# Patient Record
Sex: MALE | Race: WHITE | Employment: OTHER | URBAN - METROPOLITAN AREA
[De-identification: names, ages, dates, MRNs, and addresses within clinical notes are randomized per-mention and may not be internally consistent; named-entity substitution may affect disease eponyms.]

---

## 2018-03-15 ENCOUNTER — OFFICE VISIT (OUTPATIENT)
Dept: NEUROSURGERY | Age: 53
End: 2018-03-15
Payer: MEDICARE

## 2018-03-15 VITALS
DIASTOLIC BLOOD PRESSURE: 98 MMHG | BODY MASS INDEX: 33.18 KG/M2 | SYSTOLIC BLOOD PRESSURE: 150 MMHG | WEIGHT: 245 LBS | HEIGHT: 72 IN | HEART RATE: 90 BPM

## 2018-03-15 DIAGNOSIS — M54.42 CHRONIC MIDLINE LOW BACK PAIN WITH BILATERAL SCIATICA: Primary | ICD-10-CM

## 2018-03-15 DIAGNOSIS — G89.29 CHRONIC MIDLINE LOW BACK PAIN WITH BILATERAL SCIATICA: Primary | ICD-10-CM

## 2018-03-15 DIAGNOSIS — M54.41 CHRONIC MIDLINE LOW BACK PAIN WITH BILATERAL SCIATICA: Primary | ICD-10-CM

## 2018-03-15 PROCEDURE — 99204 OFFICE O/P NEW MOD 45 MIN: CPT | Performed by: NEUROLOGICAL SURGERY

## 2018-03-15 RX ORDER — ROSUVASTATIN CALCIUM 10 MG/1
10 TABLET, COATED ORAL DAILY
COMMUNITY

## 2018-03-15 RX ORDER — LOSARTAN POTASSIUM 50 MG/1
50 TABLET ORAL DAILY
Status: ON HOLD | COMMUNITY
End: 2022-04-27 | Stop reason: HOSPADM

## 2018-03-15 RX ORDER — OXYCODONE HYDROCHLORIDE 15 MG/1
1 TABLET, FILM COATED, EXTENDED RELEASE ORAL EVERY 12 HOURS
Status: ON HOLD | COMMUNITY
End: 2022-04-27 | Stop reason: HOSPADM

## 2018-03-15 RX ORDER — FUROSEMIDE 20 MG/1
20 TABLET ORAL DAILY
Status: ON HOLD | COMMUNITY
End: 2022-04-27 | Stop reason: HOSPADM

## 2018-03-15 RX ORDER — POTASSIUM CHLORIDE 1.5 G/1.77G
10 POWDER, FOR SOLUTION ORAL DAILY
Status: ON HOLD | COMMUNITY
End: 2022-04-27 | Stop reason: HOSPADM

## 2018-03-15 ASSESSMENT — ENCOUNTER SYMPTOMS
EYES NEGATIVE: 1
BACK PAIN: 1
ABDOMINAL PAIN: 0
BOWEL INCONTINENCE: 0
RESPIRATORY NEGATIVE: 1
GASTROINTESTINAL NEGATIVE: 1

## 2018-03-15 NOTE — PROGRESS NOTES
Mouth/Throat: Oropharynx is clear and moist. No oropharyngeal exudate. Eyes: EOM are normal. Pupils are equal, round, and reactive to light. Right eye exhibits no discharge. Left eye exhibits no discharge. No scleral icterus. Neck: Normal range of motion. Neck supple. No JVD present. No tracheal deviation present. No thyromegaly present. Pulmonary/Chest: No stridor. No apnea, no tachypnea and no bradypnea. He is not intubated. Abdominal: Normal appearance. He exhibits no distension. Musculoskeletal: He exhibits no edema, tenderness or deformity. Lymphadenopathy:     He has no cervical adenopathy. Neurological: He is alert. He has normal strength and normal reflexes. He is not disoriented. He displays no atrophy, no tremor and normal reflexes. No cranial nerve deficit or sensory deficit. He exhibits normal muscle tone. He displays a negative Romberg sign. He displays no seizure activity. Coordination and gait normal. GCS eye subscore is 4. GCS verbal subscore is 5. GCS motor subscore is 6. He displays no Babinski's sign on the right side. He displays no Babinski's sign on the left side. Reflex Scores:       Tricep reflexes are 2+ on the right side and 2+ on the left side. Bicep reflexes are 2+ on the right side and 2+ on the left side. Brachioradialis reflexes are 2+ on the right side and 2+ on the left side. Patellar reflexes are 2+ on the right side and 2+ on the left side. Achilles reflexes are 2+ on the right side and 2+ on the left side. Skin: Skin is warm and dry. No rash noted. He is not diaphoretic. No erythema. No pallor. Psychiatric: He has a normal mood and affect. His behavior is normal. Judgment and thought content normal.   Vitals reviewed. Assessment:      46year old male who presents with back pain that radiates into both legs. 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 He has failed epidurals and PT.

## 2018-03-15 NOTE — PATIENT INSTRUCTIONS
Patient Education        Back Pain: Care Instructions  Your Care Instructions    Back pain has many possible causes. It is often related to problems with muscles and ligaments of the back. It may also be related to problems with the nerves, discs, or bones of the back. Moving, lifting, standing, sitting, or sleeping in an awkward way can strain the back. Sometimes you don't notice the injury until later. Arthritis is another common cause of back pain. Although it may hurt a lot, back pain usually improves on its own within several weeks. Most people recover in 12 weeks or less. Using good home treatment and being careful not to stress your back can help you feel better sooner. Follow-up care is a key part of your treatment and safety. Be sure to make and go to all appointments, and call your doctor if you are having problems. It's also a good idea to know your test results and keep a list of the medicines you take. How can you care for yourself at home? · Sit or lie in positions that are most comfortable and reduce your pain. Try one of these positions when you lie down:  ¨ Lie on your back with your knees bent and supported by large pillows. ¨ Lie on the floor with your legs on the seat of a sofa or chair. Bharat Cornea on your side with your knees and hips bent and a pillow between your legs. ¨ Lie on your stomach if it does not make pain worse. · Do not sit up in bed, and avoid soft couches and twisted positions. Bed rest can help relieve pain at first, but it delays healing. Avoid bed rest after the first day of back pain. · Change positions every 30 minutes. If you must sit for long periods of time, take breaks from sitting. Get up and walk around, or lie in a comfortable position. · Try using a heating pad on a low or medium setting for 15 to 20 minutes every 2 or 3 hours. Try a warm shower in place of one session with the heating pad.   · You can also try an ice pack for 10 to 15 minutes every 2 to 3 hours. Put a thin cloth between the ice pack and your skin. · Take pain medicines exactly as directed. ¨ If the doctor gave you a prescription medicine for pain, take it as prescribed. ¨ If you are not taking a prescription pain medicine, ask your doctor if you can take an over-the-counter medicine. · Take short walks several times a day. You can start with 5 to 10 minutes, 3 or 4 times a day, and work up to longer walks. Walk on level surfaces and avoid hills and stairs until your back is better. · Return to work and other activities as soon as you can. Continued rest without activity is usually not good for your back. · To prevent future back pain, do exercises to stretch and strengthen your back and stomach. Learn how to use good posture, safe lifting techniques, and proper body mechanics. When should you call for help? Call your doctor now or seek immediate medical care if:  ? · You have new or worsening numbness in your legs. ? · You have new or worsening weakness in your legs. (This could make it hard to stand up.)   ? · You lose control of your bladder or bowels. ? Watch closely for changes in your health, and be sure to contact your doctor if:  ? · Your pain gets worse. ? · You are not getting better after 2 weeks. Where can you learn more? Go to https://[email or web address]. org and sign in to your [employer] account. Enter H801 in the [facility] box to learn more about \"Back Pain: Care Instructions. \"     If you do not have an account, please click on the \"Sign Up Now\" link. Current as of: March 21, 2017  Content Version: 11.5  © 3376-5363 [employer]wise, Incorporated. Care instructions adapted under license by [facility] If you have questions about a medical condition or this instruction, always ask your healthcare professional. [address] any warranty or liability for your use of this information.

## 2018-03-15 NOTE — COMMUNICATION BODY
Assessment:     46year old male who presents with back pain that radiates into both legs. 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 He has failed epidurals and PT. Plan:     I have advised him that he would benefit from an L4-L5 and L5-S1 posterior lumbar interbody fusion. He needs to quit smoking and he wants to hold off for as long as possible and I am in agreement .

## 2018-03-26 ENCOUNTER — HOSPITAL ENCOUNTER (OUTPATIENT)
Age: 53
Discharge: HOME | End: 2018-03-28
Payer: MEDICARE

## 2018-03-26 ENCOUNTER — HOSPITAL ENCOUNTER (OUTPATIENT)
Dept: GENERAL RADIOLOGY | Age: 53
Discharge: HOME | End: 2018-03-28
Payer: MEDICARE

## 2018-03-26 DIAGNOSIS — M54.42 CHRONIC MIDLINE LOW BACK PAIN WITH BILATERAL SCIATICA: ICD-10-CM

## 2018-03-26 DIAGNOSIS — M54.41 CHRONIC MIDLINE LOW BACK PAIN WITH BILATERAL SCIATICA: ICD-10-CM

## 2018-03-26 DIAGNOSIS — G89.29 CHRONIC MIDLINE LOW BACK PAIN WITH BILATERAL SCIATICA: ICD-10-CM

## 2018-03-26 PROCEDURE — 73521 X-RAY EXAM HIPS BI 2 VIEWS: CPT

## 2022-03-15 ENCOUNTER — APPOINTMENT (OUTPATIENT)
Dept: GENERAL RADIOLOGY | Age: 57
End: 2022-03-15
Payer: MEDICARE

## 2022-03-15 ENCOUNTER — HOSPITAL ENCOUNTER (EMERGENCY)
Age: 57
Discharge: HOME | End: 2022-03-15
Attending: EMERGENCY MEDICINE
Payer: MEDICARE

## 2022-03-15 VITALS
DIASTOLIC BLOOD PRESSURE: 87 MMHG | HEART RATE: 80 BPM | OXYGEN SATURATION: 98 % | SYSTOLIC BLOOD PRESSURE: 157 MMHG | TEMPERATURE: 97.8 F | RESPIRATION RATE: 16 BRPM | BODY MASS INDEX: 38.65 KG/M2 | WEIGHT: 285 LBS

## 2022-03-15 DIAGNOSIS — R07.9 CHEST PAIN, UNSPECIFIED TYPE: Primary | ICD-10-CM

## 2022-03-15 DIAGNOSIS — E87.1 HYPONATREMIA: ICD-10-CM

## 2022-03-15 LAB
ALBUMIN SERPL-MCNC: 4.6 G/DL (ref 3.5–5.2)
ALP BLD-CCNC: 122 U/L (ref 40–129)
ALT SERPL-CCNC: 73 U/L (ref 0–40)
ANION GAP SERPL CALCULATED.3IONS-SCNC: 14 MMOL/L (ref 7–16)
AST SERPL-CCNC: 70 U/L (ref 0–39)
BASOPHILS ABSOLUTE: 0 E9/L (ref 0–0.2)
BASOPHILS RELATIVE PERCENT: 0.3 % (ref 0–2)
BILIRUB SERPL-MCNC: 0.8 MG/DL (ref 0–1.2)
BUN BLDV-MCNC: 8 MG/DL (ref 6–20)
CALCIUM SERPL-MCNC: 9.7 MG/DL (ref 8.6–10.2)
CHLORIDE BLD-SCNC: 81 MMOL/L (ref 98–107)
CO2: 28 MMOL/L (ref 22–29)
CREAT SERPL-MCNC: 0.8 MG/DL (ref 0.7–1.2)
D DIMER: <200 NG/ML DDU
EOSINOPHILS ABSOLUTE: 0 E9/L (ref 0.05–0.5)
EOSINOPHILS RELATIVE PERCENT: 0.2 % (ref 0–6)
GFR AFRICAN AMERICAN: >60
GFR NON-AFRICAN AMERICAN: >60 ML/MIN/1.73
GLUCOSE BLD-MCNC: 147 MG/DL (ref 74–99)
HCT VFR BLD CALC: 39.2 % (ref 37–54)
HEMOGLOBIN: 12.7 G/DL (ref 12.5–16.5)
HYPOCHROMIA: ABNORMAL
LYMPHOCYTES ABSOLUTE: 0.51 E9/L (ref 1.5–4)
LYMPHOCYTES RELATIVE PERCENT: 7.9 % (ref 20–42)
MCH RBC QN AUTO: 25.2 PG (ref 26–35)
MCHC RBC AUTO-ENTMCNC: 32.4 % (ref 32–34.5)
MCV RBC AUTO: 77.9 FL (ref 80–99.9)
MONOCYTES ABSOLUTE: 0.26 E9/L (ref 0.1–0.95)
MONOCYTES RELATIVE PERCENT: 3.5 % (ref 2–12)
NEUTROPHILS ABSOLUTE: 5.7 E9/L (ref 1.8–7.3)
NEUTROPHILS RELATIVE PERCENT: 88.6 % (ref 43–80)
OVALOCYTES: ABNORMAL
PDW BLD-RTO: 19.7 FL (ref 11.5–15)
PLATELET # BLD: 243 E9/L (ref 130–450)
PMV BLD AUTO: 8.8 FL (ref 7–12)
POIKILOCYTES: ABNORMAL
POLYCHROMASIA: ABNORMAL
POTASSIUM REFLEX MAGNESIUM: 3.9 MMOL/L (ref 3.5–5)
PRO-BNP: 127 PG/ML (ref 0–125)
RBC # BLD: 5.03 E12/L (ref 3.8–5.8)
SODIUM BLD-SCNC: 123 MMOL/L (ref 132–146)
TARGET CELLS: ABNORMAL
TEAR DROP CELLS: ABNORMAL
TOTAL PROTEIN: 8.5 G/DL (ref 6.4–8.3)
TROPONIN, HIGH SENSITIVITY: <6 NG/L (ref 0–11)
WBC # BLD: 6.4 E9/L (ref 4.5–11.5)

## 2022-03-15 PROCEDURE — 99284 EMERGENCY DEPT VISIT MOD MDM: CPT

## 2022-03-15 PROCEDURE — 71045 X-RAY EXAM CHEST 1 VIEW: CPT

## 2022-03-15 PROCEDURE — 83880 ASSAY OF NATRIURETIC PEPTIDE: CPT

## 2022-03-15 PROCEDURE — 96374 THER/PROPH/DIAG INJ IV PUSH: CPT

## 2022-03-15 PROCEDURE — 93005 ELECTROCARDIOGRAM TRACING: CPT | Performed by: STUDENT IN AN ORGANIZED HEALTH CARE EDUCATION/TRAINING PROGRAM

## 2022-03-15 PROCEDURE — 85025 COMPLETE CBC W/AUTO DIFF WBC: CPT

## 2022-03-15 PROCEDURE — 2580000003 HC RX 258: Performed by: STUDENT IN AN ORGANIZED HEALTH CARE EDUCATION/TRAINING PROGRAM

## 2022-03-15 PROCEDURE — 85378 FIBRIN DEGRADE SEMIQUANT: CPT

## 2022-03-15 PROCEDURE — 6360000002 HC RX W HCPCS: Performed by: STUDENT IN AN ORGANIZED HEALTH CARE EDUCATION/TRAINING PROGRAM

## 2022-03-15 PROCEDURE — 6370000000 HC RX 637 (ALT 250 FOR IP): Performed by: STUDENT IN AN ORGANIZED HEALTH CARE EDUCATION/TRAINING PROGRAM

## 2022-03-15 PROCEDURE — 80053 COMPREHEN METABOLIC PANEL: CPT

## 2022-03-15 PROCEDURE — 84484 ASSAY OF TROPONIN QUANT: CPT

## 2022-03-15 RX ORDER — 0.9 % SODIUM CHLORIDE 0.9 %
1000 INTRAVENOUS SOLUTION INTRAVENOUS ONCE
Status: COMPLETED | OUTPATIENT
Start: 2022-03-15 | End: 2022-03-15

## 2022-03-15 RX ORDER — ASPIRIN 325 MG
325 TABLET ORAL ONCE
Status: COMPLETED | OUTPATIENT
Start: 2022-03-15 | End: 2022-03-15

## 2022-03-15 RX ORDER — KETOROLAC TROMETHAMINE 30 MG/ML
30 INJECTION, SOLUTION INTRAMUSCULAR; INTRAVENOUS ONCE
Status: COMPLETED | OUTPATIENT
Start: 2022-03-15 | End: 2022-03-15

## 2022-03-15 RX ORDER — PROMETHAZINE HYDROCHLORIDE 25 MG/1
12.5 TABLET ORAL EVERY 6 HOURS PRN
Qty: 28 TABLET | Refills: 0 | Status: SHIPPED | OUTPATIENT
Start: 2022-03-15 | End: 2022-03-22

## 2022-03-15 RX ORDER — NAPROXEN 500 MG/1
500 TABLET ORAL 2 TIMES DAILY WITH MEALS
Qty: 30 TABLET | Refills: 1 | Status: ON HOLD | OUTPATIENT
Start: 2022-03-15 | End: 2022-04-27 | Stop reason: HOSPADM

## 2022-03-15 RX ADMIN — KETOROLAC TROMETHAMINE 30 MG: 30 INJECTION, SOLUTION INTRAMUSCULAR; INTRAVENOUS at 14:02

## 2022-03-15 RX ADMIN — ASPIRIN 325 MG: 325 TABLET ORAL at 12:35

## 2022-03-15 RX ADMIN — SODIUM CHLORIDE 1000 ML: 9 INJECTION, SOLUTION INTRAVENOUS at 14:00

## 2022-03-15 ASSESSMENT — ENCOUNTER SYMPTOMS
SORE THROAT: 0
NAUSEA: 1
DIARRHEA: 0
SHORTNESS OF BREATH: 1
ABDOMINAL PAIN: 0
COUGH: 0
VOICE CHANGE: 0
TROUBLE SWALLOWING: 0
PHOTOPHOBIA: 0
VOMITING: 1

## 2022-03-15 ASSESSMENT — PAIN SCALES - GENERAL
PAINLEVEL_OUTOF10: 7
PAINLEVEL_OUTOF10: 8
PAINLEVEL_OUTOF10: 8

## 2022-03-15 NOTE — ED PROVIDER NOTES
HPI   Patient is a 54-year-old current-smoking male present emergency department due to worsening acute chest pain. Patient symptoms started 3 days ago. He denies any inciting factors for his symptoms. He is endorsing associated diaphoresis, nausea, vomiting and shortness of breath. Patient localizes pain to his mid to left chest.  The pain is dull and intermittently sharp. Patient states the pain is intermittent and nonradiating. Nothing particular makes the pain better or worse. No particular association with exertion. Patient denies any personal cardiac history. No risk factors for PE including recent prolonged travel, recent surgeries, history of malignancy, hemoptysis, unilateral leg swelling or history of VTE. Patient also denies any recent sick contacts or exposure to known Covid positive individuals. Patient is otherwise denying fever, chills, headache, weakness, lightheadedness, urinary symptoms, constipation or diarrhea. His symptoms are moderate with no remitting or exacerbating factors. Review of Systems   Constitutional: Positive for diaphoresis and fatigue. Negative for chills and fever. Generalized fatigue. HENT: Negative for congestion, sore throat, trouble swallowing and voice change. Eyes: Negative for photophobia and visual disturbance. Respiratory: Positive for shortness of breath. Negative for cough. Cardiovascular: Positive for chest pain. Negative for palpitations. Gastrointestinal: Positive for nausea and vomiting. Negative for abdominal pain and diarrhea. Genitourinary: Negative for dysuria, flank pain and urgency. Musculoskeletal: Negative for arthralgias and myalgias. Skin: Negative for rash and wound. Neurological: Negative for dizziness, numbness and headaches. Psychiatric/Behavioral: Negative for behavioral problems and confusion. Physical Exam  Constitutional:       General: He is not in acute distress.      Appearance: Normal appearance. He is obese. He is not ill-appearing. HENT:      Head: Normocephalic and atraumatic. Mouth/Throat:      Mouth: Mucous membranes are moist.      Pharynx: Oropharynx is clear. Eyes:      Pupils: Pupils are equal, round, and reactive to light. Cardiovascular:      Rate and Rhythm: Normal rate and regular rhythm. Pulses: Normal pulses. Heart sounds: Normal heart sounds. Pulmonary:      Effort: Pulmonary effort is normal. No respiratory distress. Breath sounds: Decreased breath sounds present. No wheezing or rales. Comments: Mildly diminished breath sounds throughout likely exacerbated by body habitus. Abdominal:      Palpations: Abdomen is soft. Tenderness: There is no abdominal tenderness. There is no guarding or rebound. Musculoskeletal:      Cervical back: Normal range of motion and neck supple. Skin:     General: Skin is warm and dry. Capillary Refill: Capillary refill takes less than 2 seconds. Neurological:      General: No focal deficit present. Mental Status: He is alert and oriented to person, place, and time. Cranial Nerves: No cranial nerve deficit. Coordination: Coordination normal.   Psychiatric:         Mood and Affect: Mood normal.         Behavior: Behavior normal.          Procedures   EKG: This EKG is signed and interpreted by me. Normal sinus rhythm with a ventricular rate of 79 bpm.  Normal axis. AR interval normal.  QTc significantly prolonged at 749 ms. No evidence of acute STEMI. T wave flattening/inversions in V1 through V3. T wave changes compared to previous EKG on 11/12/2013 but otherwise stable. MDM   Patient is a 55-year-old current smoking male presenting the emergency department due to worsening acute chest pain. Patient's pain has been ongoing for 3 days. Nothing in particular makes the pain better or worse. Patient endorsing nausea, vomiting, diaphoresis and generalized fatigue.   Aspirin given in the ED. Patient denies personal cardiac history. On initial exam, patient non-toxic appearing, afebrile, hemodynamically stable, and in no acute distress. Work-up in the emergency department unremarkable. Troponin negative. EKG sinus and nonspecific. Chest x-ray negative. Patient has no risk factors for PE.  D-dimer negative. CBC with no leukocytosis or anemia. CMP showing mild hyponatremia with sodium of 123 but otherwise unremarkable. Patient given IV fluids in the ED. Also given IV Toradol with improvement in his chest pain. Discussed hyponatremic labs with the patient and he is agreeable to follow this up with his PCP as an outpatient. Patient main stable and agreeable to discharge after work-up and results were discussed with him. Given strict return precautions in case of new or worsening symptoms. ED Course as of 03/15/22 1529   Tue Mar 15, 2022   1334 EKG: This EKG is signed and interpreted by me. Normal sinus rhythm with a ventricular rate of 79 bpm.  Normal axis. FL interval normal.  QTc significantly prolonged at 749 ms. No evidence of acute STEMI. T wave flattening/inversions in V1 through V3. [PP]   1407 Patient reevaluated. He states that he is still having pain in the chest.  We will try Toradol and reevaluate. Work-up that has returned is unremarkable. D-dimer pending. [PP]   1788 Reevaluated and states that he is feeling better after Toradol. Work-up and results were discussed with the patient at length. He is agreeable to discharge home with outpatient follow-up as needed. [PP]      ED Course User Index  [PP] You Mann DO          --------------------------------------------- PAST HISTORY ---------------------------------------------  Past Medical History:  has a past medical history of Arthritis, Back pain, chronic, Hyperlipidemia, Hypertension, and Neck pain, chronic.     Past Surgical History:  has a past surgical history that includes External ear surgery; fracture surgery; eye surgery; Hemorrhoid surgery; and Shoulder arthroscopy (Right, 1/16/2015). Social History:  reports that he has been smoking cigarettes. He has a 9.50 pack-year smoking history. He has never used smokeless tobacco. He reports current alcohol use of about 2.0 standard drinks of alcohol per week. He reports that he does not use drugs. Family History: family history is not on file. The patients home medications have been reviewed.     Allergies: Pcn [penicillins]    -------------------------------------------------- RESULTS -------------------------------------------------  Labs:  Results for orders placed or performed during the hospital encounter of 03/15/22   CBC with Auto Differential   Result Value Ref Range    WBC 6.4 4.5 - 11.5 E9/L    RBC 5.03 3.80 - 5.80 E12/L    Hemoglobin 12.7 12.5 - 16.5 g/dL    Hematocrit 39.2 37.0 - 54.0 %    MCV 77.9 (L) 80.0 - 99.9 fL    MCH 25.2 (L) 26.0 - 35.0 pg    MCHC 32.4 32.0 - 34.5 %    RDW 19.7 (H) 11.5 - 15.0 fL    Platelets 236 364 - 778 E9/L    MPV 8.8 7.0 - 12.0 fL    Neutrophils % 88.6 (H) 43.0 - 80.0 %    Lymphocytes % 7.9 (L) 20.0 - 42.0 %    Monocytes % 3.5 2.0 - 12.0 %    Eosinophils % 0.2 0.0 - 6.0 %    Basophils % 0.3 0.0 - 2.0 %    Neutrophils Absolute 5.70 1.80 - 7.30 E9/L    Lymphocytes Absolute 0.51 (L) 1.50 - 4.00 E9/L    Monocytes Absolute 0.26 0.10 - 0.95 E9/L    Eosinophils Absolute 0.00 (L) 0.05 - 0.50 E9/L    Basophils Absolute 0.00 0.00 - 0.20 E9/L    Polychromasia 1+     Hypochromia 1+     Poikilocytes 1+     Ovalocytes 1+     Target Cells 1+     Tear Drop Cells 1+    Comprehensive Metabolic Panel w/ Reflex to MG   Result Value Ref Range    Sodium 123 (L) 132 - 146 mmol/L    Potassium reflex Magnesium 3.9 3.5 - 5.0 mmol/L    Chloride 81 (L) 98 - 107 mmol/L    CO2 28 22 - 29 mmol/L    Anion Gap 14 7 - 16 mmol/L    Glucose 147 (H) 74 - 99 mg/dL    BUN 8 6 - 20 mg/dL    CREATININE 0.8 0.7 - 1.2 mg/dL    GFR Non-African American >60 >=60 mL/min/1.73    GFR African American >60     Calcium 9.7 8.6 - 10.2 mg/dL    Total Protein 8.5 (H) 6.4 - 8.3 g/dL    Albumin 4.6 3.5 - 5.2 g/dL    Total Bilirubin 0.8 0.0 - 1.2 mg/dL    Alkaline Phosphatase 122 40 - 129 U/L    ALT 73 (H) 0 - 40 U/L    AST 70 (H) 0 - 39 U/L   Troponin   Result Value Ref Range    Troponin, High Sensitivity <6 0 - 11 ng/L   Brain Natriuretic Peptide   Result Value Ref Range    Pro- (H) 0 - 125 pg/mL   D-Dimer, Quantitative   Result Value Ref Range    D-Dimer, Quant <200 ng/mL DDU       Radiology:  XR CHEST 1 VIEW   Final Result   No acute process. ------------------------- NURSING NOTES AND VITALS REVIEWED ---------------------------  Date / Time Roomed:  3/15/2022 12:11 PM  ED Bed Assignment:  19/19    The nursing notes within the ED encounter and vital signs as below have been reviewed. BP (!) 154/88   Pulse 88   Temp 97.8 °F (36.6 °C)   Resp 24   Wt 285 lb (129.3 kg)   SpO2 98%   BMI 38.65 kg/m²   Oxygen Saturation Interpretation: Normal      ------------------------------------------ PROGRESS NOTES ------------------------------------------  I have spoken with the patient and discussed todays results, in addition to providing specific details for the plan of care and counseling regarding the diagnosis and prognosis. Their questions are answered at this time and they are agreeable with the plan. I discussed at length with them reasons for immediate return here for re evaluation. They will followup with primary care by calling their office tomorrow. --------------------------------- ADDITIONAL PROVIDER NOTES ---------------------------------  At this time the patient is without objective evidence of an acute process requiring hospitalization or inpatient management. They have remained hemodynamically stable throughout their entire ED visit and are stable for discharge with outpatient follow-up.      The plan has been discussed in detail and they are aware of the specific conditions for emergent return, as well as the importance of follow-up. New Prescriptions    NAPROXEN (NAPROSYN) 500 MG TABLET    Take 1 tablet by mouth 2 times daily (with meals) for 15 days    PROMETHAZINE (PHENERGAN) 25 MG TABLET    Take 0.5 tablets by mouth every 6 hours as needed for Nausea       Diagnosis:  1. Chest pain, unspecified type    2. Hyponatremia        Disposition:  Patient's disposition: Discharge to home  Patient's condition is stable.            Edwin Resendiz DO  Resident  03/15/22 [id number]

## 2022-03-18 LAB
EKG ATRIAL RATE: 79 BPM
EKG P AXIS: 64 DEGREES
EKG P-R INTERVAL: 140 MS
EKG Q-T INTERVAL: 654 MS
EKG QRS DURATION: 94 MS
EKG QTC CALCULATION (BAZETT): 749 MS
EKG R AXIS: 65 DEGREES
EKG T AXIS: 64 DEGREES
EKG VENTRICULAR RATE: 79 BPM

## 2022-03-18 PROCEDURE — 93010 ELECTROCARDIOGRAM REPORT: CPT | Performed by: INTERNAL MEDICINE

## 2022-04-09 ENCOUNTER — HOSPITAL ENCOUNTER (INPATIENT)
Age: 57
LOS: 16 days | Discharge: NURSING, REHAB OR LONG-TERM CARE | DRG: 896 | End: 2022-04-27
Attending: EMERGENCY MEDICINE | Admitting: STUDENT IN AN ORGANIZED HEALTH CARE EDUCATION/TRAINING PROGRAM
Payer: MEDICARE

## 2022-04-09 ENCOUNTER — APPOINTMENT (OUTPATIENT)
Dept: GENERAL RADIOLOGY | Age: 57
DRG: 896 | End: 2022-04-09
Payer: MEDICARE

## 2022-04-09 DIAGNOSIS — M54.9 CHRONIC BACK PAIN, UNSPECIFIED BACK LOCATION, UNSPECIFIED BACK PAIN LATERALITY: ICD-10-CM

## 2022-04-09 DIAGNOSIS — G89.29 CHRONIC BACK PAIN, UNSPECIFIED BACK LOCATION, UNSPECIFIED BACK PAIN LATERALITY: ICD-10-CM

## 2022-04-09 DIAGNOSIS — J44.1 COPD EXACERBATION (HCC): Primary | ICD-10-CM

## 2022-04-09 DIAGNOSIS — R07.9 CHEST PAIN, UNSPECIFIED TYPE: ICD-10-CM

## 2022-04-09 DIAGNOSIS — E87.1 HYPONATREMIA: ICD-10-CM

## 2022-04-09 DIAGNOSIS — F10.931 ALCOHOL WITHDRAWAL DELIRIUM (HCC): ICD-10-CM

## 2022-04-09 LAB
ANION GAP SERPL CALCULATED.3IONS-SCNC: 17 MMOL/L (ref 7–16)
ANISOCYTOSIS: ABNORMAL
BASOPHILS ABSOLUTE: 0 E9/L (ref 0–0.2)
BASOPHILS RELATIVE PERCENT: 0.4 % (ref 0–2)
BUN BLDV-MCNC: 12 MG/DL (ref 6–20)
CALCIUM SERPL-MCNC: 9.3 MG/DL (ref 8.6–10.2)
CHLORIDE BLD-SCNC: 86 MMOL/L (ref 98–107)
CHLORIDE URINE RANDOM: 35 MMOL/L
CO2: 20 MMOL/L (ref 22–29)
CREAT SERPL-MCNC: 0.9 MG/DL (ref 0.7–1.2)
D DIMER: <200 NG/ML DDU
EOSINOPHILS ABSOLUTE: 0.07 E9/L (ref 0.05–0.5)
EOSINOPHILS RELATIVE PERCENT: 0.9 % (ref 0–6)
GFR AFRICAN AMERICAN: >60
GFR NON-AFRICAN AMERICAN: >60 ML/MIN/1.73
GLUCOSE BLD-MCNC: 97 MG/DL (ref 74–99)
HCT VFR BLD CALC: 34.8 % (ref 37–54)
HEMOGLOBIN: 11.2 G/DL (ref 12.5–16.5)
LYMPHOCYTES ABSOLUTE: 0.47 E9/L (ref 1.5–4)
LYMPHOCYTES RELATIVE PERCENT: 6.1 % (ref 20–42)
MCH RBC QN AUTO: 25.9 PG (ref 26–35)
MCHC RBC AUTO-ENTMCNC: 32.2 % (ref 32–34.5)
MCV RBC AUTO: 80.4 FL (ref 80–99.9)
MONOCYTES ABSOLUTE: 0.08 E9/L (ref 0.1–0.95)
MONOCYTES RELATIVE PERCENT: 0.9 % (ref 2–12)
NEUTROPHILS ABSOLUTE: 7.27 E9/L (ref 1.8–7.3)
NEUTROPHILS RELATIVE PERCENT: 92.1 % (ref 43–80)
OVALOCYTES: ABNORMAL
PDW BLD-RTO: 18.5 FL (ref 11.5–15)
PLATELET # BLD: 200 E9/L (ref 130–450)
PMV BLD AUTO: 8.4 FL (ref 7–12)
POIKILOCYTES: ABNORMAL
POLYCHROMASIA: ABNORMAL
POTASSIUM REFLEX MAGNESIUM: 4.3 MMOL/L (ref 3.5–5)
POTASSIUM, UR: 16.9 MMOL/L
PRO-BNP: 48 PG/ML (ref 0–125)
RBC # BLD: 4.33 E12/L (ref 3.8–5.8)
SODIUM BLD-SCNC: 123 MMOL/L (ref 132–146)
SODIUM URINE: 32 MMOL/L
TARGET CELLS: ABNORMAL
TROPONIN, HIGH SENSITIVITY: <6 NG/L (ref 0–11)
TROPONIN, HIGH SENSITIVITY: <6 NG/L (ref 0–11)
WBC # BLD: 7.9 E9/L (ref 4.5–11.5)

## 2022-04-09 PROCEDURE — 96375 TX/PRO/DX INJ NEW DRUG ADDON: CPT

## 2022-04-09 PROCEDURE — G0378 HOSPITAL OBSERVATION PER HR: HCPCS

## 2022-04-09 PROCEDURE — 85025 COMPLETE CBC W/AUTO DIFF WBC: CPT

## 2022-04-09 PROCEDURE — 96361 HYDRATE IV INFUSION ADD-ON: CPT

## 2022-04-09 PROCEDURE — 83880 ASSAY OF NATRIURETIC PEPTIDE: CPT

## 2022-04-09 PROCEDURE — 99220 PR INITIAL OBSERVATION CARE/DAY 70 MINUTES: CPT | Performed by: STUDENT IN AN ORGANIZED HEALTH CARE EDUCATION/TRAINING PROGRAM

## 2022-04-09 PROCEDURE — 83935 ASSAY OF URINE OSMOLALITY: CPT

## 2022-04-09 PROCEDURE — 6360000002 HC RX W HCPCS: Performed by: STUDENT IN AN ORGANIZED HEALTH CARE EDUCATION/TRAINING PROGRAM

## 2022-04-09 PROCEDURE — 94640 AIRWAY INHALATION TREATMENT: CPT

## 2022-04-09 PROCEDURE — 93005 ELECTROCARDIOGRAM TRACING: CPT | Performed by: STUDENT IN AN ORGANIZED HEALTH CARE EDUCATION/TRAINING PROGRAM

## 2022-04-09 PROCEDURE — 96374 THER/PROPH/DIAG INJ IV PUSH: CPT

## 2022-04-09 PROCEDURE — 71045 X-RAY EXAM CHEST 1 VIEW: CPT

## 2022-04-09 PROCEDURE — 80048 BASIC METABOLIC PNL TOTAL CA: CPT

## 2022-04-09 PROCEDURE — 6370000000 HC RX 637 (ALT 250 FOR IP): Performed by: STUDENT IN AN ORGANIZED HEALTH CARE EDUCATION/TRAINING PROGRAM

## 2022-04-09 PROCEDURE — 87449 NOS EACH ORGANISM AG IA: CPT

## 2022-04-09 PROCEDURE — 82436 ASSAY OF URINE CHLORIDE: CPT

## 2022-04-09 PROCEDURE — 2580000003 HC RX 258: Performed by: STUDENT IN AN ORGANIZED HEALTH CARE EDUCATION/TRAINING PROGRAM

## 2022-04-09 PROCEDURE — 99285 EMERGENCY DEPT VISIT HI MDM: CPT

## 2022-04-09 PROCEDURE — 84145 PROCALCITONIN (PCT): CPT

## 2022-04-09 PROCEDURE — 80307 DRUG TEST PRSMV CHEM ANLYZR: CPT

## 2022-04-09 PROCEDURE — 84133 ASSAY OF URINE POTASSIUM: CPT

## 2022-04-09 PROCEDURE — 85378 FIBRIN DEGRADE SEMIQUANT: CPT

## 2022-04-09 PROCEDURE — 6360000002 HC RX W HCPCS: Performed by: INTERNAL MEDICINE

## 2022-04-09 PROCEDURE — 96372 THER/PROPH/DIAG INJ SC/IM: CPT

## 2022-04-09 PROCEDURE — 94664 DEMO&/EVAL PT USE INHALER: CPT

## 2022-04-09 PROCEDURE — 6370000000 HC RX 637 (ALT 250 FOR IP): Performed by: INTERNAL MEDICINE

## 2022-04-09 PROCEDURE — 84300 ASSAY OF URINE SODIUM: CPT

## 2022-04-09 PROCEDURE — 84484 ASSAY OF TROPONIN QUANT: CPT

## 2022-04-09 RX ORDER — BUDESONIDE 0.5 MG/2ML
0.5 INHALANT ORAL 2 TIMES DAILY
Status: DISCONTINUED | OUTPATIENT
Start: 2022-04-09 | End: 2022-04-13

## 2022-04-09 RX ORDER — ALBUTEROL SULFATE 90 UG/1
2 AEROSOL, METERED RESPIRATORY (INHALATION) EVERY 8 HOURS PRN
COMMUNITY
Start: 2022-01-31

## 2022-04-09 RX ORDER — METHYLPREDNISOLONE SODIUM SUCCINATE 125 MG/2ML
125 INJECTION, POWDER, LYOPHILIZED, FOR SOLUTION INTRAMUSCULAR; INTRAVENOUS ONCE
Status: COMPLETED | OUTPATIENT
Start: 2022-04-09 | End: 2022-04-09

## 2022-04-09 RX ORDER — ROSUVASTATIN CALCIUM 10 MG/1
10 TABLET, COATED ORAL DAILY
Status: DISCONTINUED | OUTPATIENT
Start: 2022-04-09 | End: 2022-04-20

## 2022-04-09 RX ORDER — SODIUM CHLORIDE 0.9 % (FLUSH) 0.9 %
5-40 SYRINGE (ML) INJECTION EVERY 12 HOURS SCHEDULED
Status: DISCONTINUED | OUTPATIENT
Start: 2022-04-09 | End: 2022-04-27 | Stop reason: HOSPADM

## 2022-04-09 RX ORDER — IPRATROPIUM BROMIDE AND ALBUTEROL SULFATE 2.5; .5 MG/3ML; MG/3ML
3 SOLUTION RESPIRATORY (INHALATION) ONCE
Status: COMPLETED | OUTPATIENT
Start: 2022-04-09 | End: 2022-04-09

## 2022-04-09 RX ORDER — 0.9 % SODIUM CHLORIDE 0.9 %
1000 INTRAVENOUS SOLUTION INTRAVENOUS ONCE
Status: COMPLETED | OUTPATIENT
Start: 2022-04-09 | End: 2022-04-09

## 2022-04-09 RX ORDER — PANTOPRAZOLE SODIUM 40 MG/1
40 TABLET, DELAYED RELEASE ORAL
Status: DISCONTINUED | OUTPATIENT
Start: 2022-04-10 | End: 2022-04-10

## 2022-04-09 RX ORDER — MORPHINE SULFATE 2 MG/ML
2 INJECTION, SOLUTION INTRAMUSCULAR; INTRAVENOUS ONCE
Status: COMPLETED | OUTPATIENT
Start: 2022-04-09 | End: 2022-04-09

## 2022-04-09 RX ORDER — ACETAMINOPHEN 650 MG/1
650 SUPPOSITORY RECTAL EVERY 6 HOURS PRN
Status: DISCONTINUED | OUTPATIENT
Start: 2022-04-09 | End: 2022-04-20

## 2022-04-09 RX ORDER — HYDROXYZINE 50 MG/1
100 TABLET, FILM COATED ORAL 2 TIMES DAILY
Status: ON HOLD | COMMUNITY
Start: 2022-01-03 | End: 2022-04-27 | Stop reason: HOSPADM

## 2022-04-09 RX ORDER — FUROSEMIDE 20 MG/1
20 TABLET ORAL DAILY
Status: DISCONTINUED | OUTPATIENT
Start: 2022-04-09 | End: 2022-04-13

## 2022-04-09 RX ORDER — LOSARTAN POTASSIUM 50 MG/1
50 TABLET ORAL DAILY
Status: DISCONTINUED | OUTPATIENT
Start: 2022-04-09 | End: 2022-04-27 | Stop reason: HOSPADM

## 2022-04-09 RX ORDER — METHYLPREDNISOLONE SODIUM SUCCINATE 40 MG/ML
40 INJECTION, POWDER, LYOPHILIZED, FOR SOLUTION INTRAMUSCULAR; INTRAVENOUS EVERY 12 HOURS
Status: DISCONTINUED | OUTPATIENT
Start: 2022-04-10 | End: 2022-04-12

## 2022-04-09 RX ORDER — ACETAMINOPHEN 325 MG/1
650 TABLET ORAL EVERY 6 HOURS PRN
Status: DISCONTINUED | OUTPATIENT
Start: 2022-04-09 | End: 2022-04-20

## 2022-04-09 RX ORDER — ONDANSETRON 4 MG/1
4 TABLET, ORALLY DISINTEGRATING ORAL EVERY 8 HOURS PRN
Status: DISCONTINUED | OUTPATIENT
Start: 2022-04-09 | End: 2022-04-27 | Stop reason: HOSPADM

## 2022-04-09 RX ORDER — ASPIRIN 81 MG/1
324 TABLET, CHEWABLE ORAL ONCE
Status: COMPLETED | OUTPATIENT
Start: 2022-04-09 | End: 2022-04-09

## 2022-04-09 RX ORDER — ESCITALOPRAM OXALATE 10 MG/1
10 TABLET ORAL DAILY
COMMUNITY
Start: 2022-04-03 | End: 2022-06-08 | Stop reason: HOSPADM

## 2022-04-09 RX ORDER — METHYLPREDNISOLONE SODIUM SUCCINATE 125 MG/2ML
125 INJECTION, POWDER, LYOPHILIZED, FOR SOLUTION INTRAMUSCULAR; INTRAVENOUS DAILY
Status: DISCONTINUED | OUTPATIENT
Start: 2022-04-09 | End: 2022-04-09

## 2022-04-09 RX ORDER — SODIUM CHLORIDE 0.9 % (FLUSH) 0.9 %
5-40 SYRINGE (ML) INJECTION PRN
Status: DISCONTINUED | OUTPATIENT
Start: 2022-04-09 | End: 2022-04-27 | Stop reason: HOSPADM

## 2022-04-09 RX ORDER — NICOTINE 21 MG/24HR
1 PATCH, TRANSDERMAL 24 HOURS TRANSDERMAL DAILY
Status: DISCONTINUED | OUTPATIENT
Start: 2022-04-09 | End: 2022-04-27 | Stop reason: HOSPADM

## 2022-04-09 RX ORDER — ONDANSETRON 2 MG/ML
4 INJECTION INTRAMUSCULAR; INTRAVENOUS EVERY 6 HOURS PRN
Status: DISCONTINUED | OUTPATIENT
Start: 2022-04-09 | End: 2022-04-27 | Stop reason: HOSPADM

## 2022-04-09 RX ORDER — HYDROCHLOROTHIAZIDE 25 MG/1
50 TABLET ORAL DAILY
Status: DISCONTINUED | OUTPATIENT
Start: 2022-04-09 | End: 2022-04-13

## 2022-04-09 RX ORDER — POLYETHYLENE GLYCOL 3350 17 G/17G
17 POWDER, FOR SOLUTION ORAL DAILY PRN
Status: DISCONTINUED | OUTPATIENT
Start: 2022-04-09 | End: 2022-04-27 | Stop reason: HOSPADM

## 2022-04-09 RX ORDER — LEVALBUTEROL INHALATION SOLUTION 0.31 MG/3ML
0.31 SOLUTION RESPIRATORY (INHALATION) 4 TIMES DAILY
Status: DISCONTINUED | OUTPATIENT
Start: 2022-04-09 | End: 2022-04-10

## 2022-04-09 RX ORDER — GABAPENTIN 300 MG/1
300 CAPSULE ORAL 3 TIMES DAILY
Status: DISCONTINUED | OUTPATIENT
Start: 2022-04-09 | End: 2022-04-12

## 2022-04-09 RX ORDER — ASPIRIN 81 MG/1
81 TABLET, CHEWABLE ORAL DAILY
Status: DISCONTINUED | OUTPATIENT
Start: 2022-04-10 | End: 2022-04-20

## 2022-04-09 RX ORDER — SODIUM CHLORIDE 9 MG/ML
INJECTION, SOLUTION INTRAVENOUS PRN
Status: DISCONTINUED | OUTPATIENT
Start: 2022-04-09 | End: 2022-04-27 | Stop reason: HOSPADM

## 2022-04-09 RX ADMIN — GABAPENTIN 300 MG: 300 CAPSULE ORAL at 22:18

## 2022-04-09 RX ADMIN — ENOXAPARIN SODIUM 30 MG: 100 INJECTION SUBCUTANEOUS at 22:17

## 2022-04-09 RX ADMIN — SODIUM CHLORIDE 1000 ML: 9 INJECTION, SOLUTION INTRAVENOUS at 17:32

## 2022-04-09 RX ADMIN — MORPHINE SULFATE 2 MG: 2 INJECTION, SOLUTION INTRAMUSCULAR; INTRAVENOUS at 20:27

## 2022-04-09 RX ADMIN — ASPIRIN 81 MG 324 MG: 81 TABLET ORAL at 17:28

## 2022-04-09 RX ADMIN — IPRATROPIUM BROMIDE 0.5 MG: 0.5 SOLUTION RESPIRATORY (INHALATION) at 19:02

## 2022-04-09 RX ADMIN — IPRATROPIUM BROMIDE AND ALBUTEROL SULFATE 3 AMPULE: .5; 3 SOLUTION RESPIRATORY (INHALATION) at 16:25

## 2022-04-09 RX ADMIN — LEVALBUTEROL 0.31 MG: 0.31 SOLUTION RESPIRATORY (INHALATION) at 19:03

## 2022-04-09 RX ADMIN — BUDESONIDE 500 MCG: 0.5 INHALANT RESPIRATORY (INHALATION) at 19:02

## 2022-04-09 RX ADMIN — METHYLPREDNISOLONE SODIUM SUCCINATE 125 MG: 125 INJECTION, POWDER, FOR SOLUTION INTRAMUSCULAR; INTRAVENOUS at 16:21

## 2022-04-09 RX ADMIN — Medication 10 ML: at 20:28

## 2022-04-09 ASSESSMENT — PAIN DESCRIPTION - PAIN TYPE
TYPE: ACUTE PAIN
TYPE: ACUTE PAIN

## 2022-04-09 ASSESSMENT — PAIN DESCRIPTION - LOCATION
LOCATION: CHEST
LOCATION: CHEST

## 2022-04-09 ASSESSMENT — ENCOUNTER SYMPTOMS
NAUSEA: 0
SORE THROAT: 0
COUGH: 0
VOMITING: 0
SHORTNESS OF BREATH: 1
ABDOMINAL PAIN: 0
DIARRHEA: 0
WHEEZING: 0
EYE REDNESS: 0
SINUS PRESSURE: 0
EYE PAIN: 0
BACK PAIN: 0
EYE DISCHARGE: 0

## 2022-04-09 ASSESSMENT — PAIN SCALES - GENERAL
PAINLEVEL_OUTOF10: 6
PAINLEVEL_OUTOF10: 8
PAINLEVEL_OUTOF10: 8

## 2022-04-09 ASSESSMENT — PAIN DESCRIPTION - ORIENTATION
ORIENTATION: LEFT
ORIENTATION: LEFT

## 2022-04-09 ASSESSMENT — PAIN DESCRIPTION - DESCRIPTORS
DESCRIPTORS: HEAVINESS;SHARP
DESCRIPTORS: HEAVINESS;SHARP

## 2022-04-09 ASSESSMENT — PAIN - FUNCTIONAL ASSESSMENT: PAIN_FUNCTIONAL_ASSESSMENT: PREVENTS OR INTERFERES WITH MANY ACTIVE NOT PASSIVE ACTIVITIES

## 2022-04-09 ASSESSMENT — PAIN DESCRIPTION - ONSET: ONSET: ON-GOING

## 2022-04-09 ASSESSMENT — PAIN DESCRIPTION - PROGRESSION: CLINICAL_PROGRESSION: GRADUALLY WORSENING

## 2022-04-09 ASSESSMENT — PAIN DESCRIPTION - FREQUENCY: FREQUENCY: CONTINUOUS

## 2022-04-09 ASSESSMENT — PAIN DESCRIPTION - DIRECTION: RADIATING_TOWARDS: DOWN L LEG

## 2022-04-09 NOTE — ED PROVIDER NOTES
The history is provided by the patient and medical records. 71-year-old male presents emergency department with complaint of shortness of breath and chest pain. Patient states symptoms going for approximately 2 days. States significantly worsening of shortness of breath and left-sided chest pain whenever asked to do any exertional activity. He denies any history of COPD however he does smoke 1/2 pack a day for the past 43 years. He states that the chest pain is like a pressure sensation left side of his chest does not radiate. Does have associated diaphoresis with any exertional activity with his chest pain and shortness of breath. He otherwise denies any abdominal pain change bowel bladder habits fevers chills cough congestion rhinitis. No other acute complaints this time. The patient presents with shortness of breath that has been going on for 2 days. These symptoms are moderate in severity. Symptoms are made better by nothing. Symptoms are made worse by exertion. Associated symptoms include chest pain. Review of Systems   Constitutional: Negative for chills and fever. HENT: Negative for ear pain, sinus pressure and sore throat. Eyes: Negative for pain, discharge and redness. Respiratory: Positive for shortness of breath. Negative for cough and wheezing. Cardiovascular: Positive for chest pain. Gastrointestinal: Negative for abdominal pain, diarrhea, nausea and vomiting. Genitourinary: Negative for dysuria and frequency. Musculoskeletal: Negative for arthralgias and back pain. Skin: Negative for rash and wound. Neurological: Negative for weakness and headaches. Hematological: Negative for adenopathy. All other systems reviewed and are negative. Physical Exam  Vitals and nursing note reviewed. Constitutional:       Appearance: He is well-developed. He is not toxic-appearing. HENT:      Head: Normocephalic and atraumatic.    Eyes:      Conjunctiva/sclera: Conjunctivae normal.   Cardiovascular:      Rate and Rhythm: Normal rate and regular rhythm. Heart sounds: Normal heart sounds. No murmur heard. Pulmonary:      Effort: Pulmonary effort is normal. Tachypnea present. No respiratory distress. Breath sounds: Wheezing present. No rales. Abdominal:      General: Bowel sounds are normal.      Palpations: Abdomen is soft. Tenderness: There is no abdominal tenderness. There is no guarding or rebound. Musculoskeletal:         General: No swelling, tenderness or deformity. Cervical back: Normal range of motion and neck supple. Skin:     General: Skin is warm and dry. Coloration: Skin is not jaundiced. Neurological:      General: No focal deficit present. Mental Status: He is alert and oriented to person, place, and time. Psychiatric:         Mood and Affect: Mood normal.         Thought Content: Thought content normal.          Procedures     MDM     63-year male presents emergency department complaint of shortness of breath and chest pain. Patient and appears to have wheezing and concern for COPD exacerbation due to his significant smoking history. Given DuoNeb's as well as Solu-Medrol. EKG was unremarkable for any ST elevation depression troponin less than 6. Patient made laboratory work-up and chest x-ray unremarkable. However patient does have significant medical history including hypertension high cholesterol and cigarette smoking. And with his chest pain with exertion this is very concerning. Patient will need admitted to the hospital for further work-up and evaluation. He is agreeable to this plan.                 --------------------------------------------- PAST HISTORY ---------------------------------------------  Past Medical History:  has a past medical history of Arthritis, Back pain, chronic, Hyperlipidemia, Hypertension, and Neck pain, chronic.     Past Surgical History:  has a past surgical history that includes External ear surgery; fracture surgery; eye surgery; Hemorrhoid surgery; and Shoulder arthroscopy (Right, 1/16/2015). Social History:  reports that he has been smoking cigarettes. He has a 9.50 pack-year smoking history. He has never used smokeless tobacco. He reports current alcohol use of about 2.0 standard drinks of alcohol per week. He reports that he does not use drugs. Family History: family history is not on file. The patients home medications have been reviewed.     Allergies: Pcn [penicillins]    -------------------------------------------------- RESULTS -------------------------------------------------    LABS:  Results for orders placed or performed during the hospital encounter of 04/09/22   CBC with Auto Differential   Result Value Ref Range    WBC 7.9 4.5 - 11.5 E9/L    RBC 4.33 3.80 - 5.80 E12/L    Hemoglobin 11.2 (L) 12.5 - 16.5 g/dL    Hematocrit 34.8 (L) 37.0 - 54.0 %    MCV 80.4 80.0 - 99.9 fL    MCH 25.9 (L) 26.0 - 35.0 pg    MCHC 32.2 32.0 - 34.5 %    RDW 18.5 (H) 11.5 - 15.0 fL    Platelets 031 999 - 865 E9/L    MPV 8.4 7.0 - 12.0 fL    Neutrophils % 92.1 (H) 43.0 - 80.0 %    Lymphocytes % 6.1 (L) 20.0 - 42.0 %    Monocytes % 0.9 (L) 2.0 - 12.0 %    Eosinophils % 0.9 0.0 - 6.0 %    Basophils % 0.4 0.0 - 2.0 %    Neutrophils Absolute 7.27 1.80 - 7.30 E9/L    Lymphocytes Absolute 0.47 (L) 1.50 - 4.00 E9/L    Monocytes Absolute 0.08 (L) 0.10 - 0.95 E9/L    Eosinophils Absolute 0.07 0.05 - 0.50 E9/L    Basophils Absolute 0.00 0.00 - 0.20 E9/L    Anisocytosis 2+     Polychromasia 1+     Poikilocytes 1+     Ovalocytes 1+     Target Cells 1+    Basic Metabolic Panel w/ Reflex to MG   Result Value Ref Range    Sodium 123 (L) 132 - 146 mmol/L    Potassium reflex Magnesium 4.3 3.5 - 5.0 mmol/L    Chloride 86 (L) 98 - 107 mmol/L    CO2 20 (L) 22 - 29 mmol/L    Anion Gap 17 (H) 7 - 16 mmol/L    Glucose 97 74 - 99 mg/dL    BUN 12 6 - 20 mg/dL    CREATININE 0.9 0.7 - 1.2 mg/dL    GFR Non-African American >60 >=60 mL/min/1.73    GFR African American >60     Calcium 9.3 8.6 - 10.2 mg/dL   Troponin   Result Value Ref Range    Troponin, High Sensitivity <6 0 - 11 ng/L   Brain Natriuretic Peptide   Result Value Ref Range    Pro-BNP 48 0 - 125 pg/mL   EKG 12 Lead   Result Value Ref Range    Ventricular Rate 91 BPM    Atrial Rate 91 BPM    P-R Interval 146 ms    QRS Duration 88 ms    Q-T Interval 394 ms    QTc Calculation (Bazett) 484 ms    P Axis 67 degrees    R Axis 71 degrees    T Axis 68 degrees       RADIOLOGY:  XR CHEST PORTABLE   Final Result   No pneumonia or pleural effusion. EKG:  This EKG is signed and interpreted by me. Rate: 91  Rhythm: Sinus  Interpretation: No acute ST elevation depression sinus rhythm normal axis  Comparison: stable as compared to patient's most recent EKG      ------------------------- NURSING NOTES AND VITALS REVIEWED ---------------------------  Date / Time Roomed:  4/9/2022  4:02 PM  ED Bed Assignment:  07/07    The nursing notes within the ED encounter and vital signs as below have been reviewed.      Patient Vitals for the past 24 hrs:   BP Temp Temp src Pulse Resp SpO2 Weight   04/09/22 1728 132/65 -- -- 99 -- 97 % --   04/09/22 1715 -- -- -- 113 -- 91 % --   04/09/22 1700 -- -- -- 98 22 100 % --   04/09/22 1654 -- -- -- -- -- -- 300 lb (136.1 kg)   04/09/22 1645 -- -- -- 95 13 100 % --   04/09/22 1630 -- -- -- 86 13 100 % --   04/09/22 1628 -- -- -- -- 15 99 % --   04/09/22 1627 -- -- -- -- 23 95 % --   04/09/22 1626 -- -- -- -- 14 95 % --   04/09/22 1610 -- 98 °F (36.7 °C) Oral -- -- -- --   04/09/22 1608 (!) 156/65 -- Oral 101 18 97 % --       Oxygen Saturation Interpretation: Normal    ------------------------------------------ PROGRESS NOTES ------------------------------------------  Re-evaluation(s):  Time: 1700  Patients symptoms show no change  Repeat physical examination is not changed    Counseling:  I have spoken with the patient and discussed todays results, in addition to providing specific details for the plan of care and counseling regarding the diagnosis and prognosis. Their questions are answered at this time and they are agreeable with the plan of admission.    --------------------------------- ADDITIONAL PROVIDER NOTES ---------------------------------  Consultations:  Time: 1728. Spoke with hospitalist.  Discussed case. They will admit the patient. This patient's ED course included: a personal history and physicial examination, re-evaluation prior to disposition, multiple bedside re-evaluations, IV medications, cardiac monitoring and continuous pulse oximetry    This patient has remained hemodynamically stable during their ED course. Diagnosis:  1. COPD exacerbation (HCC)    2. Chest pain, unspecified type    3. Hyponatremia        Disposition:  Patient's disposition: Admit to telemetry  Patient's condition is stable.          Norman Louis MD  Resident  04/09/22 0757

## 2022-04-09 NOTE — LETTER
[address] Medicaid  CERTIFICATION OF NECESSITY  FOR TRANSPORTATION   BY WHEELCHAIR VAN     Individual Information   1. Name: Freddie Alegre 2. [place] Medicaid Billing Number:    3. Address: [place] [address]      Transportation Provider Information   4. Provider Name: [facility]    5. [place] Medicaid Provider Number:  National Provider Identifier (NPI):      Certification  7. Criteria:  By signing this document, the practitioner certifies that two statements are true:  A. This individual must be accompanied by a mobility-related assistive device from the point of pick-up to the point of drop-off. B. Transport of this individual by standard passenger vehicle or common carrier is precluded or contraindicated. 8. Period Beginning Date: 4/27/2022   9. Length  [x] Not more than 1 day(s)  [] One Year     Additional Information Relevant to Certification   10. Comments or Explanations, If Necessary or Appropriate     Transportation to facility. No other transportation available for rehab      Certifying Practitioner Information   11. Name of Practitioner: Dr. Peter Barlow    12. [place] Medicaid Provider Number, If Applicable:  [address] Provider Identifier (NPI):      Signature Information   14. Date of Signature: 4/27/2022 15.  Name of Person Signing: Arelis Urias RN`   Electronically signed by Arelis Urias RN on 4/27/2022 at 3:20 PM       ODM [id number]  Rev. 7/2015

## 2022-04-09 NOTE — H&P
[address] [place], [place] [address]ist Group   History and Physical      CHIEF COMPLAINT:  Chest pressure, SOB    History of Present Illness:  64 y.o. male with a history of HTN, HLD, GERD, active smoker presents with 3 days of chest pressure. He states that for the past 3 days he has had pressure (feeling like someone is sitting on his chest) in the center of his chest, associated with SOB and sweating. These symptoms happen with exertion. He also has been more shaky than usual and gets chills at times. He also has a productive cough and feels that his \"sinuses are backed up\". He states that he came to the ER a month ago also for chest pressure and SOB and was sent home. He was nauseous at that time as well and was given antinausea medication which he is still taking now. He denies any vomiting, diarrhea, constipation, abdominal pain, palpitations, leg swelling or any other complaints. Informant(s) for H&P:Patient, ER, EMR review    REVIEW OF SYSTEMS:  no fevers, chills, cp, sob, n/v, ha, vision/hearing changes, wt changes, hot/cold flashes, other open skin lesions, diarrhea, constipation, dysuria/hematuria unless noted in HPI. Complete ROS performed with the patient and is otherwise negative. PMH:  Past Medical History:   Diagnosis Date    Arthritis     Back pain, chronic     Hyperlipidemia     Hypertension     Neck pain, chronic        Surgical History:  Past Surgical History:   Procedure Laterality Date    EXTERNAL EAR SURGERY      i & d left ear.  EYE SURGERY      Lt. Eye  Dart removed   [address] [employer]    HEMORRHOID SURGERY      SHOULDER ARTHROSCOPY Right 1/16/2015    right shoulder bicep tenodesis subacromail decompression and debridement       Medications Prior to Admission:    Prior to Admission medications    Medication Sig Start Date End Date Taking?  Authorizing Provider   naproxen (NAPROSYN) 500 MG tablet Take 1 tablet by mouth 2 times daily (with meals) for 15 days 3/15/22 3/30/22  Tony Myers DO   furosemide (LASIX) 20 MG tablet Take 20 mg by mouth daily    Historical Provider, MD   potassium chloride (KLOR-CON) 20 MEQ packet Take 10 mEq by mouth daily    Historical Provider, MD   losartan (COZAAR) 50 MG tablet Take 50 mg by mouth daily    Historical Provider, MD   rosuvastatin (CRESTOR) 10 MG tablet Take 10 mg by mouth daily    Historical Provider, MD   oxyCODONE (OXYCONTIN) 15 MG T12A extended release tablet Take 1 tablet by mouth every 12 hours. Historical Provider, MD   oxyCODONE HCl ER 10 MG CR tablet Take 10 mg by mouth every 12 hours    Historical Provider, MD   Gabapentin (NEURONTIN PO) Take by mouth    Historical Provider, MD   traMADol (ULTRAM) 50 MG tablet Take 50 mg by mouth every 6 hours as needed for Pain    Historical Provider, MD   Methocarbamol (ROBAXIN-750 PO) Take by mouth    Historical Provider, MD   ibuprofen (ADVIL;MOTRIN) 200 MG tablet Take 600 mg by mouth every 6 hours as needed for Pain    Historical Provider, MD   brompheniramine-pseudoephedrine-DM 30-2-10 MG/5ML syrup Take 5 mLs by mouth 4 times daily as needed 3/14/16   Lee Xiong MD   omeprazole (PRILOSEC) 20 MG capsule Take 20 mg by mouth Daily. 11/4/13   Historical Provider, MD   hydrochlorothiazide (HYDRODIURIL) 25 MG tablet Take 50 mg by mouth daily. Historical Provider, MD   verapamil (VERELAN PM) 360 MG CR capsule Take 360 mg by mouth daily. Historical Provider, MD       Allergies:    Pcn [penicillins]    Social History:    reports that he has been smoking cigarettes. He has a 9.50 pack-year smoking history. He has never used smokeless tobacco. He reports current alcohol use of about 2.0 standard drinks of alcohol per week. He reports that he does not use drugs. Family History:   family history is not on file.  Reports his brother had a heart attack    PHYSICAL EXAM:  Vitals:  /65   Pulse 96   Temp 98 °F (36.7 °C) (Oral)   Resp 25   Wt 300 lb (136.1 kg)   SpO2 96%   BMI 40.69 kg/m²     General Appearance: alert and oriented to person, place and time and in mild distress, obese  Skin: warm, diaphoretic  Head: normocephalic and atraumatic  Eyes: pupils equal, round, and reactive to light, extraocular eye movements intact, conjunctivae normal  Neck: neck supple and non tender without mass   Pulmonary/Chest:mild wheezing throughout, normal air movement, no respiratory distress  Cardiovascular: tachycardic, normal S1 and S2 and no carotid bruits  Abdomen: soft, non-tender, non-distended, normal bowel sounds, no masses or organomegaly  Extremities: no cyanosis, no clubbing and no edema  Neurologic: no cranial nerve deficit and speech normal    LABS:  Recent Labs     04/09/22  1619   *   K 4.3   CL 86*   CO2 20*   BUN 12   CREATININE 0.9   GLUCOSE 97   CALCIUM 9.3       Recent Labs     04/09/22  1619   WBC 7.9   RBC 4.33   HGB 11.2*   HCT 34.8*   MCV 80.4   MCH 25.9*   MCHC 32.2   RDW 18.5*      MPV 8.4       No results for input(s): POCGLU in the last 72 hours. Troponin [[id number]]    Collected: 04/09/22 1741    Updated: 04/09/22 1823    Specimen Source: Blood     Troponin, High Sensitivity <6 ng/L    Comment: High Sensitivity Troponin values cannot be compared with   other Troponin methodologies. Patients with high levels of Biotin oral intake (i.e. >5 mg/day)   may have falsely decreased Troponin levels. Samples collected   within 8 hours of biotin intake may require additional information   for diagnosis.        D-Dimer, Quantitative [[id number]]    Collected: 04/09/22 1741    Updated: 04/09/22 1810    Specimen Source: Blood     D-Dimer, Quant <200 ng/mL DDU         Radiology: XR CHEST PORTABLE    Result Date: 4/9/2022  EXAMINATION: ONE XRAY VIEW OF THE CHEST 4/9/2022 4:49 pm COMPARISON: March 15, 2022 HISTORY: ORDERING SYSTEM PROVIDED HISTORY: sob, wheeze TECHNOLOGIST PROVIDED HISTORY: Reason for exam:->sob, wheeze FINDINGS: No airspace opacity or pleural effusion. The heart is normal size. No pneumothorax. No free air beneath the hemidiaphragms. No pneumonia or pleural effusion. EKG: NSR    ASSESSMENT:      Principal Problem:    Acute chest pain  Resolved Problems:    * No resolved hospital problems. *      PLAN:    1. Chest pain, rule out ACS  -Chest pain/pressure with SOB and diaphoresis and nausea  -Troponin negative x 2, EKG no ST changes  -Patient has SYL score 3, HEART score 5   -Multiple risk factors include HTN, HLD, obesity, smoker, family history (brother)  -Will consult cardio for ACS rule out, likely needs stress test  -NPO after midnight for possible stress tomorrow  -Monitor on telemetry    2. Possible COPD exacerbation  -No formal diagnosis of COPD but was given albuterol PRN by PCP  -Wheezing on exam and patient is active smoker, currently saturating well on RA  -Given 125mg solumedrol in ER, will continue with 40mg BID tomorrow  -Nebulizers and incentive spirometer    3. Hyponatremia  -Sodium 123 on admission, was also 123 last month  -Was given 1L NS bolus in ER, will hold off on any additional IVF for now  -Possibly chronic from beer potomania (drinks 4-6 beers most days)  -Could also be from HCTZ, losartan and lasix, will hold lasix first and monitor    4. HTN  -Home meds HCTZ, lasix, losartan and verapamil  -BP on admission 156/65  -Continue home meds other than lasix and monitor BP    5. HLD  -Continue statin    6. GERD  -Continue PPI    7. Tobacco use disorder  -Smokes 1/2-1PPD for 45 years  -Counseled on smoking cessation    8. Morbid obesity  -BMI 40, counseled on lifestyle changes      Code Status: Full code  DVT prophylaxis: Lovenox    NOTE: This report was transcribed using voice recognition software.  Every effort was made to ensure accuracy; however, inadvertent computerized transcription errors may be present.     Electronically signed by Janay Hope MD on 4/9/2022 at 6:17 PM

## 2022-04-10 ENCOUNTER — APPOINTMENT (OUTPATIENT)
Dept: GENERAL RADIOLOGY | Age: 57
DRG: 896 | End: 2022-04-10
Payer: MEDICARE

## 2022-04-10 LAB
AADO2: 85.1 MMHG
ALBUMIN SERPL-MCNC: 3.8 G/DL (ref 3.5–5.2)
ALBUMIN SERPL-MCNC: 4.1 G/DL (ref 3.5–5.2)
ALP BLD-CCNC: 113 U/L (ref 40–129)
ALP BLD-CCNC: 98 U/L (ref 40–129)
ALT SERPL-CCNC: 46 U/L (ref 0–40)
ALT SERPL-CCNC: 53 U/L (ref 0–40)
AMMONIA: 26 UMOL/L (ref 16–60)
AMPHETAMINE SCREEN, URINE: NOT DETECTED
ANION GAP SERPL CALCULATED.3IONS-SCNC: 14 MMOL/L (ref 7–16)
ANION GAP SERPL CALCULATED.3IONS-SCNC: 15 MMOL/L (ref 7–16)
ANISOCYTOSIS: ABNORMAL
ANISOCYTOSIS: ABNORMAL
AST SERPL-CCNC: 29 U/L (ref 0–39)
AST SERPL-CCNC: 39 U/L (ref 0–39)
B.E.: 1 MMOL/L (ref -3–3)
BARBITURATE SCREEN URINE: NOT DETECTED
BASOPHILIC STIPPLING: ABNORMAL
BASOPHILS ABSOLUTE: 0 E9/L (ref 0–0.2)
BASOPHILS ABSOLUTE: 0.01 E9/L (ref 0–0.2)
BASOPHILS RELATIVE PERCENT: 0.1 % (ref 0–2)
BASOPHILS RELATIVE PERCENT: 0.1 % (ref 0–2)
BENZODIAZEPINE SCREEN, URINE: NOT DETECTED
BILIRUB SERPL-MCNC: 0.4 MG/DL (ref 0–1.2)
BILIRUB SERPL-MCNC: 0.5 MG/DL (ref 0–1.2)
BILIRUBIN DIRECT: <0.2 MG/DL (ref 0–0.3)
BILIRUBIN, INDIRECT: ABNORMAL MG/DL (ref 0–1)
BUN BLDV-MCNC: 14 MG/DL (ref 6–20)
BUN BLDV-MCNC: 26 MG/DL (ref 6–20)
CALCIUM SERPL-MCNC: 8.9 MG/DL (ref 8.6–10.2)
CALCIUM SERPL-MCNC: 9.1 MG/DL (ref 8.6–10.2)
CANNABINOID SCREEN URINE: POSITIVE
CHLORIDE BLD-SCNC: 92 MMOL/L (ref 98–107)
CHLORIDE BLD-SCNC: 93 MMOL/L (ref 98–107)
CO2: 22 MMOL/L (ref 22–29)
CO2: 23 MMOL/L (ref 22–29)
COCAINE METABOLITE SCREEN URINE: NOT DETECTED
COHB: 0.5 % (ref 0–1.5)
CREAT SERPL-MCNC: 0.7 MG/DL (ref 0.7–1.2)
CREAT SERPL-MCNC: 1.1 MG/DL (ref 0.7–1.2)
CRITICAL: ABNORMAL
DATE ANALYZED: ABNORMAL
DATE OF COLLECTION: ABNORMAL
EKG ATRIAL RATE: 87 BPM
EKG ATRIAL RATE: 91 BPM
EKG P AXIS: 64 DEGREES
EKG P AXIS: 67 DEGREES
EKG P-R INTERVAL: 146 MS
EKG P-R INTERVAL: 158 MS
EKG Q-T INTERVAL: 394 MS
EKG Q-T INTERVAL: 416 MS
EKG QRS DURATION: 88 MS
EKG QRS DURATION: 94 MS
EKG QTC CALCULATION (BAZETT): 484 MS
EKG QTC CALCULATION (BAZETT): 500 MS
EKG R AXIS: 67 DEGREES
EKG R AXIS: 71 DEGREES
EKG T AXIS: 55 DEGREES
EKG T AXIS: 68 DEGREES
EKG VENTRICULAR RATE: 87 BPM
EKG VENTRICULAR RATE: 91 BPM
EOSINOPHILS ABSOLUTE: 0 E9/L (ref 0.05–0.5)
EOSINOPHILS ABSOLUTE: 0 E9/L (ref 0.05–0.5)
EOSINOPHILS RELATIVE PERCENT: 0 % (ref 0–6)
EOSINOPHILS RELATIVE PERCENT: 0 % (ref 0–6)
FENTANYL SCREEN, URINE: NOT DETECTED
FIO2: 40 %
GFR AFRICAN AMERICAN: >60
GFR AFRICAN AMERICAN: >60
GFR NON-AFRICAN AMERICAN: >60 ML/MIN/1.73
GFR NON-AFRICAN AMERICAN: >60 ML/MIN/1.73
GLUCOSE BLD-MCNC: 175 MG/DL (ref 74–99)
GLUCOSE BLD-MCNC: 175 MG/DL (ref 74–99)
HBA1C MFR BLD: 6 % (ref 4–5.6)
HCO3: 25.8 MMOL/L (ref 22–26)
HCT VFR BLD CALC: 30.1 % (ref 37–54)
HCT VFR BLD CALC: 35 % (ref 37–54)
HEMOGLOBIN: 11.2 G/DL (ref 12.5–16.5)
HEMOGLOBIN: 9.8 G/DL (ref 12.5–16.5)
HHB: 1.4 % (ref 0–5)
HYPOCHROMIA: ABNORMAL
IMMATURE GRANULOCYTES #: 0.09 E9/L
IMMATURE GRANULOCYTES %: 0.8 % (ref 0–5)
INFLUENZA A: NOT DETECTED
INFLUENZA B: NOT DETECTED
LAB: ABNORMAL
LACTIC ACID: 1.8 MMOL/L (ref 0.5–2.2)
LIPASE: 34 U/L (ref 13–60)
LYMPHOCYTES ABSOLUTE: 0.47 E9/L (ref 1.5–4)
LYMPHOCYTES ABSOLUTE: 0.68 E9/L (ref 1.5–4)
LYMPHOCYTES RELATIVE PERCENT: 3.5 % (ref 20–42)
LYMPHOCYTES RELATIVE PERCENT: 4.3 % (ref 20–42)
Lab: ABNORMAL
Lab: ABNORMAL
MAGNESIUM: 2.2 MG/DL (ref 1.6–2.6)
MCH RBC QN AUTO: 25.8 PG (ref 26–35)
MCH RBC QN AUTO: 26.2 PG (ref 26–35)
MCHC RBC AUTO-ENTMCNC: 32 % (ref 32–34.5)
MCHC RBC AUTO-ENTMCNC: 32.6 % (ref 32–34.5)
MCV RBC AUTO: 80.5 FL (ref 80–99.9)
MCV RBC AUTO: 80.6 FL (ref 80–99.9)
METHADONE SCREEN, URINE: NOT DETECTED
METHB: 0.3 % (ref 0–1.5)
MODE: AC
MONOCYTES ABSOLUTE: 0.16 E9/L (ref 0.1–0.95)
MONOCYTES ABSOLUTE: 0.17 E9/L (ref 0.1–0.95)
MONOCYTES RELATIVE PERCENT: 0.9 % (ref 2–12)
MONOCYTES RELATIVE PERCENT: 1.5 % (ref 2–12)
NEUTROPHILS ABSOLUTE: 10.13 E9/L (ref 1.8–7.3)
NEUTROPHILS ABSOLUTE: 16.22 E9/L (ref 1.8–7.3)
NEUTROPHILS RELATIVE PERCENT: 93.3 % (ref 43–80)
NEUTROPHILS RELATIVE PERCENT: 95.7 % (ref 43–80)
O2 CONTENT: 15.8 ML/DL
O2 SATURATION: 98.6 % (ref 92–98.5)
O2HB: 97.8 % (ref 94–97)
OPERATOR ID: ABNORMAL
OPIATE SCREEN URINE: NOT DETECTED
OSMOLALITY URINE: 170 MOSM/KG (ref 300–900)
OVALOCYTES: ABNORMAL
OXYCODONE URINE: NOT DETECTED
PATIENT TEMP: 37 C
PCO2: 42.2 MMHG (ref 35–45)
PDW BLD-RTO: 18.6 FL (ref 11.5–15)
PDW BLD-RTO: 18.7 FL (ref 11.5–15)
PEEP/CPAP: 5 CMH2O
PFO2: 3.54 MMHG/%
PH BLOOD GAS: 7.41 (ref 7.35–7.45)
PHENCYCLIDINE SCREEN URINE: NOT DETECTED
PHOSPHORUS: 3.6 MG/DL (ref 2.5–4.5)
PLATELET # BLD: 194 E9/L (ref 130–450)
PLATELET # BLD: 202 E9/L (ref 130–450)
PMV BLD AUTO: 9.1 FL (ref 7–12)
PMV BLD AUTO: 9.4 FL (ref 7–12)
PO2: 141.6 MMHG (ref 75–100)
POIKILOCYTES: ABNORMAL
POLYCHROMASIA: ABNORMAL
POTASSIUM REFLEX MAGNESIUM: 4.5 MMOL/L (ref 3.5–5)
POTASSIUM SERPL-SCNC: 4.7 MMOL/L (ref 3.5–5)
PROCALCITONIN: 0.15 NG/ML (ref 0–0.08)
RBC # BLD: 3.74 E12/L (ref 3.8–5.8)
RBC # BLD: 4.34 E12/L (ref 3.8–5.8)
RI(T): 0.6
RR MECHANICAL: 16 B/MIN
SARS-COV-2 RNA, RT PCR: NOT DETECTED
SODIUM BLD-SCNC: 128 MMOL/L (ref 132–146)
SODIUM BLD-SCNC: 131 MMOL/L (ref 132–146)
SOURCE, BLOOD GAS: ABNORMAL
TARGET CELLS: ABNORMAL
TEAR DROP CELLS: ABNORMAL
THB: 11.3 G/DL (ref 11.5–16.5)
TIME ANALYZED: 2149
TOTAL PROTEIN: 7.2 G/DL (ref 6.4–8.3)
TOTAL PROTEIN: 7.7 G/DL (ref 6.4–8.3)
TSH SERPL DL<=0.05 MIU/L-ACNC: 0.75 UIU/ML (ref 0.27–4.2)
VT MECHANICAL: 460 ML
WBC # BLD: 10.9 E9/L (ref 4.5–11.5)
WBC # BLD: 16.9 E9/L (ref 4.5–11.5)

## 2022-04-10 PROCEDURE — 6370000000 HC RX 637 (ALT 250 FOR IP): Performed by: STUDENT IN AN ORGANIZED HEALTH CARE EDUCATION/TRAINING PROGRAM

## 2022-04-10 PROCEDURE — 6370000000 HC RX 637 (ALT 250 FOR IP): Performed by: INTERNAL MEDICINE

## 2022-04-10 PROCEDURE — 36415 COLL VENOUS BLD VENIPUNCTURE: CPT

## 2022-04-10 PROCEDURE — 96376 TX/PRO/DX INJ SAME DRUG ADON: CPT

## 2022-04-10 PROCEDURE — 96361 HYDRATE IV INFUSION ADD-ON: CPT

## 2022-04-10 PROCEDURE — 36556 INSERT NON-TUNNEL CV CATH: CPT

## 2022-04-10 PROCEDURE — 82140 ASSAY OF AMMONIA: CPT

## 2022-04-10 PROCEDURE — 5A1955Z RESPIRATORY VENTILATION, GREATER THAN 96 CONSECUTIVE HOURS: ICD-10-PCS | Performed by: INTERNAL MEDICINE

## 2022-04-10 PROCEDURE — 93010 ELECTROCARDIOGRAM REPORT: CPT | Performed by: INTERNAL MEDICINE

## 2022-04-10 PROCEDURE — 84443 ASSAY THYROID STIM HORMONE: CPT

## 2022-04-10 PROCEDURE — G0378 HOSPITAL OBSERVATION PER HR: HCPCS

## 2022-04-10 PROCEDURE — 6360000002 HC RX W HCPCS: Performed by: STUDENT IN AN ORGANIZED HEALTH CARE EDUCATION/TRAINING PROGRAM

## 2022-04-10 PROCEDURE — 2580000003 HC RX 258: Performed by: STUDENT IN AN ORGANIZED HEALTH CARE EDUCATION/TRAINING PROGRAM

## 2022-04-10 PROCEDURE — 83605 ASSAY OF LACTIC ACID: CPT

## 2022-04-10 PROCEDURE — 87206 SMEAR FLUORESCENT/ACID STAI: CPT

## 2022-04-10 PROCEDURE — 94002 VENT MGMT INPAT INIT DAY: CPT

## 2022-04-10 PROCEDURE — 6360000002 HC RX W HCPCS

## 2022-04-10 PROCEDURE — 02HV33Z INSERTION OF INFUSION DEVICE INTO SUPERIOR VENA CAVA, PERCUTANEOUS APPROACH: ICD-10-PCS | Performed by: INTERNAL MEDICINE

## 2022-04-10 PROCEDURE — 0BH17EZ INSERTION OF ENDOTRACHEAL AIRWAY INTO TRACHEA, VIA NATURAL OR ARTIFICIAL OPENING: ICD-10-PCS | Performed by: INTERNAL MEDICINE

## 2022-04-10 PROCEDURE — 80053 COMPREHEN METABOLIC PANEL: CPT

## 2022-04-10 PROCEDURE — 96375 TX/PRO/DX INJ NEW DRUG ADDON: CPT

## 2022-04-10 PROCEDURE — C9113 INJ PANTOPRAZOLE SODIUM, VIA: HCPCS

## 2022-04-10 PROCEDURE — 87070 CULTURE OTHR SPECIMN AEROBIC: CPT

## 2022-04-10 PROCEDURE — 2500000003 HC RX 250 WO HCPCS: Performed by: INTERNAL MEDICINE

## 2022-04-10 PROCEDURE — 87636 SARSCOV2 & INF A&B AMP PRB: CPT

## 2022-04-10 PROCEDURE — 87081 CULTURE SCREEN ONLY: CPT

## 2022-04-10 PROCEDURE — 99205 OFFICE O/P NEW HI 60 MIN: CPT | Performed by: INTERNAL MEDICINE

## 2022-04-10 PROCEDURE — 2580000003 HC RX 258

## 2022-04-10 PROCEDURE — 96365 THER/PROPH/DIAG IV INF INIT: CPT

## 2022-04-10 PROCEDURE — 83690 ASSAY OF LIPASE: CPT

## 2022-04-10 PROCEDURE — 94640 AIRWAY INHALATION TREATMENT: CPT

## 2022-04-10 PROCEDURE — 82805 BLOOD GASES W/O2 SATURATION: CPT

## 2022-04-10 PROCEDURE — 85025 COMPLETE CBC W/AUTO DIFF WBC: CPT

## 2022-04-10 PROCEDURE — 80076 HEPATIC FUNCTION PANEL: CPT

## 2022-04-10 PROCEDURE — A4216 STERILE WATER/SALINE, 10 ML: HCPCS

## 2022-04-10 PROCEDURE — 74018 RADEX ABDOMEN 1 VIEW: CPT

## 2022-04-10 PROCEDURE — 31500 INSERT EMERGENCY AIRWAY: CPT

## 2022-04-10 PROCEDURE — 71045 X-RAY EXAM CHEST 1 VIEW: CPT

## 2022-04-10 PROCEDURE — 2500000003 HC RX 250 WO HCPCS

## 2022-04-10 PROCEDURE — 83036 HEMOGLOBIN GLYCOSYLATED A1C: CPT

## 2022-04-10 PROCEDURE — 2700000000 HC OXYGEN THERAPY PER DAY

## 2022-04-10 PROCEDURE — 96367 TX/PROPH/DG ADDL SEQ IV INF: CPT

## 2022-04-10 PROCEDURE — 2500000003 HC RX 250 WO HCPCS: Performed by: STUDENT IN AN ORGANIZED HEALTH CARE EDUCATION/TRAINING PROGRAM

## 2022-04-10 PROCEDURE — 96372 THER/PROPH/DIAG INJ SC/IM: CPT

## 2022-04-10 PROCEDURE — 96366 THER/PROPH/DIAG IV INF ADDON: CPT

## 2022-04-10 PROCEDURE — 99226 PR SBSQ OBSERVATION CARE/DAY 35 MINUTES: CPT | Performed by: STUDENT IN AN ORGANIZED HEALTH CARE EDUCATION/TRAINING PROGRAM

## 2022-04-10 PROCEDURE — 84100 ASSAY OF PHOSPHORUS: CPT

## 2022-04-10 PROCEDURE — 51702 INSERT TEMP BLADDER CATH: CPT

## 2022-04-10 PROCEDURE — 93005 ELECTROCARDIOGRAM TRACING: CPT | Performed by: STUDENT IN AN ORGANIZED HEALTH CARE EDUCATION/TRAINING PROGRAM

## 2022-04-10 PROCEDURE — 80048 BASIC METABOLIC PNL TOTAL CA: CPT

## 2022-04-10 PROCEDURE — 6370000000 HC RX 637 (ALT 250 FOR IP)

## 2022-04-10 PROCEDURE — 83735 ASSAY OF MAGNESIUM: CPT

## 2022-04-10 RX ORDER — LORAZEPAM 1 MG/1
2 TABLET ORAL
Status: DISCONTINUED | OUTPATIENT
Start: 2022-04-10 | End: 2022-04-11

## 2022-04-10 RX ORDER — LORAZEPAM 2 MG/ML
4 INJECTION INTRAMUSCULAR
Status: DISCONTINUED | OUTPATIENT
Start: 2022-04-10 | End: 2022-04-11

## 2022-04-10 RX ORDER — DIAZEPAM 5 MG/ML
10 INJECTION, SOLUTION INTRAMUSCULAR; INTRAVENOUS ONCE
Status: DISCONTINUED | OUTPATIENT
Start: 2022-04-10 | End: 2022-04-10

## 2022-04-10 RX ORDER — DEXMEDETOMIDINE HYDROCHLORIDE 4 UG/ML
.1-1.5 INJECTION, SOLUTION INTRAVENOUS CONTINUOUS
Status: DISCONTINUED | OUTPATIENT
Start: 2022-04-10 | End: 2022-04-10

## 2022-04-10 RX ORDER — IPRATROPIUM BROMIDE AND ALBUTEROL SULFATE 2.5; .5 MG/3ML; MG/3ML
1 SOLUTION RESPIRATORY (INHALATION) EVERY 4 HOURS
Status: DISCONTINUED | OUTPATIENT
Start: 2022-04-10 | End: 2022-04-13

## 2022-04-10 RX ORDER — THIAMINE HYDROCHLORIDE 100 MG/ML
100 INJECTION, SOLUTION INTRAMUSCULAR; INTRAVENOUS DAILY
Status: DISCONTINUED | OUTPATIENT
Start: 2022-04-10 | End: 2022-04-10

## 2022-04-10 RX ORDER — METOPROLOL SUCCINATE 25 MG/1
25 TABLET, EXTENDED RELEASE ORAL 2 TIMES DAILY
Status: DISCONTINUED | OUTPATIENT
Start: 2022-04-10 | End: 2022-04-12

## 2022-04-10 RX ORDER — ROCURONIUM BROMIDE 10 MG/ML
50 INJECTION, SOLUTION INTRAVENOUS ONCE
Status: COMPLETED | OUTPATIENT
Start: 2022-04-10 | End: 2022-04-10

## 2022-04-10 RX ORDER — OXYCODONE HYDROCHLORIDE AND ACETAMINOPHEN 5; 325 MG/1; MG/1
1 TABLET ORAL ONCE
Status: COMPLETED | OUTPATIENT
Start: 2022-04-10 | End: 2022-04-10

## 2022-04-10 RX ORDER — LORAZEPAM 1 MG/1
1 TABLET ORAL
Status: DISCONTINUED | OUTPATIENT
Start: 2022-04-10 | End: 2022-04-11

## 2022-04-10 RX ORDER — M-VIT,TX,IRON,MINS/CALC/FOLIC 27MG-0.4MG
1 TABLET ORAL DAILY
Status: DISCONTINUED | OUTPATIENT
Start: 2022-04-10 | End: 2022-04-27 | Stop reason: HOSPADM

## 2022-04-10 RX ORDER — PROPOFOL 10 MG/ML
5-50 INJECTION, EMULSION INTRAVENOUS CONTINUOUS
Status: DISCONTINUED | OUTPATIENT
Start: 2022-04-10 | End: 2022-04-17

## 2022-04-10 RX ORDER — ETOMIDATE 2 MG/ML
30 INJECTION INTRAVENOUS ONCE
Status: COMPLETED | OUTPATIENT
Start: 2022-04-10 | End: 2022-04-10

## 2022-04-10 RX ORDER — GAUZE BANDAGE 2" X 2"
100 BANDAGE TOPICAL DAILY
Status: DISCONTINUED | OUTPATIENT
Start: 2022-04-10 | End: 2022-04-10

## 2022-04-10 RX ORDER — PHENOBARBITAL SODIUM 65 MG/ML
60 INJECTION INTRAMUSCULAR EVERY 8 HOURS SCHEDULED
Status: DISCONTINUED | OUTPATIENT
Start: 2022-04-10 | End: 2022-04-15

## 2022-04-10 RX ORDER — MORPHINE SULFATE 2 MG/ML
2 INJECTION, SOLUTION INTRAMUSCULAR; INTRAVENOUS EVERY 4 HOURS PRN
Status: DISCONTINUED | OUTPATIENT
Start: 2022-04-10 | End: 2022-04-11

## 2022-04-10 RX ORDER — SODIUM CHLORIDE 9 MG/ML
INJECTION, SOLUTION INTRAVENOUS CONTINUOUS
Status: DISCONTINUED | OUTPATIENT
Start: 2022-04-10 | End: 2022-04-11

## 2022-04-10 RX ORDER — PHENOBARBITAL SODIUM 65 MG/ML
60 INJECTION INTRAMUSCULAR EVERY 8 HOURS SCHEDULED
Status: DISCONTINUED | OUTPATIENT
Start: 2022-04-11 | End: 2022-04-10

## 2022-04-10 RX ORDER — LORAZEPAM 2 MG/ML
3 INJECTION INTRAMUSCULAR
Status: DISCONTINUED | OUTPATIENT
Start: 2022-04-10 | End: 2022-04-11

## 2022-04-10 RX ORDER — THIAMINE HYDROCHLORIDE 100 MG/ML
100 INJECTION, SOLUTION INTRAMUSCULAR; INTRAVENOUS DAILY
Status: DISCONTINUED | OUTPATIENT
Start: 2022-04-11 | End: 2022-04-11

## 2022-04-10 RX ORDER — LORAZEPAM 2 MG/ML
1 INJECTION INTRAMUSCULAR
Status: DISCONTINUED | OUTPATIENT
Start: 2022-04-10 | End: 2022-04-11

## 2022-04-10 RX ORDER — FENTANYL CITRATE 50 UG/ML
100 INJECTION, SOLUTION INTRAMUSCULAR; INTRAVENOUS ONCE
Status: COMPLETED | OUTPATIENT
Start: 2022-04-10 | End: 2022-04-10

## 2022-04-10 RX ORDER — SODIUM CHLORIDE 0.9 % (FLUSH) 0.9 %
5-40 SYRINGE (ML) INJECTION PRN
Status: DISCONTINUED | OUTPATIENT
Start: 2022-04-10 | End: 2022-04-27 | Stop reason: HOSPADM

## 2022-04-10 RX ORDER — LORAZEPAM 1 MG/1
3 TABLET ORAL
Status: DISCONTINUED | OUTPATIENT
Start: 2022-04-10 | End: 2022-04-11

## 2022-04-10 RX ORDER — SODIUM CHLORIDE 9 MG/ML
INJECTION, SOLUTION INTRAVENOUS PRN
Status: DISCONTINUED | OUTPATIENT
Start: 2022-04-10 | End: 2022-04-27 | Stop reason: HOSPADM

## 2022-04-10 RX ORDER — PHENOBARBITAL SODIUM 65 MG/ML
130 INJECTION INTRAMUSCULAR ONCE
Status: DISCONTINUED | OUTPATIENT
Start: 2022-04-10 | End: 2022-04-10 | Stop reason: CLARIF

## 2022-04-10 RX ORDER — ROCURONIUM BROMIDE 10 MG/ML
INJECTION, SOLUTION INTRAVENOUS
Status: COMPLETED
Start: 2022-04-10 | End: 2022-04-10

## 2022-04-10 RX ORDER — LORAZEPAM 1 MG/1
4 TABLET ORAL
Status: DISCONTINUED | OUTPATIENT
Start: 2022-04-10 | End: 2022-04-11

## 2022-04-10 RX ORDER — LORAZEPAM 2 MG/ML
2 INJECTION INTRAMUSCULAR
Status: DISCONTINUED | OUTPATIENT
Start: 2022-04-10 | End: 2022-04-11

## 2022-04-10 RX ORDER — SODIUM CHLORIDE 0.9 % (FLUSH) 0.9 %
5-40 SYRINGE (ML) INJECTION EVERY 12 HOURS SCHEDULED
Status: DISCONTINUED | OUTPATIENT
Start: 2022-04-10 | End: 2022-04-27 | Stop reason: HOSPADM

## 2022-04-10 RX ORDER — SODIUM CHLORIDE 0.9 % (FLUSH) 0.9 %
0.5 SYRINGE (ML) INJECTION ONCE
Status: COMPLETED | OUTPATIENT
Start: 2022-04-10 | End: 2022-04-10

## 2022-04-10 RX ORDER — PHENOBARBITAL SODIUM 65 MG/ML
INJECTION INTRAMUSCULAR
Status: DISPENSED
Start: 2022-04-10 | End: 2022-04-11

## 2022-04-10 RX ORDER — ISOSORBIDE MONONITRATE 30 MG/1
30 TABLET, EXTENDED RELEASE ORAL DAILY
Status: DISCONTINUED | OUTPATIENT
Start: 2022-04-10 | End: 2022-04-13

## 2022-04-10 RX ORDER — PHENOBARBITAL SODIUM 65 MG/ML
130 INJECTION INTRAMUSCULAR ONCE
Status: DISCONTINUED | OUTPATIENT
Start: 2022-04-10 | End: 2022-04-10

## 2022-04-10 RX ADMIN — IPRATROPIUM BROMIDE 0.5 MG: 0.5 SOLUTION RESPIRATORY (INHALATION) at 13:16

## 2022-04-10 RX ADMIN — METHYLPREDNISOLONE SODIUM SUCCINATE 40 MG: 40 INJECTION, POWDER, FOR SOLUTION INTRAMUSCULAR; INTRAVENOUS at 08:27

## 2022-04-10 RX ADMIN — PHENOBARBITAL SODIUM 130 MG: 65 INJECTION INTRAMUSCULAR; INTRAVENOUS at 18:05

## 2022-04-10 RX ADMIN — SODIUM CHLORIDE: 9 INJECTION, SOLUTION INTRAVENOUS at 18:05

## 2022-04-10 RX ADMIN — LOSARTAN POTASSIUM 50 MG: 50 TABLET, FILM COATED ORAL at 08:25

## 2022-04-10 RX ADMIN — LEVALBUTEROL 0.31 MG: 0.31 SOLUTION RESPIRATORY (INHALATION) at 13:17

## 2022-04-10 RX ADMIN — GABAPENTIN 300 MG: 300 CAPSULE ORAL at 13:47

## 2022-04-10 RX ADMIN — ROCURONIUM BROMIDE 50 MG: 10 INJECTION, SOLUTION INTRAVENOUS at 20:35

## 2022-04-10 RX ADMIN — IPRATROPIUM BROMIDE 0.5 MG: 0.5 SOLUTION RESPIRATORY (INHALATION) at 09:43

## 2022-04-10 RX ADMIN — VERAPAMIL HYDROCHLORIDE 360 MG: 180 TABLET, FILM COATED, EXTENDED RELEASE ORAL at 08:26

## 2022-04-10 RX ADMIN — LORAZEPAM 3 MG: 1 TABLET ORAL at 16:29

## 2022-04-10 RX ADMIN — METHYLPREDNISOLONE SODIUM SUCCINATE 40 MG: 40 INJECTION, POWDER, FOR SOLUTION INTRAMUSCULAR; INTRAVENOUS at 22:08

## 2022-04-10 RX ADMIN — ENOXAPARIN SODIUM 30 MG: 100 INJECTION SUBCUTANEOUS at 08:27

## 2022-04-10 RX ADMIN — OXYCODONE AND ACETAMINOPHEN 1 TABLET: 5; 325 TABLET ORAL at 01:50

## 2022-04-10 RX ADMIN — GABAPENTIN 300 MG: 300 CAPSULE ORAL at 22:08

## 2022-04-10 RX ADMIN — MORPHINE SULFATE 2 MG: 2 INJECTION, SOLUTION INTRAMUSCULAR; INTRAVENOUS at 13:48

## 2022-04-10 RX ADMIN — PROPOFOL 50 MCG/KG/MIN: 10 INJECTION, EMULSION INTRAVENOUS at 23:19

## 2022-04-10 RX ADMIN — PHENOBARBITAL SODIUM 60 MG: 65 INJECTION INTRAMUSCULAR at 20:55

## 2022-04-10 RX ADMIN — POTASSIUM BICARBONATE 10 MEQ: 782 TABLET, EFFERVESCENT ORAL at 08:25

## 2022-04-10 RX ADMIN — IPRATROPIUM BROMIDE AND ALBUTEROL SULFATE 1 AMPULE: 2.5; .5 SOLUTION RESPIRATORY (INHALATION) at 21:19

## 2022-04-10 RX ADMIN — LORAZEPAM 4 MG: 2 INJECTION INTRAMUSCULAR; INTRAVENOUS at 18:16

## 2022-04-10 RX ADMIN — THIAMINE HCL TAB 100 MG 100 MG: 100 TAB at 14:17

## 2022-04-10 RX ADMIN — Medication 10 ML: at 08:27

## 2022-04-10 RX ADMIN — PROPOFOL 10 MCG/KG/MIN: 10 INJECTION, EMULSION INTRAVENOUS at 20:30

## 2022-04-10 RX ADMIN — ASPIRIN 81 MG 81 MG: 81 TABLET ORAL at 08:25

## 2022-04-10 RX ADMIN — LEVALBUTEROL 0.31 MG: 0.31 SOLUTION RESPIRATORY (INHALATION) at 09:44

## 2022-04-10 RX ADMIN — THIAMINE HYDROCHLORIDE: 100 INJECTION, SOLUTION INTRAMUSCULAR; INTRAVENOUS at 18:09

## 2022-04-10 RX ADMIN — METOPROLOL SUCCINATE 25 MG: 25 TABLET, EXTENDED RELEASE ORAL at 11:49

## 2022-04-10 RX ADMIN — MULTIPLE VITAMINS W/ MINERALS TAB 1 TABLET: TAB at 14:17

## 2022-04-10 RX ADMIN — ETOMIDATE 30 MG: 20 INJECTION, SOLUTION INTRAVENOUS at 20:34

## 2022-04-10 RX ADMIN — ISOSORBIDE MONONITRATE 30 MG: 30 TABLET, EXTENDED RELEASE ORAL at 11:49

## 2022-04-10 RX ADMIN — ROCURONIUM BROMIDE 50 MG: 10 INJECTION INTRAVENOUS at 20:35

## 2022-04-10 RX ADMIN — BUDESONIDE 500 MCG: 0.5 INHALANT RESPIRATORY (INHALATION) at 21:19

## 2022-04-10 RX ADMIN — LORAZEPAM 4 MG: 2 INJECTION INTRAMUSCULAR; INTRAVENOUS at 14:17

## 2022-04-10 RX ADMIN — Medication 1.4 MCG/KG/HR: at 18:53

## 2022-04-10 RX ADMIN — MORPHINE SULFATE 2 MG: 2 INJECTION, SOLUTION INTRAMUSCULAR; INTRAVENOUS at 09:47

## 2022-04-10 RX ADMIN — Medication 10 ML: at 21:04

## 2022-04-10 RX ADMIN — ENOXAPARIN SODIUM 30 MG: 100 INJECTION SUBCUTANEOUS at 22:09

## 2022-04-10 RX ADMIN — LEVALBUTEROL 0.31 MG: 0.31 SOLUTION RESPIRATORY (INHALATION) at 21:20

## 2022-04-10 RX ADMIN — FENTANYL CITRATE 100 MCG: 50 INJECTION, SOLUTION INTRAMUSCULAR; INTRAVENOUS at 20:45

## 2022-04-10 RX ADMIN — BUDESONIDE 500 MCG: 0.5 INHALANT RESPIRATORY (INHALATION) at 09:44

## 2022-04-10 RX ADMIN — GABAPENTIN 300 MG: 300 CAPSULE ORAL at 08:25

## 2022-04-10 RX ADMIN — VALPROATE SODIUM 500 MG: 100 INJECTION, SOLUTION INTRAVENOUS at 22:20

## 2022-04-10 RX ADMIN — METOPROLOL SUCCINATE 25 MG: 25 TABLET, EXTENDED RELEASE ORAL at 22:08

## 2022-04-10 RX ADMIN — SODIUM CHLORIDE 40 MG: 9 INJECTION, SOLUTION INTRAMUSCULAR; INTRAVENOUS; SUBCUTANEOUS at 22:08

## 2022-04-10 RX ADMIN — SODIUM CHLORIDE, PRESERVATIVE FREE 0.5 ML: 5 INJECTION INTRAVENOUS at 21:05

## 2022-04-10 ASSESSMENT — PAIN DESCRIPTION - ORIENTATION
ORIENTATION: LEFT
ORIENTATION: LEFT

## 2022-04-10 ASSESSMENT — PAIN SCALES - GENERAL
PAINLEVEL_OUTOF10: 8
PAINLEVEL_OUTOF10: 0
PAINLEVEL_OUTOF10: 4
PAINLEVEL_OUTOF10: 8

## 2022-04-10 ASSESSMENT — PULMONARY FUNCTION TESTS
PIF_VALUE: 19
PIF_VALUE: 20
PIF_VALUE: 32

## 2022-04-10 ASSESSMENT — PAIN DESCRIPTION - ONSET
ONSET: ON-GOING
ONSET: ON-GOING

## 2022-04-10 ASSESSMENT — PAIN - FUNCTIONAL ASSESSMENT
PAIN_FUNCTIONAL_ASSESSMENT: PREVENTS OR INTERFERES WITH MANY ACTIVE NOT PASSIVE ACTIVITIES
PAIN_FUNCTIONAL_ASSESSMENT: PREVENTS OR INTERFERES WITH MANY ACTIVE NOT PASSIVE ACTIVITIES

## 2022-04-10 ASSESSMENT — PAIN DESCRIPTION - PROGRESSION
CLINICAL_PROGRESSION: GRADUALLY WORSENING
CLINICAL_PROGRESSION: NOT CHANGED
CLINICAL_PROGRESSION: NOT CHANGED

## 2022-04-10 ASSESSMENT — PAIN DESCRIPTION - FREQUENCY
FREQUENCY: CONTINUOUS
FREQUENCY: CONTINUOUS

## 2022-04-10 ASSESSMENT — PAIN DESCRIPTION - PAIN TYPE
TYPE: ACUTE PAIN
TYPE: ACUTE PAIN

## 2022-04-10 ASSESSMENT — PAIN DESCRIPTION - DESCRIPTORS
DESCRIPTORS: SHARP;HEAVINESS
DESCRIPTORS: SHARP;HEAVINESS

## 2022-04-10 ASSESSMENT — PAIN DESCRIPTION - LOCATION
LOCATION: CHEST
LOCATION: CHEST

## 2022-04-10 NOTE — PROGRESS NOTES
[address] [place], [place] [address]ist   Progress Note    Admitting Date and Time: 4/9/2022  4:02 PM  Admit Dx: Hyponatremia [E87.1]  COPD exacerbation ([address] [J44.1]  Acute chest pain [R07.9]  Chest pain, unspecified type [R07.9]    Subjective:    Pt feels the same as yesterday, is still having chest pressure, SOB and diaphoresis. He stated that the morphine helped his pain a lot. Per RN: Patient complaining of pain    ROS: denies fever, chills, cp, sob, n/v, HA unless stated above.      metoprolol succinate  25 mg Oral BID    isosorbide mononitrate  30 mg Oral Daily    losartan  50 mg Oral Daily    [Held by provider] hydroCHLOROthiazide  50 mg Oral Daily    [Held by provider] furosemide  20 mg Oral Daily    gabapentin  300 mg Oral TID    pantoprazole  40 mg Oral QAM AC    potassium bicarb-citric acid  10 mEq Oral Daily    [Held by provider] rosuvastatin  10 mg Oral Daily    sodium chloride flush  5-40 mL IntraVENous 2 times per day    enoxaparin  30 mg SubCUTAneous BID    methylPREDNISolone  40 mg IntraVENous Q12H    budesonide  0.5 mg Nebulization BID    ipratropium  0.5 mg Nebulization 4x daily    levalbuterol  0.31 mg Nebulization 4x Daily    aspirin  81 mg Oral Daily    nicotine  1 patch TransDERmal Daily     morphine, 2 mg, Q4H PRN  perflutren lipid microspheres, 1.5 mL, ONCE PRN  sodium chloride flush, 5-40 mL, PRN  sodium chloride, , PRN  ondansetron, 4 mg, Q8H PRN   Or  ondansetron, 4 mg, Q6H PRN  polyethylene glycol, 17 g, Daily PRN  acetaminophen, 650 mg, Q6H PRN   Or  acetaminophen, 650 mg, Q6H PRN         Objective:    /85   Pulse 89   Temp 98.2 °F (36.8 °C) (Oral)   Resp 20   Ht 6' (1.829 m)   Wt 300 lb (136.1 kg)   SpO2 100%   BMI 40.69 kg/m²        General Appearance: alert and oriented to person, place and time and in no acute distress, obese  Skin: warm, slightly diaphoretic  Head: normocephalic and atraumatic  Eyes: pupils equal, round, and reactive to light, extraocular eye movements intact, conjunctivae normal  Neck: neck supple and non tender without mass   Pulmonary/Chest:mild wheezing throughout, normal air movement, no respiratory distress  Cardiovascular: tachycardic, normal S1 and S2 and no carotid bruits  Abdomen: soft, non-tender, non-distended, normal bowel sounds, no masses or organomegaly  Extremities: no cyanosis, no clubbing and no edema  Neurologic: no cranial nerve deficit and speech normal      Recent Labs     04/09/22  1619 04/10/22  0732   * 128*   K 4.3 4.5   CL 86* 92*   CO2 20* 22   BUN 12 14   CREATININE 0.9 0.7   GLUCOSE 97 175*   CALCIUM 9.3 9.1       No results for input(s): ALKPHOS, PROT, LABALBU, BILITOT, AST, ALT in the last 72 hours. Recent Labs     04/09/22  1619 04/10/22  0732   WBC 7.9 10.9   RBC 4.33 4.34   HGB 11.2* 11.2*   HCT 34.8* 35.0*   MCV 80.4 80.6   MCH 25.9* 25.8*   MCHC 32.2 32.0   RDW 18.5* 18.6*    202   MPV 8.4 9.1         Radiology:   XR CHEST PORTABLE   Final Result   No pneumonia or pleural effusion. Assessment:  Principal Problem:    Acute chest pain  Resolved Problems:    * No resolved hospital problems. *      Plan:  1. Chest pain, rule out ACS  -Chest pain/pressure with SOB and diaphoresis and nausea  -Troponin negative x 2, EKG no ST changes  -Patient has SYL score 3, HEART score 5   -Multiple risk factors include HTN, HLD, obesity, smoker, family history (brother)  -Cardio consulted, planning for cath tomorrow vs Tuesday  -Monitor on telemetry     2. Possible COPD exacerbation  -No formal diagnosis of COPD but was given albuterol PRN by PCP  -Wheezing on exam and patient is active smoker, currently saturating well on RA  -Given 125mg solumedrol in ER, will continue with 40mg BID   -Nebulizers and incentive spirometer     3. Hyponatremia  -Sodium 123 on admission, was also 123 last month.  Today 128  -Was given 1L NS bolus in ER, will hold off on any additional IVF for now  -Possibly chronic from beer potomania (drinks 4-6 beers most days)  -Could also be from HCTZ, losartan and lasix, will hold lasix and HCTZ for now     4. HTN  -Home meds HCTZ, lasix, losartan and verapamil  -BP on admission 127/85  -Continue home meds other than lasix and monitor BP     5. HLD  -Continue statin     6. GERD  -Continue PPI     7. Tobacco use disorder  -Smokes 1/2-1PPD for 45 years  -Counseled on smoking cessation     8. Morbid obesity  -BMI 40, counseled on lifestyle changes    NOTE: This report was transcribed using voice recognition software. Every effort was made to ensure accuracy; however, inadvertent computerized transcription errors may be present.      Electronically signed by Mehnaz Cuello MD on 4/10/2022 at 11:18 AM

## 2022-04-10 NOTE — CONSULTS
Assessment and Plan    ETOH withdrawal with DT's  --In restraints with precedex  --Was a code violent this evening, started on phenobarbital q 8 hours  --on CIWA with ativan  --Start Valproate acid  --mechanically vented for airway protection  --Thiamine and folic acid, banana bag    Chest pain R/O ACS  --Followed by cardiology  --Plan for heart cath tomorrow or Tuesday at [facility] however his ETOH withdrawal may effect this plan  --Cardiology started beta blocker and imdur  --on ASA and ARB    Chronic diastolic heart failure  --Does not appear to be in acute exacerbation  --Diuretics on hold due to hyponatremia  --ECHO pending    COPD exacerbation  --on solu-medrol  --aerosolized bronchodilators including duoneb and pulmicort    Hyponatremia, chronic possible due to chronic beer intake  --urine osmolality 170 and urine sodium 32  --Lasix and HCTZ on hold  --currently receiving fluid  --awaiting new BMP, will check daily    HTN  --on ARB and BB    HLD  --Stating on hold due to increased LFT, ALT now 53 and AST now 36 down from 73 and 70 respectively     GERD  --on protonix    Tobacco use disorder  --on nicotine patch  --will need continued counseling on smoking cessation    Morbid obesity  --will need counseling on lifestyle modifications    History of Present Illness: This is a 64year old male with a past medical history listed below who was admitted to the hospital for the treatment of chest pain. This evening his attending physician was notified that he had begun to hallucinate and have symptoms of alcohol withdrawal.  CIWAS protocol was started. Prior to this the patient had admitted to 4 beers daily however based on his initial CIWA score this evening of 24, it appears that he drinks far more.   He was brought to ICU for continued agitation and non compliance and was started on a precedex drip as well as scheduled phenobarbital. He had receieved a total of 8mg of ativan prior to arrival of ICU which was given over a span of 4 hours. Upon his arrival he became very agitated and began removing IV's and being aggressive with staff. A code violet was called. Phenobarbitol 130mg IM was to be given followed by Valium 10mg IV once IV access was obtained. The patient was placed in restraints and began to become some what somnolent so the phenobarbital and valium were never given. He had some snoring and a pulse ox was difficult to obtained which forced nursing staff to notify myself over concerns of airway protection. Upon my initial assessment the patient was 92% on RA and without snoring. He did not appear to be in distress and did groan and somewhat awaken to painful stimuli. However, over the course of the next hour he continued to become more obtunded, developed snoring/indications of partial airway obstructing and was not longer awakening to painful stimuli so he was intubated for airway protection. Past Medical History:  Past Medical History:   Diagnosis Date    Arthritis     Back pain, chronic     Hyperlipidemia     Hypertension     Neck pain, chronic         Family History:   Family History   Problem Relation Age of Onset    Heart Attack Brother        Allergies:         Pcn [penicillins]    Social history:  Social History     Socioeconomic History    Marital status:      Spouse name: Not on file    Number of children: Not on file    Years of education: Not on file    Highest education level: Not on file   Occupational History    Not on file   Tobacco Use    Smoking status: Current Every Day Smoker     Packs/day: 0.75     Years: 43.00     Pack years: 32.25     Types: Cigarettes    Smokeless tobacco: Never Used   Substance and Sexual Activity    Alcohol use:  Yes     Alcohol/week: 28.0 standard drinks     Types: 28 Cans of beer per week     Comment: reports that he can go days without drinking but usually drinks 4 beers a day    Drug use: Yes     Types: Marijuana Neisha Beat)     Comment: almost daily for back pain    Sexual activity: Not on file   Other Topics Concern    Not on file   Social History Narrative    Not on file     Social Determinants of Health     Financial Resource Strain:     Difficulty of Paying Living Expenses: Not on file   Food Insecurity:     Worried About [address] in the Last Year: Not on file    Ran Out of Food in the Last Year: Not on file   Transportation Needs:     Lack of Transportation (Medical): Not on file    Lack of Transportation (Non-Medical):  Not on file   Physical Activity:     Days of Exercise per Week: Not on file    Minutes of Exercise per Session: Not on file   Stress:     Feeling of Stress : Not on file   Social Connections:     Frequency of Communication with Friends and Family: Not on file    Frequency of Social Gatherings with Friends and Family: Not on file    Attends Oriental orthodox Services: Not on file    Active Member of Clubs or Organizations: Not on file    Attends Club or Organization Meetings: Not on file    Marital Status: Not on file   Intimate Partner Violence:     Fear of Current or Ex-Partner: Not on file    Emotionally Abused: Not on file    Physically Abused: Not on file    Sexually Abused: Not on file   Housing Stability:     Unable to Pay for Housing in the Last Year: Not on file    Number of [place] in the Last Year: Not on file    Unstable Housing in the Last Year: Not on file       Current Medications:     Current Facility-Administered Medications:     morphine (PF) injection 2 mg, 2 mg, IntraVENous, Q4H PRN, Mehnaz Cuello MD, 2 mg at 04/10/22 1348    metoprolol succinate (TOPROL XL) extended release tablet 25 mg, 25 mg, Oral, BID, Mary Grace Woods MD, 25 mg at 04/10/22 1149    perflutren lipid microspheres (DEFINITY) injection 1.65 mg, 1.5 mL, IntraVENous, ONCE PRN, Mary Grace Woods MD    isosorbide mononitrate (IMDUR) extended release tablet 30 mg, 30 mg, Oral, Daily, Mary Grace Woods MD, 30 mg at 04/10/22 1149    sodium chloride flush 0.9 % injection 5-40 mL, 5-40 mL, IntraVENous, 2 times per day, Conrado Reynolds MD    sodium chloride flush 0.9 % injection 5-40 mL, 5-40 mL, IntraVENous, PRN, Conrado Reynolds MD    0.9 % sodium chloride infusion, , IntraVENous, PRN, Conrado Reynolds MD    therapeutic multivitamin-minerals 1 tablet, 1 tablet, Oral, Daily, Conrado Reynolds MD, 1 tablet at 04/10/22 1417    LORazepam (ATIVAN) tablet 1 mg, 1 mg, Oral, Q1H PRN **OR** LORazepam (ATIVAN) injection 1 mg, 1 mg, IntraVENous, Q1H PRN **OR** LORazepam (ATIVAN) tablet 2 mg, 2 mg, Oral, Q1H PRN **OR** LORazepam (ATIVAN) injection 2 mg, 2 mg, IntraVENous, Q1H PRN **OR** LORazepam (ATIVAN) tablet 3 mg, 3 mg, Oral, Q1H PRN, 3 mg at 04/10/22 1629 **OR** LORazepam (ATIVAN) injection 3 mg, 3 mg, IntraVENous, Q1H PRN **OR** LORazepam (ATIVAN) tablet 4 mg, 4 mg, Oral, Q1H PRN **OR** LORazepam (ATIVAN) injection 4 mg, 4 mg, IntraVENous, Q1H PRN, Conrado Reynolds MD, 4 mg at 04/10/22 1816    [COMPLETED] PHENobarbital (LUMINAL) injection 130 mg, 130 mg, IntraVENous, Once, 130 mg at 04/10/22 1805 **FOLLOWED BY** [START ON 4/11/2022] PHENobarbital (LUMINAL) injection 60 mg, 60 mg, IntraVENous, 3 times per day, Conrado Reynolds MD    0.9 % sodium chloride infusion, , IntraVENous, Continuous, Conrado Reynolds MD, Last Rate: 100 mL/hr at 04/10/22 1805, New Bag at 04/10/22 1805    [START ON 4/11/2022] thiamine (B-1) injection 100 mg, 100 mg, IntraVENous, Daily, Conrado Reynolds MD    dexmedetomidine (PRECEDEX) 400 mcg in sodium chloride 0.9 % 100 mL infusion, 0.1-1.5 mcg/kg/hr, IntraVENous, Continuous, Sharon Gerardo MD, Last Rate: 47.6 mL/hr at 04/10/22 1853, 1.4 mcg/kg/hr at 04/10/22 1853    PHENobarbital (LUMINAL) injection 130 mg, 130 mg, IntraMUSCular, Once, Sharon Gerardo MD    diazePAM (VALIUM) injection 10 mg, 10 mg, IntraVENous, Once, Sharon Gerardo MD    losartan (COZAAR) tablet 50 mg, 50 mg, Oral, Daily, Conrado Reynolds MD, 50 mg at 04/10/22 9738 [Held by provider] hydroCHLOROthiazide (HYDRODIURIL) tablet 50 mg, 50 mg, Oral, Daily, Jerica Ramos MD    [facility] AT [place] by provider] furosemide (LASIX) tablet 20 mg, 20 mg, Oral, Daily, Jerica Ramos MD    gabapentin (NEURONTIN) capsule 300 mg, 300 mg, Oral, TID, Jerica Ramos MD, 300 mg at 04/10/22 1347    pantoprazole (PROTONIX) tablet 40 mg, 40 mg, Oral, QAM AC, Jerica Ramos MD    potassium bicarb-citric acid (EFFER-K) effervescent tablet 10 mEq, 10 mEq, Oral, Daily, Jerica Ramos MD, 10 mEq at 04/10/22 0825    [Held by provider] rosuvastatin (CRESTOR) tablet 10 mg, 10 mg, Oral, Daily, Jerica Ramos MD    sodium chloride flush 0.9 % injection 5-40 mL, 5-40 mL, IntraVENous, 2 times per day, Jerica Ramos MD, 10 mL at 04/10/22 0827    sodium chloride flush 0.9 % injection 5-40 mL, 5-40 mL, IntraVENous, PRN, Jerica Ramos MD    0.9 % sodium chloride infusion, , IntraVENous, PRN, Jerica Ramos MD    enoxaparin (LOVENOX) injection 30 mg, 30 mg, SubCUTAneous, BID, Jerica Ramos MD, 30 mg at 04/10/22 0827    ondansetron (ZOFRAN-ODT) disintegrating tablet 4 mg, 4 mg, Oral, Q8H PRN **OR** ondansetron (ZOFRAN) injection 4 mg, 4 mg, IntraVENous, Q6H PRN, Jerica Ramos MD    polyethylene glycol (GLYCOLAX) packet 17 g, 17 g, Oral, Daily PRN, Jerica Ramos MD    acetaminophen (TYLENOL) tablet 650 mg, 650 mg, Oral, Q6H PRN **OR** acetaminophen (TYLENOL) suppository 650 mg, 650 mg, Rectal, Q6H PRN, Jerica Ramos MD    methylPREDNISolone sodium (SOLU-MEDROL) injection 40 mg, 40 mg, IntraVENous, Q12H, Jerica Ramos MD, 40 mg at 04/10/22 0827    budesonide (PULMICORT) nebulizer suspension 500 mcg, 0.5 mg, Nebulization, BID, Jerica Ramos MD, 500 mcg at 04/10/22 0944    ipratropium (ATROVENT) 0.02 % nebulizer solution 0.5 mg, 0.5 mg, Nebulization, 4x daily, Jerica Ramos MD, 0.5 mg at 04/10/22 1316    levalbuterol (XOPENEX) nebulization 0.31 mg, 0.31 mg, Nebulization, 4x Daily, [facility], MD, 0.31 mg at 04/10/22 1317    aspirin chewable tablet 81 mg, 81 mg, Oral, Daily, [facility], MD, 81 mg at 04/10/22 0825    nicotine (NICODERM CQ) 14 MG/24HR 1 patch, 1 patch, TransDERmal, Daily, Anthony Wheeler MD, 1 patch at 04/10/22 5028    Review of Systems:   Unable to perform review of systems s/t AMS    Physical Exam:   Vital Signs:  /71   Pulse 79   Temp 97.5 °F (36.4 °C) (Axillary)   Resp 25   Ht 6' (1.829 m)   Wt 280 lb 8 oz (127.2 kg)   SpO2 92%   BMI 38.04 kg/m²     Input/Output: In: 10 [I.V.:10]  Out: 2000     Oxygen requirements: VENT    Ventilator Information:  Vent Information  SpO2: 92 %    General appearance: Well developed, in mild respiratory distress    HEENT: Atraumatic/normocephalic, EOMI, CARMEN, pharynx clear, moist mucosa, redness of the uvula appreciated,   Neck: Supple, no jugular venous distension, lymphadenopathy, thyromegaly or carotid bruits  Chest: Equal diminished breath sounds, course rhonchi in the left upper lobe, no wheezing, no crackles and no tenderness over ribs, loud snoring   Cardiovascular: Normal S1 , S2, regular rate and rhythm, no murmur, rub or gallop  Abdomen: Normal sounds present, soft, lax with no tenderness, no hepatosplenomegaly, and no masses, obese  Extremities: No edema. Pulses are equally present. Skin: intact, no rashes   Neurologic: obtunded, does not awaken to painful stimuli, not protecting airway, No focal deficit     Investigations:  Labs, radiological imaging and cardiac work up were reviewed        ICU STAFF PHYSICIAN NOTE OF PERSONAL INVOLVEMENT IN CARE  As the attending physician, I certify that I personally reviewed the patient's history and personally examined the patient to confirm the physical findings described above, and that I reviewed the relevant imaging studies and available reports.   I also discussed the differential diagnosis and all of the proposed management plans with the patient and individuals accompanying the patient to this visit. They had the opportunity to ask questions about the proposed management plans and to have those questions answered. I saw and evaluated the patient. I agree with the findings and the plan of care as documented in [place] APRN-CNP's note      This patient has a high probability of sudden, clinically significant deterioration, which requires the highest level of physician preparedness to intervene urgently. I managed/supervised life or organ supporting interventions that required frequent physician assessment. I devoted my full attention to the direct care of this patient for the amount of time indicated below. Time I spent with family or surrogate(s) is included only if the patient was incapable of providing the necessary information or participating in medical decision-making. Time devoted to teaching and to any procedures I billed separately is not included.   Critical Care Time: 40 minutes    Rock Deon MORENO  Pulmonary and Critical Care Medicine      Electronically signed by JOSE Vidales CNP on 4/10/2022 at 7:14 PM
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
## 2022-04-10 NOTE — PLAN OF CARE
Problem: Pain:  Goal: Control of acute pain  Description: Control of acute pain  Outcome: Ongoing     Problem: Tissue Perfusion - Cardiopulmonary, Altered:  Goal: Absence of angina  Description: Absence of angina  Outcome: Ongoing

## 2022-04-10 NOTE — SIGNIFICANT EVENT
Was informed by nursing staff that patient had become very jittery, anxious and has been hallucinating that his son and daughter in the room. Initially, on admission, patient reported that he drank 4 beers most days but can go multiple days without alcohol. Likely that patient drinks more than he initially stated. Started CIWA protocol and first CIWA score was 24 followed by 20. Has been given 4mg ativan at 1417 and another 3mg ativan at 1629. Patient was evaluated by myself at 5pm and patient and CIWA was calculated at 25. Discussed case with intensivist, Dr. Roseanne Reyna, for possible transfer to ICU and patient was accepted to ICU. Was also advised to start 180mg loading dose of phenobarbital followed by 60mg Q8H as well as IVF, banana bag, daily thiamine, protonix and duonebs. Above orders placed and discussed with floor nursing staff and ICU nursing staff.

## 2022-04-11 PROBLEM — R07.9 CHEST PAIN: Status: ACTIVE | Noted: 2022-04-11

## 2022-04-11 LAB
ANION GAP SERPL CALCULATED.3IONS-SCNC: 10 MMOL/L (ref 7–16)
ANISOCYTOSIS: ABNORMAL
BASOPHILS ABSOLUTE: 0 E9/L (ref 0–0.2)
BASOPHILS RELATIVE PERCENT: 0.1 % (ref 0–2)
BUN BLDV-MCNC: 21 MG/DL (ref 6–20)
CALCIUM SERPL-MCNC: 8.8 MG/DL (ref 8.6–10.2)
CHLORIDE BLD-SCNC: 95 MMOL/L (ref 98–107)
CO2: 25 MMOL/L (ref 22–29)
CREAT SERPL-MCNC: 0.8 MG/DL (ref 0.7–1.2)
EOSINOPHILS ABSOLUTE: 0 E9/L (ref 0.05–0.5)
EOSINOPHILS RELATIVE PERCENT: 0 % (ref 0–6)
GFR AFRICAN AMERICAN: >60
GFR NON-AFRICAN AMERICAN: >60 ML/MIN/1.73
GLUCOSE BLD-MCNC: 199 MG/DL (ref 74–99)
HCT VFR BLD CALC: 30.6 % (ref 37–54)
HEMOGLOBIN: 9.8 G/DL (ref 12.5–16.5)
L. PNEUMOPHILA SEROGP 1 UR AG: NORMAL
LV EF: 58 %
LVEF MODALITY: NORMAL
LYMPHOCYTES ABSOLUTE: 0.35 E9/L (ref 1.5–4)
LYMPHOCYTES RELATIVE PERCENT: 2.6 % (ref 20–42)
MCH RBC QN AUTO: 26.6 PG (ref 26–35)
MCHC RBC AUTO-ENTMCNC: 32 % (ref 32–34.5)
MCV RBC AUTO: 82.9 FL (ref 80–99.9)
MONOCYTES ABSOLUTE: 0.35 E9/L (ref 0.1–0.95)
MONOCYTES RELATIVE PERCENT: 2.6 % (ref 2–12)
NEUTROPHILS ABSOLUTE: 11.02 E9/L (ref 1.8–7.3)
NEUTROPHILS RELATIVE PERCENT: 94.8 % (ref 43–80)
OVALOCYTES: ABNORMAL
PDW BLD-RTO: 19.3 FL (ref 11.5–15)
PLATELET # BLD: 186 E9/L (ref 130–450)
PMV BLD AUTO: 9 FL (ref 7–12)
POIKILOCYTES: ABNORMAL
POLYCHROMASIA: ABNORMAL
POTASSIUM REFLEX MAGNESIUM: 4.5 MMOL/L (ref 3.5–5)
RBC # BLD: 3.69 E12/L (ref 3.8–5.8)
SODIUM BLD-SCNC: 130 MMOL/L (ref 132–146)
STREP PNEUMONIAE ANTIGEN, URINE: NORMAL
TRIGL SERPL-MCNC: 114 MG/DL (ref 0–149)
WBC # BLD: 11.6 E9/L (ref 4.5–11.5)

## 2022-04-11 PROCEDURE — 2580000003 HC RX 258: Performed by: STUDENT IN AN ORGANIZED HEALTH CARE EDUCATION/TRAINING PROGRAM

## 2022-04-11 PROCEDURE — C9113 INJ PANTOPRAZOLE SODIUM, VIA: HCPCS

## 2022-04-11 PROCEDURE — 6370000000 HC RX 637 (ALT 250 FOR IP): Performed by: INTERNAL MEDICINE

## 2022-04-11 PROCEDURE — 2580000003 HC RX 258: Performed by: INTERNAL MEDICINE

## 2022-04-11 PROCEDURE — 2500000003 HC RX 250 WO HCPCS

## 2022-04-11 PROCEDURE — 2000000000 HC ICU R&B

## 2022-04-11 PROCEDURE — 96367 TX/PROPH/DG ADDL SEQ IV INF: CPT

## 2022-04-11 PROCEDURE — 84478 ASSAY OF TRIGLYCERIDES: CPT

## 2022-04-11 PROCEDURE — 6360000002 HC RX W HCPCS: Performed by: STUDENT IN AN ORGANIZED HEALTH CARE EDUCATION/TRAINING PROGRAM

## 2022-04-11 PROCEDURE — 6370000000 HC RX 637 (ALT 250 FOR IP): Performed by: STUDENT IN AN ORGANIZED HEALTH CARE EDUCATION/TRAINING PROGRAM

## 2022-04-11 PROCEDURE — 99233 SBSQ HOSP IP/OBS HIGH 50: CPT | Performed by: INTERNAL MEDICINE

## 2022-04-11 PROCEDURE — 2580000003 HC RX 258

## 2022-04-11 PROCEDURE — 80048 BASIC METABOLIC PNL TOTAL CA: CPT

## 2022-04-11 PROCEDURE — 6360000004 HC RX CONTRAST MEDICATION: Performed by: INTERNAL MEDICINE

## 2022-04-11 PROCEDURE — 6370000000 HC RX 637 (ALT 250 FOR IP)

## 2022-04-11 PROCEDURE — 6360000002 HC RX W HCPCS: Performed by: INTERNAL MEDICINE

## 2022-04-11 PROCEDURE — C8929 TTE W OR WO FOL WCON,DOPPLER: HCPCS

## 2022-04-11 PROCEDURE — 36415 COLL VENOUS BLD VENIPUNCTURE: CPT

## 2022-04-11 PROCEDURE — 36592 COLLECT BLOOD FROM PICC: CPT

## 2022-04-11 PROCEDURE — 96376 TX/PRO/DX INJ SAME DRUG ADON: CPT

## 2022-04-11 PROCEDURE — 85025 COMPLETE CBC W/AUTO DIFF WBC: CPT

## 2022-04-11 PROCEDURE — 96366 THER/PROPH/DIAG IV INF ADDON: CPT

## 2022-04-11 PROCEDURE — A4216 STERILE WATER/SALINE, 10 ML: HCPCS

## 2022-04-11 PROCEDURE — 99233 SBSQ HOSP IP/OBS HIGH 50: CPT | Performed by: STUDENT IN AN ORGANIZED HEALTH CARE EDUCATION/TRAINING PROGRAM

## 2022-04-11 PROCEDURE — 6360000002 HC RX W HCPCS

## 2022-04-11 PROCEDURE — 94003 VENT MGMT INPAT SUBQ DAY: CPT

## 2022-04-11 PROCEDURE — 94640 AIRWAY INHALATION TREATMENT: CPT

## 2022-04-11 RX ORDER — FENTANYL CITRATE 0.05 MG/ML
25 INJECTION, SOLUTION INTRAMUSCULAR; INTRAVENOUS EVERY 30 MIN PRN
Status: DISCONTINUED | OUTPATIENT
Start: 2022-04-11 | End: 2022-04-15

## 2022-04-11 RX ORDER — MIDAZOLAM HYDROCHLORIDE 1 MG/ML
INJECTION INTRAMUSCULAR; INTRAVENOUS
Status: COMPLETED
Start: 2022-04-11 | End: 2022-04-11

## 2022-04-11 RX ORDER — THIAMINE HYDROCHLORIDE 100 MG/ML
500 INJECTION, SOLUTION INTRAMUSCULAR; INTRAVENOUS EVERY 8 HOURS
Status: DISCONTINUED | OUTPATIENT
Start: 2022-04-11 | End: 2022-04-11 | Stop reason: CLARIF

## 2022-04-11 RX ORDER — THIAMINE HYDROCHLORIDE 100 MG/ML
100 INJECTION, SOLUTION INTRAMUSCULAR; INTRAVENOUS DAILY
Status: COMPLETED | OUTPATIENT
Start: 2022-04-14 | End: 2022-04-16

## 2022-04-11 RX ORDER — MIDAZOLAM HYDROCHLORIDE 1 MG/ML
2 INJECTION INTRAMUSCULAR; INTRAVENOUS ONCE
Status: DISCONTINUED | OUTPATIENT
Start: 2022-04-11 | End: 2022-04-11

## 2022-04-11 RX ORDER — MIDAZOLAM HYDROCHLORIDE 1 MG/ML
6 INJECTION INTRAMUSCULAR; INTRAVENOUS ONCE
Status: COMPLETED | OUTPATIENT
Start: 2022-04-11 | End: 2022-04-11

## 2022-04-11 RX ORDER — VALPROIC ACID 250 MG/5ML
500 SOLUTION ORAL EVERY 6 HOURS SCHEDULED
Status: DISCONTINUED | OUTPATIENT
Start: 2022-04-11 | End: 2022-04-12

## 2022-04-11 RX ADMIN — ENOXAPARIN SODIUM 30 MG: 100 INJECTION SUBCUTANEOUS at 08:37

## 2022-04-11 RX ADMIN — PROPOFOL 50 MCG/KG/MIN: 10 INJECTION, EMULSION INTRAVENOUS at 18:08

## 2022-04-11 RX ADMIN — IPRATROPIUM BROMIDE AND ALBUTEROL SULFATE 1 AMPULE: 2.5; .5 SOLUTION RESPIRATORY (INHALATION) at 10:02

## 2022-04-11 RX ADMIN — VALPROATE SODIUM 500 MG: 100 INJECTION, SOLUTION INTRAVENOUS at 09:06

## 2022-04-11 RX ADMIN — GABAPENTIN 300 MG: 300 CAPSULE ORAL at 20:00

## 2022-04-11 RX ADMIN — METHYLPREDNISOLONE SODIUM SUCCINATE 40 MG: 40 INJECTION, POWDER, FOR SOLUTION INTRAMUSCULAR; INTRAVENOUS at 08:37

## 2022-04-11 RX ADMIN — PROPOFOL 45 MCG/KG/MIN: 10 INJECTION, EMULSION INTRAVENOUS at 01:57

## 2022-04-11 RX ADMIN — GABAPENTIN 300 MG: 300 CAPSULE ORAL at 08:38

## 2022-04-11 RX ADMIN — PROPOFOL 45 MCG/KG/MIN: 10 INJECTION, EMULSION INTRAVENOUS at 05:02

## 2022-04-11 RX ADMIN — ASPIRIN 81 MG 81 MG: 81 TABLET ORAL at 08:38

## 2022-04-11 RX ADMIN — MIDAZOLAM HYDROCHLORIDE 2 MG/HR: 5 INJECTION, SOLUTION INTRAMUSCULAR; INTRAVENOUS at 04:43

## 2022-04-11 RX ADMIN — POTASSIUM BICARBONATE 10 MEQ: 782 TABLET, EFFERVESCENT ORAL at 08:38

## 2022-04-11 RX ADMIN — Medication 10 ML: at 20:00

## 2022-04-11 RX ADMIN — PHENOBARBITAL SODIUM 60 MG: 65 INJECTION INTRAMUSCULAR at 05:33

## 2022-04-11 RX ADMIN — PROPOFOL 50 MCG/KG/MIN: 10 INJECTION, EMULSION INTRAVENOUS at 14:53

## 2022-04-11 RX ADMIN — THIAMINE HYDROCHLORIDE 500 MG: 100 INJECTION, SOLUTION INTRAMUSCULAR; INTRAVENOUS at 16:51

## 2022-04-11 RX ADMIN — MIDAZOLAM HYDROCHLORIDE 4 MG/HR: 5 INJECTION, SOLUTION INTRAMUSCULAR; INTRAVENOUS at 12:43

## 2022-04-11 RX ADMIN — ENOXAPARIN SODIUM 30 MG: 100 INJECTION SUBCUTANEOUS at 20:00

## 2022-04-11 RX ADMIN — LOSARTAN POTASSIUM 50 MG: 50 TABLET, FILM COATED ORAL at 08:39

## 2022-04-11 RX ADMIN — METHYLPREDNISOLONE SODIUM SUCCINATE 40 MG: 40 INJECTION, POWDER, FOR SOLUTION INTRAMUSCULAR; INTRAVENOUS at 20:00

## 2022-04-11 RX ADMIN — PHENOBARBITAL SODIUM 60 MG: 65 INJECTION INTRAMUSCULAR at 14:10

## 2022-04-11 RX ADMIN — IPRATROPIUM BROMIDE AND ALBUTEROL SULFATE 1 AMPULE: 2.5; .5 SOLUTION RESPIRATORY (INHALATION) at 18:10

## 2022-04-11 RX ADMIN — IPRATROPIUM BROMIDE AND ALBUTEROL SULFATE 1 AMPULE: 2.5; .5 SOLUTION RESPIRATORY (INHALATION) at 06:34

## 2022-04-11 RX ADMIN — THIAMINE HYDROCHLORIDE 100 MG: 100 INJECTION, SOLUTION INTRAMUSCULAR; INTRAVENOUS at 08:41

## 2022-04-11 RX ADMIN — METOPROLOL SUCCINATE 25 MG: 25 TABLET, EXTENDED RELEASE ORAL at 20:00

## 2022-04-11 RX ADMIN — IPRATROPIUM BROMIDE AND ALBUTEROL SULFATE 1 AMPULE: 2.5; .5 SOLUTION RESPIRATORY (INHALATION) at 14:59

## 2022-04-11 RX ADMIN — MULTIPLE VITAMINS W/ MINERALS TAB 1 TABLET: TAB at 08:54

## 2022-04-11 RX ADMIN — IPRATROPIUM BROMIDE AND ALBUTEROL SULFATE 1 AMPULE: 2.5; .5 SOLUTION RESPIRATORY (INHALATION) at 00:49

## 2022-04-11 RX ADMIN — PROPOFOL 50 MCG/KG/MIN: 10 INJECTION, EMULSION INTRAVENOUS at 21:09

## 2022-04-11 RX ADMIN — BUDESONIDE 500 MCG: 0.5 INHALANT RESPIRATORY (INHALATION) at 18:11

## 2022-04-11 RX ADMIN — MIDAZOLAM HYDROCHLORIDE 2 MG: 1 INJECTION INTRAMUSCULAR; INTRAVENOUS at 12:24

## 2022-04-11 RX ADMIN — PROPOFOL 40 MCG/KG/MIN: 10 INJECTION, EMULSION INTRAVENOUS at 08:41

## 2022-04-11 RX ADMIN — Medication 10 ML: at 08:43

## 2022-04-11 RX ADMIN — MIDAZOLAM 2 MG: 1 INJECTION INTRAMUSCULAR; INTRAVENOUS at 12:24

## 2022-04-11 RX ADMIN — SODIUM CHLORIDE: 9 INJECTION, SOLUTION INTRAVENOUS at 07:38

## 2022-04-11 RX ADMIN — BUDESONIDE 500 MCG: 0.5 INHALANT RESPIRATORY (INHALATION) at 06:34

## 2022-04-11 RX ADMIN — VALPROIC ACID 500 MG: 250 SOLUTION ORAL at 15:25

## 2022-04-11 RX ADMIN — Medication 10 ML: at 20:01

## 2022-04-11 RX ADMIN — PERFLUTREN 1.65 MG: 6.52 INJECTION, SUSPENSION INTRAVENOUS at 11:46

## 2022-04-11 RX ADMIN — PHENOBARBITAL SODIUM 60 MG: 65 INJECTION INTRAMUSCULAR at 21:52

## 2022-04-11 RX ADMIN — GABAPENTIN 300 MG: 300 CAPSULE ORAL at 14:10

## 2022-04-11 RX ADMIN — IPRATROPIUM BROMIDE AND ALBUTEROL SULFATE 1 AMPULE: 2.5; .5 SOLUTION RESPIRATORY (INHALATION) at 22:02

## 2022-04-11 RX ADMIN — METOPROLOL SUCCINATE 25 MG: 25 TABLET, EXTENDED RELEASE ORAL at 08:39

## 2022-04-11 RX ADMIN — FENTANYL CITRATE 25 MCG/HR: 0.05 INJECTION, SOLUTION INTRAMUSCULAR; INTRAVENOUS at 12:07

## 2022-04-11 RX ADMIN — ISOSORBIDE MONONITRATE 30 MG: 30 TABLET, EXTENDED RELEASE ORAL at 08:38

## 2022-04-11 RX ADMIN — SODIUM CHLORIDE 40 MG: 9 INJECTION, SOLUTION INTRAMUSCULAR; INTRAVENOUS; SUBCUTANEOUS at 08:51

## 2022-04-11 RX ADMIN — Medication 10 ML: at 08:57

## 2022-04-11 ASSESSMENT — PULMONARY FUNCTION TESTS
PIF_VALUE: 24
PIF_VALUE: 26
PIF_VALUE: 25
PIF_VALUE: 18
PIF_VALUE: 19
PIF_VALUE: 25
PIF_VALUE: 30
PIF_VALUE: 50
PIF_VALUE: 22
PIF_VALUE: 26
PIF_VALUE: 23
PIF_VALUE: 25
PIF_VALUE: 22
PIF_VALUE: 24
PIF_VALUE: 24
PIF_VALUE: 19
PIF_VALUE: 20
PIF_VALUE: 26
PIF_VALUE: 18

## 2022-04-11 ASSESSMENT — PAIN SCALES - GENERAL
PAINLEVEL_OUTOF10: 0

## 2022-04-11 NOTE — ACP (ADVANCE CARE PLANNING)
Advance Care Planning   Healthcare Decision Maker:    Primary Decision Maker: Maria Ines Reddy Child - [phone]      Electronically signed by Katlyn Mcmillan RN-BC on 4/11/2022 at 8:38 AM

## 2022-04-11 NOTE — PROGRESS NOTES
[facility]) Physicians        CARDIOLOGY                 INPATIENT PROGRESS NOTE          PATIENT SEEN IN FOLLOW UP FOR: Chest pain, CHF    Hospital Day: 3     Allison Wilson is a 64year old patient known to Dr. Kash Dykes from initial consult      SUBJECTIVE: Event from yesterday noted; On Vent due to alcohol withdrawal to protect airway     ROS: Review of rest of 10 systems limited since Pt on vent and sedated. OBJECTIVE: Pt on Vent and sedated. Diagnostics:       Telemetry:  Reviewed         Intake/Output Summary (Last 24 hours) at 4/11/2022 0831  Last data filed at 4/11/2022 0740  Gross per 24 hour   Intake 1668.45 ml   Output 2150 ml   Net -481.55 ml       Labs:   CBC:   Recent Labs     04/10/22  1820 04/11/22  0513   WBC 16.9* 11.6*   HGB 9.8* 9.8*   HCT 30.1* 30.6*    186     BMP:   Recent Labs     04/10/22  1820 04/11/22  0513   * 130*   K 4.7 4.5   CO2 23 25   BUN 26* 21*   CREATININE 1.1 0.8   LABGLOM >60 >60   CALCIUM 8.9 8.8     Mag:   Recent Labs     04/10/22  1820   MG 2.2     Phos:   Recent Labs     04/10/22  1820   PHOS 3.6     TSH:   Recent Labs     04/10/22  0732   TSH 0.753     HgA1c:     BNP: No results for input(s): BNP in the last 72 hours. PT/INR: No results for input(s): PROTIME, INR in the last 72 hours. APTT:No results for input(s): APTT in the last 72 hours. CARDIAC ENZYMES:No results for input(s): CKTOTAL, CKMB, CKMBINDEX, TROPONINI in the last 72 hours.   FASTING LIPID PANEL:  Lab Results   Component Value Date    CHOL 213 12/09/2015    HDL 41 12/09/2015    LDLCALC - 12/09/2015    TRIG 681 12/09/2015     LIVER PROFILE:  Recent Labs     04/10/22  0732 04/10/22  1820   AST 39 29   ALT 53* 46*   LABALBU 4.1 3.8       Current Inpatient Medications:   metoprolol succinate  25 mg Oral BID    isosorbide mononitrate  30 mg Oral Daily    sodium chloride flush  5-40 mL IntraVENous 2 times per day    multivitamin  1 tablet Oral Daily    thiamine  100 mg IntraVENous Daily    valproate sodium (DEPACON) IVPB  500 mg IntraVENous Q12H    ipratropium-albuterol  1 ampule Inhalation Q4H    pantoprazole (PROTONIX) 40 mg injection  40 mg IntraVENous Daily    PHENobarbital  60 mg IntraVENous 3 times per day    losartan  50 mg Oral Daily    [Held by provider] hydroCHLOROthiazide  50 mg Oral Daily    [Held by provider] furosemide  20 mg Oral Daily    gabapentin  300 mg Oral TID    potassium bicarb-citric acid  10 mEq Oral Daily    [Held by provider] rosuvastatin  10 mg Oral Daily    sodium chloride flush  5-40 mL IntraVENous 2 times per day    enoxaparin  30 mg SubCUTAneous BID    methylPREDNISolone  40 mg IntraVENous Q12H    budesonide  0.5 mg Nebulization BID    aspirin  81 mg Oral Daily    nicotine  1 patch TransDERmal Daily       IV Infusions (if any):   sodium chloride      sodium chloride 100 mL/hr at 22    propofol 40 mcg/kg/min (22)    midazolam 2 mg/hr (22)    sodium chloride           PHYSICAL EXAM:     CONSTITUTIONAL:   /80   Pulse 75   Temp 97 °F (36.1 °C) (Bladder)   Resp 15   Ht 6' (1.829 m)   Wt 280 lb 8 oz (127.2 kg)   SpO2 96%   BMI 38.04 kg/m²   Pulse  Av.5  Min: 74  Max: 98  Systolic (37TCX), XYK:196 , Min:104 , GAJ:275    Diastolic (33VDE), HCV:63, Min:53, Max:96    In general, this is a well developed, well nourished who appears stated age. Pt on vent and sedated. HEENT: Pt on vent and sedated. Neck-  no stridor, no noted enlargement of the thyroid, no carotid bruit. no jugular venous distention   RESPIRATORY: Chest symmetrical.  No accessory muscle use. Lung auscultation - few rhonchi  CARDIOVASCULAR:     Heart Palpation - no palpable thrills   Heart Ausculation - Regular rate and rhythm, 2/6 systolic murmur, No s3 or rub. No lower extremity edema,  Distal pulses palpable, no clubbing  ABDOMEN: Soft, nondistended. Bowel sounds present. No Palpable masses.   MS: Pt on vent and sedated. .  : Deferred  Rectal Exam: Deferred  SKIN: warm and dry   NEURO / PSYCH: Pt on vent and sedated. ASSESSMENT / PLAN:    Exertional chest pain - Likely angina (CP with diaphoresis and dyspnea on exertion). MI ruled out; Continue ASA, Nitrates, BB. R/B/A discussed by Dr Bryson Dear, agreeable for [facility]. Left Heart Cath when stable and Off Vent. Indication: 3, Score: 8. Tentatively [facility] scheduled for Wednesday. 2D Echo pending. Essential HTN - Controlled     Dyslipidemia  - on Statin    Tobacco abuse (since age 15, previously 1 PPD, currently 0.75 PPD) - Will be counseled to quit smoking    Chronic HFpEF - Diuretics as needed    Probable KULDIP (patien reports multiple episodes of \"waking up with a choking feeling\") - Recommend Out-Pt sleep study    Non-morbid Obesity, BMI 38 - Diet, exercise and weight loss will be discussed.     Hx of Marijuana and ETOH use - Will be counseled to quit    Chronic back pain -- on Oxycodone as an outpatient    [address] of CAD    Anemia -- Chronic - Monitor H/H    History of elevated LFT's in 3/2022 - Monitor LFTs                   No family at bed side    Case d/w Dr Lisa Saunders        Electronically signed by Kareem Hdez MD on 4/11/2022 at 8:31 AM  [facility]) Cardiology

## 2022-04-11 NOTE — PROGRESS NOTES
Set-up vent and assisted N. P. with intubation. The 8.0 ETT is secured at 25 cm at the upper lip with an AnchorFast ETT tube patel. BR/S were bilateral and Easy Cap was positive for CO2. Expiratory condensation was present. Total time 40 minutes.

## 2022-04-11 NOTE — PROCEDURES
Hakeem Montgomery is a 64 y.o. male patient. 1. COPD exacerbation (HCC)    2. Chest pain, unspecified type    3. Hyponatremia      Past Medical History:   Diagnosis Date    Arthritis     Back pain, chronic     Hyperlipidemia     Hypertension     Neck pain, chronic      Blood pressure 115/71, pulse 79, temperature 97.3 °F (36.3 °C), temperature source Axillary, resp. rate 25, height 6' (1.829 m), weight 280 lb 8 oz (127.2 kg), SpO2 92 %. Intubation    Date/Time: 4/10/2022 9:12 PM  Performed by: JOSE Pat CNP  Authorized by: JOSE Pat CNP   Consent: The procedure was performed in an emergent situation. Verbal consent not obtained. Written consent not obtained. Relevant documents: relevant documents present and verified  Test results: test results available and properly labeled  Site marked: the operative site was marked  Imaging studies: imaging studies available  Required items: required blood products, implants, devices, and special equipment available  Patient identity confirmed: arm band  Time out: Immediately prior to procedure a \"time out\" was called to verify the correct patient, procedure, equipment, support staff and site/side marked as required. Indications: airway protection  Intubation method: video-assisted  Patient status: unconscious  Preoxygenation: BVM  Sedatives: etomidate  Laryngoscope size: Mac 4  Tube size: 8.0 mm  Tube type: cuffed  Number of attempts: 1  Ventilation between attempts: BVM  Cricoid pressure: yes  Cords visualized: yes  Post-procedure assessment: chest rise and CO2 detector  Breath sounds: equal  Cuff inflated: yes  ETT to lip: 25 cm  Tube secured with: ETT patel  Chest x-ray interpreted by me.   Chest x-ray findings: endotracheal tube in appropriate position  Patient tolerance: patient tolerated the procedure well with no immediate complications          JOSE Pat CNP  4/10/2022

## 2022-04-11 NOTE — PLAN OF CARE
Problem: Pain:  Goal: Pain level will decrease  Description: Pain level will decrease  Outcome: Met This Shift     Problem: Pain:  Goal: Control of acute pain  Description: Control of acute pain  Outcome: Met This Shift     Problem: Pain:  Goal: Control of chronic pain  Description: Control of chronic pain  Outcome: Met This Shift     Problem: Cardiac Output - Decreased:  Goal: Hemodynamic stability will improve  Description: Hemodynamic stability will improve  Outcome: Met This Shift     Problem: Pain:  Goal: Pain level will decrease  Description: Pain level will decrease  Outcome: Met This Shift     Problem: Tissue Perfusion - Cardiopulmonary, Altered:  Goal: Absence of angina  Description: Absence of angina  Outcome: Met This Shift     Problem: Tissue Perfusion - Cardiopulmonary, Altered:  Goal: Circulatory function within specified parameters  Description: Circulatory function within specified parameters  Outcome: Met This Shift     Problem: Tissue Perfusion - Cardiopulmonary, Altered:  Goal: Hemodynamic stability will improve  Description: Hemodynamic stability will improve  Outcome: Met This Shift     Problem: Fluid Volume - Deficit:  Goal: Absence of fluid volume deficit signs and symptoms  Description: Absence of fluid volume deficit signs and symptoms  Outcome: Met This Shift     Problem: Falls - Risk of:  Goal: Absence of physical injury  Description: Absence of physical injury  Outcome: Met This Shift     Problem: Non-Violent Restraints  Goal: No harm/injury to patient while restraints in use  4/11/2022 0537 by Kenji Amaro RN  Outcome: Met This Shift     Problem: Non-Violent Restraints  Goal: Patient's dignity will be maintained  4/11/2022 0537 by Kenji Amaro RN  Outcome: Met This Shift     Problem: Tobacco Use:  Goal: Will participate in inpatient tobacco-use cessation counseling  Description: Will participate in inpatient tobacco-use cessation counseling  Outcome: Not Met This Shift     Problem: Nutrition Deficit:  Goal: Ability to achieve adequate nutritional intake will improve  Description: Ability to achieve adequate nutritional intake will improve  Outcome: Not Met This Shift     Problem: Non-Violent Restraints  Goal: Removal from restraints as soon as assessed to be safe  4/11/2022 0537 by Jorge Rincon RN  Outcome: Not Met This Shift
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
## 2022-04-11 NOTE — PROGRESS NOTES
[address] [place], [place] [address]ist   Progress Note    Admitting Date and Time: 4/9/2022  4:02 PM  Admit Dx: Hyponatremia [E87.1]  COPD exacerbation ([address] [J44.1]  Acute chest pain [R07.9]  Chest pain, unspecified type [R07.9]  Chest pain [R07.9]    Subjective:    Pt intubated and sedated currently    Per RN: Patient doing ok this morning.  Was intubated overnight or airway protection after became lethargic after treatment for alcohol withdrawal with phenobarbital.     ROS: unable to assess due to being intubated and sedated     [START ON 4/14/2022] thiamine  100 mg IntraVENous Daily    thiamine (VITAMIN B1) IVPB  500 mg IntraVENous Q8H    metoprolol succinate  25 mg Oral BID    isosorbide mononitrate  30 mg Oral Daily    sodium chloride flush  5-40 mL IntraVENous 2 times per day    multivitamin  1 tablet Oral Daily    valproate sodium (DEPACON) IVPB  500 mg IntraVENous Q12H    ipratropium-albuterol  1 ampule Inhalation Q4H    pantoprazole (PROTONIX) 40 mg injection  40 mg IntraVENous Daily    PHENobarbital  60 mg IntraVENous 3 times per day    losartan  50 mg Oral Daily    [Held by provider] hydroCHLOROthiazide  50 mg Oral Daily    [Held by provider] furosemide  20 mg Oral Daily    gabapentin  300 mg Oral TID    potassium bicarb-citric acid  10 mEq Oral Daily    [Held by provider] rosuvastatin  10 mg Oral Daily    sodium chloride flush  5-40 mL IntraVENous 2 times per day    enoxaparin  30 mg SubCUTAneous BID    methylPREDNISolone  40 mg IntraVENous Q12H    budesonide  0.5 mg Nebulization BID    aspirin  81 mg Oral Daily    nicotine  1 patch TransDERmal Daily     fentanNYL, 25 mcg, Q30 Min PRN  sodium chloride flush, 5-40 mL, PRN  sodium chloride, , PRN  sodium chloride flush, 5-40 mL, PRN  sodium chloride, , PRN  ondansetron, 4 mg, Q8H PRN   Or  ondansetron, 4 mg, Q6H PRN  polyethylene glycol, 17 g, Daily PRN  acetaminophen, 650 mg, Q6H PRN   Or  acetaminophen, 650 mg, Q6H PRN Objective:    /71   Pulse 85   Temp 96.8 °F (36 °C) (Bladder)   Resp 14   Ht 6' (1.829 m)   Wt 280 lb 8 oz (127.2 kg)   SpO2 95%   BMI 38.04 kg/m²        General Appearance: intubated, sedated, in no acute distress, obese  Skin: warm, slightly diaphoretic  Head: normocephalic and atraumatic  Eyes: pupils equal, round, and reactive to light, extraocular eye movements intact, conjunctivae normal  Neck: neck supple and non tender without mass   Pulmonary/Chest:mild wheezing throughout, normal air movement, no respiratory distress  Cardiovascular: normal rate, normal S1 and S2 and no carotid bruits  Abdomen: soft, non-tender, non-distended, normal bowel sounds, no masses or organomegaly  Extremities: no cyanosis, no clubbing and no edema  Neurologic: sedated, no apparent focal deficit      Recent Labs     04/10/22  0732 04/10/22  1820 04/11/22  0513   * 131* 130*   K 4.5 4.7 4.5   CL 92* 93* 95*   CO2 22 23 25   BUN 14 26* 21*   CREATININE 0.7 1.1 0.8   GLUCOSE 175* 175* 199*   CALCIUM 9.1 8.9 8.8       Recent Labs     04/10/22  0732 04/10/22  1820   ALKPHOS 113 98   PROT 7.7 7.2   LABALBU 4.1 3.8   BILITOT 0.5 0.4   AST 39 29   ALT 53* 46*       Recent Labs     04/10/22  0732 04/10/22  1820 04/11/22  0513   WBC 10.9 16.9* 11.6*   RBC 4.34 3.74* 3.69*   HGB 11.2* 9.8* 9.8*   HCT 35.0* 30.1* 30.6*   MCV 80.6 80.5 82.9   MCH 25.8* 26.2 26.6   MCHC 32.0 32.6 32.0   RDW 18.6* 18.7* 19.3*    194 186   MPV 9.1 9.4 9.0         Radiology:   XR ABDOMEN FOR NG/OG/NE TUBE PLACEMENT   Final Result   Nasogastric tube with the tip in the proximal gastric body. XR CHEST PORTABLE   Final Result   Adequately positioned endotracheal tube, orogastric tube and right internal   jugular central venous line. XR CHEST PORTABLE   Final Result   No pneumonia or pleural effusion.              Assessment:  Principal Problem:    Acute chest pain  Active Problems:    Chest pain  Resolved Problems:    * No resolved hospital problems. *      Plan:  1. Chest pain, rule out ACS  -Chest pain/pressure with SOB and diaphoresis and nausea  -Troponin negative x 2, EKG no ST changes  -Patient has SYL score 3, HEART score 5   -Multiple risk factors include HTN, HLD, obesity, smoker, family history (brother)  -Cardio consulted, planning for cath tomorrow however now that is intubated and in acute alcohol withdrawal cath will be pushed back per cardio. -Monitor on telemetry     2. Possible COPD exacerbation  -No formal diagnosis of COPD but was given albuterol PRN by PCP  -Wheezing on exam and patient is active smoker, currently intubated for airway protection  -Given 125mg solumedrol in ER, will continue with 40mg BID   -Nebulizers      3. Severe alcohol withdrawal with DTs  -Patient initially was not truthful with how much he drank, advised patient that was assessing for risk of alcohol withdrawal and still did not admit true amount of alcohol use  -Began to go into mild alcohol withdrawal yesterday afternoon and CIWA protocol started  -As day went on alcohol withdrawal became severe and patient required transfer to ICU as hallucinating and becoming aggressive. Lali Knutson called overnight in ICU  -Given phenobarbital loading dose and then became lethargic and required intubation for airway protection. Now sedated with propofol and fentanyl.   -Phenobarbital 60mg Q8H for alcohol withdrawal, continue to monitor CIWA    4. Hyponatremia  -Sodium 123 on admission, was also 123 last month. Today 130  -Was given 1L NS bolus in ER, will hold off on any additional IVF for now  -Possibly chronic from beer potomania as is alcoholic  -Could also be from HCTZ, losartan and lasix, will hold lasix and HCTZ for now     4. HTN  -Home meds HCTZ, lasix, losartan and verapamil  -BP on admission 127/85, this morning 125/80  -Continue home meds other than lasix and monitor BP     5. HLD  -Continue statin     6. GERD  -Continue PPI     7.  Tobacco use disorder  -Smokes 1/2-1PPD for 45 years  -Counseled on smoking cessation     8. Morbid obesity  -BMI 40, counseled on lifestyle changes    NOTE: This report was transcribed using voice recognition software. Every effort was made to ensure accuracy; however, inadvertent computerized transcription errors may be present.      Electronically signed by Erin Taveras MD on 4/11/2022 at 12:09 PM

## 2022-04-11 NOTE — PROGRESS NOTES
CRITICAL CARE PROGRESS NOTE    The patient's case was discussed in multidisciplinary rounds including critical care specialist, nursing, RT and pharmacy. His evaluation is as follows:     ETOH withdrawal with delirium tremens  --Intubated for airway protection - Vent settings 460/ f 16/ PEEP 5/ 30% FiO2  --Sedation with propofol + midazolam infusions and phenobarbital + Valproic acid   --Thiamine and folic acid, banana bag    Chest pain R/O ACS  --Followed by cardiology  --Plan for heart cath tomorrow or Tuesday at [facility] however his ETOH withdrawal may effect this plan  --Continue  beta blocker and imdur, ASA and ARB     Chronic diastolic heart failure  --Does not appear to be in acute exacerbation  --Diuretics on hold due to hyponatremia  --ECHO pending     COPD exacerbation  --on solu-medrol  --aerosolized bronchodilators including duoneb and pulmicort     Hyponatremia, chronic possible due to chronic beer intake  --Lasix and HCTZ on hold  --Currently receiving fluids  --Daily BMP     HTN  --on ARB and BB     HLD  --Stating on hold due to increased LFT, ALT now 53 and AST now 36 down from 73 and 70 respectively      GERD  --On protonix     Tobacco use disorder  --Continue nicotine patch     Morbid obesity  --will need counseling on lifestyle modifications      DVT prophylaxis with enoxaparin   Nutrition: Tube feedings  Vascular catheters: TLC needed for vasopressor support  Urinary catheter needed for strict input/output    GI prophylaxis with PPI  Physical therapy once awake  Goals of care:  Full code    /73   Pulse 98   Temp 98.2 °F (36.8 °C) (Bladder)   Resp 16   Ht 6' (1.829 m)   Wt 280 lb 8 oz (127.2 kg)   SpO2 98%   BMI 38.04 kg/m²   General: Awake, agitated, with severe tremor, flushed and diaphoretic  HEENT: No head lesions, PERRL, EOMI, mouth with ETT, no lesions, no nasal lesions, no cervical adenopathy palpated  Respiratory: Lungs with equal breath sounds bilaterally, no adventitious sounds auscultated, no accessory muscle use  CV: Regular rate, no murmurs, no JVD, trace leg edema  Abdomen: Soft, non tender, + bowel sounds, no lesions  Skin: Hydrated, adequate turgor, no rash, capillary refill <2 seconds  Extremities: Muscular strength 5/5 in 4 limbs, moves 4 limbs spontaneously, distal pulses present  Neurology: Awake and agitated, confused and not redirectable, moves 4 limbs spontaneously, neck is supple, no meningitic signs present. Last 3 CMP:  Recent Labs     04/10/22  0732 04/10/22  1820 04/11/22  0513   * 131* 130*   K 4.5 4.7 4.5   CL 92* 93* 95*   CO2 22 23 25   BUN 14 26* 21*   CREATININE 0.7 1.1 0.8   GLUCOSE 175* 175* 199*   CALCIUM 9.1 8.9 8.8   PROT 7.7 7.2  --    LABALBU 4.1 3.8  --    BILITOT 0.5 0.4  --    ALKPHOS 113 98  --    AST 39 29  --    ALT 53* 46*  --      Recent Labs     04/11/22  0513   WBC 11.6*   RBC 3.69*   HGB 9.8*   HCT 30.6*   MCV 82.9   MCH 26.6   MCHC 32.0   RDW 19.3*      MPV 9.0       No results for input(s): BC in the last 72 hours. No results for input(s): Ernesto Deborah in the last 72 hours.     24 HR INTAKE/OUTPUT:      Intake/Output Summary (Last 24 hours) at 4/11/2022 0946  Last data filed at 4/11/2022 0740  Gross per 24 hour   Intake 1668.45 ml   Output 2150 ml   Net -481.55 ml     MEDICATIONS:   [START ON 4/14/2022] thiamine  100 mg IntraVENous Daily    thiamine (VITAMIN B1) IVPB  500 mg IntraVENous Q8H    metoprolol succinate  25 mg Oral BID    isosorbide mononitrate  30 mg Oral Daily    sodium chloride flush  5-40 mL IntraVENous 2 times per day    multivitamin  1 tablet Oral Daily    valproate sodium (DEPACON) IVPB  500 mg IntraVENous Q12H    ipratropium-albuterol  1 ampule Inhalation Q4H    pantoprazole (PROTONIX) 40 mg injection  40 mg IntraVENous Daily    PHENobarbital  60 mg IntraVENous 3 times per day    losartan  50 mg Oral Daily    [Held by provider] hydroCHLOROthiazide  50 mg Oral Daily    [Held by provider] furosemide  20 mg Oral Daily    gabapentin  300 mg Oral TID    potassium bicarb-citric acid  10 mEq Oral Daily    [Held by provider] rosuvastatin  10 mg Oral Daily    sodium chloride flush  5-40 mL IntraVENous 2 times per day    enoxaparin  30 mg SubCUTAneous BID    methylPREDNISolone  40 mg IntraVENous Q12H    budesonide  0.5 mg Nebulization BID    aspirin  81 mg Oral Daily    nicotine  1 patch TransDERmal Daily      fentaNYL 5 mcg/ml in 0.9%  ml infusion      sodium chloride      propofol 40 mcg/kg/min (04/11/22 0841)    sodium chloride       fentanNYL, perflutren lipid microspheres, sodium chloride flush, sodium chloride, sodium chloride flush, sodium chloride, ondansetron **OR** ondansetron, polyethylene glycol, acetaminophen **OR** acetaminophen    OBJECTIVE:  Vitals:    04/11/22 0800   BP: 128/76   Pulse: 84   Resp: 17   Temp: 96.8 °F (36 °C)   SpO2: 96%     FiO2 : 30 %  O2 Flow Rate (L/min): 3 L/min  O2 Device: Ventilator        LABS:  WBC   Date Value Ref Range Status   04/11/2022 11.6 (H) 4.5 - 11.5 E9/L Final   04/10/2022 16.9 (H) 4.5 - 11.5 E9/L Final   04/10/2022 10.9 4.5 - 11.5 E9/L Final     Hemoglobin   Date Value Ref Range Status   04/11/2022 9.8 (L) 12.5 - 16.5 g/dL Final   04/10/2022 9.8 (L) 12.5 - 16.5 g/dL Final   04/10/2022 11.2 (L) 12.5 - 16.5 g/dL Final     Hematocrit   Date Value Ref Range Status   04/11/2022 30.6 (L) 37.0 - 54.0 % Final   04/10/2022 30.1 (L) 37.0 - 54.0 % Final   04/10/2022 35.0 (L) 37.0 - 54.0 % Final     MCV   Date Value Ref Range Status   04/11/2022 82.9 80.0 - 99.9 fL Final   04/10/2022 80.5 80.0 - 99.9 fL Final   04/10/2022 80.6 80.0 - 99.9 fL Final     Platelets   Date Value Ref Range Status   04/11/2022 186 130 - 450 E9/L Final   04/10/2022 194 130 - 450 E9/L Final   04/10/2022 202 130 - 450 E9/L Final     Sodium   Date Value Ref Range Status   04/11/2022 130 (L) 132 - 146 mmol/L Final   04/10/2022 131 (L) 132 - 146 mmol/L Final 04/10/2022 128 (L) 132 - 146 mmol/L Final     Potassium   Date Value Ref Range Status   04/10/2022 4.7 3.5 - 5.0 mmol/L Final     Potassium reflex Magnesium   Date Value Ref Range Status   04/11/2022 4.5 3.5 - 5.0 mmol/L Final   04/10/2022 4.5 3.5 - 5.0 mmol/L Final   04/09/2022 4.3 3.5 - 5.0 mmol/L Final     Chloride   Date Value Ref Range Status   04/11/2022 95 (L) 98 - 107 mmol/L Final   04/10/2022 93 (L) 98 - 107 mmol/L Final   04/10/2022 92 (L) 98 - 107 mmol/L Final     CO2   Date Value Ref Range Status   04/11/2022 25 22 - 29 mmol/L Final   04/10/2022 23 22 - 29 mmol/L Final   04/10/2022 22 22 - 29 mmol/L Final     BUN   Date Value Ref Range Status   04/11/2022 21 (H) 6 - 20 mg/dL Final   04/10/2022 26 (H) 6 - 20 mg/dL Final   04/10/2022 14 6 - 20 mg/dL Final     CREATININE   Date Value Ref Range Status   04/11/2022 0.8 0.7 - 1.2 mg/dL Final   04/10/2022 1.1 0.7 - 1.2 mg/dL Final   04/10/2022 0.7 0.7 - 1.2 mg/dL Final     Glucose   Date Value Ref Range Status   04/11/2022 199 (H) 74 - 99 mg/dL Final   04/10/2022 175 (H) 74 - 99 mg/dL Final   04/10/2022 175 (H) 74 - 99 mg/dL Final     Calcium   Date Value Ref Range Status   04/11/2022 8.8 8.6 - 10.2 mg/dL Final   04/10/2022 8.9 8.6 - 10.2 mg/dL Final   04/10/2022 9.1 8.6 - 10.2 mg/dL Final     Total Protein   Date Value Ref Range Status   04/10/2022 7.2 6.4 - 8.3 g/dL Final   04/10/2022 7.7 6.4 - 8.3 g/dL Final   03/15/2022 8.5 (H) 6.4 - 8.3 g/dL Final     Albumin   Date Value Ref Range Status   04/10/2022 3.8 3.5 - 5.2 g/dL Final   04/10/2022 4.1 3.5 - 5.2 g/dL Final   03/15/2022 4.6 3.5 - 5.2 g/dL Final   10/13/2010 4.8 3.2 - 4.8 g/dL Final     Total Bilirubin   Date Value Ref Range Status   04/10/2022 0.4 0.0 - 1.2 mg/dL Final   04/10/2022 0.5 0.0 - 1.2 mg/dL Final   03/15/2022 0.8 0.0 - 1.2 mg/dL Final     Alkaline Phosphatase   Date Value Ref Range Status   04/10/2022 98 40 - 129 U/L Final   04/10/2022 113 40 - 129 U/L Final   03/15/2022 122 40 - 129 U/L Final     AST   Date Value Ref Range Status   04/10/2022 29 0 - 39 U/L Final   04/10/2022 39 0 - 39 U/L Final   03/15/2022 70 (H) 0 - 39 U/L Final     ALT   Date Value Ref Range Status   04/10/2022 46 (H) 0 - 40 U/L Final   04/10/2022 53 (H) 0 - 40 U/L Final   03/15/2022 73 (H) 0 - 40 U/L Final     GFR Non-   Date Value Ref Range Status   04/11/2022 >60 >=60 mL/min/1.73 Final     Comment:     Chronic Kidney Disease: less than 60 ml/min/1.73 sq.m. Kidney Failure: less than 15 ml/min/1.73 sq.m. Results valid for patients 18 years and older. 04/10/2022 >60 >=60 mL/min/1.73 Final     Comment:     Chronic Kidney Disease: less than 60 ml/min/1.73 sq.m. Kidney Failure: less than 15 ml/min/1.73 sq.m. Results valid for patients 18 years and older. 04/10/2022 >60 >=60 mL/min/1.73 Final     Comment:     Chronic Kidney Disease: less than 60 ml/min/1.73 sq.m. Kidney Failure: less than 15 ml/min/1.73 sq.m. Results valid for patients 18 years and older. GFR    Date Value Ref Range Status   04/11/2022 >60  Final   04/10/2022 >60  Final   04/10/2022 >60  Final     Magnesium   Date Value Ref Range Status   04/10/2022 2.2 1.6 - 2.6 mg/dL Final     Phosphorus   Date Value Ref Range Status   04/10/2022 3.6 2.5 - 4.5 mg/dL Final     Recent Labs     04/10/22  2149   PH 7.405   PO2 141.6*   PCO2 42.2   HCO3 25.8   BE 1.0   O2SAT 98.6*       RADIOLOGY:  XR ABDOMEN FOR NG/OG/NE TUBE PLACEMENT   Final Result   Nasogastric tube with the tip in the proximal gastric body. XR CHEST PORTABLE   Final Result   Adequately positioned endotracheal tube, orogastric tube and right internal   jugular central venous line. XR CHEST PORTABLE   Final Result   No pneumonia or pleural effusion. PROBLEM LIST:  Principal Problem:    Acute chest pain  Active Problems:    Chest pain  Resolved Problems:    * No resolved hospital problems. *          ATTESTATION:  ICU Staff Physician note of personal involvement in Care  As the attending physician, I certify that I personally reviewed the patients history and personnally examined the patient to confirm the physical findings described above,  And that I reviewed the relevant imaging studies and available reports. I also discussed the differential diagnosis and all of the proposed management plans with the patient and individuals accompanying the patient to this visit. They had the opportunity to ask questions about the proposed management plans and to have those questions answered. This patient has a high probability of sudden, clinically significant deterioration, which requires the highest level of physician preparedness to intervene urgently. I managed/supervised life or organ supporting interventions that required frequent physician assessment. I devoted my full attention to the direct care of this patient for the amount of time indicated below. Time I spent with the family or surrogate(s) is included only if the patient was incapable of providing the necessary information or participating in medical decisions - Time devoted to teaching and to any procedures I billed separately is not included.      CRITICAL CARE TIME:  30 minutes    Clemente Silver MD  Pulmonary and Critical Care Medicine

## 2022-04-11 NOTE — PROCEDURES
Jennifer Bacon is a 64 y.o. male patient. 1. COPD exacerbation (HCC)    2. Chest pain, unspecified type    3. Hyponatremia    4. Alcohol withdrawal delirium (HCC)      Past Medical History:   Diagnosis Date    Arthritis     Back pain, chronic     Hyperlipidemia     Hypertension     Neck pain, chronic      Blood pressure 115/71, pulse 79, temperature 97.3 °F (36.3 °C), temperature source Axillary, resp. rate 25, height 6' (1.829 m), weight 280 lb 8 oz (127.2 kg), SpO2 92 %. Central Line    Date/Time: 4/10/2022 9:14 PM  Performed by: JOSE Tinsley CNP  Authorized by: JOSE Tinsley CNP   Consent: The procedure was performed in an emergent situation. Verbal consent not obtained. Written consent not obtained. Relevant documents: relevant documents present and verified  Test results: test results available and properly labeled  Site marked: the operative site was marked  Imaging studies: imaging studies available  Required items: required blood products, implants, devices, and special equipment available  Patient identity confirmed: arm band  Time out: Immediately prior to procedure a \"time out\" was called to verify the correct patient, procedure, equipment, support staff and site/side marked as required.   Indications: vascular access    Sedation:  Patient sedated: yes  Analgesia: see MAR for details    Preparation: skin prepped with ChloraPrep  Skin prep agent dried: skin prep agent completely dried prior to procedure  Sterile barriers: all five maximum sterile barriers used - cap, mask, sterile gown, sterile gloves, and large sterile sheet  Hand hygiene: hand hygiene performed prior to central venous catheter insertion  Location details: right internal jugular  Patient position: flat  Catheter type: triple lumen  Catheter size: 7 Fr  Pre-procedure: landmarks identified  Ultrasound guidance: yes  Sterile ultrasound techniques: sterile gel and sterile probe covers were used  Number of attempts: 1  Successful placement: yes  Post-procedure: line sutured and dressing applied  Assessment: blood return through all ports,  placement verified by x-ray and no pneumothorax on x-ray  Patient tolerance: patient tolerated the procedure well with no immediate complications          JOSE Brwon - SHARONDA  4/10/2022

## 2022-04-12 LAB
ALBUMIN SERPL-MCNC: 3.6 G/DL (ref 3.5–5.2)
ALP BLD-CCNC: 87 U/L (ref 40–129)
ALT SERPL-CCNC: 40 U/L (ref 0–40)
ANION GAP SERPL CALCULATED.3IONS-SCNC: 10 MMOL/L (ref 7–16)
ANISOCYTOSIS: ABNORMAL
AST SERPL-CCNC: 21 U/L (ref 0–39)
BASOPHILIC STIPPLING: ABNORMAL
BASOPHILS ABSOLUTE: 0 E9/L (ref 0–0.2)
BASOPHILS RELATIVE PERCENT: 0.1 % (ref 0–2)
BILIRUB SERPL-MCNC: 0.3 MG/DL (ref 0–1.2)
BUN BLDV-MCNC: 15 MG/DL (ref 6–20)
CALCIUM SERPL-MCNC: 8.7 MG/DL (ref 8.6–10.2)
CHLORIDE BLD-SCNC: 95 MMOL/L (ref 98–107)
CO2: 26 MMOL/L (ref 22–29)
CREAT SERPL-MCNC: 0.8 MG/DL (ref 0.7–1.2)
EOSINOPHILS ABSOLUTE: 0 E9/L (ref 0.05–0.5)
EOSINOPHILS RELATIVE PERCENT: 0 % (ref 0–6)
GFR AFRICAN AMERICAN: >60
GFR NON-AFRICAN AMERICAN: >60 ML/MIN/1.73
GLUCOSE BLD-MCNC: 181 MG/DL (ref 74–99)
HCT VFR BLD CALC: 31.4 % (ref 37–54)
HEMOGLOBIN: 9.8 G/DL (ref 12.5–16.5)
HYPOCHROMIA: ABNORMAL
LYMPHOCYTES ABSOLUTE: 0.21 E9/L (ref 1.5–4)
LYMPHOCYTES RELATIVE PERCENT: 1.7 % (ref 20–42)
MAGNESIUM: 2.5 MG/DL (ref 1.6–2.6)
MCH RBC QN AUTO: 26.2 PG (ref 26–35)
MCHC RBC AUTO-ENTMCNC: 31.2 % (ref 32–34.5)
MCV RBC AUTO: 84 FL (ref 80–99.9)
MONOCYTES ABSOLUTE: 0.73 E9/L (ref 0.1–0.95)
MONOCYTES RELATIVE PERCENT: 7 % (ref 2–12)
MRSA CULTURE ONLY: NORMAL
NEUTROPHILS ABSOLUTE: 9.46 E9/L (ref 1.8–7.3)
NEUTROPHILS RELATIVE PERCENT: 91.3 % (ref 43–80)
OVALOCYTES: ABNORMAL
PDW BLD-RTO: 20.1 FL (ref 11.5–15)
PHOSPHORUS: 3.3 MG/DL (ref 2.5–4.5)
PLATELET # BLD: 187 E9/L (ref 130–450)
PMV BLD AUTO: 9.5 FL (ref 7–12)
POIKILOCYTES: ABNORMAL
POLYCHROMASIA: ABNORMAL
POTASSIUM SERPL-SCNC: 5 MMOL/L (ref 3.5–5)
RBC # BLD: 3.74 E12/L (ref 3.8–5.8)
SODIUM BLD-SCNC: 131 MMOL/L (ref 132–146)
TOTAL PROTEIN: 6.7 G/DL (ref 6.4–8.3)
WBC # BLD: 10.4 E9/L (ref 4.5–11.5)

## 2022-04-12 PROCEDURE — 85025 COMPLETE CBC W/AUTO DIFF WBC: CPT

## 2022-04-12 PROCEDURE — 6360000002 HC RX W HCPCS: Performed by: INTERNAL MEDICINE

## 2022-04-12 PROCEDURE — 94640 AIRWAY INHALATION TREATMENT: CPT

## 2022-04-12 PROCEDURE — 80053 COMPREHEN METABOLIC PANEL: CPT

## 2022-04-12 PROCEDURE — 87077 CULTURE AEROBIC IDENTIFY: CPT

## 2022-04-12 PROCEDURE — 6370000000 HC RX 637 (ALT 250 FOR IP)

## 2022-04-12 PROCEDURE — 2580000003 HC RX 258: Performed by: STUDENT IN AN ORGANIZED HEALTH CARE EDUCATION/TRAINING PROGRAM

## 2022-04-12 PROCEDURE — 36592 COLLECT BLOOD FROM PICC: CPT

## 2022-04-12 PROCEDURE — C9113 INJ PANTOPRAZOLE SODIUM, VIA: HCPCS

## 2022-04-12 PROCEDURE — 6370000000 HC RX 637 (ALT 250 FOR IP): Performed by: INTERNAL MEDICINE

## 2022-04-12 PROCEDURE — 6360000002 HC RX W HCPCS

## 2022-04-12 PROCEDURE — 2500000003 HC RX 250 WO HCPCS: Performed by: INTERNAL MEDICINE

## 2022-04-12 PROCEDURE — 6360000002 HC RX W HCPCS: Performed by: STUDENT IN AN ORGANIZED HEALTH CARE EDUCATION/TRAINING PROGRAM

## 2022-04-12 PROCEDURE — 2000000000 HC ICU R&B

## 2022-04-12 PROCEDURE — 87206 SMEAR FLUORESCENT/ACID STAI: CPT

## 2022-04-12 PROCEDURE — 94003 VENT MGMT INPAT SUBQ DAY: CPT

## 2022-04-12 PROCEDURE — 83735 ASSAY OF MAGNESIUM: CPT

## 2022-04-12 PROCEDURE — 6370000000 HC RX 637 (ALT 250 FOR IP): Performed by: STUDENT IN AN ORGANIZED HEALTH CARE EDUCATION/TRAINING PROGRAM

## 2022-04-12 PROCEDURE — A4216 STERILE WATER/SALINE, 10 ML: HCPCS

## 2022-04-12 PROCEDURE — 99233 SBSQ HOSP IP/OBS HIGH 50: CPT | Performed by: STUDENT IN AN ORGANIZED HEALTH CARE EDUCATION/TRAINING PROGRAM

## 2022-04-12 PROCEDURE — 87070 CULTURE OTHR SPECIMN AEROBIC: CPT

## 2022-04-12 PROCEDURE — 84100 ASSAY OF PHOSPHORUS: CPT

## 2022-04-12 PROCEDURE — 87186 SC STD MICRODIL/AGAR DIL: CPT

## 2022-04-12 PROCEDURE — 2580000003 HC RX 258

## 2022-04-12 PROCEDURE — 2580000003 HC RX 258: Performed by: INTERNAL MEDICINE

## 2022-04-12 PROCEDURE — 99233 SBSQ HOSP IP/OBS HIGH 50: CPT | Performed by: INTERNAL MEDICINE

## 2022-04-12 RX ORDER — MIDAZOLAM HYDROCHLORIDE 1 MG/ML
2 INJECTION INTRAMUSCULAR; INTRAVENOUS ONCE
Status: COMPLETED | OUTPATIENT
Start: 2022-04-12 | End: 2022-04-12

## 2022-04-12 RX ORDER — VALPROIC ACID 250 MG/5ML
750 SOLUTION ORAL EVERY 6 HOURS SCHEDULED
Status: COMPLETED | OUTPATIENT
Start: 2022-04-12 | End: 2022-04-16

## 2022-04-12 RX ORDER — GABAPENTIN 300 MG/1
600 CAPSULE ORAL 3 TIMES DAILY
Status: DISCONTINUED | OUTPATIENT
Start: 2022-04-12 | End: 2022-04-13

## 2022-04-12 RX ORDER — DEXMEDETOMIDINE HYDROCHLORIDE 4 UG/ML
.1-1.5 INJECTION, SOLUTION INTRAVENOUS CONTINUOUS
Status: DISCONTINUED | OUTPATIENT
Start: 2022-04-12 | End: 2022-04-13

## 2022-04-12 RX ORDER — FUROSEMIDE 10 MG/ML
40 INJECTION INTRAMUSCULAR; INTRAVENOUS ONCE
Status: COMPLETED | OUTPATIENT
Start: 2022-04-12 | End: 2022-04-12

## 2022-04-12 RX ADMIN — FENTANYL CITRATE 125 MCG/HR: 0.05 INJECTION, SOLUTION INTRAMUSCULAR; INTRAVENOUS at 13:52

## 2022-04-12 RX ADMIN — Medication 10 ML: at 20:18

## 2022-04-12 RX ADMIN — IPRATROPIUM BROMIDE AND ALBUTEROL SULFATE 1 AMPULE: 2.5; .5 SOLUTION RESPIRATORY (INHALATION) at 17:36

## 2022-04-12 RX ADMIN — BUDESONIDE 500 MCG: 0.5 INHALANT RESPIRATORY (INHALATION) at 17:36

## 2022-04-12 RX ADMIN — ENOXAPARIN SODIUM 30 MG: 100 INJECTION SUBCUTANEOUS at 20:18

## 2022-04-12 RX ADMIN — THIAMINE HYDROCHLORIDE 500 MG: 100 INJECTION, SOLUTION INTRAMUSCULAR; INTRAVENOUS at 16:34

## 2022-04-12 RX ADMIN — SODIUM CHLORIDE 40 MG: 9 INJECTION, SOLUTION INTRAMUSCULAR; INTRAVENOUS; SUBCUTANEOUS at 08:30

## 2022-04-12 RX ADMIN — GABAPENTIN 300 MG: 300 CAPSULE ORAL at 08:30

## 2022-04-12 RX ADMIN — BUDESONIDE 500 MCG: 0.5 INHALANT RESPIRATORY (INHALATION) at 06:14

## 2022-04-12 RX ADMIN — Medication 0.4 MCG/KG/HR: at 19:05

## 2022-04-12 RX ADMIN — PROPOFOL 40 MCG/KG/MIN: 10 INJECTION, EMULSION INTRAVENOUS at 15:44

## 2022-04-12 RX ADMIN — Medication 10 ML: at 09:08

## 2022-04-12 RX ADMIN — ASPIRIN 81 MG 81 MG: 81 TABLET ORAL at 08:30

## 2022-04-12 RX ADMIN — DOXYCYCLINE 100 MG: 100 INJECTION, POWDER, LYOPHILIZED, FOR SOLUTION INTRAVENOUS at 17:08

## 2022-04-12 RX ADMIN — METOPROLOL SUCCINATE 25 MG: 25 TABLET, EXTENDED RELEASE ORAL at 08:30

## 2022-04-12 RX ADMIN — MULTIPLE VITAMINS W/ MINERALS TAB 1 TABLET: TAB at 08:30

## 2022-04-12 RX ADMIN — MIDAZOLAM 2 MG: 1 INJECTION INTRAMUSCULAR; INTRAVENOUS at 10:09

## 2022-04-12 RX ADMIN — Medication 10 ML: at 09:07

## 2022-04-12 RX ADMIN — GABAPENTIN 600 MG: 300 CAPSULE ORAL at 20:17

## 2022-04-12 RX ADMIN — METOPROLOL TARTRATE 25 MG: 25 TABLET, FILM COATED ORAL at 20:21

## 2022-04-12 RX ADMIN — VALPROIC ACID 750 MG: 250 SOLUTION ORAL at 11:56

## 2022-04-12 RX ADMIN — PROPOFOL 40 MCG/KG/MIN: 10 INJECTION, EMULSION INTRAVENOUS at 00:49

## 2022-04-12 RX ADMIN — PROPOFOL 30 MCG/KG/MIN: 10 INJECTION, EMULSION INTRAVENOUS at 09:15

## 2022-04-12 RX ADMIN — PHENOBARBITAL SODIUM 60 MG: 65 INJECTION INTRAMUSCULAR at 22:29

## 2022-04-12 RX ADMIN — PROPOFOL 40 MCG/KG/MIN: 10 INJECTION, EMULSION INTRAVENOUS at 22:30

## 2022-04-12 RX ADMIN — IPRATROPIUM BROMIDE AND ALBUTEROL SULFATE 1 AMPULE: 2.5; .5 SOLUTION RESPIRATORY (INHALATION) at 13:30

## 2022-04-12 RX ADMIN — IPRATROPIUM BROMIDE AND ALBUTEROL SULFATE 1 AMPULE: 2.5; .5 SOLUTION RESPIRATORY (INHALATION) at 06:14

## 2022-04-12 RX ADMIN — FUROSEMIDE 40 MG: 10 INJECTION, SOLUTION INTRAMUSCULAR; INTRAVENOUS at 14:00

## 2022-04-12 RX ADMIN — VALPROIC ACID 500 MG: 250 SOLUTION ORAL at 00:00

## 2022-04-12 RX ADMIN — PROPOFOL 30 MCG/KG/MIN: 10 INJECTION, EMULSION INTRAVENOUS at 05:18

## 2022-04-12 RX ADMIN — Medication 0.2 MCG/KG/HR: at 12:31

## 2022-04-12 RX ADMIN — PHENOBARBITAL SODIUM 60 MG: 65 INJECTION INTRAMUSCULAR at 05:18

## 2022-04-12 RX ADMIN — IPRATROPIUM BROMIDE AND ALBUTEROL SULFATE 1 AMPULE: 2.5; .5 SOLUTION RESPIRATORY (INHALATION) at 21:44

## 2022-04-12 RX ADMIN — THIAMINE HYDROCHLORIDE 500 MG: 100 INJECTION, SOLUTION INTRAMUSCULAR; INTRAVENOUS at 08:35

## 2022-04-12 RX ADMIN — VALPROIC ACID 750 MG: 250 SOLUTION ORAL at 17:44

## 2022-04-12 RX ADMIN — ISOSORBIDE MONONITRATE 30 MG: 30 TABLET, EXTENDED RELEASE ORAL at 08:30

## 2022-04-12 RX ADMIN — THIAMINE HYDROCHLORIDE 500 MG: 100 INJECTION, SOLUTION INTRAMUSCULAR; INTRAVENOUS at 00:05

## 2022-04-12 RX ADMIN — IPRATROPIUM BROMIDE AND ALBUTEROL SULFATE 1 AMPULE: 2.5; .5 SOLUTION RESPIRATORY (INHALATION) at 01:40

## 2022-04-12 RX ADMIN — PHENOBARBITAL SODIUM 60 MG: 65 INJECTION INTRAMUSCULAR at 14:05

## 2022-04-12 RX ADMIN — VALPROIC ACID 500 MG: 250 SOLUTION ORAL at 05:18

## 2022-04-12 RX ADMIN — LOSARTAN POTASSIUM 50 MG: 50 TABLET, FILM COATED ORAL at 08:30

## 2022-04-12 RX ADMIN — IPRATROPIUM BROMIDE AND ALBUTEROL SULFATE 1 AMPULE: 2.5; .5 SOLUTION RESPIRATORY (INHALATION) at 09:47

## 2022-04-12 RX ADMIN — FENTANYL CITRATE 100 MCG/HR: 0.05 INJECTION, SOLUTION INTRAMUSCULAR; INTRAVENOUS at 01:08

## 2022-04-12 RX ADMIN — PROPOFOL 45 MCG/KG/MIN: 10 INJECTION, EMULSION INTRAVENOUS at 12:35

## 2022-04-12 RX ADMIN — MIDAZOLAM HYDROCHLORIDE 4 MG/HR: 5 INJECTION, SOLUTION INTRAMUSCULAR; INTRAVENOUS at 06:47

## 2022-04-12 RX ADMIN — PROPOFOL 40 MCG/KG/MIN: 10 INJECTION, EMULSION INTRAVENOUS at 19:04

## 2022-04-12 RX ADMIN — GABAPENTIN 600 MG: 300 CAPSULE ORAL at 14:09

## 2022-04-12 RX ADMIN — METHYLPREDNISOLONE SODIUM SUCCINATE 40 MG: 40 INJECTION, POWDER, FOR SOLUTION INTRAMUSCULAR; INTRAVENOUS at 08:30

## 2022-04-12 RX ADMIN — ENOXAPARIN SODIUM 30 MG: 100 INJECTION SUBCUTANEOUS at 08:30

## 2022-04-12 ASSESSMENT — PULMONARY FUNCTION TESTS
PIF_VALUE: 31
PIF_VALUE: 36
PIF_VALUE: 26
PIF_VALUE: 25
PIF_VALUE: 19
PIF_VALUE: 31
PIF_VALUE: 25
PIF_VALUE: 26
PIF_VALUE: 36
PIF_VALUE: 33
PIF_VALUE: 26
PIF_VALUE: 28
PIF_VALUE: 29
PIF_VALUE: 29
PIF_VALUE: 30
PIF_VALUE: 30
PIF_VALUE: 22
PIF_VALUE: 27
PIF_VALUE: 26
PIF_VALUE: 32
PIF_VALUE: 35
PIF_VALUE: 29
PIF_VALUE: 31
PIF_VALUE: 29
PIF_VALUE: 29
PIF_VALUE: 20
PIF_VALUE: 45
PIF_VALUE: 28

## 2022-04-12 ASSESSMENT — PAIN SCALES - GENERAL: PAINLEVEL_OUTOF10: 0

## 2022-04-12 NOTE — PROGRESS NOTES
[facility]) Physicians        CARDIOLOGY                 INPATIENT PROGRESS NOTE          PATIENT SEEN IN FOLLOW UP FOR: Chest pain, CHF    Hospital Day: 4     Shaina Pineda is a 64year old patient known to Dr. Ira Azul from initial consult      SUBJECTIVE:  On Vent due to alcohol withdrawal to protect airway     ROS: Review of rest of 10 systems limited since Pt on vent and sedated. OBJECTIVE: Pt on Vent and sedated. Diagnostics:       Telemetry:  Reviewed     Echo 4/11/22   Summary   Technically sub-optimal images - Definity Echo contrast used. Normal left ventricular chamber size. Normal left ventricular systolic function, LVEF 15-57%. Mild left ventricular concentric hypertrophy noted. Normal diastolic function. Interatrial septum not well visualized but appears intact. Normal right ventricle size and function, TAPSE 2.3cm. Valves not well visualized. No hemodynamically significant aortic stenosis. Normal aortic root size. No evidence of pericardial effusion. No intra cardiac mass or thrombus. No comparison study available. Intake/Output Summary (Last 24 hours) at 4/12/2022 1154  Last data filed at 4/12/2022 1000  Gross per 24 hour   Intake 2973.2 ml   Output 1550 ml   Net 1423.2 ml       Labs:   CBC:   Recent Labs     04/11/22  0513 04/12/22  0531   WBC 11.6* 10.4   HGB 9.8* 9.8*   HCT 30.6* 31.4*    187     BMP:   Recent Labs     04/11/22  0513 04/12/22  0531   * 131*   K 4.5 5.0   CO2 25 26   BUN 21* 15   CREATININE 0.8 0.8   LABGLOM >60 >60   CALCIUM 8.8 8.7     Mag:   Recent Labs     04/10/22  1820 04/12/22  0531   MG 2.2 2.5     Phos:   Recent Labs     04/10/22  1820 04/12/22  0531   PHOS 3.6 3.3     TSH:   Recent Labs     04/10/22  0732   TSH 0.753     HgA1c:     BNP: No results for input(s): BNP in the last 72 hours. PT/INR: No results for input(s): PROTIME, INR in the last 72 hours. APTT:No results for input(s):  APTT in the last 72 hours.  CARDIAC ENZYMES:No results for input(s): CKTOTAL, CKMB, CKMBINDEX, TROPONINI in the last 72 hours.   FASTING LIPID PANEL:  Lab Results   Component Value Date    CHOL 213 2015    HDL 41 2015    LDLCALC - 2015    TRIG 114 2022     LIVER PROFILE:  Recent Labs     04/10/22  1820 22  0531   AST 29 21   ALT 46* 40   LABALBU 3.8 3.6       Current Inpatient Medications:   gabapentin  600 mg Oral TID    valproic acid  750 mg Per NG tube 4 times per day    metoprolol tartrate  25 mg Oral BID    [START ON 2022] thiamine  100 mg IntraVENous Daily    thiamine (VITAMIN B1) IVPB  500 mg IntraVENous Q8H    isosorbide mononitrate  30 mg Oral Daily    sodium chloride flush  5-40 mL IntraVENous 2 times per day    multivitamin  1 tablet Oral Daily    ipratropium-albuterol  1 ampule Inhalation Q4H    pantoprazole (PROTONIX) 40 mg injection  40 mg IntraVENous Daily    PHENobarbital  60 mg IntraVENous 3 times per day    losartan  50 mg Oral Daily    [Held by provider] hydroCHLOROthiazide  50 mg Oral Daily    [Held by provider] furosemide  20 mg Oral Daily    [Held by provider] rosuvastatin  10 mg Oral Daily    sodium chloride flush  5-40 mL IntraVENous 2 times per day    enoxaparin  30 mg SubCUTAneous BID    budesonide  0.5 mg Nebulization BID    aspirin  81 mg Oral Daily    nicotine  1 patch TransDERmal Daily       IV Infusions (if any):   dexmedetomidine HCl in NaCl      fentaNYL 5 mcg/ml in 0.9%  ml infusion 100 mcg/hr (22 1299)    midazolam (VERSED) custom infusion 4 mg/hr (22 0647)    sodium chloride      propofol 30 mcg/kg/min (22 0915)    sodium chloride           PHYSICAL EXAM:     CONSTITUTIONAL:   /75   Pulse 99   Temp 99 °F (37.2 °C) (Core)   Resp 17   Ht 6' (1.829 m)   Wt 280 lb 8 oz (127.2 kg)   SpO2 93%   BMI 38.04 kg/m²   Pulse  Av.3  Min: 81  Max: 995  Systolic (30NZX), AHH:736 , Min:93 , CCC:596    Diastolic (24hrs), Av, Min:67, Max:90    In general, this is a well developed, well nourished who appears stated age. Pt on vent and sedated. HEENT: Pt on vent and sedated. Neck-  no stridor, no noted enlargement of the thyroid, no carotid bruit. no jugular venous distention   RESPIRATORY: Chest symmetrical.  No accessory muscle use. Lung auscultation - few rhonchi  CARDIOVASCULAR:     Heart Ausculation - Regular rate and rhythm, 2/6 systolic murmur, No s3 or rub.   + lower extremity edema,  Distal pulses palpable, no clubbing  ABDOMEN: Bowel sounds present. No Palpable masses. MS: Pt on vent and sedated. : Deferred  Rectal Exam: Deferred  SKIN: warm and dry   NEURO / PSYCH: Pt on vent and sedated. ASSESSMENT / PLAN:    Exertional chest pain - Likely angina (CP with diaphoresis and dyspnea on exertion). MI ruled out; Continue ASA, Nitrates, BB. R/B/A discussed by Dr Stephanie Alfaro, agreeable for C. Left Heart Cath when stable and Off Vent. Indication: 3, Score: 8. Tentatively LHC scheduled for Thursday. EF 55-60%. Essential HTN - Controlled     Dyslipidemia  - on Statin    Tobacco abuse (since age 15, previously 1 PPD, currently 0.75 PPD) - Will be counseled to quit smoking    Chronic HFpEF - Give one dose Lasix 40mg iv today and as needed    Probable KULDIP - Recommend Out-Pt sleep study    Non-morbid Obesity, BMI 38 - Diet, exercise and weight loss will be discussed.     Hx of Marijuana and ETOH use - Will be counseled to quit    Chronic back pain - on Oxycodone as an outpatient    Family Hx of CAD    Anemia, Chronic - Monitor H/H    History of elevated LFT's in 3/2022 - Monitor LFTs             No family at bed side    Case d/w Dr Jarod Castro      Electronically signed by Liset Cardoso MD on 2022 at 11:54 [facility]

## 2022-04-12 NOTE — CARE COORDINATION
4/12/2022 Cm transition of care: ICU, vent, sedation- fent, diprivan, versed, phenobarbital, solumedrol, protonix iv, duonebs, lovenox sq. Per daughter patient's addiction is not only alcohol but narcotics. Need for heart cath once more stable from withdrawals. Pt would benefit from rehab for his etoh and narcotic abuse. [facility] referral placed due to pt having custody of his 3 y/o grandson- only two living in the home. Cm following.  Electronically signed by Nitish Schaefer RN on 4/12/2022 at 10:00 AM

## 2022-04-12 NOTE — PLAN OF CARE
Problem: Pain:  Goal: Pain level will decrease  Description: Pain level will decrease  Outcome: Met This Shift  Goal: Control of acute pain  Description: Control of acute pain  Outcome: Met This Shift  Goal: Control of chronic pain  Description: Control of chronic pain  Outcome: Met This Shift     Problem: Cardiac Output - Decreased:  Goal: Hemodynamic stability will improve  Description: Hemodynamic stability will improve  Outcome: Met This Shift     Problem: Serum Glucose Level - Abnormal:  Goal: Ability to maintain appropriate glucose levels will improve  Description: Ability to maintain appropriate glucose levels will improve  Outcome: Met This Shift     Problem: Pain:  Goal: Pain level will decrease  Description: Pain level will decrease  Outcome: Met This Shift  Goal: Control of acute pain  Description: Control of acute pain  Outcome: Met This Shift  Goal: Control of chronic pain  Description: Control of chronic pain  Outcome: Met This Shift     Problem: Tissue Perfusion - Cardiopulmonary, Altered:  Goal: Absence of angina  Description: Absence of angina  Outcome: Met This Shift  Goal: Circulatory function within specified parameters  Description: Circulatory function within specified parameters  4/12/2022 1648 by Lord Richard RN  Outcome: Met This Shift  4/12/2022 0305 by Willie Calhoun RN  Outcome: Met This Shift  Goal: Hemodynamic stability will improve  Description: Hemodynamic stability will improve  4/12/2022 1648 by Lord Richard RN  Outcome: Met This Shift  4/12/2022 0305 by Willie Calhoun RN  Outcome: Met This Shift     Problem: Fluid Volume - Deficit:  Goal: Absence of fluid volume deficit signs and symptoms  Description: Absence of fluid volume deficit signs and symptoms  Outcome: Met This Shift     Problem: Sleep Pattern Disturbance:  Goal: Appears well-rested  Description: Appears well-rested  Outcome: Met This Shift     Problem: Falls - Risk of:  Goal: Will remain free from falls  Description: Will remain free from falls  Outcome: Met This Shift  Goal: Absence of physical injury  Description: Absence of physical injury  Outcome: Met This Shift     Problem: Non-Violent Restraints  Goal: No harm/injury to patient while restraints in use  4/12/2022 1648 by Mal Moritz, RN  Outcome: Met This Shift  4/12/2022 0305 by Mely Argueta RN  Outcome: Met This Shift  Goal: Patient's dignity will be maintained  4/12/2022 1648 by Mal Moritz, RN  Outcome: Met This Shift  4/12/2022 0305 by Mely Argueta RN  Outcome: Met This Shift     Problem: Skin Integrity:  Goal: Will show no infection signs and symptoms  Description: Will show no infection signs and symptoms  Outcome: Met This Shift  Goal: Absence of new skin breakdown  Description: Absence of new skin breakdown  Outcome: Met This Shift

## 2022-04-12 NOTE — PROGRESS NOTES
CRITICAL CARE PROGRESS NOTE    The patient's case was discussed in multidisciplinary rounds including critical care specialist, nursing, RT and pharmacy. His evaluation is as follows:     ETOH withdrawal with delirium tremens  --Today had episode of agitation requiring administration of midazolam 2 mg extra on top of infusion. --Intubated for airway protection - Vent settings 460/ f 16/ PEEP 5/ 30% FiO2  --Sedation with propofol + midazolam infusions and phenobarbital + Valproic acid (Increased to 750 mg q6h) + gabapentin (increased to 600 mg TID). --Check triglycerides MWF     --Goal to decrease/stop midazolam as it is expected to cause delirium from benzodiazepines, will start precedex infusion.    --Continue Thiamine and folic acid, banana bag    Acute hypoxemic respiratory failure due to pneumonia  --Mechanical ventilation with lung protective strategy  --Antibiotics: Doxycycline    Chest pain R/O ACS  --Followed by cardiology  --Plan for heart cath tomorrow or Tuesday at [facility] [place] however his ETOH withdrawal may effect this plan  --Continue  beta blocker and imdur, ASA and ARB     Chronic diastolic heart failure  --Does not appear to be in acute exacerbation  --Diuretics on hold due to hyponatremia  --ECHO pending     COPD exacerbation  --Stop solu-medrol  --Continue aerosolized bronchodilators including duoneb and pulmicort     Hyponatremia, chronic possible due to chronic beer intake  --Lasix and HCTZ on hold  --Currently receiving fluids  --Daily BMP     HTN  --on ARB and BB     HLD  --Stating on hold due to increased LFT, ALT now 53 and AST now 36 down from 73 and 70 respectively      GERD  --On protonix     Tobacco use disorder  --Continue nicotine patch     Morbid obesity  --will need counseling on lifestyle modifications    Prolonged QTc  --Avoid medications that prolong the QT      DVT prophylaxis with enoxaparin   Nutrition: Tube feedings  Vascular catheters: TLC needed for vasopressor support  Urinary catheter needed for strict input/output    GI prophylaxis with PPI  Physical therapy once awake  Goals of care: Full code    BP (!) 140/84   Pulse 116   Temp 99 °F (37.2 °C) (Core)   Resp 30   Ht 6' (1.829 m)   Wt 280 lb 8 oz (127.2 kg)   SpO2 90%   BMI 38.04 kg/m²   General:Lethargic, sedated and ventilated, at times agitated and moving 4 limbs and trying to sit up. HEENT: No head lesions, PERRL, EOMI, mouth with ETT, no nasal lesions, no cervical adenopathy palpated  Respiratory: Lungs with equal breath sounds bilaterally, no adventitious sounds auscultated, no accessory muscle use  CV: Regular rate, no murmurs, no JVD, no leg edema  Abdomen: Soft, non tender, + bowel sounds, no lesions  Skin: Hydrated, adequate turgor, no rash, capillary refill <2 seconds  Extremities: Muscular strength 5/5 in 4 limbs when agitated, moves 4 limbs spontaneously, distal pulses present  Neurology: Lethargic, sedated, neck is supple, no meningitic signs present. Last 3 CMP:    Recent Labs     04/10/22  0732 04/10/22  0732 04/10/22  1820 04/11/22  0513 04/12/22  0531   *   < > 131* 130* 131*   K 4.5  --  4.7 4.5 5.0   CL 92*   < > 93* 95* 95*   CO2 22   < > 23 25 26   BUN 14   < > 26* 21* 15   CREATININE 0.7   < > 1.1 0.8 0.8   GLUCOSE 175*   < > 175* 199* 181*   CALCIUM 9.1   < > 8.9 8.8 8.7   PROT 7.7  --  7.2  --  6.7   LABALBU 4.1  --  3.8  --  3.6   BILITOT 0.5  --  0.4  --  0.3   ALKPHOS 113  --  98  --  87   AST 39  --  29  --  21   ALT 53*  --  46*  --  40    < > = values in this interval not displayed. Recent Labs     04/12/22  0531   WBC 10.4   RBC 3.74*   HGB 9.8*   HCT 31.4*   MCV 84.0   MCH 26.2   MCHC 31.2*   RDW 20.1*      MPV 9.5       No results for input(s): BC in the last 72 hours. No results for input(s): Bart Ruts in the last 72 hours.     24 HR INTAKE/OUTPUT:      Intake/Output Summary (Last 24 hours) at 4/12/2022 1038  Last data filed at 4/12/2022 0800  Gross per 24 hour   Intake 2973.2 ml   Output 1150 ml   Net 1823.2 ml     MEDICATIONS:   [START ON 4/14/2022] thiamine  100 mg IntraVENous Daily    thiamine (VITAMIN B1) IVPB  500 mg IntraVENous Q8H    valproic acid  500 mg Per NG tube 4 times per day    metoprolol succinate  25 mg Oral BID    isosorbide mononitrate  30 mg Oral Daily    sodium chloride flush  5-40 mL IntraVENous 2 times per day    multivitamin  1 tablet Oral Daily    ipratropium-albuterol  1 ampule Inhalation Q4H    pantoprazole (PROTONIX) 40 mg injection  40 mg IntraVENous Daily    PHENobarbital  60 mg IntraVENous 3 times per day    losartan  50 mg Oral Daily    [Held by provider] hydroCHLOROthiazide  50 mg Oral Daily    [Held by provider] furosemide  20 mg Oral Daily    gabapentin  300 mg Oral TID    potassium bicarb-citric acid  10 mEq Oral Daily    [Held by provider] rosuvastatin  10 mg Oral Daily    sodium chloride flush  5-40 mL IntraVENous 2 times per day    enoxaparin  30 mg SubCUTAneous BID    methylPREDNISolone  40 mg IntraVENous Q12H    budesonide  0.5 mg Nebulization BID    aspirin  81 mg Oral Daily    nicotine  1 patch TransDERmal Daily      fentaNYL 5 mcg/ml in 0.9%  ml infusion 100 mcg/hr (04/12/22 2827)    midazolam (VERSED) custom infusion 4 mg/hr (04/12/22 0673)    sodium chloride      propofol 30 mcg/kg/min (04/12/22 2397)    sodium chloride       fentanNYL, sodium chloride flush, sodium chloride, sodium chloride flush, sodium chloride, ondansetron **OR** ondansetron, polyethylene glycol, acetaminophen **OR** acetaminophen    OBJECTIVE:  Vitals:    04/12/22 1029   BP:    Pulse: 116   Resp: 30   Temp:    SpO2: 90%     FiO2 : 40 %  O2 Flow Rate (L/min): 3 L/min  O2 Device: Ventilator        LABS:  WBC   Date Value Ref Range Status   04/12/2022 10.4 4.5 - 11.5 E9/L Final   04/11/2022 11.6 (H) 4.5 - 11.5 E9/L Final   04/10/2022 16.9 (H) 4.5 - 11.5 E9/L Final     Hemoglobin   Date Value Ref Range Status 04/12/2022 9.8 (L) 12.5 - 16.5 g/dL Final   04/11/2022 9.8 (L) 12.5 - 16.5 g/dL Final   04/10/2022 9.8 (L) 12.5 - 16.5 g/dL Final     Hematocrit   Date Value Ref Range Status   04/12/2022 31.4 (L) 37.0 - 54.0 % Final   04/11/2022 30.6 (L) 37.0 - 54.0 % Final   04/10/2022 30.1 (L) 37.0 - 54.0 % Final     MCV   Date Value Ref Range Status   04/12/2022 84.0 80.0 - 99.9 fL Final   04/11/2022 82.9 80.0 - 99.9 fL Final   04/10/2022 80.5 80.0 - 99.9 fL Final     Platelets   Date Value Ref Range Status   04/12/2022 187 130 - 450 E9/L Final   04/11/2022 186 130 - 450 E9/L Final   04/10/2022 194 130 - 450 E9/L Final     Sodium   Date Value Ref Range Status   04/12/2022 131 (L) 132 - 146 mmol/L Final   04/11/2022 130 (L) 132 - 146 mmol/L Final   04/10/2022 131 (L) 132 - 146 mmol/L Final     Potassium   Date Value Ref Range Status   04/12/2022 5.0 3.5 - 5.0 mmol/L Final   04/10/2022 4.7 3.5 - 5.0 mmol/L Final     Potassium reflex Magnesium   Date Value Ref Range Status   04/11/2022 4.5 3.5 - 5.0 mmol/L Final   04/10/2022 4.5 3.5 - 5.0 mmol/L Final   04/09/2022 4.3 3.5 - 5.0 mmol/L Final     Chloride   Date Value Ref Range Status   04/12/2022 95 (L) 98 - 107 mmol/L Final   04/11/2022 95 (L) 98 - 107 mmol/L Final   04/10/2022 93 (L) 98 - 107 mmol/L Final     CO2   Date Value Ref Range Status   04/12/2022 26 22 - 29 mmol/L Final   04/11/2022 25 22 - 29 mmol/L Final   04/10/2022 23 22 - 29 mmol/L Final     BUN   Date Value Ref Range Status   04/12/2022 15 6 - 20 mg/dL Final   04/11/2022 21 (H) 6 - 20 mg/dL Final   04/10/2022 26 (H) 6 - 20 mg/dL Final     CREATININE   Date Value Ref Range Status   04/12/2022 0.8 0.7 - 1.2 mg/dL Final   04/11/2022 0.8 0.7 - 1.2 mg/dL Final   04/10/2022 1.1 0.7 - 1.2 mg/dL Final     Glucose   Date Value Ref Range Status   04/12/2022 181 (H) 74 - 99 mg/dL Final   04/11/2022 199 (H) 74 - 99 mg/dL Final   04/10/2022 175 (H) 74 - 99 mg/dL Final     Calcium   Date Value Ref Range Status   04/12/2022 8.7 8.6 - 10.2 mg/dL Final   04/11/2022 8.8 8.6 - 10.2 mg/dL Final   04/10/2022 8.9 8.6 - 10.2 mg/dL Final     Total Protein   Date Value Ref Range Status   04/12/2022 6.7 6.4 - 8.3 g/dL Final   04/10/2022 7.2 6.4 - 8.3 g/dL Final   04/10/2022 7.7 6.4 - 8.3 g/dL Final     Albumin   Date Value Ref Range Status   04/12/2022 3.6 3.5 - 5.2 g/dL Final   04/10/2022 3.8 3.5 - 5.2 g/dL Final   04/10/2022 4.1 3.5 - 5.2 g/dL Final   10/13/2010 4.8 3.2 - 4.8 g/dL Final     Total Bilirubin   Date Value Ref Range Status   04/12/2022 0.3 0.0 - 1.2 mg/dL Final   04/10/2022 0.4 0.0 - 1.2 mg/dL Final   04/10/2022 0.5 0.0 - 1.2 mg/dL Final     Alkaline Phosphatase   Date Value Ref Range Status   04/12/2022 87 40 - 129 U/L Final   04/10/2022 98 40 - 129 U/L Final   04/10/2022 113 40 - 129 U/L Final     AST   Date Value Ref Range Status   04/12/2022 21 0 - 39 U/L Final   04/10/2022 29 0 - 39 U/L Final   04/10/2022 39 0 - 39 U/L Final     ALT   Date Value Ref Range Status   04/12/2022 40 0 - 40 U/L Final   04/10/2022 46 (H) 0 - 40 U/L Final   04/10/2022 53 (H) 0 - 40 U/L Final     GFR Non-   Date Value Ref Range Status   04/12/2022 >60 >=60 mL/min/1.73 Final     Comment:     Chronic Kidney Disease: less than 60 ml/min/1.73 sq.m. Kidney Failure: less than 15 ml/min/1.73 sq.m. Results valid for patients 18 years and older. 04/11/2022 >60 >=60 mL/min/1.73 Final     Comment:     Chronic Kidney Disease: less than 60 ml/min/1.73 sq.m. Kidney Failure: less than 15 ml/min/1.73 sq.m. Results valid for patients 18 years and older. 04/10/2022 >60 >=60 mL/min/1.73 Final     Comment:     Chronic Kidney Disease: less than 60 ml/min/1.73 sq.m. Kidney Failure: less than 15 ml/min/1.73 sq.m. Results valid for patients 18 years and older.        GFR    Date Value Ref Range Status   04/12/2022 >60  Final   04/11/2022 >60  Final   04/10/2022 >60  Final     Magnesium   Date Value Ref Range Status   04/12/2022 2.5 1.6 - 2.6 mg/dL Final   04/10/2022 2.2 1.6 - 2.6 mg/dL Final     Phosphorus   Date Value Ref Range Status   04/12/2022 3.3 2.5 - 4.5 mg/dL Final   04/10/2022 3.6 2.5 - 4.5 mg/dL Final     Recent Labs     04/10/22  2149   PH 7.405   PO2 141.6*   PCO2 42.2   HCO3 25.8   BE 1.0   O2SAT 98.6*       RADIOLOGY:  XR ABDOMEN FOR NG/OG/NE TUBE PLACEMENT   Final Result   Nasogastric tube with the tip in the proximal gastric body. XR CHEST PORTABLE   Final Result   Adequately positioned endotracheal tube, orogastric tube and right internal   jugular central venous line. XR CHEST PORTABLE   Final Result   No pneumonia or pleural effusion. PROBLEM LIST:  Principal Problem:    Acute chest pain  Active Problems:    Chest pain  Resolved Problems:    * No resolved hospital problems. *          ATTESTATION:  ICU Staff Physician note of personal involvement in Care  As the attending physician, I certify that I personally reviewed the patients history and personnally examined the patient to confirm the physical findings described above,  And that I reviewed the relevant imaging studies and available reports. I also discussed the differential diagnosis and all of the proposed management plans with the patient and individuals accompanying the patient to this visit. They had the opportunity to ask questions about the proposed management plans and to have those questions answered. This patient has a high probability of sudden, clinically significant deterioration, which requires the highest level of physician preparedness to intervene urgently. I managed/supervised life or organ supporting interventions that required frequent physician assessment. I devoted my full attention to the direct care of this patient for the amount of time indicated below.   Time I spent with the family or surrogate(s) is included only if the patient was incapable of providing the necessary information or

## 2022-04-12 NOTE — PLAN OF CARE
Problem: Pain:  Goal: Pain level will decrease  Description: Pain level will decrease  4/11/2022 1443 by Ayaka Smart RN  Outcome: Met This Shift  Goal: Control of acute pain  Description: Control of acute pain  4/11/2022 1443 by Ayaka Smart RN  Outcome: Met This Shift  Goal: Control of chronic pain  Description: Control of chronic pain  4/11/2022 1443 by Ayaka Smart RN  Outcome: Met This Shift     Problem: Cardiac Output - Decreased:  Goal: Hemodynamic stability will improve  Description: Hemodynamic stability will improve  4/11/2022 1443 by Ayaka Smart RN  Outcome: Met This Shift     Problem: Serum Glucose Level - Abnormal:  Goal: Ability to maintain appropriate glucose levels will improve  Description: Ability to maintain appropriate glucose levels will improve  4/11/2022 1443 by Ayaka Smart RN  Outcome: Met This Shift     Problem: Pain:  Goal: Pain level will decrease  Description: Pain level will decrease  4/11/2022 1443 by Ayaka Smart RN  Outcome: Met This Shift  Goal: Control of acute pain  Description: Control of acute pain  4/11/2022 1443 by Ayaka Smart RN  Outcome: Met This Shift  Goal: Control of chronic pain  Description: Control of chronic pain  4/11/2022 1443 by Ayaka Smart RN  Outcome: Met This Shift     Problem: Tissue Perfusion - Cardiopulmonary, Altered:  Goal: Absence of angina  Description: Absence of angina  4/11/2022 1443 by Ayaka Smart RN  Outcome: Met This Shift  Goal: Circulatory function within specified parameters  Description: Circulatory function within specified parameters  4/12/2022 0305 by Janel Patterson RN  Outcome: Met This Shift  4/11/2022 1443 by Ayaka Smart RN  Outcome: Met This Shift  Goal: Hemodynamic stability will improve  Description: Hemodynamic stability will improve  4/12/2022 0305 by Janel Patterson RN  Outcome: Met This Shift  4/11/2022 1443 by Ayaka Smart RN  Outcome: Met This Shift     Problem: Fluid Volume - Deficit:  Goal: Absence of fluid volume deficit signs and symptoms  Description: Absence of fluid volume deficit signs and symptoms  4/11/2022 1443 by Lacey Pedersen RN  Outcome: Met This Shift     Problem: Sleep Pattern Disturbance:  Goal: Appears well-rested  Description: Appears well-rested  4/11/2022 1443 by Lacey Pedersen RN  Outcome: Met This Shift     Problem: Falls - Risk of:  Goal: Will remain free from falls  Description: Will remain free from falls  4/11/2022 1443 by Lacey Pedersen RN  Outcome: Met This Shift  Goal: Absence of physical injury  Description: Absence of physical injury  4/11/2022 1443 by Lacey Pedersen RN  Outcome: Met This Shift     Problem: Non-Violent Restraints  Goal: No harm/injury to patient while restraints in use  4/12/2022 0305 by Sharon Jarrett RN  Outcome: Met This Shift  4/11/2022 1443 by Lacey Pedersen RN  Outcome: Met This Shift  Goal: Patient's dignity will be maintained  4/12/2022 0305 by Sharon Jarrett RN  Outcome: Met This Shift  4/11/2022 1443 by Lacey Pedersen RN  Outcome: Met This Shift     Problem: Skin Integrity:  Goal: Will show no infection signs and symptoms  Description: Will show no infection signs and symptoms  4/11/2022 1443 by Lacey Pedersen RN  Outcome: Met This Shift  Goal: Absence of new skin breakdown  Description: Absence of new skin breakdown  4/11/2022 1443 by Lacey Pedersen RN  Outcome: Met This Shift     Problem: Non-Violent Restraints  Goal: Removal from restraints as soon as assessed to be safe  4/12/2022 0305 by Sharon Jarrett RN  Outcome: Not Met This Shift  Patient educated on restraints and lines/tubes connected to him. Patient is not redirectable at this time. Restraints continued for patient's safety. Will continue to assess.

## 2022-04-12 NOTE — PROGRESS NOTES
This nurse found an extra bag of versed in the patient's room. The newest bag was sent back to pharmacy. This nurse will hang the other bag. See MAR. Charge nurse Tre El RN aware.

## 2022-04-12 NOTE — PROGRESS NOTES
[address] [place], [place] [address]   Progress Note    Admitting Date and Time: 4/9/2022  4:02 PM  Admit Dx: Hyponatremia [E87.1]  COPD exacerbation (HCC) [J44.1]  Acute chest pain [R07.9]  Chest pain, unspecified type [R07.9]  Chest pain [R07.9]    Subjective:    Pt intubated and sedated currently    Per RN: Patient became more agitated this morning and required versed in addition to continuous infusion    ROS: unable to assess due to being intubated and sedated     gabapentin  600 mg Oral TID    valproic acid  750 mg Per NG tube 4 times per day    metoprolol tartrate  25 mg Oral BID    [START ON 4/14/2022] thiamine  100 mg IntraVENous Daily    thiamine (VITAMIN B1) IVPB  500 mg IntraVENous Q8H    isosorbide mononitrate  30 mg Oral Daily    sodium chloride flush  5-40 mL IntraVENous 2 times per day    multivitamin  1 tablet Oral Daily    ipratropium-albuterol  1 ampule Inhalation Q4H    pantoprazole (PROTONIX) 40 mg injection  40 mg IntraVENous Daily    PHENobarbital  60 mg IntraVENous 3 times per day    losartan  50 mg Oral Daily    [Held by provider] hydroCHLOROthiazide  50 mg Oral Daily    [Held by provider] furosemide  20 mg Oral Daily    [Held by provider] rosuvastatin  10 mg Oral Daily    sodium chloride flush  5-40 mL IntraVENous 2 times per day    enoxaparin  30 mg SubCUTAneous BID    budesonide  0.5 mg Nebulization BID    aspirin  81 mg Oral Daily    nicotine  1 patch TransDERmal Daily     fentanNYL, 25 mcg, Q30 Min PRN  sodium chloride flush, 5-40 mL, PRN  sodium chloride, , PRN  sodium chloride flush, 5-40 mL, PRN  sodium chloride, , PRN  ondansetron, 4 mg, Q8H PRN   Or  ondansetron, 4 mg, Q6H PRN  polyethylene glycol, 17 g, Daily PRN  acetaminophen, 650 mg, Q6H PRN   Or  acetaminophen, 650 mg, Q6H PRN         Objective:    /74   Pulse 93   Temp 99.3 °F (37.4 °C) (Core)   Resp 15   Ht 6' (1.829 m)   Wt 280 lb 8 oz (127.2 kg)   SpO2 95%   BMI 38.04 kg/m² General Appearance: intubated, sedated, in no acute distress, obese, restless, agitated  Skin: warm, slightly diaphoretic  Head: normocephalic and atraumatic  Eyes: pupils equal, round, and reactive to light, extraocular eye movements intact, conjunctivae normal  Neck: neck supple and non tender without mass   Pulmonary/Chest:mild wheezing throughout, normal air movement, no respiratory distress  Cardiovascular: normal rate, normal S1 and S2 and no carotid bruits  Abdomen: soft, non-tender, non-distended, normal bowel sounds, no masses or organomegaly  Extremities: no cyanosis, no clubbing and no edema  Neurologic: sedated, no apparent focal deficit      Recent Labs     04/10/22  1820 04/11/22  0513 04/12/22  0531   * 130* 131*   K 4.7 4.5 5.0   CL 93* 95* 95*   CO2 23 25 26   BUN 26* 21* 15   CREATININE 1.1 0.8 0.8   GLUCOSE 175* 199* 181*   CALCIUM 8.9 8.8 8.7       Recent Labs     04/10/22  0732 04/10/22  1820 04/12/22  0531   ALKPHOS 113 98 87   PROT 7.7 7.2 6.7   LABALBU 4.1 3.8 3.6   BILITOT 0.5 0.4 0.3   AST 39 29 21   ALT 53* 46* 40       Recent Labs     04/10/22  1820 04/11/22  0513 04/12/22  0531   WBC 16.9* 11.6* 10.4   RBC 3.74* 3.69* 3.74*   HGB 9.8* 9.8* 9.8*   HCT 30.1* 30.6* 31.4*   MCV 80.5 82.9 84.0   MCH 26.2 26.6 26.2   MCHC 32.6 32.0 31.2*   RDW 18.7* 19.3* 20.1*    186 187   MPV 9.4 9.0 9.5         Radiology:   XR ABDOMEN FOR NG/OG/NE TUBE PLACEMENT   Final Result   Nasogastric tube with the tip in the proximal gastric body. XR CHEST PORTABLE   Final Result   Adequately positioned endotracheal tube, orogastric tube and right internal   jugular central venous line. XR CHEST PORTABLE   Final Result   No pneumonia or pleural effusion. Assessment:  Principal Problem:    Acute chest pain  Active Problems:    Chest pain  Resolved Problems:    * No resolved hospital problems. *      Plan:  1.  Chest pain, rule out ACS  -Chest pain/pressure with SOB and diaphoresis and nausea  -Troponin negative x 2, EKG no ST changes  -Patient has SYL score 3, HEART score 5   -Multiple risk factors include HTN, HLD, obesity, smoker, family history (brother)  -Cardio consulted, planning for cath tomorrow however now that is intubated and in acute alcohol withdrawal cath will be pushed back per cardio. -Monitor on telemetry     2. Possible COPD exacerbation  -No formal diagnosis of COPD but was given albuterol PRN by PCP  -Wheezing on exam and patient is active smoker, currently intubated for airway protection  -Given 125mg solumedrol in ER and had two days of 40mg BID, now stopped per ICU  -Nebulizers      3. Severe alcohol withdrawal with DTs  -Patient initially was not truthful with how much he drank, advised patient that was assessing for risk of alcohol withdrawal and still did not admit true amount of alcohol use  -Began to go into mild alcohol withdrawal on 4/10 afternoon and CIWA protocol started  -As day went on alcohol withdrawal became severe and patient required transfer to ICU as hallucinating and becoming aggressive. Lali Knutson called overnight on 4/10 in ICU  -Given phenobarbital loading dose and then became lethargic and required intubation for airway protection. Now sedated with propofol and fentanyl.   -Phenobarbital 60mg Q8H, valproic acid 750mg QID, precedex drip, versed drip and gabapentin 600mg TID for alcohol withdrawal. Still required additional dose of versed today for agitation. Also on propofol and fentanyl drips. Goal is to wean off versed with addition of precedex to avoid versed delirium     4. Hyponatremia  -Sodium 123 on admission, was also 123 last month. Today 131  -Was given 1L NS bolus in ER and had about 24hrs of NS 100ml/hr. Now off IVF  -Possibly chronic from beer potomania as is alcoholic  -Could also be from HCTZ, losartan and lasix, will hold lasix and HCTZ for now     4.  HTN  -Home meds HCTZ, lasix, losartan and verapamil  -BP on admission 127/85, this morning 126/74  -Continue home meds other than lasix and monitor BP     5. HLD  -Continue statin     6. GERD  -Continue PPI     7. Tobacco use disorder  -Smokes 1/2-1PPD for 45 years  -Counseled on smoking cessation     8. Morbid obesity  -BMI 40, counseled on lifestyle changes for once discharged from hospital     NOTE: This report was transcribed using voice recognition software. Every effort was made to ensure accuracy; however, inadvertent computerized transcription errors may be present.      Electronically signed by Geary Homans, MD on 4/12/2022 at 2:31 PM

## 2022-04-13 LAB
ALBUMIN SERPL-MCNC: 3.5 G/DL (ref 3.5–5.2)
ALP BLD-CCNC: 87 U/L (ref 40–129)
ALT SERPL-CCNC: 41 U/L (ref 0–40)
ANION GAP SERPL CALCULATED.3IONS-SCNC: 9 MMOL/L (ref 7–16)
ANISOCYTOSIS: ABNORMAL
AST SERPL-CCNC: 20 U/L (ref 0–39)
BASOPHILS ABSOLUTE: 0.01 E9/L (ref 0–0.2)
BASOPHILS RELATIVE PERCENT: 0.1 % (ref 0–2)
BILIRUB SERPL-MCNC: 0.3 MG/DL (ref 0–1.2)
BUN BLDV-MCNC: 15 MG/DL (ref 6–20)
CALCIUM SERPL-MCNC: 8.9 MG/DL (ref 8.6–10.2)
CHLORIDE BLD-SCNC: 99 MMOL/L (ref 98–107)
CO2: 29 MMOL/L (ref 22–29)
CREAT SERPL-MCNC: 0.8 MG/DL (ref 0.7–1.2)
CULTURE, RESPIRATORY: NORMAL
EOSINOPHILS ABSOLUTE: 0 E9/L (ref 0.05–0.5)
EOSINOPHILS RELATIVE PERCENT: 0 % (ref 0–6)
GFR AFRICAN AMERICAN: >60
GFR NON-AFRICAN AMERICAN: >60 ML/MIN/1.73
GLUCOSE BLD-MCNC: 134 MG/DL (ref 74–99)
HCT VFR BLD CALC: 33.7 % (ref 37–54)
HEMOGLOBIN: 10.5 G/DL (ref 12.5–16.5)
IMMATURE GRANULOCYTES #: 0.18 E9/L
IMMATURE GRANULOCYTES %: 2.1 % (ref 0–5)
LYMPHOCYTES ABSOLUTE: 1.02 E9/L (ref 1.5–4)
LYMPHOCYTES RELATIVE PERCENT: 12 % (ref 20–42)
MAGNESIUM: 2.5 MG/DL (ref 1.6–2.6)
MCH RBC QN AUTO: 26 PG (ref 26–35)
MCHC RBC AUTO-ENTMCNC: 31.2 % (ref 32–34.5)
MCV RBC AUTO: 83.4 FL (ref 80–99.9)
MONOCYTES ABSOLUTE: 1.03 E9/L (ref 0.1–0.95)
MONOCYTES RELATIVE PERCENT: 12.1 % (ref 2–12)
NEUTROPHILS ABSOLUTE: 6.25 E9/L (ref 1.8–7.3)
NEUTROPHILS RELATIVE PERCENT: 73.7 % (ref 43–80)
PDW BLD-RTO: 20.1 FL (ref 11.5–15)
PHOSPHORUS: 3.5 MG/DL (ref 2.5–4.5)
PLATELET # BLD: 189 E9/L (ref 130–450)
PMV BLD AUTO: 9.1 FL (ref 7–12)
POLYCHROMASIA: ABNORMAL
POTASSIUM SERPL-SCNC: 4.5 MMOL/L (ref 3.5–5)
RBC # BLD: 4.04 E12/L (ref 3.8–5.8)
SMEAR, RESPIRATORY: NORMAL
SODIUM BLD-SCNC: 137 MMOL/L (ref 132–146)
TOTAL PROTEIN: 7 G/DL (ref 6.4–8.3)
TRIGL SERPL-MCNC: 88 MG/DL (ref 0–149)
WBC # BLD: 8.5 E9/L (ref 4.5–11.5)

## 2022-04-13 PROCEDURE — 6370000000 HC RX 637 (ALT 250 FOR IP): Performed by: INTERNAL MEDICINE

## 2022-04-13 PROCEDURE — 84100 ASSAY OF PHOSPHORUS: CPT

## 2022-04-13 PROCEDURE — A4216 STERILE WATER/SALINE, 10 ML: HCPCS

## 2022-04-13 PROCEDURE — 2580000003 HC RX 258

## 2022-04-13 PROCEDURE — 80053 COMPREHEN METABOLIC PANEL: CPT

## 2022-04-13 PROCEDURE — C9113 INJ PANTOPRAZOLE SODIUM, VIA: HCPCS

## 2022-04-13 PROCEDURE — 36592 COLLECT BLOOD FROM PICC: CPT

## 2022-04-13 PROCEDURE — 2580000003 HC RX 258: Performed by: INTERNAL MEDICINE

## 2022-04-13 PROCEDURE — 99233 SBSQ HOSP IP/OBS HIGH 50: CPT | Performed by: STUDENT IN AN ORGANIZED HEALTH CARE EDUCATION/TRAINING PROGRAM

## 2022-04-13 PROCEDURE — 99233 SBSQ HOSP IP/OBS HIGH 50: CPT | Performed by: INTERNAL MEDICINE

## 2022-04-13 PROCEDURE — 6360000002 HC RX W HCPCS

## 2022-04-13 PROCEDURE — 2500000003 HC RX 250 WO HCPCS: Performed by: INTERNAL MEDICINE

## 2022-04-13 PROCEDURE — 6360000002 HC RX W HCPCS: Performed by: INTERNAL MEDICINE

## 2022-04-13 PROCEDURE — 85025 COMPLETE CBC W/AUTO DIFF WBC: CPT

## 2022-04-13 PROCEDURE — 94640 AIRWAY INHALATION TREATMENT: CPT

## 2022-04-13 PROCEDURE — 6360000002 HC RX W HCPCS: Performed by: STUDENT IN AN ORGANIZED HEALTH CARE EDUCATION/TRAINING PROGRAM

## 2022-04-13 PROCEDURE — 94003 VENT MGMT INPAT SUBQ DAY: CPT

## 2022-04-13 PROCEDURE — 6370000000 HC RX 637 (ALT 250 FOR IP): Performed by: STUDENT IN AN ORGANIZED HEALTH CARE EDUCATION/TRAINING PROGRAM

## 2022-04-13 PROCEDURE — 83735 ASSAY OF MAGNESIUM: CPT

## 2022-04-13 PROCEDURE — 2700000000 HC OXYGEN THERAPY PER DAY

## 2022-04-13 PROCEDURE — 2580000003 HC RX 258: Performed by: STUDENT IN AN ORGANIZED HEALTH CARE EDUCATION/TRAINING PROGRAM

## 2022-04-13 PROCEDURE — 6370000000 HC RX 637 (ALT 250 FOR IP)

## 2022-04-13 PROCEDURE — 2000000000 HC ICU R&B

## 2022-04-13 PROCEDURE — 84478 ASSAY OF TRIGLYCERIDES: CPT

## 2022-04-13 RX ORDER — IPRATROPIUM BROMIDE AND ALBUTEROL SULFATE 2.5; .5 MG/3ML; MG/3ML
1 SOLUTION RESPIRATORY (INHALATION) EVERY 4 HOURS PRN
Status: DISCONTINUED | OUTPATIENT
Start: 2022-04-13 | End: 2022-04-19

## 2022-04-13 RX ORDER — DIAZEPAM 5 MG/1
5 TABLET ORAL EVERY 6 HOURS
Status: DISCONTINUED | OUTPATIENT
Start: 2022-04-13 | End: 2022-04-15

## 2022-04-13 RX ORDER — FOLIC ACID 1 MG/1
1 TABLET ORAL DAILY
Status: DISCONTINUED | OUTPATIENT
Start: 2022-04-13 | End: 2022-04-19

## 2022-04-13 RX ORDER — GABAPENTIN 400 MG/1
800 CAPSULE ORAL 3 TIMES DAILY
Status: DISCONTINUED | OUTPATIENT
Start: 2022-04-13 | End: 2022-04-16

## 2022-04-13 RX ADMIN — VALPROIC ACID 750 MG: 250 SOLUTION ORAL at 00:08

## 2022-04-13 RX ADMIN — PROPOFOL 35 MCG/KG/MIN: 10 INJECTION, EMULSION INTRAVENOUS at 12:25

## 2022-04-13 RX ADMIN — GABAPENTIN 600 MG: 300 CAPSULE ORAL at 08:26

## 2022-04-13 RX ADMIN — LOSARTAN POTASSIUM 50 MG: 50 TABLET, FILM COATED ORAL at 08:35

## 2022-04-13 RX ADMIN — IPRATROPIUM BROMIDE AND ALBUTEROL SULFATE 1 AMPULE: 2.5; .5 SOLUTION RESPIRATORY (INHALATION) at 09:14

## 2022-04-13 RX ADMIN — BUDESONIDE 500 MCG: 0.5 INHALANT RESPIRATORY (INHALATION) at 06:24

## 2022-04-13 RX ADMIN — VALPROIC ACID 750 MG: 250 SOLUTION ORAL at 11:32

## 2022-04-13 RX ADMIN — VALPROIC ACID 750 MG: 250 SOLUTION ORAL at 23:34

## 2022-04-13 RX ADMIN — GABAPENTIN 800 MG: 400 CAPSULE ORAL at 14:17

## 2022-04-13 RX ADMIN — FOLIC ACID 1 MG: 1 TABLET ORAL at 11:31

## 2022-04-13 RX ADMIN — ENOXAPARIN SODIUM 30 MG: 100 INJECTION SUBCUTANEOUS at 20:31

## 2022-04-13 RX ADMIN — DIAZEPAM 5 MG: 5 TABLET ORAL at 11:32

## 2022-04-13 RX ADMIN — FENTANYL CITRATE 50 MCG/HR: 0.05 INJECTION, SOLUTION INTRAMUSCULAR; INTRAVENOUS at 14:19

## 2022-04-13 RX ADMIN — PHENOBARBITAL SODIUM 60 MG: 65 INJECTION INTRAMUSCULAR at 05:55

## 2022-04-13 RX ADMIN — ACETAMINOPHEN 650 MG: 325 TABLET ORAL at 17:44

## 2022-04-13 RX ADMIN — Medication 10 ML: at 08:27

## 2022-04-13 RX ADMIN — PROPOFOL 50 MCG/KG/MIN: 10 INJECTION, EMULSION INTRAVENOUS at 23:35

## 2022-04-13 RX ADMIN — DIAZEPAM 5 MG: 5 TABLET ORAL at 17:44

## 2022-04-13 RX ADMIN — PROPOFOL 35 MCG/KG/MIN: 10 INJECTION, EMULSION INTRAVENOUS at 05:26

## 2022-04-13 RX ADMIN — VALPROIC ACID 750 MG: 250 SOLUTION ORAL at 05:55

## 2022-04-13 RX ADMIN — PROPOFOL 30 MCG/KG/MIN: 10 INJECTION, EMULSION INTRAVENOUS at 01:30

## 2022-04-13 RX ADMIN — METOPROLOL TARTRATE 25 MG: 25 TABLET, FILM COATED ORAL at 08:25

## 2022-04-13 RX ADMIN — PROPOFOL 50 MCG/KG/MIN: 10 INJECTION, EMULSION INTRAVENOUS at 19:08

## 2022-04-13 RX ADMIN — SODIUM CHLORIDE 40 MG: 9 INJECTION, SOLUTION INTRAMUSCULAR; INTRAVENOUS; SUBCUTANEOUS at 08:26

## 2022-04-13 RX ADMIN — METOPROLOL TARTRATE 25 MG: 25 TABLET, FILM COATED ORAL at 20:31

## 2022-04-13 RX ADMIN — DOXYCYCLINE 100 MG: 100 INJECTION, POWDER, LYOPHILIZED, FOR SOLUTION INTRAVENOUS at 05:03

## 2022-04-13 RX ADMIN — DIAZEPAM 5 MG: 5 TABLET ORAL at 23:34

## 2022-04-13 RX ADMIN — IPRATROPIUM BROMIDE AND ALBUTEROL SULFATE 1 AMPULE: 2.5; .5 SOLUTION RESPIRATORY (INHALATION) at 01:47

## 2022-04-13 RX ADMIN — PROPOFOL 35 MCG/KG/MIN: 10 INJECTION, EMULSION INTRAVENOUS at 08:24

## 2022-04-13 RX ADMIN — IPRATROPIUM BROMIDE AND ALBUTEROL SULFATE 1 AMPULE: 2.5; .5 SOLUTION RESPIRATORY (INHALATION) at 06:24

## 2022-04-13 RX ADMIN — MULTIPLE VITAMINS W/ MINERALS TAB 1 TABLET: TAB at 08:26

## 2022-04-13 RX ADMIN — PHENOBARBITAL SODIUM 60 MG: 65 INJECTION INTRAMUSCULAR at 14:17

## 2022-04-13 RX ADMIN — PROPOFOL 50 MCG/KG/MIN: 10 INJECTION, EMULSION INTRAVENOUS at 21:08

## 2022-04-13 RX ADMIN — THIAMINE HYDROCHLORIDE 500 MG: 100 INJECTION, SOLUTION INTRAMUSCULAR; INTRAVENOUS at 16:26

## 2022-04-13 RX ADMIN — THIAMINE HYDROCHLORIDE 500 MG: 100 INJECTION, SOLUTION INTRAMUSCULAR; INTRAVENOUS at 08:50

## 2022-04-13 RX ADMIN — ACETAMINOPHEN 650 MG: 325 TABLET ORAL at 23:34

## 2022-04-13 RX ADMIN — PROPOFOL 50 MCG/KG/MIN: 10 INJECTION, EMULSION INTRAVENOUS at 16:29

## 2022-04-13 RX ADMIN — Medication 0.4 MCG/KG/HR: at 03:01

## 2022-04-13 RX ADMIN — ASPIRIN 81 MG 81 MG: 81 TABLET ORAL at 08:25

## 2022-04-13 RX ADMIN — FENTANYL CITRATE 125 MCG/HR: 0.05 INJECTION, SOLUTION INTRAMUSCULAR; INTRAVENOUS at 00:17

## 2022-04-13 RX ADMIN — VALPROIC ACID 750 MG: 250 SOLUTION ORAL at 17:44

## 2022-04-13 RX ADMIN — GABAPENTIN 800 MG: 400 CAPSULE ORAL at 20:31

## 2022-04-13 RX ADMIN — PHENOBARBITAL SODIUM 60 MG: 65 INJECTION INTRAMUSCULAR at 22:07

## 2022-04-13 RX ADMIN — THIAMINE HYDROCHLORIDE 500 MG: 100 INJECTION, SOLUTION INTRAMUSCULAR; INTRAVENOUS at 00:17

## 2022-04-13 RX ADMIN — ENOXAPARIN SODIUM 30 MG: 100 INJECTION SUBCUTANEOUS at 08:26

## 2022-04-13 RX ADMIN — Medication 10 ML: at 20:31

## 2022-04-13 ASSESSMENT — PULMONARY FUNCTION TESTS
PIF_VALUE: 21
PIF_VALUE: 26
PIF_VALUE: 26
PIF_VALUE: 47
PIF_VALUE: 42
PIF_VALUE: 20
PIF_VALUE: 26
PIF_VALUE: 27
PIF_VALUE: 19
PIF_VALUE: 13
PIF_VALUE: 21
PIF_VALUE: 26
PIF_VALUE: 20
PIF_VALUE: 21
PIF_VALUE: 20
PIF_VALUE: 31
PIF_VALUE: 25
PIF_VALUE: 26
PIF_VALUE: 36
PIF_VALUE: 18
PIF_VALUE: 22
PIF_VALUE: 16
PIF_VALUE: 20
PIF_VALUE: 16

## 2022-04-13 ASSESSMENT — PAIN SCALES - GENERAL
PAINLEVEL_OUTOF10: 0

## 2022-04-13 ASSESSMENT — PAIN DESCRIPTION - PAIN TYPE: TYPE: ACUTE PAIN

## 2022-04-13 NOTE — PROGRESS NOTES
[address] [place], [place] [address]   Progress Note    Admitting Date and Time: 4/9/2022  4:02 PM  Admit Dx: Hyponatremia [E87.1]  COPD exacerbation (HCC) [J44.1]  Acute chest pain [R07.9]  Chest pain, unspecified type [R07.9]  Chest pain [R07.9]    Subjective:    Pt intubated and sedated currently    Per RN: Patient continues to become agitated when being given care    ROS: unable to assess due to being intubated and sedated     diazePAM  5 mg Oral Q6H    gabapentin  800 mg Oral TID    folic acid  1 mg Oral Daily    valproic acid  750 mg Per NG tube 4 times per day    metoprolol tartrate  25 mg Oral BID    [START ON 4/14/2022] thiamine  100 mg IntraVENous Daily    thiamine (VITAMIN B1) IVPB  500 mg IntraVENous Q8H    sodium chloride flush  5-40 mL IntraVENous 2 times per day    multivitamin  1 tablet Oral Daily    pantoprazole (PROTONIX) 40 mg injection  40 mg IntraVENous Daily    PHENobarbital  60 mg IntraVENous 3 times per day    losartan  50 mg Oral Daily    rosuvastatin  10 mg Oral Daily    sodium chloride flush  5-40 mL IntraVENous 2 times per day    enoxaparin  30 mg SubCUTAneous BID    aspirin  81 mg Oral Daily    nicotine  1 patch TransDERmal Daily     ipratropium-albuterol, 1 ampule, Q4H PRN  fentanNYL, 25 mcg, Q30 Min PRN  sodium chloride flush, 5-40 mL, PRN  sodium chloride, , PRN  sodium chloride flush, 5-40 mL, PRN  sodium chloride, , PRN  ondansetron, 4 mg, Q8H PRN   Or  ondansetron, 4 mg, Q6H PRN  polyethylene glycol, 17 g, Daily PRN  acetaminophen, 650 mg, Q6H PRN   Or  acetaminophen, 650 mg, Q6H PRN         Objective:    /74   Pulse 76   Temp 99.3 °F (37.4 °C) (Core)   Resp 17   Ht 6' (1.829 m)   Wt 280 lb 8 oz (127.2 kg)   SpO2 93%   BMI 38.04 kg/m²        General Appearance: intubated, sedated, in no acute distress, obese, restless, agitated  Skin: warm, slightly diaphoretic  Head: normocephalic and atraumatic  Eyes: pupils equal, round, and reactive to light, extraocular eye movements intact, conjunctivae normal  Neck: neck supple and non tender without mass   Pulmonary/Chest:mild wheezing throughout, normal air movement, no respiratory distress  Cardiovascular: normal rate, normal S1 and S2 and no carotid bruits  Abdomen: soft, non-tender, non-distended, normal bowel sounds, no masses or organomegaly  Extremities: no cyanosis, no clubbing and no edema  Neurologic: sedated, no apparent focal deficit      Recent Labs     04/11/22  0513 04/12/22  0531 04/13/22  0504   * 131* 137   K 4.5 5.0 4.5   CL 95* 95* 99   CO2 25 26 29   BUN 21* 15 15   CREATININE 0.8 0.8 0.8   GLUCOSE 199* 181* 134*   CALCIUM 8.8 8.7 8.9       Recent Labs     04/10/22  1820 04/12/22  0531 04/13/22  0504   ALKPHOS 98 87 87   PROT 7.2 6.7 7.0   LABALBU 3.8 3.6 3.5   BILITOT 0.4 0.3 0.3   AST 29 21 20   ALT 46* 40 41*       Recent Labs     04/11/22  0513 04/12/22  0531 04/13/22  0504   WBC 11.6* 10.4 8.5   RBC 3.69* 3.74* 4.04   HGB 9.8* 9.8* 10.5*   HCT 30.6* 31.4* 33.7*   MCV 82.9 84.0 83.4   MCH 26.6 26.2 26.0   MCHC 32.0 31.2* 31.2*   RDW 19.3* 20.1* 20.1*    187 189   MPV 9.0 9.5 9.1         Radiology:   XR ABDOMEN FOR NG/OG/NE TUBE PLACEMENT   Final Result   Nasogastric tube with the tip in the proximal gastric body. XR CHEST PORTABLE   Final Result   Adequately positioned endotracheal tube, orogastric tube and right internal   jugular central venous line. XR CHEST PORTABLE   Final Result   No pneumonia or pleural effusion. Assessment:  Principal Problem:    Acute chest pain  Active Problems:    Chest pain  Resolved Problems:    * No resolved hospital problems. *      Plan:  1.  Chest pain, rule out ACS  -Chest pain/pressure with SOB and diaphoresis and nausea  -Troponin negative x 2, EKG no ST changes  -Patient has SYL score 3, HEART score 5   -Multiple risk factors include HTN, HLD, obesity, smoker, family history (brother)  -Cardio consulted, planning for cath tomorrow however now that is intubated and in acute alcohol withdrawal cath will be pushed back per cardio. -Monitor on telemetry     2. Possible COPD exacerbation  -No formal diagnosis of COPD but was given albuterol PRN by PCP  -Wheezing on exam and patient is active smoker, currently intubated for airway protection  -Given 125mg solumedrol in ER and had two days of 40mg BID, now stopped per ICU  -Nebulizers      3. Severe alcohol withdrawal with DTs  -Patient initially was not truthful with how much he drank, advised patient that was assessing for risk of alcohol withdrawal and still did not admit true amount of alcohol use  -Began to go into mild alcohol withdrawal on 4/10 afternoon and CIWA protocol started  -As day went on alcohol withdrawal became severe and patient required transfer to ICU as hallucinating and becoming aggressive. Lali Knutson called overnight on 4/10 in ICU  -Given phenobarbital loading dose and then became lethargic and required intubation for airway protection. Now sedated with propofol and fentanyl.   -Phenobarbital 60mg Q8H, valproic acid 750mg Q6H, valium 5mg Q6H and gabapentin 800mg TID for alcohol withdrawal. Also on propofol and fentanyl drips. 4. Hyponatremia  -Sodium 123 on admission, was also 123 last month. Today 137  -Was given 1L NS bolus in ER and had about 24hrs of NS 100ml/hr. Now off IVF but on tube feedings  -Possibly chronic from beer potomania as is alcoholic  -Could also be from HCTZ, losartan and lasix, will hold lasix and HCTZ for now     4. HTN  -Home meds HCTZ, lasix, losartan and verapamil  -BP on admission 127/85, this morning 127/74  -Continue home meds other than lasix and monitor BP     5. HLD  -Continue statin     6. GERD  -Continue PPI     7. Tobacco use disorder  -Smokes 1/2-1PPD for 45 years  -Counseled on smoking cessation     8.  Morbid obesity  -BMI 40, counseled on lifestyle changes for once discharged from hospital     NOTE: This report was transcribed using voice recognition software. Every effort was made to ensure accuracy; however, inadvertent computerized transcription errors may be present.      Electronically signed by Peter Barlow MD on 4/13/2022 at 2:18 PM

## 2022-04-13 NOTE — PLAN OF CARE
Problem: Non-Violent Restraints  Goal: Removal from restraints as soon as assessed to be safe  4/13/2022 1447 by Fran Vidales RN  Outcome: Not Met This Shift  4/13/2022 0608 by Bola Rand RN  Outcome: Not Met This Shift  Goal: No harm/injury to patient while restraints in use  4/13/2022 1447 by Fran Vidales RN  Outcome: Met This Shift  4/13/2022 0608 by Bola Rand RN  Outcome: Met This Shift  Goal: Patient's dignity will be maintained  4/13/2022 1447 by Fran Vidales RN  Outcome: Met This Shift  4/13/2022 0608 by Bola Rand RN  Outcome: Met This Shift

## 2022-04-13 NOTE — PROGRESS NOTES
[facility]) Physicians        CARDIOLOGY                 INPATIENT PROGRESS NOTE          PATIENT SEEN IN FOLLOW UP FOR: Chest pain, CHF    Hospital Day: 5     Hakeem Montgomery is a 64year old patient known to Dr. Catalina Quezada from initial consult      SUBJECTIVE:  On Vent due to alcohol withdrawal to protect airway     ROS: Review of rest of 10 systems limited since Pt on Vent and sedated. OBJECTIVE: Pt on Vent and sedated. Diagnostics:       Telemetry:  Reviewed     Echo 4/11/22   Summary   Technically sub-optimal images - Definity Echo contrast used. Normal left ventricular chamber size. Normal left ventricular systolic function, LVEF 89-15%. Mild left ventricular concentric hypertrophy noted. Normal diastolic function. Interatrial septum not well visualized but appears intact. Normal right ventricle size and function, TAPSE 2.3cm. Valves not well visualized. No hemodynamically significant aortic stenosis. Normal aortic root size. No evidence of pericardial effusion. No intra cardiac mass or thrombus. No comparison study available. Intake/Output Summary (Last 24 hours) at 4/13/2022 1219  Last data filed at 4/13/2022 0526  Gross per 24 hour   Intake 3040.84 ml   Output 2375 ml   Net 665.84 ml       Labs:   CBC:   Recent Labs     04/12/22  0531 04/13/22  0504   WBC 10.4 8.5   HGB 9.8* 10.5*   HCT 31.4* 33.7*    189     BMP:   Recent Labs     04/12/22  0531 04/13/22  0504   * 137   K 5.0 4.5   CO2 26 29   BUN 15 15   CREATININE 0.8 0.8   LABGLOM >60 >60   CALCIUM 8.7 8.9     Mag:   Recent Labs     04/12/22  0531 04/13/22  0504   MG 2.5 2.5     Phos:   Recent Labs     04/12/22  0531 04/13/22  0504   PHOS 3.3 3.5     TSH:   No results for input(s): TSH in the last 72 hours. HgA1c:     BNP: No results for input(s): BNP in the last 72 hours. PT/INR: No results for input(s): PROTIME, INR in the last 72 hours. APTT:No results for input(s):  APTT in the last 72 hours.  CARDIAC ENZYMES:No results for input(s): CKTOTAL, CKMB, CKMBINDEX, TROPONINI in the last 72 hours. FASTING LIPID PANEL:  Lab Results   Component Value Date    CHOL 213 2015    HDL 41 2015    [address] - 2015    TRIG 88 2022     LIVER PROFILE:  Recent Labs     22  0531 22  0504   AST 21 20   ALT 40 41*   LABALBU 3.6 3.5       Current Inpatient Medications:   diazePAM  5 mg Oral Q6H    gabapentin  800 mg Oral TID    folic acid  1 mg Oral Daily    valproic acid  750 mg Per NG tube 4 times per day    metoprolol tartrate  25 mg Oral BID    [START ON 2022] thiamine  100 mg IntraVENous Daily    thiamine (VITAMIN B1) IVPB  500 mg IntraVENous Q8H    sodium chloride flush  5-40 mL IntraVENous 2 times per day    multivitamin  1 tablet Oral Daily    ipratropium-albuterol  1 ampule Inhalation Q4H    pantoprazole (PROTONIX) 40 mg injection  40 mg IntraVENous Daily    PHENobarbital  60 mg IntraVENous 3 times per day    losartan  50 mg Oral Daily    rosuvastatin  10 mg Oral Daily    sodium chloride flush  5-40 mL IntraVENous 2 times per day    enoxaparin  30 mg SubCUTAneous BID    aspirin  81 mg Oral Daily    nicotine  1 patch TransDERmal Daily       IV Infusions (if any):   fentaNYL 5 mcg/ml in 0.9%  ml infusion 75 mcg/hr (22 1132)    sodium chloride      propofol 35 mcg/kg/min (22 0824)    sodium chloride           PHYSICAL EXAM:     CONSTITUTIONAL:   /82   Pulse 79   Temp 99.3 °F (37.4 °C) (Core)   Resp 14   Ht 6' (1.829 m)   Wt 280 lb 8 oz (127.2 kg)   SpO2 95%   BMI 38.04 kg/m²   Pulse  Av.8  Min: 72  Max: 92  Systolic (76TYE), NMT:030 , Min:121 , FJI:877    Diastolic (41TIZ), SHANNAN:05, Min:73, Max:111    In general, this is a well developed, well nourished who appears stated age. Pt on vent and sedated. HEENT: Pt on vent and sedated. Neck-  no stridor, no noted enlargement of the thyroid, no carotid bruit.  no jugular

## 2022-04-13 NOTE — PROGRESS NOTES
Comprehensive Nutrition Assessment    Type and Reason for Visit:  Initial,RD Nutrition Re-Screen/LOS (Vent/ICU)    Nutrition Recommendations/Plan: Modify Tube Feeding (Immune Enhancing([email or web address] [email or web address] x24hrs add 1 protein modular via OGT 1080mlTV/2429kcal includes propofol&prot. mod/128gm pro/820ml FW: additional water flushes per critical care. Nutrition Assessment:  Pt admits w/ Dx: SOB, Intubated 2/2 Alcohol Withdrawl Delirium w/ PMH of Polysubstance abuse, COPD, GERD. Pt receives TF for nutritional support. Will monitor and recommend TF modifications to meet optimal nutr'l needs    Malnutrition Assessment:  Malnutrition Status: At risk for malnutrition (Comment)    Context:  Acute Illness     Findings of the 6 clinical characteristics of malnutrition:  Energy Intake:  7 - 50% or less of estimated energy requirements for 5 or more days  Weight Loss:  Unable to assess (no accurate wt hx)     Body Fat Loss:  No significant body fat loss     Muscle Mass Loss:  No significant muscle mass loss    Fluid Accumulation:  No significant fluid accumulation     Strength:  Not Performed    Estimated Daily Nutrient Needs:  Energy (kcal):  8748-4900; Weight Used for Energy Requirements:  Current     Protein (g):  120-145 (x1.5-1.8gm/kg);  Weight Used for Protein Requirements:  Ideal        Fluid (ml/day):  per critical care; Method Used for Fluid Requirements:         Nutrition Related Findings:  Intubated, Propofol @26.7ml/hr provides 705 lipid kcals, MAP 92, hypoactvie BS, abd distended/round, BLE +2 pitting edema, BUE +1 non-pitting edema, I/O WNL      Wounds:  None       Current Nutrition Therapies:    Current Tube Feeding (TF) Orders:  · Feeding Route: Orogastric  · Formula: Standard with Fiber  · Schedule: Continuous    · Water Flushes: per critical order  · Current TF & Flush Orders Provides: 960ml TV/2145kcal includes propofol/61gm pro/730ml FW      Anthropometric Measures:  · Height: 6' (182.9 cm)  · Current Body Weight: 280 lb (127 kg) (4/10 bed scale)   · Admission Body Weight: 300 lb (136.1 kg) (4/10 no method)    · Usual Body Weight: 285 lb (129.3 kg) (3/15/2022 per emr review w/ no method)     · Ideal Body Weight: 178 lbs; % Ideal Body Weight 157.3 %   · BMI: 38  · Adjusted Body Weight:  ; No Adjustment   · BMI Categories: Obese Class 2 (BMI 35.0 -39.9)       Nutrition Diagnosis:   · Inadequate oral intake related to impaired respiratory function as evidenced by NPO or clear liquid status due to medical condition,nutrition support - enteral nutrition      Nutrition Interventions:   Food and/or Nutrient Delivery:  Modify Tube Feeding (Immune Enhancing(LORI [email or web address] x24hrs add 1 protein modular via OGT 1080mlTV/2429kcal includes propofol&prot. mod/128gm pro/820ml FW: additional water flushes per critical care)  Nutrition Education/Counseling:  Education not indicated   Coordination of Nutrition Care:  Continue to monitor while inpatient    Goals:  EN Tolerance       Nutrition Monitoring and Evaluation:   Behavioral-Environmental Outcomes:  None Identified   Food/Nutrient Intake Outcomes:  Enteral Nutrition Intake/Tolerance  Physical Signs/Symptoms Outcomes:  Biochemical Data,Chewing or Swallowing,Fluid Status or Edema,Hemodynamic Status,Nutrition Focused Physical Findings,Skin,Weight     Discharge Planning:     Too soon to determine     Electronically signed by Jesús Cortes RD, LD on 4/13/22 at 9:37 AM EDT    Contact: [phone]

## 2022-04-13 NOTE — PROGRESS NOTES
CRITICAL CARE PROGRESS NOTE    The patient's case was discussed in multidisciplinary rounds including critical care specialist, nursing, RT and pharmacy. His evaluation is as follows:     Daily progress:  April 13, 2022: Patient was agitated and combative off sedation this morning. He has no fever, chills, rigors. He remains sedated and intubated.   He continues to be off vasopressors    ETOH withdrawal with delirium tremens    --Intubated for airway protection - Vent settings 460/ f 16/ PEEP 5/ 30% FiO2  --Sedation with propofol + fentanyl and phenobarbital + Valproic acid (Increased to 750 mg q6h) + gabapentin (increased to 800 mg TID)+ scheduled diazepam was added  --Check triglycerides MWF     --Continue Thiamine and folic acid    Acute hypoxemic respiratory failure due to pneumonia  --Mechanical ventilation with lung protective strategy  --Antibiotics: Off antibiotics    Chest pain R/O ACS  --Followed by cardiology  --Plan for heart cath tomorrow or Tuesday at [facility] however his ETOH withdrawal may effect this plan  --Continue  beta blocker and imdur, ASA and ARB     Chronic diastolic heart failure  --Does not appear to be in acute exacerbation  --Diuretics on hold due to hyponatremia  --ECHO pending     COPD exacerbation  --Off solu-medrol  --Continue as needed aerosolized bronchodilators     Hyponatremia, chronic possible due to chronic beer intake  --Lasix and HCTZ on hold  --Currently on tube feed  --Daily BMP     HTN  --on ARB and BB     HLD  --Stating on hold due to increased LFT, ALT now 53 and AST now 36 down from 73 and 70 respectively      GERD  --On protonix     Tobacco use disorder  --Continue nicotine patch     Morbid obesity  --will need counseling on lifestyle modifications    Prolonged QTc  --Avoid medications that prolong the QT      DVT prophylaxis with enoxaparin   Nutrition: Tube feedings  Vascular catheters: TLC needed for vasopressor support  Urinary catheter needed for strict input/output    GI prophylaxis with PPI  Physical therapy once awake  Goals of care: Full code    /82   Pulse 79   Temp 99.3 °F (37.4 °C) (Core)   Resp 14   Ht 6' (1.829 m)   Wt 280 lb 8 oz (127.2 kg)   SpO2 95%   BMI 38.04 kg/m²   General:Lethargic, sedated and ventilated, at times agitated and moving 4 limbs and trying to sit up. HEENT: No head lesions, PERRL, EOMI, mouth with ETT, no nasal lesions, no cervical adenopathy palpated  Respiratory: Lungs with equal breath sounds bilaterally, no adventitious sounds auscultated, no accessory muscle use  CV: Regular rate, no murmurs, no JVD, no leg edema  Abdomen: Soft, non tender, + bowel sounds, no lesions  Skin: Hydrated, adequate turgor, no rash, capillary refill <2 seconds  Extremities: Muscular strength 5/5 in 4 limbs when agitated, moves 4 limbs spontaneously, distal pulses present  Neurology: Lethargic, sedated, neck is supple, no meningitic signs present. Last 3 CMP:    Recent Labs     04/10/22  1820 04/10/22  1820 04/11/22  0513 04/12/22  0531 04/13/22  0504   *   < > 130* 131* 137   K 4.7  --  4.5 5.0 4.5   CL 93*   < > 95* 95* 99   CO2 23   < > 25 26 29   BUN 26*   < > 21* 15 15   CREATININE 1.1   < > 0.8 0.8 0.8   GLUCOSE 175*   < > 199* 181* 134*   CALCIUM 8.9   < > 8.8 8.7 8.9   PROT 7.2  --   --  6.7 7.0   LABALBU 3.8  --   --  3.6 3.5   BILITOT 0.4  --   --  0.3 0.3   ALKPHOS 98  --   --  87 87   AST 29  --   --  21 20   ALT 46*  --   --  40 41*    < > = values in this interval not displayed. Recent Labs     04/13/22  0504   WBC 8.5   RBC 4.04   HGB 10.5*   HCT 33.7*   MCV 83.4   MCH 26.0   MCHC 31.2*   RDW 20.1*      MPV 9.1       No results for input(s): BC in the last 72 hours. No results for input(s): Emmalene [place] in the last 72 hours.     24 HR INTAKE/OUTPUT:      Intake/Output Summary (Last 24 hours) at 4/13/2022 1245  Last data filed at 4/13/2022 0526  Gross per 24 hour   Intake 3040.84 ml   Output 2375 ml Net 665.84 ml     MEDICATIONS:   diazePAM  5 mg Oral Q6H    gabapentin  800 mg Oral TID    folic acid  1 mg Oral Daily    valproic acid  750 mg Per NG tube 4 times per day    metoprolol tartrate  25 mg Oral BID    [START ON 4/14/2022] thiamine  100 mg IntraVENous Daily    thiamine (VITAMIN B1) IVPB  500 mg IntraVENous Q8H    sodium chloride flush  5-40 mL IntraVENous 2 times per day    multivitamin  1 tablet Oral Daily    ipratropium-albuterol  1 ampule Inhalation Q4H    pantoprazole (PROTONIX) 40 mg injection  40 mg IntraVENous Daily    PHENobarbital  60 mg IntraVENous 3 times per day    losartan  50 mg Oral Daily    rosuvastatin  10 mg Oral Daily    sodium chloride flush  5-40 mL IntraVENous 2 times per day    enoxaparin  30 mg SubCUTAneous BID    aspirin  81 mg Oral Daily    nicotine  1 patch TransDERmal Daily      fentaNYL 5 mcg/ml in 0.9%  ml infusion 75 mcg/hr (04/13/22 1132)    sodium chloride      propofol 35 mcg/kg/min (04/13/22 1225)    sodium chloride       fentanNYL, sodium chloride flush, sodium chloride, sodium chloride flush, sodium chloride, ondansetron **OR** ondansetron, polyethylene glycol, acetaminophen **OR** acetaminophen    OBJECTIVE:  Vitals:    04/13/22 1100   BP: 131/82   Pulse: 79   Resp: 14   Temp:    SpO2: 95%     FiO2 : 40 %  O2 Flow Rate (L/min): 3 L/min  O2 Device: Ventilator        LABS:  WBC   Date Value Ref Range Status   04/13/2022 8.5 4.5 - 11.5 E9/L Final   04/12/2022 10.4 4.5 - 11.5 E9/L Final   04/11/2022 11.6 (H) 4.5 - 11.5 E9/L Final     Hemoglobin   Date Value Ref Range Status   04/13/2022 10.5 (L) 12.5 - 16.5 g/dL Final   04/12/2022 9.8 (L) 12.5 - 16.5 g/dL Final   04/11/2022 9.8 (L) 12.5 - 16.5 g/dL Final     Hematocrit   Date Value Ref Range Status   04/13/2022 33.7 (L) 37.0 - 54.0 % Final   04/12/2022 31.4 (L) 37.0 - 54.0 % Final   04/11/2022 30.6 (L) 37.0 - 54.0 % Final     MCV   Date Value Ref Range Status   04/13/2022 83.4 80.0 - 99.9 fL Final   04/12/2022 84.0 80.0 - 99.9 fL Final   04/11/2022 82.9 80.0 - 99.9 fL Final     Platelets   Date Value Ref Range Status   04/13/2022 189 130 - 450 E9/L Final   04/12/2022 187 130 - 450 E9/L Final   04/11/2022 186 130 - 450 E9/L Final     Sodium   Date Value Ref Range Status   04/13/2022 137 132 - 146 mmol/L Final   04/12/2022 131 (L) 132 - 146 mmol/L Final   04/11/2022 130 (L) 132 - 146 mmol/L Final     Potassium   Date Value Ref Range Status   04/13/2022 4.5 3.5 - 5.0 mmol/L Final   04/12/2022 5.0 3.5 - 5.0 mmol/L Final   04/10/2022 4.7 3.5 - 5.0 mmol/L Final     Potassium reflex Magnesium   Date Value Ref Range Status   04/11/2022 4.5 3.5 - 5.0 mmol/L Final   04/10/2022 4.5 3.5 - 5.0 mmol/L Final   04/09/2022 4.3 3.5 - 5.0 mmol/L Final     Chloride   Date Value Ref Range Status   04/13/2022 99 98 - 107 mmol/L Final   04/12/2022 95 (L) 98 - 107 mmol/L Final   04/11/2022 95 (L) 98 - 107 mmol/L Final     CO2   Date Value Ref Range Status   04/13/2022 29 22 - 29 mmol/L Final   04/12/2022 26 22 - 29 mmol/L Final   04/11/2022 25 22 - 29 mmol/L Final     BUN   Date Value Ref Range Status   04/13/2022 15 6 - 20 mg/dL Final   04/12/2022 15 6 - 20 mg/dL Final   04/11/2022 21 (H) 6 - 20 mg/dL Final     CREATININE   Date Value Ref Range Status   04/13/2022 0.8 0.7 - 1.2 mg/dL Final   04/12/2022 0.8 0.7 - 1.2 mg/dL Final   04/11/2022 0.8 0.7 - 1.2 mg/dL Final     Glucose   Date Value Ref Range Status   04/13/2022 134 (H) 74 - 99 mg/dL Final   04/12/2022 181 (H) 74 - 99 mg/dL Final   04/11/2022 199 (H) 74 - 99 mg/dL Final     Calcium   Date Value Ref Range Status   04/13/2022 8.9 8.6 - 10.2 mg/dL Final   04/12/2022 8.7 8.6 - 10.2 mg/dL Final   04/11/2022 8.8 8.6 - 10.2 mg/dL Final     Total Protein   Date Value Ref Range Status   04/13/2022 7.0 6.4 - 8.3 g/dL Final   04/12/2022 6.7 6.4 - 8.3 g/dL Final   04/10/2022 7.2 6.4 - 8.3 g/dL Final     Albumin   Date Value Ref Range Status   04/13/2022 3.5 3.5 - 5.2 g/dL Final 04/12/2022 3.6 3.5 - 5.2 g/dL Final   04/10/2022 3.8 3.5 - 5.2 g/dL Final   10/13/2010 4.8 3.2 - 4.8 g/dL Final     Total Bilirubin   Date Value Ref Range Status   04/13/2022 0.3 0.0 - 1.2 mg/dL Final   04/12/2022 0.3 0.0 - 1.2 mg/dL Final   04/10/2022 0.4 0.0 - 1.2 mg/dL Final     Alkaline Phosphatase   Date Value Ref Range Status   04/13/2022 87 40 - 129 U/L Final   04/12/2022 87 40 - 129 U/L Final   04/10/2022 98 40 - 129 U/L Final     AST   Date Value Ref Range Status   04/13/2022 20 0 - 39 U/L Final   04/12/2022 21 0 - 39 U/L Final   04/10/2022 29 0 - 39 U/L Final     ALT   Date Value Ref Range Status   04/13/2022 41 (H) 0 - 40 U/L Final   04/12/2022 40 0 - 40 U/L Final   04/10/2022 46 (H) 0 - 40 U/L Final     GFR Non-   Date Value Ref Range Status   04/13/2022 >60 >=60 mL/min/1.73 Final     Comment:     Chronic Kidney Disease: less than 60 ml/min/1.73 sq.m. Kidney Failure: less than 15 ml/min/1.73 sq.m. Results valid for patients 18 years and older. 04/12/2022 >60 >=60 mL/min/1.73 Final     Comment:     Chronic Kidney Disease: less than 60 ml/min/1.73 sq.m. Kidney Failure: less than 15 ml/min/1.73 sq.m. Results valid for patients 18 years and older. 04/11/2022 >60 >=60 mL/min/1.73 Final     Comment:     Chronic Kidney Disease: less than 60 ml/min/1.73 sq.m. Kidney Failure: less than 15 ml/min/1.73 sq.m. Results valid for patients 18 years and older.        GFR    Date Value Ref Range Status   04/13/2022 >60  Final   04/12/2022 >60  Final   04/11/2022 >60  Final     Magnesium   Date Value Ref Range Status   04/13/2022 2.5 1.6 - 2.6 mg/dL Final   04/12/2022 2.5 1.6 - 2.6 mg/dL Final   04/10/2022 2.2 1.6 - 2.6 mg/dL Final     Phosphorus   Date Value Ref Range Status   04/13/2022 3.5 2.5 - 4.5 mg/dL Final   04/12/2022 3.3 2.5 - 4.5 mg/dL Final   04/10/2022 3.6 2.5 - 4.5 mg/dL Final     Recent Labs     04/10/22  2149   PH 7.405   PO2 141.6*

## 2022-04-14 ENCOUNTER — APPOINTMENT (OUTPATIENT)
Dept: GENERAL RADIOLOGY | Age: 57
DRG: 896 | End: 2022-04-14
Payer: MEDICARE

## 2022-04-14 LAB
ALBUMIN SERPL-MCNC: 3.1 G/DL (ref 3.5–5.2)
ALP BLD-CCNC: 110 U/L (ref 40–129)
ALT SERPL-CCNC: 45 U/L (ref 0–40)
ANION GAP SERPL CALCULATED.3IONS-SCNC: 12 MMOL/L (ref 7–16)
ANISOCYTOSIS: ABNORMAL
AST SERPL-CCNC: 27 U/L (ref 0–39)
BACTERIA: ABNORMAL /HPF
BASOPHILS ABSOLUTE: 0 E9/L (ref 0–0.2)
BASOPHILS RELATIVE PERCENT: 0.3 % (ref 0–2)
BILIRUB SERPL-MCNC: 0.4 MG/DL (ref 0–1.2)
BILIRUBIN URINE: ABNORMAL
BLOOD, URINE: NEGATIVE
BUN BLDV-MCNC: 20 MG/DL (ref 6–20)
CALCIUM SERPL-MCNC: 8.7 MG/DL (ref 8.6–10.2)
CHLORIDE BLD-SCNC: 96 MMOL/L (ref 98–107)
CLARITY: CLEAR
CO2: 26 MMOL/L (ref 22–29)
COLOR: ABNORMAL
CREAT SERPL-MCNC: 1 MG/DL (ref 0.7–1.2)
EOSINOPHILS ABSOLUTE: 0 E9/L (ref 0.05–0.5)
EOSINOPHILS RELATIVE PERCENT: 0.3 % (ref 0–6)
EPITHELIAL CELLS, UA: ABNORMAL /HPF
GFR AFRICAN AMERICAN: >60
GFR NON-AFRICAN AMERICAN: >60 ML/MIN/1.73
GLUCOSE BLD-MCNC: 158 MG/DL (ref 74–99)
GLUCOSE URINE: NEGATIVE MG/DL
HCT VFR BLD CALC: 35.5 % (ref 37–54)
HEMOGLOBIN: 11 G/DL (ref 12.5–16.5)
HYPOCHROMIA: ABNORMAL
KETONES, URINE: ABNORMAL MG/DL
LEUKOCYTE ESTERASE, URINE: NEGATIVE
LYMPHOCYTES ABSOLUTE: 0.54 E9/L (ref 1.5–4)
LYMPHOCYTES RELATIVE PERCENT: 4.3 % (ref 20–42)
MAGNESIUM: 2.4 MG/DL (ref 1.6–2.6)
MCH RBC QN AUTO: 26.3 PG (ref 26–35)
MCHC RBC AUTO-ENTMCNC: 31 % (ref 32–34.5)
MCV RBC AUTO: 84.9 FL (ref 80–99.9)
METAMYELOCYTES RELATIVE PERCENT: 1.7 % (ref 0–1)
MONOCYTES ABSOLUTE: 1.35 E9/L (ref 0.1–0.95)
MONOCYTES RELATIVE PERCENT: 10.4 % (ref 2–12)
MYELOCYTE PERCENT: 0.9 % (ref 0–0)
NEUTROPHILS ABSOLUTE: 11.48 E9/L (ref 1.8–7.3)
NEUTROPHILS RELATIVE PERCENT: 82.6 % (ref 43–80)
NITRITE, URINE: NEGATIVE
OVALOCYTES: ABNORMAL
PDW BLD-RTO: 20.5 FL (ref 11.5–15)
PH UA: 6 (ref 5–9)
PHOSPHORUS: 4.7 MG/DL (ref 2.5–4.5)
PLATELET # BLD: 204 E9/L (ref 130–450)
PMV BLD AUTO: 9.3 FL (ref 7–12)
POIKILOCYTES: ABNORMAL
POLYCHROMASIA: ABNORMAL
POTASSIUM SERPL-SCNC: 4.3 MMOL/L (ref 3.5–5)
PROTEIN UA: 30 MG/DL
RBC # BLD: 4.18 E12/L (ref 3.8–5.8)
RBC UA: ABNORMAL /HPF (ref 0–2)
SODIUM BLD-SCNC: 134 MMOL/L (ref 132–146)
SPECIFIC GRAVITY UA: 1.01 (ref 1–1.03)
TOTAL PROTEIN: 6.6 G/DL (ref 6.4–8.3)
UROBILINOGEN, URINE: 1 E.U./DL
WBC # BLD: 13.5 E9/L (ref 4.5–11.5)
WBC UA: ABNORMAL /HPF (ref 0–5)

## 2022-04-14 PROCEDURE — 6370000000 HC RX 637 (ALT 250 FOR IP): Performed by: STUDENT IN AN ORGANIZED HEALTH CARE EDUCATION/TRAINING PROGRAM

## 2022-04-14 PROCEDURE — A4216 STERILE WATER/SALINE, 10 ML: HCPCS

## 2022-04-14 PROCEDURE — 6370000000 HC RX 637 (ALT 250 FOR IP): Performed by: INTERNAL MEDICINE

## 2022-04-14 PROCEDURE — 36415 COLL VENOUS BLD VENIPUNCTURE: CPT

## 2022-04-14 PROCEDURE — 2580000003 HC RX 258

## 2022-04-14 PROCEDURE — 6360000002 HC RX W HCPCS: Performed by: INTERNAL MEDICINE

## 2022-04-14 PROCEDURE — 80053 COMPREHEN METABOLIC PANEL: CPT

## 2022-04-14 PROCEDURE — 2580000003 HC RX 258: Performed by: INTERNAL MEDICINE

## 2022-04-14 PROCEDURE — 6360000002 HC RX W HCPCS: Performed by: NURSE PRACTITIONER

## 2022-04-14 PROCEDURE — 99233 SBSQ HOSP IP/OBS HIGH 50: CPT | Performed by: INTERNAL MEDICINE

## 2022-04-14 PROCEDURE — 36592 COLLECT BLOOD FROM PICC: CPT

## 2022-04-14 PROCEDURE — 6370000000 HC RX 637 (ALT 250 FOR IP): Performed by: NURSE PRACTITIONER

## 2022-04-14 PROCEDURE — 6360000002 HC RX W HCPCS

## 2022-04-14 PROCEDURE — 87150 DNA/RNA AMPLIFIED PROBE: CPT

## 2022-04-14 PROCEDURE — C9113 INJ PANTOPRAZOLE SODIUM, VIA: HCPCS

## 2022-04-14 PROCEDURE — 94003 VENT MGMT INPAT SUBQ DAY: CPT

## 2022-04-14 PROCEDURE — 85025 COMPLETE CBC W/AUTO DIFF WBC: CPT

## 2022-04-14 PROCEDURE — 87040 BLOOD CULTURE FOR BACTERIA: CPT

## 2022-04-14 PROCEDURE — 2000000000 HC ICU R&B

## 2022-04-14 PROCEDURE — 6360000002 HC RX W HCPCS: Performed by: STUDENT IN AN ORGANIZED HEALTH CARE EDUCATION/TRAINING PROGRAM

## 2022-04-14 PROCEDURE — 2500000003 HC RX 250 WO HCPCS: Performed by: INTERNAL MEDICINE

## 2022-04-14 PROCEDURE — 83735 ASSAY OF MAGNESIUM: CPT

## 2022-04-14 PROCEDURE — 84100 ASSAY OF PHOSPHORUS: CPT

## 2022-04-14 PROCEDURE — 71045 X-RAY EXAM CHEST 1 VIEW: CPT

## 2022-04-14 PROCEDURE — 81001 URINALYSIS AUTO W/SCOPE: CPT

## 2022-04-14 PROCEDURE — 2580000003 HC RX 258: Performed by: STUDENT IN AN ORGANIZED HEALTH CARE EDUCATION/TRAINING PROGRAM

## 2022-04-14 RX ORDER — CLONIDINE 0.3 MG/24H
1 PATCH, EXTENDED RELEASE TRANSDERMAL WEEKLY
Status: DISCONTINUED | OUTPATIENT
Start: 2022-04-14 | End: 2022-04-21

## 2022-04-14 RX ORDER — HYDRALAZINE HYDROCHLORIDE 20 MG/ML
10 INJECTION INTRAMUSCULAR; INTRAVENOUS EVERY 6 HOURS PRN
Status: DISCONTINUED | OUTPATIENT
Start: 2022-04-14 | End: 2022-04-27 | Stop reason: HOSPADM

## 2022-04-14 RX ADMIN — ACETAMINOPHEN 650 MG: 325 TABLET ORAL at 15:24

## 2022-04-14 RX ADMIN — MULTIPLE VITAMINS W/ MINERALS TAB 1 TABLET: TAB at 07:56

## 2022-04-14 RX ADMIN — DIAZEPAM 5 MG: 5 TABLET ORAL at 16:37

## 2022-04-14 RX ADMIN — PROPOFOL 50 MCG/KG/MIN: 10 INJECTION, EMULSION INTRAVENOUS at 10:06

## 2022-04-14 RX ADMIN — FOLIC ACID 1 MG: 1 TABLET ORAL at 07:55

## 2022-04-14 RX ADMIN — Medication 10 ML: at 08:13

## 2022-04-14 RX ADMIN — CEFEPIME HYDROCHLORIDE 2000 MG: 2 INJECTION, POWDER, FOR SOLUTION INTRAVENOUS at 18:31

## 2022-04-14 RX ADMIN — PHENOBARBITAL SODIUM 60 MG: 65 INJECTION INTRAMUSCULAR at 22:03

## 2022-04-14 RX ADMIN — FENTANYL CITRATE 150 MCG/HR: 0.05 INJECTION, SOLUTION INTRAMUSCULAR; INTRAVENOUS at 05:27

## 2022-04-14 RX ADMIN — PROPOFOL 50 MCG/KG/MIN: 10 INJECTION, EMULSION INTRAVENOUS at 23:41

## 2022-04-14 RX ADMIN — PROPOFOL 50 MCG/KG/MIN: 10 INJECTION, EMULSION INTRAVENOUS at 20:50

## 2022-04-14 RX ADMIN — PROPOFOL 50 MCG/KG/MIN: 10 INJECTION, EMULSION INTRAVENOUS at 18:10

## 2022-04-14 RX ADMIN — Medication 10 ML: at 20:03

## 2022-04-14 RX ADMIN — VALPROIC ACID 750 MG: 250 SOLUTION ORAL at 05:13

## 2022-04-14 RX ADMIN — THIAMINE HYDROCHLORIDE 100 MG: 100 INJECTION, SOLUTION INTRAMUSCULAR; INTRAVENOUS at 16:38

## 2022-04-14 RX ADMIN — CEFEPIME HYDROCHLORIDE 2000 MG: 2 INJECTION, POWDER, FOR SOLUTION INTRAVENOUS at 11:31

## 2022-04-14 RX ADMIN — DIAZEPAM 5 MG: 5 TABLET ORAL at 05:13

## 2022-04-14 RX ADMIN — VALPROIC ACID 750 MG: 250 SOLUTION ORAL at 18:05

## 2022-04-14 RX ADMIN — GABAPENTIN 800 MG: 400 CAPSULE ORAL at 20:01

## 2022-04-14 RX ADMIN — Medication 10 ML: at 20:02

## 2022-04-14 RX ADMIN — THIAMINE HYDROCHLORIDE 500 MG: 100 INJECTION, SOLUTION INTRAMUSCULAR; INTRAVENOUS at 00:54

## 2022-04-14 RX ADMIN — ASPIRIN 81 MG 81 MG: 81 TABLET ORAL at 07:56

## 2022-04-14 RX ADMIN — ROSUVASTATIN CALCIUM 10 MG: 10 TABLET, FILM COATED ORAL at 07:55

## 2022-04-14 RX ADMIN — PROPOFOL 50 MCG/KG/MIN: 10 INJECTION, EMULSION INTRAVENOUS at 07:15

## 2022-04-14 RX ADMIN — ACETAMINOPHEN 650 MG: 325 TABLET ORAL at 05:13

## 2022-04-14 RX ADMIN — LOSARTAN POTASSIUM 50 MG: 50 TABLET, FILM COATED ORAL at 07:56

## 2022-04-14 RX ADMIN — SODIUM CHLORIDE 40 MG: 9 INJECTION, SOLUTION INTRAMUSCULAR; INTRAVENOUS; SUBCUTANEOUS at 08:12

## 2022-04-14 RX ADMIN — GABAPENTIN 800 MG: 400 CAPSULE ORAL at 07:55

## 2022-04-14 RX ADMIN — THIAMINE HYDROCHLORIDE 500 MG: 100 INJECTION, SOLUTION INTRAMUSCULAR; INTRAVENOUS at 09:03

## 2022-04-14 RX ADMIN — PHENOBARBITAL SODIUM 60 MG: 65 INJECTION INTRAMUSCULAR at 13:00

## 2022-04-14 RX ADMIN — DIAZEPAM 5 MG: 5 TABLET ORAL at 10:03

## 2022-04-14 RX ADMIN — DIAZEPAM 5 MG: 5 TABLET ORAL at 22:59

## 2022-04-14 RX ADMIN — METOPROLOL TARTRATE 25 MG: 25 TABLET, FILM COATED ORAL at 07:55

## 2022-04-14 RX ADMIN — KETAMINE HYDROCHLORIDE 0.2 MG/KG/HR: 50 INJECTION, SOLUTION INTRAMUSCULAR; INTRAVENOUS at 11:04

## 2022-04-14 RX ADMIN — PHENOBARBITAL SODIUM 60 MG: 65 INJECTION INTRAMUSCULAR at 05:13

## 2022-04-14 RX ADMIN — SODIUM CHLORIDE: 9 INJECTION, SOLUTION INTRAVENOUS at 11:29

## 2022-04-14 RX ADMIN — ENOXAPARIN SODIUM 30 MG: 100 INJECTION SUBCUTANEOUS at 20:02

## 2022-04-14 RX ADMIN — METOPROLOL TARTRATE 25 MG: 25 TABLET, FILM COATED ORAL at 20:02

## 2022-04-14 RX ADMIN — PROPOFOL 50 MCG/KG/MIN: 10 INJECTION, EMULSION INTRAVENOUS at 13:27

## 2022-04-14 RX ADMIN — ENOXAPARIN SODIUM 30 MG: 100 INJECTION SUBCUTANEOUS at 08:12

## 2022-04-14 RX ADMIN — PROPOFOL 50 MCG/KG/MIN: 10 INJECTION, EMULSION INTRAVENOUS at 04:35

## 2022-04-14 RX ADMIN — HYDRALAZINE HYDROCHLORIDE 10 MG: 20 INJECTION INTRAMUSCULAR; INTRAVENOUS at 16:37

## 2022-04-14 RX ADMIN — PROPOFOL 50 MCG/KG/MIN: 10 INJECTION, EMULSION INTRAVENOUS at 15:23

## 2022-04-14 RX ADMIN — ACETAMINOPHEN 650 MG: 325 TABLET ORAL at 20:50

## 2022-04-14 RX ADMIN — VALPROIC ACID 750 MG: 250 SOLUTION ORAL at 11:17

## 2022-04-14 RX ADMIN — PROPOFOL 50 MCG/KG/MIN: 10 INJECTION, EMULSION INTRAVENOUS at 02:15

## 2022-04-14 RX ADMIN — GABAPENTIN 800 MG: 400 CAPSULE ORAL at 13:30

## 2022-04-14 ASSESSMENT — PULMONARY FUNCTION TESTS
PIF_VALUE: 37
PIF_VALUE: 39
PIF_VALUE: 32
PIF_VALUE: 38
PIF_VALUE: 39
PIF_VALUE: 23
PIF_VALUE: 38
PIF_VALUE: 37
PIF_VALUE: 37
PIF_VALUE: 36
PIF_VALUE: 31
PIF_VALUE: 31
PIF_VALUE: 42
PIF_VALUE: 40
PIF_VALUE: 26
PIF_VALUE: 22
PIF_VALUE: 29
PIF_VALUE: 30
PIF_VALUE: 24
PIF_VALUE: 30
PIF_VALUE: 29

## 2022-04-14 ASSESSMENT — PAIN SCALES - GENERAL
PAINLEVEL_OUTOF10: 0
PAINLEVEL_OUTOF10: 3
PAINLEVEL_OUTOF10: 0
PAINLEVEL_OUTOF10: 3
PAINLEVEL_OUTOF10: 0

## 2022-04-14 NOTE — PROGRESS NOTES
Per Pharmacy Ketamine Order- Must contact provider if SBP is over 160. Dr Nabila Phoenix notified and RN was given orders for Hydralazine 10 mg IV q4hr  and a Clonidine patch. Told to continue with titration as appropriate.     Electronically signed by Suzan Canas RN on 4/14/2022 at 5:17 PM

## 2022-04-14 NOTE — CARE COORDINATION
4/14/2022 CM transition of care: ICU, vent, sedation- fent, diprivan, versed, phenobarbital, solumedrol, protonix iv, thiamine iv, duonebs, lovenox sq. Tube feedings. Family states pt has dual addictions etoh and narcotics. Need for heart cath once more stable from withdrawals, possibly Friday.  Met with son Gabriel Plant Herminio Quinteros), he is working with courts for return of his child Sae Campbell- see [facility] referral below. Cm for discharge planning.  Electronically signed by Nitish Schaefer RN-BC on 4/14/2022 at 9:15 AM

## 2022-04-14 NOTE — PROGRESS NOTES
Pt bladder temp probe temp 101.8- NP notified- [employer] cooling blanket at 0600 and given tylenol via OG.        Electronically signed by Carlos Talavera RN on 4/14/2022 at 6:06 AM

## 2022-04-14 NOTE — PROGRESS NOTES
[facility]) Physicians        CARDIOLOGY                 INPATIENT PROGRESS NOTE          PATIENT SEEN IN FOLLOW UP FOR: Chest pain, CHF    Hospital Day: 6     Giovana Pulido is a 64year old patient known to Dr. Shawanda Correa from initial consult      SUBJECTIVE:  On Vent due to alcohol withdrawal to protect airway     ROS: Review of rest of 10 systems limited since Pt on Vent and sedated. OBJECTIVE: Pt on Vent and sedated. Diagnostics:       Telemetry:  Reviewed     Echo 4/11/22   Summary   Technically sub-optimal images - Definity Echo contrast used. Normal left ventricular chamber size. Normal left ventricular systolic function, LVEF 36-62%. Mild left ventricular concentric hypertrophy noted. Normal diastolic function. Interatrial septum not well visualized but appears intact. Normal right ventricle size and function, TAPSE 2.3cm. Valves not well visualized. No hemodynamically significant aortic stenosis. Normal aortic root size. No evidence of pericardial effusion. No intra cardiac mass or thrombus. No comparison study available. Intake/Output Summary (Last 24 hours) at 4/14/2022 1809  Last data filed at 4/14/2022 1659  Gross per 24 hour   Intake 3028.26 ml   Output 1400 ml   Net 1628.26 ml       Labs:   CBC:   Recent Labs     04/13/22  0504 04/14/22  0548   WBC 8.5 13.5*   HGB 10.5* 11.0*   HCT 33.7* 35.5*    204     BMP:   Recent Labs     04/13/22  0504 04/14/22  0548    134   K 4.5 4.3   CO2 29 26   BUN 15 20   CREATININE 0.8 1.0   LABGLOM >60 >60   CALCIUM 8.9 8.7     Mag:   Recent Labs     04/13/22  0504 04/14/22  0548   MG 2.5 2.4     Phos:   Recent Labs     04/13/22  0504 04/14/22  0548   PHOS 3.5 4.7*     TSH:   No results for input(s): TSH in the last 72 hours. HgA1c:     BNP: No results for input(s): BNP in the last 72 hours. PT/INR: No results for input(s): PROTIME, INR in the last 72 hours. APTT:No results for input(s):  APTT in the last 72 hours. CARDIAC ENZYMES:No results for input(s): CKTOTAL, CKMB, CKMBINDEX, TROPONINI in the last 72 hours. FASTING LIPID PANEL:  Lab Results   Component Value Date    CHOL 213 2015    HDL 41 2015    [address] - 2015    TRIG 88 2022     LIVER PROFILE:  Recent Labs     22  0504 22  0548   AST 20 27   ALT 41* 45*   LABALBU 3.5 3.1*       Current Inpatient Medications:   cefepime  2,000 mg IntraVENous Q8H    cloNIDine  1 patch TransDERmal Weekly    diazePAM  5 mg Oral Q6H    gabapentin  800 mg Oral TID    folic acid  1 mg Oral Daily    valproic acid  750 mg Per NG tube 4 times per day    metoprolol tartrate  25 mg Oral BID    thiamine  100 mg IntraVENous Daily    sodium chloride flush  5-40 mL IntraVENous 2 times per day    multivitamin  1 tablet Oral Daily    pantoprazole (PROTONIX) 40 mg injection  40 mg IntraVENous Daily    PHENobarbital  60 mg IntraVENous 3 times per day    losartan  50 mg Oral Daily    rosuvastatin  10 mg Oral Daily    sodium chloride flush  5-40 mL IntraVENous 2 times per day    enoxaparin  30 mg SubCUTAneous BID    aspirin  81 mg Oral Daily    nicotine  1 patch TransDERmal Daily       IV Infusions (if any):   ketamine (KETALAR) 5 mg/mL infusion for analgosedation 0.702 mg/kg/hr (22)    sodium chloride Stopped (22 1131)    propofol 50 mcg/kg/min (22)    sodium chloride           PHYSICAL EXAM:     CONSTITUTIONAL:   BP (!) 141/78   Pulse 88   Temp 100.4 °F (38 °C) (Core)   Resp 15   Ht 6' (1.829 m)   Wt 280 lb 8 oz (127.2 kg)   SpO2 95%   BMI 38.04 kg/m²   Pulse  Av.8  Min: 72  Max: 258  Systolic (70AML), ZPK:241 , Min:84 , WXH:601    Diastolic (14NMO), FSU:76, Min:52, Max:125    In general, this is a well developed, well nourished who appears stated age. Pt on vent and sedated. HEENT: Pt on vent and sedated. Neck-  no stridor, no noted enlargement of the thyroid, no carotid bruit.  no jugular venous distention   RESPIRATORY: Chest symmetrical.  No accessory muscle use. Lung auscultation - few rhonchi  CARDIOVASCULAR:     Heart Ausculation - Regular rate and rhythm, 2/6 systolic murmur, No s3 or rub.   + lower extremity edema,  Distal pulses palpable, no clubbing  ABDOMEN: Bowel sounds present. No Palpable masses. MS: Pt on vent and sedated. : Deferred  Rectal Exam: Deferred  SKIN: warm and dry   NEURO / PSYCH: Pt on vent and sedated. ASSESSMENT / PLAN:    Exertional chest pain - Likely angina (CP with diaphoresis and dyspnea on exertion). MI ruled out; Continue ASA, Nitrates, BB. R/B/A discussed by Dr Terranec Michaels, agreeable for [facility]. Left Heart Cath when stable and Off Vent. Indication: 3, Score: 8. Cancel his Heart cath for now. Essential HTN - Adjust meds for BP     Dyslipidemia  - on Statin    Tobacco abuse (since age 15, previously 1 PPD, currently 0.75 PPD) - Will be counseled to quit smoking    Chronic HFpEF - Given one dose Lasix 40mg yesterday    Probable KULDIP - Recommend Out-Pt sleep study    Non-morbid Obesity, BMI 38 - Diet, exercise and weight loss will be discussed.     Hx of Marijuana and ETOH use - Will be counseled to quit    Chronic back pain - on Oxycodone as an outpatient    Family Hx of CAD    Anemia, Chronic - Monitor H/H    History of elevated LFT's in 3/2022 - Monitor LFTs           No family at bed side      Electronically signed by Elisabeth Alonso MD on 4/14/2022 at 6:09 PM  [facility]) Cardiology

## 2022-04-14 NOTE — PLAN OF CARE
Problem: Falls - Risk of:  Goal: Will remain free from falls  Description: Will remain free from falls  Outcome: Met This Shift  Goal: Absence of physical injury  Description: Absence of physical injury  Outcome: Met This Shift     Problem: Non-Violent Restraints  Goal: No harm/injury to patient while restraints in use  Outcome: Met This Shift  Goal: Patient's dignity will be maintained  Outcome: Met This Shift     Problem: Non-Violent Restraints  Goal: Removal from restraints as soon as assessed to be safe  Outcome: Not Met This Shift

## 2022-04-14 NOTE — PROGRESS NOTES
Patient's son Justyna Rich asking to be present for SAT/SBT to \"help keep dad calm. \" Informed Justyna Rich that sometimes family being present for these trials actually makes the patient more restless so he may be asked to step out, however he will be allowed to try and stay in room for this. Unsure when SAT/SBT will be attempted due to instability of patient but informed Justyna Rich that he will be notified when this step is ready to be taken.

## 2022-04-14 NOTE — PROGRESS NOTES
CRITICAL CARE PROGRESS NOTE    The patient's case was discussed in multidisciplinary rounds including critical care specialist, nursing, RT and pharmacy. His evaluation is as follows:     Daily progress:  April 13, 2022: Patient was agitated and combative off sedation this morning. He has no fever, chills, rigors. He remains sedated and intubated. He continues to be off vasopressors    April 14, 2022: Patient remains sedated intubated and mechanically ventilated. Patient's ventilator was weaned down slowly but the patient was fighting the endotracheal tube. He is running low-grade fever but remains hemodynamically stable. White blood cells count has slightly increased.     ETOH withdrawal with delirium tremens    --Intubated for airway protection - Vent settings 460/ f 16/ PEEP 5/ 40% FiO2  --Sedation with propofol + fentanyl and phenobarbital + Valproic acid (Increased to 750 mg q6h) + gabapentin (increased to 800 mg TID)+ scheduled diazepam was added  --We will add ketamine and discontinue continue  --Check triglycerides twice a week     --Continue Thiamine and folic acid    Acute hypoxemic respiratory failure due to pneumonia  --Mechanical ventilation with lung protective strategy  --Antibiotics: Cefepime for low-grade fever of suspected healthcare associated pneumonia    Chest pain R/O ACS  --Followed by cardiology  --Plan for heart cath tomorrow or Tuesday at [facility] however his ETOH withdrawal may effect this plan  --Continue  beta blocker and imdur, ASA and ARB     Chronic diastolic heart failure  --Does not appear to be in acute exacerbation  --Diuretics on hold due to hyponatremia  --ECHO with normal EF    COPD exacerbation (resolved)  --Off solu-medrol  --Continue as needed aerosolized bronchodilators     Hyponatremia, chronic possible due to chronic beer intake  --Resume losartan  --Currently on tube feed  --Daily BMP     HTN  --on ARB and BB     HLD  --Stating on hold due to increased LFT, ALT now 53 and AST now 36 down from 73 and 70 respectively      GERD  --On protonix     Tobacco use disorder  --Continue nicotine patch     Morbid obesity  --will need counseling on lifestyle modifications    Prolonged QTc  --Avoid medications that prolong the QT      DVT prophylaxis with enoxaparin   Nutrition: Tube feedings  Vascular catheters: TLC needed for vasopressor support  Urinary catheter needed for strict input/output    GI prophylaxis with PPI  Physical therapy once awake  Goals of care: Full code    /86   Pulse 74   Temp 99.7 °F (37.6 °C) (Core)   Resp 18   Ht 6' (1.829 m)   Wt 280 lb 8 oz (127.2 kg)   SpO2 100%   BMI 38.04 kg/m²   General:Lethargic, sedated and ventilated, at times agitated and moving 4 limbs and trying to sit up. HEENT: No head lesions, PERRL, EOMI, mouth with ETT, no nasal lesions, no cervical adenopathy palpated  Respiratory: Lungs with equal breath sounds bilaterally, no adventitious sounds auscultated, no accessory muscle use  CV: Regular rate, no murmurs, no JVD, no leg edema  Abdomen: Soft, non tender, + bowel sounds, no lesions  Skin: Hydrated, adequate turgor, no rash, capillary refill <2 seconds  Extremities: Muscular strength 5/5 in 4 limbs when agitated, moves 4 limbs spontaneously, distal pulses present  Neurology: Lethargic, sedated, neck is supple, no meningitic signs present. Last 3 CMP:    Recent Labs     04/12/22  0531 04/13/22  0504 04/14/22  0548   * 137 134   K 5.0 4.5 4.3   CL 95* 99 96*   CO2 26 29 26   BUN 15 15 20   CREATININE 0.8 0.8 1.0   GLUCOSE 181* 134* 158*   CALCIUM 8.7 8.9 8.7   PROT 6.7 7.0 6.6   LABALBU 3.6 3.5 3.1*   BILITOT 0.3 0.3 0.4   ALKPHOS 87 87 110   AST 21 20 27   ALT 40 41* 45*     Recent Labs     04/14/22  0548   WBC 13.5*   RBC 4.18   HGB 11.0*   HCT 35.5*   MCV 84.9   MCH 26.3   MCHC 31.0*   RDW 20.5*      MPV 9.3       No results for input(s): BC in the last 72 hours.     No results for input(s): Joellen Scott in the last 72 hours.     24 HR INTAKE/OUTPUT:      Intake/Output Summary (Last 24 hours) at 4/14/2022 1042  Last data filed at 4/14/2022 1006  Gross per 24 hour   Intake 2996.25 ml   Output 1450 ml   Net 1546.25 ml     MEDICATIONS:   cefepime  2,000 mg IntraVENous Q8H    diazePAM  5 mg Oral Q6H    gabapentin  800 mg Oral TID    folic acid  1 mg Oral Daily    valproic acid  750 mg Per NG tube 4 times per day    metoprolol tartrate  25 mg Oral BID    thiamine  100 mg IntraVENous Daily    sodium chloride flush  5-40 mL IntraVENous 2 times per day    multivitamin  1 tablet Oral Daily    pantoprazole (PROTONIX) 40 mg injection  40 mg IntraVENous Daily    PHENobarbital  60 mg IntraVENous 3 times per day    losartan  50 mg Oral Daily    rosuvastatin  10 mg Oral Daily    sodium chloride flush  5-40 mL IntraVENous 2 times per day    enoxaparin  30 mg SubCUTAneous BID    aspirin  81 mg Oral Daily    nicotine  1 patch TransDERmal Daily      fentaNYL 5 mcg/ml in 0.9%  ml infusion 150 mcg/hr (04/14/22 1021)    sodium chloride      propofol 50 mcg/kg/min (04/14/22 1006)    sodium chloride       ipratropium-albuterol, fentanNYL, sodium chloride flush, sodium chloride, sodium chloride flush, sodium chloride, ondansetron **OR** ondansetron, polyethylene glycol, acetaminophen **OR** acetaminophen    OBJECTIVE:  Vitals:    04/14/22 1000   BP: 124/86   Pulse: 74   Resp: 18   Temp: 99.7 °F (37.6 °C)   SpO2: 100%     FiO2 : 40 %  O2 Flow Rate (L/min): 3 L/min  O2 Device: Ventilator        LABS:  WBC   Date Value Ref Range Status   04/14/2022 13.5 (H) 4.5 - 11.5 E9/L Final   04/13/2022 8.5 4.5 - 11.5 E9/L Final   04/12/2022 10.4 4.5 - 11.5 E9/L Final     Hemoglobin   Date Value Ref Range Status   04/14/2022 11.0 (L) 12.5 - 16.5 g/dL Final   04/13/2022 10.5 (L) 12.5 - 16.5 g/dL Final   04/12/2022 9.8 (L) 12.5 - 16.5 g/dL Final     Hematocrit   Date Value Ref Range Status   04/14/2022 35.5 (L) 37.0 - 54.0 % Final   04/13/2022 33.7 (L) 37.0 - 54.0 % Final   04/12/2022 31.4 (L) 37.0 - 54.0 % Final     MCV   Date Value Ref Range Status   04/14/2022 84.9 80.0 - 99.9 fL Final   04/13/2022 83.4 80.0 - 99.9 fL Final   04/12/2022 84.0 80.0 - 99.9 fL Final     Platelets   Date Value Ref Range Status   04/14/2022 204 130 - 450 E9/L Final   04/13/2022 189 130 - 450 E9/L Final   04/12/2022 187 130 - 450 E9/L Final     Sodium   Date Value Ref Range Status   04/14/2022 134 132 - 146 mmol/L Final   04/13/2022 137 132 - 146 mmol/L Final   04/12/2022 131 (L) 132 - 146 mmol/L Final     Potassium   Date Value Ref Range Status   04/14/2022 4.3 3.5 - 5.0 mmol/L Final   04/13/2022 4.5 3.5 - 5.0 mmol/L Final   04/12/2022 5.0 3.5 - 5.0 mmol/L Final     Potassium reflex Magnesium   Date Value Ref Range Status   04/11/2022 4.5 3.5 - 5.0 mmol/L Final   04/10/2022 4.5 3.5 - 5.0 mmol/L Final   04/09/2022 4.3 3.5 - 5.0 mmol/L Final     Chloride   Date Value Ref Range Status   04/14/2022 96 (L) 98 - 107 mmol/L Final   04/13/2022 99 98 - 107 mmol/L Final   04/12/2022 95 (L) 98 - 107 mmol/L Final     CO2   Date Value Ref Range Status   04/14/2022 26 22 - 29 mmol/L Final   04/13/2022 29 22 - 29 mmol/L Final   04/12/2022 26 22 - 29 mmol/L Final     BUN   Date Value Ref Range Status   04/14/2022 20 6 - 20 mg/dL Final   04/13/2022 15 6 - 20 mg/dL Final   04/12/2022 15 6 - 20 mg/dL Final     CREATININE   Date Value Ref Range Status   04/14/2022 1.0 0.7 - 1.2 mg/dL Final   04/13/2022 0.8 0.7 - 1.2 mg/dL Final   04/12/2022 0.8 0.7 - 1.2 mg/dL Final     Glucose   Date Value Ref Range Status   04/14/2022 158 (H) 74 - 99 mg/dL Final   04/13/2022 134 (H) 74 - 99 mg/dL Final   04/12/2022 181 (H) 74 - 99 mg/dL Final     Calcium   Date Value Ref Range Status   04/14/2022 8.7 8.6 - 10.2 mg/dL Final   04/13/2022 8.9 8.6 - 10.2 mg/dL Final   04/12/2022 8.7 8.6 - 10.2 mg/dL Final     Total Protein   Date Value Ref Range Status   04/14/2022 6.6 6.4 - 8.3 g/dL Final   04/13/2022 7.0 6.4 - 8.3 g/dL Final   04/12/2022 6.7 6.4 - 8.3 g/dL Final     Albumin   Date Value Ref Range Status   04/14/2022 3.1 (L) 3.5 - 5.2 g/dL Final   04/13/2022 3.5 3.5 - 5.2 g/dL Final   04/12/2022 3.6 3.5 - 5.2 g/dL Final   10/13/2010 4.8 3.2 - 4.8 g/dL Final     Total Bilirubin   Date Value Ref Range Status   04/14/2022 0.4 0.0 - 1.2 mg/dL Final   04/13/2022 0.3 0.0 - 1.2 mg/dL Final   04/12/2022 0.3 0.0 - 1.2 mg/dL Final     Alkaline Phosphatase   Date Value Ref Range Status   04/14/2022 110 40 - 129 U/L Final   04/13/2022 87 40 - 129 U/L Final   04/12/2022 87 40 - 129 U/L Final     AST   Date Value Ref Range Status   04/14/2022 27 0 - 39 U/L Final   04/13/2022 20 0 - 39 U/L Final   04/12/2022 21 0 - 39 U/L Final     ALT   Date Value Ref Range Status   04/14/2022 45 (H) 0 - 40 U/L Final   04/13/2022 41 (H) 0 - 40 U/L Final   04/12/2022 40 0 - 40 U/L Final     GFR Non-   Date Value Ref Range Status   04/14/2022 >60 >=60 mL/min/1.73 Final     Comment:     Chronic Kidney Disease: less than 60 ml/min/1.73 sq.m. Kidney Failure: less than 15 ml/min/1.73 sq.m. Results valid for patients 18 years and older. 04/13/2022 >60 >=60 mL/min/1.73 Final     Comment:     Chronic Kidney Disease: less than 60 ml/min/1.73 sq.m. Kidney Failure: less than 15 ml/min/1.73 sq.m. Results valid for patients 18 years and older. 04/12/2022 >60 >=60 mL/min/1.73 Final     Comment:     Chronic Kidney Disease: less than 60 ml/min/1.73 sq.m. Kidney Failure: less than 15 ml/min/1.73 sq.m. Results valid for patients 18 years and older.        GFR    Date Value Ref Range Status   04/14/2022 >60  Final   04/13/2022 >60  Final   04/12/2022 >60  Final     Magnesium   Date Value Ref Range Status   04/14/2022 2.4 1.6 - 2.6 mg/dL Final   04/13/2022 2.5 1.6 - 2.6 mg/dL Final   04/12/2022 2.5 1.6 - 2.6 mg/dL Final     Phosphorus   Date Value Ref Range Status   04/14/2022 4.7 (H) 2.5 - 4.5 mg/dL Final   04/13/2022 3.5 2.5 - 4.5 mg/dL Final   04/12/2022 3.3 2.5 - 4.5 mg/dL Final     No results for input(s): PH, PO2, PCO2, HCO3, BE, O2SAT in the last 72 hours. RADIOLOGY:  XR CHEST PORTABLE   Final Result   New atelectasis and or infiltrate at the right base. XR ABDOMEN FOR NG/OG/NE TUBE PLACEMENT   Final Result   Nasogastric tube with the tip in the proximal gastric body. XR CHEST PORTABLE   Final Result   Adequately positioned endotracheal tube, orogastric tube and right internal   jugular central venous line. XR CHEST PORTABLE   Final Result   No pneumonia or pleural effusion. PROBLEM LIST:  Principal Problem:    Acute chest pain  Active Problems:    Chest pain  Resolved Problems:    * No resolved hospital problems. *          ATTESTATION:  ICU Staff Physician note of personal involvement in Care  As the attending physician, I certify that I personally reviewed the patients history and personnally examined the patient to confirm the physical findings described above,  And that I reviewed the relevant imaging studies and available reports. I also discussed the differential diagnosis and all of the proposed management plans with the patient and individuals accompanying the patient to this visit. They had the opportunity to ask questions about the proposed management plans and to have those questions answered. This patient has a high probability of sudden, clinically significant deterioration, which requires the highest level of physician preparedness to intervene urgently. I managed/supervised life or organ supporting interventions that required frequent physician assessment. I devoted my full attention to the direct care of this patient for the amount of time indicated below.   Time I spent with the family or surrogate(s) is included only if the patient was incapable of providing the necessary information or participating in medical decisions - Time devoted to teaching and to any procedures I billed separately is not included.      CRITICAL CARE TIME:  40 minutes

## 2022-04-14 NOTE — PLAN OF CARE
Problem: Cardiac Output - Decreased:  Goal: Hemodynamic stability will improve  Description: Hemodynamic stability will improve  Outcome: Met This Shift     Problem: Non-Violent Restraints  Goal: Removal from restraints as soon as assessed to be safe  4/14/2022 1338 by Vika Patel RN  Outcome: Not Met This Shift     Problem: Non-Violent Restraints  Goal: No harm/injury to patient while restraints in use  4/14/2022 1338 by Vika Patel RN  Outcome: Met This Shift     Problem: Non-Violent Restraints  Goal: Patient's dignity will be maintained  4/14/2022 1338 by Vika Patel RN  Outcome: Met This Shift     Problem: Skin Integrity:  Goal: Will show no infection signs and symptoms  Description: Will show no infection signs and symptoms  Outcome: Not Met This Shift     Problem: Skin Integrity:  Goal: Absence of new skin breakdown  Description: Absence of new skin breakdown  Outcome: Met This Shift

## 2022-04-14 NOTE — PROGRESS NOTES
[address] [place], [place] [address]ist   ICU Progress Note    Admitting Date and Time: 4/9/2022  4:02 PM  Admit Dx: Hyponatremia [E87.1]  COPD exacerbation ([address] [J44.1]  Acute chest pain [R07.9]  Chest pain, unspecified type [R07.9]  Chest pain [R07.9]    Subjective:    4/13: Pt intubated and sedated currently    4/14: Pt intubated and sedated. Per RN: Propofol is at upper limits.  Ketamine added today per intensivist    ROS: unable to assess due to being intubated and sedated     cefepime  2,000 mg IntraVENous Q8H    cloNIDine  1 patch TransDERmal Weekly    diazePAM  5 mg Oral Q6H    gabapentin  800 mg Oral TID    folic acid  1 mg Oral Daily    valproic acid  750 mg Per NG tube 4 times per day    metoprolol tartrate  25 mg Oral BID    thiamine  100 mg IntraVENous Daily    sodium chloride flush  5-40 mL IntraVENous 2 times per day    multivitamin  1 tablet Oral Daily    pantoprazole (PROTONIX) 40 mg injection  40 mg IntraVENous Daily    PHENobarbital  60 mg IntraVENous 3 times per day    losartan  50 mg Oral Daily    rosuvastatin  10 mg Oral Daily    sodium chloride flush  5-40 mL IntraVENous 2 times per day    enoxaparin  30 mg SubCUTAneous BID    aspirin  81 mg Oral Daily    nicotine  1 patch TransDERmal Daily     hydrALAZINE, 10 mg, Q6H PRN  ipratropium-albuterol, 1 ampule, Q4H PRN  fentanNYL, 25 mcg, Q30 Min PRN  sodium chloride flush, 5-40 mL, PRN  sodium chloride, , PRN  sodium chloride flush, 5-40 mL, PRN  sodium chloride, , PRN  ondansetron, 4 mg, Q8H PRN   Or  ondansetron, 4 mg, Q6H PRN  polyethylene glycol, 17 g, Daily PRN  acetaminophen, 650 mg, Q6H PRN   Or  acetaminophen, 650 mg, Q6H PRN         Objective:    BP (!) 172/76   Pulse 93   Temp 100.4 °F (38 °C) (Core)   Resp 14   Ht 6' (1.829 m)   Wt 280 lb 8 oz (127.2 kg)   SpO2 94%   BMI 38.04 kg/m²    General Appearance: intubated, sedated, obese  Skin: warm,  Head: normocephalic and atraumatic  Eyes: pupils equal, round, and reactive to light, extraocular eye movements intact, conjunctivae normal  Neck: R IJ TLC. Pulmonary/Chest:mild wheezing throughout, normal air movement, no respiratory distress  Cardiovascular: normal rate, normal S1 and S2 and no carotid bruits  Abdomen: soft, non-tender, non-distended, normal bowel sounds, no masses or organomegaly  Extremities: no cyanosis, no clubbing and no edema  Neurologic: sedated, no apparent focal deficit      Recent Labs     04/12/22 0531 04/13/22  0504 04/14/22  0548   * 137 134   K 5.0 4.5 4.3   CL 95* 99 96*   CO2 26 29 26   BUN 15 15 20   CREATININE 0.8 0.8 1.0   GLUCOSE 181* 134* 158*   CALCIUM 8.7 8.9 8.7       Recent Labs     04/12/22 0531 04/13/22  0504 04/14/22  0548   ALKPHOS 87 87 110   PROT 6.7 7.0 6.6   LABALBU 3.6 3.5 3.1*   BILITOT 0.3 0.3 0.4   AST 21 20 27   ALT 40 41* 45*       Recent Labs     04/12/22 0531 04/13/22  0504 04/14/22  0548   WBC 10.4 8.5 13.5*   RBC 3.74* 4.04 4.18   HGB 9.8* 10.5* 11.0*   HCT 31.4* 33.7* 35.5*   MCV 84.0 83.4 84.9   MCH 26.2 26.0 26.3   MCHC 31.2* 31.2* 31.0*   RDW 20.1* 20.1* 20.5*    189 204   MPV 9.5 9.1 9.3         Radiology:   XR CHEST PORTABLE   Final Result   New atelectasis and or infiltrate at the right base. XR ABDOMEN FOR NG/OG/NE TUBE PLACEMENT   Final Result   Nasogastric tube with the tip in the proximal gastric body. XR CHEST PORTABLE   Final Result   Adequately positioned endotracheal tube, orogastric tube and right internal   jugular central venous line. XR CHEST PORTABLE   Final Result   No pneumonia or pleural effusion. Assessment:  Principal Problem:    Acute chest pain  Active Problems:    Chest pain  Resolved Problems:    * No resolved hospital problems. *      Plan:    1.  Chest pain rule out ACS  -Chest pain/pressure with SOB and diaphoresis and nausea  -Troponin negative x 2, EKG no ST changes  -Patient has SYL score 3, HEART score 5   -Multiple risk factors include HTN, HLD, obesity, smoker, family history (brother)  -Cardio consulted, they were planning for cath but not now that is intubated and in acute alcohol withdrawal cath will be pushed back per cardio. -Monitor on telemetry     2. Probable COPD exacerbation  -No formal diagnosis of COPD but was given albuterol PRN by PCP  -Wheezing on exam and patient is active smoker, currently intubated for airway protection  -Given 125mg solumedrol in ER and had two days of 40mg BID, now stopped per ICU  -Nebulizers; budesonide stopped after Sunday AM dose     3. Severe alcohol withdrawal with DTs  -Per prior hospitalist, patient initially was not truthful with how much he drank, advised patient that was assessing for risk of alcohol withdrawal and still did not admit true amount of alcohol use  -Began to go into mild alcohol withdrawal on 4/10 afternoon and CIWA protocol started  -As day went on alcohol withdrawal became severe and patient required transfer to ICU as hallucinating and becoming aggressive. Lali Knutson called overnight on 4/10 in ICU  -Given phenobarbital loading dose and then became lethargic and required intubation for airway protection. Now sedated with propofol and fentanyl.   -Phenobarbital 60mg Q8H, valproic acid 750mg Q6H, valium 5mg Q6H and gabapentin 800mg TID for alcohol withdrawal.     4. Hyponatremia  -Sodium 123 on admission, was also 123 last month. Today 134  -Was given 1L NS bolus in ER and had about 24hrs of NS 100ml/hr. Now off IVF but on tube feedings  -Possibly chronic from beer potomania as is alcoholic  -Could also be from HCTZ, losartan and lasix, will hold lasix and HCTZ for now      5. HTN  -Home meds HCTZ, lasix, losartan and verapamil  -BP on admission 127/85, this morning 127/74  -Continue home meds other than lasix and monitor BP     6. HLD  -Continue statin     7. GERD  -Continue PPI     8.   Tobacco use disorder   -Smokes 1/2-1PPD for 45 years  -Counseled on smoking cessation     9. Morbid obesity with BMI 38.04  -will need to be counseled on lifestyle changes prior to discharge from hospital     NOTE: This report was transcribed using voice recognition software. Every effort was made to ensure accuracy; however, inadvertent computerized transcription errors may be present.      Electronically signed by Dimitri Suárez MD on 4/14/2022 at 4:57 PM

## 2022-04-15 ENCOUNTER — HOSPITAL ENCOUNTER (OUTPATIENT)
Dept: CARDIAC CATH/INVASIVE PROCEDURES | Age: 57
Discharge: HOME | End: 2022-04-15

## 2022-04-15 LAB
ACINETOBACTER CALCOAC BAUMANNII COMPLEX BY PCR: NOT DETECTED
ALBUMIN SERPL-MCNC: 3 G/DL (ref 3.5–5.2)
ALP BLD-CCNC: 159 U/L (ref 40–129)
ALT SERPL-CCNC: 42 U/L (ref 0–40)
ANION GAP SERPL CALCULATED.3IONS-SCNC: 10 MMOL/L (ref 7–16)
ANISOCYTOSIS: ABNORMAL
AST SERPL-CCNC: 35 U/L (ref 0–39)
BACTEROIDES FRAGILIS BY PCR: NOT DETECTED
BASOPHILS ABSOLUTE: 0 E9/L (ref 0–0.2)
BASOPHILS RELATIVE PERCENT: 0.4 % (ref 0–2)
BILIRUB SERPL-MCNC: 0.5 MG/DL (ref 0–1.2)
BOTTLE TYPE: ABNORMAL
BUN BLDV-MCNC: 11 MG/DL (ref 6–20)
CALCIUM SERPL-MCNC: 8.5 MG/DL (ref 8.6–10.2)
CANDIDA ALBICANS BY PCR: NOT DETECTED
CANDIDA AURIS BY PCR: NOT DETECTED
CANDIDA GLABRATA BY PCR: NOT DETECTED
CANDIDA KRUSEI BY PCR: NOT DETECTED
CANDIDA PARAPSILOSIS BY PCR: NOT DETECTED
CANDIDA TROPICALIS BY PCR: NOT DETECTED
CHLORIDE BLD-SCNC: 95 MMOL/L (ref 98–107)
CO2: 26 MMOL/L (ref 22–29)
CREAT SERPL-MCNC: 0.7 MG/DL (ref 0.7–1.2)
CRYPTOCOCCUS NEOFORMANS/GATTII BY PCR: NOT DETECTED
DOHLE BODIES: ABNORMAL
ENTEROBACTER CLOACAE COMPLEX BY PCR: NOT DETECTED
ENTEROBACTERALES BY PCR: NOT DETECTED
ENTEROCOCCUS FAECALIS BY PCR: NOT DETECTED
ENTEROCOCCUS FAECIUM BY PCR: NOT DETECTED
EOSINOPHILS ABSOLUTE: 0.25 E9/L (ref 0.05–0.5)
EOSINOPHILS RELATIVE PERCENT: 1.7 % (ref 0–6)
ESCHERICHIA COLI BY PCR: NOT DETECTED
GFR AFRICAN AMERICAN: >60
GFR NON-AFRICAN AMERICAN: >60 ML/MIN/1.73
GLUCOSE BLD-MCNC: 179 MG/DL (ref 74–99)
HAEMOPHILUS INFLUENZAE BY PCR: NOT DETECTED
HCT VFR BLD CALC: 36.6 % (ref 37–54)
HEMOGLOBIN: 11.9 G/DL (ref 12.5–16.5)
KLEBSIELLA AEROGENES BY PCR: NOT DETECTED
KLEBSIELLA OXYTOCA BY PCR: NOT DETECTED
KLEBSIELLA PNEUMONIAE GROUP BY PCR: NOT DETECTED
LISTERIA MONOCYTOGENES BY PCR: NOT DETECTED
LYMPHOCYTES ABSOLUTE: 0.44 E9/L (ref 1.5–4)
LYMPHOCYTES RELATIVE PERCENT: 2.6 % (ref 20–42)
MAGNESIUM: 2.4 MG/DL (ref 1.6–2.6)
MCH RBC QN AUTO: 27.3 PG (ref 26–35)
MCHC RBC AUTO-ENTMCNC: 32.5 % (ref 32–34.5)
MCV RBC AUTO: 83.9 FL (ref 80–99.9)
METAMYELOCYTES RELATIVE PERCENT: 3.4 % (ref 0–1)
METHICILLIN RESISTANCE MECA/C  BY PCR: NOT DETECTED
MONOCYTES ABSOLUTE: 1.16 E9/L (ref 0.1–0.95)
MONOCYTES RELATIVE PERCENT: 7.8 % (ref 2–12)
MYELOCYTE PERCENT: 1.7 % (ref 0–0)
NEISSERIA MENINGITIDIS BY PCR: NOT DETECTED
NEUTROPHILS ABSOLUTE: 12.76 E9/L (ref 1.8–7.3)
NEUTROPHILS RELATIVE PERCENT: 82.8 % (ref 43–80)
ORDER NUMBER: ABNORMAL
OVALOCYTES: ABNORMAL
PDW BLD-RTO: 20.1 FL (ref 11.5–15)
PHOSPHORUS: 3.2 MG/DL (ref 2.5–4.5)
PLATELET # BLD: 195 E9/L (ref 130–450)
PMV BLD AUTO: 9.3 FL (ref 7–12)
POIKILOCYTES: ABNORMAL
POLYCHROMASIA: ABNORMAL
POTASSIUM SERPL-SCNC: 4.6 MMOL/L (ref 3.5–5)
PROTEUS SPECIES BY PCR: NOT DETECTED
PSEUDOMONAS AERUGINOSA BY PCR: NOT DETECTED
RBC # BLD: 4.36 E12/L (ref 3.8–5.8)
SALMONELLA SPECIES BY PCR: NOT DETECTED
SERRATIA MARCESCENS BY PCR: NOT DETECTED
SODIUM BLD-SCNC: 131 MMOL/L (ref 132–146)
SOURCE OF BLOOD CULTURE: ABNORMAL
STAPHYLOCOCCUS AUREUS BY PCR: NOT DETECTED
STAPHYLOCOCCUS EPIDERMIDIS BY PCR: DETECTED
STAPHYLOCOCCUS LUGDUNENSIS BY PCR: NOT DETECTED
STAPHYLOCOCCUS SPECIES BY PCR: DETECTED
STENOTROPHOMONAS MALTOPHILIA BY PCR: NOT DETECTED
STREPTOCOCCUS AGALACTIAE BY PCR: NOT DETECTED
STREPTOCOCCUS PNEUMONIAE BY PCR: NOT DETECTED
STREPTOCOCCUS PYOGENES  BY PCR: NOT DETECTED
STREPTOCOCCUS SPECIES BY PCR: NOT DETECTED
TARGET CELLS: ABNORMAL
TEAR DROP CELLS: ABNORMAL
TOTAL PROTEIN: 6.8 G/DL (ref 6.4–8.3)
TOXIC GRANULATION: ABNORMAL
VACUOLATED NEUTROPHILS: ABNORMAL
WBC # BLD: 14.5 E9/L (ref 4.5–11.5)

## 2022-04-15 PROCEDURE — 6370000000 HC RX 637 (ALT 250 FOR IP): Performed by: INTERNAL MEDICINE

## 2022-04-15 PROCEDURE — 83735 ASSAY OF MAGNESIUM: CPT

## 2022-04-15 PROCEDURE — 6360000002 HC RX W HCPCS: Performed by: NURSE PRACTITIONER

## 2022-04-15 PROCEDURE — 6360000002 HC RX W HCPCS

## 2022-04-15 PROCEDURE — 36415 COLL VENOUS BLD VENIPUNCTURE: CPT

## 2022-04-15 PROCEDURE — 84100 ASSAY OF PHOSPHORUS: CPT

## 2022-04-15 PROCEDURE — 2580000003 HC RX 258: Performed by: INTERNAL MEDICINE

## 2022-04-15 PROCEDURE — 6360000002 HC RX W HCPCS: Performed by: INTERNAL MEDICINE

## 2022-04-15 PROCEDURE — 80053 COMPREHEN METABOLIC PANEL: CPT

## 2022-04-15 PROCEDURE — 2580000003 HC RX 258

## 2022-04-15 PROCEDURE — 2500000003 HC RX 250 WO HCPCS: Performed by: NURSE PRACTITIONER

## 2022-04-15 PROCEDURE — 85025 COMPLETE CBC W/AUTO DIFF WBC: CPT

## 2022-04-15 PROCEDURE — A4216 STERILE WATER/SALINE, 10 ML: HCPCS

## 2022-04-15 PROCEDURE — 6360000002 HC RX W HCPCS: Performed by: STUDENT IN AN ORGANIZED HEALTH CARE EDUCATION/TRAINING PROGRAM

## 2022-04-15 PROCEDURE — 94003 VENT MGMT INPAT SUBQ DAY: CPT

## 2022-04-15 PROCEDURE — 2580000003 HC RX 258: Performed by: STUDENT IN AN ORGANIZED HEALTH CARE EDUCATION/TRAINING PROGRAM

## 2022-04-15 PROCEDURE — 99232 SBSQ HOSP IP/OBS MODERATE 35: CPT | Performed by: INTERNAL MEDICINE

## 2022-04-15 PROCEDURE — 94640 AIRWAY INHALATION TREATMENT: CPT

## 2022-04-15 PROCEDURE — 6370000000 HC RX 637 (ALT 250 FOR IP): Performed by: STUDENT IN AN ORGANIZED HEALTH CARE EDUCATION/TRAINING PROGRAM

## 2022-04-15 PROCEDURE — 99233 SBSQ HOSP IP/OBS HIGH 50: CPT | Performed by: INTERNAL MEDICINE

## 2022-04-15 PROCEDURE — 36592 COLLECT BLOOD FROM PICC: CPT

## 2022-04-15 PROCEDURE — 2500000003 HC RX 250 WO HCPCS: Performed by: INTERNAL MEDICINE

## 2022-04-15 PROCEDURE — C9113 INJ PANTOPRAZOLE SODIUM, VIA: HCPCS

## 2022-04-15 PROCEDURE — 2000000000 HC ICU R&B

## 2022-04-15 RX ORDER — MEPERIDINE HYDROCHLORIDE 25 MG/ML
50 INJECTION INTRAMUSCULAR; INTRAVENOUS; SUBCUTANEOUS ONCE
Status: COMPLETED | OUTPATIENT
Start: 2022-04-15 | End: 2022-04-15

## 2022-04-15 RX ORDER — IPRATROPIUM BROMIDE AND ALBUTEROL SULFATE 2.5; .5 MG/3ML; MG/3ML
1 SOLUTION RESPIRATORY (INHALATION)
Status: DISCONTINUED | OUTPATIENT
Start: 2022-04-15 | End: 2022-04-18

## 2022-04-15 RX ORDER — DIAZEPAM 5 MG/1
10 TABLET ORAL EVERY 6 HOURS
Status: DISCONTINUED | OUTPATIENT
Start: 2022-04-15 | End: 2022-04-16

## 2022-04-15 RX ORDER — PHENOBARBITAL SODIUM 65 MG/ML
130 INJECTION INTRAMUSCULAR EVERY 8 HOURS SCHEDULED
Status: DISCONTINUED | OUTPATIENT
Start: 2022-04-15 | End: 2022-04-16

## 2022-04-15 RX ORDER — METHYLPREDNISOLONE SODIUM SUCCINATE 40 MG/ML
40 INJECTION, POWDER, LYOPHILIZED, FOR SOLUTION INTRAMUSCULAR; INTRAVENOUS EVERY 12 HOURS
Status: DISCONTINUED | OUTPATIENT
Start: 2022-04-15 | End: 2022-04-18

## 2022-04-15 RX ORDER — ACETYLCYSTEINE 100 MG/ML
4 SOLUTION ORAL; RESPIRATORY (INHALATION) EVERY 4 HOURS
Status: DISCONTINUED | OUTPATIENT
Start: 2022-04-15 | End: 2022-04-20

## 2022-04-15 RX ORDER — METOPROLOL TARTRATE 5 MG/5ML
5 INJECTION INTRAVENOUS EVERY 6 HOURS PRN
Status: DISCONTINUED | OUTPATIENT
Start: 2022-04-15 | End: 2022-04-18

## 2022-04-15 RX ORDER — METOPROLOL TARTRATE 50 MG/1
50 TABLET, FILM COATED ORAL 2 TIMES DAILY
Status: DISCONTINUED | OUTPATIENT
Start: 2022-04-15 | End: 2022-04-18

## 2022-04-15 RX ORDER — LABETALOL HYDROCHLORIDE 5 MG/ML
10 INJECTION, SOLUTION INTRAVENOUS ONCE
Status: COMPLETED | OUTPATIENT
Start: 2022-04-15 | End: 2022-04-15

## 2022-04-15 RX ADMIN — Medication 10 ML: at 20:36

## 2022-04-15 RX ADMIN — ACETYLCYSTEINE 400 MG: 100 INHALANT RESPIRATORY (INHALATION) at 20:04

## 2022-04-15 RX ADMIN — VALPROIC ACID 750 MG: 250 SOLUTION ORAL at 05:24

## 2022-04-15 RX ADMIN — THIAMINE HYDROCHLORIDE 100 MG: 100 INJECTION, SOLUTION INTRAMUSCULAR; INTRAVENOUS at 08:58

## 2022-04-15 RX ADMIN — VALPROIC ACID 750 MG: 250 SOLUTION ORAL at 00:03

## 2022-04-15 RX ADMIN — ACETYLCYSTEINE 400 MG: 100 INHALANT RESPIRATORY (INHALATION) at 16:22

## 2022-04-15 RX ADMIN — PROPOFOL 50 MCG/KG/MIN: 10 INJECTION, EMULSION INTRAVENOUS at 22:41

## 2022-04-15 RX ADMIN — PROPOFOL 50 MCG/KG/MIN: 10 INJECTION, EMULSION INTRAVENOUS at 20:05

## 2022-04-15 RX ADMIN — ASPIRIN 81 MG 81 MG: 81 TABLET ORAL at 08:58

## 2022-04-15 RX ADMIN — IPRATROPIUM BROMIDE AND ALBUTEROL SULFATE 1 AMPULE: .5; 2.5 SOLUTION RESPIRATORY (INHALATION) at 20:04

## 2022-04-15 RX ADMIN — DIAZEPAM 10 MG: 5 TABLET ORAL at 23:21

## 2022-04-15 RX ADMIN — PROPOFOL 50 MCG/KG/MIN: 10 INJECTION, EMULSION INTRAVENOUS at 17:27

## 2022-04-15 RX ADMIN — DIAZEPAM 10 MG: 5 TABLET ORAL at 17:47

## 2022-04-15 RX ADMIN — HYDRALAZINE HYDROCHLORIDE 10 MG: 20 INJECTION INTRAMUSCULAR; INTRAVENOUS at 03:21

## 2022-04-15 RX ADMIN — VALPROIC ACID 750 MG: 250 SOLUTION ORAL at 12:06

## 2022-04-15 RX ADMIN — VALPROIC ACID 750 MG: 250 SOLUTION ORAL at 18:53

## 2022-04-15 RX ADMIN — PROPOFOL 50 MCG/KG/MIN: 10 INJECTION, EMULSION INTRAVENOUS at 13:57

## 2022-04-15 RX ADMIN — METOPROLOL TARTRATE 25 MG: 25 TABLET, FILM COATED ORAL at 08:57

## 2022-04-15 RX ADMIN — Medication 10 ML: at 09:00

## 2022-04-15 RX ADMIN — VANCOMYCIN HYDROCHLORIDE 1500 MG: 5 INJECTION, POWDER, LYOPHILIZED, FOR SOLUTION INTRAVENOUS at 12:08

## 2022-04-15 RX ADMIN — IPRATROPIUM BROMIDE AND ALBUTEROL SULFATE 1 AMPULE: .5; 2.5 SOLUTION RESPIRATORY (INHALATION) at 12:29

## 2022-04-15 RX ADMIN — CEFEPIME HYDROCHLORIDE 2000 MG: 2 INJECTION, POWDER, FOR SOLUTION INTRAVENOUS at 18:57

## 2022-04-15 RX ADMIN — ACETAMINOPHEN 650 MG: 325 TABLET ORAL at 04:32

## 2022-04-15 RX ADMIN — PHENOBARBITAL SODIUM 130 MG: 65 INJECTION INTRAMUSCULAR; INTRAVENOUS at 14:48

## 2022-04-15 RX ADMIN — GABAPENTIN 800 MG: 400 CAPSULE ORAL at 14:48

## 2022-04-15 RX ADMIN — ROSUVASTATIN CALCIUM 10 MG: 10 TABLET, FILM COATED ORAL at 08:59

## 2022-04-15 RX ADMIN — PROPOFOL 50 MCG/KG/MIN: 10 INJECTION, EMULSION INTRAVENOUS at 10:51

## 2022-04-15 RX ADMIN — MULTIPLE VITAMINS W/ MINERALS TAB 1 TABLET: TAB at 08:58

## 2022-04-15 RX ADMIN — MEPERIDINE HYDROCHLORIDE 50 MG: 25 INJECTION INTRAMUSCULAR; INTRAVENOUS; SUBCUTANEOUS at 09:49

## 2022-04-15 RX ADMIN — ACETYLCYSTEINE 400 MG: 100 INHALANT RESPIRATORY (INHALATION) at 12:30

## 2022-04-15 RX ADMIN — GABAPENTIN 800 MG: 400 CAPSULE ORAL at 08:58

## 2022-04-15 RX ADMIN — CEFEPIME HYDROCHLORIDE 2000 MG: 2 INJECTION, POWDER, FOR SOLUTION INTRAVENOUS at 03:23

## 2022-04-15 RX ADMIN — PHENOBARBITAL SODIUM 130 MG: 65 INJECTION INTRAMUSCULAR; INTRAVENOUS at 21:59

## 2022-04-15 RX ADMIN — METHYLPREDNISOLONE SODIUM SUCCINATE 40 MG: 40 INJECTION, POWDER, FOR SOLUTION INTRAMUSCULAR; INTRAVENOUS at 21:58

## 2022-04-15 RX ADMIN — ENOXAPARIN SODIUM 30 MG: 100 INJECTION SUBCUTANEOUS at 20:27

## 2022-04-15 RX ADMIN — METOPROLOL TARTRATE 50 MG: 50 TABLET, FILM COATED ORAL at 20:28

## 2022-04-15 RX ADMIN — IPRATROPIUM BROMIDE AND ALBUTEROL SULFATE 1 AMPULE: .5; 2.5 SOLUTION RESPIRATORY (INHALATION) at 16:22

## 2022-04-15 RX ADMIN — VANCOMYCIN HYDROCHLORIDE 1500 MG: 5 INJECTION, POWDER, LYOPHILIZED, FOR SOLUTION INTRAVENOUS at 23:36

## 2022-04-15 RX ADMIN — GABAPENTIN 800 MG: 400 CAPSULE ORAL at 20:27

## 2022-04-15 RX ADMIN — VALPROIC ACID 750 MG: 250 SOLUTION ORAL at 23:21

## 2022-04-15 RX ADMIN — CEFEPIME HYDROCHLORIDE 2000 MG: 2 INJECTION, POWDER, FOR SOLUTION INTRAVENOUS at 10:33

## 2022-04-15 RX ADMIN — SODIUM CHLORIDE: 9 INJECTION, SOLUTION INTRAVENOUS at 23:36

## 2022-04-15 RX ADMIN — PHENOBARBITAL SODIUM 60 MG: 65 INJECTION INTRAMUSCULAR at 05:24

## 2022-04-15 RX ADMIN — Medication 10 ML: at 20:37

## 2022-04-15 RX ADMIN — METHYLPREDNISOLONE SODIUM SUCCINATE 40 MG: 40 INJECTION, POWDER, FOR SOLUTION INTRAMUSCULAR; INTRAVENOUS at 10:17

## 2022-04-15 RX ADMIN — SODIUM CHLORIDE 40 MG: 9 INJECTION, SOLUTION INTRAMUSCULAR; INTRAVENOUS; SUBCUTANEOUS at 08:57

## 2022-04-15 RX ADMIN — KETAMINE HYDROCHLORIDE 0.95 MG/KG/HR: 100 INJECTION INTRAMUSCULAR; INTRAVENOUS at 10:23

## 2022-04-15 RX ADMIN — DIAZEPAM 10 MG: 5 TABLET ORAL at 10:35

## 2022-04-15 RX ADMIN — PROPOFOL 50 MCG/KG/MIN: 10 INJECTION, EMULSION INTRAVENOUS at 07:32

## 2022-04-15 RX ADMIN — DIAZEPAM 5 MG: 5 TABLET ORAL at 05:25

## 2022-04-15 RX ADMIN — PROPOFOL 50 MCG/KG/MIN: 10 INJECTION, EMULSION INTRAVENOUS at 04:34

## 2022-04-15 RX ADMIN — PROPOFOL 50 MCG/KG/MIN: 10 INJECTION, EMULSION INTRAVENOUS at 02:34

## 2022-04-15 RX ADMIN — LABETALOL HYDROCHLORIDE 10 MG: 5 INJECTION INTRAVENOUS at 06:14

## 2022-04-15 RX ADMIN — ENOXAPARIN SODIUM 30 MG: 100 INJECTION SUBCUTANEOUS at 08:59

## 2022-04-15 RX ADMIN — LOSARTAN POTASSIUM 50 MG: 50 TABLET, FILM COATED ORAL at 10:17

## 2022-04-15 RX ADMIN — FOLIC ACID 1 MG: 1 TABLET ORAL at 08:58

## 2022-04-15 ASSESSMENT — PULMONARY FUNCTION TESTS
PIF_VALUE: 31
PIF_VALUE: 21
PIF_VALUE: 21
PIF_VALUE: 22
PIF_VALUE: 17
PIF_VALUE: 21
PIF_VALUE: 35
PIF_VALUE: 29
PIF_VALUE: 25
PIF_VALUE: 21
PIF_VALUE: 20
PIF_VALUE: 27
PIF_VALUE: 30
PIF_VALUE: 25
PIF_VALUE: 32
PIF_VALUE: 34
PIF_VALUE: 26
PIF_VALUE: 31
PIF_VALUE: 28

## 2022-04-15 NOTE — PROGRESS NOTES
[facility]) Physicians        CARDIOLOGY                 INPATIENT PROGRESS NOTE          PATIENT SEEN IN FOLLOW UP FOR: Chest pain, CHF    Hospital Day: 7     Hakeem Montgomery is a 64year old patient known to Dr. Catalina Quezada from initial consult      SUBJECTIVE:  On Vent, due to alcohol withdrawal and to protect airway     ROS: Review of rest of 10 systems limited since Pt on Vent and sedated. OBJECTIVE: Pt on Vent and sedated. Diagnostics:       Telemetry:  Reviewed     Echo 4/11/22   Summary   Technically sub-optimal images - Definity Echo contrast used. Normal left ventricular chamber size. Normal left ventricular systolic function, LVEF 99-75%. Mild left ventricular concentric hypertrophy noted. Normal diastolic function. Interatrial septum not well visualized but appears intact. Normal right ventricle size and function, TAPSE 2.3cm. Valves not well visualized. No hemodynamically significant aortic stenosis. Normal aortic root size. No evidence of pericardial effusion. No intra cardiac mass or thrombus. No comparison study available. Intake/Output Summary (Last 24 hours) at 4/15/2022 1003  Last data filed at 4/15/2022 1828  Gross per 24 hour   Intake 2463.03 ml   Output 2275 ml   Net 188.03 ml       Labs:   CBC:   Recent Labs     04/14/22  0548 04/15/22  0445   WBC 13.5* 14.5*   HGB 11.0* 11.9*   HCT 35.5* 36.6*    195     BMP:   Recent Labs     04/14/22  0548 04/15/22  0445    131*   K 4.3 4.6   CO2 26 26   BUN 20 11   CREATININE 1.0 0.7   LABGLOM >60 >60   CALCIUM 8.7 8.5*     Mag:   Recent Labs     04/14/22  0548 04/15/22  0445   MG 2.4 2.4     Phos:   Recent Labs     04/14/22  0548 04/15/22  0445   PHOS 4.7* 3.2     TSH:   No results for input(s): TSH in the last 72 hours. HgA1c:     BNP: No results for input(s): BNP in the last 72 hours. PT/INR: No results for input(s): PROTIME, INR in the last 72 hours. APTT:No results for input(s):  APTT in the last 72 hours. CARDIAC ENZYMES:No results for input(s): CKTOTAL, CKMB, CKMBINDEX, TROPONINI in the last 72 hours. FASTING LIPID PANEL:  Lab Results   Component Value Date    CHOL 213 2015    HDL 41 2015    [address] - 2015    TRIG 88 2022     LIVER PROFILE:  Recent Labs     22  0548 04/15/22  0445   AST 27 35   ALT 45* 42*   LABALBU 3.1* 3.0*       Current Inpatient Medications:   cefepime  2,000 mg IntraVENous Q8H    cloNIDine  1 patch TransDERmal Weekly    diazePAM  5 mg Oral Q6H    gabapentin  800 mg Oral TID    folic acid  1 mg Oral Daily    valproic acid  750 mg Per NG tube 4 times per day    metoprolol tartrate  25 mg Oral BID    thiamine  100 mg IntraVENous Daily    sodium chloride flush  5-40 mL IntraVENous 2 times per day    multivitamin  1 tablet Oral Daily    pantoprazole (PROTONIX) 40 mg injection  40 mg IntraVENous Daily    PHENobarbital  60 mg IntraVENous 3 times per day    losartan  50 mg Oral Daily    rosuvastatin  10 mg Oral Daily    sodium chloride flush  5-40 mL IntraVENous 2 times per day    enoxaparin  30 mg SubCUTAneous BID    aspirin  81 mg Oral Daily    nicotine  1 patch TransDERmal Daily       IV Infusions (if any):   ketamine (KETALAR) 5 mg/mL infusion for analgosedation 0.9 mg/kg/hr (04/15/22 0910)    sodium chloride Stopped (22 1131)    propofol 50 mcg/kg/min (04/15/22 0732)    sodium chloride           PHYSICAL EXAM:     CONSTITUTIONAL:   BP (!) 178/80   Pulse 86   Temp 99.8 °F (37.7 °C) (Bladder)   Resp 18   Ht 6' (1.829 m)   Wt 280 lb 8 oz (127.2 kg)   SpO2 98%   BMI 38.04 kg/m²   Pulse  Av.4  Min: 76  Max: 925  Systolic (63HZM), DLM:434 , Min:119 , BSL:004    Diastolic (66SZR), WWF:19, Min:51, Max:125    In general, this is a well developed, well nourished who appears stated age. Pt on vent and sedated. HEENT: Pt on vent and sedated. Neck-  no stridor, no noted enlargement of the thyroid, no carotid bruit. no jugular venous distention   RESPIRATORY: Chest symmetrical.  No accessory muscle use. Lung auscultation - few rhonchi  CARDIOVASCULAR:     Heart Ausculation - Regular rate and rhythm, 2/6 systolic murmur, No s3 or rub.   + lower extremity edema,  Distal pulses palpable, no clubbing  ABDOMEN: Bowel sounds present. No Palpable masses. MS: Pt on vent and sedated. : Deferred  Rectal Exam: Deferred  SKIN: warm and dry   NEURO / PSYCH: Pt on vent and sedated. ASSESSMENT / PLAN:    Exertional chest pain - Likely angina (CP with diaphoresis and dyspnea on exertion). MI ruled out; Continue ASA, Nitrates, BB. R/B/A discussed by Dr Lee Gama, he was agreeable for [facility]. Indication: 3, Score: 8. Cancel Heart cath for now, Left Heart Cath when stable. Essential HTN - Increase Metorpolol 50 BID, monitor BP, HR    Dyslipidemia  - on Statin    Tobacco abuse - Will be counseled to quit smoking    Chronic HFpEF - Diuretics as needed    Probable KULDIP - Recommend Out-Pt sleep study    Non-morbid Obesity, BMI 38 - Diet, exercise and weight loss will be discussed.     Hx of Marijuana and ETOH use - Will be counseled to quit    Chronic back pain - on Oxycodone as an outpatient    Family Hx of CAD    Anemia, Chronic - Monitor H/H    History of elevated LFT's in 3/2022 - Monitor LFTs           No family at bed side      Electronically signed by Venancio Olivares MD on 4/15/2022 at 10:03 AM  [facility]) Cardiology

## 2022-04-15 NOTE — PROGRESS NOTES
Pharmacy Consultation Note  (Antibiotic Dosing and Monitoring)    Initial consult date: 4/15/2022  Consulting physician/provider: Dr. Nabila hPoenix  Drug: Vancomycin  Indication: Sepsis    Age/  Gender Height Weight IBW  Allergy Information   56 y.o./male 6' (182.9 cm) 300 lb (136.1 kg)     Ideal body weight: 77.6 kg (171 lb 1.2 oz)  Adjusted ideal body weight: 97.5 kg (214 lb 13.5 oz)   Pcn [penicillins]      Renal Function:  Recent Labs     04/13/22  0504 04/14/22  0548 04/15/22  0445   BUN 15 20 11   CREATININE 0.8 1.0 0.7       Intake/Output Summary (Last 24 hours) at 4/15/2022 1058  Last data filed at 4/15/2022 6734  Gross per 24 hour   Intake 2437.66 ml   Output 2275 ml   Net 162.66 ml       Vancomycin Monitoring:  Trough:  No results for input(s): VANCOTROUGH in the last 72 hours. Random:  No results for input(s): VANCORANDOM in the last 72 hours. Vancomycin Administration Times:  Recent vancomycin administrations      No vancomycin IV orders with administrations found. Orders not given:          vancomycin (VANCOCIN) 1,500 mg in dextrose 5 % 300 mL IVPB              Assessment:  · Patient is a 64 y.o. male who has been initiated on vancomycin for sepsis. Pt with 1/2 positive blood cultures for GPC in clusters. Additionally, patient currently intubated with increasing sputum production, temperature, and leukocytosis. · Estimated Creatinine Clearance: 162 mL/min (based on SCr of 0.7 mg/dL).   · To dose vancomycin, pharmacy will be utilizing [employer] calculation software for goal AUC/ANAHI 400-600 mg/L-hr    Plan:  · Start vancomycin 1500 mg IV every 12 hours (est AUC/ANAHI 539 mg/L.hr)  · Will check vancomycin levels when appropriate  · Will continue to monitor renal function   · Clinical pharmacy to follow    Gavin Mary PharmD, BCPS 4/15/2022 10:58 AM   Ext: [phone]

## 2022-04-15 NOTE — PLAN OF CARE
Problem: Non-Violent Restraints  Goal: Removal from restraints as soon as assessed to be safe  4/15/2022 1900 by Felipe Ellis RN  Outcome: Not Met This Shift     Problem: Non-Violent Restraints  Goal: No harm/injury to patient while restraints in use  4/15/2022 1900 by Felipe Ellis RN  Outcome: Met This Shift     Problem: Non-Violent Restraints  Goal: Patient's dignity will be maintained  4/15/2022 1900 by Felipe Ellis RN  Outcome: Met This Shift

## 2022-04-15 NOTE — CARE COORDINATION
4-15-Cm note: ( covid neg 4-10) pt remains in ICU on vent with sedation, pt has not been able to tolerate weaning from sedation,  may need to consider LTAC if unable to be weaned off vent. No family at bedside during my visit. CM/SS will follow for dc planning. If vent weaning is successful PT/OT would be needed to assess needs.  Electronically signed by Axel Noel RN on 4/15/2022 at 3:48 PM

## 2022-04-15 NOTE — PROGRESS NOTES
[address] [place], [place] [address]ist   ICU Progress Note    Admitting Date and Time: 4/9/2022  4:02 PM  Admit Dx: Hyponatremia [E87.1]  COPD exacerbation ([address] [J44.1]  Acute chest pain [R07.9]  Chest pain, unspecified type [R07.9]  Chest pain [R07.9]    Subjective:    4/13: Pt intubated and sedated currently    4/14: Pt intubated and sedated. 4/15: Pt remains in intubated and sedated. Per RN: Patient was squirming all over the bed. Phenobarb and valium were increased. Blood cultures gram positive cocci in clusters and Vanco was started.     ROS: unable to assess due to being intubated and sedated     diazePAM  10 mg Oral Q6H    PHENobarbital  130 mg IntraVENous 3 times per day    methylPREDNISolone  40 mg IntraVENous Q12H    acetylcysteine  4 mL Inhalation Q4H    ipratropium-albuterol  1 ampule Inhalation Q4H WA    metoprolol tartrate  50 mg Oral BID    vancomycin  1,500 mg IntraVENous Q12H    cefepime  2,000 mg IntraVENous Q8H    cloNIDine  1 patch TransDERmal Weekly    gabapentin  800 mg Oral TID    folic acid  1 mg Oral Daily    valproic acid  750 mg Per NG tube 4 times per day    thiamine  100 mg IntraVENous Daily    sodium chloride flush  5-40 mL IntraVENous 2 times per day    multivitamin  1 tablet Oral Daily    pantoprazole (PROTONIX) 40 mg injection  40 mg IntraVENous Daily    losartan  50 mg Oral Daily    rosuvastatin  10 mg Oral Daily    sodium chloride flush  5-40 mL IntraVENous 2 times per day    enoxaparin  30 mg SubCUTAneous BID    aspirin  81 mg Oral Daily    nicotine  1 patch TransDERmal Daily     metoprolol, 5 mg, Q6H PRN  hydrALAZINE, 10 mg, Q6H PRN  ipratropium-albuterol, 1 ampule, Q4H PRN  sodium chloride flush, 5-40 mL, PRN  sodium chloride, , PRN  sodium chloride flush, 5-40 mL, PRN  sodium chloride, , PRN  ondansetron, 4 mg, Q8H PRN   Or  ondansetron, 4 mg, Q6H PRN  polyethylene glycol, 17 g, Daily PRN  acetaminophen, 650 mg, Q6H PRN   Or  acetaminophen, 650 mg, Q6H PRN         Objective:    BP (!) 178/80   Pulse 79   Temp 99.9 °F (37.7 °C) (Bladder)   Resp 16   Ht 6' (1.829 m)   Wt 280 lb 8 oz (127.2 kg)   SpO2 99%   BMI 38.04 kg/m²    General Appearance: intubated, sedated, obese  Skin: warm,  Head: normocephalic and atraumatic  Eyes: pupils equal, round, and reactive to light, extraocular eye movements intact, conjunctivae normal  Neck: R IJ TLC. Pulmonary/Chest:mild wheezing throughout, normal air movement, no respiratory distress  Cardiovascular: normal rate, normal S1 and S2 and no carotid bruits  Abdomen: soft, non-tender, non-distended, normal bowel sounds, no masses or organomegaly  Extremities: no cyanosis, no clubbing and no edema  Neurologic: sedated, no apparent focal deficit      Recent Labs     04/13/22  0504 04/14/22  0548 04/15/22  0445    134 131*   K 4.5 4.3 4.6   CL 99 96* 95*   CO2 29 26 26   BUN 15 20 11   CREATININE 0.8 1.0 0.7   GLUCOSE 134* 158* 179*   CALCIUM 8.9 8.7 8.5*       Recent Labs     04/13/22  0504 04/14/22  0548 04/15/22  0445   ALKPHOS 87 110 159*   PROT 7.0 6.6 6.8   LABALBU 3.5 3.1* 3.0*   BILITOT 0.3 0.4 0.5   AST 20 27 35   ALT 41* 45* 42*       Recent Labs     04/13/22  0504 04/14/22  0548 04/15/22  0445   WBC 8.5 13.5* 14.5*   RBC 4.04 4.18 4.36   HGB 10.5* 11.0* 11.9*   HCT 33.7* 35.5* 36.6*   MCV 83.4 84.9 83.9   MCH 26.0 26.3 27.3   MCHC 31.2* 31.0* 32.5   RDW 20.1* 20.5* 20.1*    204 195   MPV 9.1 9.3 9.3     STREP PNEUMONIAE ANTIGEN [[id number]] Collected: 04/09/22 2033   Order Status: Completed Specimen: Urine, clean catch Updated: 04/11/22 1129    STREP PNEUMONIAE ANTIGEN, URINE --    Presumptive negative- suggests no current or recent   pneumococcal infection.    Infection due to Strep pneumoniae cannot be   ruled out since the antigen present in the sample   may be below the detection limit of the test.   Normal Range:Presumptive Negative    Narrative:     Source: URINE       Site:           LEGIONELLA ANTIGEN, URINE [[id number]] Collected: 04/09/22 2034   Order Status: Completed Specimen: Urine voided Updated: 04/11/22 1127    L. pneumophila Serogp 1 Ur Ag --    Presumptive Negative -suggesting no recent or current infections   with Legionella pneumophila serogroup 1. Infection to Legionella cannot be ruled out since other serogroups   and species may cause infection, antigen may not be present in   early infection, or level of antigen may be below the   detection limit. Normal Range: Presumptive Negative    Narrative:     Source: URV       Site: Urine Clean Catch             COVID-19 & Influenza Combo [[id number]] Collected: 04/10/22 0843   Order Status: Completed Specimen: Nasopharyngeal Swab Updated: 04/10/22 0944    SARS-CoV-2 RNA, RT PCR NOT DETECTED    Comment: Not Detected results do not preclude SARS-CoV-2 infection and   should not be used as the sole basis for patient management   decisions.  Results must be combined with clinical observations,   patient history, and epidemiological information. Testing was performed using [employer] SARS-CoV-2 and Influenza A/B   nucleic acid assay. This test is a multiplex Real-Time Reverse   Transcriptase Polymerase Chain Reaction (RT-PCR)-based in vitro   diagnostic test intended for the qualitative detection of nucleic   acids from SARS-CoV-2, influenza A, and influenza B in nasopharyngeal   and nasal swab specimens for use under the FDAs Emergency Use   Authorization (EUA) only.      Patient Fact Sheet: [employer].   Provider Fact Sheet: Gila.   EUA: Gila.   IFU: Gila.     Methodology:  RT-PCR        INFLUENZA A NOT DETECTED    INFLUENZA B NOT DETECTED     Culture, Blood 2 [[id number]] (Abnormal) Collected: 04/14/22 0322   Order Status: Completed Specimen: Blood Updated: 04/15/22 1046    Culture, Blood 2 -- Abnormal     Gram stain performed from blood culture bottle media   Gram positive cocci in clusters    Abnormal    Narrative:     Zully Caul [id number],   Microbiology results called to and read back by Brian Ha RN, 04/15/2022   10:46, by [facility]   Culture, Blood 1 [[id number]] Collected: 04/14/22 0322   Order Status: Completed Specimen: Blood Updated: 04/15/22 0935    Blood Culture, Routine 24 Hours no growth   Narrative:     Source: BLOOD       Site: Hand, Left          Radiology:   XR CHEST PORTABLE   Final Result   New atelectasis and or infiltrate at the right base. XR ABDOMEN FOR NG/OG/NE TUBE PLACEMENT   Final Result   Nasogastric tube with the tip in the proximal gastric body. XR CHEST PORTABLE   Final Result   Adequately positioned endotracheal tube, orogastric tube and right internal   jugular central venous line. XR CHEST PORTABLE   Final Result   No pneumonia or pleural effusion. Assessment:  Principal Problem:    Acute chest pain  Active Problems:    Chest pain  Resolved Problems:    * No resolved hospital problems. *      Plan:    1. Chest pain rule out ACS  -Chest pain/pressure with SOB and diaphoresis and nausea  -Troponin negative x 2, EKG no ST changes  -Patient has SYL score 3, HEART score 5   -Multiple risk factors include HTN, HLD, obesity, smoker, family history (brother)  -Cardio consulted, they were planning for cath but not now that is intubated and in acute alcohol withdrawal cath will be pushed back per cardio. -Monitor on telemetry     2. Probable COPD exacerbation  -No formal diagnosis of COPD but was given albuterol PRN by PCP  -Wheezing on exam and patient is active smoker, currently intubated for airway protection  -Given 125mg solumedrol in ER and had two days of 40mg BID, now stopped per ICU  -Nebulizers; budesonide stopped after Sunday AM dose     3.  Severe alcohol withdrawal with DTs  -Per prior hospitalist, patient initially was not truthful with how much he drank, advised patient that was assessing for risk of alcohol withdrawal and still did not admit true amount of alcohol use  -Began to go into mild alcohol withdrawal on 4/10 afternoon and CIWA protocol started  -As day went on alcohol withdrawal became severe and patient required transfer to ICU as hallucinating and becoming aggressive. Lali Knutson called overnight on 4/10 in ICU  -Given phenobarbital loading dose and then became lethargic and required intubation for airway protection. Now sedated with propofol and fentanyl.   -Phenobarbital 60mg Q8H (today increased to to 130 mg q8), valproic acid 750mg Q6H, valium 5mg Q6H (today increased to 10 mg q6) and gabapentin 800mg TID for alcohol withdrawal.     4. Hyponatremia  -Sodium 123 on admission, was also 123 last month. Today 131  -Was given 1L NS bolus in ER and had about 24hrs of NS 100ml/hr. Now off IVF but on tube feedings  -Possibly chronic from beer potomania as is alcoholic  -Could also be from HCTZ, losartan and lasix, will hold lasix and HCTZ for now      5. Positive blood culture gram-positive cocci in clusters  -Patient with 1 set of blood cultures positive from 4/14.  -started on Vanco 1.5 g IV q12.    6. HTN  -Home meds HCTZ, lasix, losartan and verapamil  -BP on admission 127/85, this morning 127/74  -Continue home meds other than lasix and monitor BP     7. HLD  -Continue statin     8. GERD  -Continue PPI     9. Tobacco use disorder   -Smokes 1/2-1PPD for 45 years  -Counseled on smoking cessation     10. Morbid obesity with BMI 38.04  -will need to be counseled on lifestyle changes prior to discharge from hospital     NOTE: This report was transcribed using voice recognition software. Every effort was made to ensure accuracy; however, inadvertent computerized transcription errors may be present.      Electronically signed by Helen Zuleta MD on 4/15/2022 at 4:09 PM

## 2022-04-15 NOTE — PROGRESS NOTES
CRITICAL CARE PROGRESS NOTE    The patient's case was discussed in multidisciplinary rounds including critical care specialist, nursing, RT and pharmacy. His evaluation is as follows:     Daily progress:  April 13, 2022: Patient was agitated and combative off sedation this morning. He has no fever, chills, rigors. He remains sedated and intubated. He continues to be off vasopressors    April 14, 2022: Patient remains sedated intubated and mechanically ventilated. Patient's ventilator was weaned down slowly but the patient was fighting the endotracheal tube. He is running low-grade fever but remains hemodynamically stable. White blood cells count has slightly increased. April 15, 2022: Patient remains sedated intubated and mechanically ventilated he continues to be agitated or combative off sedation. He has no fever this morning but he was shivering and received 1 dose of Demerol. ETOH withdrawal with delirium tremens  --Intubated for airway protection - Vent settings 460/ f 16/ PEEP 5/ 40% FiO2  --Sedation with propofol + fentanyl and phenobarbital + Valproic acid (Increased to 750 mg q6h) + gabapentin (increased to 800 mg TID)+ scheduled diazepam was increased and phenobarbital was increased as well.   --Continue with Ketamine and propofol  --Check triglycerides twice a week     --Continue Thiamine and folic acid    Acute hypoxemic respiratory failure due to pneumonia  --Mechanical ventilation with lung protective strategy  --Antibiotics: Cefepime/vancomycin for low-grade fever of suspected healthcare associated pneumonia and positive blood culture    Chest pain R/O ACS  --Followed by cardiology  --Plan for heart cath tomorrow or Tuesday at [facility] however his ETOH withdrawal may effect this plan  --Continue  beta blocker and imdur, ASA and ARB     Chronic diastolic heart failure  --Does not appear to be in acute exacerbation  --Diuretics on hold due to hyponatremia  --ECHO with normal EF    COPD exacerbation (resolved)  --Received Solu-Medrol 40 Randag  --Scheduled bronchodilators and scheduled Mucomyst    Hyponatremia, chronic possible due to chronic beer intake  --Resume losartan  --Currently on tube feed  --Daily BMP     HTN  --on ARB and BB     HLD  --Stating on hold due to increased LFT, ALT now 53 and AST now 36 down from 73 and 70 respectively      GERD  --On protonix     Tobacco use disorder  --Continue nicotine patch     Morbid obesity  --will need counseling on lifestyle modifications    Prolonged QTc  --Avoid medications that prolong the QT      DVT prophylaxis with enoxaparin   Nutrition: Tube feedings  Vascular catheters: TLC needed for vasopressor support  Urinary catheter needed for strict input/output    GI prophylaxis with PPI  Physical therapy once awake  Goals of care: Full code    BP (!) 178/80   Pulse 86   Temp 99.8 °F (37.7 °C) (Bladder)   Resp 18   Ht 6' (1.829 m)   Wt 280 lb 8 oz (127.2 kg)   SpO2 98%   BMI 38.04 kg/m²   General:Lethargic, sedated and ventilated, at times agitated and moving 4 limbs and trying to sit up. HEENT: No head lesions, PERRL, EOMI, mouth with ETT, no nasal lesions, no cervical adenopathy palpated  Respiratory: Lungs with decreased breath sounds bilaterally, +ve wheezing auscultated, no accessory muscle use  CV: Regular rate, no murmurs, no JVD, no leg edema  Abdomen: Soft, non tender, + bowel sounds, no lesions  Skin: Hydrated, adequate turgor, no rash, capillary refill <2 seconds  Extremities: Muscular strength 5/5 in 4 limbs when agitated, moves 4 limbs spontaneously, distal pulses present  Neurology: Lethargic, sedated, neck is supple, no meningitic signs present.       Last 3 CMP:    Recent Labs     04/13/22  0504 04/14/22  0548 04/15/22  0445    134 131*   K 4.5 4.3 4.6   CL 99 96* 95*   CO2 29 26 26   BUN 15 20 11   CREATININE 0.8 1.0 0.7   GLUCOSE 134* 158* 179*   CALCIUM 8.9 8.7 8.5*   PROT 7.0 6.6 6.8   LABALBU 3.5 3.1* 3.0* BILITOT 0.3 0.4 0.5   ALKPHOS 87 110 159*   AST 20 27 35   ALT 41* 45* 42*     Recent Labs     04/15/22  0445   WBC 14.5*   RBC 4.36   HGB 11.9*   HCT 36.6*   MCV 83.9   MCH 27.3   MCHC 32.5   RDW 20.1*      MPV 9.3       Recent Labs     04/14/22  0322   BC 24 Hours no growth       Recent Labs     04/14/22  0322   BLOODCULT2 Gram stain performed from blood culture bottle media  Gram positive cocci in clusters  *       24 HR INTAKE/OUTPUT:      Intake/Output Summary (Last 24 hours) at 4/15/2022 0959  Last data filed at 4/15/2022 2693  Gross per 24 hour   Intake 2463.03 ml   Output 2275 ml   Net 188.03 ml     MEDICATIONS:   cefepime  2,000 mg IntraVENous Q8H    cloNIDine  1 patch TransDERmal Weekly    diazePAM  5 mg Oral Q6H    gabapentin  800 mg Oral TID    folic acid  1 mg Oral Daily    valproic acid  750 mg Per NG tube 4 times per day    metoprolol tartrate  25 mg Oral BID    thiamine  100 mg IntraVENous Daily    sodium chloride flush  5-40 mL IntraVENous 2 times per day    multivitamin  1 tablet Oral Daily    pantoprazole (PROTONIX) 40 mg injection  40 mg IntraVENous Daily    PHENobarbital  60 mg IntraVENous 3 times per day    losartan  50 mg Oral Daily    rosuvastatin  10 mg Oral Daily    sodium chloride flush  5-40 mL IntraVENous 2 times per day    enoxaparin  30 mg SubCUTAneous BID    aspirin  81 mg Oral Daily    nicotine  1 patch TransDERmal Daily      ketamine (KETALAR) 5 mg/mL infusion for analgosedation 0.9 mg/kg/hr (04/15/22 0910)    sodium chloride Stopped (04/14/22 1131)    propofol 50 mcg/kg/min (04/15/22 0732)    sodium chloride       hydrALAZINE, ipratropium-albuterol, sodium chloride flush, sodium chloride, sodium chloride flush, sodium chloride, ondansetron **OR** ondansetron, polyethylene glycol, acetaminophen **OR** acetaminophen    OBJECTIVE:  Vitals:    04/15/22 0823   BP:    Pulse: 86   Resp: 18   Temp:    SpO2: 98%     FiO2 : 40 %  O2 Flow Rate (L/min): 3 L/min  O2 Device: Ventilator        LABS:  WBC   Date Value Ref Range Status   04/15/2022 14.5 (H) 4.5 - 11.5 E9/L Final   04/14/2022 13.5 (H) 4.5 - 11.5 E9/L Final   04/13/2022 8.5 4.5 - 11.5 E9/L Final     Hemoglobin   Date Value Ref Range Status   04/15/2022 11.9 (L) 12.5 - 16.5 g/dL Final   04/14/2022 11.0 (L) 12.5 - 16.5 g/dL Final   04/13/2022 10.5 (L) 12.5 - 16.5 g/dL Final     Hematocrit   Date Value Ref Range Status   04/15/2022 36.6 (L) 37.0 - 54.0 % Final   04/14/2022 35.5 (L) 37.0 - 54.0 % Final   04/13/2022 33.7 (L) 37.0 - 54.0 % Final     MCV   Date Value Ref Range Status   04/15/2022 83.9 80.0 - 99.9 fL Final   04/14/2022 84.9 80.0 - 99.9 fL Final   04/13/2022 83.4 80.0 - 99.9 fL Final     Platelets   Date Value Ref Range Status   04/15/2022 195 130 - 450 E9/L Final   04/14/2022 204 130 - 450 E9/L Final   04/13/2022 189 130 - 450 E9/L Final     Sodium   Date Value Ref Range Status   04/15/2022 131 (L) 132 - 146 mmol/L Final   04/14/2022 134 132 - 146 mmol/L Final   04/13/2022 137 132 - 146 mmol/L Final     Potassium   Date Value Ref Range Status   04/15/2022 4.6 3.5 - 5.0 mmol/L Final   04/14/2022 4.3 3.5 - 5.0 mmol/L Final   04/13/2022 4.5 3.5 - 5.0 mmol/L Final     Potassium reflex Magnesium   Date Value Ref Range Status   04/11/2022 4.5 3.5 - 5.0 mmol/L Final   04/10/2022 4.5 3.5 - 5.0 mmol/L Final   04/09/2022 4.3 3.5 - 5.0 mmol/L Final     Chloride   Date Value Ref Range Status   04/15/2022 95 (L) 98 - 107 mmol/L Final   04/14/2022 96 (L) 98 - 107 mmol/L Final   04/13/2022 99 98 - 107 mmol/L Final     CO2   Date Value Ref Range Status   04/15/2022 26 22 - 29 mmol/L Final   04/14/2022 26 22 - 29 mmol/L Final   04/13/2022 29 22 - 29 mmol/L Final     BUN   Date Value Ref Range Status   04/15/2022 11 6 - 20 mg/dL Final   04/14/2022 20 6 - 20 mg/dL Final   04/13/2022 15 6 - 20 mg/dL Final     CREATININE   Date Value Ref Range Status   04/15/2022 0.7 0.7 - 1.2 mg/dL Final   04/14/2022 1.0 0.7 - 1.2 mg/dL Final   04/13/2022 0.8 0.7 - 1.2 mg/dL Final     Glucose   Date Value Ref Range Status   04/15/2022 179 (H) 74 - 99 mg/dL Final   04/14/2022 158 (H) 74 - 99 mg/dL Final   04/13/2022 134 (H) 74 - 99 mg/dL Final     Calcium   Date Value Ref Range Status   04/15/2022 8.5 (L) 8.6 - 10.2 mg/dL Final   04/14/2022 8.7 8.6 - 10.2 mg/dL Final   04/13/2022 8.9 8.6 - 10.2 mg/dL Final     Total Protein   Date Value Ref Range Status   04/15/2022 6.8 6.4 - 8.3 g/dL Final   04/14/2022 6.6 6.4 - 8.3 g/dL Final   04/13/2022 7.0 6.4 - 8.3 g/dL Final     Albumin   Date Value Ref Range Status   04/15/2022 3.0 (L) 3.5 - 5.2 g/dL Final   04/14/2022 3.1 (L) 3.5 - 5.2 g/dL Final   04/13/2022 3.5 3.5 - 5.2 g/dL Final   10/13/2010 4.8 3.2 - 4.8 g/dL Final     Total Bilirubin   Date Value Ref Range Status   04/15/2022 0.5 0.0 - 1.2 mg/dL Final   04/14/2022 0.4 0.0 - 1.2 mg/dL Final   04/13/2022 0.3 0.0 - 1.2 mg/dL Final     Alkaline Phosphatase   Date Value Ref Range Status   04/15/2022 159 (H) 40 - 129 U/L Final   04/14/2022 110 40 - 129 U/L Final   04/13/2022 87 40 - 129 U/L Final     AST   Date Value Ref Range Status   04/15/2022 35 0 - 39 U/L Final   04/14/2022 27 0 - 39 U/L Final   04/13/2022 20 0 - 39 U/L Final     ALT   Date Value Ref Range Status   04/15/2022 42 (H) 0 - 40 U/L Final   04/14/2022 45 (H) 0 - 40 U/L Final   04/13/2022 41 (H) 0 - 40 U/L Final     GFR Non-   Date Value Ref Range Status   04/15/2022 >60 >=60 mL/min/1.73 Final     Comment:     Chronic Kidney Disease: less than 60 ml/min/1.73 sq.m. Kidney Failure: less than 15 ml/min/1.73 sq.m. Results valid for patients 18 years and older. 04/14/2022 >60 >=60 mL/min/1.73 Final     Comment:     Chronic Kidney Disease: less than 60 ml/min/1.73 sq.m. Kidney Failure: less than 15 ml/min/1.73 sq.m. Results valid for patients 18 years and older.      04/13/2022 >60 >=60 mL/min/1.73 Final     Comment:     Chronic Kidney Disease: less than 60 ml/min/1.73 sq.m. Kidney Failure: less than 15 ml/min/1.73 sq.m. Results valid for patients 18 years and older. GFR    Date Value Ref Range Status   04/15/2022 >60  Final   04/14/2022 >60  Final   04/13/2022 >60  Final     Magnesium   Date Value Ref Range Status   04/15/2022 2.4 1.6 - 2.6 mg/dL Final   04/14/2022 2.4 1.6 - 2.6 mg/dL Final   04/13/2022 2.5 1.6 - 2.6 mg/dL Final     Phosphorus   Date Value Ref Range Status   04/15/2022 3.2 2.5 - 4.5 mg/dL Final   04/14/2022 4.7 (H) 2.5 - 4.5 mg/dL Final   04/13/2022 3.5 2.5 - 4.5 mg/dL Final     No results for input(s): PH, PO2, PCO2, HCO3, BE, O2SAT in the last 72 hours. RADIOLOGY:  XR CHEST PORTABLE   Final Result   New atelectasis and or infiltrate at the right base. XR ABDOMEN FOR NG/OG/NE TUBE PLACEMENT   Final Result   Nasogastric tube with the tip in the proximal gastric body. XR CHEST PORTABLE   Final Result   Adequately positioned endotracheal tube, orogastric tube and right internal   jugular central venous line. XR CHEST PORTABLE   Final Result   No pneumonia or pleural effusion. PROBLEM LIST:  Principal Problem:    Acute chest pain  Active Problems:    Chest pain  Resolved Problems:    * No resolved hospital problems. *          ATTESTATION:  ICU Staff Physician note of personal involvement in Care  As the attending physician, I certify that I personally reviewed the patients history and personnally examined the patient to confirm the physical findings described above,  And that I reviewed the relevant imaging studies and available reports. I also discussed the differential diagnosis and all of the proposed management plans with the patient and individuals accompanying the patient to this visit. They had the opportunity to ask questions about the proposed management plans and to have those questions answered.      This patient has a high probability of sudden, clinically significant deterioration, which requires the highest level of physician preparedness to intervene urgently. I managed/supervised life or organ supporting interventions that required frequent physician assessment. I devoted my full attention to the direct care of this patient for the amount of time indicated below. Time I spent with the family or surrogate(s) is included only if the patient was incapable of providing the necessary information or participating in medical decisions - Time devoted to teaching and to any procedures I billed separately is not included.      CRITICAL CARE TIME:  40 minutes

## 2022-04-15 NOTE — PLAN OF CARE
Problem: Pain:  Goal: Pain level will decrease  Description: Pain level will decrease  Outcome: Met This Shift     Problem: Pain:  Goal: Control of acute pain  Description: Control of acute pain  Outcome: Met This Shift     Problem: Pain:  Goal: Control of chronic pain  Description: Control of chronic pain  Outcome: Met This Shift     Problem: Cardiac Output - Decreased:  Goal: Hemodynamic stability will improve  Description: Hemodynamic stability will improve  Outcome: Met This Shift     Problem: Pain:  Goal: Pain level will decrease  Description: Pain level will decrease  Outcome: Met This Shift     Problem: Falls - Risk of:  Goal: Will remain free from falls  Description: Will remain free from falls  Outcome: Met This Shift     Problem: Falls - Risk of:  Goal: Absence of physical injury  Description: Absence of physical injury  Outcome: Met This Shift     Problem: Non-Violent Restraints  Goal: No harm/injury to patient while restraints in use  Outcome: Met This Shift     Problem: Non-Violent Restraints  Goal: Patient's dignity will be maintained  Outcome: Met This Shift     Problem: Non-Violent Restraints  Goal: Removal from restraints as soon as assessed to be safe  Outcome: Not Met This Shift

## 2022-04-16 LAB
ALBUMIN SERPL-MCNC: 3 G/DL (ref 3.5–5.2)
ALP BLD-CCNC: 166 U/L (ref 40–129)
ALT SERPL-CCNC: 39 U/L (ref 0–40)
ANION GAP SERPL CALCULATED.3IONS-SCNC: 8 MMOL/L (ref 7–16)
AST SERPL-CCNC: 30 U/L (ref 0–39)
BASOPHILS ABSOLUTE: 0 E9/L (ref 0–0.2)
BASOPHILS RELATIVE PERCENT: 0.6 % (ref 0–2)
BILIRUB SERPL-MCNC: 0.3 MG/DL (ref 0–1.2)
BUN BLDV-MCNC: 18 MG/DL (ref 6–20)
BURR CELLS: ABNORMAL
CALCIUM SERPL-MCNC: 8.8 MG/DL (ref 8.6–10.2)
CHLORIDE BLD-SCNC: 93 MMOL/L (ref 98–107)
CO2: 27 MMOL/L (ref 22–29)
CREAT SERPL-MCNC: 0.7 MG/DL (ref 0.7–1.2)
DOHLE BODIES: ABNORMAL
EOSINOPHILS ABSOLUTE: 0 E9/L (ref 0.05–0.5)
EOSINOPHILS RELATIVE PERCENT: 0.1 % (ref 0–6)
GFR AFRICAN AMERICAN: >60
GFR NON-AFRICAN AMERICAN: >60 ML/MIN/1.73
GLUCOSE BLD-MCNC: 266 MG/DL (ref 74–99)
HBA1C MFR BLD: 6.2 % (ref 4–5.6)
HCT VFR BLD CALC: 35.9 % (ref 37–54)
HEMOGLOBIN: 11.4 G/DL (ref 12.5–16.5)
HYPOCHROMIA: ABNORMAL
LYMPHOCYTES ABSOLUTE: 0.14 E9/L (ref 1.5–4)
LYMPHOCYTES RELATIVE PERCENT: 0.9 % (ref 20–42)
MAGNESIUM: 2.6 MG/DL (ref 1.6–2.6)
MCH RBC QN AUTO: 26 PG (ref 26–35)
MCHC RBC AUTO-ENTMCNC: 31.8 % (ref 32–34.5)
MCV RBC AUTO: 81.8 FL (ref 80–99.9)
METER GLUCOSE: 175 MG/DL (ref 74–99)
METER GLUCOSE: 189 MG/DL (ref 74–99)
METER GLUCOSE: 262 MG/DL (ref 74–99)
MONOCYTES ABSOLUTE: 1.29 E9/L (ref 0.1–0.95)
MONOCYTES RELATIVE PERCENT: 8.8 % (ref 2–12)
MYELOCYTE PERCENT: 0.9 % (ref 0–0)
NEUTROPHILS ABSOLUTE: 12.87 E9/L (ref 1.8–7.3)
NEUTROPHILS RELATIVE PERCENT: 88.6 % (ref 43–80)
OVALOCYTES: ABNORMAL
PDW BLD-RTO: 19.2 FL (ref 11.5–15)
PHOSPHORUS: 3.2 MG/DL (ref 2.5–4.5)
PLATELET # BLD: 197 E9/L (ref 130–450)
PMV BLD AUTO: 9.2 FL (ref 7–12)
POIKILOCYTES: ABNORMAL
POLYCHROMASIA: ABNORMAL
POTASSIUM SERPL-SCNC: 5.4 MMOL/L (ref 3.5–5)
PROMYELOCYTES PERCENT: 0.9 % (ref 0–0)
RBC # BLD: 4.39 E12/L (ref 3.8–5.8)
SCHISTOCYTES: ABNORMAL
SODIUM BLD-SCNC: 128 MMOL/L (ref 132–146)
SPHEROCYTES: ABNORMAL
TARGET CELLS: ABNORMAL
TOTAL PROTEIN: 7 G/DL (ref 6.4–8.3)
TRIGL SERPL-MCNC: 115 MG/DL (ref 0–149)
VANCOMYCIN TROUGH: 5.7 MCG/ML (ref 5–16)
WBC # BLD: 14.3 E9/L (ref 4.5–11.5)

## 2022-04-16 PROCEDURE — 6360000002 HC RX W HCPCS

## 2022-04-16 PROCEDURE — 6360000002 HC RX W HCPCS: Performed by: NURSE PRACTITIONER

## 2022-04-16 PROCEDURE — 84478 ASSAY OF TRIGLYCERIDES: CPT

## 2022-04-16 PROCEDURE — C9113 INJ PANTOPRAZOLE SODIUM, VIA: HCPCS

## 2022-04-16 PROCEDURE — 94640 AIRWAY INHALATION TREATMENT: CPT

## 2022-04-16 PROCEDURE — 2580000003 HC RX 258: Performed by: STUDENT IN AN ORGANIZED HEALTH CARE EDUCATION/TRAINING PROGRAM

## 2022-04-16 PROCEDURE — 80053 COMPREHEN METABOLIC PANEL: CPT

## 2022-04-16 PROCEDURE — 85025 COMPLETE CBC W/AUTO DIFF WBC: CPT

## 2022-04-16 PROCEDURE — 2000000000 HC ICU R&B

## 2022-04-16 PROCEDURE — 83735 ASSAY OF MAGNESIUM: CPT

## 2022-04-16 PROCEDURE — 36415 COLL VENOUS BLD VENIPUNCTURE: CPT

## 2022-04-16 PROCEDURE — 2580000003 HC RX 258

## 2022-04-16 PROCEDURE — 80202 ASSAY OF VANCOMYCIN: CPT

## 2022-04-16 PROCEDURE — 6360000002 HC RX W HCPCS: Performed by: INTERNAL MEDICINE

## 2022-04-16 PROCEDURE — 36592 COLLECT BLOOD FROM PICC: CPT

## 2022-04-16 PROCEDURE — 6370000000 HC RX 637 (ALT 250 FOR IP): Performed by: INTERNAL MEDICINE

## 2022-04-16 PROCEDURE — 99232 SBSQ HOSP IP/OBS MODERATE 35: CPT | Performed by: INTERNAL MEDICINE

## 2022-04-16 PROCEDURE — 83036 HEMOGLOBIN GLYCOSYLATED A1C: CPT

## 2022-04-16 PROCEDURE — 2500000003 HC RX 250 WO HCPCS: Performed by: INTERNAL MEDICINE

## 2022-04-16 PROCEDURE — 6360000002 HC RX W HCPCS: Performed by: STUDENT IN AN ORGANIZED HEALTH CARE EDUCATION/TRAINING PROGRAM

## 2022-04-16 PROCEDURE — 94003 VENT MGMT INPAT SUBQ DAY: CPT

## 2022-04-16 PROCEDURE — 84100 ASSAY OF PHOSPHORUS: CPT

## 2022-04-16 PROCEDURE — 6370000000 HC RX 637 (ALT 250 FOR IP): Performed by: STUDENT IN AN ORGANIZED HEALTH CARE EDUCATION/TRAINING PROGRAM

## 2022-04-16 PROCEDURE — 82962 GLUCOSE BLOOD TEST: CPT

## 2022-04-16 PROCEDURE — A4216 STERILE WATER/SALINE, 10 ML: HCPCS

## 2022-04-16 PROCEDURE — 2580000003 HC RX 258: Performed by: INTERNAL MEDICINE

## 2022-04-16 RX ORDER — DEXTROSE MONOHYDRATE 25 G/50ML
12.5 INJECTION, SOLUTION INTRAVENOUS PRN
Status: DISCONTINUED | OUTPATIENT
Start: 2022-04-16 | End: 2022-04-16 | Stop reason: RX

## 2022-04-16 RX ORDER — NICOTINE POLACRILEX 4 MG
15 LOZENGE BUCCAL PRN
Status: DISCONTINUED | OUTPATIENT
Start: 2022-04-16 | End: 2022-04-16 | Stop reason: SDUPTHER

## 2022-04-16 RX ORDER — FENTANYL CITRATE 0.05 MG/ML
50 INJECTION, SOLUTION INTRAMUSCULAR; INTRAVENOUS
Status: DISCONTINUED | OUTPATIENT
Start: 2022-04-16 | End: 2022-04-16

## 2022-04-16 RX ORDER — PHENOBARBITAL SODIUM 65 MG/ML
65 INJECTION INTRAMUSCULAR EVERY 8 HOURS SCHEDULED
Status: DISCONTINUED | OUTPATIENT
Start: 2022-04-16 | End: 2022-04-17 | Stop reason: CLARIF

## 2022-04-16 RX ORDER — DEXTROSE MONOHYDRATE 50 MG/ML
100 INJECTION, SOLUTION INTRAVENOUS PRN
Status: DISCONTINUED | OUTPATIENT
Start: 2022-04-16 | End: 2022-04-27 | Stop reason: HOSPADM

## 2022-04-16 RX ORDER — GABAPENTIN 300 MG/1
600 CAPSULE ORAL 3 TIMES DAILY
Status: DISCONTINUED | OUTPATIENT
Start: 2022-04-16 | End: 2022-04-17

## 2022-04-16 RX ORDER — METOPROLOL TARTRATE 5 MG/5ML
5 INJECTION INTRAVENOUS ONCE
Status: COMPLETED | OUTPATIENT
Start: 2022-04-16 | End: 2022-04-16

## 2022-04-16 RX ORDER — FENTANYL CITRATE 0.05 MG/ML
50 INJECTION, SOLUTION INTRAMUSCULAR; INTRAVENOUS
Status: DISCONTINUED | OUTPATIENT
Start: 2022-04-16 | End: 2022-04-18

## 2022-04-16 RX ORDER — DIAZEPAM 5 MG/1
5 TABLET ORAL EVERY 6 HOURS
Status: DISCONTINUED | OUTPATIENT
Start: 2022-04-16 | End: 2022-04-17

## 2022-04-16 RX ORDER — DEXTROSE MONOHYDRATE 50 MG/ML
100 INJECTION, SOLUTION INTRAVENOUS PRN
Status: DISCONTINUED | OUTPATIENT
Start: 2022-04-16 | End: 2022-04-16 | Stop reason: SDUPTHER

## 2022-04-16 RX ORDER — DEXTROSE MONOHYDRATE 25 G/50ML
12.5 INJECTION, SOLUTION INTRAVENOUS PRN
Status: DISCONTINUED | OUTPATIENT
Start: 2022-04-16 | End: 2022-04-16 | Stop reason: CLARIF

## 2022-04-16 RX ORDER — NICOTINE POLACRILEX 4 MG
15 LOZENGE BUCCAL PRN
Status: DISCONTINUED | OUTPATIENT
Start: 2022-04-16 | End: 2022-04-27 | Stop reason: HOSPADM

## 2022-04-16 RX ADMIN — IPRATROPIUM BROMIDE AND ALBUTEROL SULFATE 1 AMPULE: .5; 2.5 SOLUTION RESPIRATORY (INHALATION) at 09:24

## 2022-04-16 RX ADMIN — IPRATROPIUM BROMIDE AND ALBUTEROL SULFATE 1 AMPULE: .5; 2.5 SOLUTION RESPIRATORY (INHALATION) at 02:38

## 2022-04-16 RX ADMIN — METHYLPREDNISOLONE SODIUM SUCCINATE 40 MG: 40 INJECTION, POWDER, FOR SOLUTION INTRAMUSCULAR; INTRAVENOUS at 10:00

## 2022-04-16 RX ADMIN — DIAZEPAM 10 MG: 5 TABLET ORAL at 06:12

## 2022-04-16 RX ADMIN — PROPOFOL 50 MCG/KG/MIN: 10 INJECTION, EMULSION INTRAVENOUS at 06:59

## 2022-04-16 RX ADMIN — ACETYLCYSTEINE 400 MG: 100 INHALANT RESPIRATORY (INHALATION) at 13:38

## 2022-04-16 RX ADMIN — CEFEPIME HYDROCHLORIDE 2000 MG: 2 INJECTION, POWDER, FOR SOLUTION INTRAVENOUS at 18:56

## 2022-04-16 RX ADMIN — CEFEPIME HYDROCHLORIDE 2000 MG: 2 INJECTION, POWDER, FOR SOLUTION INTRAVENOUS at 10:32

## 2022-04-16 RX ADMIN — THIAMINE HYDROCHLORIDE 100 MG: 100 INJECTION, SOLUTION INTRAMUSCULAR; INTRAVENOUS at 08:37

## 2022-04-16 RX ADMIN — MULTIPLE VITAMINS W/ MINERALS TAB 1 TABLET: TAB at 08:37

## 2022-04-16 RX ADMIN — PHENOBARBITAL SODIUM 130 MG: 65 INJECTION INTRAMUSCULAR; INTRAVENOUS at 05:15

## 2022-04-16 RX ADMIN — Medication 10 ML: at 08:39

## 2022-04-16 RX ADMIN — METHYLPREDNISOLONE SODIUM SUCCINATE 40 MG: 40 INJECTION, POWDER, FOR SOLUTION INTRAMUSCULAR; INTRAVENOUS at 23:09

## 2022-04-16 RX ADMIN — PROPOFOL 50 MCG/KG/MIN: 10 INJECTION, EMULSION INTRAVENOUS at 12:49

## 2022-04-16 RX ADMIN — ACETYLCYSTEINE 400 MG: 100 INHALANT RESPIRATORY (INHALATION) at 18:19

## 2022-04-16 RX ADMIN — KETAMINE HYDROCHLORIDE 0.95 MG/KG/HR: 100 INJECTION INTRAMUSCULAR; INTRAVENOUS at 05:16

## 2022-04-16 RX ADMIN — SODIUM CHLORIDE: 9 INJECTION, SOLUTION INTRAVENOUS at 03:06

## 2022-04-16 RX ADMIN — IPRATROPIUM BROMIDE AND ALBUTEROL SULFATE 1 AMPULE: .5; 2.5 SOLUTION RESPIRATORY (INHALATION) at 13:38

## 2022-04-16 RX ADMIN — PROPOFOL 50 MCG/KG/MIN: 10 INJECTION, EMULSION INTRAVENOUS at 23:29

## 2022-04-16 RX ADMIN — METOPROLOL TARTRATE 5 MG: 5 INJECTION INTRAVENOUS at 18:15

## 2022-04-16 RX ADMIN — PROPOFOL 50 MCG/KG/MIN: 10 INJECTION, EMULSION INTRAVENOUS at 21:06

## 2022-04-16 RX ADMIN — FENTANYL CITRATE 50 MCG: 50 INJECTION INTRAMUSCULAR; INTRAVENOUS at 23:52

## 2022-04-16 RX ADMIN — PROPOFOL 50 MCG/KG/MIN: 10 INJECTION, EMULSION INTRAVENOUS at 01:01

## 2022-04-16 RX ADMIN — IPRATROPIUM BROMIDE AND ALBUTEROL SULFATE 1 AMPULE: .5; 2.5 SOLUTION RESPIRATORY (INHALATION) at 22:00

## 2022-04-16 RX ADMIN — ACETAMINOPHEN 650 MG: 325 TABLET ORAL at 20:37

## 2022-04-16 RX ADMIN — ASPIRIN 81 MG 81 MG: 81 TABLET ORAL at 08:37

## 2022-04-16 RX ADMIN — METOPROLOL TARTRATE 50 MG: 50 TABLET, FILM COATED ORAL at 08:37

## 2022-04-16 RX ADMIN — PROPOFOL 50 MCG/KG/MIN: 10 INJECTION, EMULSION INTRAVENOUS at 10:33

## 2022-04-16 RX ADMIN — CEFEPIME HYDROCHLORIDE 2000 MG: 2 INJECTION, POWDER, FOR SOLUTION INTRAVENOUS at 03:07

## 2022-04-16 RX ADMIN — Medication 10 ML: at 20:42

## 2022-04-16 RX ADMIN — ACETYLCYSTEINE 400 MG: 100 INHALANT RESPIRATORY (INHALATION) at 22:01

## 2022-04-16 RX ADMIN — IPRATROPIUM BROMIDE AND ALBUTEROL SULFATE 1 AMPULE: .5; 2.5 SOLUTION RESPIRATORY (INHALATION) at 06:34

## 2022-04-16 RX ADMIN — ACETYLCYSTEINE 400 MG: 100 INHALANT RESPIRATORY (INHALATION) at 02:38

## 2022-04-16 RX ADMIN — FENTANYL CITRATE 50 MCG: 50 INJECTION INTRAMUSCULAR; INTRAVENOUS at 20:34

## 2022-04-16 RX ADMIN — INSULIN LISPRO 1 UNITS: 100 INJECTION, SOLUTION INTRAVENOUS; SUBCUTANEOUS at 17:20

## 2022-04-16 RX ADMIN — ENOXAPARIN SODIUM 30 MG: 100 INJECTION SUBCUTANEOUS at 08:36

## 2022-04-16 RX ADMIN — VANCOMYCIN HYDROCHLORIDE 1500 MG: 5 INJECTION, POWDER, LYOPHILIZED, FOR SOLUTION INTRAVENOUS at 23:45

## 2022-04-16 RX ADMIN — PROPOFOL 50 MCG/KG/MIN: 10 INJECTION, EMULSION INTRAVENOUS at 18:55

## 2022-04-16 RX ADMIN — FOLIC ACID 1 MG: 1 TABLET ORAL at 08:37

## 2022-04-16 RX ADMIN — INSULIN LISPRO 3 UNITS: 100 INJECTION, SOLUTION INTRAVENOUS; SUBCUTANEOUS at 12:23

## 2022-04-16 RX ADMIN — HYDRALAZINE HYDROCHLORIDE 10 MG: 20 INJECTION INTRAMUSCULAR; INTRAVENOUS at 16:24

## 2022-04-16 RX ADMIN — GABAPENTIN 600 MG: 300 CAPSULE ORAL at 20:37

## 2022-04-16 RX ADMIN — ROSUVASTATIN CALCIUM 10 MG: 10 TABLET, FILM COATED ORAL at 08:37

## 2022-04-16 RX ADMIN — VALPROIC ACID 750 MG: 250 SOLUTION ORAL at 05:15

## 2022-04-16 RX ADMIN — METOPROLOL TARTRATE 5 MG: 5 INJECTION, SOLUTION INTRAVENOUS at 15:09

## 2022-04-16 RX ADMIN — DIAZEPAM 5 MG: 5 TABLET ORAL at 23:09

## 2022-04-16 RX ADMIN — GABAPENTIN 800 MG: 400 CAPSULE ORAL at 08:37

## 2022-04-16 RX ADMIN — VANCOMYCIN HYDROCHLORIDE 1500 MG: 5 INJECTION, POWDER, LYOPHILIZED, FOR SOLUTION INTRAVENOUS at 11:12

## 2022-04-16 RX ADMIN — DIAZEPAM 10 MG: 5 TABLET ORAL at 10:00

## 2022-04-16 RX ADMIN — ACETYLCYSTEINE 400 MG: 100 INHALANT RESPIRATORY (INHALATION) at 06:34

## 2022-04-16 RX ADMIN — IPRATROPIUM BROMIDE AND ALBUTEROL SULFATE 1 AMPULE: .5; 2.5 SOLUTION RESPIRATORY (INHALATION) at 18:19

## 2022-04-16 RX ADMIN — PHENOBARBITAL SODIUM 65 MG: 65 INJECTION INTRAMUSCULAR; INTRAVENOUS at 23:09

## 2022-04-16 RX ADMIN — DIAZEPAM 5 MG: 5 TABLET ORAL at 17:24

## 2022-04-16 RX ADMIN — ACETYLCYSTEINE 400 MG: 100 INHALANT RESPIRATORY (INHALATION) at 09:24

## 2022-04-16 RX ADMIN — PROPOFOL 50 MCG/KG/MIN: 10 INJECTION, EMULSION INTRAVENOUS at 03:56

## 2022-04-16 RX ADMIN — INSULIN LISPRO 1 UNITS: 100 INJECTION, SOLUTION INTRAVENOUS; SUBCUTANEOUS at 20:42

## 2022-04-16 RX ADMIN — PROPOFOL 50 MCG/KG/MIN: 10 INJECTION, EMULSION INTRAVENOUS at 16:22

## 2022-04-16 RX ADMIN — ENOXAPARIN SODIUM 30 MG: 100 INJECTION SUBCUTANEOUS at 20:37

## 2022-04-16 RX ADMIN — SODIUM CHLORIDE 40 MG: 9 INJECTION, SOLUTION INTRAMUSCULAR; INTRAVENOUS; SUBCUTANEOUS at 08:37

## 2022-04-16 RX ADMIN — LOSARTAN POTASSIUM 50 MG: 50 TABLET, FILM COATED ORAL at 08:37

## 2022-04-16 RX ADMIN — METOPROLOL TARTRATE 50 MG: 50 TABLET, FILM COATED ORAL at 20:37

## 2022-04-16 ASSESSMENT — PULMONARY FUNCTION TESTS
PIF_VALUE: 19
PIF_VALUE: 19
PIF_VALUE: 20
PIF_VALUE: 18
PIF_VALUE: 23
PIF_VALUE: 18
PIF_VALUE: 32
PIF_VALUE: 20
PIF_VALUE: 17
PIF_VALUE: 23
PIF_VALUE: 20
PIF_VALUE: 31
PIF_VALUE: 33
PIF_VALUE: 20
PIF_VALUE: 19
PIF_VALUE: 21
PIF_VALUE: 20
PIF_VALUE: 20
PIF_VALUE: 17
PIF_VALUE: 18
PIF_VALUE: 20
PIF_VALUE: 15
PIF_VALUE: 22
PIF_VALUE: 20
PIF_VALUE: 18

## 2022-04-16 ASSESSMENT — PAIN SCALES - GENERAL: PAINLEVEL_OUTOF10: 0

## 2022-04-16 NOTE — PROGRESS NOTES
[address] [place] [address]   ICU Progress Note    Admitting Date and Time: 4/9/2022  4:02 PM  Admit Dx: Hyponatremia [E87.1]  COPD exacerbation ([address] [J44.1]  Acute chest pain [R07.9]  Chest pain, unspecified type [R07.9]  Chest pain [R07.9]    Subjective:    4/16: Pt intubated and sedated currently      Per RN: Not very successful in weaning sedation  ROS: unable to assess due to being intubated and sedated     insulin lispro  0-6 Units SubCUTAneous TID WC    insulin lispro  0-3 Units SubCUTAneous Nightly    diazePAM  5 mg Oral Q6H    gabapentin  600 mg Oral TID    PHENobarbital  65 mg IntraVENous 3 times per day    methylPREDNISolone  40 mg IntraVENous Q12H    acetylcysteine  4 mL Inhalation Q4H    ipratropium-albuterol  1 ampule Inhalation Q4H WA    metoprolol tartrate  50 mg Oral BID    vancomycin  1,500 mg IntraVENous Q12H    cefepime  2,000 mg IntraVENous Q8H    cloNIDine  1 patch TransDERmal Weekly    folic acid  1 mg Oral Daily    sodium chloride flush  5-40 mL IntraVENous 2 times per day    multivitamin  1 tablet Oral Daily    pantoprazole (PROTONIX) 40 mg injection  40 mg IntraVENous Daily    losartan  50 mg Oral Daily    rosuvastatin  10 mg Oral Daily    sodium chloride flush  5-40 mL IntraVENous 2 times per day    enoxaparin  30 mg SubCUTAneous BID    aspirin  81 mg Oral Daily    nicotine  1 patch TransDERmal Daily     dextrose bolus (hypoglycemia), 125 mL, PRN   Or  dextrose bolus (hypoglycemia), 250 mL, PRN  glucose, 15 g, PRN  glucagon (rDNA), 1 mg, PRN  dextrose, 100 mL/hr, PRN  metoprolol, 5 mg, Q6H PRN  hydrALAZINE, 10 mg, Q6H PRN  ipratropium-albuterol, 1 ampule, Q4H PRN  sodium chloride flush, 5-40 mL, PRN  sodium chloride, , PRN  sodium chloride flush, 5-40 mL, PRN  sodium chloride, , PRN  ondansetron, 4 mg, Q8H PRN   Or  ondansetron, 4 mg, Q6H PRN  polyethylene glycol, 17 g, Daily PRN  acetaminophen, 650 mg, Q6H PRN   Or  acetaminophen, 650 mg, Q6H PRN Objective:    BP (!) 210/109   Pulse 103   Temp 100.2 °F (37.9 °C) (Bladder)   Resp 17   Ht 6' (1.829 m)   Wt 280 lb 8 oz (127.2 kg)   SpO2 96%   BMI 38.04 kg/m²    General Appearance: intubated, sedated, obese  Skin: warm,  Head: normocephalic and atraumatic  Eyes: pupils equal, round, and reactive to light, extraocular eye movements intact, conjunctivae normal  Neck: R IJ TLC. Pulmonary/Chest:mild wheezing throughout, normal air movement, no respiratory distress  Cardiovascular: normal rate, normal S1 and S2 and no carotid bruits  Abdomen: soft, non-tender, non-distended, normal bowel sounds, no masses or organomegaly  Extremities: no cyanosis, no clubbing and no edema  Neurologic: sedated, no apparent focal deficit      Recent Labs     04/14/22  0548 04/15/22  0445 04/16/22  0423    131* 128*   K 4.3 4.6 5.4*   CL 96* 95* 93*   CO2 26 26 27   BUN 20 11 18   CREATININE 1.0 0.7 0.7   GLUCOSE 158* 179* 266*   CALCIUM 8.7 8.5* 8.8       Recent Labs     04/14/22  0548 04/15/22  0445 04/16/22  0423   ALKPHOS 110 159* 166*   PROT 6.6 6.8 7.0   LABALBU 3.1* 3.0* 3.0*   BILITOT 0.4 0.5 0.3   AST 27 35 30   ALT 45* 42* 39       Recent Labs     04/14/22  0548 04/15/22  0445 04/16/22  0423   WBC 13.5* 14.5* 14.3*   RBC 4.18 4.36 4.39   HGB 11.0* 11.9* 11.4*   HCT 35.5* 36.6* 35.9*   MCV 84.9 83.9 81.8   MCH 26.3 27.3 26.0   MCHC 31.0* 32.5 31.8*   RDW 20.5* 20.1* 19.2*    195 197   MPV 9.3 9.3 9.2     STREP PNEUMONIAE ANTIGEN [[id number]] Collected: 04/09/22 2033   Order Status: Completed Specimen: Urine, clean catch Updated: 04/11/22 1129    STREP PNEUMONIAE ANTIGEN, URINE --    Presumptive negative- suggests no current or recent   pneumococcal infection.    Infection due to Strep pneumoniae cannot be   ruled out since the antigen present in the sample   may be below the detection limit of the test.   Normal Range:Presumptive Negative    Narrative:     Source: URINE       Site:           LEGIONELLA ANTIGEN, URINE [[id number]] Collected: 04/09/22 2034   Order Status: Completed Specimen: Urine voided Updated: 04/11/22 1127    L. pneumophila Serogp 1 Ur Ag --    Presumptive Negative -suggesting no recent or current infections   with Legionella pneumophila serogroup 1. Infection to Legionella cannot be ruled out since other serogroups   and species may cause infection, antigen may not be present in   early infection, or level of antigen may be below the   detection limit. Normal Range: Presumptive Negative    Narrative:     Source: URV       Site: Urine Clean Catch             COVID-19 & Influenza Combo [[id number]] Collected: 04/10/22 0843   Order Status: Completed Specimen: Nasopharyngeal Swab Updated: 04/10/22 0944    SARS-CoV-2 RNA, RT PCR NOT DETECTED    Comment: Not Detected results do not preclude SARS-CoV-2 infection and   should not be used as the sole basis for patient management   decisions.  Results must be combined with clinical observations,   patient history, and epidemiological information. Testing was performed using [employer] SARS-CoV-2 and Influenza A/B   nucleic acid assay. This test is a multiplex Real-Time Reverse   Transcriptase Polymerase Chain Reaction (RT-PCR)-based in vitro   diagnostic test intended for the qualitative detection of nucleic   acids from SARS-CoV-2, influenza A, and influenza B in nasopharyngeal   and nasal swab specimens for use under the FDAs Emergency Use   Authorization (EUA) only.      Patient Fact Sheet: FindDrives.pl   Provider Fact Sheet: FindDrives.pl   EUA: FindDrives.pl   IFU: FindDrives.pl     Methodology:  RT-PCR        INFLUENZA A NOT DETECTED    INFLUENZA B NOT DETECTED     Culture, Blood 2 [[id number]] (Abnormal) Collected: 04/14/22 0322   Order Status: Completed Specimen: Blood Updated: 04/15/22 1046    Culture, Blood 2 -- Abnormal     Gram stain performed from blood culture bottle media   Gram positive cocci in clusters    Abnormal    Narrative:     Talisha Wang [id number],   Microbiology results called to and read back by Mario Acosta RN, 04/15/2022   10:46, by [facility]   Culture, Blood 1 [[id number]] Collected: 04/14/22 0322   Order Status: Completed Specimen: Blood Updated: 04/15/22 0935    Blood Culture, Routine 24 Hours no growth   Narrative:     Source: BLOOD       Site: Hand, Left          Radiology:   XR CHEST PORTABLE   Final Result   New atelectasis and or infiltrate at the right base. XR ABDOMEN FOR NG/OG/NE TUBE PLACEMENT   Final Result   Nasogastric tube with the tip in the proximal gastric body. XR CHEST PORTABLE   Final Result   Adequately positioned endotracheal tube, orogastric tube and right internal   jugular central venous line. XR CHEST PORTABLE   Final Result   No pneumonia or pleural effusion. Assessment:  Principal Problem:    Acute chest pain  Active Problems:    Chest pain  Resolved Problems:    * No resolved hospital problems. *      Plan:    1. Chest pain rule out ACS  -Chest pain/pressure with SOB and diaphoresis and nausea  -Troponin negative x 2, EKG no ST changes  -Patient has SYL score 3, HEART score 5   -Multiple risk factors include HTN, HLD, obesity, smoker, family history (brother)  -Cardio planning for cath is on hold since he is intubated and in acute alcohol withdrawal    2. Probable COPD exacerbation  -No formal diagnosis by PCP  Status post 125mg solumedrol in ER and 2 days of 40mg BID  Nebulizers; budesonide stopped after Sunday AM dose     3.  Severe alcohol withdrawal with DTs  -Per prior hospitalist, patient initially was not truthful with how much he drank, advised patient that was assessing for risk of alcohol withdrawal and still did not admit true amount of alcohol use  -Began to go into mild alcohol withdrawal on 4/10 afternoon and [facility] protocol started  -As day went on alcohol withdrawal became severe and patient required transfer to ICU as hallucinating and becoming aggressive. Lali Knutson called overnight on 4/10 in ICU  -Given phenobarbital loading dose and then became lethargic and required intubation for airway protection. Now sedated with propofol and fentanyl.   -Phenobarbital titrated, as well as valproic acid and valium 5mg Q6Hand gabapentin 800mg TID for alcohol withdrawal.     4. Hyponatremia Possibly chronic from beer potomania as is alcoholic  -Sodium 920 on admission, was also 123 last month. Improved  -Was given 1L NS bolus in ER and had about 24hrs of NS 100ml/hr. Monitor on tube feedings  --Could also be from HCTZ, losartan and lasix, will hold lasix and HCTZ for now      5. Positive blood culture gram-positive cocci in clusters  -Patient with 1 set of blood cultures positive from 4/14.  -started on Vanco 1.5 g IV q12.    6. HTN  -Home meds HCTZ, lasix, losartan and verapamil   monitor BP and adjust.  Mainly agitation is driving up blood pressure     7. HLD Continue statin     8. GERD; Continue PPI     9. Tobacco use disorder   -Smokes 1/2-1PPD for 45 years  -Counseled on smoking cessation     10. Morbid obesity with BMI 38.04  -will need to be counseled on lifestyle changes prior to discharge from hospital   11. Full code. 12.  VT prophylaxis 30 twice daily with Lovenox. 13.  Disposition probable rehab placement when medically clear in the coming week  NOTE: This report was transcribed using voice recognition software. Every effort was made to ensure accuracy; however, inadvertent computerized transcription errors may be present.      Electronically signed by Aubrey Hudson MD on 4/16/2022 at 5:20 PM

## 2022-04-16 NOTE — PROGRESS NOTES
CRITICAL CARE PROGRESS NOTE    The patient's case was discussed in multidisciplinary rounds including critical care specialist, nursing, RT and pharmacy. His evaluation is as follows:     Daily progress:  April 13, 2022: Patient was agitated and combative off sedation this morning. He has no fever, chills, rigors. He remains sedated and intubated. He continues to be off vasopressors    April 14, 2022: Patient remains sedated intubated and mechanically ventilated. Patient's ventilator was weaned down slowly but the patient was fighting the endotracheal tube. He is running low-grade fever but remains hemodynamically stable. White blood cells count has slightly increased. 4/16/2022: Patient remains sedated intubated and mechanically ventilated. He is much less wheezy today. We will attempt sedation vacation followed by spontaneous breathing trial if possible. His secretions have improved however he continues to have respiratory secretions. He has low-grade fever    ETOH withdrawal with delirium tremens  --Sedation vacation today followed by spontaneous breathing trial if possible.   --Intubated for airway protection - Vent settings 460/ f 16/ PEEP 5/ 40% FiO2  --Sedation with propofol + fentanyl and phenobarbital + Valproic acid (Increased to 750 mg q6h) + gabapentin (increased to 800 mg TID)+ scheduled diazepam was added  --Continue with ketamine  --Check triglycerides twice a week     --Continue Thiamine and folic acid    Acute hypoxemic respiratory failure due to pneumonia  --Mechanical ventilation with lung protective strategy  --Antibiotics: Cefepime/vancomycin for low-grade fever due to suspected healthcare associated pneumonia    Chest pain R/O ACS  --Followed by cardiology  --Plan for heart cath tomorrow or Tuesday at [facility] however his ETOH withdrawal may effect this plan  --Continue  beta blocker and imdur, ASA and ARB     Chronic diastolic heart failure  --Does not appear to be in acute exacerbation  --Diuretics on hold due to hyponatremia  --ECHO with normal EF    COPD exacerbation (resolved)  --Off solu-medrol  --Continue as needed aerosolized bronchodilators     Hyponatremia, chronic possible due to chronic beer intake  --Resume losartan  --Currently on tube feed  --Daily BMP     HTN  --on ARB and BB     HLD  --Stating on hold due to increased LFT, ALT now 53 and AST now 36 down from 73 and 70 respectively      GERD  --On protonix     Tobacco use disorder  --Continue nicotine patch     Morbid obesity  --will need counseling on lifestyle modifications    Prolonged QTc  --Avoid medications that prolong the QT      DVT prophylaxis with enoxaparin   Nutrition: Tube feedings  Vascular catheters: TLC needed for vasopressor support  Urinary catheter needed for strict input/output    GI prophylaxis with PPI  Physical therapy once awake  Goals of care: Full code    /67   Pulse 73   Temp 99.4 °F (37.4 °C) (Bladder)   Resp 20   Ht 6' (1.829 m)   Wt 280 lb 8 oz (127.2 kg)   SpO2 96%   BMI 38.04 kg/m²   General:Lethargic, sedated and ventilated, at times agitated and moving 4 limbs and trying to sit up. HEENT: No head lesions, PERRL, EOMI, mouth with ETT, no nasal lesions, no cervical adenopathy palpated  Respiratory: Lungs with equal breath sounds bilaterally, no adventitious sounds auscultated, no accessory muscle use  CV: Regular rate, no murmurs, no JVD, no leg edema  Abdomen: Soft, non tender, + bowel sounds, no lesions  Skin: Hydrated, adequate turgor, no rash, capillary refill <2 seconds  Extremities: Muscular strength 5/5 in 4 limbs when agitated, moves 4 limbs spontaneously, distal pulses present  Neurology: Lethargic, sedated, neck is supple, no meningitic signs present.       Last 3 CMP:    Recent Labs     04/14/22  0548 04/15/22  0445 04/16/22  0423    131* 128*   K 4.3 4.6 5.4*   CL 96* 95* 93*   CO2 26 26 27   BUN 20 11 18   CREATININE 1.0 0.7 0.7   GLUCOSE 158* 179* 266*   CALCIUM 8.7 8.5* 8.8   PROT 6.6 6.8 7.0   LABALBU 3.1* 3.0* 3.0*   BILITOT 0.4 0.5 0.3   ALKPHOS 110 159* 166*   AST 27 35 30   ALT 45* 42* 39     Recent Labs     04/16/22  0423   WBC 14.3*   RBC 4.39   HGB 11.4*   HCT 35.9*   MCV 81.8   MCH 26.0   MCHC 31.8*   RDW 19.2*      MPV 9.2       Recent Labs     04/14/22  0322   BC 24 Hours no growth       Recent Labs     04/14/22  0322   BLOODCULT2 Gram stain performed from blood culture bottle media  Gram positive cocci in clusters  Gram stain performed from blood culture bottle media  Gram positive cocci in clusters  *  This organism was isolated in one set. Susceptibility testing is not routinely done as this  organism frequently represents skin contamination. Additional testing can be ordered by calling the  Microbiology Department.          24 HR INTAKE/OUTPUT:      Intake/Output Summary (Last 24 hours) at 4/16/2022 1158  Last data filed at 4/16/2022 0715  Gross per 24 hour   Intake 3727.47 ml   Output 2325 ml   Net 1402.47 ml     MEDICATIONS:   diazePAM  10 mg Oral Q6H    PHENobarbital  130 mg IntraVENous 3 times per day    methylPREDNISolone  40 mg IntraVENous Q12H    acetylcysteine  4 mL Inhalation Q4H    ipratropium-albuterol  1 ampule Inhalation Q4H WA    metoprolol tartrate  50 mg Oral BID    vancomycin  1,500 mg IntraVENous Q12H    cefepime  2,000 mg IntraVENous Q8H    cloNIDine  1 patch TransDERmal Weekly    gabapentin  800 mg Oral TID    folic acid  1 mg Oral Daily    sodium chloride flush  5-40 mL IntraVENous 2 times per day    multivitamin  1 tablet Oral Daily    pantoprazole (PROTONIX) 40 mg injection  40 mg IntraVENous Daily    losartan  50 mg Oral Daily    rosuvastatin  10 mg Oral Daily    sodium chloride flush  5-40 mL IntraVENous 2 times per day    enoxaparin  30 mg SubCUTAneous BID    aspirin  81 mg Oral Daily    nicotine  1 patch TransDERmal Daily      ketamine (KETALAR) 5 mg/mL infusion for analgosedation 0.947 mg/kg/hr (04/16/22 0715)    sodium chloride Stopped (04/16/22 0307)    propofol 50 mcg/kg/min (04/16/22 1033)    sodium chloride Stopped (04/16/22 0306)     metoprolol, hydrALAZINE, ipratropium-albuterol, sodium chloride flush, sodium chloride, sodium chloride flush, sodium chloride, ondansetron **OR** ondansetron, polyethylene glycol, acetaminophen **OR** acetaminophen    OBJECTIVE:  Vitals:    04/16/22 0928   BP:    Pulse: 73   Resp: 20   Temp:    SpO2: 96%     FiO2 : 40 %  O2 Flow Rate (L/min): 3 L/min  O2 Device: Ventilator        LABS:  WBC   Date Value Ref Range Status   04/16/2022 14.3 (H) 4.5 - 11.5 E9/L Final   04/15/2022 14.5 (H) 4.5 - 11.5 E9/L Final   04/14/2022 13.5 (H) 4.5 - 11.5 E9/L Final     Hemoglobin   Date Value Ref Range Status   04/16/2022 11.4 (L) 12.5 - 16.5 g/dL Final   04/15/2022 11.9 (L) 12.5 - 16.5 g/dL Final   04/14/2022 11.0 (L) 12.5 - 16.5 g/dL Final     Hematocrit   Date Value Ref Range Status   04/16/2022 35.9 (L) 37.0 - 54.0 % Final   04/15/2022 36.6 (L) 37.0 - 54.0 % Final   04/14/2022 35.5 (L) 37.0 - 54.0 % Final     MCV   Date Value Ref Range Status   04/16/2022 81.8 80.0 - 99.9 fL Final   04/15/2022 83.9 80.0 - 99.9 fL Final   04/14/2022 84.9 80.0 - 99.9 fL Final     Platelets   Date Value Ref Range Status   04/16/2022 197 130 - 450 E9/L Final   04/15/2022 195 130 - 450 E9/L Final   04/14/2022 204 130 - 450 E9/L Final     Sodium   Date Value Ref Range Status   04/16/2022 128 (L) 132 - 146 mmol/L Final   04/15/2022 131 (L) 132 - 146 mmol/L Final   04/14/2022 134 132 - 146 mmol/L Final     Potassium   Date Value Ref Range Status   04/16/2022 5.4 (H) 3.5 - 5.0 mmol/L Final   04/15/2022 4.6 3.5 - 5.0 mmol/L Final   04/14/2022 4.3 3.5 - 5.0 mmol/L Final     Potassium reflex Magnesium   Date Value Ref Range Status   04/11/2022 4.5 3.5 - 5.0 mmol/L Final   04/10/2022 4.5 3.5 - 5.0 mmol/L Final   04/09/2022 4.3 3.5 - 5.0 mmol/L Final     Chloride   Date Value Ref Range Status 04/16/2022 93 (L) 98 - 107 mmol/L Final   04/15/2022 95 (L) 98 - 107 mmol/L Final   04/14/2022 96 (L) 98 - 107 mmol/L Final     CO2   Date Value Ref Range Status   04/16/2022 27 22 - 29 mmol/L Final   04/15/2022 26 22 - 29 mmol/L Final   04/14/2022 26 22 - 29 mmol/L Final     BUN   Date Value Ref Range Status   04/16/2022 18 6 - 20 mg/dL Final   04/15/2022 11 6 - 20 mg/dL Final   04/14/2022 20 6 - 20 mg/dL Final     CREATININE   Date Value Ref Range Status   04/16/2022 0.7 0.7 - 1.2 mg/dL Final   04/15/2022 0.7 0.7 - 1.2 mg/dL Final   04/14/2022 1.0 0.7 - 1.2 mg/dL Final     Glucose   Date Value Ref Range Status   04/16/2022 266 (H) 74 - 99 mg/dL Final   04/15/2022 179 (H) 74 - 99 mg/dL Final   04/14/2022 158 (H) 74 - 99 mg/dL Final     Calcium   Date Value Ref Range Status   04/16/2022 8.8 8.6 - 10.2 mg/dL Final   04/15/2022 8.5 (L) 8.6 - 10.2 mg/dL Final   04/14/2022 8.7 8.6 - 10.2 mg/dL Final     Total Protein   Date Value Ref Range Status   04/16/2022 7.0 6.4 - 8.3 g/dL Final   04/15/2022 6.8 6.4 - 8.3 g/dL Final   04/14/2022 6.6 6.4 - 8.3 g/dL Final     Albumin   Date Value Ref Range Status   04/16/2022 3.0 (L) 3.5 - 5.2 g/dL Final   04/15/2022 3.0 (L) 3.5 - 5.2 g/dL Final   04/14/2022 3.1 (L) 3.5 - 5.2 g/dL Final   10/13/2010 4.8 3.2 - 4.8 g/dL Final     Total Bilirubin   Date Value Ref Range Status   04/16/2022 0.3 0.0 - 1.2 mg/dL Final   04/15/2022 0.5 0.0 - 1.2 mg/dL Final   04/14/2022 0.4 0.0 - 1.2 mg/dL Final     Alkaline Phosphatase   Date Value Ref Range Status   04/16/2022 166 (H) 40 - 129 U/L Final   04/15/2022 159 (H) 40 - 129 U/L Final   04/14/2022 110 40 - 129 U/L Final     AST   Date Value Ref Range Status   04/16/2022 30 0 - 39 U/L Final   04/15/2022 35 0 - 39 U/L Final   04/14/2022 27 0 - 39 U/L Final     ALT   Date Value Ref Range Status   04/16/2022 39 0 - 40 U/L Final   04/15/2022 42 (H) 0 - 40 U/L Final   04/14/2022 45 (H) 0 - 40 U/L Final     GFR Non-   Date Value Ref reviewed the patients history and personnally examined the patient to confirm the physical findings described above,  And that I reviewed the relevant imaging studies and available reports. I also discussed the differential diagnosis and all of the proposed management plans with the patient and individuals accompanying the patient to this visit. They had the opportunity to ask questions about the proposed management plans and to have those questions answered. This patient has a high probability of sudden, clinically significant deterioration, which requires the highest level of physician preparedness to intervene urgently. I managed/supervised life or organ supporting interventions that required frequent physician assessment. I devoted my full attention to the direct care of this patient for the amount of time indicated below. Time I spent with the family or surrogate(s) is included only if the patient was incapable of providing the necessary information or participating in medical decisions - Time devoted to teaching and to any procedures I billed separately is not included.      CRITICAL CARE TIME:  40 minutes

## 2022-04-16 NOTE — PROGRESS NOTES
[facility]) Physicians        CARDIOLOGY                 INPATIENT PROGRESS NOTE          PATIENT SEEN IN FOLLOW UP FOR: Chest pain, CHF    Hospital Day: 8     Gertrudis Taveras is a 64year old patient known to Dr. Shanita Denney from initial consult      SUBJECTIVE:  On Vent, due to alcohol withdrawal and to protect airway     ROS: Review of rest of 10 systems limited since Pt on Vent and sedated. OBJECTIVE: Pt on Vent and sedated. Diagnostics:       Telemetry:  Reviewed     Echo 4/11/22   Summary   Technically sub-optimal images - Definity Echo contrast used. Normal left ventricular chamber size. Normal left ventricular systolic function, LVEF 24-96%. Mild left ventricular concentric hypertrophy noted. Normal diastolic function. Interatrial septum not well visualized but appears intact. Normal right ventricle size and function, TAPSE 2.3cm. Valves not well visualized. No hemodynamically significant aortic stenosis. Normal aortic root size. No evidence of pericardial effusion. No intra cardiac mass or thrombus. No comparison study available. Intake/Output Summary (Last 24 hours) at 4/16/2022 1135  Last data filed at 4/16/2022 0715  Gross per 24 hour   Intake 3727.47 ml   Output 2325 ml   Net 1402.47 ml       Labs:   CBC:   Recent Labs     04/15/22  0445 04/16/22  0423   WBC 14.5* 14.3*   HGB 11.9* 11.4*   HCT 36.6* 35.9*    197     BMP:   Recent Labs     04/15/22  0445 04/16/22  0423   * 128*   K 4.6 5.4*   CO2 26 27   BUN 11 18   CREATININE 0.7 0.7   LABGLOM >60 >60   CALCIUM 8.5* 8.8     Mag:   Recent Labs     04/15/22  0445 04/16/22  0423   MG 2.4 2.6     Phos:   Recent Labs     04/15/22  0445 04/16/22  0423   PHOS 3.2 3.2     TSH:   No results for input(s): TSH in the last 72 hours. HgA1c:     BNP: No results for input(s): BNP in the last 72 hours. PT/INR: No results for input(s): PROTIME, INR in the last 72 hours. APTT:No results for input(s):  APTT in the last 72 hours. CARDIAC ENZYMES:No results for input(s): CKTOTAL, CKMB, CKMBINDEX, TROPONINI in the last 72 hours. FASTING LIPID PANEL:  Lab Results   Component Value Date    CHOL 213 2015    HDL 41 2015    LDLCALC - 2015    TRIG 115 2022     LIVER PROFILE:  Recent Labs     04/15/22  0445 22  0423   AST 35 30   ALT 42* 39   LABALBU 3.0* 3.0*       Current Inpatient Medications:   diazePAM  10 mg Oral Q6H    PHENobarbital  130 mg IntraVENous 3 times per day    methylPREDNISolone  40 mg IntraVENous Q12H    acetylcysteine  4 mL Inhalation Q4H    ipratropium-albuterol  1 ampule Inhalation Q4H WA    metoprolol tartrate  50 mg Oral BID    vancomycin  1,500 mg IntraVENous Q12H    cefepime  2,000 mg IntraVENous Q8H    cloNIDine  1 patch TransDERmal Weekly    gabapentin  800 mg Oral TID    folic acid  1 mg Oral Daily    sodium chloride flush  5-40 mL IntraVENous 2 times per day    multivitamin  1 tablet Oral Daily    pantoprazole (PROTONIX) 40 mg injection  40 mg IntraVENous Daily    losartan  50 mg Oral Daily    rosuvastatin  10 mg Oral Daily    sodium chloride flush  5-40 mL IntraVENous 2 times per day    enoxaparin  30 mg SubCUTAneous BID    aspirin  81 mg Oral Daily    nicotine  1 patch TransDERmal Daily       IV Infusions (if any):   ketamine (KETALAR) 5 mg/mL infusion for analgosedation 0.947 mg/kg/hr (22 0715)    sodium chloride Stopped (22 0307)    propofol 50 mcg/kg/min (22 1033)    sodium chloride Stopped (22 0306)         PHYSICAL EXAM:     CONSTITUTIONAL:   /67   Pulse 73   Temp 99.4 °F (37.4 °C) (Bladder)   Resp 20   Ht 6' (1.829 m)   Wt 280 lb 8 oz (127.2 kg)   SpO2 96%   BMI 38.04 kg/m²   Pulse  Av.2  Min: 73  Max: 333  Systolic (49HZV), NABILA:956 , Min:103 , KRN:257    Diastolic (11GNO), GLQ:24, Min:48, Max:95    In general, this is a well developed, well nourished who appears stated age. Pt on vent and sedated. HEENT: Pt on vent and sedated. Neck-  no stridor, no noted enlargement of the thyroid, no carotid bruit. no jugular venous distention   RESPIRATORY: Chest symmetrical.  No accessory muscle use. Lung auscultation - few rhonchi  CARDIOVASCULAR:     Heart Ausculation - Regular rate and rhythm, 2/6 systolic murmur, No s3 or rub.   + lower extremity edema,  Distal pulses palpable, no clubbing  ABDOMEN: Bowel sounds present. No Palpable masses. MS: Pt on vent and sedated. : Deferred  Rectal Exam: Deferred  SKIN: warm and dry   NEURO / PSYCH: Pt on vent and sedated. ASSESSMENT / PLAN:    Exertional chest pain - Likely angina (CP with diaphoresis and dyspnea on exertion). MI ruled out; Continue ASA, Nitrates, BB. R/B/A discussed by Dr Cory Castillo, he was agreeable for [facility]. Indication: 3, Score: 8. Cancel Heart cath for now, Left Heart Cath when stable. Essential HTN - Increase Metorpolol 50 BID, monitor BP, HR    Dyslipidemia  - on Statin    Tobacco abuse - Will be counseled to quit smoking    Chronic HFpEF - Diuretics as needed    Probable KULDIP - Recommend Out-Pt sleep study    Non-morbid Obesity, BMI 38 - Diet, exercise and weight loss will be discussed.     Hx of Marijuana and ETOH use - Will be counseled to quit    Chronic back pain - on Oxycodone as an outpatient    Family Hx of CAD    Anemia, Chronic - Monitor H/H    History of elevated LFT's in 3/2022 - Monitor LFTs               Cardiology will see as needed during weekend  No family at bed side      Electronically signed by Charan Dorsey MD on 4/16/2022 at 11:35 AM  [facility]) Cardiology

## 2022-04-16 NOTE — PLAN OF CARE
Problem: Non-Violent Restraints  Goal: Removal from restraints as soon as assessed to be safe  4/16/2022 1824 by Michael Link RN  Outcome: Not Met This Shift     Problem: Non-Violent Restraints  Goal: No harm/injury to patient while restraints in use  4/16/2022 1824 by Michael Link RN  Outcome: Met This Shift     Problem: Non-Violent Restraints  Goal: Patient's dignity will be maintained  4/16/2022 1824 by Michael Link RN  Outcome: Met This Shift

## 2022-04-16 NOTE — PLAN OF CARE
Problem: Pain:  Goal: Pain level will decrease  Description: Pain level will decrease  Outcome: Met This Shift     Problem: Pain:  Goal: Control of acute pain  Description: Control of acute pain  Outcome: Met This Shift     Problem: Cardiac Output - Decreased:  Goal: Hemodynamic stability will improve  Description: Hemodynamic stability will improve  Outcome: Met This Shift     Problem: Pain:  Goal: Pain level will decrease  Description: Pain level will decrease  Outcome: Met This Shift     Problem: Falls - Risk of:  Goal: Will remain free from falls  Description: Will remain free from falls  Outcome: Met This Shift     Problem: Falls - Risk of:  Goal: Absence of physical injury  Description: Absence of physical injury  Outcome: Met This Shift     Problem: Non-Violent Restraints  Goal: No harm/injury to patient while restraints in use  4/16/2022 0545 by Leila Melissa RN  Outcome: Met This Shift     Problem: Non-Violent Restraints  Goal: Patient's dignity will be maintained  4/16/2022 0545 by Leila Melissa RN  Outcome: Met This Shift     Problem: Pain:  Goal: Control of chronic pain  Description: Control of chronic pain  Outcome: Not Met This Shift     Problem: Non-Violent Restraints  Goal: Removal from restraints as soon as assessed to be safe  4/16/2022 0545 by Leila Melissa RN  Outcome: Not Met This Shift

## 2022-04-16 NOTE — PROGRESS NOTES
Pharmacy Consultation Note  (Antibiotic Dosing and Monitoring)    Initial consult date: 4/15/2022  Consulting physician/provider: Dr. Christa Pérez  Drug: Vancomycin  Indication: Sepsis    Age/  Gender Height Weight IBW  Allergy Information   56 y.o./male 6' (182.9 cm) 300 lb (136.1 kg)     Ideal body weight: 77.6 kg (171 lb 1.2 oz)  Adjusted ideal body weight: 97.5 kg (214 lb 13.5 oz)   Pcn [penicillins]      Renal Function:  Recent Labs     04/14/22  0548 04/15/22  0445 04/16/22  0423   BUN 20 11 18   CREATININE 1.0 0.7 0.7       Intake/Output Summary (Last 24 hours) at 4/16/2022 1543  Last data filed at 4/16/2022 1408  Gross per 24 hour   Intake 3333 ml   Output 3025 ml   Net 308 ml       Vancomycin Monitoring:  Trough:  No results for input(s): VANCOTROUGH in the last 72 hours. Random:  No results for input(s): VANCORANDOM in the last 72 hours. Vancomycin Administration Times:  Recent vancomycin administrations      No vancomycin IV orders with administrations found. Orders not given:          vancomycin (VANCOCIN) 1,500 mg in dextrose 5 % 300 mL IVPB              Assessment:  · Patient is a 64 y.o. male who has been initiated on vancomycin for sepsis. Pt with 1/2 positive blood cultures for GPC in clusters. Additionally, patient currently intubated with increasing sputum production, temperature, and leukocytosis. Estimated Creatinine Clearance: 162 mL/min (based on SCr of 0.7 mg/dL). · To dose vancomycin, pharmacy will be utilizing [employer] calculation software for goal AUC/ANAHI 400-600 mg/L-hr    Plan:  · Vancomycin 1500 mg IV every 12 hours  · Trough tonight @ 2300;  Hold dose if level > 20 mcg/mL  · Will continue to monitor renal function   · Clinical pharmacy to follow    Alton Durand PharmD 4/16/2022 3:49 PM   [phone]

## 2022-04-17 LAB
ALBUMIN SERPL-MCNC: 2.7 G/DL (ref 3.5–5.2)
ALP BLD-CCNC: 129 U/L (ref 40–129)
ALT SERPL-CCNC: 46 U/L (ref 0–40)
AMMONIA: 33 UMOL/L (ref 16–60)
ANION GAP SERPL CALCULATED.3IONS-SCNC: 10 MMOL/L (ref 7–16)
AST SERPL-CCNC: 33 U/L (ref 0–39)
BASOPHILS ABSOLUTE: 0 E9/L (ref 0–0.2)
BASOPHILS RELATIVE PERCENT: 0 % (ref 0–2)
BILIRUB SERPL-MCNC: 0.3 MG/DL (ref 0–1.2)
BUN BLDV-MCNC: 18 MG/DL (ref 6–20)
CALCIUM SERPL-MCNC: 8.9 MG/DL (ref 8.6–10.2)
CHLORIDE BLD-SCNC: 87 MMOL/L (ref 98–107)
CO2: 24 MMOL/L (ref 22–29)
CREAT SERPL-MCNC: 0.6 MG/DL (ref 0.7–1.2)
CULTURE, BLOOD 2: ABNORMAL
EOSINOPHILS ABSOLUTE: 0 E9/L (ref 0.05–0.5)
EOSINOPHILS RELATIVE PERCENT: 0 % (ref 0–6)
GFR AFRICAN AMERICAN: >60
GFR NON-AFRICAN AMERICAN: >60 ML/MIN/1.73
GLUCOSE BLD-MCNC: 237 MG/DL (ref 74–99)
HCT VFR BLD CALC: 34.9 % (ref 37–54)
HEMOGLOBIN: 11 G/DL (ref 12.5–16.5)
LYMPHOCYTES ABSOLUTE: 0.38 E9/L (ref 1.5–4)
LYMPHOCYTES RELATIVE PERCENT: 3 % (ref 20–42)
MAGNESIUM: 2.3 MG/DL (ref 1.6–2.6)
MCH RBC QN AUTO: 26.1 PG (ref 26–35)
MCHC RBC AUTO-ENTMCNC: 31.5 % (ref 32–34.5)
MCV RBC AUTO: 82.7 FL (ref 80–99.9)
METAMYELOCYTES RELATIVE PERCENT: 3 % (ref 0–1)
METER GLUCOSE: 195 MG/DL (ref 74–99)
METER GLUCOSE: 201 MG/DL (ref 74–99)
METER GLUCOSE: 201 MG/DL (ref 74–99)
MONOCYTES ABSOLUTE: 2 E9/L (ref 0.1–0.95)
MONOCYTES RELATIVE PERCENT: 16 % (ref 2–12)
MYELOCYTE PERCENT: 2 % (ref 0–0)
NEUTROPHILS ABSOLUTE: 10.13 E9/L (ref 1.8–7.3)
NEUTROPHILS RELATIVE PERCENT: 76 % (ref 43–80)
ORGANISM: ABNORMAL
PDW BLD-RTO: 19.4 FL (ref 11.5–15)
PHOSPHORUS: 3.5 MG/DL (ref 2.5–4.5)
PLATELET # BLD: 222 E9/L (ref 130–450)
PMV BLD AUTO: 9.2 FL (ref 7–12)
POTASSIUM SERPL-SCNC: 5.2 MMOL/L (ref 3.5–5)
RBC # BLD: 4.22 E12/L (ref 3.8–5.8)
SODIUM BLD-SCNC: 121 MMOL/L (ref 132–146)
TOTAL PROTEIN: 6.8 G/DL (ref 6.4–8.3)
WBC # BLD: 12.5 E9/L (ref 4.5–11.5)

## 2022-04-17 PROCEDURE — 2580000003 HC RX 258

## 2022-04-17 PROCEDURE — 2000000000 HC ICU R&B

## 2022-04-17 PROCEDURE — 6360000002 HC RX W HCPCS: Performed by: NURSE PRACTITIONER

## 2022-04-17 PROCEDURE — 6360000002 HC RX W HCPCS: Performed by: STUDENT IN AN ORGANIZED HEALTH CARE EDUCATION/TRAINING PROGRAM

## 2022-04-17 PROCEDURE — 99232 SBSQ HOSP IP/OBS MODERATE 35: CPT | Performed by: INTERNAL MEDICINE

## 2022-04-17 PROCEDURE — 6370000000 HC RX 637 (ALT 250 FOR IP): Performed by: INTERNAL MEDICINE

## 2022-04-17 PROCEDURE — 2580000003 HC RX 258: Performed by: STUDENT IN AN ORGANIZED HEALTH CARE EDUCATION/TRAINING PROGRAM

## 2022-04-17 PROCEDURE — 80053 COMPREHEN METABOLIC PANEL: CPT

## 2022-04-17 PROCEDURE — 82962 GLUCOSE BLOOD TEST: CPT

## 2022-04-17 PROCEDURE — 85025 COMPLETE CBC W/AUTO DIFF WBC: CPT

## 2022-04-17 PROCEDURE — 94640 AIRWAY INHALATION TREATMENT: CPT

## 2022-04-17 PROCEDURE — 2580000003 HC RX 258: Performed by: INTERNAL MEDICINE

## 2022-04-17 PROCEDURE — 84100 ASSAY OF PHOSPHORUS: CPT

## 2022-04-17 PROCEDURE — 6360000002 HC RX W HCPCS: Performed by: INTERNAL MEDICINE

## 2022-04-17 PROCEDURE — 6360000002 HC RX W HCPCS

## 2022-04-17 PROCEDURE — A4216 STERILE WATER/SALINE, 10 ML: HCPCS

## 2022-04-17 PROCEDURE — 82140 ASSAY OF AMMONIA: CPT

## 2022-04-17 PROCEDURE — C9113 INJ PANTOPRAZOLE SODIUM, VIA: HCPCS

## 2022-04-17 PROCEDURE — 2580000003 HC RX 258: Performed by: NURSE PRACTITIONER

## 2022-04-17 PROCEDURE — 6370000000 HC RX 637 (ALT 250 FOR IP): Performed by: STUDENT IN AN ORGANIZED HEALTH CARE EDUCATION/TRAINING PROGRAM

## 2022-04-17 PROCEDURE — 36592 COLLECT BLOOD FROM PICC: CPT

## 2022-04-17 PROCEDURE — 2500000003 HC RX 250 WO HCPCS: Performed by: INTERNAL MEDICINE

## 2022-04-17 PROCEDURE — 94003 VENT MGMT INPAT SUBQ DAY: CPT

## 2022-04-17 PROCEDURE — 83735 ASSAY OF MAGNESIUM: CPT

## 2022-04-17 RX ORDER — DEXTROSE MONOHYDRATE 25 G/50ML
12.5 INJECTION, SOLUTION INTRAVENOUS PRN
Status: DISCONTINUED | OUTPATIENT
Start: 2022-04-17 | End: 2022-04-17 | Stop reason: SDUPTHER

## 2022-04-17 RX ORDER — MIDAZOLAM HYDROCHLORIDE 1 MG/ML
3 INJECTION INTRAMUSCULAR; INTRAVENOUS ONCE
Status: DISCONTINUED | OUTPATIENT
Start: 2022-04-17 | End: 2022-04-17 | Stop reason: ALTCHOICE

## 2022-04-17 RX ORDER — NICOTINE POLACRILEX 4 MG
15 LOZENGE BUCCAL PRN
Status: DISCONTINUED | OUTPATIENT
Start: 2022-04-17 | End: 2022-04-17 | Stop reason: SDUPTHER

## 2022-04-17 RX ORDER — FENTANYL CITRATE 0.05 MG/ML
50 INJECTION, SOLUTION INTRAMUSCULAR; INTRAVENOUS
Status: COMPLETED | OUTPATIENT
Start: 2022-04-17 | End: 2022-04-17

## 2022-04-17 RX ORDER — GLYCOPYRROLATE 1 MG/1
1 TABLET ORAL 3 TIMES DAILY
Status: DISCONTINUED | OUTPATIENT
Start: 2022-04-17 | End: 2022-04-18

## 2022-04-17 RX ORDER — DEXTROSE MONOHYDRATE 50 MG/ML
100 INJECTION, SOLUTION INTRAVENOUS PRN
Status: DISCONTINUED | OUTPATIENT
Start: 2022-04-17 | End: 2022-04-17 | Stop reason: SDUPTHER

## 2022-04-17 RX ORDER — PHENOBARBITAL SODIUM 65 MG/ML
65 INJECTION INTRAMUSCULAR EVERY 8 HOURS SCHEDULED
Status: DISCONTINUED | OUTPATIENT
Start: 2022-04-17 | End: 2022-04-18

## 2022-04-17 RX ORDER — GABAPENTIN 400 MG/1
400 CAPSULE ORAL 3 TIMES DAILY
Status: DISCONTINUED | OUTPATIENT
Start: 2022-04-17 | End: 2022-04-22

## 2022-04-17 RX ORDER — SODIUM CHLORIDE 9 MG/ML
INJECTION, SOLUTION INTRAVENOUS CONTINUOUS
Status: DISCONTINUED | OUTPATIENT
Start: 2022-04-17 | End: 2022-04-21

## 2022-04-17 RX ORDER — MIDAZOLAM HYDROCHLORIDE 5 MG/ML
INJECTION INTRAMUSCULAR; INTRAVENOUS
Status: COMPLETED
Start: 2022-04-17 | End: 2022-04-17

## 2022-04-17 RX ORDER — DEXMEDETOMIDINE HYDROCHLORIDE 4 UG/ML
.1-1.5 INJECTION, SOLUTION INTRAVENOUS CONTINUOUS
Status: DISCONTINUED | OUTPATIENT
Start: 2022-04-17 | End: 2022-04-18

## 2022-04-17 RX ADMIN — PHENOBARBITAL SODIUM 65 MG: 65 INJECTION INTRAMUSCULAR; INTRAVENOUS at 05:19

## 2022-04-17 RX ADMIN — PHENOBARBITAL SODIUM 65 MG: 65 INJECTION INTRAMUSCULAR; INTRAVENOUS at 14:01

## 2022-04-17 RX ADMIN — METHYLPREDNISOLONE SODIUM SUCCINATE 40 MG: 40 INJECTION, POWDER, FOR SOLUTION INTRAMUSCULAR; INTRAVENOUS at 10:19

## 2022-04-17 RX ADMIN — FENTANYL CITRATE 50 MCG: 50 INJECTION INTRAMUSCULAR; INTRAVENOUS at 03:10

## 2022-04-17 RX ADMIN — ACETYLCYSTEINE 400 MG: 100 INHALANT RESPIRATORY (INHALATION) at 22:21

## 2022-04-17 RX ADMIN — PROPOFOL 50 MCG/KG/MIN: 10 INJECTION, EMULSION INTRAVENOUS at 02:15

## 2022-04-17 RX ADMIN — PROPOFOL 50 MCG/KG/MIN: 10 INJECTION, EMULSION INTRAVENOUS at 05:18

## 2022-04-17 RX ADMIN — GABAPENTIN 400 MG: 400 CAPSULE ORAL at 14:00

## 2022-04-17 RX ADMIN — PROPOFOL 50 MCG/KG/MIN: 10 INJECTION, EMULSION INTRAVENOUS at 08:21

## 2022-04-17 RX ADMIN — FOLIC ACID 1 MG: 1 TABLET ORAL at 08:50

## 2022-04-17 RX ADMIN — CEFEPIME HYDROCHLORIDE 2000 MG: 2 INJECTION, POWDER, FOR SOLUTION INTRAVENOUS at 10:19

## 2022-04-17 RX ADMIN — METOPROLOL TARTRATE 50 MG: 50 TABLET, FILM COATED ORAL at 20:36

## 2022-04-17 RX ADMIN — INSULIN LISPRO 1 UNITS: 100 INJECTION, SOLUTION INTRAVENOUS; SUBCUTANEOUS at 20:38

## 2022-04-17 RX ADMIN — IPRATROPIUM BROMIDE AND ALBUTEROL SULFATE 1 AMPULE: .5; 2.5 SOLUTION RESPIRATORY (INHALATION) at 01:49

## 2022-04-17 RX ADMIN — GABAPENTIN 400 MG: 400 CAPSULE ORAL at 20:36

## 2022-04-17 RX ADMIN — SODIUM CHLORIDE 40 MG: 9 INJECTION, SOLUTION INTRAMUSCULAR; INTRAVENOUS; SUBCUTANEOUS at 08:50

## 2022-04-17 RX ADMIN — FENTANYL CITRATE 50 MCG: 50 INJECTION INTRAMUSCULAR; INTRAVENOUS at 02:07

## 2022-04-17 RX ADMIN — IPRATROPIUM BROMIDE AND ALBUTEROL SULFATE 1 AMPULE: .5; 2.5 SOLUTION RESPIRATORY (INHALATION) at 22:21

## 2022-04-17 RX ADMIN — ENOXAPARIN SODIUM 30 MG: 100 INJECTION SUBCUTANEOUS at 08:50

## 2022-04-17 RX ADMIN — ACETAMINOPHEN 650 MG: 325 TABLET ORAL at 14:01

## 2022-04-17 RX ADMIN — Medication 10 ML: at 09:03

## 2022-04-17 RX ADMIN — GABAPENTIN 600 MG: 300 CAPSULE ORAL at 08:50

## 2022-04-17 RX ADMIN — MIDAZOLAM HYDROCHLORIDE 3 MG: 5 INJECTION, SOLUTION INTRAMUSCULAR; INTRAVENOUS at 06:10

## 2022-04-17 RX ADMIN — ACETYLCYSTEINE 400 MG: 100 INHALANT RESPIRATORY (INHALATION) at 09:20

## 2022-04-17 RX ADMIN — METHYLPREDNISOLONE SODIUM SUCCINATE 40 MG: 40 INJECTION, POWDER, FOR SOLUTION INTRAMUSCULAR; INTRAVENOUS at 22:54

## 2022-04-17 RX ADMIN — GLYCOPYRROLATE 1 MG: 1 TABLET ORAL at 11:13

## 2022-04-17 RX ADMIN — DIAZEPAM 5 MG: 5 TABLET ORAL at 11:13

## 2022-04-17 RX ADMIN — VANCOMYCIN HYDROCHLORIDE 1750 MG: 500 INJECTION, POWDER, LYOPHILIZED, FOR SOLUTION INTRAVENOUS at 11:19

## 2022-04-17 RX ADMIN — GLYCOPYRROLATE 1 MG: 1 TABLET ORAL at 14:01

## 2022-04-17 RX ADMIN — INSULIN LISPRO 2 UNITS: 100 INJECTION, SOLUTION INTRAVENOUS; SUBCUTANEOUS at 17:52

## 2022-04-17 RX ADMIN — FENTANYL CITRATE 50 MCG: 50 INJECTION INTRAMUSCULAR; INTRAVENOUS at 23:00

## 2022-04-17 RX ADMIN — ENOXAPARIN SODIUM 30 MG: 100 INJECTION SUBCUTANEOUS at 20:36

## 2022-04-17 RX ADMIN — VANCOMYCIN HYDROCHLORIDE 1750 MG: 500 INJECTION, POWDER, LYOPHILIZED, FOR SOLUTION INTRAVENOUS at 23:01

## 2022-04-17 RX ADMIN — ACETYLCYSTEINE 400 MG: 100 INHALANT RESPIRATORY (INHALATION) at 01:50

## 2022-04-17 RX ADMIN — METOPROLOL TARTRATE 5 MG: 5 INJECTION, SOLUTION INTRAVENOUS at 02:10

## 2022-04-17 RX ADMIN — LOSARTAN POTASSIUM 50 MG: 50 TABLET, FILM COATED ORAL at 08:50

## 2022-04-17 RX ADMIN — INSULIN LISPRO 2 UNITS: 100 INJECTION, SOLUTION INTRAVENOUS; SUBCUTANEOUS at 08:46

## 2022-04-17 RX ADMIN — MULTIPLE VITAMINS W/ MINERALS TAB 1 TABLET: TAB at 08:50

## 2022-04-17 RX ADMIN — ACETYLCYSTEINE 400 MG: 100 INHALANT RESPIRATORY (INHALATION) at 18:36

## 2022-04-17 RX ADMIN — IPRATROPIUM BROMIDE AND ALBUTEROL SULFATE 1 AMPULE: .5; 2.5 SOLUTION RESPIRATORY (INHALATION) at 13:26

## 2022-04-17 RX ADMIN — DIAZEPAM 5 MG: 5 TABLET ORAL at 05:19

## 2022-04-17 RX ADMIN — ACETAMINOPHEN 650 MG: 325 TABLET ORAL at 20:36

## 2022-04-17 RX ADMIN — ACETYLCYSTEINE 400 MG: 100 INHALANT RESPIRATORY (INHALATION) at 13:26

## 2022-04-17 RX ADMIN — CEFEPIME HYDROCHLORIDE 2000 MG: 2 INJECTION, POWDER, FOR SOLUTION INTRAVENOUS at 02:55

## 2022-04-17 RX ADMIN — METOPROLOL TARTRATE 50 MG: 50 TABLET, FILM COATED ORAL at 08:50

## 2022-04-17 RX ADMIN — GLYCOPYRROLATE 1 MG: 1 TABLET ORAL at 20:36

## 2022-04-17 RX ADMIN — FENTANYL CITRATE 50 MCG: 50 INJECTION INTRAMUSCULAR; INTRAVENOUS at 05:54

## 2022-04-17 RX ADMIN — Medication 15 ML: at 21:00

## 2022-04-17 RX ADMIN — ASPIRIN 81 MG 81 MG: 81 TABLET ORAL at 08:50

## 2022-04-17 RX ADMIN — Medication 10 ML: at 20:46

## 2022-04-17 RX ADMIN — IPRATROPIUM BROMIDE AND ALBUTEROL SULFATE 1 AMPULE: .5; 2.5 SOLUTION RESPIRATORY (INHALATION) at 09:20

## 2022-04-17 RX ADMIN — Medication 0.2 MCG/KG/HR: at 13:58

## 2022-04-17 RX ADMIN — CEFEPIME HYDROCHLORIDE 2000 MG: 2 INJECTION, POWDER, FOR SOLUTION INTRAVENOUS at 19:54

## 2022-04-17 RX ADMIN — INSULIN LISPRO 2 UNITS: 100 INJECTION, SOLUTION INTRAVENOUS; SUBCUTANEOUS at 11:25

## 2022-04-17 RX ADMIN — IPRATROPIUM BROMIDE AND ALBUTEROL SULFATE 1 AMPULE: .5; 2.5 SOLUTION RESPIRATORY (INHALATION) at 18:35

## 2022-04-17 RX ADMIN — ROSUVASTATIN CALCIUM 10 MG: 10 TABLET, FILM COATED ORAL at 08:50

## 2022-04-17 RX ADMIN — IPRATROPIUM BROMIDE AND ALBUTEROL SULFATE 1 AMPULE: .5; 2.5 SOLUTION RESPIRATORY (INHALATION) at 06:09

## 2022-04-17 RX ADMIN — DIAZEPAM 5 MG: 5 TABLET ORAL at 17:51

## 2022-04-17 RX ADMIN — ACETYLCYSTEINE 400 MG: 100 INHALANT RESPIRATORY (INHALATION) at 06:09

## 2022-04-17 RX ADMIN — SODIUM CHLORIDE 20 ML/HR: 9 INJECTION, SOLUTION INTRAVENOUS at 14:26

## 2022-04-17 RX ADMIN — SODIUM CHLORIDE: 9 INJECTION, SOLUTION INTRAVENOUS at 06:58

## 2022-04-17 ASSESSMENT — PULMONARY FUNCTION TESTS
PIF_VALUE: 21
PIF_VALUE: 41
PIF_VALUE: 22
PIF_VALUE: 19
PIF_VALUE: 30
PIF_VALUE: 23
PIF_VALUE: 18
PIF_VALUE: 21
PIF_VALUE: 21
PIF_VALUE: 27
PIF_VALUE: 17
PIF_VALUE: 13
PIF_VALUE: 15
PIF_VALUE: 13
PIF_VALUE: 24
PIF_VALUE: 18
PIF_VALUE: 16
PIF_VALUE: 21
PIF_VALUE: 16
PIF_VALUE: 20
PIF_VALUE: 28
PIF_VALUE: 14
PIF_VALUE: 16
PIF_VALUE: 10
PIF_VALUE: 19
PIF_VALUE: 14
PIF_VALUE: 15
PIF_VALUE: 14

## 2022-04-17 ASSESSMENT — PAIN SCALES - GENERAL
PAINLEVEL_OUTOF10: 0
PAINLEVEL_OUTOF10: 2
PAINLEVEL_OUTOF10: 0
PAINLEVEL_OUTOF10: 2
PAINLEVEL_OUTOF10: 0

## 2022-04-17 NOTE — PROGRESS NOTES
Metal chain bracelet removed from patients left wrist, placed in specimen cup with patient label, and locked in patients medicine cabinet.

## 2022-04-17 NOTE — PLAN OF CARE
Problem: Non-Violent Restraints  Goal: No harm/injury to patient while restraints in use  4/17/2022 0623 by Ian Kowalski RN  Outcome: Met This Shift    Problem: Non-Violent Restraints  Goal: Patient's dignity will be maintained  4/17/2022 0623 by Ian Kowalski RN  Outcome: Met This Shift     Problem: Non-Violent Restraints  Goal: Removal from restraints as soon as assessed to be safe  4/17/2022 0623 by Ian Kwoalski RN  Outcome: Not Met This Shift     Problem: Serum Glucose Level - Abnormal:  Goal: Ability to maintain appropriate glucose levels will improve  Description: Ability to maintain appropriate glucose levels will improve  Outcome: Ongoing     Problem: Skin Integrity:  Goal: Absence of new skin breakdown  Description: Absence of new skin breakdown  Outcome: Met This Shift     Problem: Skin Integrity:  Goal: Will show no infection signs and symptoms  Description: Will show no infection signs and symptoms  Outcome: Met This Shift     Problem: Falls - Risk of:  Goal: Will remain free from falls  Description: Will remain free from falls  Outcome: Met This Shift     Problem: Falls - Risk of:  Goal: Absence of physical injury  Description: Absence of physical injury  Outcome: Met This Shift

## 2022-04-17 NOTE — PROGRESS NOTES
Pharmacy Consultation Note  (Antibiotic Dosing and Monitoring)    Initial consult date: 4/15/2022  Consulting physician/provider: Dr. Josue Rodriguez  Drug: Vancomycin  Indication: Sepsis    Age/  Gender Height Weight IBW  Allergy Information   56 y.o./male 6' (182.9 cm) 300 lb (136.1 kg)     Ideal body weight: 77.6 kg (171 lb 1.2 oz)  Adjusted ideal body weight: 97.5 kg (214 lb 13.5 oz)   Pcn [penicillins]      Renal Function:  Recent Labs     04/15/22  0445 04/16/22  0423 04/17/22  0522   BUN 11 18 18   CREATININE 0.7 0.7 0.6*       Intake/Output Summary (Last 24 hours) at 4/17/2022 0752  Last data filed at 4/17/2022 0612  Gross per 24 hour   Intake 2345.99 ml   Output 4250 ml   Net -1904.01 ml       Vancomycin Monitoring:  Trough:    Recent Labs     04/16/22  2305   VANCOTROUGH 5.7     Random:  No results for input(s): VANCORANDOM in the last 72 hours. Recent vancomycin administrations                   vancomycin (VANCOCIN) 1,500 mg in dextrose 5 % 300 mL IVPB (mg) 1,500 mg New Bag 04/16/22 2345     1,500 mg New Bag  1112     1,500 mg New Bag 04/15/22 2336     1,500 mg New Bag  1208              Assessment:  · Patient is a 64 y.o. male who has been initiated on vancomycin for sepsis. Pt with 1/2 positive blood cultures for GPC in clusters. Additionally, patient currently intubated with increasing sputum production, temperature, and leukocytosis. Estimated Creatinine Clearance: 189 mL/min (A) (based on SCr of 0.6 mg/dL (L)). · To dose vancomycin, pharmacy will be utilizing [employer] â€[employer]/ calculation software for goal AUC/ANAHI 400-600 mg/L-hr   · 4/16: Trough level @ 2305 = 5.7 mcg/mL.  AUC/ANAHI 387 mg/L.hr    Plan:  · Increase dose to vancomycin 1750 mg IV every 12 hours (est AUC/ANAHI 451 mg/L.hr)  · Repeat level when needed  · Will continue to monitor renal function   · Clinical pharmacy to follow    Indu Batista PharmD, [facility] 4/17/2022 7:57 AM   Ext: [phone]

## 2022-04-17 NOTE — PROGRESS NOTES
[address] [place] [address]   ICU Progress Note    Admitting Date and Time: 4/9/2022  4:02 PM  Admit Dx: Hyponatremia [E87.1]  COPD exacerbation ([address] [J44.1]  Acute chest pain [R07.9]  Chest pain, unspecified type [R07.9]  Chest pain [R07.9]    Subjective:    4/17: Pt intubated and sedated currently      Per RN: Not very successful in weaning sedation  ROS: unable to assess due to being intubated and sedated     vancomycin  1,750 mg IntraVENous Q12H    glycopyrrolate  1 mg Oral TID    gabapentin  400 mg Oral TID    insulin lispro  0-6 Units SubCUTAneous TID WC    insulin lispro  0-3 Units SubCUTAneous Nightly    diazePAM  5 mg Oral Q6H    PHENobarbital  65 mg IntraVENous 3 times per day    methylPREDNISolone  40 mg IntraVENous Q12H    acetylcysteine  4 mL Inhalation Q4H    ipratropium-albuterol  1 ampule Inhalation Q4H WA    metoprolol tartrate  50 mg Oral BID    cefepime  2,000 mg IntraVENous Q8H    cloNIDine  1 patch TransDERmal Weekly    folic acid  1 mg Oral Daily    sodium chloride flush  5-40 mL IntraVENous 2 times per day    multivitamin  1 tablet Oral Daily    pantoprazole (PROTONIX) 40 mg injection  40 mg IntraVENous Daily    losartan  50 mg Oral Daily    rosuvastatin  10 mg Oral Daily    sodium chloride flush  5-40 mL IntraVENous 2 times per day    enoxaparin  30 mg SubCUTAneous BID    aspirin  81 mg Oral Daily    nicotine  1 patch TransDERmal Daily     dextrose bolus (hypoglycemia), 125 mL, PRN   Or  dextrose bolus (hypoglycemia), 250 mL, PRN  glucose, 15 g, PRN  glucagon (rDNA), 1 mg, PRN  dextrose, 100 mL/hr, PRN  fentanNYL, 50 mcg, Q1H PRN  metoprolol, 5 mg, Q6H PRN  hydrALAZINE, 10 mg, Q6H PRN  ipratropium-albuterol, 1 ampule, Q4H PRN  sodium chloride flush, 5-40 mL, PRN  sodium chloride, , PRN  sodium chloride flush, 5-40 mL, PRN  sodium chloride, , PRN  ondansetron, 4 mg, Q8H PRN   Or  ondansetron, 4 mg, Q6H PRN  polyethylene glycol, 17 g, Daily Negative    Narrative:     Source: URINE       Site:              LEGIONELLA ANTIGEN, URINE [[id number]] Collected: 04/09/22 2034   Order Status: Completed Specimen: Urine voided Updated: 04/11/22 1127    L. pneumophila Serogp 1 Ur Ag --    Presumptive Negative -suggesting no recent or current infections   with Legionella pneumophila serogroup 1. Infection to Legionella cannot be ruled out since other serogroups   and species may cause infection, antigen may not be present in   early infection, or level of antigen may be below the   detection limit. Normal Range: Presumptive Negative    Narrative:     Source: URV       Site: Urine Clean Catch             COVID-19 & Influenza Combo [[id number]] Collected: 04/10/22 0843   Order Status: Completed Specimen: Nasopharyngeal Swab Updated: 04/10/22 0944    SARS-CoV-2 RNA, RT PCR NOT DETECTED    Comment: Not Detected results do not preclude SARS-CoV-2 infection and   should not be used as the sole basis for patient management   decisions.  Results must be combined with clinical observations,   patient history, and epidemiological information. Testing was performed using [employer] SARS-CoV-2 and Influenza A/B   nucleic acid assay. This test is a multiplex Real-Time Reverse   Transcriptase Polymerase Chain Reaction (RT-PCR)-based in vitro   diagnostic test intended for the qualitative detection of nucleic   acids from SARS-CoV-2, influenza A, and influenza B in nasopharyngeal   and nasal swab specimens for use under the FDAs Emergency Use   Authorization (EUA) only.      Patient Fact Sheet: FindDrives.pl   Provider Fact Sheet: FindDrives.pl   EUA: FindDrives.pl   IFU: FindDrives.pl     Methodology:  RT-PCR        INFLUENZA A NOT DETECTED    INFLUENZA B NOT DETECTED     Culture, Blood 2 [[id number]] (Abnormal) Collected: 04/14/22 0322   Order Status: Completed Specimen: Blood Updated: 04/15/22 1046    Culture, Blood 2 -- Abnormal     Gram stain performed from blood culture bottle media   Gram positive cocci in clusters    Abnormal    Narrative:     Charissa Frank [id number],   Microbiology results called to and read back by Osbaldo Gallagher RN, 04/15/2022   10:46, by [facility]   Culture, Blood 1 [[id number]] Collected: 04/14/22 0322   Order Status: Completed Specimen: Blood Updated: 04/15/22 0935    Blood Culture, Routine 24 Hours no growth   Narrative:     Source: BLOOD       Site: Hand, Left          Radiology:   XR CHEST PORTABLE   Final Result   New atelectasis and or infiltrate at the right base. XR ABDOMEN FOR NG/OG/NE TUBE PLACEMENT   Final Result   Nasogastric tube with the tip in the proximal gastric body. XR CHEST PORTABLE   Final Result   Adequately positioned endotracheal tube, orogastric tube and right internal   jugular central venous line. XR CHEST PORTABLE   Final Result   No pneumonia or pleural effusion. Assessment:  Principal Problem:    Acute chest pain  Active Problems:    Chest pain  Resolved Problems:    * No resolved hospital problems. *      Plan:    1. Chest pain ruled out ACS / MI  -Chest pain/pressure with SOB and diaphoresis and nausea  -Troponin negative x 2, EKG no ST changes  -Patient has SYL score 3, HEART score 5   -Multiple risk factors include HTN, HLD, obesity, smoker, family history (brother)  -Cardio planning for cath is on hold since he is intubated and in acute alcohol withdrawal  Meanwhile ASA, Nitrates, BB (up titrated), statin    2. Probable COPD exacerbation  No formal diagnosis by PCP  Status post 125mg solumedrol in ER and 2 days of 40mg BID  Nebulizers; budesonide stopped after Sunday AM dose     3.  Severe alcohol withdrawal with DTs  -Per prior hospitalist, patient initially was not truthful with how much he drank, advised patient that was assessing for risk of alcohol withdrawal and still did not admit true amount of alcohol use  -Began to go into mild alcohol withdrawal on 4/10 afternoon and CIWA protocol started  -As day went on alcohol withdrawal became severe and patient required transfer to ICU as hallucinating and becoming aggressive. Lali Knutson called overnight on 4/10 in ICU  -Given phenobarbital loading dose and then became lethargic and required intubation for airway protection. Now sedated with propofol and fentanyl.   -Phenobarbital titrated, as well as valproic acid and valium 5mg Q6Hand gabapentin 800mg TID for alcohol withdrawal.   4/17: diprivan changing to precedex    4. Hyponatremia Possibly chronic from beer potomania as is alcoholic  -Sodium 403 on admission, was also 123 last month. Improved  -Was given 1L NS bolus in ER and had about 24hrs of NS 100ml/hr. Monitor on tube feedings  --Could also be from HCTZ, losartan and lasix, will hold lasix and HCTZ for now      5. Positive blood culture gram-positive cocci in clusters  -Patient with 1 set of blood cultures positive from 4/14.  -started on Vanco 1.5 g IV q12.    6. HTN  -Home meds HCTZ, lasix, losartan and verapamil   monitor BP and adjust.  Mainly agitation is driving up blood pressure     7. HLD Continue statin     8. GERD; Continue PPI     9. Tobacco use disorder   -Smokes 1/2-1PPD for 45 years  -Counseled on smoking cessation     10. Morbid obesity with BMI 38.04  -will need to be counseled on lifestyle changes prior to discharge from hospital once he is awake    11. KULDIP: no previous diagnosis. Need ooutpatient sleep study and pfts  12 Full code. 13  VT prophylaxis 30 twice daily with Lovenox. 14.  Disposition probable rehab placement when medically clear in the coming week  NOTE: This report was transcribed using voice recognition software. Every effort was made to ensure accuracy; however, inadvertent computerized transcription errors may be present.      Electronically signed by Yris Mitchell MD on 4/17/2022 at 3:05 PM

## 2022-04-17 NOTE — PLAN OF CARE
Problem: Falls - Risk of:  Goal: Will remain free from falls  Description: Will remain free from falls  4/17/2022 1812 by Celi Iglesias RN  Outcome: Met This Shift     Problem: Falls - Risk of:  Goal: Absence of physical injury  Description: Absence of physical injury  4/17/2022 1812 by Celi Iglesias RN  Outcome: Met This Shift     Problem: Non-Violent Restraints  Goal: No harm/injury to patient while restraints in use  4/17/2022 1812 by Celi Iglesias RN  Outcome: Met This Shift     Problem: Non-Violent Restraints  Goal: Patient's dignity will be maintained  4/17/2022 1812 by Celi Iglesias RN  Outcome: Met This Shift     Problem: Skin Integrity:  Goal: Will show no infection signs and symptoms  Description: Will show no infection signs and symptoms  4/17/2022 1812 by Celi Iglesias RN  Outcome: Met This Shift     Problem: Skin Integrity:  Goal: Absence of new skin breakdown  Description: Absence of new skin breakdown  4/17/2022 1812 by Celi Iglesias RN  Outcome: Met This Shift     Problem: Non-Violent Restraints  Goal: Removal from restraints as soon as assessed to be safe  4/17/2022 1812 by Celi Ilgesias RN  Outcome: Not Met This Shift   Patient continues to try to pull at lines, drains, & tubes when released from bilateral soft wrist restraints.

## 2022-04-17 NOTE — PROGRESS NOTES
blood cultures is growing oxidase negative Staphylococcus. Respiratory culture is growing Staph aureus. --Add glycopyrrolate for increased secretions. Chest pain R/O ACS  --Followed by cardiology  --Plan for heart cath tomorrow or Tuesday at [facility] however his ETOH withdrawal may effect this plan  --Continue  beta blocker and imdur, ASA and ARB     Chronic diastolic heart failure  --Does not appear to be in acute exacerbation  --Diuretics on hold due to hyponatremia  --ECHO with normal EF    COPD exacerbation (resolved)  --On solu-medrol  --Continue as needed aerosolized bronchodilators     Hyponatremia, chronic possible due to chronic beer intake  --Resume losartan  --Currently on tube feed and tolerating it. --Daily BMP     HTN  --on ARB and BB     HLD  --Stating on hold due to increased LFT, ALT now 53 and AST now 36 down from 73 and 70 respectively      GERD  --On protonix     Tobacco use disorder  --Continue nicotine patch     Morbid obesity  --will need counseling on lifestyle modifications    Prolonged QTc  --Avoid medications that prolong the QT      DVT prophylaxis with enoxaparin   Nutrition: Tube feedings  Vascular catheters: TLC needed for vasopressor support  Urinary catheter needed for strict input/output    GI prophylaxis with PPI  Physical therapy once awake  Goals of care: Full code    /70   Pulse 87   Temp 99.8 °F (37.7 °C) (Bladder)   Resp 23   Ht 6' (1.829 m)   Wt 280 lb 8 oz (127.2 kg)   SpO2 95%   BMI 38.04 kg/m²   General:Lethargic, sedated and ventilated, at times agitated and moving 4 limbs and trying to sit up.    HEENT: No head lesions, PERRL, EOMI, mouth with ETT, no nasal lesions, no cervical adenopathy palpated  Respiratory: Lungs with equal breath sounds bilaterally, no adventitious sounds auscultated, no accessory muscle use  CV: Regular rate, no murmurs, no JVD, no leg edema  Abdomen: Soft, non tender, + bowel sounds, no lesions  Skin: Hydrated, adequate turgor, no rash, capillary refill <2 seconds  Extremities: Muscular strength 5/5 in 4 limbs when agitated, moves 4 limbs spontaneously, distal pulses present  Neurology: Lethargic, sedated, neck is supple, no meningitic signs present. Last 3 CMP:    Recent Labs     04/15/22  0445 04/16/22  0423 04/17/22  0522   * 128* 121*   K 4.6 5.4* 5.2*   CL 95* 93* 87*   CO2 26 27 24   BUN 11 18 18   CREATININE 0.7 0.7 0.6*   GLUCOSE 179* 266* 237*   CALCIUM 8.5* 8.8 8.9   PROT 6.8 7.0 6.8   LABALBU 3.0* 3.0* 2.7*   BILITOT 0.5 0.3 0.3   ALKPHOS 159* 166* 129   AST 35 30 33   ALT 42* 39 46*     Recent Labs     04/17/22  0522   WBC 12.5*   RBC 4.22   HGB 11.0*   HCT 34.9*   MCV 82.7   MCH 26.1   MCHC 31.5*   RDW 19.4*      MPV 9.2       No results for input(s): BC in the last 72 hours. No results for input(s): [place] in the last 72 hours.     24 HR INTAKE/OUTPUT:      Intake/Output Summary (Last 24 hours) at 4/17/2022 0939  Last data filed at 4/17/2022 0612  Gross per 24 hour   Intake 2345.99 ml   Output 4250 ml   Net -1904.01 ml     MEDICATIONS:   vancomycin  1,750 mg IntraVENous Q12H    insulin lispro  0-6 Units SubCUTAneous TID     insulin lispro  0-3 Units SubCUTAneous Nightly    diazePAM  5 mg Oral Q6H    gabapentin  600 mg Oral TID    PHENobarbital  65 mg IntraVENous 3 times per day    methylPREDNISolone  40 mg IntraVENous Q12H    acetylcysteine  4 mL Inhalation Q4H    ipratropium-albuterol  1 ampule Inhalation Q4H WA    metoprolol tartrate  50 mg Oral BID    cefepime  2,000 mg IntraVENous Q8H    cloNIDine  1 patch TransDERmal Weekly    folic acid  1 mg Oral Daily    sodium chloride flush  5-40 mL IntraVENous 2 times per day    multivitamin  1 tablet Oral Daily    pantoprazole (PROTONIX) 40 mg injection  40 mg IntraVENous Daily    losartan  50 mg Oral Daily    rosuvastatin  10 mg Oral Daily    sodium chloride flush  5-40 mL IntraVENous 2 times per day    enoxaparin  30 mg SubCUTAneous BID    aspirin  81 mg Oral Daily    nicotine  1 patch TransDERmal Daily      sodium chloride 50 mL/hr at 04/17/22 0658    dextrose      ketamine (KETALAR) 5 mg/mL infusion for analgosedation Stopped (04/16/22 1936)    sodium chloride Stopped (04/16/22 0307)    propofol 50 mcg/kg/min (04/17/22 0821)    sodium chloride Stopped (04/16/22 0306)     dextrose bolus (hypoglycemia) **OR** dextrose bolus (hypoglycemia), glucose, glucagon (rDNA), dextrose, fentanNYL, metoprolol, hydrALAZINE, ipratropium-albuterol, sodium chloride flush, sodium chloride, sodium chloride flush, sodium chloride, ondansetron **OR** ondansetron, polyethylene glycol, acetaminophen **OR** acetaminophen    OBJECTIVE:  Vitals:    04/17/22 0800   BP:    Pulse:    Resp:    Temp: 99.8 °F (37.7 °C)   SpO2:      FiO2 : 40 %  O2 Flow Rate (L/min): 3 L/min  O2 Device: Ventilator        LABS:  WBC   Date Value Ref Range Status   04/17/2022 12.5 (H) 4.5 - 11.5 E9/L Final   04/16/2022 14.3 (H) 4.5 - 11.5 E9/L Final   04/15/2022 14.5 (H) 4.5 - 11.5 E9/L Final     Hemoglobin   Date Value Ref Range Status   04/17/2022 11.0 (L) 12.5 - 16.5 g/dL Final   04/16/2022 11.4 (L) 12.5 - 16.5 g/dL Final   04/15/2022 11.9 (L) 12.5 - 16.5 g/dL Final     Hematocrit   Date Value Ref Range Status   04/17/2022 34.9 (L) 37.0 - 54.0 % Final   04/16/2022 35.9 (L) 37.0 - 54.0 % Final   04/15/2022 36.6 (L) 37.0 - 54.0 % Final     MCV   Date Value Ref Range Status   04/17/2022 82.7 80.0 - 99.9 fL Final   04/16/2022 81.8 80.0 - 99.9 fL Final   04/15/2022 83.9 80.0 - 99.9 fL Final     Platelets   Date Value Ref Range Status   04/17/2022 222 130 - 450 E9/L Final   04/16/2022 197 130 - 450 E9/L Final   04/15/2022 195 130 - 450 E9/L Final     Sodium   Date Value Ref Range Status   04/17/2022 121 (L) 132 - 146 mmol/L Final   04/16/2022 128 (L) 132 - 146 mmol/L Final   04/15/2022 131 (L) 132 - 146 mmol/L Final     Potassium   Date Value Ref Range Status   04/17/2022 5.2 (H) 3.5 - 5.0 mmol/L Final   04/16/2022 5.4 (H) 3.5 - 5.0 mmol/L Final   04/15/2022 4.6 3.5 - 5.0 mmol/L Final     Potassium reflex Magnesium   Date Value Ref Range Status   04/11/2022 4.5 3.5 - 5.0 mmol/L Final   04/10/2022 4.5 3.5 - 5.0 mmol/L Final   04/09/2022 4.3 3.5 - 5.0 mmol/L Final     Chloride   Date Value Ref Range Status   04/17/2022 87 (L) 98 - 107 mmol/L Final   04/16/2022 93 (L) 98 - 107 mmol/L Final   04/15/2022 95 (L) 98 - 107 mmol/L Final     CO2   Date Value Ref Range Status   04/17/2022 24 22 - 29 mmol/L Final   04/16/2022 27 22 - 29 mmol/L Final   04/15/2022 26 22 - 29 mmol/L Final     BUN   Date Value Ref Range Status   04/17/2022 18 6 - 20 mg/dL Final   04/16/2022 18 6 - 20 mg/dL Final   04/15/2022 11 6 - 20 mg/dL Final     CREATININE   Date Value Ref Range Status   04/17/2022 0.6 (L) 0.7 - 1.2 mg/dL Final   04/16/2022 0.7 0.7 - 1.2 mg/dL Final   04/15/2022 0.7 0.7 - 1.2 mg/dL Final     Glucose   Date Value Ref Range Status   04/17/2022 237 (H) 74 - 99 mg/dL Final   04/16/2022 266 (H) 74 - 99 mg/dL Final   04/15/2022 179 (H) 74 - 99 mg/dL Final     Calcium   Date Value Ref Range Status   04/17/2022 8.9 8.6 - 10.2 mg/dL Final   04/16/2022 8.8 8.6 - 10.2 mg/dL Final   04/15/2022 8.5 (L) 8.6 - 10.2 mg/dL Final     Total Protein   Date Value Ref Range Status   04/17/2022 6.8 6.4 - 8.3 g/dL Final   04/16/2022 7.0 6.4 - 8.3 g/dL Final   04/15/2022 6.8 6.4 - 8.3 g/dL Final     Albumin   Date Value Ref Range Status   04/17/2022 2.7 (L) 3.5 - 5.2 g/dL Final   04/16/2022 3.0 (L) 3.5 - 5.2 g/dL Final   04/15/2022 3.0 (L) 3.5 - 5.2 g/dL Final   10/13/2010 4.8 3.2 - 4.8 g/dL Final     Total Bilirubin   Date Value Ref Range Status   04/17/2022 0.3 0.0 - 1.2 mg/dL Final   04/16/2022 0.3 0.0 - 1.2 mg/dL Final   04/15/2022 0.5 0.0 - 1.2 mg/dL Final     Alkaline Phosphatase   Date Value Ref Range Status   04/17/2022 129 40 - 129 U/L Final   04/16/2022 166 (H) 40 - 129 U/L Final   04/15/2022 159 (H) 40 - 129 U/L Final     AST   Date Value Ref Range Status   04/17/2022 33 0 - 39 U/L Final   04/16/2022 30 0 - 39 U/L Final   04/15/2022 35 0 - 39 U/L Final     ALT   Date Value Ref Range Status   04/17/2022 46 (H) 0 - 40 U/L Final   04/16/2022 39 0 - 40 U/L Final   04/15/2022 42 (H) 0 - 40 U/L Final     GFR Non-   Date Value Ref Range Status   04/17/2022 >60 >=60 mL/min/1.73 Final     Comment:     Chronic Kidney Disease: less than 60 ml/min/1.73 sq.m. Kidney Failure: less than 15 ml/min/1.73 sq.m. Results valid for patients 18 years and older. 04/16/2022 >60 >=60 mL/min/1.73 Final     Comment:     Chronic Kidney Disease: less than 60 ml/min/1.73 sq.m. Kidney Failure: less than 15 ml/min/1.73 sq.m. Results valid for patients 18 years and older. 04/15/2022 >60 >=60 mL/min/1.73 Final     Comment:     Chronic Kidney Disease: less than 60 ml/min/1.73 sq.m. Kidney Failure: less than 15 ml/min/1.73 sq.m. Results valid for patients 18 years and older. GFR    Date Value Ref Range Status   04/17/2022 >60  Final   04/16/2022 >60  Final   04/15/2022 >60  Final     Magnesium   Date Value Ref Range Status   04/17/2022 2.3 1.6 - 2.6 mg/dL Final   04/16/2022 2.6 1.6 - 2.6 mg/dL Final   04/15/2022 2.4 1.6 - 2.6 mg/dL Final     Phosphorus   Date Value Ref Range Status   04/17/2022 3.5 2.5 - 4.5 mg/dL Final   04/16/2022 3.2 2.5 - 4.5 mg/dL Final   04/15/2022 3.2 2.5 - 4.5 mg/dL Final     No results for input(s): PH, PO2, PCO2, HCO3, BE, O2SAT in the last 72 hours. RADIOLOGY:  XR CHEST PORTABLE   Final Result   New atelectasis and or infiltrate at the right base. XR ABDOMEN FOR NG/OG/NE TUBE PLACEMENT   Final Result   Nasogastric tube with the tip in the proximal gastric body. XR CHEST PORTABLE   Final Result   Adequately positioned endotracheal tube, orogastric tube and right internal   jugular central venous line.          XR CHEST PORTABLE   Final Result   No pneumonia or pleural effusion. PROBLEM LIST:  Principal Problem:    Acute chest pain  Active Problems:    Chest pain  Resolved Problems:    * No resolved hospital problems. *          ATTESTATION:  ICU Staff Physician note of personal involvement in Care  As the attending physician, I certify that I personally reviewed the patients history and personnally examined the patient to confirm the physical findings described above,  And that I reviewed the relevant imaging studies and available reports. I also discussed the differential diagnosis and all of the proposed management plans with the patient and individuals accompanying the patient to this visit. They had the opportunity to ask questions about the proposed management plans and to have those questions answered. This patient has a high probability of sudden, clinically significant deterioration, which requires the highest level of physician preparedness to intervene urgently. I managed/supervised life or organ supporting interventions that required frequent physician assessment. I devoted my full attention to the direct care of this patient for the amount of time indicated below. Time I spent with the family or surrogate(s) is included only if the patient was incapable of providing the necessary information or participating in medical decisions - Time devoted to teaching and to any procedures I billed separately is not included.      CRITICAL CARE TIME:  40 minutes

## 2022-04-17 NOTE — PROGRESS NOTES
Contacted Allie Duenas (patient's son) regarding the patient's facial hair interfering with central line dressing change. Dorotheasandeep Henrique agreed and gave permission to shave beard if need be.      Electronically signed by Linda Gomez RN on 4/16/2022 at 9:48 PM

## 2022-04-17 NOTE — PROGRESS NOTES
[facility]) Physicians        CARDIOLOGY                 INPATIENT PROGRESS NOTE          PATIENT SEEN IN FOLLOW UP FOR: Chest pain, CHF    Hospital Day: 9     Dara Lyn is a 64year old patient known to Dr. Rowena Aponte from initial consult      SUBJECTIVE:  On Vent, due to alcohol withdrawal and to protect airway     ROS: Review of rest of 10 systems limited since Pt on Vent and sedated. OBJECTIVE: Pt on Vent and sedated. Diagnostics:       Telemetry:  Reviewed     Echo 4/11/22   Summary   Technically sub-optimal images - Definity Echo contrast used. Normal left ventricular chamber size. Normal left ventricular systolic function, LVEF 18-96%. Mild left ventricular concentric hypertrophy noted. Normal diastolic function. Interatrial septum not well visualized but appears intact. Normal right ventricle size and function, TAPSE 2.3cm. Valves not well visualized. No hemodynamically significant aortic stenosis. Normal aortic root size. No evidence of pericardial effusion. No intra cardiac mass or thrombus. No comparison study available. Intake/Output Summary (Last 24 hours) at 4/17/2022 1120  Last data filed at 4/17/2022 0612  Gross per 24 hour   Intake 2345.99 ml   Output 4250 ml   Net -1904.01 ml       Labs:   CBC:   Recent Labs     04/16/22 0423 04/17/22  0522   WBC 14.3* 12.5*   HGB 11.4* 11.0*   HCT 35.9* 34.9*    222     BMP:   Recent Labs     04/16/22 0423 04/17/22  0522   * 121*   K 5.4* 5.2*   CO2 27 24   BUN 18 18   CREATININE 0.7 0.6*   LABGLOM >60 >60   CALCIUM 8.8 8.9     Mag:   Recent Labs     04/16/22 0423 04/17/22  0522   MG 2.6 2.3     Phos:   Recent Labs     04/16/22 0423 04/17/22  0522   PHOS 3.2 3.5     TSH:   No results for input(s): TSH in the last 72 hours. HgA1c:     BNP: No results for input(s): BNP in the last 72 hours. PT/INR: No results for input(s): PROTIME, INR in the last 72 hours. APTT:No results for input(s):  APTT in the last 72 hours. CARDIAC ENZYMES:No results for input(s): CKTOTAL, CKMB, CKMBINDEX, TROPONINI in the last 72 hours.   FASTING LIPID PANEL:  Lab Results   Component Value Date    CHOL 213 2015    HDL 41 2015    LDLCALC - 2015    TRIG 115 2022     LIVER PROFILE:  Recent Labs     22  0423 22  0522   AST 30 33   ALT 39 46*   LABALBU 3.0* 2.7*       Current Inpatient Medications:   vancomycin  1,750 mg IntraVENous Q12H    glycopyrrolate  1 mg Oral TID    gabapentin  400 mg Oral TID    insulin lispro  0-6 Units SubCUTAneous TID WC    insulin lispro  0-3 Units SubCUTAneous Nightly    diazePAM  5 mg Oral Q6H    PHENobarbital  65 mg IntraVENous 3 times per day    methylPREDNISolone  40 mg IntraVENous Q12H    acetylcysteine  4 mL Inhalation Q4H    ipratropium-albuterol  1 ampule Inhalation Q4H WA    metoprolol tartrate  50 mg Oral BID    cefepime  2,000 mg IntraVENous Q8H    cloNIDine  1 patch TransDERmal Weekly    folic acid  1 mg Oral Daily    sodium chloride flush  5-40 mL IntraVENous 2 times per day    multivitamin  1 tablet Oral Daily    pantoprazole (PROTONIX) 40 mg injection  40 mg IntraVENous Daily    losartan  50 mg Oral Daily    rosuvastatin  10 mg Oral Daily    sodium chloride flush  5-40 mL IntraVENous 2 times per day    enoxaparin  30 mg SubCUTAneous BID    aspirin  81 mg Oral Daily    nicotine  1 patch TransDERmal Daily       IV Infusions (if any):   sodium chloride 50 mL/hr at 22 0658    dextrose      [Held by provider] ketamine (KETALAR) 5 mg/mL infusion for analgosedation Stopped (22 193)    sodium chloride Stopped (227)    propofol Stopped (22 1015)    sodium chloride Stopped (22 030)         PHYSICAL EXAM:     CONSTITUTIONAL:   /67   Pulse 85   Temp 99.8 °F (37.7 °C) (Bladder)   Resp 26   Ht 6' (1.829 m)   Wt 280 lb 8 oz (127.2 kg)   SpO2 92%   BMI 38.04 kg/m²   Pulse  Av.5  Min: 80 Max: 536  Systolic (64AAI), LLU:684 , Min:111 , GRV:559    Diastolic (88UBU), TCU:94, Min:61, Max:113    In general, this is a well developed, well nourished who appears stated age. Pt on vent and sedated. HEENT: Pt on vent and sedated. Neck-  no stridor, no noted enlargement of the thyroid, no carotid bruit. no jugular venous distention   RESPIRATORY: Chest symmetrical.  No accessory muscle use. Lung auscultation - few rhonchi  CARDIOVASCULAR:     Heart Ausculation - Regular rate and rhythm, 2/6 systolic murmur, No s3 or rub.   + lower extremity edema,  Distal pulses palpable, no clubbing  ABDOMEN: Bowel sounds present. No Palpable masses. MS: Pt on vent and sedated. : Deferred  Rectal Exam: Deferred  SKIN: warm and dry   NEURO / PSYCH: Pt on vent and sedated. ASSESSMENT / PLAN:    Exertional chest pain - Likely angina (CP with diaphoresis and dyspnea on exertion). MI ruled out; Continue ASA, Nitrates, BB. R/B/A discussed by Dr Danilo Marshall, he was agreeable for [facility]. Indication: 3, Score: 8. Cancel Heart cath for now, Left Heart Cath when stable. Essential HTN - Increase Metorpolol 50 BID, monitor BP, HR    Dyslipidemia  - on Statin    Tobacco abuse - Will be counseled to quit smoking    Chronic HFpEF - Diuretics as needed    Probable KULDIP - Recommend Out-Pt sleep study    Non-morbid Obesity, BMI 38 - Diet, exercise and weight loss will be discussed.     Hx of Marijuana and ETOH use - Will be counseled to quit    Chronic back pain - on Oxycodone as an outpatient    Family Hx of CAD    Anemia, Chronic - Monitor H/H    History of elevated LFT's in 3/2022 - Monitor LFTs           No family at bed side  D/w his nursing staff        Electronically signed by Laura Tovar MD on 4/17/2022 at 11:20 AM  [facility]) Cardiology

## 2022-04-17 NOTE — PROGRESS NOTES
Pt's son Dejan Person called in for update- Answered all questions at this time. Dejan Person requested to be notified if a spontaneous breathing trial was to occur and wishes for a call to be present during that time.      Electronically signed by Vanita Weber RN on 4/16/2022 at 8:04 PM

## 2022-04-18 ENCOUNTER — APPOINTMENT (OUTPATIENT)
Dept: GENERAL RADIOLOGY | Age: 57
DRG: 896 | End: 2022-04-18
Payer: MEDICARE

## 2022-04-18 LAB
ALBUMIN SERPL-MCNC: 3.2 G/DL (ref 3.5–5.2)
ALP BLD-CCNC: 137 U/L (ref 40–129)
ALT SERPL-CCNC: 65 U/L (ref 0–40)
ANION GAP SERPL CALCULATED.3IONS-SCNC: 11 MMOL/L (ref 7–16)
ANISOCYTOSIS: ABNORMAL
AST SERPL-CCNC: 43 U/L (ref 0–39)
B.E.: 1.6 MMOL/L (ref -3–3)
BASOPHILS ABSOLUTE: 0 E9/L (ref 0–0.2)
BASOPHILS RELATIVE PERCENT: 0.8 % (ref 0–2)
BILIRUB SERPL-MCNC: 0.6 MG/DL (ref 0–1.2)
BUN BLDV-MCNC: 19 MG/DL (ref 6–20)
CALCIUM SERPL-MCNC: 9.6 MG/DL (ref 8.6–10.2)
CHLORIDE BLD-SCNC: 96 MMOL/L (ref 98–107)
CO2: 26 MMOL/L (ref 22–29)
CREAT SERPL-MCNC: 0.5 MG/DL (ref 0.7–1.2)
CULTURE, RESPIRATORY: ABNORMAL
CULTURE, RESPIRATORY: ABNORMAL
DELIVERY SYSTEMS: NORMAL
DEVICE: NORMAL
EOSINOPHILS ABSOLUTE: 0 E9/L (ref 0.05–0.5)
EOSINOPHILS RELATIVE PERCENT: 0.1 % (ref 0–6)
FIO2: 40
GFR AFRICAN AMERICAN: >60
GFR NON-AFRICAN AMERICAN: >60 ML/MIN/1.73
GLUCOSE BLD-MCNC: 218 MG/DL (ref 74–99)
HCO3: 25.5 MMOL/L (ref 22–26)
HCT VFR BLD CALC: 36.6 % (ref 37–54)
HEMOGLOBIN: 11.7 G/DL (ref 12.5–16.5)
HYPOCHROMIA: ABNORMAL
LYMPHOCYTES ABSOLUTE: 0.72 E9/L (ref 1.5–4)
LYMPHOCYTES RELATIVE PERCENT: 3.5 % (ref 20–42)
MAGNESIUM: 2.3 MG/DL (ref 1.6–2.6)
MCH RBC QN AUTO: 26.4 PG (ref 26–35)
MCHC RBC AUTO-ENTMCNC: 32 % (ref 32–34.5)
MCV RBC AUTO: 82.4 FL (ref 80–99.9)
METAMYELOCYTES RELATIVE PERCENT: 0.9 % (ref 0–1)
METER GLUCOSE: 122 MG/DL (ref 74–99)
METER GLUCOSE: 134 MG/DL (ref 74–99)
METER GLUCOSE: 192 MG/DL (ref 74–99)
METER GLUCOSE: 261 MG/DL (ref 74–99)
MODE: NORMAL
MONOCYTES ABSOLUTE: 2.33 E9/L (ref 0.1–0.95)
MONOCYTES RELATIVE PERCENT: 13.2 % (ref 2–12)
MYELOCYTE PERCENT: 0.9 % (ref 0–0)
NEUTROPHILS ABSOLUTE: 14.86 E9/L (ref 1.8–7.3)
NEUTROPHILS RELATIVE PERCENT: 81.6 % (ref 43–80)
O2 SATURATION: 97.9 % (ref 92–98.5)
OPERATOR ID: 1102
ORGANISM: ABNORMAL
OVALOCYTES: ABNORMAL
PCO2 37: 36.9 MMHG (ref 35–45)
PDW BLD-RTO: 19 FL (ref 11.5–15)
PH 37: 7.45 (ref 7.35–7.45)
PHOSPHORUS: 3.9 MG/DL (ref 2.5–4.5)
PLATELET # BLD: 259 E9/L (ref 130–450)
PMV BLD AUTO: 8.9 FL (ref 7–12)
PO2 37: 98 MMHG (ref 80–100)
POC SOURCE: NORMAL
POIKILOCYTES: ABNORMAL
POLYCHROMASIA: ABNORMAL
POSITIVE END EXP PRESS: 5 CMH2O
POTASSIUM SERPL-SCNC: 5.1 MMOL/L (ref 3.5–5)
PRESSURE SUPPORT: 5 CMH2O
RBC # BLD: 4.44 E12/L (ref 3.8–5.8)
SMEAR, RESPIRATORY: ABNORMAL
SODIUM BLD-SCNC: 133 MMOL/L (ref 132–146)
TOTAL PROTEIN: 7.7 G/DL (ref 6.4–8.3)
WBC # BLD: 17.9 E9/L (ref 4.5–11.5)

## 2022-04-18 PROCEDURE — 2000000000 HC ICU R&B

## 2022-04-18 PROCEDURE — 94669 MECHANICAL CHEST WALL OSCILL: CPT

## 2022-04-18 PROCEDURE — 6370000000 HC RX 637 (ALT 250 FOR IP)

## 2022-04-18 PROCEDURE — A4216 STERILE WATER/SALINE, 10 ML: HCPCS

## 2022-04-18 PROCEDURE — 94640 AIRWAY INHALATION TREATMENT: CPT

## 2022-04-18 PROCEDURE — 6360000002 HC RX W HCPCS

## 2022-04-18 PROCEDURE — 2500000003 HC RX 250 WO HCPCS: Performed by: INTERNAL MEDICINE

## 2022-04-18 PROCEDURE — 82803 BLOOD GASES ANY COMBINATION: CPT

## 2022-04-18 PROCEDURE — 99233 SBSQ HOSP IP/OBS HIGH 50: CPT | Performed by: INTERNAL MEDICINE

## 2022-04-18 PROCEDURE — 85025 COMPLETE CBC W/AUTO DIFF WBC: CPT

## 2022-04-18 PROCEDURE — 36600 WITHDRAWAL OF ARTERIAL BLOOD: CPT

## 2022-04-18 PROCEDURE — 80053 COMPREHEN METABOLIC PANEL: CPT

## 2022-04-18 PROCEDURE — 2580000003 HC RX 258: Performed by: INTERNAL MEDICINE

## 2022-04-18 PROCEDURE — 6360000002 HC RX W HCPCS: Performed by: INTERNAL MEDICINE

## 2022-04-18 PROCEDURE — 94003 VENT MGMT INPAT SUBQ DAY: CPT

## 2022-04-18 PROCEDURE — 99232 SBSQ HOSP IP/OBS MODERATE 35: CPT | Performed by: INTERNAL MEDICINE

## 2022-04-18 PROCEDURE — 6370000000 HC RX 637 (ALT 250 FOR IP): Performed by: INTERNAL MEDICINE

## 2022-04-18 PROCEDURE — 94660 CPAP INITIATION&MGMT: CPT

## 2022-04-18 PROCEDURE — 2580000003 HC RX 258: Performed by: NURSE PRACTITIONER

## 2022-04-18 PROCEDURE — 51702 INSERT TEMP BLADDER CATH: CPT

## 2022-04-18 PROCEDURE — 83735 ASSAY OF MAGNESIUM: CPT

## 2022-04-18 PROCEDURE — 6370000000 HC RX 637 (ALT 250 FOR IP): Performed by: STUDENT IN AN ORGANIZED HEALTH CARE EDUCATION/TRAINING PROGRAM

## 2022-04-18 PROCEDURE — 84100 ASSAY OF PHOSPHORUS: CPT

## 2022-04-18 PROCEDURE — 82962 GLUCOSE BLOOD TEST: CPT

## 2022-04-18 PROCEDURE — 6360000002 HC RX W HCPCS: Performed by: NURSE PRACTITIONER

## 2022-04-18 PROCEDURE — 2580000003 HC RX 258

## 2022-04-18 PROCEDURE — C9113 INJ PANTOPRAZOLE SODIUM, VIA: HCPCS

## 2022-04-18 PROCEDURE — 6360000002 HC RX W HCPCS: Performed by: STUDENT IN AN ORGANIZED HEALTH CARE EDUCATION/TRAINING PROGRAM

## 2022-04-18 PROCEDURE — 2580000003 HC RX 258: Performed by: STUDENT IN AN ORGANIZED HEALTH CARE EDUCATION/TRAINING PROGRAM

## 2022-04-18 PROCEDURE — 36592 COLLECT BLOOD FROM PICC: CPT

## 2022-04-18 PROCEDURE — 71045 X-RAY EXAM CHEST 1 VIEW: CPT

## 2022-04-18 RX ORDER — FUROSEMIDE 10 MG/ML
20 INJECTION INTRAMUSCULAR; INTRAVENOUS ONCE
Status: COMPLETED | OUTPATIENT
Start: 2022-04-18 | End: 2022-04-18

## 2022-04-18 RX ORDER — FENTANYL CITRATE 0.05 MG/ML
50 INJECTION, SOLUTION INTRAMUSCULAR; INTRAVENOUS
Status: ACTIVE | OUTPATIENT
Start: 2022-04-18 | End: 2022-04-18

## 2022-04-18 RX ORDER — METOPROLOL TARTRATE 5 MG/5ML
5 INJECTION INTRAVENOUS EVERY 6 HOURS
Status: DISCONTINUED | OUTPATIENT
Start: 2022-04-18 | End: 2022-04-20

## 2022-04-18 RX ORDER — IPRATROPIUM BROMIDE AND ALBUTEROL SULFATE 2.5; .5 MG/3ML; MG/3ML
1 SOLUTION RESPIRATORY (INHALATION) EVERY 4 HOURS
Status: DISCONTINUED | OUTPATIENT
Start: 2022-04-18 | End: 2022-04-25

## 2022-04-18 RX ORDER — GLYCOPYRROLATE 0.2 MG/ML
0.2 INJECTION INTRAMUSCULAR; INTRAVENOUS EVERY 4 HOURS PRN
Status: DISCONTINUED | OUTPATIENT
Start: 2022-04-18 | End: 2022-04-20

## 2022-04-18 RX ADMIN — LOSARTAN POTASSIUM 50 MG: 50 TABLET, FILM COATED ORAL at 09:43

## 2022-04-18 RX ADMIN — Medication 10 ML: at 10:05

## 2022-04-18 RX ADMIN — IPRATROPIUM BROMIDE AND ALBUTEROL SULFATE 1 AMPULE: .5; 2.5 SOLUTION RESPIRATORY (INHALATION) at 08:57

## 2022-04-18 RX ADMIN — INSULIN LISPRO 3 UNITS: 100 INJECTION, SOLUTION INTRAVENOUS; SUBCUTANEOUS at 14:07

## 2022-04-18 RX ADMIN — SODIUM CHLORIDE 25 ML: 9 INJECTION, SOLUTION INTRAVENOUS at 03:55

## 2022-04-18 RX ADMIN — ACETYLCYSTEINE 400 MG: 100 INHALANT RESPIRATORY (INHALATION) at 16:02

## 2022-04-18 RX ADMIN — CEFTRIAXONE 2000 MG: 2 INJECTION, POWDER, FOR SOLUTION INTRAMUSCULAR; INTRAVENOUS at 14:18

## 2022-04-18 RX ADMIN — Medication 1 MCG/KG/HR: at 07:07

## 2022-04-18 RX ADMIN — GLYCOPYRROLATE 1 MG: 1 TABLET ORAL at 09:42

## 2022-04-18 RX ADMIN — IPRATROPIUM BROMIDE AND ALBUTEROL SULFATE 1 AMPULE: .5; 2.5 SOLUTION RESPIRATORY (INHALATION) at 12:26

## 2022-04-18 RX ADMIN — FUROSEMIDE 20 MG: 10 INJECTION, SOLUTION INTRAMUSCULAR; INTRAVENOUS at 17:41

## 2022-04-18 RX ADMIN — ENOXAPARIN SODIUM 30 MG: 100 INJECTION SUBCUTANEOUS at 09:46

## 2022-04-18 RX ADMIN — ACETYLCYSTEINE 400 MG: 100 INHALANT RESPIRATORY (INHALATION) at 12:26

## 2022-04-18 RX ADMIN — Medication 10 ML: at 10:04

## 2022-04-18 RX ADMIN — ACETYLCYSTEINE 400 MG: 100 INHALANT RESPIRATORY (INHALATION) at 01:44

## 2022-04-18 RX ADMIN — CEFEPIME HYDROCHLORIDE 2000 MG: 2 INJECTION, POWDER, FOR SOLUTION INTRAVENOUS at 03:55

## 2022-04-18 RX ADMIN — FUROSEMIDE 20 MG: 10 INJECTION, SOLUTION INTRAMUSCULAR; INTRAVENOUS at 16:35

## 2022-04-18 RX ADMIN — IPRATROPIUM BROMIDE AND ALBUTEROL SULFATE 1 AMPULE: .5; 2.5 SOLUTION RESPIRATORY (INHALATION) at 05:41

## 2022-04-18 RX ADMIN — Medication 0.8 MCG/KG/HR: at 10:55

## 2022-04-18 RX ADMIN — ENOXAPARIN SODIUM 30 MG: 100 INJECTION SUBCUTANEOUS at 21:00

## 2022-04-18 RX ADMIN — METOPROLOL TARTRATE 5 MG: 5 INJECTION INTRAVENOUS at 14:22

## 2022-04-18 RX ADMIN — Medication 0.6 MCG/KG/HR: at 00:52

## 2022-04-18 RX ADMIN — Medication 1 MCG/KG/HR: at 03:50

## 2022-04-18 RX ADMIN — GLYCOPYRROLATE 0.2 MG: 0.2 INJECTION INTRAMUSCULAR; INTRAVENOUS at 16:26

## 2022-04-18 RX ADMIN — ACETYLCYSTEINE 400 MG: 100 INHALANT RESPIRATORY (INHALATION) at 05:42

## 2022-04-18 RX ADMIN — SODIUM CHLORIDE: 9 INJECTION, SOLUTION INTRAVENOUS at 20:41

## 2022-04-18 RX ADMIN — METOPROLOL TARTRATE 50 MG: 50 TABLET, FILM COATED ORAL at 09:45

## 2022-04-18 RX ADMIN — MULTIPLE VITAMINS W/ MINERALS TAB 1 TABLET: TAB at 09:42

## 2022-04-18 RX ADMIN — Medication 10 ML: at 21:01

## 2022-04-18 RX ADMIN — IPRATROPIUM BROMIDE AND ALBUTEROL SULFATE 1 AMPULE: .5; 2.5 SOLUTION RESPIRATORY (INHALATION) at 22:47

## 2022-04-18 RX ADMIN — ACETYLCYSTEINE 400 MG: 100 INHALANT RESPIRATORY (INHALATION) at 08:58

## 2022-04-18 RX ADMIN — SODIUM CHLORIDE 40 MG: 9 INJECTION, SOLUTION INTRAMUSCULAR; INTRAVENOUS; SUBCUTANEOUS at 09:59

## 2022-04-18 RX ADMIN — ROSUVASTATIN CALCIUM 10 MG: 10 TABLET, FILM COATED ORAL at 09:43

## 2022-04-18 RX ADMIN — METHYLPREDNISOLONE SODIUM SUCCINATE 40 MG: 40 INJECTION, POWDER, FOR SOLUTION INTRAMUSCULAR; INTRAVENOUS at 10:02

## 2022-04-18 RX ADMIN — ACETYLCYSTEINE 400 MG: 100 INHALANT RESPIRATORY (INHALATION) at 22:47

## 2022-04-18 RX ADMIN — HYDRALAZINE HYDROCHLORIDE 10 MG: 20 INJECTION INTRAMUSCULAR; INTRAVENOUS at 04:10

## 2022-04-18 RX ADMIN — INSULIN LISPRO 1 UNITS: 100 INJECTION, SOLUTION INTRAVENOUS; SUBCUTANEOUS at 09:30

## 2022-04-18 RX ADMIN — SODIUM CHLORIDE: 9 INJECTION, SOLUTION INTRAVENOUS at 00:54

## 2022-04-18 RX ADMIN — FOLIC ACID 1 MG: 1 TABLET ORAL at 09:43

## 2022-04-18 RX ADMIN — ASPIRIN 81 MG 81 MG: 81 TABLET ORAL at 09:45

## 2022-04-18 RX ADMIN — IPRATROPIUM BROMIDE AND ALBUTEROL SULFATE 1 AMPULE: .5; 2.5 SOLUTION RESPIRATORY (INHALATION) at 01:43

## 2022-04-18 RX ADMIN — IPRATROPIUM BROMIDE AND ALBUTEROL SULFATE 1 AMPULE: .5; 2.5 SOLUTION RESPIRATORY (INHALATION) at 16:02

## 2022-04-18 RX ADMIN — Medication 10 ML: at 21:00

## 2022-04-18 RX ADMIN — GABAPENTIN 400 MG: 400 CAPSULE ORAL at 09:44

## 2022-04-18 ASSESSMENT — PAIN SCALES - GENERAL
PAINLEVEL_OUTOF10: 0
PAINLEVEL_OUTOF10: 1
PAINLEVEL_OUTOF10: 0
PAINLEVEL_OUTOF10: 0

## 2022-04-18 ASSESSMENT — PULMONARY FUNCTION TESTS
PIF_VALUE: 27
PIF_VALUE: 25
PIF_VALUE: 15
PIF_VALUE: 22
PIF_VALUE: 24
PIF_VALUE: 22
PIF_VALUE: 23
PIF_VALUE: 13
PIF_VALUE: 23
PIF_VALUE: 11
PIF_VALUE: 22
PIF_VALUE: 25
PIF_VALUE: 21
PIF_VALUE: 22
PIF_VALUE: 21
PIF_VALUE: 21
PIF_VALUE: 13
PIF_VALUE: 19

## 2022-04-18 NOTE — PROGRESS NOTES
Department of Internal Medicine  General Internal Medicine  Attending Progress Note  Chief Complaint   Patient presents with    Shortness of Breath     SUBJECTIVE:    Intubated and sedated.      OBJECTIVE      Medications    Current Facility-Administered Medications: fentaNYL (SUBLIMAZE) injection 50 mcg, 50 mcg, IntraVENous, Once PRN  cefTRIAXone (ROCEPHIN) 2,000 mg in sterile water 20 mL IV syringe, 2,000 mg, IntraVENous, Q24H  [Held by provider] metoprolol tartrate (LOPRESSOR) tablet 75 mg, 75 mg, Oral, BID  [Held by provider] metoprolol tartrate (LOPRESSOR) tablet 25 mg, 25 mg, Oral, Once  metoprolol (LOPRESSOR) injection 5 mg, 5 mg, IntraVENous, Q6H  0.9 % sodium chloride infusion, , IntraVENous, Continuous  glycopyrrolate (ROBINUL) tablet 1 mg, 1 mg, Oral, TID  [Held by provider] gabapentin (NEURONTIN) capsule 400 mg, 400 mg, Oral, TID  dexmedetomidine (PRECEDEX) 400 mcg in sodium chloride 0.9 % 100 mL infusion, 0.1-1.5 mcg/kg/hr, IntraVENous, Continuous  insulin lispro (HUMALOG) injection vial 0-6 Units, 0-6 Units, SubCUTAneous, TID WC  insulin lispro (HUMALOG) injection vial 0-3 Units, 0-3 Units, SubCUTAneous, Nightly  dextrose bolus (hypoglycemia) 10% 125 mL, 125 mL, IntraVENous, PRN **OR** dextrose bolus (hypoglycemia) 10% 250 mL, 250 mL, IntraVENous, PRN  glucose (GLUTOSE) 40 % oral gel 15 g, 15 g, Oral, PRN  glucagon (rDNA) injection 1 mg, 1 mg, IntraMUSCular, PRN  dextrose 5 % solution, 100 mL/hr, IntraVENous, PRN  acetylcysteine (MUCOMYST) 10 % solution 400 mg, 4 mL, Inhalation, Q4H  ipratropium-albuterol (DUONEB) nebulizer solution 1 ampule, 1 ampule, Inhalation, Q4H WA  [Held by provider] ketamine (KETALAR) 1,250 mg in sodium chloride 0.9 % 250 mL infusion, 0.05-2.5 mg/kg/hr (Ideal), IntraVENous, Continuous  hydrALAZINE (APRESOLINE) injection 10 mg, 10 mg, IntraVENous, Q6H PRN  cloNIDine (CATAPRES) 0.3 MG/24HR 1 patch, 1 patch, TransDERmal, Weekly  folic acid (FOLVITE) tablet 1 mg, 1 mg, Oral, Daily  ipratropium-albuterol (DUONEB) nebulizer solution 1 ampule, 1 ampule, Inhalation, Q4H PRN  sodium chloride flush 0.9 % injection 5-40 mL, 5-40 mL, IntraVENous, 2 times per day  sodium chloride flush 0.9 % injection 5-40 mL, 5-40 mL, IntraVENous, PRN  0.9 % sodium chloride infusion, , IntraVENous, PRN  therapeutic multivitamin-minerals 1 tablet, 1 tablet, Oral, Daily  pantoprazole (PROTONIX) 40 mg in sodium chloride (PF) 10 mL injection, 40 mg, IntraVENous, Daily  [Held by provider] losartan (COZAAR) tablet 50 mg, 50 mg, Oral, Daily  [Held by provider] rosuvastatin (CRESTOR) tablet 10 mg, 10 mg, Oral, Daily  sodium chloride flush 0.9 % injection 5-40 mL, 5-40 mL, IntraVENous, 2 times per day  sodium chloride flush 0.9 % injection 5-40 mL, 5-40 mL, IntraVENous, PRN  0.9 % sodium chloride infusion, , IntraVENous, PRN  enoxaparin (LOVENOX) injection 30 mg, 30 mg, SubCUTAneous, BID  ondansetron (ZOFRAN-ODT) disintegrating tablet 4 mg, 4 mg, Oral, Q8H PRN **OR** ondansetron (ZOFRAN) injection 4 mg, 4 mg, IntraVENous, Q6H PRN  polyethylene glycol (GLYCOLAX) packet 17 g, 17 g, Oral, Daily PRN  acetaminophen (TYLENOL) tablet 650 mg, 650 mg, Oral, Q6H PRN **OR** acetaminophen (TYLENOL) suppository 650 mg, 650 mg, Rectal, Q6H PRN  aspirin chewable tablet 81 mg, 81 mg, Oral, Daily  nicotine (NICODERM CQ) 14 MG/24HR 1 patch, 1 patch, TransDERmal, Daily  Physical    VITALS:  /73   Pulse 79   Temp 98.2 °F (36.8 °C)   Resp 17   Ht 6' (1.829 m)   Wt 277 lb 3.2 oz (125.7 kg)   SpO2 97%   BMI 37.60 kg/m²   CONSTITUTIONAL:  awake  EYES:  lids and lashes normal and extra-ocular muscles intact  ENT:  atraumatic  NECK:  supple, symmetrical, trachea midline  LUNGS:  rhonchi right anterior and left anterior  CARDIOVASCULAR:  normal apical pulses, normal S1 and S2 and no edema  ABDOMEN:  normal bowel sounds, non-distended and non-tender  MUSCULOSKELETAL:  there is no redness, warmth, or swelling of the joints  NEUROLOGIC: Mental Status Exam:  Level of Alertness:   awake  SKIN:  no bruising or bleeding  Data    CBC:   Lab Results   Component Value Date    WBC 17.9 04/18/2022    RBC 4.44 04/18/2022    HGB 11.7 04/18/2022    HCT 36.6 04/18/2022    MCV 82.4 04/18/2022    MCH 26.4 04/18/2022    MCHC 32.0 04/18/2022    RDW 19.0 04/18/2022     04/18/2022    MPV 8.9 04/18/2022     BMP:    Lab Results   Component Value Date     04/18/2022    K 5.1 04/18/2022    K 4.5 04/11/2022    CL 96 04/18/2022    CO2 26 04/18/2022    BUN 19 04/18/2022    LABALBU 3.2 04/18/2022    LABALBU 4.8 10/13/2010    CREATININE 0.5 04/18/2022    CALCIUM 9.6 04/18/2022    GFRAA >60 04/18/2022    LABGLOM >60 04/18/2022    GLUCOSE 218 04/18/2022       ASSESSMENT AND PLAN      1. Acute chest pain  Management per cardiology recommendations    2. Acute Respiratory failure with Hypoxia:  Currently intubated  Vent management per pulm  Continue to monitor  Breathing treatment and pulm Hygiene    3. Severe Etoh abuse with DT:  Continue ciwa protocol   Currently on precedex  Continue thiamine and vitamins    4. Hypertension/Hyperlipidemia/GERD:   Continue home meds as tolerated    5. Morbid Obesity: lifestyle modifications    6. COPD:   Concern for exacerbation  Continue home medications  Breathing treatment per order.

## 2022-04-18 NOTE — PLAN OF CARE
Problem: Pain:  Goal: Pain level will decrease  Description: Pain level will decrease  4/18/2022 0516 by Alyse Macias RN  Outcome: Met This Shift  4/18/2022 0515 by Alyse Macias RN  Outcome: Met This Shift  Goal: Control of acute pain  Description: Control of acute pain  4/18/2022 0516 by Alyse Macias RN  Outcome: Met This Shift  4/18/2022 0515 by Alyse Macias RN  Outcome: Met This Shift  Goal: Control of chronic pain  Description: Control of chronic pain  4/18/2022 0516 by Alyse Macias RN  Outcome: Met This Shift  4/18/2022 0515 by Alyse Macias RN  Outcome: Met This Shift     Problem: Discharge Planning:  Goal: Discharged to appropriate level of care  Description: Discharged to appropriate level of care  4/18/2022 0516 by Alyse Macias RN  Outcome: Met This Shift  4/18/2022 0515 by Alyse Macias RN  Outcome: Met This Shift     Problem: Cardiac Output - Decreased:  Goal: Hemodynamic stability will improve  Description: Hemodynamic stability will improve  4/18/2022 0516 by Alyse Macias RN  Outcome: Met This Shift  4/18/2022 0515 by Alyse Macias RN  Outcome: Met This Shift     Problem: Serum Glucose Level - Abnormal:  Goal: Ability to maintain appropriate glucose levels will improve  Description: Ability to maintain appropriate glucose levels will improve  4/18/2022 0516 by Alyse Macias RN  Outcome: Met This Shift  4/18/2022 0515 by Alyse Macias RN  Outcome: Met This Shift     Problem: Pain:  Goal: Pain level will decrease  Description: Pain level will decrease  4/18/2022 0516 by lAyse Macias RN  Outcome: Met This Shift  4/18/2022 0515 by Alyse Macias RN  Outcome: Met This Shift  Goal: Control of acute pain  Description: Control of acute pain  4/18/2022 0516 by Alyse Macias RN  Outcome: Met This Shift  4/18/2022 0515 by Alyse Macias RN  Outcome: Met This Shift  Goal: Control of chronic pain  Description: Control of chronic pain  4/18/2022 8515 by Cherri Mayfield RN  Outcome: Met This Shift  4/18/2022 0515 by Cherri Mayfield RN  Outcome: Met This Shift     Problem: Tissue Perfusion - Cardiopulmonary, Altered:  Goal: Absence of angina  Description: Absence of angina  4/18/2022 0516 by Cherri Mayfield RN  Outcome: Met This Shift  4/18/2022 0515 by Cherri Mayfield RN  Outcome: Met This Shift  Goal: Circulatory function within specified parameters  Description: Circulatory function within specified parameters  4/18/2022 0516 by Cherri Mayfield RN  Outcome: Met This Shift  4/18/2022 0515 by Cherri Mayfield RN  Outcome: Met This Shift  Goal: Hemodynamic stability will improve  Description: Hemodynamic stability will improve  4/18/2022 0516 by Cherri Mayfield RN  Outcome: Met This Shift  4/18/2022 0515 by Cherri Mayfield RN  Outcome: Met This Shift     Problem: Tobacco Use:  Goal: Will participate in inpatient tobacco-use cessation counseling  Description: Will participate in inpatient tobacco-use cessation counseling  4/18/2022 0516 by Cherri Mayfield RN  Outcome: Met This Shift  4/18/2022 0515 by Cherri Mayfield RN  Outcome: Met This Shift     Problem: Fluid Volume - Deficit:  Goal: Absence of fluid volume deficit signs and symptoms  Description: Absence of fluid volume deficit signs and symptoms  4/18/2022 0516 by Cherri Mayfield RN  Outcome: Met This Shift  4/18/2022 0515 by Cherri Mayfield RN  Outcome: Met This Shift     Problem: Nutrition Deficit:  Goal: Ability to achieve adequate nutritional intake will improve  Description: Ability to achieve adequate nutritional intake will improve  4/18/2022 0516 by Cherri Mayfield RN  Outcome: Met This Shift  4/18/2022 0515 by Cherri Mayfield RN  Outcome: Met This Shift     Problem: Sleep Pattern Disturbance:  Goal: Appears well-rested  Description: Appears well-rested  4/18/2022 0516 by Cherri Mayfield RN  Outcome: Met This Shift  4/18/2022 0515 by Cherri Mayfield RN  Outcome:  Met This Shift     Problem: Violence - Risk of, Self/Other-Directed:  Goal: Knowledge of developmental care interventions  Description: Absence of violence  4/18/2022 0516 by Damion Rivers RN  Outcome: Met This Shift  4/18/2022 0515 by Damion Rivers RN  Outcome: Met This Shift     Problem: Falls - Risk of:  Goal: Will remain free from falls  Description: Will remain free from falls  4/18/2022 0516 by Damion Rivers RN  Outcome: Met This Shift  4/18/2022 0515 by Damion Rivers RN  Outcome: Met This Shift  4/17/2022 1812 by Fior Chu RN  Outcome: Met This Shift  Goal: Absence of physical injury  Description: Absence of physical injury  4/18/2022 0516 by Damion Rivers RN  Outcome: Met This Shift  4/18/2022 0515 by Damion Rivers RN  Outcome: Met This Shift  4/17/2022 1812 by Fior Chu RN  Outcome: Met This Shift     Problem: Non-Violent Restraints  Goal: No harm/injury to patient while restraints in use  4/18/2022 0516 by Damion Rivers RN  Outcome: Met This Shift  4/18/2022 0515 by Damion Rivers RN  Outcome: Met This Shift  4/17/2022 1812 by Fior Chu RN  Outcome: Met This Shift  Goal: Patient's dignity will be maintained  4/18/2022 0516 by Damion Rivers RN  Outcome: Met This Shift  4/18/2022 0515 by Damion Rivers RN  Outcome: Met This Shift  4/17/2022 1812 by Fior Chu RN  Outcome: Met This Shift     Problem: Skin Integrity:  Goal: Will show no infection signs and symptoms  Description: Will show no infection signs and symptoms  4/18/2022 0516 by Damion Rivers RN  Outcome: Met This Shift  4/18/2022 0515 by Damion Rivers RN  Outcome: Met This Shift  4/17/2022 1812 by Fior Chu RN  Outcome: Met This Shift  Goal: Absence of new skin breakdown  Description: Absence of new skin breakdown  4/18/2022 0516 by Damion Rivers RN  Outcome: Met This Shift  4/18/2022 0515 by Damion Rivers RN  Outcome: Met This Shift  4/17/2022 1812 by Fior Sensing, RN  Outcome: Met This Shift

## 2022-04-18 NOTE — PROGRESS NOTES
[facility]) Physicians        CARDIOLOGY                 INPATIENT PROGRESS NOTE          PATIENT SEEN IN FOLLOW UP FOR: Chest pain, CHF    Hospital Day: 10     Allison Wilson is a 64year old patient known to Dr. Kash Dykes from initial consult      SUBJECTIVE: Apparently undergoing extubation trials today         Echo 4/11/22   Summary   Technically sub-optimal images - Definity Echo contrast used. Normal left ventricular chamber size. Normal left ventricular systolic function, LVEF 76-50%. Mild left ventricular concentric hypertrophy noted. Normal diastolic function. Interatrial septum not well visualized but appears intact. Normal right ventricle size and function, TAPSE 2.3cm. Valves not well visualized. No hemodynamically significant aortic stenosis. Normal aortic root size. No evidence of pericardial effusion. No intra cardiac mass or thrombus. No comparison study available. Intake/Output Summary (Last 24 hours) at 4/18/2022 1654  Last data filed at 4/18/2022 1458  Gross per 24 hour   Intake 3040.99 ml   Output 3050 ml   Net -9.01 ml       Labs:   CBC:   Recent Labs     04/17/22  0522 04/18/22  0405   WBC 12.5* 17.9*   HGB 11.0* 11.7*   HCT 34.9* 36.6*    259     BMP:   Recent Labs     04/17/22  0522 04/18/22  0405   * 133   K 5.2* 5.1*   CO2 24 26   BUN 18 19   CREATININE 0.6* 0.5*   LABGLOM >60 >60   CALCIUM 8.9 9.6     Mag:   Recent Labs     04/17/22  0522 04/18/22  0405   MG 2.3 2.3     Phos:   Recent Labs     04/17/22  0522 04/18/22  0405   PHOS 3.5 3.9     TSH:   No results for input(s): TSH in the last 72 hours. HgA1c:     BNP: No results for input(s): BNP in the last 72 hours. PT/INR: No results for input(s): PROTIME, INR in the last 72 hours. APTT:No results for input(s): APTT in the last 72 hours. CARDIAC ENZYMES:No results for input(s): CKTOTAL, CKMB, CKMBINDEX, TROPONINI in the last 72 hours.   FASTING LIPID PANEL:  Lab Results   Component Value Date    CHOL 213 2015    HDL 41 2015    LDLCALC - 2015    TRIG 115 2022     LIVER PROFILE:  Recent Labs     22  0522 22  0405   AST 33 43*   ALT 46* 65*   LABALBU 2.7* 3.2*       Current Inpatient Medications:   cefTRIAXone (ROCEPHIN) IV  2,000 mg IntraVENous Q24H    [Held by provider] metoprolol tartrate  75 mg Oral BID    [Held by provider] metoprolol tartrate  25 mg Oral Once    metoprolol  5 mg IntraVENous Q6H    glycopyrrolate  1 mg Oral TID    [Held by provider] gabapentin  400 mg Oral TID    insulin lispro  0-6 Units SubCUTAneous TID WC    insulin lispro  0-3 Units SubCUTAneous Nightly    acetylcysteine  4 mL Inhalation Q4H    ipratropium-albuterol  1 ampule Inhalation Q4H WA    cloNIDine  1 patch TransDERmal Weekly    folic acid  1 mg Oral Daily    sodium chloride flush  5-40 mL IntraVENous 2 times per day    multivitamin  1 tablet Oral Daily    pantoprazole (PROTONIX) 40 mg injection  40 mg IntraVENous Daily    [Held by provider] losartan  50 mg Oral Daily    [Held by provider] rosuvastatin  10 mg Oral Daily    sodium chloride flush  5-40 mL IntraVENous 2 times per day    enoxaparin  30 mg SubCUTAneous BID    aspirin  81 mg Oral Daily    nicotine  1 patch TransDERmal Daily       IV Infusions (if any):   sodium chloride 50 mL/hr at 22 1025    dextrose      [Held by provider] ketamine (KETALAR) 5 mg/mL infusion for analgosedation Stopped (22 1936)    sodium chloride Stopped (22 0355)    sodium chloride Stopped (22 0306)         PHYSICAL EXAM:     CONSTITUTIONAL:   /73   Pulse 79   Temp 98.2 °F (36.8 °C)   Resp 17   Ht 6' (1.829 m)   Wt 277 lb 3.2 oz (125.7 kg)   SpO2 97%   BMI 37.60 kg/m²   Pulse  Av.8  Min: 74  Max: 345  Systolic (23FZQ), ZHW:223 , Min:97 , OEI:663    Diastolic (02QVV), RSJ:77, Min:73, Max:119    In general, this is a well developed, well nourished who appears stated age.  Pt on vent and sedated. HEENT: Pt on vent and sedated. Neck-  no stridor, no noted enlargement of the thyroid, no carotid bruit. no jugular venous distention   RESPIRATORY: Chest symmetrical.  No accessory muscle use. Lung auscultation - few rhonchi  CARDIOVASCULAR:     Heart Ausculation - Regular rate and rhythm, 2/6 systolic murmur, No s3 or rub.   + lower extremity edema,  Distal pulses palpable, no clubbing  ABDOMEN: Bowel sounds present. No Palpable masses. MS: Pt on vent and sedated. : Deferred  Rectal Exam: Deferred  SKIN: warm and dry   NEURO / PSYCH: Pt on vent and sedated. ASSESSMENT / PLAN:    Exertional chest pain - Likely angina (CP with diaphoresis and dyspnea on exertion). Apparently beta-blocker on hold  Awaiting invasive coronary angiogram once stable  Continue beta-blocker and aspirin and resume statin when liver function test stable    Essential HTN -   I recommend weaning off/discontinuation of clonidine patch and instead change metoprolol to carvedilol and if blood pressure still not controlled add Imdur or hydralazine    Dyslipidemia  -resume statin therapy when liver function test is stable    Tobacco abuse - Will be counseled to quit smoking    Chronic HFpEF - Diuretics as needed    Probable KULDIP - Recommend Out-Pt sleep study    Non-morbid Obesity, BMI 38 - Diet, exercise and weight loss will be discussed.     Hx of Marijuana and ETOH use - Will be counseled to quit    Chronic back pain - on Oxycodone as an outpatient    Family Hx of CAD    Anemia, Chronic - Monitor H/H    History of elevated LFT's in 3/2022 - Monitor LFTs          Electronically signed by Damir Lawler MD on 4/18/2022 at 4:54 PM  [facility]) Cardiology

## 2022-04-18 NOTE — PROGRESS NOTES
order  · Current TF & Flush Orders Provides: not running per RD encounter  · Goal TF & Flush Orders Provides: 960mL TV, 1440 calories, 61g protein, 730mL water      Anthropometric Measures:  · Height: 6' (182.9 cm)  · Current Body Weight: 277 lb 3.2 oz (125.7 kg) (4/18 bed scale)   · Admission Body Weight: 300 lb (136.1 kg) (4/10 no method)    · Usual Body Weight: 285 lb (129.3 kg) (3/15/2022 per emr review w/ no method)     · Ideal Body Weight: 178 lbs; % Ideal Body Weight 157.3 %   · BMI: 37.6  · BMI Categories: Obese Class 2 (BMI 35.0 -39.9)       Nutrition Diagnosis:   · Inadequate oral intake related to impaired respiratory function as evidenced by intubation,NPO or clear liquid status due to medical condition,nutrition support - enteral nutrition    Nutrition Interventions:   Food and/or Nutrient Delivery:  Continue NPO (Updated TF recs: Standard with Fiber @ 65mL/hr + 1 protein modular)  Nutrition Education/Counseling:  No recommendation at this time   Coordination of Nutrition Care:  Continue to monitor while inpatient    Goals:  EN tolerance       Nutrition Monitoring and Evaluation:   Behavioral-Environmental Outcomes:  None Identified   Food/Nutrient Intake Outcomes:  Enteral Nutrition Intake/Tolerance  Physical Signs/Symptoms Outcomes:  Biochemical Data,Nutrition Focused Physical Findings,Skin,Weight,Chewing or Swallowing,GI Status,Fluid Status or Edema,Hemodynamic Status     Discharge Planning:     Too soon to determine     Electronically signed by Steve Damon MS, RD, LD on 4/18/22 at 11:55 AM EDT    Contact: [phone]

## 2022-04-18 NOTE — PROGRESS NOTES
Pharmacy Consultation Note  (Antibiotic Dosing and Monitoring)    Vancomycin has been discontinued; pharmacy will sign-off. Please reconsult if needed.     Thank you,  Vicki Escobedo, PharmD, BCPS 4/18/2022 12:11 PM

## 2022-04-18 NOTE — PROGRESS NOTES
CRITICAL CARE PROGRESS NOTE    The patient's case was discussed in multidisciplinary rounds including critical care specialist, nursing, RT and pharmacy. His evaluation is as follows:     ETOH withdrawal with delirium tremens  --On precedex --> Goal to stop precedex and hopefully keep him awake and calm  --Vent settings 460/ f 16/ PEEP 5/ 30% FiO2 --Has copious oral secretions. --Other medications for DTs include gabapentin and clonidine  --Continue Thiamine and folic acid, banana bag    Acute hypoxemic respiratory failure due to staphylococcal pneumonia (MSSA)  --Mechanical ventilation with lung protective strategy  --Antibiotics: Ceftriaxone for 4 more days    Chest pain R/O ACS  --Followed by cardiology  --Plan for heart cath tomorrow when improved  --Continue  beta blocker and imdur, ASA and ARB     Chronic diastolic heart failure  --Does not appear to be in acute exacerbation  --Continue afterload reduction as stated above     COPD exacerbation  --Continue aerosolized bronchodilators including duoneb and pulmicort     Hyponatremia, chronic possible due to chronic beer intake  --Lasix and HCTZ on hold  --Currently receiving fluids  --Daily BMP     HTN  --On ARB and BB, clonidine and hydralazine     HLD  --Continue rosuivastatin     GERD  --On protonix     Tobacco use disorder  --Continue nicotine patch     Morbid obesity  -Will need counseling on lifestyle modifications    Prolonged QTc  --Avoid medications that prolong the QT    Ileus  --Hold enteral intake for now      DVT prophylaxis with enoxaparin   Nutrition: Tube feedings on hold due to high residuals  Vascular catheters: TLC needed for vasopressor support  Urinary catheter needed for strict input/output    GI prophylaxis with PPI  Physical therapy once awake  Goals of care:  Full code    /73   Pulse 82   Temp 98.2 °F (36.8 °C)   Resp 30   Ht 6' (1.829 m)   Wt 277 lb 3.2 oz (125.7 kg)   SpO2 94%   BMI 37.60 kg/m²     General: Somnolent but easily srousable, ventilated, he tracks me and turns his head when I call his name, he moves left arm and both feet  HEENT: No head lesions, PERRL, EOMI, mouth with ETT , no cervical adenopathy palpated  Respiratory: Lungs with equal breath sounds bilaterally, no adventitious sounds auscultated, no accessory muscle use  CV: Regular rate, no murmurs, no JVD, no leg edema  Abdomen: Soft, non tender, + bowel sounds, no lesions  Skin: Hydrated, adequate turgor, no rash, capillary refill <2 seconds  Extremities: Muscular strength 1/5 in left arm and both feet, distal pulses present  Neurology: Somnolent but easily arousable, follows commands, moves limbs on command, neck is supple, no meningitic signs present. Last 3 CMP:    Recent Labs     04/16/22  0423 04/17/22  0522 04/18/22  0405   * 121* 133   K 5.4* 5.2* 5.1*   CL 93* 87* 96*   CO2 27 24 26   BUN 18 18 19   CREATININE 0.7 0.6* 0.5*   GLUCOSE 266* 237* 218*   CALCIUM 8.8 8.9 9.6   PROT 7.0 6.8 7.7   LABALBU 3.0* 2.7* 3.2*   BILITOT 0.3 0.3 0.6   ALKPHOS 166* 129 137*   AST 30 33 43*   ALT 39 46* 65*     Recent Labs     04/18/22  0405   WBC 17.9*   RBC 4.44   HGB 11.7*   HCT 36.6*   MCV 82.4   MCH 26.4   MCHC 32.0   RDW 19.0*      MPV 8.9       No results for input(s): BC in the last 72 hours. No results for input(s): Jeremy Lynn in the last 72 hours.     24 HR INTAKE/OUTPUT:      Intake/Output Summary (Last 24 hours) at 4/18/2022 1202  Last data filed at 4/18/2022 1025  Gross per 24 hour   Intake 4736.6 ml   Output 3800 ml   Net 936.6 ml     MEDICATIONS:   vancomycin  1,750 mg IntraVENous Q12H    glycopyrrolate  1 mg Oral TID    gabapentin  400 mg Oral TID    [Held by provider] PHENobarbital  65 mg IntraVENous 3 times per day    insulin lispro  0-6 Units SubCUTAneous TID WC    insulin lispro  0-3 Units SubCUTAneous Nightly    methylPREDNISolone  40 mg IntraVENous Q12H    acetylcysteine  4 mL Inhalation Q4H    ipratropium-albuterol  1 ampule Inhalation Q4H WA    metoprolol tartrate  50 mg Oral BID    cefepime  2,000 mg IntraVENous Q8H    cloNIDine  1 patch TransDERmal Weekly    folic acid  1 mg Oral Daily    sodium chloride flush  5-40 mL IntraVENous 2 times per day    multivitamin  1 tablet Oral Daily    pantoprazole (PROTONIX) 40 mg injection  40 mg IntraVENous Daily    losartan  50 mg Oral Daily    rosuvastatin  10 mg Oral Daily    sodium chloride flush  5-40 mL IntraVENous 2 times per day    enoxaparin  30 mg SubCUTAneous BID    aspirin  81 mg Oral Daily    nicotine  1 patch TransDERmal Daily      sodium chloride 50 mL/hr at 04/18/22 1025    dexmedetomidine HCl in NaCl 0.8 mcg/kg/hr (04/18/22 1055)    dextrose      [Held by provider] ketamine (KETALAR) 5 mg/mL infusion for analgosedation Stopped (04/16/22 1936)    sodium chloride Stopped (04/18/22 0355)    sodium chloride Stopped (04/16/22 0306)     fentanNYL, dextrose bolus (hypoglycemia) **OR** dextrose bolus (hypoglycemia), glucose, glucagon (rDNA), dextrose, metoprolol, hydrALAZINE, ipratropium-albuterol, sodium chloride flush, sodium chloride, sodium chloride flush, sodium chloride, ondansetron **OR** ondansetron, polyethylene glycol, acetaminophen **OR** acetaminophen    OBJECTIVE:  Vitals:    04/18/22 0945   BP: 120/73   Pulse: 82   Resp:    Temp:    SpO2:      FiO2 : 40 %  O2 Flow Rate (L/min): 3 L/min  O2 Device: Ventilator        LABS:  WBC   Date Value Ref Range Status   04/18/2022 17.9 (H) 4.5 - 11.5 E9/L Final   04/17/2022 12.5 (H) 4.5 - 11.5 E9/L Final   04/16/2022 14.3 (H) 4.5 - 11.5 E9/L Final     Hemoglobin   Date Value Ref Range Status   04/18/2022 11.7 (L) 12.5 - 16.5 g/dL Final   04/17/2022 11.0 (L) 12.5 - 16.5 g/dL Final   04/16/2022 11.4 (L) 12.5 - 16.5 g/dL Final     Hematocrit   Date Value Ref Range Status   04/18/2022 36.6 (L) 37.0 - 54.0 % Final   04/17/2022 34.9 (L) 37.0 - 54.0 % Final   04/16/2022 35.9 (L) 37.0 - 54.0 % Final     MCV   Date Value Ref Range Status   04/18/2022 82.4 80.0 - 99.9 fL Final   04/17/2022 82.7 80.0 - 99.9 fL Final   04/16/2022 81.8 80.0 - 99.9 fL Final     Platelets   Date Value Ref Range Status   04/18/2022 259 130 - 450 E9/L Final   04/17/2022 222 130 - 450 E9/L Final   04/16/2022 197 130 - 450 E9/L Final     Sodium   Date Value Ref Range Status   04/18/2022 133 132 - 146 mmol/L Final   04/17/2022 121 (L) 132 - 146 mmol/L Final   04/16/2022 128 (L) 132 - 146 mmol/L Final     Potassium   Date Value Ref Range Status   04/18/2022 5.1 (H) 3.5 - 5.0 mmol/L Final   04/17/2022 5.2 (H) 3.5 - 5.0 mmol/L Final   04/16/2022 5.4 (H) 3.5 - 5.0 mmol/L Final     Potassium reflex Magnesium   Date Value Ref Range Status   04/11/2022 4.5 3.5 - 5.0 mmol/L Final   04/10/2022 4.5 3.5 - 5.0 mmol/L Final   04/09/2022 4.3 3.5 - 5.0 mmol/L Final     Chloride   Date Value Ref Range Status   04/18/2022 96 (L) 98 - 107 mmol/L Final   04/17/2022 87 (L) 98 - 107 mmol/L Final   04/16/2022 93 (L) 98 - 107 mmol/L Final     CO2   Date Value Ref Range Status   04/18/2022 26 22 - 29 mmol/L Final   04/17/2022 24 22 - 29 mmol/L Final   04/16/2022 27 22 - 29 mmol/L Final     BUN   Date Value Ref Range Status   04/18/2022 19 6 - 20 mg/dL Final   04/17/2022 18 6 - 20 mg/dL Final   04/16/2022 18 6 - 20 mg/dL Final     CREATININE   Date Value Ref Range Status   04/18/2022 0.5 (L) 0.7 - 1.2 mg/dL Final   04/17/2022 0.6 (L) 0.7 - 1.2 mg/dL Final   04/16/2022 0.7 0.7 - 1.2 mg/dL Final     Glucose   Date Value Ref Range Status   04/18/2022 218 (H) 74 - 99 mg/dL Final   04/17/2022 237 (H) 74 - 99 mg/dL Final   04/16/2022 266 (H) 74 - 99 mg/dL Final     Calcium   Date Value Ref Range Status   04/18/2022 9.6 8.6 - 10.2 mg/dL Final   04/17/2022 8.9 8.6 - 10.2 mg/dL Final   04/16/2022 8.8 8.6 - 10.2 mg/dL Final     Total Protein   Date Value Ref Range Status   04/18/2022 7.7 6.4 - 8.3 g/dL Final   04/17/2022 6.8 6.4 - 8.3 g/dL Final   04/16/2022 7.0 6.4 - 8.3 g/dL Final     Albumin Date Value Ref Range Status   04/18/2022 3.2 (L) 3.5 - 5.2 g/dL Final   04/17/2022 2.7 (L) 3.5 - 5.2 g/dL Final   04/16/2022 3.0 (L) 3.5 - 5.2 g/dL Final   10/13/2010 4.8 3.2 - 4.8 g/dL Final     Total Bilirubin   Date Value Ref Range Status   04/18/2022 0.6 0.0 - 1.2 mg/dL Final   04/17/2022 0.3 0.0 - 1.2 mg/dL Final   04/16/2022 0.3 0.0 - 1.2 mg/dL Final     Alkaline Phosphatase   Date Value Ref Range Status   04/18/2022 137 (H) 40 - 129 U/L Final   04/17/2022 129 40 - 129 U/L Final   04/16/2022 166 (H) 40 - 129 U/L Final     AST   Date Value Ref Range Status   04/18/2022 43 (H) 0 - 39 U/L Final   04/17/2022 33 0 - 39 U/L Final   04/16/2022 30 0 - 39 U/L Final     ALT   Date Value Ref Range Status   04/18/2022 65 (H) 0 - 40 U/L Final   04/17/2022 46 (H) 0 - 40 U/L Final   04/16/2022 39 0 - 40 U/L Final     GFR Non-   Date Value Ref Range Status   04/18/2022 >60 >=60 mL/min/1.73 Final     Comment:     Chronic Kidney Disease: less than 60 ml/min/1.73 sq.m. Kidney Failure: less than 15 ml/min/1.73 sq.m. Results valid for patients 18 years and older. 04/17/2022 >60 >=60 mL/min/1.73 Final     Comment:     Chronic Kidney Disease: less than 60 ml/min/1.73 sq.m. Kidney Failure: less than 15 ml/min/1.73 sq.m. Results valid for patients 18 years and older. 04/16/2022 >60 >=60 mL/min/1.73 Final     Comment:     Chronic Kidney Disease: less than 60 ml/min/1.73 sq.m. Kidney Failure: less than 15 ml/min/1.73 sq.m. Results valid for patients 18 years and older.        GFR    Date Value Ref Range Status   04/18/2022 >60  Final   04/17/2022 >60  Final   04/16/2022 >60  Final     Magnesium   Date Value Ref Range Status   04/18/2022 2.3 1.6 - 2.6 mg/dL Final   04/17/2022 2.3 1.6 - 2.6 mg/dL Final   04/16/2022 2.6 1.6 - 2.6 mg/dL Final     Phosphorus   Date Value Ref Range Status   04/18/2022 3.9 2.5 - 4.5 mg/dL Final   04/17/2022 3.5 2.5 - 4.5 mg/dL Final 04/16/2022 3.2 2.5 - 4.5 mg/dL Final     No results for input(s): PH, PO2, PCO2, HCO3, BE, O2SAT in the last 72 hours. RADIOLOGY:  XR CHEST PORTABLE   Final Result   New atelectasis and or infiltrate at the right mid lung with stable   atelectasis and or infiltrate at the right base. XR CHEST PORTABLE   Final Result   New atelectasis and or infiltrate at the right base. XR ABDOMEN FOR NG/OG/NE TUBE PLACEMENT   Final Result   Nasogastric tube with the tip in the proximal gastric body. XR CHEST PORTABLE   Final Result   Adequately positioned endotracheal tube, orogastric tube and right internal   jugular central venous line. XR CHEST PORTABLE   Final Result   No pneumonia or pleural effusion. PROBLEM LIST:  Principal Problem:    Acute chest pain  Active Problems:    Chest pain  Resolved Problems:    * No resolved hospital problems. *          ATTESTATION:  ICU Staff Physician note of personal involvement in Care  As the attending physician, I certify that I personally reviewed the patients history and personnally examined the patient to confirm the physical findings described above,  And that I reviewed the relevant imaging studies and available reports. I also discussed the differential diagnosis and all of the proposed management plans with the patient and individuals accompanying the patient to this visit. They had the opportunity to ask questions about the proposed management plans and to have those questions answered. This patient has a high probability of sudden, clinically significant deterioration, which requires the highest level of physician preparedness to intervene urgently. I managed/supervised life or organ supporting interventions that required frequent physician assessment. I devoted my full attention to the direct care of this patient for the amount of time indicated below.   Time I spent with the family or surrogate(s) is included only if the patient was incapable of providing the necessary information or participating in medical decisions - Time devoted to teaching and to any procedures I billed separately is not included.      CRITICAL CARE TIME:  30 minutes    Marisela Brown MD  Pulmonary and Critical Care Medicine

## 2022-04-19 ENCOUNTER — APPOINTMENT (OUTPATIENT)
Dept: GENERAL RADIOLOGY | Age: 57
DRG: 896 | End: 2022-04-19
Payer: MEDICARE

## 2022-04-19 LAB
ALBUMIN SERPL-MCNC: 3.3 G/DL (ref 3.5–5.2)
ALBUMIN SERPL-MCNC: 3.3 G/DL (ref 3.5–5.2)
ALP BLD-CCNC: 174 U/L (ref 40–129)
ALP BLD-CCNC: 192 U/L (ref 40–129)
ALT SERPL-CCNC: 195 U/L (ref 0–40)
ALT SERPL-CCNC: 227 U/L (ref 0–40)
ANION GAP SERPL CALCULATED.3IONS-SCNC: 13 MMOL/L (ref 7–16)
ANION GAP SERPL CALCULATED.3IONS-SCNC: 14 MMOL/L (ref 7–16)
ANISOCYTOSIS: ABNORMAL
AST SERPL-CCNC: 203 U/L (ref 0–39)
AST SERPL-CCNC: 99 U/L (ref 0–39)
BASOPHILS ABSOLUTE: 0 E9/L (ref 0–0.2)
BASOPHILS RELATIVE PERCENT: 0.2 % (ref 0–2)
BILIRUB SERPL-MCNC: 0.6 MG/DL (ref 0–1.2)
BILIRUB SERPL-MCNC: 0.8 MG/DL (ref 0–1.2)
BLOOD CULTURE, ROUTINE: NORMAL
BUN BLDV-MCNC: 23 MG/DL (ref 6–20)
BUN BLDV-MCNC: 24 MG/DL (ref 6–20)
CALCIUM SERPL-MCNC: 9.5 MG/DL (ref 8.6–10.2)
CALCIUM SERPL-MCNC: 9.7 MG/DL (ref 8.6–10.2)
CHLORIDE BLD-SCNC: 97 MMOL/L (ref 98–107)
CHLORIDE BLD-SCNC: 99 MMOL/L (ref 98–107)
CO2: 25 MMOL/L (ref 22–29)
CO2: 26 MMOL/L (ref 22–29)
CREAT SERPL-MCNC: 0.6 MG/DL (ref 0.7–1.2)
CREAT SERPL-MCNC: 0.6 MG/DL (ref 0.7–1.2)
EKG ATRIAL RATE: 104 BPM
EKG P AXIS: 40 DEGREES
EKG P-R INTERVAL: 140 MS
EKG Q-T INTERVAL: 340 MS
EKG QRS DURATION: 84 MS
EKG QTC CALCULATION (BAZETT): 447 MS
EKG R AXIS: 60 DEGREES
EKG T AXIS: 15 DEGREES
EKG VENTRICULAR RATE: 104 BPM
EOSINOPHILS ABSOLUTE: 0 E9/L (ref 0.05–0.5)
EOSINOPHILS RELATIVE PERCENT: 0.1 % (ref 0–6)
GFR AFRICAN AMERICAN: >60
GFR AFRICAN AMERICAN: >60
GFR NON-AFRICAN AMERICAN: >60 ML/MIN/1.73
GFR NON-AFRICAN AMERICAN: >60 ML/MIN/1.73
GLUCOSE BLD-MCNC: 157 MG/DL (ref 74–99)
GLUCOSE BLD-MCNC: 197 MG/DL (ref 74–99)
HCT VFR BLD CALC: 34.1 % (ref 37–54)
HEMOGLOBIN: 11 G/DL (ref 12.5–16.5)
HYPOCHROMIA: ABNORMAL
LYMPHOCYTES ABSOLUTE: 0.75 E9/L (ref 1.5–4)
LYMPHOCYTES RELATIVE PERCENT: 3.3 % (ref 20–42)
MAGNESIUM: 2 MG/DL (ref 1.6–2.6)
MCH RBC QN AUTO: 27 PG (ref 26–35)
MCHC RBC AUTO-ENTMCNC: 32.3 % (ref 32–34.5)
MCV RBC AUTO: 83.6 FL (ref 80–99.9)
METAMYELOCYTES RELATIVE PERCENT: 3.3 % (ref 0–1)
METER GLUCOSE: 140 MG/DL (ref 74–99)
METER GLUCOSE: 141 MG/DL (ref 74–99)
METER GLUCOSE: 164 MG/DL (ref 74–99)
METER GLUCOSE: 194 MG/DL (ref 74–99)
MONOCYTES ABSOLUTE: 2.5 E9/L (ref 0.1–0.95)
MONOCYTES RELATIVE PERCENT: 10 % (ref 2–12)
MYELOCYTE PERCENT: 2.5 % (ref 0–0)
NEUTROPHILS ABSOLUTE: 21.5 E9/L (ref 1.8–7.3)
NEUTROPHILS RELATIVE PERCENT: 80 % (ref 43–80)
OVALOCYTES: ABNORMAL
PDW BLD-RTO: 19 FL (ref 11.5–15)
PHOSPHORUS: 2.9 MG/DL (ref 2.5–4.5)
PLATELET # BLD: 334 E9/L (ref 130–450)
PMV BLD AUTO: 9 FL (ref 7–12)
POIKILOCYTES: ABNORMAL
POLYCHROMASIA: ABNORMAL
POTASSIUM SERPL-SCNC: 3.1 MMOL/L (ref 3.5–5)
POTASSIUM SERPL-SCNC: 3.6 MMOL/L (ref 3.5–5)
PROMYELOCYTES PERCENT: 0.8 % (ref 0–0)
RBC # BLD: 4.08 E12/L (ref 3.8–5.8)
SODIUM BLD-SCNC: 135 MMOL/L (ref 132–146)
SODIUM BLD-SCNC: 139 MMOL/L (ref 132–146)
TEAR DROP CELLS: ABNORMAL
TOTAL PROTEIN: 7.7 G/DL (ref 6.4–8.3)
TOTAL PROTEIN: 7.7 G/DL (ref 6.4–8.3)
TRIGL SERPL-MCNC: 169 MG/DL (ref 0–149)
WBC # BLD: 25 E9/L (ref 4.5–11.5)

## 2022-04-19 PROCEDURE — A4216 STERILE WATER/SALINE, 10 ML: HCPCS

## 2022-04-19 PROCEDURE — 2580000003 HC RX 258: Performed by: INTERNAL MEDICINE

## 2022-04-19 PROCEDURE — 6370000000 HC RX 637 (ALT 250 FOR IP): Performed by: INTERNAL MEDICINE

## 2022-04-19 PROCEDURE — 2000000000 HC ICU R&B

## 2022-04-19 PROCEDURE — 2580000003 HC RX 258: Performed by: STUDENT IN AN ORGANIZED HEALTH CARE EDUCATION/TRAINING PROGRAM

## 2022-04-19 PROCEDURE — 97110 THERAPEUTIC EXERCISES: CPT | Performed by: PHYSICAL THERAPIST

## 2022-04-19 PROCEDURE — 6370000000 HC RX 637 (ALT 250 FOR IP): Performed by: STUDENT IN AN ORGANIZED HEALTH CARE EDUCATION/TRAINING PROGRAM

## 2022-04-19 PROCEDURE — 6360000002 HC RX W HCPCS

## 2022-04-19 PROCEDURE — 36415 COLL VENOUS BLD VENIPUNCTURE: CPT

## 2022-04-19 PROCEDURE — 82962 GLUCOSE BLOOD TEST: CPT

## 2022-04-19 PROCEDURE — 71045 X-RAY EXAM CHEST 1 VIEW: CPT

## 2022-04-19 PROCEDURE — C9113 INJ PANTOPRAZOLE SODIUM, VIA: HCPCS

## 2022-04-19 PROCEDURE — 94640 AIRWAY INHALATION TREATMENT: CPT

## 2022-04-19 PROCEDURE — 83735 ASSAY OF MAGNESIUM: CPT

## 2022-04-19 PROCEDURE — 2500000003 HC RX 250 WO HCPCS: Performed by: INTERNAL MEDICINE

## 2022-04-19 PROCEDURE — 93005 ELECTROCARDIOGRAM TRACING: CPT | Performed by: INTERNAL MEDICINE

## 2022-04-19 PROCEDURE — 85025 COMPLETE CBC W/AUTO DIFF WBC: CPT

## 2022-04-19 PROCEDURE — 2580000003 HC RX 258: Performed by: NURSE PRACTITIONER

## 2022-04-19 PROCEDURE — 2580000003 HC RX 258

## 2022-04-19 PROCEDURE — 99232 SBSQ HOSP IP/OBS MODERATE 35: CPT | Performed by: INTERNAL MEDICINE

## 2022-04-19 PROCEDURE — 6360000002 HC RX W HCPCS: Performed by: STUDENT IN AN ORGANIZED HEALTH CARE EDUCATION/TRAINING PROGRAM

## 2022-04-19 PROCEDURE — 6370000000 HC RX 637 (ALT 250 FOR IP)

## 2022-04-19 PROCEDURE — 2700000000 HC OXYGEN THERAPY PER DAY

## 2022-04-19 PROCEDURE — 97165 OT EVAL LOW COMPLEX 30 MIN: CPT

## 2022-04-19 PROCEDURE — 84100 ASSAY OF PHOSPHORUS: CPT

## 2022-04-19 PROCEDURE — 84478 ASSAY OF TRIGLYCERIDES: CPT

## 2022-04-19 PROCEDURE — 80053 COMPREHEN METABOLIC PANEL: CPT

## 2022-04-19 PROCEDURE — 99233 SBSQ HOSP IP/OBS HIGH 50: CPT | Performed by: INTERNAL MEDICINE

## 2022-04-19 PROCEDURE — 36592 COLLECT BLOOD FROM PICC: CPT

## 2022-04-19 PROCEDURE — 94669 MECHANICAL CHEST WALL OSCILL: CPT

## 2022-04-19 PROCEDURE — 97161 PT EVAL LOW COMPLEX 20 MIN: CPT | Performed by: PHYSICAL THERAPIST

## 2022-04-19 PROCEDURE — 6360000002 HC RX W HCPCS: Performed by: INTERNAL MEDICINE

## 2022-04-19 RX ORDER — MAGNESIUM SULFATE 1 G/100ML
1000 INJECTION INTRAVENOUS ONCE
Status: COMPLETED | OUTPATIENT
Start: 2022-04-19 | End: 2022-04-19

## 2022-04-19 RX ORDER — POTASSIUM CHLORIDE 29.8 MG/ML
40 INJECTION INTRAVENOUS ONCE
Status: COMPLETED | OUTPATIENT
Start: 2022-04-19 | End: 2022-04-19

## 2022-04-19 RX ORDER — FUROSEMIDE 10 MG/ML
40 INJECTION INTRAMUSCULAR; INTRAVENOUS 2 TIMES DAILY
Status: DISCONTINUED | OUTPATIENT
Start: 2022-04-19 | End: 2022-04-22

## 2022-04-19 RX ADMIN — ACETYLCYSTEINE 400 MG: 100 INHALANT RESPIRATORY (INHALATION) at 13:32

## 2022-04-19 RX ADMIN — CEFTRIAXONE 2000 MG: 2 INJECTION, POWDER, FOR SOLUTION INTRAMUSCULAR; INTRAVENOUS at 12:31

## 2022-04-19 RX ADMIN — ENOXAPARIN SODIUM 30 MG: 100 INJECTION SUBCUTANEOUS at 08:01

## 2022-04-19 RX ADMIN — METOPROLOL TARTRATE 5 MG: 5 INJECTION INTRAVENOUS at 17:42

## 2022-04-19 RX ADMIN — Medication 10 ML: at 08:03

## 2022-04-19 RX ADMIN — METOPROLOL TARTRATE 5 MG: 5 INJECTION INTRAVENOUS at 00:09

## 2022-04-19 RX ADMIN — SODIUM CHLORIDE 40 MG: 9 INJECTION, SOLUTION INTRAMUSCULAR; INTRAVENOUS; SUBCUTANEOUS at 07:58

## 2022-04-19 RX ADMIN — GLYCOPYRROLATE 0.2 MG: 0.2 INJECTION INTRAMUSCULAR; INTRAVENOUS at 07:58

## 2022-04-19 RX ADMIN — IPRATROPIUM BROMIDE AND ALBUTEROL SULFATE 1 AMPULE: .5; 2.5 SOLUTION RESPIRATORY (INHALATION) at 13:32

## 2022-04-19 RX ADMIN — FUROSEMIDE 40 MG: 10 INJECTION, SOLUTION INTRAMUSCULAR; INTRAVENOUS at 17:47

## 2022-04-19 RX ADMIN — Medication 10 ML: at 20:57

## 2022-04-19 RX ADMIN — INSULIN LISPRO 1 UNITS: 100 INJECTION, SOLUTION INTRAVENOUS; SUBCUTANEOUS at 12:32

## 2022-04-19 RX ADMIN — ACETYLCYSTEINE 400 MG: 100 INHALANT RESPIRATORY (INHALATION) at 22:03

## 2022-04-19 RX ADMIN — ENOXAPARIN SODIUM 30 MG: 100 INJECTION SUBCUTANEOUS at 20:57

## 2022-04-19 RX ADMIN — IPRATROPIUM BROMIDE AND ALBUTEROL SULFATE 1 AMPULE: .5; 2.5 SOLUTION RESPIRATORY (INHALATION) at 10:05

## 2022-04-19 RX ADMIN — ACETAMINOPHEN 650 MG: 650 SUPPOSITORY RECTAL at 00:17

## 2022-04-19 RX ADMIN — ACETYLCYSTEINE 400 MG: 100 INHALANT RESPIRATORY (INHALATION) at 10:05

## 2022-04-19 RX ADMIN — THIAMINE HYDROCHLORIDE: 100 INJECTION, SOLUTION INTRAMUSCULAR; INTRAVENOUS at 13:00

## 2022-04-19 RX ADMIN — IPRATROPIUM BROMIDE AND ALBUTEROL SULFATE 1 AMPULE: .5; 2.5 SOLUTION RESPIRATORY (INHALATION) at 06:01

## 2022-04-19 RX ADMIN — IPRATROPIUM BROMIDE AND ALBUTEROL SULFATE 1 AMPULE: .5; 2.5 SOLUTION RESPIRATORY (INHALATION) at 01:52

## 2022-04-19 RX ADMIN — ACETYLCYSTEINE 400 MG: 100 INHALANT RESPIRATORY (INHALATION) at 18:16

## 2022-04-19 RX ADMIN — SODIUM CHLORIDE 12.5 ML/HR: 9 INJECTION, SOLUTION INTRAVENOUS at 19:47

## 2022-04-19 RX ADMIN — MAGNESIUM SULFATE HEPTAHYDRATE 1000 MG: 1 INJECTION, SOLUTION INTRAVENOUS at 19:41

## 2022-04-19 RX ADMIN — FUROSEMIDE 40 MG: 10 INJECTION, SOLUTION INTRAMUSCULAR; INTRAVENOUS at 12:31

## 2022-04-19 RX ADMIN — POTASSIUM CHLORIDE 40 MEQ: 29.8 INJECTION, SOLUTION INTRAVENOUS at 19:47

## 2022-04-19 RX ADMIN — SODIUM CHLORIDE: 9 INJECTION, SOLUTION INTRAVENOUS at 16:31

## 2022-04-19 RX ADMIN — Medication 10 ML: at 20:58

## 2022-04-19 RX ADMIN — Medication 10 ML: at 08:02

## 2022-04-19 RX ADMIN — SODIUM CHLORIDE, PRESERVATIVE FREE 10 ML: 5 INJECTION INTRAVENOUS at 17:48

## 2022-04-19 RX ADMIN — ACETYLCYSTEINE 400 MG: 100 INHALANT RESPIRATORY (INHALATION) at 01:53

## 2022-04-19 RX ADMIN — METOPROLOL TARTRATE 5 MG: 5 INJECTION INTRAVENOUS at 06:27

## 2022-04-19 RX ADMIN — METOPROLOL TARTRATE 5 MG: 5 INJECTION INTRAVENOUS at 11:11

## 2022-04-19 RX ADMIN — ACETYLCYSTEINE 400 MG: 100 INHALANT RESPIRATORY (INHALATION) at 06:01

## 2022-04-19 RX ADMIN — ACETAMINOPHEN 650 MG: 650 SUPPOSITORY RECTAL at 11:11

## 2022-04-19 RX ADMIN — IPRATROPIUM BROMIDE AND ALBUTEROL SULFATE 1 AMPULE: .5; 2.5 SOLUTION RESPIRATORY (INHALATION) at 18:16

## 2022-04-19 RX ADMIN — INSULIN LISPRO 2 UNITS: 100 INJECTION, SOLUTION INTRAVENOUS; SUBCUTANEOUS at 20:57

## 2022-04-19 RX ADMIN — IPRATROPIUM BROMIDE AND ALBUTEROL SULFATE 1 AMPULE: .5; 2.5 SOLUTION RESPIRATORY (INHALATION) at 22:04

## 2022-04-19 ASSESSMENT — PAIN SCALES - GENERAL
PAINLEVEL_OUTOF10: 0
PAINLEVEL_OUTOF10: 10
PAINLEVEL_OUTOF10: 0

## 2022-04-19 NOTE — PLAN OF CARE
Problem: Pain:  Goal: Pain level will decrease  Description: Pain level will decrease  Outcome: Met This Shift     Problem: Pain:  Goal: Control of acute pain  Description: Control of acute pain  Outcome: Met This Shift     Problem: Pain:  Goal: Pain level will decrease  Description: Pain level will decrease  Outcome: Met This Shift     Problem: Falls - Risk of:  Goal: Will remain free from falls  Description: Will remain free from falls  Outcome: Met This Shift     Problem: Falls - Risk of:  Goal: Absence of physical injury  Description: Absence of physical injury  Outcome: Met This Shift     Problem: Skin Integrity:  Goal: Will show no infection signs and symptoms  Description: Will show no infection signs and symptoms  Outcome: Met This Shift     Problem: Skin Integrity:  Goal: Absence of new skin breakdown  Description: Absence of new skin breakdown  Outcome: Met This Shift     Problem: Non-Violent Restraints  Goal: Removal from restraints as soon as assessed to be safe  Outcome: Completed     Problem: Non-Violent Restraints  Goal: No harm/injury to patient while restraints in use  Outcome: Completed     Problem: Non-Violent Restraints  Goal: Patient's dignity will be maintained  Outcome: Completed

## 2022-04-19 NOTE — PROGRESS NOTES
Physical Therapy    Physical Therapy Initial Evaluation/Plan of Care    Room #:  AD32/ZO54-91  Patient Name: Martinez Bronson  YOB: 1965  MRN: [id number]    Date of Service: 4/19/2022     Tentative placement recommendation: Inpatient Rehab  Equipment recommendation: To be determined      Evaluating Physical Therapist: Gladis Juarez  #[id number]      Specific Provider Orders/Date/Referring Provider :  04/18/22 1430   PT eval and treat Start: 04/18/22 1430, End: 04/18/22 1430, ONE TIME, Standing Count: 1 Occurrences, Chiki Richardson MD      Admitting Diagnosis:   Hyponatremia [E87.1]  COPD exacerbation ([facility] [J44.1]  Acute chest pain [R07.9]  Chest pain, unspecified type [R07.9]  Chest pain [R07.9]    Admitted with    shortness of breath and chest pain  Intubated 4/10-4/18  Surgery: none  Visit Diagnoses       Codes    COPD exacerbation ([facility] [address]    -  Primary J44.1    Hyponatremia     E87.1    Alcohol withdrawal delirium ([facility] [address]     F10.231          Patient Active Problem List   Diagnosis    Biceps rupture, proximal    Shoulder pain    Pectoral muscle rupture    Biceps tendonitis    Shoulder impingement    Rotator cuff tear    Acromioclavicular joint arthritis    Acute chest pain    Chest pain        ASSESSMENT of Current Deficits Patient exhibits decreased strength, balance, endurance, range of motion and coordination impairing functional mobility, tolerance to activity and participation along with cognitive im pairments are barriers to d/c and require skilled intervention during hospital stay to attain pre hospital level of function. Decreased strength, balance and endurance  increases patient's risk for fall.   Difficulty following commands and attending to task ; labile      PHYSICAL THERAPY  PLAN OF CARE       Physical therapy plan of care is established based on physician order,  patient diagnosis and clinical assessment    Current Treatment Recommendations:    -Bed Mobility: Lower extremity exercises , Upper extremity exercises  and Trunk control activities   -Sitting Balance: Incorporate reaching activities to activate trunk muscles , Hands on support to maintain midline  and Facilitate postural control in all planes   -Transfers: Provide instruction on proper hand and foot position for adequate transfer of weight onto lower extremities and use of gait device if needed and Cues for hand placement, technique and safety. Provide stabilization to prevent fall   -Endurance: Utilize Supervised activities to increase level of endurance to allow for safe functional mobility including transfers and gait     PT long term treatment goals are located in below grid    Patient and or family understand(s) diagnosis, prognosis, and plan of care. Frequency of treatments: Patient will be seen  3-5 times/week. Prior Level of Function: Patient ambulated independently and driving  Rehab Potential: good   For long term goals    Past medical history:   Past Medical History:   Diagnosis Date    Arthritis     Back pain, chronic     Hyperlipidemia     Hypertension     Neck pain, chronic      Past Surgical History:   Procedure Laterality Date    EXTERNAL EAR SURGERY      i & d left ear.  EYE SURGERY      Lt. Eye  Dart removed   [address] [employer]    HEMORRHOID SURGERY      SHOULDER ARTHROSCOPY Right 1/16/2015    right shoulder bicep tenodesis subacromail decompression and debridement       SUBJECTIVE:    Precautions: Bedrest with bathroom Privileges , falls, alarm and O2 , heated hi andrzej 60L/m, 85%   ciwa  Social history: Patient lives with grandson in a mobile home      Equipment owned: unknown,       AM-PAC Basic Mobility        AM-PAC Mobility Inpatient   How much difficulty turning over in bed?: A Lot  How much difficulty sitting down on / standing up from a chair with arms?: Unable  How much difficulty moving from lying on back to sitting on side of bed?: Unable  How much help from another person moving to and from a bed to a chair?: Total  How much help from another person needed to walk in hospital room?: Total  How much help from another person for climbing 3-5 steps with a railing?: Total  AM-PAC Inpatient Mobility Raw Score : 7  AM-PAC Inpatient T-Scale Score : 26.42  Mobility Inpatient CMS 0-100% Score: 92.36  Mobility Inpatient CMS G-Code Modifier : CM    Nursing cleared patient for PT evaluation. The admitting diagnosis and active problem list as listed above have been reviewed prior to the initiation of this evaluation. OBJECTIVE;   Initial Evaluation  Date: 4/19/2022 Treatment Date:     Short Term/ Long Term   Goals   Was pt agreeable to Eval/treatment?  Yes  To be met in 5 days   Pain level   0/10        Bed Mobility    Rolling: Maximal assist of  2    Supine to sit: Not assessed     Sit to supine: Not assessed     Scooting: Not assessed     Rolling: Minimal assist of 1    Supine to sit: Minimal assist of 1    Sit to supine: Minimal assist of 1    Scooting: Minimal assist of 1     Transfers Sit to stand: Not assessed     Sit to stand: Minimal assist of 1     Ambulation    not assessed    15  feet using  wheeled walker with Moderate assist of 1    ROM Within functional limits    Increase range of motion 10% of affected joints    Strength BUE:  refer to OT eval  RLE:  3-/5  LLE:  3-/5  Poor coordination and motor contorl with volitional movement  Increase strength in affected mm groups by 1/3 grade   Balance Sitting EOB:  not assessed    Dynamic Standing:  not assessed    Sitting EOB:  fair    Dynamic Standing: fair with least restrictive device      Patient is Alert & Oriented x person and follows one step directions  With repetition and hands on cuing  Sensation:  Patient  denies numbness/tingling   Edema:  no   Endurance: poor      Vitals: Heated high flow   Blood Pressure at rest  Blood Pressure during session    Heart Rate at rest 105 Heart Rate during session 110   SPO2 at rest 100%  SPO2 during session 100%     Patient education  Patient educated on role of Physical Therapy, risks of immobility, safety and plan of care,  importance of mobility while in hospital  and positioning for skin integrity and comfort     Patient response to education:   Pt verbalized understanding Pt demonstrated skill Pt requires further education in this area   No No Yes      Treatment:  Patient practiced and was instructed/facilitated in the following treatment: Patient placed in chair position for duration of evaluation and exercises a/a bicep curl, shoulder elevation, Long arc quad and ankle pumps       x10  Positioned for comfort with arms supported       Therapeutic Exercises:  see above      At end of session, patient in bed with alarm call light and phone within reach,  all lines and tubes intact, nursing notified. Patient would benefit from continued skilled Physical Therapy to improve functional independence and quality of life. Patient's/ family goals   none stated    Time in  225  Time out  255    Total Treatment Time  10 minutes    Evaluation time includes thorough review of current medical information, gathering information on past medical history/social history and prior level of function, completion of standardized testing/informal observation of tasks, assessment of data, and development of Plan of care and goals.      CPT codes:  Low Complexity PT evaluation (61019)  Therapeutic exercises (89549)   10 minutes  1 unit(s)    Key[facility] Link PT

## 2022-04-19 NOTE — PROGRESS NOTES
CRITICAL CARE PROGRESS NOTE    The patient's case was discussed in multidisciplinary rounds including critical care specialist, nursing, RT and pharmacy. His evaluation is as follows:     ETOH withdrawal with delirium tremens - Resolved  --Extubated on 4/19   --Other medications for DTs include gabapentin and clonidine  --Continue Thiamine and folic acid, banana bag    Acute hypoxemic respiratory failure due to staphylococcal pneumonia (MSSA)  --Antibiotics: Ceftriaxone for 3 more days    Chest pain R/O ACS  --Followed by cardiology  --Plan for heart cath when improved  --Continue  beta blocker and imdur, ASA and ARB  --Not on statin as he is unable to take oral route. If he continues with dysphagia, then will insert NG     Chronic diastolic heart failure  --Continue afterload reduction as stated above     COPD exacerbation  --Continue aerosolized bronchodilators including duoneb and pulmicort     Hyponatremia, chronic possible due to chronic beer intake  --Lasix with goal of -1L to -2L   --Daily BMP     HTN  --On ARB and BB, clonidine and hydralazine     HLD  --Continue rosuivastatin     GERD  --On protonix     Tobacco use disorder  --Continue nicotine patch     Morbid obesity  -Will need counseling on lifestyle modifications    Prolonged QTc  --Avoid medications that prolong the QT    Ileus  --Hold enteral intake for now      DVT prophylaxis with enoxaparin   Nutrition: Tube feedings on hold due to high residuals  Vascular catheters: TLC needed for vasopressor support  Urinary catheter needed for strict input/output    GI prophylaxis with PPI  Physical therapy once awake  Goals of care:  Full code    BP (!) 144/87   Pulse 105   Temp 101.2 °F (38.4 °C)   Resp 22   Ht 6' (1.829 m)   Wt 277 lb 3.2 oz (125.7 kg)   SpO2 100%   BMI 37.60 kg/m²     General: Awake, oriented to place and person  HEENT: No head lesions, PERRL, EOMI, mouth without lesions, no nasal lesions, no cervical adenopathy palpated  Respiratory: Lungs with equal breath sounds bilaterally, no adventitious sounds auscultated, no accessory muscle use  CV: Regular rate, no murmurs, JVD up to jaw, 1+ leg edema  Abdomen: Soft, non tender, + bowel sounds, no lesions  Skin: Hydrated, adequate turgor, no rash, capillary refill <2 seconds  Extremities: Muscular strength 2/5 in 4 limbs, moves 4 limbs spontaneously, distal pulses present  Neurology: Awake and alert, follows commands, moves 4 limbs on command and spontaneously,  neck is supple, no meningitic signs present. Last 3 CMP:    Recent Labs     04/17/22  0522 04/18/22  0405 04/19/22  0436   * 133 135   K 5.2* 5.1* 3.6   CL 87* 96* 97*   CO2 24 26 25   BUN 18 19 23*   CREATININE 0.6* 0.5* 0.6*   GLUCOSE 237* 218* 197*   CALCIUM 8.9 9.6 9.7   PROT 6.8 7.7 7.7   LABALBU 2.7* 3.2* 3.3*   BILITOT 0.3 0.6 0.8   ALKPHOS 129 137* 192*   AST 33 43* 203*   ALT 46* 65* 227*     Recent Labs     04/19/22  0436   WBC 25.0*   RBC 4.08   HGB 11.0*   HCT 34.1*   MCV 83.6   MCH 27.0   MCHC 32.3   RDW 19.0*      MPV 9.0       No results for input(s): BC in the last 72 hours. No results for input(s): Bassem Dunk in the last 72 hours.     24 HR INTAKE/OUTPUT:      Intake/Output Summary (Last 24 hours) at 4/19/2022 1211  Last data filed at 4/19/2022 1100  Gross per 24 hour   Intake 1065.77 ml   Output 2880 ml   Net -1814.23 ml     MEDICATIONS:   cefTRIAXone (ROCEPHIN) IV  2,000 mg IntraVENous Q24H    [Held by provider] metoprolol tartrate  75 mg Oral BID    [Held by provider] metoprolol tartrate  25 mg Oral Once    metoprolol  5 mg IntraVENous Q6H    ipratropium-albuterol  1 ampule Inhalation Q4H    [Held by provider] gabapentin  400 mg Oral TID    insulin lispro  0-6 Units SubCUTAneous TID     insulin lispro  0-3 Units SubCUTAneous Nightly    acetylcysteine  4 mL Inhalation Q4H    cloNIDine  1 patch TransDERmal Weekly    folic acid  1 mg Oral Daily    sodium chloride flush 5-40 mL IntraVENous 2 times per day    multivitamin  1 tablet Oral Daily    pantoprazole (PROTONIX) 40 mg injection  40 mg IntraVENous Daily    [Held by provider] losartan  50 mg Oral Daily    [Held by provider] rosuvastatin  10 mg Oral Daily    sodium chloride flush  5-40 mL IntraVENous 2 times per day    enoxaparin  30 mg SubCUTAneous BID    aspirin  81 mg Oral Daily    nicotine  1 patch TransDERmal Daily      sodium chloride 50 mL/hr at 04/19/22 8657    dextrose      [Held by provider] ketamine (KETALAR) 5 mg/mL infusion for analgosedation Stopped (04/16/22 1936)    sodium chloride Stopped (04/18/22 0355)    sodium chloride Stopped (04/16/22 0306)     glycopyrrolate, dextrose bolus (hypoglycemia) **OR** dextrose bolus (hypoglycemia), glucose, glucagon (rDNA), dextrose, hydrALAZINE, sodium chloride flush, sodium chloride, sodium chloride flush, sodium chloride, ondansetron **OR** ondansetron, polyethylene glycol, acetaminophen **OR** acetaminophen    OBJECTIVE:  Vitals:    04/19/22 1200   BP: (!) 144/87   Pulse: 105   Resp: 22   Temp:    SpO2: 100%     FiO2 : 70 % (Decreased from 85%)  O2 Flow Rate (L/min): 60 L/min  O2 Device: Heated high flow cannula        LABS:  WBC   Date Value Ref Range Status   04/19/2022 25.0 (H) 4.5 - 11.5 E9/L Final   04/18/2022 17.9 (H) 4.5 - 11.5 E9/L Final   04/17/2022 12.5 (H) 4.5 - 11.5 E9/L Final     Hemoglobin   Date Value Ref Range Status   04/19/2022 11.0 (L) 12.5 - 16.5 g/dL Final   04/18/2022 11.7 (L) 12.5 - 16.5 g/dL Final   04/17/2022 11.0 (L) 12.5 - 16.5 g/dL Final     Hematocrit   Date Value Ref Range Status   04/19/2022 34.1 (L) 37.0 - 54.0 % Final   04/18/2022 36.6 (L) 37.0 - 54.0 % Final   04/17/2022 34.9 (L) 37.0 - 54.0 % Final     MCV   Date Value Ref Range Status   04/19/2022 83.6 80.0 - 99.9 fL Final   04/18/2022 82.4 80.0 - 99.9 fL Final   04/17/2022 82.7 80.0 - 99.9 fL Final     Platelets   Date Value Ref Range Status   04/19/2022 334 130 - 450 E9/L Final   04/18/2022 259 130 - 450 E9/L Final   04/17/2022 222 130 - 450 E9/L Final     Sodium   Date Value Ref Range Status   04/19/2022 135 132 - 146 mmol/L Final   04/18/2022 133 132 - 146 mmol/L Final   04/17/2022 121 (L) 132 - 146 mmol/L Final     Potassium   Date Value Ref Range Status   04/19/2022 3.6 3.5 - 5.0 mmol/L Final   04/18/2022 5.1 (H) 3.5 - 5.0 mmol/L Final   04/17/2022 5.2 (H) 3.5 - 5.0 mmol/L Final     Potassium reflex Magnesium   Date Value Ref Range Status   04/11/2022 4.5 3.5 - 5.0 mmol/L Final   04/10/2022 4.5 3.5 - 5.0 mmol/L Final   04/09/2022 4.3 3.5 - 5.0 mmol/L Final     Chloride   Date Value Ref Range Status   04/19/2022 97 (L) 98 - 107 mmol/L Final   04/18/2022 96 (L) 98 - 107 mmol/L Final   04/17/2022 87 (L) 98 - 107 mmol/L Final     CO2   Date Value Ref Range Status   04/19/2022 25 22 - 29 mmol/L Final   04/18/2022 26 22 - 29 mmol/L Final   04/17/2022 24 22 - 29 mmol/L Final     BUN   Date Value Ref Range Status   04/19/2022 23 (H) 6 - 20 mg/dL Final   04/18/2022 19 6 - 20 mg/dL Final   04/17/2022 18 6 - 20 mg/dL Final     CREATININE   Date Value Ref Range Status   04/19/2022 0.6 (L) 0.7 - 1.2 mg/dL Final   04/18/2022 0.5 (L) 0.7 - 1.2 mg/dL Final   04/17/2022 0.6 (L) 0.7 - 1.2 mg/dL Final     Glucose   Date Value Ref Range Status   04/19/2022 197 (H) 74 - 99 mg/dL Final   04/18/2022 218 (H) 74 - 99 mg/dL Final   04/17/2022 237 (H) 74 - 99 mg/dL Final     Calcium   Date Value Ref Range Status   04/19/2022 9.7 8.6 - 10.2 mg/dL Final   04/18/2022 9.6 8.6 - 10.2 mg/dL Final   04/17/2022 8.9 8.6 - 10.2 mg/dL Final     Total Protein   Date Value Ref Range Status   04/19/2022 7.7 6.4 - 8.3 g/dL Final   04/18/2022 7.7 6.4 - 8.3 g/dL Final   04/17/2022 6.8 6.4 - 8.3 g/dL Final     Albumin   Date Value Ref Range Status   04/19/2022 3.3 (L) 3.5 - 5.2 g/dL Final   04/18/2022 3.2 (L) 3.5 - 5.2 g/dL Final   04/17/2022 2.7 (L) 3.5 - 5.2 g/dL Final   10/13/2010 4.8 3.2 - 4.8 g/dL Final     Total extubation. Bilateral atelectasis and or   infiltrate with slight interval progression in the right mid lung. XR CHEST PORTABLE   Final Result   New atelectasis and or infiltrate at the right mid lung with stable   atelectasis and or infiltrate at the right base. XR CHEST PORTABLE   Final Result   New atelectasis and or infiltrate at the right base. XR ABDOMEN FOR NG/OG/NE TUBE PLACEMENT   Final Result   Nasogastric tube with the tip in the proximal gastric body. XR CHEST PORTABLE   Final Result   Adequately positioned endotracheal tube, orogastric tube and right internal   jugular central venous line. XR CHEST PORTABLE   Final Result   No pneumonia or pleural effusion. PROBLEM LIST:  Principal Problem:    Acute chest pain  Active Problems:    Chest pain  Resolved Problems:    * No resolved hospital problems. *          ATTESTATION:  ICU Staff Physician note of personal involvement in Care  As the attending physician, I certify that I personally reviewed the patients history and personnally examined the patient to confirm the physical findings described above,  And that I reviewed the relevant imaging studies and available reports. I also discussed the differential diagnosis and all of the proposed management plans with the patient and individuals accompanying the patient to this visit. They had the opportunity to ask questions about the proposed management plans and to have those questions answered. This patient has a high probability of sudden, clinically significant deterioration, which requires the highest level of physician preparedness to intervene urgently. I managed/supervised life or organ supporting interventions that required frequent physician assessment. I devoted my full attention to the direct care of this patient for the amount of time indicated below.   Time I spent with the family or surrogate(s) is included only if the patient was incapable of providing the necessary information or participating in medical decisions - Time devoted to teaching and to any procedures I billed separately is not included.      CRITICAL CARE TIME:  30 minutes    Antoinette Mart MD  Pulmonary and Critical Care Medicine

## 2022-04-19 NOTE — PROGRESS NOTES
Department of Internal Medicine  General Internal Medicine  Attending Progress Note  Chief Complaint   Patient presents with    Shortness of Breath     SUBJECTIVE:    Reports that he is doing well. Currently extubated.      OBJECTIVE      Medications    Current Facility-Administered Medications: folic acid 1 mg, thiamine (B-1) 100 mg in dextrose 5 % 50 mL IVPB, , IntraVENous, Daily  furosemide (LASIX) injection 40 mg, 40 mg, IntraVENous, BID  cefTRIAXone (ROCEPHIN) 2,000 mg in sterile water 20 mL IV syringe, 2,000 mg, IntraVENous, Q24H  [Held by provider] metoprolol tartrate (LOPRESSOR) tablet 75 mg, 75 mg, Oral, BID  [Held by provider] metoprolol tartrate (LOPRESSOR) tablet 25 mg, 25 mg, Oral, Once  metoprolol (LOPRESSOR) injection 5 mg, 5 mg, IntraVENous, Q6H  glycopyrrolate (ROBINUL) injection 0.2 mg, 0.2 mg, IntraVENous, Q4H PRN  ipratropium-albuterol (DUONEB) nebulizer solution 1 ampule, 1 ampule, Inhalation, Q4H  0.9 % sodium chloride infusion, , IntraVENous, Continuous  [Held by provider] gabapentin (NEURONTIN) capsule 400 mg, 400 mg, Oral, TID  insulin lispro (HUMALOG) injection vial 0-6 Units, 0-6 Units, SubCUTAneous, TID WC  insulin lispro (HUMALOG) injection vial 0-3 Units, 0-3 Units, SubCUTAneous, Nightly  dextrose bolus (hypoglycemia) 10% 125 mL, 125 mL, IntraVENous, PRN **OR** dextrose bolus (hypoglycemia) 10% 250 mL, 250 mL, IntraVENous, PRN  glucose (GLUTOSE) 40 % oral gel 15 g, 15 g, Oral, PRN  glucagon (rDNA) injection 1 mg, 1 mg, IntraMUSCular, PRN  dextrose 5 % solution, 100 mL/hr, IntraVENous, PRN  acetylcysteine (MUCOMYST) 10 % solution 400 mg, 4 mL, Inhalation, Q4H  [Held by provider] ketamine (KETALAR) 1,250 mg in sodium chloride 0.9 % 250 mL infusion, 0.05-2.5 mg/kg/hr (Ideal), IntraVENous, Continuous  hydrALAZINE (APRESOLINE) injection 10 mg, 10 mg, IntraVENous, Q6H PRN  cloNIDine (CATAPRES) 0.3 MG/24HR 1 patch, 1 patch, TransDERmal, Weekly  sodium chloride flush 0.9 % injection 5-40 mL, 5-40 mL, IntraVENous, 2 times per day  sodium chloride flush 0.9 % injection 5-40 mL, 5-40 mL, IntraVENous, PRN  0.9 % sodium chloride infusion, , IntraVENous, PRN  therapeutic multivitamin-minerals 1 tablet, 1 tablet, Oral, Daily  pantoprazole (PROTONIX) 40 mg in sodium chloride (PF) 10 mL injection, 40 mg, IntraVENous, Daily  [Held by provider] losartan (COZAAR) tablet 50 mg, 50 mg, Oral, Daily  [Held by provider] rosuvastatin (CRESTOR) tablet 10 mg, 10 mg, Oral, Daily  sodium chloride flush 0.9 % injection 5-40 mL, 5-40 mL, IntraVENous, 2 times per day  sodium chloride flush 0.9 % injection 5-40 mL, 5-40 mL, IntraVENous, PRN  0.9 % sodium chloride infusion, , IntraVENous, PRN  enoxaparin (LOVENOX) injection 30 mg, 30 mg, SubCUTAneous, BID  ondansetron (ZOFRAN-ODT) disintegrating tablet 4 mg, 4 mg, Oral, Q8H PRN **OR** ondansetron (ZOFRAN) injection 4 mg, 4 mg, IntraVENous, Q6H PRN  polyethylene glycol (GLYCOLAX) packet 17 g, 17 g, Oral, Daily PRN  acetaminophen (TYLENOL) tablet 650 mg, 650 mg, Oral, Q6H PRN **OR** acetaminophen (TYLENOL) suppository 650 mg, 650 mg, Rectal, Q6H PRN  aspirin chewable tablet 81 mg, 81 mg, Oral, Daily  nicotine (NICODERM CQ) 14 MG/24HR 1 patch, 1 patch, TransDERmal, Daily  Physical    VITALS:  /80   Pulse 94   Temp 98.4 °F (36.9 °C)   Resp 21   Ht 6' (1.829 m)   Wt 277 lb 3.2 oz (125.7 kg)   SpO2 98%   BMI 37.60 kg/m²   CONSTITUTIONAL:  awake and alert  EYES:  extra-ocular muscles intact and vision intact  ENT:  normocepalic, without obvious abnormality  NECK:  supple, symmetrical, trachea midline  HEMATOLOGIC/LYMPHATICS:  no cervical lymphadenopathy  LUNGS:  no increased work of breathing, no retractions and no crackles or wheezing  CARDIOVASCULAR:  normal apical pulses and normal S1 and S2  ABDOMEN:  normal bowel sounds, non-distended and non-tender  MUSCULOSKELETAL:  there is no redness, warmth, or swelling of the joints  NEUROLOGIC:  Mental Status Exam:  Level of Alertness:   awake  Orientation:   person, place, time  SKIN:  no bruising or bleeding  Data    CBC:   Lab Results   Component Value Date    WBC 25.0 04/19/2022    RBC 4.08 04/19/2022    HGB 11.0 04/19/2022    HCT 34.1 04/19/2022    MCV 83.6 04/19/2022    MCH 27.0 04/19/2022    MCHC 32.3 04/19/2022    RDW 19.0 04/19/2022     04/19/2022    MPV 9.0 04/19/2022     BMP:    Lab Results   Component Value Date     04/19/2022    K 3.6 04/19/2022    K 4.5 04/11/2022    CL 97 04/19/2022    CO2 25 04/19/2022    BUN 23 04/19/2022    LABALBU 3.3 04/19/2022    LABALBU 4.8 10/13/2010    CREATININE 0.6 04/19/2022    CALCIUM 9.7 04/19/2022    GFRAA >60 04/19/2022    LABGLOM >60 04/19/2022    GLUCOSE 197 04/19/2022       ASSESSMENT AND PLAN      1. Acute chest pain  Management per cardiology recommendations     2. Acute Respiratory failure with Hypoxia:  Extubated and doing well on Airvo  Continue to monitor  Continue pulmo hygiene with incentive spirometry     3. Alcohol abuse complicated with DT:  Improving  Continue to monitor   Continue withdrawal protocol   counseled on important of cessations    4. Hypertension Essential  Hyperlipidemia:  GeRD:  Continue home medications  BP is well controlled     5. Morbid Obesity: lifestyle modifications     6.   COPD:   Continue breathing treatment as ordered  contiue oxygen support

## 2022-04-19 NOTE — PROGRESS NOTES
[address] [facility]  [facility]        Date:2022                                                  Patient Name: Rizwan Dominguez    MRN: [id number]    : 1965    Room: [place] [address]      Evaluating OT: Lindy Portillo OTR/L #[id number]     Referring Provider and Specific Provider Orders/Date:      22 1430  OT eval and treat  Start:  22 1430,   End:  22 1430,   ONE TIME,   Standing Count:  1 Occurrences,   Brian Bassett MD       Placement Recommendation: LTAC vs Subacute Rehab       Diagnosis:   1. COPD exacerbation (HCC)    2. Chest pain, unspecified type    3. Hyponatremia    4. Alcohol withdrawal delirium [facility])           Surgery: None      Pertinent Medical History:       Past Medical History:   Diagnosis Date    Arthritis     Back pain, chronic     Hyperlipidemia     Hypertension     Neck pain, chronic          Past Surgical History:   Procedure Laterality Date    EXTERNAL EAR SURGERY      i & d left ear.  EYE SURGERY      Lt. Eye  Dart removed   [place] [address] [employer]    HEMORRHOID SURGERY      SHOULDER ARTHROSCOPY Right 2015    right shoulder bicep tenodesis subacromail decompression and debridement       Precautions:  Fall Risk, falls and alarm, heated evelia flow O2, bedrest with bathroom privileges, cognition/delerium, garbled speech, history of ETOH abuse      Assessment of current deficits    [x] Functional mobility  [x]ADLs  [x] Strength               [x]Cognition    [x] Functional transfers   [x] IADLs         [x] Safety Awareness   [x]Endurance    [x] Fine Coordination              [x] Balance      [] Vision/perception   []Sensation     [x]Gross Motor Coordination  [x] ROM  [x] Delirium                   [x] Motor Control     OT PLAN OF CARE   OT POC based on physician orders, patient diagnosis and results of clinical assessment    Frequency/Duration 1-3 days/wk for 2 weeks PRN     Specific OT Treatment Interventions to include:   * Instruction/training on adapted ADL techniques and AE recommendations to increase functional independence within precautions       * Training on energy conservation strategies, correct breathing pattern and techniques to improve independence/tolerance for self-care routine  * Functional transfer/mobility training/DME recommendations for increased independence, safety, and fall prevention  * Patient/Family education to increase follow through with safety techniques and functional independence  * Recommendation of environmental modifications for increased safety with functional transfers/mobility and ADLs  * Cognitive retraining/development of therapeutic activities to improve problem solving, judgement, memory, and attention for increased safety/participation in ADL/IADL tasks  * Therapeutic exercise to improve motor endurance, ROM, and functional strength for ADLs/functional transfers  * Therapeutic activities to facilitate/challenge dynamic balance, stand tolerance for increased safety and independence with ADLs  * Therapeutic activities to facilitate gross/fine motor skills for increased independence with ADLs  * Positioning to improve skin integrity, interaction with environment and functional independence  * Delirium prevention/treatment  * Manual techniques for edema management    Recommended Adaptive Equipment: TBD     Home Living: Lives with 3 y/o grandson who he has custody of, mobile home, Ramp to enter. Bathroom set-up: Unknown         Equipment owned: None    Prior Level of Function: Independent with ADLs , Independent with IADLs; ambulated independently. Driving: Yes  Occupation: On SSDI      Pain Level: No signs of pain. Cognition: A&O: 2/4 - self and place only;  Follows 1-2 step directions - difficult to formally assess 2/2 garbled speech    Memory: impaired   Sequencing: poor   Problem solving: poor   Judgement/safety: poor    [facility]   AM-PAC Daily Activity Inpatient   How much help for putting on and taking off regular lower body clothing?: Total  How much help for Bathing?: A Lot  How much help for Toileting?: Total  How much help for putting on and taking off regular upper body clothing?: A Lot  How much help for taking care of personal grooming?: A Lot  How much help for eating meals?: A Lot  AM-PAC Inpatient Daily Activity Raw Score: 10  AM-PAC Inpatient ADL T-Scale Score : 27.31  ADL Inpatient CMS 0-100% Score: 74.7  ADL Inpatient CMS G-Code Modifier : CL     Functional Assessment:    Initial Eval Status  Date: 4/19/22   Treatment Status  Date: STGs = LTGs  Time frame: 10-14 days   Feeding Maximal Assist   Limited by decreased UE ROM/strength/coordination     Minimal Assist    Grooming Maximal Assist   Decreased functional reach and overall strength. Max A to bring cloth to face. Minimal Assist    UB Dressing Maximal Assist    Minimal Assist    LB Dressing Dependent   Assist to lift heels off bed to doff/don heel protectors/socks     Moderate Assist    Bathing Maximal Assist   Assist x 2 for rolling to reach buttocks/rear area     Moderate Assist    Toileting Dependent   Incontinent of stool. Pt has charles catheter. Moderate Assist    Bed Mobility  Supine to sit: Not assessed   Sit to supine: Not assessed     NT due to pt overall debility, decreased activity tolerance, safety and fall risk.        Supine to sit: Minimal Assist   Sit to supine: Minimal Assist    Functional Transfers Not assessed     Moderate Assist    Functional Mobility Not assessed      Moderate Assist x 1-2 with use of wheeled walker    Balance Sitting:      Static: not assessed     Dynamic: not assessed   Standing: not assessed     Sitting:     Static: fair     Dynamic: fair   Standing: fair minus/fair    Activity Tolerance poor   Increase sitting tolerance >5 minutes for improved engagement with functional activities and prep for functional transfers      Visual/  Perceptual Glasses: N/a     NA      Hand Dominance: N/a     AROM (PROM) Strength Additional Info:  Goal:   RUE  Impaired 2/2 weakness  AAROM WFL  2/5 good  and poor FMC/dexterity noted during ADL tasks   Improve overall RUE strength  for participation in functional tasks   LUE Impaired 2/2 weakness  AAROM WFL  2/5 good  and poor FMC/dexterity noted during ADL tasks   Improve overall LUE strength  for participation in functional tasks     Hearing:  [facility]   Sensation:   No c/o numbness or tingling  Tone:  WFL   Edema:     Comments: RN cleared patient for OT. Upon arrival patient in supine. Therapist facilitated and instructed pt on adapted  techniques & compensatory strategies to improve safety and independence with basic ADLs, bed mobility to allow pt to achieve highest level of independence and safely. Pt demonstrated poor understanding of education & follow through. Pt positioned in chair position for evaluation. Supine > sit deferred d/t safety concerns 2/2 cognition, endurance, and overall debility per RN. At end of session, patient was supine with RN in room. Overall, patient demonstrated  decreased independence and safety during completion of ADL tasks. Pt would benefit from continued skilled OT to increase safety and independence with completion of ADL tasks and functional mobility for improved quality of life. Rehab Potential: Good for established goals. Patient / Family Goal: Not stated      Patient and/or family were instructed on functional diagnosis, prognosis/goals and OT plan of care. Demonstrated poor understanding.      Eval Complexity: Low    Time In: 10:34 AM   Time Out: 10:54 AM    Total Treatment Time: 0       Min Units   OT Eval Low 97165  X  1    OT Eval Medium 68891      OT Eval High 26183      OT Re-Eval [id number]            ADL/Self Care 97354     Therapeutic Activities 66594 Therapeutic Ex [id number]       Orthotic Management [id number]       Manual [id number]     Neuro Re-Ed 33303       Non-Billable Time        Evaluation Time additionally includes thorough review of current medical information, gathering information on past medical history/social history and prior level of function, interpretation of standardized testing/informal observation of tasks, assessment of data and development of plan of care and goals.         Evaluating OT: Shobha Brown OTR/L #[id number]

## 2022-04-19 NOTE — CARE COORDINATION
4-19-Cm note: ( covid neg 4-10) pt was extubated last evening, he is on airvo at this time, met with pt for transition of care, pt asking \"what happened? \" explained the nurse would be in to speak to him. Pt is alert and wants to return home at [place] , PT in room with pt at this time , will await recommendations, also will talk to pt about ETOH rehab when he is able to hold that conversation. CM/SS will follow daily.  Electronically signed by Liliana Urias RN on 4/19/2022 at 2:36 PM

## 2022-04-19 NOTE — PLAN OF CARE
Problem: Falls - Risk of:  Goal: Will remain free from falls  Description: Will remain free from falls  4/19/2022 0744 by Savanna Nguyen RN  Outcome: Met This Shift  4/19/2022 0412 by Michael Rubio RN  Outcome: Met This Shift  Goal: Absence of physical injury  Description: Absence of physical injury  4/19/2022 0744 by Savanna Nguyen RN  Outcome: Met This Shift  4/19/2022 0412 by Michael Rubio RN  Outcome: Met This Shift     Problem: Skin Integrity:  Goal: Will show no infection signs and symptoms  Description: Will show no infection signs and symptoms  4/19/2022 0744 by Savanna Nguyen RN  Outcome: Met This Shift  4/19/2022 0412 by Michael Rubio RN  Outcome: Met This Shift  Goal: Absence of new skin breakdown  Description: Absence of new skin breakdown  4/19/2022 0744 by Savanna Nguyen RN  Outcome: Met This Shift  4/19/2022 0412 by Michael Rubio RN  Outcome: Met This Shift

## 2022-04-19 NOTE — PROGRESS NOTES
[facility]) Physicians        CARDIOLOGY                 INPATIENT PROGRESS NOTE          PATIENT SEEN IN FOLLOW UP FOR: Chest pain, CHF    Hospital Day: 11     Jose Hernandez is a 64year old patient known to Dr. Gildrado Vera from initial consult      SUBJECTIVE: Extubated. Echo 4/11/22   Summary   Technically sub-optimal images - Definity Echo contrast used. Normal left ventricular chamber size. Normal left ventricular systolic function, LVEF 05-99%. Mild left ventricular concentric hypertrophy noted. Normal diastolic function. Interatrial septum not well visualized but appears intact. Normal right ventricle size and function, TAPSE 2.3cm. Valves not well visualized. No hemodynamically significant aortic stenosis. Normal aortic root size. No evidence of pericardial effusion. No intra cardiac mass or thrombus. No comparison study available. Intake/Output Summary (Last 24 hours) at 4/19/2022 1548  Last data filed at 4/19/2022 1500  Gross per 24 hour   Intake 1065.77 ml   Output 4065 ml   Net -2999.23 ml       Labs:   CBC:   Recent Labs     04/18/22  0405 04/19/22  0436   WBC 17.9* 25.0*   HGB 11.7* 11.0*   HCT 36.6* 34.1*    334     BMP:   Recent Labs     04/18/22  0405 04/19/22  0436    135   K 5.1* 3.6   CO2 26 25   BUN 19 23*   CREATININE 0.5* 0.6*   LABGLOM >60 >60   CALCIUM 9.6 9.7     Mag:   Recent Labs     04/18/22  0405 04/19/22  0436   MG 2.3 2.0     Phos:   Recent Labs     04/18/22  0405 04/19/22  0436   PHOS 3.9 2.9     TSH:   No results for input(s): TSH in the last 72 hours. HgA1c:     BNP: No results for input(s): BNP in the last 72 hours. PT/INR: No results for input(s): PROTIME, INR in the last 72 hours. APTT:No results for input(s): APTT in the last 72 hours. CARDIAC ENZYMES:No results for input(s): CKTOTAL, CKMB, CKMBINDEX, TROPONINI in the last 72 hours.   FASTING LIPID PANEL:  Lab Results   Component Value Date    CHOL 213 12/09/2015 HDL 41 2015    LDLCALC - 2015    TRIG 169 2022     LIVER PROFILE:  Recent Labs     22  0405 22  0436   AST 43* 203*   ALT 65* 227*   LABALBU 3.2* 3.3*       Current Inpatient Medications:   thiamine and folic acid IVPB   IntraVENous Daily    furosemide  40 mg IntraVENous BID    cefTRIAXone (ROCEPHIN) IV  2,000 mg IntraVENous Q24H    [Held by provider] metoprolol tartrate  75 mg Oral BID    [Held by provider] metoprolol tartrate  25 mg Oral Once    metoprolol  5 mg IntraVENous Q6H    ipratropium-albuterol  1 ampule Inhalation Q4H    [Held by provider] gabapentin  400 mg Oral TID    insulin lispro  0-6 Units SubCUTAneous TID WC    insulin lispro  0-3 Units SubCUTAneous Nightly    acetylcysteine  4 mL Inhalation Q4H    cloNIDine  1 patch TransDERmal Weekly    sodium chloride flush  5-40 mL IntraVENous 2 times per day    multivitamin  1 tablet Oral Daily    pantoprazole (PROTONIX) 40 mg injection  40 mg IntraVENous Daily    [Held by provider] losartan  50 mg Oral Daily    [Held by provider] rosuvastatin  10 mg Oral Daily    sodium chloride flush  5-40 mL IntraVENous 2 times per day    enoxaparin  30 mg SubCUTAneous BID    aspirin  81 mg Oral Daily    nicotine  1 patch TransDERmal Daily       IV Infusions (if any):   sodium chloride 50 mL/hr at 22 9584    dextrose      [Held by provider] ketamine (KETALAR) 5 mg/mL infusion for analgosedation Stopped (22 1936)    sodium chloride Stopped (22 0355)    sodium chloride Stopped (22 0306)         PHYSICAL EXAM:     CONSTITUTIONAL:   BP (!) 118/93   Pulse 103   Temp 100.4 °F (38 °C)   Resp 22   Ht 6' (1.829 m)   Wt 277 lb 3.2 oz (125.7 kg)   SpO2 100%   BMI 37.60 kg/m²   Pulse  Av.5  Min: 94  Max: 362  Systolic (41FDR), EOI:772 , Min:102 , IIL:908    Diastolic (67BRW), SMF:28, Min:67, Max:145    In general, this is a well developed, well nourished who appears stated age.  Pt on vent and sedated. HEENT: Pt on vent and sedated. Neck-  no stridor, no noted enlargement of the thyroid, no carotid bruit. no jugular venous distention   RESPIRATORY: Chest symmetrical.  No accessory muscle use. Lung auscultation - few rhonchi  CARDIOVASCULAR:     Heart Ausculation - Regular rate and rhythm, 2/6 systolic murmur, No s3 or rub.   + lower extremity edema,  Distal pulses palpable, no clubbing  ABDOMEN: Bowel sounds present. No Palpable masses. MS: Pt on vent and sedated. : Deferred  Rectal Exam: Deferred  SKIN: warm and dry   NEURO / PSYCH: Pt on vent and sedated. ASSESSMENT / PLAN:    Exertional chest pain - Likely angina (CP with diaphoresis and dyspnea on exertion). Awaiting invasive coronary angiogram once stable  Continue beta-blocker and aspirin and resume statin when liver function test stable    Essential HTN -   Again, I recommend weaning off/discontinuation of clonidine patch and instead change metoprolol to carvedilol and if blood pressure still not controlled add Imdur or hydralazine    Dyslipidemia  -resume statin therapy when liver function test is stable    Tobacco abuse - Will be counseled to quit smoking    Chronic HFpEF - Diuretics as needed    Probable KULDIP - Recommend Out-Pt sleep study    Non-morbid Obesity, BMI 38 - Diet, exercise and weight loss will be discussed.     Hx of Marijuana and ETOH use - Will be counseled to quit    Chronic back pain - on Oxycodone as an outpatient    Family Hx of CAD    Anemia, Chronic - Monitor H/H    History of elevated LFT's in 3/2022 - Monitor LFTs          Electronically signed by Zuly Welsh MD on 4/19/2022 at 3:48 PM  [facility]) Cardiology

## 2022-04-20 ENCOUNTER — APPOINTMENT (OUTPATIENT)
Dept: GENERAL RADIOLOGY | Age: 57
DRG: 896 | End: 2022-04-20
Payer: MEDICARE

## 2022-04-20 LAB
ALBUMIN SERPL-MCNC: 3.1 G/DL (ref 3.5–5.2)
ALP BLD-CCNC: 153 U/L (ref 40–129)
ALT SERPL-CCNC: 165 U/L (ref 0–40)
ANION GAP SERPL CALCULATED.3IONS-SCNC: 15 MMOL/L (ref 7–16)
ANION GAP SERPL CALCULATED.3IONS-SCNC: 16 MMOL/L (ref 7–16)
ANISOCYTOSIS: ABNORMAL
AST SERPL-CCNC: 67 U/L (ref 0–39)
BASOPHILS ABSOLUTE: 0.17 E9/L (ref 0–0.2)
BASOPHILS RELATIVE PERCENT: 0.9 % (ref 0–2)
BILIRUB SERPL-MCNC: 0.5 MG/DL (ref 0–1.2)
BUN BLDV-MCNC: 29 MG/DL (ref 6–20)
BUN BLDV-MCNC: 29 MG/DL (ref 6–20)
CALCIUM SERPL-MCNC: 9.3 MG/DL (ref 8.6–10.2)
CALCIUM SERPL-MCNC: 9.4 MG/DL (ref 8.6–10.2)
CHLORIDE BLD-SCNC: 100 MMOL/L (ref 98–107)
CHLORIDE BLD-SCNC: 99 MMOL/L (ref 98–107)
CO2: 24 MMOL/L (ref 22–29)
CO2: 25 MMOL/L (ref 22–29)
CREAT SERPL-MCNC: 0.7 MG/DL (ref 0.7–1.2)
CREAT SERPL-MCNC: 0.7 MG/DL (ref 0.7–1.2)
DOHLE BODIES: ABNORMAL
EOSINOPHILS ABSOLUTE: 0 E9/L (ref 0.05–0.5)
EOSINOPHILS RELATIVE PERCENT: 0.5 % (ref 0–6)
GFR AFRICAN AMERICAN: >60
GFR AFRICAN AMERICAN: >60
GFR NON-AFRICAN AMERICAN: >60 ML/MIN/1.73
GFR NON-AFRICAN AMERICAN: >60 ML/MIN/1.73
GLUCOSE BLD-MCNC: 168 MG/DL (ref 74–99)
GLUCOSE BLD-MCNC: 177 MG/DL (ref 74–99)
HCT VFR BLD CALC: 31.7 % (ref 37–54)
HEMOGLOBIN: 9.9 G/DL (ref 12.5–16.5)
LYMPHOCYTES ABSOLUTE: 0.58 E9/L (ref 1.5–4)
LYMPHOCYTES RELATIVE PERCENT: 2.6 % (ref 20–42)
MAGNESIUM: 2.2 MG/DL (ref 1.6–2.6)
MAGNESIUM: 2.2 MG/DL (ref 1.6–2.6)
MCH RBC QN AUTO: 25.9 PG (ref 26–35)
MCHC RBC AUTO-ENTMCNC: 31.2 % (ref 32–34.5)
MCV RBC AUTO: 83 FL (ref 80–99.9)
METAMYELOCYTES RELATIVE PERCENT: 3.4 % (ref 0–1)
METER GLUCOSE: 156 MG/DL (ref 74–99)
METER GLUCOSE: 159 MG/DL (ref 74–99)
METER GLUCOSE: 160 MG/DL (ref 74–99)
METER GLUCOSE: 164 MG/DL (ref 74–99)
MONOCYTES ABSOLUTE: 1.74 E9/L (ref 0.1–0.95)
MONOCYTES RELATIVE PERCENT: 9.4 % (ref 2–12)
MYELOCYTE PERCENT: 0.9 % (ref 0–0)
NEUTROPHILS ABSOLUTE: 16.79 E9/L (ref 1.8–7.3)
NEUTROPHILS RELATIVE PERCENT: 82.9 % (ref 43–80)
OVALOCYTES: ABNORMAL
PDW BLD-RTO: 18.7 FL (ref 11.5–15)
PHOSPHORUS: 3.7 MG/DL (ref 2.5–4.5)
PHOSPHORUS: 4.1 MG/DL (ref 2.5–4.5)
PLATELET # BLD: 389 E9/L (ref 130–450)
PMV BLD AUTO: 8.9 FL (ref 7–12)
POIKILOCYTES: ABNORMAL
POLYCHROMASIA: ABNORMAL
POTASSIUM SERPL-SCNC: 3.3 MMOL/L (ref 3.5–5)
POTASSIUM SERPL-SCNC: 3.4 MMOL/L (ref 3.5–5)
RBC # BLD: 3.82 E12/L (ref 3.8–5.8)
SODIUM BLD-SCNC: 139 MMOL/L (ref 132–146)
SODIUM BLD-SCNC: 140 MMOL/L (ref 132–146)
TARGET CELLS: ABNORMAL
TEAR DROP CELLS: ABNORMAL
TOTAL PROTEIN: 7.4 G/DL (ref 6.4–8.3)
VACUOLATED NEUTROPHILS: ABNORMAL
WBC # BLD: 19.3 E9/L (ref 4.5–11.5)

## 2022-04-20 PROCEDURE — 82962 GLUCOSE BLOOD TEST: CPT

## 2022-04-20 PROCEDURE — 94640 AIRWAY INHALATION TREATMENT: CPT

## 2022-04-20 PROCEDURE — 92611 MOTION FLUOROSCOPY/SWALLOW: CPT | Performed by: SPEECH-LANGUAGE PATHOLOGIST

## 2022-04-20 PROCEDURE — C9113 INJ PANTOPRAZOLE SODIUM, VIA: HCPCS

## 2022-04-20 PROCEDURE — 92610 EVALUATE SWALLOWING FUNCTION: CPT | Performed by: SPEECH-LANGUAGE PATHOLOGIST

## 2022-04-20 PROCEDURE — 83735 ASSAY OF MAGNESIUM: CPT

## 2022-04-20 PROCEDURE — 6360000002 HC RX W HCPCS: Performed by: STUDENT IN AN ORGANIZED HEALTH CARE EDUCATION/TRAINING PROGRAM

## 2022-04-20 PROCEDURE — 2580000003 HC RX 258: Performed by: INTERNAL MEDICINE

## 2022-04-20 PROCEDURE — 6370000000 HC RX 637 (ALT 250 FOR IP)

## 2022-04-20 PROCEDURE — 92526 ORAL FUNCTION THERAPY: CPT | Performed by: SPEECH-LANGUAGE PATHOLOGIST

## 2022-04-20 PROCEDURE — 97110 THERAPEUTIC EXERCISES: CPT | Performed by: PHYSICAL THERAPIST

## 2022-04-20 PROCEDURE — 6370000000 HC RX 637 (ALT 250 FOR IP): Performed by: INTERNAL MEDICINE

## 2022-04-20 PROCEDURE — 94660 CPAP INITIATION&MGMT: CPT

## 2022-04-20 PROCEDURE — 2580000003 HC RX 258: Performed by: STUDENT IN AN ORGANIZED HEALTH CARE EDUCATION/TRAINING PROGRAM

## 2022-04-20 PROCEDURE — 36415 COLL VENOUS BLD VENIPUNCTURE: CPT

## 2022-04-20 PROCEDURE — 2500000003 HC RX 250 WO HCPCS: Performed by: INTERNAL MEDICINE

## 2022-04-20 PROCEDURE — 84100 ASSAY OF PHOSPHORUS: CPT

## 2022-04-20 PROCEDURE — 2000000000 HC ICU R&B

## 2022-04-20 PROCEDURE — 2700000000 HC OXYGEN THERAPY PER DAY

## 2022-04-20 PROCEDURE — 80048 BASIC METABOLIC PNL TOTAL CA: CPT

## 2022-04-20 PROCEDURE — 80053 COMPREHEN METABOLIC PANEL: CPT

## 2022-04-20 PROCEDURE — 71045 X-RAY EXAM CHEST 1 VIEW: CPT

## 2022-04-20 PROCEDURE — 94669 MECHANICAL CHEST WALL OSCILL: CPT

## 2022-04-20 PROCEDURE — 2580000003 HC RX 258

## 2022-04-20 PROCEDURE — 6360000002 HC RX W HCPCS: Performed by: INTERNAL MEDICINE

## 2022-04-20 PROCEDURE — 74230 X-RAY XM SWLNG FUNCJ C+: CPT

## 2022-04-20 PROCEDURE — 6360000002 HC RX W HCPCS

## 2022-04-20 PROCEDURE — 85025 COMPLETE CBC W/AUTO DIFF WBC: CPT

## 2022-04-20 PROCEDURE — 99232 SBSQ HOSP IP/OBS MODERATE 35: CPT | Performed by: INTERNAL MEDICINE

## 2022-04-20 PROCEDURE — A4216 STERILE WATER/SALINE, 10 ML: HCPCS

## 2022-04-20 RX ORDER — OXYCODONE HYDROCHLORIDE 5 MG/1
10 TABLET ORAL EVERY 4 HOURS PRN
Status: DISCONTINUED | OUTPATIENT
Start: 2022-04-20 | End: 2022-04-24

## 2022-04-20 RX ORDER — ASPIRIN 81 MG/1
81 TABLET, CHEWABLE ORAL DAILY
Status: DISCONTINUED | OUTPATIENT
Start: 2022-04-21 | End: 2022-04-27 | Stop reason: HOSPADM

## 2022-04-20 RX ORDER — OXYCODONE HYDROCHLORIDE 5 MG/1
5 TABLET ORAL EVERY 4 HOURS PRN
Status: DISCONTINUED | OUTPATIENT
Start: 2022-04-20 | End: 2022-04-27 | Stop reason: HOSPADM

## 2022-04-20 RX ORDER — METOPROLOL TARTRATE 50 MG/1
50 TABLET, FILM COATED ORAL 2 TIMES DAILY
Status: DISCONTINUED | OUTPATIENT
Start: 2022-04-20 | End: 2022-04-22

## 2022-04-20 RX ORDER — LIDOCAINE 4 G/G
1 PATCH TOPICAL DAILY
Status: DISCONTINUED | OUTPATIENT
Start: 2022-04-20 | End: 2022-04-27 | Stop reason: HOSPADM

## 2022-04-20 RX ORDER — ACETAMINOPHEN 325 MG/1
650 TABLET ORAL EVERY 6 HOURS PRN
Status: DISCONTINUED | OUTPATIENT
Start: 2022-04-20 | End: 2022-04-27 | Stop reason: HOSPADM

## 2022-04-20 RX ORDER — GLYCOPYRROLATE 1 MG/1
1 TABLET ORAL 3 TIMES DAILY PRN
Status: DISCONTINUED | OUTPATIENT
Start: 2022-04-20 | End: 2022-04-27 | Stop reason: HOSPADM

## 2022-04-20 RX ORDER — ACETAMINOPHEN 650 MG/1
650 SUPPOSITORY RECTAL EVERY 6 HOURS PRN
Status: DISCONTINUED | OUTPATIENT
Start: 2022-04-20 | End: 2022-04-27 | Stop reason: HOSPADM

## 2022-04-20 RX ORDER — POTASSIUM CHLORIDE 29.8 MG/ML
20 INJECTION INTRAVENOUS
Status: COMPLETED | OUTPATIENT
Start: 2022-04-20 | End: 2022-04-20

## 2022-04-20 RX ORDER — HYDROMORPHONE HYDROCHLORIDE 1 MG/ML
0.5 INJECTION, SOLUTION INTRAMUSCULAR; INTRAVENOUS; SUBCUTANEOUS ONCE
Status: COMPLETED | OUTPATIENT
Start: 2022-04-20 | End: 2022-04-20

## 2022-04-20 RX ORDER — POTASSIUM CHLORIDE 29.8 MG/ML
40 INJECTION INTRAVENOUS
Status: COMPLETED | OUTPATIENT
Start: 2022-04-20 | End: 2022-04-20

## 2022-04-20 RX ORDER — ROSUVASTATIN CALCIUM 10 MG/1
10 TABLET, COATED ORAL DAILY
Status: DISCONTINUED | OUTPATIENT
Start: 2022-04-21 | End: 2022-04-27 | Stop reason: HOSPADM

## 2022-04-20 RX ADMIN — IPRATROPIUM BROMIDE AND ALBUTEROL SULFATE 1 AMPULE: .5; 2.5 SOLUTION RESPIRATORY (INHALATION) at 21:51

## 2022-04-20 RX ADMIN — THIAMINE HYDROCHLORIDE: 100 INJECTION, SOLUTION INTRAMUSCULAR; INTRAVENOUS at 10:10

## 2022-04-20 RX ADMIN — IPRATROPIUM BROMIDE AND ALBUTEROL SULFATE 1 AMPULE: .5; 2.5 SOLUTION RESPIRATORY (INHALATION) at 05:36

## 2022-04-20 RX ADMIN — METOPROLOL TARTRATE 5 MG: 5 INJECTION INTRAVENOUS at 06:19

## 2022-04-20 RX ADMIN — ACETYLCYSTEINE 400 MG: 100 INHALANT RESPIRATORY (INHALATION) at 05:37

## 2022-04-20 RX ADMIN — FUROSEMIDE 40 MG: 10 INJECTION, SOLUTION INTRAMUSCULAR; INTRAVENOUS at 08:38

## 2022-04-20 RX ADMIN — CEFTRIAXONE 2000 MG: 2 INJECTION, POWDER, FOR SOLUTION INTRAMUSCULAR; INTRAVENOUS at 11:30

## 2022-04-20 RX ADMIN — IPRATROPIUM BROMIDE AND ALBUTEROL SULFATE 1 AMPULE: .5; 2.5 SOLUTION RESPIRATORY (INHALATION) at 08:37

## 2022-04-20 RX ADMIN — POTASSIUM CHLORIDE 20 MEQ: 29.8 INJECTION, SOLUTION INTRAVENOUS at 18:02

## 2022-04-20 RX ADMIN — ACETYLCYSTEINE 400 MG: 100 INHALANT RESPIRATORY (INHALATION) at 16:20

## 2022-04-20 RX ADMIN — METOPROLOL TARTRATE 5 MG: 5 INJECTION INTRAVENOUS at 11:30

## 2022-04-20 RX ADMIN — BARIUM SULFATE 45 ML: 400 PASTE ORAL at 14:23

## 2022-04-20 RX ADMIN — INSULIN LISPRO 1 UNITS: 100 INJECTION, SOLUTION INTRAVENOUS; SUBCUTANEOUS at 20:55

## 2022-04-20 RX ADMIN — METOPROLOL TARTRATE 5 MG: 5 INJECTION INTRAVENOUS at 18:01

## 2022-04-20 RX ADMIN — BARIUM SULFATE 45 ML: 400 SUSPENSION ORAL at 14:27

## 2022-04-20 RX ADMIN — SODIUM CHLORIDE 40 MG: 9 INJECTION, SOLUTION INTRAMUSCULAR; INTRAVENOUS; SUBCUTANEOUS at 08:38

## 2022-04-20 RX ADMIN — METOPROLOL TARTRATE 50 MG: 50 TABLET, FILM COATED ORAL at 20:53

## 2022-04-20 RX ADMIN — ACETYLCYSTEINE 400 MG: 100 INHALANT RESPIRATORY (INHALATION) at 01:32

## 2022-04-20 RX ADMIN — ACETYLCYSTEINE 400 MG: 100 INHALANT RESPIRATORY (INHALATION) at 12:20

## 2022-04-20 RX ADMIN — IPRATROPIUM BROMIDE AND ALBUTEROL SULFATE 1 AMPULE: .5; 2.5 SOLUTION RESPIRATORY (INHALATION) at 01:32

## 2022-04-20 RX ADMIN — Medication 10 ML: at 08:39

## 2022-04-20 RX ADMIN — POTASSIUM CHLORIDE 40 MEQ: 29.8 INJECTION, SOLUTION INTRAVENOUS at 07:18

## 2022-04-20 RX ADMIN — IPRATROPIUM BROMIDE AND ALBUTEROL SULFATE 1 AMPULE: .5; 2.5 SOLUTION RESPIRATORY (INHALATION) at 16:19

## 2022-04-20 RX ADMIN — METOPROLOL TARTRATE 5 MG: 5 INJECTION INTRAVENOUS at 00:30

## 2022-04-20 RX ADMIN — Medication 10 ML: at 20:52

## 2022-04-20 RX ADMIN — POTASSIUM CHLORIDE 20 MEQ: 29.8 INJECTION, SOLUTION INTRAVENOUS at 16:30

## 2022-04-20 RX ADMIN — ENOXAPARIN SODIUM 30 MG: 100 INJECTION SUBCUTANEOUS at 08:38

## 2022-04-20 RX ADMIN — IPRATROPIUM BROMIDE AND ALBUTEROL SULFATE 1 AMPULE: .5; 2.5 SOLUTION RESPIRATORY (INHALATION) at 12:19

## 2022-04-20 RX ADMIN — OXYCODONE 10 MG: 5 TABLET ORAL at 22:16

## 2022-04-20 RX ADMIN — HYDROMORPHONE HYDROCHLORIDE 0.5 MG: 1 INJECTION, SOLUTION INTRAMUSCULAR; INTRAVENOUS; SUBCUTANEOUS at 16:27

## 2022-04-20 RX ADMIN — ACETYLCYSTEINE 400 MG: 100 INHALANT RESPIRATORY (INHALATION) at 08:37

## 2022-04-20 RX ADMIN — FUROSEMIDE 40 MG: 10 INJECTION, SOLUTION INTRAMUSCULAR; INTRAVENOUS at 18:01

## 2022-04-20 RX ADMIN — ENOXAPARIN SODIUM 30 MG: 100 INJECTION SUBCUTANEOUS at 20:52

## 2022-04-20 RX ADMIN — BARIUM SULFATE 45 ML: 400 PASTE ORAL at 14:29

## 2022-04-20 RX ADMIN — GLYCOPYRROLATE 0.2 MG: 0.2 INJECTION INTRAMUSCULAR; INTRAVENOUS at 06:19

## 2022-04-20 ASSESSMENT — PAIN DESCRIPTION - LOCATION
LOCATION: BACK

## 2022-04-20 ASSESSMENT — PAIN DESCRIPTION - PAIN TYPE: TYPE: CHRONIC PAIN

## 2022-04-20 ASSESSMENT — PAIN DESCRIPTION - FREQUENCY: FREQUENCY: INTERMITTENT

## 2022-04-20 ASSESSMENT — PAIN SCALES - GENERAL
PAINLEVEL_OUTOF10: 3
PAINLEVEL_OUTOF10: 0
PAINLEVEL_OUTOF10: 2
PAINLEVEL_OUTOF10: 0
PAINLEVEL_OUTOF10: 0
PAINLEVEL_OUTOF10: 6
PAINLEVEL_OUTOF10: 0
PAINLEVEL_OUTOF10: 2
PAINLEVEL_OUTOF10: 0
PAINLEVEL_OUTOF10: 3
PAINLEVEL_OUTOF10: 8
PAINLEVEL_OUTOF10: 0
PAINLEVEL_OUTOF10: 3
PAINLEVEL_OUTOF10: 2
PAINLEVEL_OUTOF10: 0
PAINLEVEL_OUTOF10: 2
PAINLEVEL_OUTOF10: 0

## 2022-04-20 ASSESSMENT — PAIN DESCRIPTION - ONSET: ONSET: GRADUAL

## 2022-04-20 ASSESSMENT — PAIN DESCRIPTION - ORIENTATION
ORIENTATION: LOWER
ORIENTATION: LOWER

## 2022-04-20 ASSESSMENT — PAIN - FUNCTIONAL ASSESSMENT: PAIN_FUNCTIONAL_ASSESSMENT: PREVENTS OR INTERFERES SOME ACTIVE ACTIVITIES AND ADLS

## 2022-04-20 ASSESSMENT — PAIN DESCRIPTION - DESCRIPTORS
DESCRIPTORS: ACHING;DISCOMFORT;DULL
DESCRIPTORS: ACHING;DISCOMFORT;DULL;SORE

## 2022-04-20 NOTE — PLAN OF CARE
Problem: Pain:  Goal: Pain level will decrease  Description: Pain level will decrease  Outcome: Met This Shift  Goal: Control of acute pain  Description: Control of acute pain  Outcome: Met This Shift  Goal: Control of chronic pain  Description: Control of chronic pain  Outcome: Met This Shift     Problem: Pain:  Goal: Pain level will decrease  Description: Pain level will decrease  Outcome: Met This Shift  Goal: Control of acute pain  Description: Control of acute pain  Outcome: Met This Shift  Goal: Control of chronic pain  Description: Control of chronic pain  Outcome: Met This Shift     Problem: Violence - Risk of, Self/Other-Directed:  Goal: Knowledge of developmental care interventions  Description: Absence of violence  Outcome: Met This Shift     Problem: Falls - Risk of:  Goal: Will remain free from falls  Description: Will remain free from falls  Outcome: Met This Shift  Goal: Absence of physical injury  Description: Absence of physical injury  Outcome: Met This Shift     Problem: Skin Integrity:  Goal: Will show no infection signs and symptoms  Description: Will show no infection signs and symptoms  Outcome: Met This Shift  Goal: Absence of new skin breakdown  Description: Absence of new skin breakdown  Outcome: Met This Shift

## 2022-04-20 NOTE — CARE COORDINATION
4-20-Cm note: ( covid neg 4-10) pt remains in ICU, he is currently on room air , pt going for a barium swallow eval this afternoon, PT/OT recommending rehab at [facility] , pt unsure what he wants to do at this time, however, he is interested in [facility] , with his permission called Kellie Lares with [facility], she will be here this afternoon to talk to patient. Provided a list of facilities to choose from should he agree to placement at [facility] . Would need precert for placement.  Electronically signed by Axel Noel RN on 4/20/2022 at 1:36 PM

## 2022-04-20 NOTE — PROGRESS NOTES
SPEECH/LANGUAGE PATHOLOGY  VIDEOFLUOROSCOPIC STUDY OF SWALLOWING (MBS)   and PLAN OF CARE    PATIENT NAME:  Augustina Haas  (male)     MRN:  [id number]    :  1965  (64 y.o.)  STATUS:  Inpatient: Room IC09/IC09-01    TODAY'S DATE:  2022  REFERRING PROVIDER:   Dr. Adam Bowen: SLP video swallow  Date of order:  2022   REASON FOR REFERRAL: dysphagia    EVALUATING THERAPIST: Prem Osman SLP      RESULTS:      DYSPHAGIA DIAGNOSIS:  severe  oropharyngeal phase dysphagia     DIET RECOMMENDATIONS:   NPO: NPO excluding meds. Meds to be given crushed in pudding if not able to given via alternate method. PO trials of puree and honey thick by SLP only       FEEDING RECOMMENDATIONS:    Assistance level:  Not applicable     Compensatory strategies recommended: Double swallow, Effortful swallow, Small bites/sips, Alternate solids and liquids, Liquids by teaspoon only and Throat clear--during PO trials with SLP only      Discussed recommendations with nursing and/or faxed report to referring provider: Yes    Laryngeal Penetration and Aspiration:  Penetration WITHOUT aspiration was observed in 's study with  extremely thick liquid (pudding)  Penetration WITH aspiration was observed in 's study with  mildly thick liquid (nectar), moderately thick liquid (honey)    SPEECH THERAPY  PLAN OF CARE   The dysphagia POC is established based on physician order and dysphagia diagnosis    Skilled SLP intervention for dysphagia management up to 5x per week until goals met, pt plateaus in function and/or discharged from hospital      Conditions Requiring Skilled Therapeutic Intervention for dysphagia:    Patient is performing below functional baseline d/t  current acute condition, Multiple diagnoses, multiple medications, and increased dependency upon caregivers.   Reduced pharyngeal clearing of the bolus  Reduced laryngeal closure resulting in penetration  Reduced laryngeal closure resulting in aspiration   Swallow triggered when bolus head at level of pyriform sinus increasing risk of aspiration    SPECIFIC DYSPHAGIA INTERVENTIONS TO INCLUDE:     Training in positioning for improved integrity of swallow  Compensatory strategy training   Therapeutic exercises  Trials of upgraded diet/liquid     Specific instructions for next treatment:  development and training of compensatory swallow strategies to improve airway protection and swallow function  Treatment Goals:    Short Term Goals:  Pt will implement identified compensatory swallowing strategies on 90% of opportunities or greater to improve airway protection and swallow function. Pt will complete PO trials of upgraded diet textures with SLP only to determine the least restrictive PO diet to maintain adequate nutrition/hydration with no more than 1 overt s/s of pen/asp. Pt will complete laryngeal strength/ ROM therapeutic exercises to improve airway protection for the least restrictive PO diet minimal verbal prompts  Pt will complete Shaker and/or Chin Tuck Against Resistance (CTAR) to increase UES relaxation diameter and increase anterior laryngeal excursion to reduce pharyngeal residuals and reduce risk of pen/asp with minimal verbal prompts. Pt will complete Effortful Swallow therapeutically to target increased oral and base of tongue pressure, increased pharyngeal constrictor contractions, and increased UES relaxation duration to reduce pharyngeal residue with minimal verbal prompts     Long Term Goals:   Pt will improve oropharyngeal swallow function to ensure airway protection during PO intake to maintain adequate nutrition/hydration and decrease signs/symptoms of aspiration to less than 1 x/day.       Patient/family Goal:    To be able to eat regular foods and drink regular liquids    Plan of care discussed with Patient   The Patient did not demonstrate complete understanding of the diagnosis, prognosis and plan of care     Rehabilitation Potential/Prognosis: good                      ADMITTING DIAGNOSIS: Hyponatremia [E87.1]  COPD exacerbation (HCC) [J44.1]  Acute chest pain [R07.9]  Chest pain, unspecified type [R07.9]  Chest pain [R07.9]     VISIT DIAGNOSIS:   Visit Diagnoses       Codes    COPD exacerbation ([facility]    -  Primary J44.1    Hyponatremia     E87.1    Alcohol withdrawal delirium ([facility]     F10.231              PATIENT REPORT/COMPLAINT: denies difficulty swallowing    PRIOR LEVEL OF SWALLOW FUNCTION:    Past History of Dysphagia?:  none reported  Persistent cough on PO trials during clinical swallow exam     Home diet: Regular consistency solids (IDDSI level 7) with  thin liquids (IDDSI level 0)  Current Diet Order:  Diet NPO    PROCEDURE:  Consistencies Administered During the Evaluation   Liquids: nectar thick liquid and honey thick liquid   Solids:  pureed foods      Method of Intake:   spoon  Fed by clinician      Position:   Seated, upright, Lateral plane    INSTRUMENTAL ASSESSMENT:    MBSImP Results:   Lip closure for intraoral bolus containment resulted in no labial escape. Tongue control during bolus hold maintained a cohesive bolus held between tongue to palate seal. Bolus preparation and mastication solid not appropriate. Bolus transport/lingual motion was with brisk tongue motion. Oral residue was not observed. There was complete oral clearance. Initiation of the pharyngeal swallow occurred as the bolus head reached the pyriform sinuses. Soft palate elevation resulted in no bolus between the soft palate and the pharyngeal wall. Laryngeal elevation demonstrated partial superior movement of the thyroid cartilage with partial approximation of the arytenoids to the epiglottic petiole. Anterior hyoid excursion demonstrated partial anterior movement. Epiglottic movement resulted in partial inversion.  Laryngeal vestibule closure was incomplete, as indicated by a narrow column of air or contrast within the laryngeal vestibule at the height of the swallow. Pharyngeal stripping wave was present but diminished. Pharyngeal contraction could not be determined due to logistical reasons not related to physiologic impairment. Pharyngoesophageal segment opening was completely distended for complete duration with no obstruction of bolus flow. Tongue base retraction allowed a collection of residue between the retracted tongue base and the posterior pharyngeal wall. A collection of residue remained within or on the pharyngeal structures Esophageal clearance in the upright position could not be assessed due to logistical reasons not related to physiologic impairment. PENETRATION-ASPIRATION SCALE (PAS):  THIN item not administered  MILDLY THICK 7 = Material enters the airway, passes below the vocal folds, and is not ejected from the trachea despite effort   MODERATELY THICK 5 = Material enters the airway, contacts the vocal folds, and is not ejected from the airway  PUREE 2 = Material enters the airway, remains above vocal folds, and is ejected from the airway   HARD SOLID item not administered       COMPENSATORY STRATEGIES    Compensatory strategies that were beneficial included Double swallow, Effortful swallow, Small bites/sips, Alternate solids and liquids, Liquids by teaspoon only and Throat clear      STRUCTURAL/FUNCTIONAL ANOMALIES   No structural/functional anomalies were noted    CERVICAL ESOPHAGEAL STAGE :     The cervical esophagus appeared adequate          ___________    Cognition:   Within functional limits for this exam, Follows 1 - step directions appropriate for this assessment, Confusion noted and Attention impaired,     Oral Peripheral Examination   Generalized oral weakness    Current Respiratory Status   2 liters nasal cannula     Parameters of Speech Production  Respiration:  Adequate for speech production  Quality:   Breathy  Intensity: Within functional limits    Pain: No pain reported.     EDUCATION:   The Speech Language Pathologist (SLP) completed education regarding results of evaluation and that intervention is warranted at this time. Learner: Patient  Education: Reviewed results and recommendations of this evaluation  Evaluation of Education:  Needs further instruction    This plan may be re-evaluated and revised as warranted. Evaluation Time includes thorough review of current medical information, gathering information on past medical history/social history and prior level of function, completion of standardized testing/informal observation of tasks, assessment of data and education on plan of care and goals. Patient seen for swallow therapy 10 minutes. Reviewed current solid/liquid consistency diet recommendation for   NPO: NPO excluding meds. Meds to be given crushed in pudding if not able to given via alternate method. PO trials of puree and honey thick by SLP only   and discussed compensatory strategies to ensure safe PO intake. Reviewed aspiration precautions. Discussed use of NPO to decrease risk of aspiration with implementation of strengthening exercises to improve swallow function as the long term goal.  Patient was not able to return demonstration of compensatory strategies during session. Patient will require ongoing education regarding dysphagia secondary to decreased ability to restate strategies during session. [x]The admitting diagnosis and active problem list, have been reviewed prior to initiation of this evaluation.     CPT Code: 69468  dysphagia study  12538  dysphagia tx      ACTIVE PROBLEM LIST:   Patient Active Problem List   Diagnosis    Biceps rupture, proximal    Shoulder pain    Pectoral muscle rupture    Biceps tendonitis    Shoulder impingement    Rotator cuff tear    Acromioclavicular joint arthritis    Acute chest pain    Chest pain       Daniel Valdez MSCCC/SLP  Speech Language Pathologist  [id number]

## 2022-04-20 NOTE — PROGRESS NOTES
Physical Therapy    Physical Therapy Treatment Note/Plan of Care    Room #:  CD59/AC53-23  Patient Name: Darren Lemos  YOB: 1965  MRN: [id number]    Date of Service: 4/20/2022     Tentative placement recommendation: Inpatient Rehab  Equipment recommendation: To be determined      Evaluating Physical Therapist: Gladis Tan  #[id number]      Specific Provider Orders/Date/Referring Provider :  04/18/22 1430   PT eval and treat Start: 04/18/22 1430, End: 04/18/22 1430, ONE TIME, Standing Count: 1 Occurrences, Amador Mitchell MD      Admitting Diagnosis:   Hyponatremia [E87.1]  COPD exacerbation ([facility] [J44.1]  Acute chest pain [R07.9]  Chest pain, unspecified type [R07.9]  Chest pain [R07.9]    Admitted with    shortness of breath and chest pain  Intubated 4/10-4/18  Surgery: none  Visit Diagnoses       Codes    COPD exacerbation ([facility]    -  Primary J44.1    Hyponatremia     E87.1    Alcohol withdrawal delirium ([facility]     F10.231          Patient Active Problem List   Diagnosis    Biceps rupture, proximal    Shoulder pain    Pectoral muscle rupture    Biceps tendonitis    Shoulder impingement    Rotator cuff tear    Acromioclavicular joint arthritis    Acute chest pain    Chest pain        ASSESSMENT of Current Deficits Patient exhibits decreased strength, balance, endurance, range of motion and coordination impairing functional mobility, tolerance to activity and participation along with cognitive im pairments are barriers to d/c and require skilled intervention during hospital stay to attain pre hospital level of function. Decreased strength, balance and endurance  increases patient's risk for fall. Patient able to tolerate chair position exercises in bed today. Patient on a decreased amount of oxygen today in comparison to yesterday. Patient's PO2 fluctuated between 87% and 91% throughout session.   Difficulty following commands and attending to task ; labile      PHYSICAL THERAPY  PLAN OF CARE       Physical therapy plan of care is established based on physician order,  patient diagnosis and clinical assessment    Current Treatment Recommendations:    -Bed Mobility: Lower extremity exercises , Upper extremity exercises  and Trunk control activities   -Sitting Balance: Incorporate reaching activities to activate trunk muscles , Hands on support to maintain midline  and Facilitate postural control in all planes   -Transfers: Provide instruction on proper hand and foot position for adequate transfer of weight onto lower extremities and use of gait device if needed and Cues for hand placement, technique and safety. Provide stabilization to prevent fall   -Endurance: Utilize Supervised activities to increase level of endurance to allow for safe functional mobility including transfers and gait     PT long term treatment goals are located in below grid    Patient and or family understand(s) diagnosis, prognosis, and plan of care. Frequency of treatments: Patient will be seen  3-5 times/week. Prior Level of Function: Patient ambulated independently and driving  Rehab Potential: good   For long term goals    Past medical history:   Past Medical History:   Diagnosis Date    Arthritis     Back pain, chronic     Hyperlipidemia     Hypertension     Neck pain, chronic      Past Surgical History:   Procedure Laterality Date    EXTERNAL EAR SURGERY      i & d left ear.  EYE SURGERY      Lt. Eye  Dart removed   [address] [employer]    HEMORRHOID SURGERY      SHOULDER ARTHROSCOPY Right 1/16/2015    right shoulder bicep tenodesis subacromail decompression and debridement       SUBJECTIVE:    Precautions: Bedrest with bathroom Privileges , falls, alarm and O2 , heated hi andrzej 60L/m, 85%   ciwa  Social history: Patient lives with grandson in a mobile home      Equipment owned: unknown,       AM-PAC Basic Mobility        AM-PAC Mobility Inpatient   How much difficulty turning over in bed?: A Lot  How much difficulty sitting down on / standing up from a chair with arms?: Unable  How much difficulty moving from lying on back to sitting on side of bed?: Unable  How much help from another person moving to and from a bed to a chair?: Total  How much help from another person needed to walk in hospital room?: Total  How much help from another person for climbing 3-5 steps with a railing?: Total  AM-PAC Inpatient Mobility Raw Score : 7  AM-PAC Inpatient T-Scale Score : 26.42  Mobility Inpatient CMS 0-100% Score: 92.36  Mobility Inpatient CMS G-Code Modifier : CM    Nursing cleared patient for PT treatment. .   OBJECTIVE;   Initial Evaluation  Date: 4/19/2022 Treatment Date:  4/20/2022     Short Term/ Long Term   Goals   Was pt agreeable to Eval/treatment?  Yes yes To be met in 5 days   Pain level   0/10    0/10    Bed Mobility    Rolling: Maximal assist of  2    Supine to sit: Not assessed     Sit to supine: Not assessed     Scooting: Not assessed    Rolling: Not assessed    Supine to sit: Not assessed    Sit to supine: Not assessed    Scooting: Not assessed   Rolling: Minimal assist of 1    Supine to sit: Minimal assist of 1    Sit to supine: Minimal assist of 1    Scooting: Minimal assist of 1     Transfers Sit to stand: Not assessed    Sit to stand: Not assessed     Sit to stand: Minimal assist of 1     Ambulation    not assessed  not assessed   15  feet using  wheeled walker with Moderate assist of 1    ROM Within functional limits    Increase range of motion 10% of affected joints    Strength BUE:  refer to OT eval  RLE:  3-/5  LLE:  3-/5  Poor coordination and motor contorl with volitional movement  Increase strength in affected mm groups by 1/3 grade   Balance Sitting EOB:  not assessed    Dynamic Standing:  not assessed   Sitting EOB: not assessed   Dynamic Standing: not assessed  Sitting EOB:  fair    Dynamic Standing: fair with least restrictive device      Patient is Alert & Oriented x person and follows one step directions  With repetition and hands on cuing  Sensation:  Patient  denies numbness/tingling   Edema:  no   Endurance: poor      Vitals: 4 liters nasal cannula   Blood Pressure at rest  Blood Pressure during session    Heart Rate at rest 103 Heart Rate during session 107-113   SPO2 at rest 90%  SPO2 during session 87-91%     Patient education  Patient educated on role of Physical Therapy, risks of immobility, safety and plan of care,  importance of mobility while in hospital  and positioning for skin integrity and comfort     Patient response to education:   Pt verbalized understanding Pt demonstrated skill Pt requires further education in this area   No No Yes      Treatment:  Patient practiced and was instructed/facilitated in the following treatment: Patient performed AROM exercises in chair position during treatment today. Patient educated on pursed lip breathing. Therapeutic Exercises:  heel slide, hip abduction/adduction, straight leg raise, shoulder elevation and elbow flexion/extension x10-15 reps      At end of session, patient in bed with alarm call light and phone within reach,  all lines and tubes intact, nursing notified. Patient would benefit from continued skilled Physical Therapy to improve functional independence and quality of life.          Patient's/ family goals   none stated    Time in  438  Time out  453  Total Treatment Time  15 minutes      CPT codes:    Therapeutic exercises (22745)   15 minutes  1 unit(s)    Sagrario Watkins, Student Physical Therapist

## 2022-04-20 NOTE — PROGRESS NOTES
SPEECH/LANGUAGE PATHOLOGY  CLINICAL ASSESSMENT OF SWALLOWING FUNCTION   and PLAN OF CARE    PATIENT NAME:  Mandy Graves  (male)     MRN:  [id number]    :  1965  (64 y.o.)  STATUS:  Inpatient: Room IC09/IC09-01    TODAY'S DATE:  2022  REFERRING PROVIDER:     SLP swallowing-dysphagia evaluation and treatment Start: 22, End: 22, ONE TIME, Standing Count: 1 Occurrences, NOE Rizo MD  REASON FOR REFERRAL: dysphagia extubated 2022   EVALUATING THERAPIST: TAMIA Desir                 RESULTS:    DYSPHAGIA DIAGNOSIS:   Clinical indicators of unable to rule out silent aspiration bedside      DIET RECOMMENDATIONS:  NPO until MBSS can be completed        FEEDING RECOMMENDATIONS:     Assistance level:  Not applicable      Compensatory strategies recommended: No strategies are recommended at this time      Discussed recommendations with nursing and/or faxed report to referring provider: Yes    SPEECH THERAPY  PLAN OF CARE   The dysphagia POC is established based on physician order, dysphagia diagnosis and results of clinical assessment     Will establish POC once MBSS is completed. Conditions Requiring Skilled Therapeutic Intervention for dysphagia:    Wet vocal quality during PO intake  Coughing during PO intake      Specific dysphagia interventions to include:     MBSS to be completed     Specific instructions for next treatment:  MBSS to be completed  Patient Treatment Goals:    Short Term Goals:  Pt will participate in MBSS to fully assess oropharyngeal swallow function and to assist in determining the least restrictive PO diet to maintain adequate nutrition/hydration     Long Term Goals:   Pt will improve oropharyngeal swallow function to ensure airway protection during PO intake to maintain adequate nutrition/hydration and decrease signs/symptoms of aspiration to less than 1 x/day.    OTHER RECOMMENDATIONS:  A Video Swallow Study (MBSS) is recommended and requires a physician order      Patient/family Goal:    To be able to eat regular foods and drink regular liquids    Plan of care discussed with Patient   The Patient did not demonstrate complete understanding of the diagnosis, prognosis and plan of care     Rehabilitation Potential/Prognosis: good                    ADMITTING DIAGNOSIS: Hyponatremia [E87.1]  COPD exacerbation ([facility]) [J44.1]  Acute chest pain [R07.9]  Chest pain, unspecified type [R07.9]  Chest pain [R07.9]    VISIT DIAGNOSIS:   Visit Diagnoses       Codes    COPD exacerbation ([facility]    -  Primary J44.1    Hyponatremia     E87.1    Alcohol withdrawal delirium ([facility]     F10.231           PATIENT REPORT/COMPLAINT: denies difficulty swallowing  RN cleared patient for participation in assessment     yes     PRIOR LEVEL OF SWALLOW FUNCTION:    PAST HISTORY OF DYSPHAGIA?: none reported    Home diet: Regular consistency solids (IDDSI level 7) with  thin liquids (IDDSI level 0)  Current Diet Order:  Diet NPO    PROCEDURE:  Consistencies Administered During the Evaluation   Liquids: ice chips   Solids:  pureed foods      Method of Intake:   spoon, coated spoon  Fed by clinician      Position:   Seated, upright    CLINICAL ASSESSMENT:  Oral Stage:        The oral stage of swallowing was within functional limits for consistencies administered      Pharyngeal Stage:    Latent wet cough was noted after presentation of thin liquid and pureed foods  Wet respirations were noted after presentation of thin liquid and pureed foods  Wet/gurgly vocal quality was noted after presentation of thin liquid and pureed foods    Cognition:   Within functional limits for this exam and Confusion noted    Oral Peripheral Examination   Adequate lingual/labial strength     Current Respiratory Status    room air     Parameters of Speech Production  Respiration:  Adequate for speech production  Quality:   Within functional limits  Intensity: Within functional limits    Volitional Swallow: present     Volitional Cough:   present     Pain: No pain reported. EDUCATION:   The Speech Language Pathologist (SLP) completed education regarding results of evaluation and that intervention is warranted at this time. Learner: Patient  Education: Reviewed results and recommendations of this evaluation  Evaluation of Education:  Needs further instruction    This plan may be re-evaluated and revised as warranted. Evaluation Time includes thorough review of current medical information, gathering information on past medical history/social history and prior level of function, completion of standardized testing/informal observation of tasks, assessment of data and education on plan of care and goals. [x]The admitting diagnosis and active problem list, have been reviewed prior to initiation of this evaluation.         ACTIVE PROBLEM LIST:   Patient Active Problem List   Diagnosis    Biceps rupture, proximal    Shoulder pain    Pectoral muscle rupture    Biceps tendonitis    Shoulder impingement    Rotator cuff tear    Acromioclavicular joint arthritis    Acute chest pain    Chest pain         CPT code:  14574  bedside swallow janis Patel MSCCC/SLP  Speech Language Pathologist  [id number]

## 2022-04-20 NOTE — PROGRESS NOTES
CRITICAL CARE PROGRESS NOTE    The patient's case was discussed in multidisciplinary rounds including critical care specialist, nursing, RT and pharmacy. His evaluation is as follows:     ETOH withdrawal with delirium tremens - Resolved  --Extubated on 4/19, now on room air  --Other medications for DTs include gabapentin and clonidine  --Continue Thiamine and folic acid, banana bag    Acute hypoxemic respiratory failure due to staphylococcal pneumonia (MSSA)  --Antibiotics: Ceftriaxone until 4/21  --On room air    Chest pain R/O ACS  --Followed by cardiology  --Plan for heart cath when improved  --Continue  beta blocker and imdur, ASA and ARB  --Not on statin as he is unable to take oral route. If he continues with dysphagia, then will insert NG     Chronic diastolic heart failure  --Continue afterload reduction as stated above     COPD exacerbation  --Continue aerosolized bronchodilators including duoneb and pulmicort     Hyponatremia, chronic possible due to chronic beer intake  --Lasix with goal of -1L to -2L   --Daily BMP     HTN  --ARB on hold, continue BB, clonidine and hydralazine     HLD  --Continue rosuivastatin     GERD  --On protonix     Tobacco use disorder  --Continue nicotine patch     Morbid obesity  -Will need counseling on lifestyle modifications    Prolonged QTc  --Avoid medications that prolong the QT    Dysphagia  --NG inserted      DVT prophylaxis with enoxaparin   Nutrition: Tube feedings restarted  Vascular catheters: TLC needed for vasopressor support, will remove  Urinary catheter needed for strict input/output    GI prophylaxis with PPI  Physical therapy once awake  Goals of care:  Full code    /74   Pulse 103   Temp 99.7 °F (37.6 °C)   Resp 19   Ht 6' (1.829 m)   Wt 277 lb 3.2 oz (125.7 kg)   SpO2 94%   BMI 37.60 kg/m²   General: Awake, oriented to place, time and person, very hoarse  HEENT: No head lesions, PERRL, EOMI, mouth without lesions, no nasal lesions, no cervical adenopathy palpated  Respiratory: Lungs with equal breath sounds bilaterally, no adventitious sounds auscultated, no accessory muscle use  CV: Regular rate, no murmurs, JVD 3 cm above clavicle, 2+ leg edema  Abdomen: Soft, non tender, + bowel sounds, no lesions  Skin: Hydrated, adequate turgor, no rash, capillary refill <2 seconds  Extremities: Muscular strength 4/5 in 4 limbs, moves 4 limbs spontaneously, distal pulses present  Neurology: Awake and alert, follows commands, moves 4 limbs on command and spontaneously, neck is supple, no meningitic signs present. Last 3 CMP:    Recent Labs     04/19/22  0436 04/19/22  1749 04/20/22  0422    139 139   K 3.6 3.1* 3.3*   CL 97* 99 99   CO2 25 26 24   BUN 23* 24* 29*   CREATININE 0.6* 0.6* 0.7   GLUCOSE 197* 157* 168*   CALCIUM 9.7 9.5 9.4   PROT 7.7 7.7 7.4   LABALBU 3.3* 3.3* 3.1*   BILITOT 0.8 0.6 0.5   ALKPHOS 192* 174* 153*   * 99* 67*   * 195* 165*     Recent Labs     04/20/22 0422   WBC 19.3*   RBC 3.82   HGB 9.9*   HCT 31.7*   MCV 83.0   MCH 25.9*   MCHC 31.2*   RDW 18.7*      MPV 8.9       No results for input(s): BC in the last 72 hours. No results for input(s): Lori Reading in the last 72 hours.     24 HR INTAKE/OUTPUT:      Intake/Output Summary (Last 24 hours) at 4/20/2022 1041  Last data filed at 4/20/2022 1000  Gross per 24 hour   Intake 1414.35 ml   Output 4315 ml   Net -2900.65 ml     MEDICATIONS:   potassium chloride  40 mEq IntraVENous Q1H    thiamine and folic acid IVPB   IntraVENous Daily    furosemide  40 mg IntraVENous BID    cefTRIAXone (ROCEPHIN) IV  2,000 mg IntraVENous Q24H    [Held by provider] metoprolol tartrate  75 mg Oral BID    [Held by provider] metoprolol tartrate  25 mg Oral Once    metoprolol  5 mg IntraVENous Q6H    ipratropium-albuterol  1 ampule Inhalation Q4H    [Held by provider] gabapentin  400 mg Oral TID    insulin lispro  0-6 Units SubCUTAneous TID WC    insulin lispro  0-3 Units SubCUTAneous Nightly    acetylcysteine  4 mL Inhalation Q4H    cloNIDine  1 patch TransDERmal Weekly    sodium chloride flush  5-40 mL IntraVENous 2 times per day    multivitamin  1 tablet Oral Daily    pantoprazole (PROTONIX) 40 mg injection  40 mg IntraVENous Daily    [Held by provider] losartan  50 mg Oral Daily    [Held by provider] rosuvastatin  10 mg Oral Daily    sodium chloride flush  5-40 mL IntraVENous 2 times per day    enoxaparin  30 mg SubCUTAneous BID    aspirin  81 mg Oral Daily    nicotine  1 patch TransDERmal Daily      sodium chloride 50 mL/hr at 04/20/22 0300    dextrose      sodium chloride 12.5 mL/hr at 04/20/22 0300    sodium chloride Stopped (04/16/22 0306)     glycopyrrolate, dextrose bolus (hypoglycemia) **OR** dextrose bolus (hypoglycemia), glucose, glucagon (rDNA), dextrose, hydrALAZINE, sodium chloride flush, sodium chloride, sodium chloride flush, sodium chloride, ondansetron **OR** ondansetron, polyethylene glycol, acetaminophen **OR** acetaminophen    OBJECTIVE:  Vitals:    04/20/22 1000   BP: 130/74   Pulse: 103   Resp: 19   Temp: 99.7 °F (37.6 °C)   SpO2: 94%     FiO2 : 55 %  O2 Flow Rate (L/min): 0 L/min  O2 Device: None (Room air)        LABS:  WBC   Date Value Ref Range Status   04/20/2022 19.3 (H) 4.5 - 11.5 E9/L Final   04/19/2022 25.0 (H) 4.5 - 11.5 E9/L Final   04/18/2022 17.9 (H) 4.5 - 11.5 E9/L Final     Hemoglobin   Date Value Ref Range Status   04/20/2022 9.9 (L) 12.5 - 16.5 g/dL Final   04/19/2022 11.0 (L) 12.5 - 16.5 g/dL Final   04/18/2022 11.7 (L) 12.5 - 16.5 g/dL Final     Hematocrit   Date Value Ref Range Status   04/20/2022 31.7 (L) 37.0 - 54.0 % Final   04/19/2022 34.1 (L) 37.0 - 54.0 % Final   04/18/2022 36.6 (L) 37.0 - 54.0 % Final     MCV   Date Value Ref Range Status   04/20/2022 83.0 80.0 - 99.9 fL Final   04/19/2022 83.6 80.0 - 99.9 fL Final   04/18/2022 82.4 80.0 - 99.9 fL Final     Platelets   Date Value Ref Range Status   04/20/2022 389 130 - 450 E9/L Final   04/19/2022 334 130 - 450 E9/L Final   04/18/2022 259 130 - 450 E9/L Final     Sodium   Date Value Ref Range Status   04/20/2022 139 132 - 146 mmol/L Final   04/19/2022 139 132 - 146 mmol/L Final   04/19/2022 135 132 - 146 mmol/L Final     Potassium   Date Value Ref Range Status   04/20/2022 3.3 (L) 3.5 - 5.0 mmol/L Final   04/19/2022 3.1 (L) 3.5 - 5.0 mmol/L Final   04/19/2022 3.6 3.5 - 5.0 mmol/L Final     Potassium reflex Magnesium   Date Value Ref Range Status   04/11/2022 4.5 3.5 - 5.0 mmol/L Final   04/10/2022 4.5 3.5 - 5.0 mmol/L Final   04/09/2022 4.3 3.5 - 5.0 mmol/L Final     Chloride   Date Value Ref Range Status   04/20/2022 99 98 - 107 mmol/L Final   04/19/2022 99 98 - 107 mmol/L Final   04/19/2022 97 (L) 98 - 107 mmol/L Final     CO2   Date Value Ref Range Status   04/20/2022 24 22 - 29 mmol/L Final   04/19/2022 26 22 - 29 mmol/L Final   04/19/2022 25 22 - 29 mmol/L Final     BUN   Date Value Ref Range Status   04/20/2022 29 (H) 6 - 20 mg/dL Final   04/19/2022 24 (H) 6 - 20 mg/dL Final   04/19/2022 23 (H) 6 - 20 mg/dL Final     CREATININE   Date Value Ref Range Status   04/20/2022 0.7 0.7 - 1.2 mg/dL Final   04/19/2022 0.6 (L) 0.7 - 1.2 mg/dL Final   04/19/2022 0.6 (L) 0.7 - 1.2 mg/dL Final     Glucose   Date Value Ref Range Status   04/20/2022 168 (H) 74 - 99 mg/dL Final   04/19/2022 157 (H) 74 - 99 mg/dL Final   04/19/2022 197 (H) 74 - 99 mg/dL Final     Calcium   Date Value Ref Range Status   04/20/2022 9.4 8.6 - 10.2 mg/dL Final   04/19/2022 9.5 8.6 - 10.2 mg/dL Final   04/19/2022 9.7 8.6 - 10.2 mg/dL Final     Total Protein   Date Value Ref Range Status   04/20/2022 7.4 6.4 - 8.3 g/dL Final   04/19/2022 7.7 6.4 - 8.3 g/dL Final   04/19/2022 7.7 6.4 - 8.3 g/dL Final     Albumin   Date Value Ref Range Status   04/20/2022 3.1 (L) 3.5 - 5.2 g/dL Final   04/19/2022 3.3 (L) 3.5 - 5.2 g/dL Final   04/19/2022 3.3 (L) 3.5 - 5.2 g/dL Final   10/13/2010 4.8 3.2 - 4.8 g/dL Final     Total Bilirubin   Date Value Ref Range Status   04/20/2022 0.5 0.0 - 1.2 mg/dL Final   04/19/2022 0.6 0.0 - 1.2 mg/dL Final   04/19/2022 0.8 0.0 - 1.2 mg/dL Final     Alkaline Phosphatase   Date Value Ref Range Status   04/20/2022 153 (H) 40 - 129 U/L Final   04/19/2022 174 (H) 40 - 129 U/L Final   04/19/2022 192 (H) 40 - 129 U/L Final     AST   Date Value Ref Range Status   04/20/2022 67 (H) 0 - 39 U/L Final   04/19/2022 99 (H) 0 - 39 U/L Final   04/19/2022 203 (H) 0 - 39 U/L Final     ALT   Date Value Ref Range Status   04/20/2022 165 (H) 0 - 40 U/L Final   04/19/2022 195 (H) 0 - 40 U/L Final   04/19/2022 227 (H) 0 - 40 U/L Final     GFR Non-   Date Value Ref Range Status   04/20/2022 >60 >=60 mL/min/1.73 Final     Comment:     Chronic Kidney Disease: less than 60 ml/min/1.73 sq.m. Kidney Failure: less than 15 ml/min/1.73 sq.m. Results valid for patients 18 years and older. 04/19/2022 >60 >=60 mL/min/1.73 Final     Comment:     Chronic Kidney Disease: less than 60 ml/min/1.73 sq.m. Kidney Failure: less than 15 ml/min/1.73 sq.m. Results valid for patients 18 years and older. 04/19/2022 >60 >=60 mL/min/1.73 Final     Comment:     Chronic Kidney Disease: less than 60 ml/min/1.73 sq.m. Kidney Failure: less than 15 ml/min/1.73 sq.m. Results valid for patients 18 years and older.        GFR    Date Value Ref Range Status   04/20/2022 >60  Final   04/19/2022 >60  Final   04/19/2022 >60  Final     Magnesium   Date Value Ref Range Status   04/20/2022 2.2 1.6 - 2.6 mg/dL Final   04/19/2022 2.0 1.6 - 2.6 mg/dL Final   04/18/2022 2.3 1.6 - 2.6 mg/dL Final     Phosphorus   Date Value Ref Range Status   04/20/2022 3.7 2.5 - 4.5 mg/dL Final   04/19/2022 2.9 2.5 - 4.5 mg/dL Final   04/18/2022 3.9 2.5 - 4.5 mg/dL Final     Recent Labs     04/18/22  1501   HCO3 25.5   BE 1.6   O2SAT 97.9       RADIOLOGY:  XR CHEST PORTABLE   Final Result   Post endotracheal nasogastric extubation. Bilateral atelectasis and or   infiltrate with slight interval progression in the right mid lung. XR CHEST PORTABLE   Final Result   New atelectasis and or infiltrate at the right mid lung with stable   atelectasis and or infiltrate at the right base. XR CHEST PORTABLE   Final Result   New atelectasis and or infiltrate at the right base. XR ABDOMEN FOR NG/OG/NE TUBE PLACEMENT   Final Result   Nasogastric tube with the tip in the proximal gastric body. XR CHEST PORTABLE   Final Result   Adequately positioned endotracheal tube, orogastric tube and right internal   jugular central venous line. XR CHEST PORTABLE   Final Result   No pneumonia or pleural effusion. PROBLEM LIST:  Principal Problem:    Acute chest pain  Active Problems:    Chest pain  Resolved Problems:    * No resolved hospital problems. *      ATTESTATION:  ICU Staff Physician note of personal involvement in Care  As the attending physician, I certify that I personally reviewed the patients history and personnally examined the patient to confirm the physical findings described above,  And that I reviewed the relevant imaging studies and available reports. I also discussed the differential diagnosis and all of the proposed management plans with the patient and individuals accompanying the patient to this visit. They had the opportunity to ask questions about the proposed management plans and to have those questions answered. This patient has a high probability of sudden, clinically significant deterioration, which requires the highest level of physician preparedness to intervene urgently. I managed/supervised life or organ supporting interventions that required frequent physician assessment. I devoted my full attention to the direct care of this patient for the amount of time indicated below.   Time I spent with the family or surrogate(s) is included only if the patient was incapable of providing the necessary information or participating in medical decisions - Time devoted to teaching and to any procedures I billed separately is not included.      CRITICAL CARE TIME:  30 minutes    Mary Lou Yoli MORENO  Pulmonary and Critical Care Medicine

## 2022-04-20 NOTE — PROGRESS NOTES
Department of Internal Medicine  General Internal Medicine  Attending Progress Note  Chief Complaint   Patient presents with    Shortness of Breath     SUBJECTIVE:    Reports that he is doing okay. No fever and chills. No chest pain. Aware of plans to continue current medications.      OBJECTIVE      Medications    Current Facility-Administered Medications: potassium chloride 20 mEq/50 mL IVPB (Central Line), 20 mEq, IntraVENous, Q1H  lidocaine 4 % external patch 1 patch, 1 patch, TransDERmal, Daily  oxyCODONE (ROXICODONE) immediate release tablet 5 mg, 5 mg, Per NG tube, Q4H PRN  oxyCODONE (ROXICODONE) immediate release tablet 10 mg, 10 mg, Per NG tube, I0R PRN  folic acid 1 mg, thiamine (B-1) 100 mg in dextrose 5 % 50 mL IVPB, , IntraVENous, Daily  furosemide (LASIX) injection 40 mg, 40 mg, IntraVENous, BID  cefTRIAXone (ROCEPHIN) 2,000 mg in sterile water 20 mL IV syringe, 2,000 mg, IntraVENous, Q24H  [Held by provider] metoprolol tartrate (LOPRESSOR) tablet 75 mg, 75 mg, Oral, BID  [Held by provider] metoprolol tartrate (LOPRESSOR) tablet 25 mg, 25 mg, Oral, Once  metoprolol (LOPRESSOR) injection 5 mg, 5 mg, IntraVENous, Q6H  glycopyrrolate (ROBINUL) injection 0.2 mg, 0.2 mg, IntraVENous, Q4H PRN  ipratropium-albuterol (DUONEB) nebulizer solution 1 ampule, 1 ampule, Inhalation, Q4H  0.9 % sodium chloride infusion, , IntraVENous, Continuous  [Held by provider] gabapentin (NEURONTIN) capsule 400 mg, 400 mg, Oral, TID  insulin lispro (HUMALOG) injection vial 0-6 Units, 0-6 Units, SubCUTAneous, TID WC  insulin lispro (HUMALOG) injection vial 0-3 Units, 0-3 Units, SubCUTAneous, Nightly  dextrose bolus (hypoglycemia) 10% 125 mL, 125 mL, IntraVENous, PRN **OR** dextrose bolus (hypoglycemia) 10% 250 mL, 250 mL, IntraVENous, PRN  glucose (GLUTOSE) 40 % oral gel 15 g, 15 g, Oral, PRN  glucagon (rDNA) injection 1 mg, 1 mg, IntraMUSCular, PRN  dextrose 5 % solution, 100 mL/hr, IntraVENous, PRN  acetylcysteine (MUCOMYST) 10 % solution 400 mg, 4 mL, Inhalation, Q4H  hydrALAZINE (APRESOLINE) injection 10 mg, 10 mg, IntraVENous, Q6H PRN  cloNIDine (CATAPRES) 0.3 MG/24HR 1 patch, 1 patch, TransDERmal, Weekly  sodium chloride flush 0.9 % injection 5-40 mL, 5-40 mL, IntraVENous, 2 times per day  sodium chloride flush 0.9 % injection 5-40 mL, 5-40 mL, IntraVENous, PRN  0.9 % sodium chloride infusion, , IntraVENous, PRN  therapeutic multivitamin-minerals 1 tablet, 1 tablet, Oral, Daily  pantoprazole (PROTONIX) 40 mg in sodium chloride (PF) 10 mL injection, 40 mg, IntraVENous, Daily  [Held by provider] losartan (COZAAR) tablet 50 mg, 50 mg, Oral, Daily  [Held by provider] rosuvastatin (CRESTOR) tablet 10 mg, 10 mg, Oral, Daily  sodium chloride flush 0.9 % injection 5-40 mL, 5-40 mL, IntraVENous, 2 times per day  sodium chloride flush 0.9 % injection 5-40 mL, 5-40 mL, IntraVENous, PRN  0.9 % sodium chloride infusion, , IntraVENous, PRN  enoxaparin (LOVENOX) injection 30 mg, 30 mg, SubCUTAneous, BID  ondansetron (ZOFRAN-ODT) disintegrating tablet 4 mg, 4 mg, Oral, Q8H PRN **OR** ondansetron (ZOFRAN) injection 4 mg, 4 mg, IntraVENous, Q6H PRN  polyethylene glycol (GLYCOLAX) packet 17 g, 17 g, Oral, Daily PRN  acetaminophen (TYLENOL) tablet 650 mg, 650 mg, Oral, Q6H PRN **OR** acetaminophen (TYLENOL) suppository 650 mg, 650 mg, Rectal, Q6H PRN  aspirin chewable tablet 81 mg, 81 mg, Oral, Daily  nicotine (NICODERM CQ) 14 MG/24HR 1 patch, 1 patch, TransDERmal, Daily  Physical    VITALS:  BP (!) 167/85   Pulse 101   Temp 100.2 °F (37.9 °C)   Resp 22   Ht 6' (1.829 m)   Wt 277 lb 3.2 oz (125.7 kg)   SpO2 93%   BMI 37.60 kg/m²   CONSTITUTIONAL:  awake and alert  EYES:  extra-ocular muscles intact and vision intact  ENT:  normocepalic, without obvious abnormality  NECK:  supple, symmetrical, trachea midline  LUNGS:  no increased work of breathing, no retractions and no crackles or wheezing  CARDIOVASCULAR:  normal apical pulses and normal S1 and S2  ABDOMEN:  normal bowel sounds, soft and non-tender  MUSCULOSKELETAL:  there is no redness, warmth, or swelling of the joints  NEUROLOGIC:  Mental Status Exam:  Level of Alertness:   awake  Orientation:   person, place, time  SKIN:  no bruising or bleeding  Data    CBC:   Lab Results   Component Value Date    WBC 19.3 04/20/2022    RBC 3.82 04/20/2022    HGB 9.9 04/20/2022    HCT 31.7 04/20/2022    MCV 83.0 04/20/2022    MCH 25.9 04/20/2022    MCHC 31.2 04/20/2022    RDW 18.7 04/20/2022     04/20/2022    MPV 8.9 04/20/2022     BMP:    Lab Results   Component Value Date     04/20/2022    K 3.4 04/20/2022    K 4.5 04/11/2022     04/20/2022    CO2 25 04/20/2022    BUN 29 04/20/2022    LABALBU 3.1 04/20/2022    LABALBU 4.8 10/13/2010    CREATININE 0.7 04/20/2022    CALCIUM 9.3 04/20/2022    GFRAA >60 04/20/2022    LABGLOM >60 04/20/2022    GLUCOSE 177 04/20/2022       ASSESSMENT AND PLAN      1.  Acute chest pain  Defer to cardiology for further management  No current chest pain     2.  Acute Respiratory failure with Hypoxia:  Able wean off of Airvo  Continue supplemental oxygen and wean off as tolerates     3.  Alcohol abuse complicated with DT:  Continue to monitor  Currently of      4.  Hypertension Essential:  GERD  Hyperlipidemia:  Continue home medications  BP is not adequately controlled. Continue meds as ordered     5.  Morbid Obesity: lifestyle modifications     6.  COPD:   Breathing treatment. 7.  Dysphagia: continue NPO  Spe[facility] consult  [facility]

## 2022-04-21 LAB
ALBUMIN SERPL-MCNC: 3.2 G/DL (ref 3.5–5.2)
ALBUMIN SERPL-MCNC: 3.3 G/DL (ref 3.5–5.2)
ALBUMIN SERPL-MCNC: 3.4 G/DL (ref 3.5–5.2)
ALP BLD-CCNC: 160 U/L (ref 40–129)
ALP BLD-CCNC: 175 U/L (ref 40–129)
ALP BLD-CCNC: 178 U/L (ref 40–129)
ALT SERPL-CCNC: 252 U/L (ref 0–40)
ALT SERPL-CCNC: 307 U/L (ref 0–40)
ALT SERPL-CCNC: 437 U/L (ref 0–40)
ANION GAP SERPL CALCULATED.3IONS-SCNC: 12 MMOL/L (ref 7–16)
ANION GAP SERPL CALCULATED.3IONS-SCNC: 13 MMOL/L (ref 7–16)
ANION GAP SERPL CALCULATED.3IONS-SCNC: 13 MMOL/L (ref 7–16)
ANISOCYTOSIS: ABNORMAL
AST SERPL-CCNC: 146 U/L (ref 0–39)
AST SERPL-CCNC: 188 U/L (ref 0–39)
AST SERPL-CCNC: 237 U/L (ref 0–39)
BASOPHILIC STIPPLING: ABNORMAL
BASOPHILS ABSOLUTE: 0 E9/L (ref 0–0.2)
BASOPHILS RELATIVE PERCENT: 0.9 % (ref 0–2)
BILIRUB SERPL-MCNC: 0.3 MG/DL (ref 0–1.2)
BUN BLDV-MCNC: 27 MG/DL (ref 6–20)
BUN BLDV-MCNC: 29 MG/DL (ref 6–20)
BUN BLDV-MCNC: 31 MG/DL (ref 6–20)
CALCIUM SERPL-MCNC: 9.4 MG/DL (ref 8.6–10.2)
CALCIUM SERPL-MCNC: 9.4 MG/DL (ref 8.6–10.2)
CALCIUM SERPL-MCNC: 9.8 MG/DL (ref 8.6–10.2)
CHLORIDE BLD-SCNC: 104 MMOL/L (ref 98–107)
CHLORIDE BLD-SCNC: 104 MMOL/L (ref 98–107)
CHLORIDE BLD-SCNC: 105 MMOL/L (ref 98–107)
CO2: 26 MMOL/L (ref 22–29)
CO2: 27 MMOL/L (ref 22–29)
CO2: 28 MMOL/L (ref 22–29)
CREAT SERPL-MCNC: 0.7 MG/DL (ref 0.7–1.2)
CREAT SERPL-MCNC: 0.7 MG/DL (ref 0.7–1.2)
CREAT SERPL-MCNC: 0.8 MG/DL (ref 0.7–1.2)
EOSINOPHILS ABSOLUTE: 0.11 E9/L (ref 0.05–0.5)
EOSINOPHILS RELATIVE PERCENT: 0.9 % (ref 0–6)
GFR AFRICAN AMERICAN: >60
GFR NON-AFRICAN AMERICAN: >60 ML/MIN/1.73
GLUCOSE BLD-MCNC: 160 MG/DL (ref 74–99)
GLUCOSE BLD-MCNC: 185 MG/DL (ref 74–99)
GLUCOSE BLD-MCNC: 195 MG/DL (ref 74–99)
HCT VFR BLD CALC: 32 % (ref 37–54)
HEMOGLOBIN: 10 G/DL (ref 12.5–16.5)
LYMPHOCYTES ABSOLUTE: 1.13 E9/L (ref 1.5–4)
LYMPHOCYTES RELATIVE PERCENT: 8.8 % (ref 20–42)
MAGNESIUM: 2.2 MG/DL (ref 1.6–2.6)
MAGNESIUM: 2.3 MG/DL (ref 1.6–2.6)
MCH RBC QN AUTO: 26.5 PG (ref 26–35)
MCHC RBC AUTO-ENTMCNC: 31.3 % (ref 32–34.5)
MCV RBC AUTO: 84.9 FL (ref 80–99.9)
METER GLUCOSE: 133 MG/DL (ref 74–99)
METER GLUCOSE: 162 MG/DL (ref 74–99)
METER GLUCOSE: 165 MG/DL (ref 74–99)
METER GLUCOSE: 170 MG/DL (ref 74–99)
MONOCYTES ABSOLUTE: 0.88 E9/L (ref 0.1–0.95)
MONOCYTES RELATIVE PERCENT: 7 % (ref 2–12)
MYELOCYTE PERCENT: 0.9 % (ref 0–0)
NEUTROPHILS ABSOLUTE: 10.46 E9/L (ref 1.8–7.3)
NEUTROPHILS RELATIVE PERCENT: 82.5 % (ref 43–80)
OVALOCYTES: ABNORMAL
PDW BLD-RTO: 19.1 FL (ref 11.5–15)
PHOSPHORUS: 3.9 MG/DL (ref 2.5–4.5)
PHOSPHORUS: 4.4 MG/DL (ref 2.5–4.5)
PLATELET # BLD: 493 E9/L (ref 130–450)
PMV BLD AUTO: 9.3 FL (ref 7–12)
POIKILOCYTES: ABNORMAL
POLYCHROMASIA: ABNORMAL
POTASSIUM SERPL-SCNC: 3.5 MMOL/L (ref 3.5–5)
POTASSIUM SERPL-SCNC: 3.7 MMOL/L (ref 3.5–5)
POTASSIUM SERPL-SCNC: 3.9 MMOL/L (ref 3.5–5)
RBC # BLD: 3.77 E12/L (ref 3.8–5.8)
SODIUM BLD-SCNC: 143 MMOL/L (ref 132–146)
SODIUM BLD-SCNC: 144 MMOL/L (ref 132–146)
SODIUM BLD-SCNC: 145 MMOL/L (ref 132–146)
TEAR DROP CELLS: ABNORMAL
TOTAL PROTEIN: 7.6 G/DL (ref 6.4–8.3)
TOTAL PROTEIN: 7.6 G/DL (ref 6.4–8.3)
TOTAL PROTEIN: 8.1 G/DL (ref 6.4–8.3)
WBC # BLD: 12.6 E9/L (ref 4.5–11.5)

## 2022-04-21 PROCEDURE — 6360000002 HC RX W HCPCS: Performed by: INTERNAL MEDICINE

## 2022-04-21 PROCEDURE — 82962 GLUCOSE BLOOD TEST: CPT

## 2022-04-21 PROCEDURE — 99233 SBSQ HOSP IP/OBS HIGH 50: CPT | Performed by: INTERNAL MEDICINE

## 2022-04-21 PROCEDURE — 94669 MECHANICAL CHEST WALL OSCILL: CPT

## 2022-04-21 PROCEDURE — 2500000003 HC RX 250 WO HCPCS: Performed by: INTERNAL MEDICINE

## 2022-04-21 PROCEDURE — 85025 COMPLETE CBC W/AUTO DIFF WBC: CPT

## 2022-04-21 PROCEDURE — 36592 COLLECT BLOOD FROM PICC: CPT

## 2022-04-21 PROCEDURE — 2500000003 HC RX 250 WO HCPCS: Performed by: NURSE PRACTITIONER

## 2022-04-21 PROCEDURE — 94640 AIRWAY INHALATION TREATMENT: CPT

## 2022-04-21 PROCEDURE — 6370000000 HC RX 637 (ALT 250 FOR IP): Performed by: STUDENT IN AN ORGANIZED HEALTH CARE EDUCATION/TRAINING PROGRAM

## 2022-04-21 PROCEDURE — 6370000000 HC RX 637 (ALT 250 FOR IP): Performed by: NURSE PRACTITIONER

## 2022-04-21 PROCEDURE — 99233 SBSQ HOSP IP/OBS HIGH 50: CPT | Performed by: STUDENT IN AN ORGANIZED HEALTH CARE EDUCATION/TRAINING PROGRAM

## 2022-04-21 PROCEDURE — 2000000000 HC ICU R&B

## 2022-04-21 PROCEDURE — 2700000000 HC OXYGEN THERAPY PER DAY

## 2022-04-21 PROCEDURE — 6370000000 HC RX 637 (ALT 250 FOR IP): Performed by: INTERNAL MEDICINE

## 2022-04-21 PROCEDURE — 83735 ASSAY OF MAGNESIUM: CPT

## 2022-04-21 PROCEDURE — 6360000002 HC RX W HCPCS: Performed by: STUDENT IN AN ORGANIZED HEALTH CARE EDUCATION/TRAINING PROGRAM

## 2022-04-21 PROCEDURE — 92526 ORAL FUNCTION THERAPY: CPT | Performed by: SPEECH-LANGUAGE PATHOLOGIST

## 2022-04-21 PROCEDURE — 6370000000 HC RX 637 (ALT 250 FOR IP)

## 2022-04-21 PROCEDURE — 2580000003 HC RX 258: Performed by: INTERNAL MEDICINE

## 2022-04-21 PROCEDURE — 36415 COLL VENOUS BLD VENIPUNCTURE: CPT

## 2022-04-21 PROCEDURE — 2580000003 HC RX 258: Performed by: NURSE PRACTITIONER

## 2022-04-21 PROCEDURE — 2580000003 HC RX 258: Performed by: STUDENT IN AN ORGANIZED HEALTH CARE EDUCATION/TRAINING PROGRAM

## 2022-04-21 PROCEDURE — 80053 COMPREHEN METABOLIC PANEL: CPT

## 2022-04-21 PROCEDURE — 84100 ASSAY OF PHOSPHORUS: CPT

## 2022-04-21 RX ORDER — POTASSIUM CHLORIDE 29.8 MG/ML
20 INJECTION INTRAVENOUS
Status: DISCONTINUED | OUTPATIENT
Start: 2022-04-21 | End: 2022-04-21 | Stop reason: CLARIF

## 2022-04-21 RX ORDER — POTASSIUM CHLORIDE 29.8 MG/ML
40 INJECTION INTRAVENOUS ONCE
Status: COMPLETED | OUTPATIENT
Start: 2022-04-21 | End: 2022-04-21

## 2022-04-21 RX ADMIN — Medication 10 ML: at 21:31

## 2022-04-21 RX ADMIN — POTASSIUM CHLORIDE 40 MEQ: 29.8 INJECTION, SOLUTION INTRAVENOUS at 10:06

## 2022-04-21 RX ADMIN — OXYCODONE 10 MG: 5 TABLET ORAL at 17:24

## 2022-04-21 RX ADMIN — MULTIPLE VITAMINS W/ MINERALS TAB 1 TABLET: TAB at 10:13

## 2022-04-21 RX ADMIN — METOPROLOL TARTRATE 50 MG: 50 TABLET, FILM COATED ORAL at 10:13

## 2022-04-21 RX ADMIN — SODIUM CHLORIDE: 9 INJECTION, SOLUTION INTRAVENOUS at 03:56

## 2022-04-21 RX ADMIN — IPRATROPIUM BROMIDE AND ALBUTEROL SULFATE 1 AMPULE: .5; 2.5 SOLUTION RESPIRATORY (INHALATION) at 13:41

## 2022-04-21 RX ADMIN — ROSUVASTATIN CALCIUM 10 MG: 10 TABLET, FILM COATED ORAL at 10:13

## 2022-04-21 RX ADMIN — OXYCODONE 10 MG: 5 TABLET ORAL at 22:11

## 2022-04-21 RX ADMIN — Medication 5 ML: at 22:00

## 2022-04-21 RX ADMIN — CEFTRIAXONE 2000 MG: 2 INJECTION, POWDER, FOR SOLUTION INTRAMUSCULAR; INTRAVENOUS at 14:01

## 2022-04-21 RX ADMIN — ACETAMINOPHEN 650 MG: 325 TABLET ORAL at 17:33

## 2022-04-21 RX ADMIN — ASPIRIN 81 MG 81 MG: 81 TABLET ORAL at 10:13

## 2022-04-21 RX ADMIN — IPRATROPIUM BROMIDE AND ALBUTEROL SULFATE 1 AMPULE: .5; 2.5 SOLUTION RESPIRATORY (INHALATION) at 17:01

## 2022-04-21 RX ADMIN — ENOXAPARIN SODIUM 30 MG: 100 INJECTION SUBCUTANEOUS at 21:18

## 2022-04-21 RX ADMIN — IPRATROPIUM BROMIDE AND ALBUTEROL SULFATE 1 AMPULE: .5; 2.5 SOLUTION RESPIRATORY (INHALATION) at 09:45

## 2022-04-21 RX ADMIN — OXYCODONE 10 MG: 5 TABLET ORAL at 13:19

## 2022-04-21 RX ADMIN — IPRATROPIUM BROMIDE AND ALBUTEROL SULFATE 1 AMPULE: .5; 2.5 SOLUTION RESPIRATORY (INHALATION) at 22:13

## 2022-04-21 RX ADMIN — METOPROLOL TARTRATE 50 MG: 50 TABLET, FILM COATED ORAL at 21:28

## 2022-04-21 RX ADMIN — SODIUM CHLORIDE, PRESERVATIVE FREE 10 ML: 5 INJECTION INTRAVENOUS at 17:39

## 2022-04-21 RX ADMIN — INSULIN LISPRO 1 UNITS: 100 INJECTION, SOLUTION INTRAVENOUS; SUBCUTANEOUS at 10:40

## 2022-04-21 RX ADMIN — Medication 10 ML: at 10:57

## 2022-04-21 RX ADMIN — FUROSEMIDE 40 MG: 10 INJECTION, SOLUTION INTRAMUSCULAR; INTRAVENOUS at 17:39

## 2022-04-21 RX ADMIN — SODIUM CHLORIDE, PRESERVATIVE FREE 10 ML: 5 INJECTION INTRAVENOUS at 21:32

## 2022-04-21 RX ADMIN — FUROSEMIDE 40 MG: 10 INJECTION, SOLUTION INTRAMUSCULAR; INTRAVENOUS at 10:10

## 2022-04-21 RX ADMIN — IPRATROPIUM BROMIDE AND ALBUTEROL SULFATE 1 AMPULE: .5; 2.5 SOLUTION RESPIRATORY (INHALATION) at 04:58

## 2022-04-21 RX ADMIN — INSULIN LISPRO 1 UNITS: 100 INJECTION, SOLUTION INTRAVENOUS; SUBCUTANEOUS at 13:29

## 2022-04-21 RX ADMIN — ENOXAPARIN SODIUM 30 MG: 100 INJECTION SUBCUTANEOUS at 10:06

## 2022-04-21 RX ADMIN — INSULIN LISPRO 1 UNITS: 100 INJECTION, SOLUTION INTRAVENOUS; SUBCUTANEOUS at 18:28

## 2022-04-21 RX ADMIN — THIAMINE HYDROCHLORIDE: 100 INJECTION, SOLUTION INTRAMUSCULAR; INTRAVENOUS at 10:53

## 2022-04-21 RX ADMIN — ANTI-FUNGAL POWDER MICONAZOLE NITRATE TALC FREE: 1.42 POWDER TOPICAL at 21:30

## 2022-04-21 RX ADMIN — IPRATROPIUM BROMIDE AND ALBUTEROL SULFATE 1 AMPULE: .5; 2.5 SOLUTION RESPIRATORY (INHALATION) at 01:39

## 2022-04-21 ASSESSMENT — PAIN SCALES - GENERAL
PAINLEVEL_OUTOF10: 0
PAINLEVEL_OUTOF10: 0
PAINLEVEL_OUTOF10: 8
PAINLEVEL_OUTOF10: 10
PAINLEVEL_OUTOF10: 7
PAINLEVEL_OUTOF10: 0
PAINLEVEL_OUTOF10: 7
PAINLEVEL_OUTOF10: 7

## 2022-04-21 ASSESSMENT — PAIN - FUNCTIONAL ASSESSMENT
PAIN_FUNCTIONAL_ASSESSMENT: PREVENTS OR INTERFERES WITH MANY ACTIVE NOT PASSIVE ACTIVITIES

## 2022-04-21 ASSESSMENT — PAIN DESCRIPTION - FREQUENCY
FREQUENCY: CONTINUOUS
FREQUENCY: CONTINUOUS

## 2022-04-21 ASSESSMENT — PAIN DESCRIPTION - ORIENTATION
ORIENTATION: LOWER

## 2022-04-21 ASSESSMENT — PAIN DESCRIPTION - DESCRIPTORS
DESCRIPTORS: ACHING;DULL;SHOOTING
DESCRIPTORS: ACHING;DULL;SHOOTING
DESCRIPTORS: ACHING;SHOOTING
DESCRIPTORS: ACHING;SHOOTING
DESCRIPTORS: ACHING;DULL;SHOOTING

## 2022-04-21 ASSESSMENT — PAIN DESCRIPTION - PAIN TYPE
TYPE: CHRONIC PAIN
TYPE: CHRONIC PAIN

## 2022-04-21 ASSESSMENT — PAIN DESCRIPTION - LOCATION
LOCATION: BACK

## 2022-04-21 ASSESSMENT — PAIN DESCRIPTION - ONSET
ONSET: ON-GOING
ONSET: ON-GOING

## 2022-04-21 NOTE — CARE COORDINATION
Peer Recovery Support Note     Name: Alexa Reynoso  Date: 4/21/2022          Chief Complaint   Patient presents with    Chest Pain                Peer Support met with patient. [x] Support and education provided  [] Resources provided   [] Treatment referral:   [] Other:   [] Patient declined peer recovery services      Referred By: Zaid Motta CM     Notes: PRS came to meet patient and explain role. Peer was tearful and shared \"I have to quit, I am done\". Peer also expressed he was hoping to \"Do something with treatment while I'm here at the hospital\". PRS explained to him that [employer] [facility] does not currently offer in house treatment/IOP options. PRS discussed treatment options with peer, peer expressed a desire to do IOP/Outpatient services and get into AA locally. Peer resides in [facility], this PRS will look into outpatient DOA treatment local to him. PRS will follow up each day he remains here for support and education about treatment/recovery options.

## 2022-04-21 NOTE — PROGRESS NOTES
[facility]) Physicians        CARDIOLOGY                 INPATIENT PROGRESS NOTE          PATIENT SEEN IN FOLLOW UP FOR: Chest pain, CHF    Hospital Day: 13     Rigo Ryan is a 64year old patient known to Dr. Kahlil Borjas from initial consult      SUBJECTIVE: Denies chest pain or shortness of breath. Blood pressure is low and subsequently I am discontinuing clonidine patch. Hold other blood pressure medications       Echo 4/11/22   Summary   Technically sub-optimal images - Definity Echo contrast used. Normal left ventricular chamber size. Normal left ventricular systolic function, LVEF 90-57%. Mild left ventricular concentric hypertrophy noted. Normal diastolic function. Interatrial septum not well visualized but appears intact. Normal right ventricle size and function, TAPSE 2.3cm. Valves not well visualized. No hemodynamically significant aortic stenosis. Normal aortic root size. No evidence of pericardial effusion. No intra cardiac mass or thrombus. No comparison study available. Intake/Output Summary (Last 24 hours) at 4/21/2022 1821  Last data filed at 4/21/2022 1800  Gross per 24 hour   Intake 1253.15 ml   Output 3260 ml   Net -2006.85 ml       Labs:   CBC:   Recent Labs     04/20/22  0422 04/21/22  0538   WBC 19.3* 12.6*   HGB 9.9* 10.0*   HCT 31.7* 32.0*    493*     BMP:   Recent Labs     04/20/22  2348 04/21/22  0538    144   K 3.9 3.5   CO2 27 26   BUN 31* 29*   CREATININE 0.7 0.7   LABGLOM >60 >60   CALCIUM 9.4 9.4     Mag:   Recent Labs     04/20/22  2348 04/21/22  1352   MG 2.3 2.2     Phos:   Recent Labs     04/20/22  2348 04/21/22  1352   PHOS 4.4 3.9     TSH:   No results for input(s): TSH in the last 72 hours. HgA1c:     BNP: No results for input(s): BNP in the last 72 hours. PT/INR: No results for input(s): PROTIME, INR in the last 72 hours. APTT:No results for input(s): APTT in the last 72 hours.   CARDIAC ENZYMES:No results for input(s): CKTOTAL, CKMB, CKMBINDEX, TROPONINI in the last 72 hours. FASTING LIPID PANEL:  Lab Results   Component Value Date    CHOL 213 2015    HDL 41 2015    LDLCALC - 2015    TRIG 169 2022     LIVER PROFILE:  Recent Labs     22  2348 22  0538   * 188*   * 307*   LABALBU 3.2* 3.3*       Current Inpatient Medications:   miconazole   Topical BID    lidocaine  1 patch TransDERmal Daily    aspirin  81 mg Per NG tube Daily    metoprolol tartrate  50 mg Per NG tube BID    rosuvastatin  10 mg Per NG tube Daily    thiamine and folic acid IVPB   IntraVENous Daily    furosemide  40 mg IntraVENous BID    [Held by provider] metoprolol tartrate  75 mg Oral BID    [Held by provider] metoprolol tartrate  25 mg Oral Once    ipratropium-albuterol  1 ampule Inhalation Q4H    [Held by provider] gabapentin  400 mg Oral TID    insulin lispro  0-6 Units SubCUTAneous TID WC    insulin lispro  0-3 Units SubCUTAneous Nightly    cloNIDine  1 patch TransDERmal Weekly    sodium chloride flush  5-40 mL IntraVENous 2 times per day    multivitamin  1 tablet Oral Daily    [Held by provider] losartan  50 mg Oral Daily    sodium chloride flush  5-40 mL IntraVENous 2 times per day    enoxaparin  30 mg SubCUTAneous BID    nicotine  1 patch TransDERmal Daily       IV Infusions (if any):   dextrose      sodium chloride Stopped (22 2100)    sodium chloride Stopped (22 0306)         PHYSICAL EXAM:     CONSTITUTIONAL:   BP 84/72   Pulse 99   Temp 100.9 °F (38.3 °C) (Core)   Resp 19   Ht 6' (1.829 m)   Wt 257 lb (116.6 kg)   SpO2 92%   BMI 34.86 kg/m²   Pulse  Av  Min: 83  Max: 870  Systolic (02FJD), URF:601 , Min:84 , PD    Diastolic (17MBP), FJI:56, Min:64, Max:95    In general, this is a well developed, well nourished who appears stated age. Pt on vent and sedated. HEENT: Pt on vent and sedated.    Neck-  no stridor, no noted enlargement of the thyroid, no carotid bruit. no jugular venous distention   RESPIRATORY: Chest symmetrical.  No accessory muscle use. Lung auscultation - few rhonchi  CARDIOVASCULAR:     Heart Ausculation - Regular rate and rhythm, 2/6 systolic murmur, No s3 or rub.   + lower extremity edema,  Distal pulses palpable, no clubbing  ABDOMEN: Bowel sounds present. No Palpable masses. MS: Pt on vent and sedated. : Deferred  Rectal Exam: Deferred  SKIN: warm and dry   NEURO / PSYCH: Pt on vent and sedated. ASSESSMENT / PLAN:    Exertional chest pain - Likely angina (CP with diaphoresis and dyspnea on exertion). Awaiting invasive coronary angiogram once stable  Continue beta-blocker and aspirin and resume statin when liver function test stable    Essential HTN -   Now hypotensive and subsequently I have discontinued clonidine patch. Dyslipidemia  -resume statin therapy when liver function test is stable    Tobacco abuse -  counseled to quit smoking    Chronic HFpEF - Diuretics as needed    Probable KULDIP - Recommend Out-Pt sleep study    Non-morbid Obesity, BMI 38 - Diet, exercise and weight loss will be discussed.     Hx of Marijuana and ETOH use -  counseled to quit    Chronic back pain - on Oxycodone as an outpatient    Family Hx of CAD    Anemia, Chronic - Monitor H/H    History of elevated LFT's in 3/2022 - Monitor LFTs          Electronically signed by Louann Peck MD on 4/21/2022 at 6:21 PM  [facility]) Cardiology

## 2022-04-21 NOTE — PROGRESS NOTES
CRITICAL CARE PROGRESS NOTE    The patient's case was discussed in multidisciplinary rounds including critical care specialist, nursing, RT and pharmacy. His evaluation is as follows:     Daily progress:  April 21, 2022: Patient had an uneventful night. He was extubated on April 19, 2023. He has no fever, chills, rigors. He is still intermittently confused. He failed multiple speech evaluation and currently on tube feeding. ETOH withdrawal with delirium tremens - Resolved  --Extubated on 4/19, now on room air  --Other medications for DTs include gabapentin and clonidine  --Continue Thiamine and folic acid, banana bag    Acute hypoxemic respiratory failure due to staphylococcal pneumonia (MSSA)  --Antibiotics: Ceftriaxone until 4/21  --On 2 L NC    Chest pain R/O ACS  --Followed by cardiology  --Plan for heart cath when improved  --Continue  beta blocker and imdur, ASA and ARB  --Not on statin as he is unable to take oral route. If he continues with dysphagia, then will insert NG     Chronic diastolic heart failure  --Continue afterload reduction as stated above     COPD exacerbation  --Continue aerosolized bronchodilators including duoneb and pulmicort     Hyponatremia, chronic possible due to chronic beer intake  --Lasix with goal of -1L to -2L   --Daily BMP     HTN  --ARB on hold, continue BB, clonidine and hydralazine     HLD  --Continue rosuivastatin     GERD  --On protonix     Tobacco use disorder  --Continue nicotine patch     Morbid obesity  -Will need counseling on lifestyle modifications    Prolonged QTc  --Avoid medications that prolong the QT    Dysphagia  --NG inserted      DVT prophylaxis with enoxaparin   Nutrition: Tube feedings restarted  Vascular catheters: TLC needed for vasopressor support, will remove  Urinary catheter needed for strict input/output    GI prophylaxis with PPI  Physical therapy once awake  Goals of care:  Full code    /72   Pulse 93   Temp 98.8 °F (37.1 °C) (Oral) Resp 19   Ht 6' (1.829 m)   Wt 257 lb (116.6 kg)   SpO2 95%   BMI 34.86 kg/m²   General: Awake, oriented to place, time and person, very hoarse  HEENT: No head lesions, PERRL, EOMI, mouth without lesions, no nasal lesions, no cervical adenopathy palpated  Respiratory: Lungs with equal breath sounds bilaterally, no adventitious sounds auscultated, no accessory muscle use  CV: Regular rate, no murmurs, JVD 3 cm above clavicle, 2+ leg edema  Abdomen: Soft, non tender, + bowel sounds, no lesions  Skin: Hydrated, adequate turgor, no rash, capillary refill <2 seconds  Extremities: Muscular strength 4/5 in 4 limbs, moves 4 limbs spontaneously, distal pulses present  Neurology: Awake and alert, follows commands, moves 4 limbs on command and spontaneously, neck is supple, no meningitic signs present. Last 3 CMP:    Recent Labs     04/20/22  0422 04/20/22  0422 04/20/22  1458 04/20/22  2348 04/21/22  0538      < > 140 143 144   K 3.3*   < > 3.4* 3.9 3.5   CL 99   < > 100 104 105   CO2 24   < > 25 27 26   BUN 29*   < > 29* 31* 29*   CREATININE 0.7   < > 0.7 0.7 0.7   GLUCOSE 168*   < > 177* 195* 185*   CALCIUM 9.4   < > 9.3 9.4 9.4   PROT 7.4  --   --  7.6 7.6   LABALBU 3.1*  --   --  3.2* 3.3*   BILITOT 0.5  --   --  0.3 0.3   ALKPHOS 153*  --   --  178* 160*   AST 67*  --   --  146* 188*   *  --   --  252* 307*    < > = values in this interval not displayed. Recent Labs     04/21/22  0538   WBC 12.6*   RBC 3.77*   HGB 10.0*   HCT 32.0*   MCV 84.9   MCH 26.5   MCHC 31.3*   RDW 19.1*   *   MPV 9.3       No results for input(s): BC in the last 72 hours. No results for input(s): Meir Stinson in the last 72 hours.     24 HR INTAKE/OUTPUT:      Intake/Output Summary (Last 24 hours) at 4/21/2022 0820  Last data filed at 4/21/2022 0419  Gross per 24 hour   Intake 2064.73 ml   Output 3600 ml   Net -1535.27 ml     MEDICATIONS:   lidocaine  1 patch TransDERmal Daily    aspirin  81 mg Per NG tube Daily    metoprolol tartrate  50 mg Per NG tube BID    rosuvastatin  10 mg Per NG tube Daily    thiamine and folic acid IVPB   IntraVENous Daily    furosemide  40 mg IntraVENous BID    cefTRIAXone (ROCEPHIN) IV  2,000 mg IntraVENous Q24H    [Held by provider] metoprolol tartrate  75 mg Oral BID    [Held by provider] metoprolol tartrate  25 mg Oral Once    ipratropium-albuterol  1 ampule Inhalation Q4H    [Held by provider] gabapentin  400 mg Oral TID    insulin lispro  0-6 Units SubCUTAneous TID WC    insulin lispro  0-3 Units SubCUTAneous Nightly    cloNIDine  1 patch TransDERmal Weekly    sodium chloride flush  5-40 mL IntraVENous 2 times per day    multivitamin  1 tablet Oral Daily    pantoprazole (PROTONIX) 40 mg injection  40 mg IntraVENous Daily    [Held by provider] losartan  50 mg Oral Daily    sodium chloride flush  5-40 mL IntraVENous 2 times per day    enoxaparin  30 mg SubCUTAneous BID    nicotine  1 patch TransDERmal Daily      sodium chloride 50 mL/hr at 04/21/22 9304    dextrose      sodium chloride Stopped (04/20/22 2100)    sodium chloride Stopped (04/16/22 0306)     oxyCODONE, oxyCODONE, acetaminophen **OR** acetaminophen, glycopyrrolate, dextrose bolus (hypoglycemia) **OR** dextrose bolus (hypoglycemia), glucose, glucagon (rDNA), dextrose, hydrALAZINE, sodium chloride flush, sodium chloride, sodium chloride flush, sodium chloride, ondansetron **OR** ondansetron, polyethylene glycol    OBJECTIVE:  Vitals:    04/21/22 0600   BP: 124/72   Pulse: 93   Resp: 19   Temp:    SpO2: 95%     FiO2 : 55 %  O2 Flow Rate (L/min): 2 L/min  O2 Device: Nasal cannula        LABS:  WBC   Date Value Ref Range Status   04/21/2022 12.6 (H) 4.5 - 11.5 E9/L Final   04/20/2022 19.3 (H) 4.5 - 11.5 E9/L Final   04/19/2022 25.0 (H) 4.5 - 11.5 E9/L Final     Hemoglobin   Date Value Ref Range Status   04/21/2022 10.0 (L) 12.5 - 16.5 g/dL Final   04/20/2022 9.9 (L) 12.5 - 16.5 g/dL Final   04/19/2022 11.0 04/20/2022 9.3 8.6 - 10.2 mg/dL Final     Total Protein   Date Value Ref Range Status   04/21/2022 7.6 6.4 - 8.3 g/dL Final   04/20/2022 7.6 6.4 - 8.3 g/dL Final   04/20/2022 7.4 6.4 - 8.3 g/dL Final     Albumin   Date Value Ref Range Status   04/21/2022 3.3 (L) 3.5 - 5.2 g/dL Final   04/20/2022 3.2 (L) 3.5 - 5.2 g/dL Final   04/20/2022 3.1 (L) 3.5 - 5.2 g/dL Final   10/13/2010 4.8 3.2 - 4.8 g/dL Final     Total Bilirubin   Date Value Ref Range Status   04/21/2022 0.3 0.0 - 1.2 mg/dL Final   04/20/2022 0.3 0.0 - 1.2 mg/dL Final   04/20/2022 0.5 0.0 - 1.2 mg/dL Final     Alkaline Phosphatase   Date Value Ref Range Status   04/21/2022 160 (H) 40 - 129 U/L Final   04/20/2022 178 (H) 40 - 129 U/L Final   04/20/2022 153 (H) 40 - 129 U/L Final     AST   Date Value Ref Range Status   04/21/2022 188 (H) 0 - 39 U/L Final   04/20/2022 146 (H) 0 - 39 U/L Final   04/20/2022 67 (H) 0 - 39 U/L Final     ALT   Date Value Ref Range Status   04/21/2022 307 (H) 0 - 40 U/L Final   04/20/2022 252 (H) 0 - 40 U/L Final   04/20/2022 165 (H) 0 - 40 U/L Final     GFR Non-   Date Value Ref Range Status   04/21/2022 >60 >=60 mL/min/1.73 Final     Comment:     Chronic Kidney Disease: less than 60 ml/min/1.73 sq.m. Kidney Failure: less than 15 ml/min/1.73 sq.m. Results valid for patients 18 years and older. 04/20/2022 >60 >=60 mL/min/1.73 Final     Comment:     Chronic Kidney Disease: less than 60 ml/min/1.73 sq.m. Kidney Failure: less than 15 ml/min/1.73 sq.m. Results valid for patients 18 years and older. 04/20/2022 >60 >=60 mL/min/1.73 Final     Comment:     Chronic Kidney Disease: less than 60 ml/min/1.73 sq.m. Kidney Failure: less than 15 ml/min/1.73 sq.m. Results valid for patients 18 years and older.        GFR    Date Value Ref Range Status   04/21/2022 >60  Final   04/20/2022 >60  Final   04/20/2022 >60  Final     Magnesium   Date Value Ref Range Status   04/20/2022 2. 3 1.6 - 2.6 mg/dL Final   04/20/2022 2.2 1.6 - 2.6 mg/dL Final   04/20/2022 2.2 1.6 - 2.6 mg/dL Final     Phosphorus   Date Value Ref Range Status   04/20/2022 4.4 2.5 - 4.5 mg/dL Final   04/20/2022 4.1 2.5 - 4.5 mg/dL Final   04/20/2022 3.7 2.5 - 4.5 mg/dL Final     Recent Labs     04/18/22  1501   HCO3 25.5   BE 1.6   O2SAT 97.9       RADIOLOGY:  XR CHEST ABDOMEN NG PLACEMENT   Final Result   Enteric tube terminates within the stomach. FL MODIFIED BARIUM SWALLOW W VIDEO   Final Result   Swallowing dysfunction as described above including aspiration of nectar and   deep penetration of honey consistency. Please see separate speech pathology report for full discussion of findings   and recommendations. XR CHEST PORTABLE   Final Result   Post endotracheal nasogastric extubation. Bilateral atelectasis and or   infiltrate with slight interval progression in the right mid lung. XR CHEST PORTABLE   Final Result   New atelectasis and or infiltrate at the right mid lung with stable   atelectasis and or infiltrate at the right base. XR CHEST PORTABLE   Final Result   New atelectasis and or infiltrate at the right base. XR ABDOMEN FOR NG/OG/NE TUBE PLACEMENT   Final Result   Nasogastric tube with the tip in the proximal gastric body. XR CHEST PORTABLE   Final Result   Adequately positioned endotracheal tube, orogastric tube and right internal   jugular central venous line. XR CHEST PORTABLE   Final Result   No pneumonia or pleural effusion. PROBLEM LIST:  Principal Problem:    Acute chest pain  Active Problems:    Chest pain  Resolved Problems:    * No resolved hospital problems.  *      ATTESTATION:   Staff Physician note of personal involvement in Care  As the attending physician, I certify that I personally reviewed the patients history and personnally examined the patient to confirm the physical findings described above,  And that I reviewed the relevant imaging studies and available reports. I also discussed the differential diagnosis and all of the proposed management plans with the patient and individuals accompanying the patient to this visit. They had the opportunity to ask questions about the proposed management plans and to have those questions answered.

## 2022-04-21 NOTE — PLAN OF CARE
Problem: Pain:  Goal: Pain level will decrease  Description: Pain level will decrease  4/21/2022 0445 by Cary Barr RN  Outcome: Progressing  4/21/2022 0444 by Cary Barr RN  Outcome: Progressing  Goal: Control of acute pain  Description: Control of acute pain  4/21/2022 0445 by Cary Barr RN  Outcome: Progressing  4/21/2022 0444 by Cary Barr RN  Outcome: Progressing  Goal: Control of chronic pain  Description: Control of chronic pain  4/21/2022 0445 by Cary Barr RN  Outcome: Progressing  4/21/2022 0444 by Cary Barr RN  Outcome: Progressing     Problem: Discharge Planning:  Goal: Discharged to appropriate level of care  Description: Discharged to appropriate level of care  4/21/2022 0445 by Cary Barr RN  Outcome: Progressing  4/21/2022 0444 by Cary Barr RN  Outcome: Progressing     Problem: Cardiac Output - Decreased:  Goal: Hemodynamic stability will improve  Description: Hemodynamic stability will improve  4/21/2022 0445 by Cary Barr RN  Outcome: Progressing  4/21/2022 0444 by Cary Barr RN  Outcome: Progressing     Problem: Serum Glucose Level - Abnormal:  Goal: Ability to maintain appropriate glucose levels will improve  Description: Ability to maintain appropriate glucose levels will improve  4/21/2022 0445 by Cary Barr RN  Outcome: Progressing  4/21/2022 0444 by Cary Barr RN  Outcome: Progressing     Problem: Pain:  Goal: Pain level will decrease  Description: Pain level will decrease  4/21/2022 0445 by Cary Barr RN  Outcome: Progressing  4/21/2022 0444 by Cary Barr RN  Outcome: Progressing  Goal: Control of acute pain  Description: Control of acute pain  4/21/2022 0445 by Cary Barr RN  Outcome: Progressing  4/21/2022 0444 by Cary Barr RN  Outcome: Progressing  Goal: Control of chronic pain  Description: Control of chronic pain  4/21/2022 0445 by Cary Barr RN  Outcome: Progressing  4/21/2022 0444 by Cherri Mayfield RN  Outcome: Progressing     Problem: Tissue Perfusion - Cardiopulmonary, Altered:  Goal: Absence of angina  Description: Absence of angina  4/21/2022 0445 by Cherri Mayfield RN  Outcome: Progressing  4/21/2022 0444 by hCerri Mayfield RN  Outcome: Progressing  Goal: Circulatory function within specified parameters  Description: Circulatory function within specified parameters  4/21/2022 0445 by Cherri Mayfield RN  Outcome: Progressing  4/21/2022 0444 by Cherri Mayfield RN  Outcome: Progressing  Goal: Hemodynamic stability will improve  Description: Hemodynamic stability will improve  4/21/2022 0445 by Cherri Mayfield RN  Outcome: Progressing  4/21/2022 0444 by Cherri Mayfield RN  Outcome: Progressing     Problem: Tobacco Use:  Goal: Will participate in inpatient tobacco-use cessation counseling  Description: Will participate in inpatient tobacco-use cessation counseling  4/21/2022 0445 by Cherri Mayfield RN  Outcome: Progressing  4/21/2022 0444 by Cherri Mayfield RN  Outcome: Progressing     Problem: Fluid Volume - Deficit:  Goal: Absence of fluid volume deficit signs and symptoms  Description: Absence of fluid volume deficit signs and symptoms  4/21/2022 0445 by Cherri Mayfield RN  Outcome: Progressing  4/21/2022 0444 by Cherri Mayfield RN  Outcome: Progressing     Problem: Nutrition Deficit:  Goal: Ability to achieve adequate nutritional intake will improve  Description: Ability to achieve adequate nutritional intake will improve  4/21/2022 0445 by Cherri Mayfield RN  Outcome: Progressing  4/21/2022 0444 by Cherri Mayfield RN  Outcome: Progressing     Problem: Sleep Pattern Disturbance:  Goal: Appears well-rested  Description: Appears well-rested  4/21/2022 0445 by Cherri Mayfield RN  Outcome: Progressing  4/21/2022 0444 by Cherri Mayfield RN  Outcome: Progressing     Problem: Violence - Risk of, Self/Other-Directed:  Goal: Knowledge of developmental care interventions  Description: Absence of violence  4/21/2022 0445 by Matt Hamlin RN  Outcome: Progressing  4/21/2022 0444 by Matt Hamlin RN  Outcome: Progressing     Problem: Falls - Risk of:  Goal: Will remain free from falls  Description: Will remain free from falls  4/21/2022 0445 by Matt Hamlin RN  Outcome: Progressing  4/21/2022 0444 by Matt Hamlin RN  Outcome: Progressing  Goal: Absence of physical injury  Description: Absence of physical injury  4/21/2022 0445 by Matt Hamlin RN  Outcome: Progressing  4/21/2022 0444 by Matt Hamlin RN  Outcome: Progressing     Problem: Skin Integrity:  Goal: Will show no infection signs and symptoms  Description: Will show no infection signs and symptoms  4/21/2022 0445 by Matt Hamlin RN  Outcome: Progressing  4/21/2022 0444 by Matt Hamlin RN  Outcome: Progressing  Goal: Absence of new skin breakdown  Description: Absence of new skin breakdown  4/21/2022 0445 by Matt Hamlni RN  Outcome: Progressing  4/21/2022 0444 by Matt Hamlin RN  Outcome: Progressing     Problem: Discharge Planning  Goal: Discharge to home or other facility with appropriate resources  4/21/2022 0445 by Matt Hamlin RN  Outcome: Progressing  4/21/2022 0444 by Matt Hamlin RN  Outcome: Progressing     Problem: ABCDS Injury Assessment  Goal: Absence of physical injury  4/21/2022 0445 by Matt Hamlin RN  Outcome: Progressing  4/21/2022 0444 by Matt Hamlin RN  Outcome: Progressing

## 2022-04-21 NOTE — PROGRESS NOTES
[address] [place], [place] [address]ist   Progress Note    Admitting Date and Time: 4/9/2022  4:02 PM  Admit Dx: Hyponatremia [E87.1]  COPD exacerbation ([address] [J44.1]  Acute chest pain [R07.9]  Chest pain, unspecified type [R07.9]  Chest pain [R07.9]    Subjective:    Pt reports that he is feeling ok but still very congested. He is very motivated to [employer] out of here\". Per RN: Patient has been compliant this morning and cooperative with all care. Congested and will be doing chest vest later.      ROS: unable to assess due to being intubated and sedated     miconazole   Topical BID    lidocaine  1 patch TransDERmal Daily    aspirin  81 mg Per NG tube Daily    metoprolol tartrate  50 mg Per NG tube BID    rosuvastatin  10 mg Per NG tube Daily    thiamine and folic acid IVPB   IntraVENous Daily    furosemide  40 mg IntraVENous BID    [Held by provider] metoprolol tartrate  75 mg Oral BID    [Held by provider] metoprolol tartrate  25 mg Oral Once    ipratropium-albuterol  1 ampule Inhalation Q4H    [Held by provider] gabapentin  400 mg Oral TID    insulin lispro  0-6 Units SubCUTAneous TID WC    insulin lispro  0-3 Units SubCUTAneous Nightly    cloNIDine  1 patch TransDERmal Weekly    sodium chloride flush  5-40 mL IntraVENous 2 times per day    multivitamin  1 tablet Oral Daily    [Held by provider] losartan  50 mg Oral Daily    sodium chloride flush  5-40 mL IntraVENous 2 times per day    enoxaparin  30 mg SubCUTAneous BID    nicotine  1 patch TransDERmal Daily     oxyCODONE, 5 mg, Q4H PRN  oxyCODONE, 10 mg, Q4H PRN  acetaminophen, 650 mg, Q6H PRN   Or  acetaminophen, 650 mg, Q6H PRN  glycopyrrolate, 1 mg, TID PRN  dextrose bolus (hypoglycemia), 125 mL, PRN   Or  dextrose bolus (hypoglycemia), 250 mL, PRN  glucose, 15 g, PRN  glucagon (rDNA), 1 mg, PRN  dextrose, 100 mL/hr, PRN  hydrALAZINE, 10 mg, Q6H PRN  sodium chloride flush, 5-40 mL, PRN  sodium chloride, , PRN  sodium chloride flush, 5-40 mL, PRN  sodium chloride, , PRN  ondansetron, 4 mg, Q8H PRN   Or  ondansetron, 4 mg, Q6H PRN  polyethylene glycol, 17 g, Daily PRN         Objective:    /81   Pulse 99   Temp 98.8 °F (37.1 °C) (Axillary)   Resp 17   Ht 6' (1.829 m)   Wt 257 lb (116.6 kg)   SpO2 93%   BMI 34.86 kg/m²        General Appearance: alert and oriented x3. No acute distress  Skin: warm, slightly diaphoretic  Head: normocephalic and atraumatic  Eyes: pupils equal, round, and reactive to light, extraocular eye movements intact, conjunctivae normal  Neck: neck supple and non tender without mass   Pulmonary/Chest:upper airway congestion, otherwise clear. No respiratory distress on 2L NC  Cardiovascular: normal rate, normal S1 and S2 and no carotid bruits  Abdomen: soft, non-tender, non-distended, normal bowel sounds, no masses or organomegaly  Extremities: no cyanosis, no clubbing and no edema  Neurologic: cranial nerves intact, no focal deficit, speech normal but voice hoarse       Recent Labs     04/20/22  1458 04/20/22  2348 04/21/22  0538    143 144   K 3.4* 3.9 3.5    104 105   CO2 25 27 26   BUN 29* 31* 29*   CREATININE 0.7 0.7 0.7   GLUCOSE 177* 195* 185*   CALCIUM 9.3 9.4 9.4       Recent Labs     04/20/22  0422 04/20/22  2348 04/21/22  0538   ALKPHOS 153* 178* 160*   PROT 7.4 7.6 7.6   LABALBU 3.1* 3.2* 3.3*   BILITOT 0.5 0.3 0.3   AST 67* 146* 188*   * 252* 307*       Recent Labs     04/19/22  0436 04/20/22  0422 04/21/22  0538   WBC 25.0* 19.3* 12.6*   RBC 4.08 3.82 3.77*   HGB 11.0* 9.9* 10.0*   HCT 34.1* 31.7* 32.0*   MCV 83.6 83.0 84.9   MCH 27.0 25.9* 26.5   MCHC 32.3 31.2* 31.3*   RDW 19.0* 18.7* 19.1*    389 493*   MPV 9.0 8.9 9.3         Radiology:   XR CHEST ABDOMEN NG PLACEMENT   Final Result   Enteric tube terminates within the stomach.          FL MODIFIED BARIUM SWALLOW W VIDEO   Final Result   Swallowing dysfunction as described above including aspiration of nectar and   deep withdrawal with DTs (now resolved)  -Patient initially was not truthful with how much he drank, advised patient that was assessing for risk of alcohol withdrawal and still did not admit true amount of alcohol use  -Went into severe alcohol withdrawal with DTs and was eventually intubated for airway protection   -Now off all withdrawal medications, doing well  -Agreeable to peer recovery discussions and seems motivated to stop drinking    4. Hyponatremia (resolved)  -Sodium 123 on admission, was also 123 last month. Today 144  -Was given 1L NS bolus in ER and had about 24hrs of NS 100ml/hr. Now off IVF but on tube feedings  -Possibly chronic from beer potomania as is alcoholic     4. HTN  -Home meds HCTZ, lasix, losartan and verapamil  -BP on admission 127/85, this morning 127/74  -Continue home meds and monitor BP     5. HLD  -Continue statin     6. GERD  -Continue PPI     7. Tobacco use disorder  -Smokes 1/2-1PPD for 45 years  -Currently has nicotine patch  -Counseled on smoking cessation     8. Morbid obesity  -BMI 40, counseled on lifestyle changes for once discharged from hospital     NOTE: This report was transcribed using voice recognition software. Every effort was made to ensure accuracy; however, inadvertent computerized transcription errors may be present.      Electronically signed by Aaron Muniz MD on 4/21/2022 at 3:58 PM

## 2022-04-21 NOTE — PROGRESS NOTES
Speech Language Pathology      NAME:  Mandy Graves  :  1965  DATE: 2022  ROOM:  [place] [address]    Patient seen for dysphagia therapy x2 total 30 minutes and 15 minutes. Patient still with confusion and decreased attention to task. Patient does not recognize current deficits. Education completed again regarding dysphagia and degree of impairment present and potential for pulmonary compromise if PO intake is initiated too quickly. Patient able to complete dysphagia exercises but due to his cognitive deficit he needs frequent redirection to complete exercises accurately. Second session limited PO trials to determine if any PO items able to be initiated. Patient still with moist throat clear with PO trial of puree and with ice chips. Recommend continue NPO with NG and will continue with exercises. Discussed with patient that ice chips can not be initiated until his pulmonary physicians determine his lung function is at a point where he could tolerate the high probability of aspiration of trace amounts of water with oral bacteria. Discussed this with nursing also as patient is continually asking for ice chips and or sips of water  Will continue POC.       Hyponatremia [E87.1]  COPD exacerbation ([facility] [J44.1]  Acute chest pain [R07.9]  Chest pain, unspecified type [R07.9]  Chest pain [R07.9]    [id number]  dysphagia tx x2    Dave Treadwell MSCCC/SLP  Speech Language Pathologist  [phone]

## 2022-04-21 NOTE — CARE COORDINATION
4-21-Cm note: ( covid neg 4-10) pt remains in ICU, failed barium swallow yesterday, has NG tube for Meds and nutrition at this time, will follow for follow up swallow evals. liaison from  Peer recovery here now, pt is interested in  Getting help to quit drinking, CM/SS will follow for possible placement is [facility] vs inpatient ETOH rehab .  Electronically signed by Ben Fulton RN on 4/21/2022 at 12:03 PM

## 2022-04-22 ENCOUNTER — APPOINTMENT (OUTPATIENT)
Dept: GENERAL RADIOLOGY | Age: 57
DRG: 896 | End: 2022-04-22
Payer: MEDICARE

## 2022-04-22 LAB
MAGNESIUM: 2.2 MG/DL (ref 1.6–2.6)
METER GLUCOSE: 160 MG/DL (ref 74–99)
METER GLUCOSE: 169 MG/DL (ref 74–99)
METER GLUCOSE: 179 MG/DL (ref 74–99)
METER GLUCOSE: 181 MG/DL (ref 74–99)
PHOSPHORUS: 4.1 MG/DL (ref 2.5–4.5)

## 2022-04-22 PROCEDURE — 2580000003 HC RX 258: Performed by: STUDENT IN AN ORGANIZED HEALTH CARE EDUCATION/TRAINING PROGRAM

## 2022-04-22 PROCEDURE — 6370000000 HC RX 637 (ALT 250 FOR IP): Performed by: STUDENT IN AN ORGANIZED HEALTH CARE EDUCATION/TRAINING PROGRAM

## 2022-04-22 PROCEDURE — 97530 THERAPEUTIC ACTIVITIES: CPT | Performed by: PHYSICAL THERAPIST

## 2022-04-22 PROCEDURE — 94669 MECHANICAL CHEST WALL OSCILL: CPT

## 2022-04-22 PROCEDURE — 6370000000 HC RX 637 (ALT 250 FOR IP): Performed by: INTERNAL MEDICINE

## 2022-04-22 PROCEDURE — 36592 COLLECT BLOOD FROM PICC: CPT

## 2022-04-22 PROCEDURE — 2000000000 HC ICU R&B

## 2022-04-22 PROCEDURE — 74018 RADEX ABDOMEN 1 VIEW: CPT

## 2022-04-22 PROCEDURE — 92526 ORAL FUNCTION THERAPY: CPT

## 2022-04-22 PROCEDURE — 6360000002 HC RX W HCPCS: Performed by: STUDENT IN AN ORGANIZED HEALTH CARE EDUCATION/TRAINING PROGRAM

## 2022-04-22 PROCEDURE — 99233 SBSQ HOSP IP/OBS HIGH 50: CPT | Performed by: INTERNAL MEDICINE

## 2022-04-22 PROCEDURE — 2580000003 HC RX 258: Performed by: INTERNAL MEDICINE

## 2022-04-22 PROCEDURE — 6360000002 HC RX W HCPCS: Performed by: INTERNAL MEDICINE

## 2022-04-22 PROCEDURE — 6370000000 HC RX 637 (ALT 250 FOR IP)

## 2022-04-22 PROCEDURE — 82962 GLUCOSE BLOOD TEST: CPT

## 2022-04-22 PROCEDURE — 2700000000 HC OXYGEN THERAPY PER DAY

## 2022-04-22 PROCEDURE — 94640 AIRWAY INHALATION TREATMENT: CPT

## 2022-04-22 PROCEDURE — 99233 SBSQ HOSP IP/OBS HIGH 50: CPT | Performed by: STUDENT IN AN ORGANIZED HEALTH CARE EDUCATION/TRAINING PROGRAM

## 2022-04-22 PROCEDURE — 2500000003 HC RX 250 WO HCPCS: Performed by: INTERNAL MEDICINE

## 2022-04-22 RX ORDER — ISOSORBIDE DINITRATE 10 MG/1
10 TABLET ORAL 3 TIMES DAILY
Status: DISCONTINUED | OUTPATIENT
Start: 2022-04-22 | End: 2022-04-27 | Stop reason: HOSPADM

## 2022-04-22 RX ORDER — FUROSEMIDE 40 MG/1
40 TABLET ORAL 2 TIMES DAILY
Status: DISCONTINUED | OUTPATIENT
Start: 2022-04-22 | End: 2022-04-27 | Stop reason: HOSPADM

## 2022-04-22 RX ADMIN — MULTIPLE VITAMINS W/ MINERALS TAB 1 TABLET: TAB at 08:39

## 2022-04-22 RX ADMIN — METOPROLOL TARTRATE 75 MG: 25 TABLET, FILM COATED ORAL at 20:07

## 2022-04-22 RX ADMIN — ENOXAPARIN SODIUM 30 MG: 100 INJECTION SUBCUTANEOUS at 20:07

## 2022-04-22 RX ADMIN — METOPROLOL TARTRATE 50 MG: 50 TABLET, FILM COATED ORAL at 08:39

## 2022-04-22 RX ADMIN — INSULIN LISPRO 1 UNITS: 100 INJECTION, SOLUTION INTRAVENOUS; SUBCUTANEOUS at 20:06

## 2022-04-22 RX ADMIN — OXYCODONE 10 MG: 5 TABLET ORAL at 02:13

## 2022-04-22 RX ADMIN — THIAMINE HYDROCHLORIDE: 100 INJECTION, SOLUTION INTRAMUSCULAR; INTRAVENOUS at 10:27

## 2022-04-22 RX ADMIN — Medication 10 ML: at 08:42

## 2022-04-22 RX ADMIN — FUROSEMIDE 40 MG: 10 INJECTION, SOLUTION INTRAMUSCULAR; INTRAVENOUS at 08:40

## 2022-04-22 RX ADMIN — ISOSORBIDE DINITRATE 10 MG: 10 TABLET ORAL at 20:08

## 2022-04-22 RX ADMIN — IPRATROPIUM BROMIDE AND ALBUTEROL SULFATE 1 AMPULE: .5; 2.5 SOLUTION RESPIRATORY (INHALATION) at 09:21

## 2022-04-22 RX ADMIN — ANTI-FUNGAL POWDER MICONAZOLE NITRATE TALC FREE: 1.42 POWDER TOPICAL at 08:41

## 2022-04-22 RX ADMIN — OXYCODONE 10 MG: 5 TABLET ORAL at 06:26

## 2022-04-22 RX ADMIN — ISOSORBIDE DINITRATE 10 MG: 10 TABLET ORAL at 10:21

## 2022-04-22 RX ADMIN — INSULIN LISPRO 1 UNITS: 100 INJECTION, SOLUTION INTRAVENOUS; SUBCUTANEOUS at 07:22

## 2022-04-22 RX ADMIN — Medication 20 ML: at 20:08

## 2022-04-22 RX ADMIN — ANTI-FUNGAL POWDER MICONAZOLE NITRATE TALC FREE: 1.42 POWDER TOPICAL at 20:16

## 2022-04-22 RX ADMIN — OXYCODONE 10 MG: 5 TABLET ORAL at 14:00

## 2022-04-22 RX ADMIN — INSULIN LISPRO 1 UNITS: 100 INJECTION, SOLUTION INTRAVENOUS; SUBCUTANEOUS at 12:13

## 2022-04-22 RX ADMIN — IPRATROPIUM BROMIDE AND ALBUTEROL SULFATE 1 AMPULE: .5; 2.5 SOLUTION RESPIRATORY (INHALATION) at 14:46

## 2022-04-22 RX ADMIN — METOPROLOL TARTRATE 25 MG: 25 TABLET, FILM COATED ORAL at 10:21

## 2022-04-22 RX ADMIN — INSULIN LISPRO 1 UNITS: 100 INJECTION, SOLUTION INTRAVENOUS; SUBCUTANEOUS at 17:13

## 2022-04-22 RX ADMIN — Medication 10 ML: at 08:41

## 2022-04-22 RX ADMIN — ASPIRIN 81 MG 81 MG: 81 TABLET ORAL at 08:39

## 2022-04-22 RX ADMIN — ENOXAPARIN SODIUM 30 MG: 100 INJECTION SUBCUTANEOUS at 08:40

## 2022-04-22 RX ADMIN — ISOSORBIDE DINITRATE 10 MG: 10 TABLET ORAL at 14:02

## 2022-04-22 RX ADMIN — Medication 10 ML: at 20:16

## 2022-04-22 RX ADMIN — ROSUVASTATIN CALCIUM 10 MG: 10 TABLET, FILM COATED ORAL at 08:39

## 2022-04-22 RX ADMIN — IPRATROPIUM BROMIDE AND ALBUTEROL SULFATE 1 AMPULE: .5; 2.5 SOLUTION RESPIRATORY (INHALATION) at 01:24

## 2022-04-22 RX ADMIN — FUROSEMIDE 40 MG: 40 TABLET ORAL at 18:07

## 2022-04-22 RX ADMIN — IPRATROPIUM BROMIDE AND ALBUTEROL SULFATE 1 AMPULE: .5; 2.5 SOLUTION RESPIRATORY (INHALATION) at 05:08

## 2022-04-22 RX ADMIN — IPRATROPIUM BROMIDE AND ALBUTEROL SULFATE 1 AMPULE: .5; 2.5 SOLUTION RESPIRATORY (INHALATION) at 18:27

## 2022-04-22 RX ADMIN — IPRATROPIUM BROMIDE AND ALBUTEROL SULFATE 1 AMPULE: .5; 2.5 SOLUTION RESPIRATORY (INHALATION) at 22:05

## 2022-04-22 ASSESSMENT — PAIN SCALES - GENERAL
PAINLEVEL_OUTOF10: 1
PAINLEVEL_OUTOF10: 0
PAINLEVEL_OUTOF10: 7
PAINLEVEL_OUTOF10: 4
PAINLEVEL_OUTOF10: 7
PAINLEVEL_OUTOF10: 8

## 2022-04-22 ASSESSMENT — PAIN DESCRIPTION - DESCRIPTORS
DESCRIPTORS: ACHING
DESCRIPTORS: ACHING;THROBBING
DESCRIPTORS: ACHING;DISCOMFORT;THROBBING

## 2022-04-22 ASSESSMENT — PAIN DESCRIPTION - ORIENTATION
ORIENTATION: LOWER
ORIENTATION: MID
ORIENTATION: LOWER

## 2022-04-22 ASSESSMENT — PAIN DESCRIPTION - LOCATION
LOCATION: BACK

## 2022-04-22 ASSESSMENT — PAIN DESCRIPTION - ONSET
ONSET: ON-GOING
ONSET: ON-GOING

## 2022-04-22 ASSESSMENT — PAIN - FUNCTIONAL ASSESSMENT
PAIN_FUNCTIONAL_ASSESSMENT: PREVENTS OR INTERFERES WITH MANY ACTIVE NOT PASSIVE ACTIVITIES
PAIN_FUNCTIONAL_ASSESSMENT: PREVENTS OR INTERFERES SOME ACTIVE ACTIVITIES AND ADLS

## 2022-04-22 ASSESSMENT — PAIN DESCRIPTION - FREQUENCY
FREQUENCY: CONTINUOUS
FREQUENCY: CONTINUOUS

## 2022-04-22 ASSESSMENT — PAIN DESCRIPTION - PAIN TYPE
TYPE: CHRONIC PAIN
TYPE: CHRONIC PAIN

## 2022-04-22 NOTE — PLAN OF CARE
Problem: Pain:  Goal: Pain level will decrease  Description: Pain level will decrease  4/22/2022 0743 by Kamala Dolan RN  Outcome: Progressing     Problem: Pain:  Goal: Control of acute pain  Description: Control of acute pain  4/22/2022 0743 by Kamala Dolan RN  Outcome: Progressing     Problem: Pain:  Goal: Control of chronic pain  Description: Control of chronic pain  4/22/2022 0743 by Kamala Dolan RN  Outcome: Progressing     Problem: Discharge Planning:  Goal: Discharged to appropriate level of care  Description: Discharged to appropriate level of care  Outcome: Progressing     Problem: Pain:  Goal: Control of acute pain  Description: Control of acute pain  4/22/2022 0743 by Kamaal Dolan RN  Outcome: Progressing     Problem: Pain:  Goal: Control of chronic pain  Description: Control of chronic pain  4/22/2022 0743 by Kamala Dolan RN  Outcome: Progressing     Problem: Tobacco Use:  Goal: Will participate in inpatient tobacco-use cessation counseling  Description: Will participate in inpatient tobacco-use cessation counseling  Outcome: Progressing     Problem: Nutrition Deficit:  Goal: Ability to achieve adequate nutritional intake will improve  Description: Ability to achieve adequate nutritional intake will improve  Outcome: Progressing

## 2022-04-22 NOTE — PROGRESS NOTES
SPEECH LANGUAGE PATHOLOGY  DAILY PROGRESS NOTE        PATIENT NAME:  Angel Edwards      :  1965          TODAY'S DATE:  2022 ROOM:  [place] [address]    Patient seen at bedside for skilled dysphagia tx. Patient received trials of ice chips, honey thick liquids via tsp, and puree textures via tsp. Patient oral stage exhibited mild oral stasis, fair bolus formation;manipulation, and fair AP tongue propulsion. Patient pharyngeal stage exhibited delayed throat clear after ice chips, honey thick trials, and puree trials, w/ cough x 1 noted after puree. Patient exhibits good follow through w/ effortful swl and completed x5. SLP educated patient on continued recommendation for NPO at this time and to cont w/ therapeutic exercises to increase oropharyngeal strength. Patient verbalized understanding and agreement. SLP to cont w/ POC.        CPT code(s) 36160  dysphagia tx  Total minutes :  30 minutes

## 2022-04-22 NOTE — PROGRESS NOTES
Physical Therapy    Physical Therapy Treatment Note/Plan of Care    Room #:  PE42/ZJ91-50  Patient Name: Ivan Calderon  YOB: 1965  MRN: [id number]    Date of Service: 4/22/2022     Tentative placement recommendation: Inpatient Rehab  Equipment recommendation: To be determined      Evaluating Physical Therapist: Gladis Urena  #[id number]      Specific Provider Orders/Date/Referring Provider :  04/18/22 1430   PT eval and treat Start: 04/18/22 1430, End: 04/18/22 1430, ONE TIME, Standing Count: 1 Occurrences, Bob Sparrow MD      Admitting Diagnosis:   Hyponatremia [E87.1]  COPD exacerbation ([facility] [address] [J44.1]  Acute chest pain [R07.9]  Chest pain, unspecified type [R07.9]  Chest pain [R07.9]    Admitted with    shortness of breath and chest pain  Intubated 4/10-4/18  Surgery: none  Visit Diagnoses       Codes    COPD exacerbation ([facility] [address]    -  Primary J44.1    Hyponatremia     E87.1    Alcohol withdrawal delirium ([facility] [address]     F10.231          Patient Active Problem List   Diagnosis    Biceps rupture, proximal    Shoulder pain    Pectoral muscle rupture    Biceps tendonitis    Shoulder impingement    Rotator cuff tear    Acromioclavicular joint arthritis    Acute chest pain    Chest pain        ASSESSMENT of Current Deficits Patient exhibits decreased strength, balance, endurance, range of motion and coordination impairing functional mobility, tolerance to activity and participation along with cognitive im pairments are barriers to d/c and require skilled intervention during hospital stay to attain pre hospital level of function. Decreased strength, balance and endurance  increases patient's risk for fall. Patient able to tolerate chair position exercises in bed today. Patient on a decreased amount of oxygen today in comparison to yesterday. Patient's PO2 fluctuated between 86% and 94% throughout session. Difficulty following commands and attending to task.  Pt required less assistance during todays session but was still weak and unsteady with decreased endurance for function. Pt side stepped along the bed with Jhonatan and a [employer] and required seated rest breaks after 10-15 side steps. PHYSICAL THERAPY  PLAN OF CARE       Physical therapy plan of care is established based on physician order,  patient diagnosis and clinical assessment    Current Treatment Recommendations:    -Bed Mobility: Lower extremity exercises , Upper extremity exercises  and Trunk control activities   -Sitting Balance: Incorporate reaching activities to activate trunk muscles , Hands on support to maintain midline  and Facilitate postural control in all planes   -Transfers: Provide instruction on proper hand and foot position for adequate transfer of weight onto lower extremities and use of gait device if needed and Cues for hand placement, technique and safety. Provide stabilization to prevent fall   -Endurance: Utilize Supervised activities to increase level of endurance to allow for safe functional mobility including transfers and gait     PT long term treatment goals are located in below grid    Patient and or family understand(s) diagnosis, prognosis, and plan of care. Frequency of treatments: Patient will be seen  3-5 times/week. Prior Level of Function: Patient ambulated independently and driving  Rehab Potential: good   For long term goals    Past medical history:   Past Medical History:   Diagnosis Date    Alcohol abuse     Arthritis     Back pain, chronic     Hyperlipidemia     Hypertension     Hyponatremia     Neck pain, chronic     Tobacco abuse      Past Surgical History:   Procedure Laterality Date    EXTERNAL EAR SURGERY      i & d left ear.  EYE SURGERY      Lt. Eye  Dart removed   [address] [employer]    HEMORRHOID SURGERY      SHOULDER ARTHROSCOPY Right 1/16/2015    right shoulder bicep tenodesis subacromail decompression and debridement SUBJECTIVE:    Precautions: Bedrest with bathroom Privileges , falls, alarm and O2 , heated hi andrzej 60L/m, 85%   ciwa  Social history: Patient lives with grandson in a mobile home      Equipment owned: unknown,       AM-PAC Basic Mobility        AM-[facility] Mobility Inpatient   How much difficulty turning over in bed?: A Little  How much difficulty sitting down on / standing up from a chair with arms?: A Little  How much difficulty moving from lying on back to sitting on side of bed?: A Little  How much help from another person moving to and from a bed to a chair?: A Little  How much help from another person needed to walk in hospital room?: A Lot  How much help from another person for climbing 3-5 steps with a railing?: A Lot  AM-PAC Inpatient Mobility Raw Score : 16  AM-PAC Inpatient T-Scale Score : 40.78  Mobility Inpatient CMS 0-100% Score: 54.16  Mobility Inpatient CMS G-Code Modifier : CK    Nursing cleared patient for PT treatment. .   OBJECTIVE;   Initial Evaluation  Date: 4/19/2022 Treatment Date:  4/22/2022     Short Term/ Long Term   Goals   Was pt agreeable to Eval/treatment?  Yes yes To be met in 5 days   Pain level   0/10    0/10    Bed Mobility    Rolling: Maximal assist of  2    Supine to sit: Not assessed     Sit to supine: Not assessed     Scooting: Not assessed    Rolling: Supervision    Supine to sit: Supervision    Sit to supine: Supervision    Scooting: Supervision   Rolling: Minimal assist of 1    Supine to sit: Minimal assist of 1    Sit to supine: Minimal assist of 1    Scooting: Minimal assist of 1     Transfers Sit to stand: Not assessed    Sit to stand: Minimal assist of 1    Sit to stand: Minimal assist of 1     Ambulation    not assessed  3 x 10-15 side steps using  wheeled walker with Minimal assist of 1     15  feet using  wheeled walker with Moderate assist of 1    ROM Within functional limits    Increase range of motion 10% of affected joints    Strength BUE:  refer to OT eval  RLE: 3-/5  LLE:  3-/5  Poor coordination and motor contorl with volitional movement  Increase strength in affected mm groups by 1/3 grade   Balance Sitting EOB:  not assessed    Dynamic Standing:  not assessed   Sitting EOB: fair   Dynamic Standing: fair-  Sitting EOB:  fair    Dynamic Standing: fair with least restrictive device      Patient is Alert & Oriented x person and follows one step directions  With repetition and hands on cuing  Sensation:  Patient  denies numbness/tingling   Edema:  no   Endurance: fair -      Vitals: 2 liters nasal cannula   Blood Pressure at rest  Blood Pressure during session    Heart Rate at rest 95 Heart Rate during session    SPO2 at rest 93%  SPO2 during session 86-93%     Patient education  Patient educated on role of Physical Therapy, risks of immobility, safety and plan of care,  importance of mobility while in hospital  and positioning for skin integrity and comfort     Patient response to education:   Pt verbalized understanding Pt demonstrated skill Pt requires further education in this area   No No Yes      Treatment:  Patient practiced and was instructed/facilitated in the following treatment: Patient performed bed mobility, transfers, side stepping along bed, sat EOB, instructed PLB paced breathing. Therapeutic Exercises:  not performed and elbow flexion/extension     At end of session, patient in bed with alarm call light and phone within reach,  all lines and tubes intact, nursing notified. Patient would benefit from continued skilled Physical Therapy to improve functional independence and quality of life.          Patient's/ family goals   none stated    Time in  1506  Time out  1535  Total Treatment Time  29 minutes      CPT codes:    Therapeutic activities (82628)   29 minutes  2 unit(s)  Corinne Ott, PT License Number [id number]

## 2022-04-22 NOTE — CARE COORDINATION
4-22-UPDATE: [facility] cannot accept pt due to ETOH/Marijuana, pt MAD, will not give another choice at this time. CM/SS will follow.  4-22-Cm note: ( covid neg 4-10) pt is on 2l nc , has NG for tube feeding, pt very aggitated today because they did not repeat his swallow evaluation, pt has PRS following him for needs at dc , also pt will need PT/OT on case to determine dc needs, pt was given a list of SNF'S , the only choice he would give at this time is [facility] in [place], referral made, no beds expected till beginning of the week. Will await decision. Pt will need PRECERT,  Signed Ranjana COLLADO . CM/SS will follow .  Electronically signed by Nolan Rene RN on 4/22/2022 at 2:24 PM

## 2022-04-22 NOTE — PLAN OF CARE
Problem: Pain:  Goal: Pain level will decrease  Description: Pain level will decrease  Outcome: Progressing  Goal: Control of acute pain  Description: Control of acute pain  Outcome: Progressing  Goal: Control of chronic pain  Description: Control of chronic pain  Outcome: Progressing     Problem: Cardiac Output - Decreased:  Goal: Hemodynamic stability will improve  Description: Hemodynamic stability will improve  Outcome: Completed     Problem: Serum Glucose Level - Abnormal:  Goal: Ability to maintain appropriate glucose levels will improve  Description: Ability to maintain appropriate glucose levels will improve  Outcome: Completed     Problem: Pain:  Goal: Pain level will decrease  Description: Pain level will decrease  Outcome: Progressing  Goal: Control of acute pain  Description: Control of acute pain  Outcome: Progressing  Goal: Control of chronic pain  Description: Control of chronic pain  Outcome: Progressing     Problem: Tissue Perfusion - Cardiopulmonary, Altered:  Goal: Absence of angina  Description: Absence of angina  Outcome: Completed  Goal: Circulatory function within specified parameters  Description: Circulatory function within specified parameters  Outcome: Completed  Goal: Hemodynamic stability will improve  Description: Hemodynamic stability will improve  Outcome: Completed     Problem: Fluid Volume - Deficit:  Goal: Absence of fluid volume deficit signs and symptoms  Description: Absence of fluid volume deficit signs and symptoms  Outcome: Completed     Problem: Sleep Pattern Disturbance:  Goal: Appears well-rested  Description: Appears well-rested  Outcome: Completed     Problem: Violence - Risk of, Self/Other-Directed:  Goal: Knowledge of developmental care interventions  Description: Absence of violence  Outcome: Completed     Problem: Falls - Risk of:  Goal: Will remain free from falls  Description: Will remain free from falls  Outcome: Progressing  Goal: Absence of physical injury  Description: Absence of physical injury  Outcome: Progressing     Problem: Skin Integrity:  Goal: Will show no infection signs and symptoms  Description: Will show no infection signs and symptoms  Outcome: Progressing  Goal: Absence of new skin breakdown  Description: Absence of new skin breakdown  Outcome: Progressing     Problem: ABCDS Injury Assessment  Goal: Absence of physical injury  Outcome: Completed

## 2022-04-22 NOTE — PROGRESS NOTES
[address] [place], [place] [address]ist   Progress Note    Admitting Date and Time: 4/9/2022  4:02 PM  Admit Dx: Hyponatremia [E87.1]  COPD exacerbation ([address] [J44.1]  Acute chest pain [R07.9]  Chest pain, unspecified type [R07.9]  Chest pain [R07.9]    Subjective:    Pt reports that he is feeling ok but still very congested and has some back pain, part of which is chronic. He is also very frustrated that he is still in the hospital and not able to [address] Per RN: Nothing to report. Spoke with Dr. Bry Powell regarding case today and patient can be transferred out of ICU from her POV. ROS: Patient denies CP, palpitations, fever/chills, SOB.      metoprolol tartrate  75 mg Per NG tube BID    isosorbide dinitrate  10 mg Per NG tube TID    furosemide  40 mg Per NG tube BID    miconazole   Topical BID    lidocaine  1 patch TransDERmal Daily    aspirin  81 mg Per NG tube Daily    rosuvastatin  10 mg Per NG tube Daily    thiamine and folic acid IVPB   IntraVENous Daily    ipratropium-albuterol  1 ampule Inhalation Q4H    insulin lispro  0-6 Units SubCUTAneous TID WC    insulin lispro  0-3 Units SubCUTAneous Nightly    sodium chloride flush  5-40 mL IntraVENous 2 times per day    multivitamin  1 tablet Oral Daily    [Held by provider] losartan  50 mg Oral Daily    sodium chloride flush  5-40 mL IntraVENous 2 times per day    enoxaparin  30 mg SubCUTAneous BID    nicotine  1 patch TransDERmal Daily     oxyCODONE, 5 mg, Q4H PRN  oxyCODONE, 10 mg, Q4H PRN  acetaminophen, 650 mg, Q6H PRN   Or  acetaminophen, 650 mg, Q6H PRN  glycopyrrolate, 1 mg, TID PRN  dextrose bolus (hypoglycemia), 125 mL, PRN   Or  dextrose bolus (hypoglycemia), 250 mL, PRN  glucose, 15 g, PRN  glucagon (rDNA), 1 mg, PRN  dextrose, 100 mL/hr, PRN  hydrALAZINE, 10 mg, Q6H PRN  sodium chloride flush, 5-40 mL, PRN  sodium chloride, , PRN  sodium chloride flush, 5-40 mL, PRN  sodium chloride, , PRN  ondansetron, 4 mg, Q8H PRN Or  ondansetron, 4 mg, Q6H PRN  polyethylene glycol, 17 g, Daily PRN         Objective:    BP (!) 148/94   Pulse 118   Temp 99.5 °F (37.5 °C) (Oral)   Resp 21   Ht 6' (1.829 m)   Wt 257 lb (116.6 kg)   SpO2 92%   BMI 34.86 kg/m²        General Appearance: alert and oriented x3. No acute distress  Skin: warm, slightly diaphoretic  Head: normocephalic and atraumatic  Eyes: pupils equal, round, and reactive to light, extraocular eye movements intact, conjunctivae normal  Neck: neck supple and non tender without mass   Pulmonary/Chest:upper airway congestion, otherwise clear. No respiratory distress on 2L NC  Cardiovascular: normal rate, normal S1 and S2 and no carotid bruits  Abdomen: soft, non-tender, non-distended, normal bowel sounds, no masses or organomegaly  Extremities: no cyanosis, no clubbing and no edema  Neurologic: cranial nerves intact, no focal deficit, speech normal but voice hoarse       Recent Labs     04/20/22  2348 04/21/22  0538 04/21/22 1952    144 145   K 3.9 3.5 3.7    105 104   CO2 27 26 28   BUN 31* 29* 27*   CREATININE 0.7 0.7 0.8   GLUCOSE 195* 185* 160*   CALCIUM 9.4 9.4 9.8       Recent Labs     04/20/22  2348 04/21/22  0538 04/21/22 1952   ALKPHOS 178* 160* 175*   PROT 7.6 7.6 8.1   LABALBU 3.2* 3.3* 3.4*   BILITOT 0.3 0.3 0.3   * 188* 237*   * 307* 437*       Recent Labs     04/20/22  0422 04/21/22  0538   WBC 19.3* 12.6*   RBC 3.82 3.77*   HGB 9.9* 10.0*   HCT 31.7* 32.0*   MCV 83.0 84.9   MCH 25.9* 26.5   MCHC 31.2* 31.3*   RDW 18.7* 19.1*    493*   MPV 8.9 9.3         Radiology:   XR CHEST ABDOMEN NG PLACEMENT   Final Result   Enteric tube terminates within the stomach. FL MODIFIED BARIUM SWALLOW W VIDEO   Final Result   Swallowing dysfunction as described above including aspiration of nectar and   deep penetration of honey consistency.       Please see separate speech pathology report for full discussion of findings   and recommendations. XR CHEST PORTABLE   Final Result   Post endotracheal nasogastric extubation. Bilateral atelectasis and or   infiltrate with slight interval progression in the right mid lung. XR CHEST PORTABLE   Final Result   New atelectasis and or infiltrate at the right mid lung with stable   atelectasis and or infiltrate at the right base. XR CHEST PORTABLE   Final Result   New atelectasis and or infiltrate at the right base. XR ABDOMEN FOR NG/OG/NE TUBE PLACEMENT   Final Result   Nasogastric tube with the tip in the proximal gastric body. XR CHEST PORTABLE   Final Result   Adequately positioned endotracheal tube, orogastric tube and right internal   jugular central venous line. XR CHEST PORTABLE   Final Result   No pneumonia or pleural effusion. XR ABDOMEN FOR NG/OG/NE TUBE PLACEMENT    (Results Pending)       Assessment:  Principal Problem:    Acute chest pain  Active Problems:    Chest pain  Resolved Problems:    * No resolved hospital problems. *      Plan:  1. Dysphagia  -Failed multiple swallow studies after extubation and had NG tube in place and is on tube feedings  -Will continue working with speech therapy    2. Chest pain, rule out ACS  -Chest pain/pressure with SOB and diaphoresis and nausea  -Troponin negative x 2, EKG no ST changes  -Patient has SYL score 3, HEART score 5   -Multiple risk factors include HTN, HLD, obesity, smoker, family history (brother)  -Cardio consulted, planning for cath initially however with alcohol withdrawal and intubation previously was put on hold. Plan to do cath when patient more medically stable  -Monitor on telemetry     2. Possible COPD exacerbation  -No formal diagnosis of COPD but was given albuterol PRN by PCP  -Wheezing improved  -Given 125mg solumedrol in ER and had two days of 40mg BID, now stopped per ICU  -Nebulizers      3.  Severe alcohol withdrawal with DTs (now resolved)  -Patient initially was not truthful with how much he drank, advised patient that was assessing for risk of alcohol withdrawal and still did not admit true amount of alcohol use  -Went into severe alcohol withdrawal with DTs and was eventually intubated for airway protection   -Now off all withdrawal medications, doing well  -Agreeable to peer recovery discussions and seems motivated to stop drinking    4. Hyponatremia (resolved)  -Sodium 123 on admission, was also 123 last month. Today 145  -Was given 1L NS bolus in ER and had about 24hrs of NS 100ml/hr. Now off IVF but on tube feedings  -Possibly chronic from beer potomania as is alcoholic     4. HTN  -Home meds HCTZ, lasix, losartan and verapamil  -BP on admission 127/85, this morning 148/94  -Continue home meds and monitor BP     5. HLD  -Continue statin     6. GERD  -Continue PPI     7. Tobacco use disorder  -Smokes 1/2-1PPD for 45 years  -Currently has nicotine patch  -Counseled on smoking cessation     8. Morbid obesity  -BMI 40, counseled on lifestyle changes for once discharged from hospital     NOTE: This report was transcribed using voice recognition software. Every effort was made to ensure accuracy; however, inadvertent computerized transcription errors may be present.      Electronically signed by Steven Barton MD on 4/22/2022 at 4:40 PM

## 2022-04-22 NOTE — PROGRESS NOTES
[facility]) Physicians        CARDIOLOGY                 INPATIENT PROGRESS NOTE          PATIENT SEEN IN FOLLOW UP FOR: Chest pain, CHF    Hospital Day: [address] [place] [address] is a 64year old patient known to Dr. Valeriy Valle from initial consult      SUBJECTIVE: Apparently NG tube came out. Potentially may be transferred to the telemetry at some point over the weekend. We will discuss invasive coronary angiogram on Sunday if there are no acute issues and potentially plan for Monday if liver function has improved     Echo 4/11/22   Summary   Technically sub-optimal images - Definity Echo contrast used. Normal left ventricular chamber size. Normal left ventricular systolic function, LVEF 85-71%. Mild left ventricular concentric hypertrophy noted. Normal diastolic function. Interatrial septum not well visualized but appears intact. Normal right ventricle size and function, TAPSE 2.3cm. Valves not well visualized. No hemodynamically significant aortic stenosis. Normal aortic root size. No evidence of pericardial effusion. No intra cardiac mass or thrombus. No comparison study available. Intake/Output Summary (Last 24 hours) at 4/22/2022 1828  Last data filed at 4/22/2022 1700  Gross per 24 hour   Intake 216 ml   Output 2500 ml   Net -2284 ml       Labs:   CBC:   Recent Labs     04/20/22  0422 04/21/22  0538   WBC 19.3* 12.6*   HGB 9.9* 10.0*   HCT 31.7* 32.0*    493*     BMP:   Recent Labs     04/21/22  0538 04/21/22 1952    145   K 3.5 3.7   CO2 26 28   BUN 29* 27*   CREATININE 0.7 0.8   LABGLOM >60 >60   CALCIUM 9.4 9.8     Mag:   Recent Labs     04/21/22  1352 04/21/22 1952   MG 2.2 2.2     Phos:   Recent Labs     04/21/22  1352 04/21/22 1952   PHOS 3.9 4.1     TSH:   No results for input(s): TSH in the last 72 hours. HgA1c:     BNP: No results for input(s): BNP in the last 72 hours.   PT/INR: No results for input(s): PROTIME, INR in the last 72 hours.  APTT:No results for input(s): APTT in the last 72 hours. CARDIAC ENZYMES:No results for input(s): CKTOTAL, CKMB, CKMBINDEX, TROPONINI in the last 72 hours. FASTING LIPID PANEL:  Lab Results   Component Value Date    CHOL 213 2015    HDL 41 2015    LDLCALC - 2015    TRIG 169 2022     LIVER PROFILE:  Recent Labs     22  0538 22   * 237*   * 437*   LABALBU 3.3* 3.4*       Current Inpatient Medications:   metoprolol tartrate  75 mg Per NG tube BID    isosorbide dinitrate  10 mg Per NG tube TID    furosemide  40 mg Per NG tube BID    miconazole   Topical BID    lidocaine  1 patch TransDERmal Daily    aspirin  81 mg Per NG tube Daily    rosuvastatin  10 mg Per NG tube Daily    thiamine and folic acid IVPB   IntraVENous Daily    ipratropium-albuterol  1 ampule Inhalation Q4H    insulin lispro  0-6 Units SubCUTAneous TID WC    insulin lispro  0-3 Units SubCUTAneous Nightly    sodium chloride flush  5-40 mL IntraVENous 2 times per day    multivitamin  1 tablet Oral Daily    [Held by provider] losartan  50 mg Oral Daily    sodium chloride flush  5-40 mL IntraVENous 2 times per day    enoxaparin  30 mg SubCUTAneous BID    nicotine  1 patch TransDERmal Daily       IV Infusions (if any):   dextrose      sodium chloride Stopped (22 2100)    sodium chloride Stopped (22 0306)         PHYSICAL EXAM:     CONSTITUTIONAL:   BP (!) 150/96   Pulse 120   Temp 99.5 °F (37.5 °C) (Oral)   Resp 21   Ht 6' (1.829 m)   Wt 257 lb (116.6 kg)   SpO2 93%   BMI 34.86 kg/m²   Pulse  Av.8  Min: 85  Max: 311  Systolic (36YYL), FNY:698 , Min:122 , BZX:251    Diastolic (44UQE), XWV:77, Min:75, Max:114    In general, this is a well developed, well nourished who appears stated age. Pt on vent and sedated. HEENT: Pt on vent and sedated. Neck-  no stridor, no noted enlargement of the thyroid, no carotid bruit.  no jugular venous distention

## 2022-04-22 NOTE — PROGRESS NOTES
NG Tube came out during physical therapy. Replaced with 16 fr NG tube without difficulty. Waiting for x-ray.

## 2022-04-22 NOTE — PROGRESS NOTES
CRITICAL CARE PROGRESS NOTE    The patient's case was discussed in multidisciplinary rounds including critical care specialist, nursing, RT and pharmacy. His evaluation is as follows:     ETOH withdrawal with delirium tremens - Resolved  --Extubated on 4/19, on 2L NC  --Other medications for DTs include gabapentin and clonidine, reduce them slowly  --Continue Thiamine and folic acid, banana bag    Acute hypoxemic respiratory failure due to staphylococcal pneumonia (MSSA)  --Completed antibiotics  --On 2L NC    Chest pain R/O ACS  --Followed by cardiology  --Plan for heart cath when improved  --Continue  beta blocker and imdur, ASA and ARB  --Not on statin as he is unable to take oral route. If he continues with dysphagia, then will insert NG     Chronic diastolic heart failure  --Continue afterload reduction as stated above     COPD exacerbation  --Continue aerosolized bronchodilators including duoneb and pulmicort     Hyponatremia, chronic possible due to chronic beer intake  --Lasix 40 mg BID with goal of -1L to -2L   --Daily BMP     HTN  --ARB on hold, continue BB, clonidine and hydralazine     HLD  --Continue rosuivastatin     GERD  --On protonix     Tobacco use disorder  --Continue nicotine patch     Morbid obesity  -Will need counseling on lifestyle modifications    Prolonged QTc  --Avoid medications that prolong the QT    Dysphagia  --NG inserted      DVT prophylaxis with enoxaparin   Nutrition: Tube feedings restarted  Vascular catheters: TLC needed for vasopressor support, will remove  Urinary catheter needed for strict input/output    GI prophylaxis with PPI  Physical therapy once awake  Goals of care: Full code    The patient can transfer to telemetry. Due to clinical improvement, we will not follow daily but PRN  Please call intensivist on call if the patient's condition were to worsen.    Thank you for allowing us to participate in the care of Mr Pat Dueñas      BP (!) 152/114   Pulse 113   Temp 99.2 °F (37.3 °C) (Bladder)   Resp 24   Ht 6' (1.829 m)   Wt 257 lb (116.6 kg)   SpO2 91%   BMI 34.86 kg/m²   General: Awake ,oriented to place, time and person  HEENT: No head lesions, PERRL, EOMI, mouth without lesions, no nasal lesions, no cervical adenopathy palpated  Respiratory: Lungs with equal breath sounds bilaterally, no adventitious sounds auscultated, no accessory muscle use  CV: Regular rate, no murmurs, no JVD, no leg edema  Abdomen: Soft, non tender, + bowel sounds, no lesions  Skin: Hydrated, adequate turgor, no rash, capillary refill <2 seconds  Extremities: Muscular strength 4/5 in 4 limbs, moves 4 limbs spontaneously, distal pulses present  Neurology: Awake and alert, follows commands, moves 4 limbs on command and spontaneously, neck is supple, no meningitic signs present. Last 3 CMP:    Recent Labs     04/20/22  2348 04/21/22  0538 04/21/22 1952    144 145   K 3.9 3.5 3.7    105 104   CO2 27 26 28   BUN 31* 29* 27*   CREATININE 0.7 0.7 0.8   GLUCOSE 195* 185* 160*   CALCIUM 9.4 9.4 9.8   PROT 7.6 7.6 8.1   LABALBU 3.2* 3.3* 3.4*   BILITOT 0.3 0.3 0.3   ALKPHOS 178* 160* 175*   * 188* 237*   * 307* 437*     Recent Labs     04/21/22  0538   WBC 12.6*   RBC 3.77*   HGB 10.0*   HCT 32.0*   MCV 84.9   MCH 26.5   MCHC 31.3*   RDW 19.1*   *   MPV 9.3       No results for input(s): BC in the last 72 hours. No results for input(s): Lori Reading in the last 72 hours.     24 HR INTAKE/OUTPUT:      Intake/Output Summary (Last 24 hours) at 4/22/2022 1004  Last data filed at 4/22/2022 0700  Gross per 24 hour   Intake 200 ml   Output 3000 ml   Net -2800 ml     MEDICATIONS:   miconazole   Topical BID    lidocaine  1 patch TransDERmal Daily    aspirin  81 mg Per NG tube Daily    metoprolol tartrate  50 mg Per NG tube BID    rosuvastatin  10 mg Per NG tube Daily    thiamine and folic acid IVPB   IntraVENous Daily    furosemide  40 mg IntraVENous BID    [Held by provider] metoprolol tartrate  75 mg Oral BID    [Held by provider] metoprolol tartrate  25 mg Oral Once    ipratropium-albuterol  1 ampule Inhalation Q4H    [Held by provider] gabapentin  400 mg Oral TID    insulin lispro  0-6 Units SubCUTAneous TID WC    insulin lispro  0-3 Units SubCUTAneous Nightly    sodium chloride flush  5-40 mL IntraVENous 2 times per day    multivitamin  1 tablet Oral Daily    [Held by provider] losartan  50 mg Oral Daily    sodium chloride flush  5-40 mL IntraVENous 2 times per day    enoxaparin  30 mg SubCUTAneous BID    nicotine  1 patch TransDERmal Daily      dextrose      sodium chloride Stopped (04/20/22 2100)    sodium chloride Stopped (04/16/22 0306)     oxyCODONE, oxyCODONE, acetaminophen **OR** acetaminophen, glycopyrrolate, dextrose bolus (hypoglycemia) **OR** dextrose bolus (hypoglycemia), glucose, glucagon (rDNA), dextrose, hydrALAZINE, sodium chloride flush, sodium chloride, sodium chloride flush, sodium chloride, ondansetron **OR** ondansetron, polyethylene glycol    OBJECTIVE:  Vitals:    04/22/22 0900   BP: (!) 152/114   Pulse: 113   Resp: 24   Temp:    SpO2: 91%     FiO2 : 55 %  O2 Flow Rate (L/min): 2 L/min  O2 Device: Nasal cannula        LABS:  WBC   Date Value Ref Range Status   04/21/2022 12.6 (H) 4.5 - 11.5 E9/L Final   04/20/2022 19.3 (H) 4.5 - 11.5 E9/L Final   04/19/2022 25.0 (H) 4.5 - 11.5 E9/L Final     Hemoglobin   Date Value Ref Range Status   04/21/2022 10.0 (L) 12.5 - 16.5 g/dL Final   04/20/2022 9.9 (L) 12.5 - 16.5 g/dL Final   04/19/2022 11.0 (L) 12.5 - 16.5 g/dL Final     Hematocrit   Date Value Ref Range Status   04/21/2022 32.0 (L) 37.0 - 54.0 % Final   04/20/2022 31.7 (L) 37.0 - 54.0 % Final   04/19/2022 34.1 (L) 37.0 - 54.0 % Final     MCV   Date Value Ref Range Status   04/21/2022 84.9 80.0 - 99.9 fL Final   04/20/2022 83.0 80.0 - 99.9 fL Final   04/19/2022 83.6 80.0 - 99.9 fL Final     Platelets   Date Value Ref Range Status 04/21/2022 493 (H) 130 - 450 E9/L Final   04/20/2022 389 130 - 450 E9/L Final   04/19/2022 334 130 - 450 E9/L Final     Sodium   Date Value Ref Range Status   04/21/2022 145 132 - 146 mmol/L Final   04/21/2022 144 132 - 146 mmol/L Final   04/20/2022 143 132 - 146 mmol/L Final     Potassium   Date Value Ref Range Status   04/21/2022 3.7 3.5 - 5.0 mmol/L Final   04/21/2022 3.5 3.5 - 5.0 mmol/L Final   04/20/2022 3.9 3.5 - 5.0 mmol/L Final     Potassium reflex Magnesium   Date Value Ref Range Status   04/11/2022 4.5 3.5 - 5.0 mmol/L Final   04/10/2022 4.5 3.5 - 5.0 mmol/L Final   04/09/2022 4.3 3.5 - 5.0 mmol/L Final     Chloride   Date Value Ref Range Status   04/21/2022 104 98 - 107 mmol/L Final   04/21/2022 105 98 - 107 mmol/L Final   04/20/2022 104 98 - 107 mmol/L Final     CO2   Date Value Ref Range Status   04/21/2022 28 22 - 29 mmol/L Final   04/21/2022 26 22 - 29 mmol/L Final   04/20/2022 27 22 - 29 mmol/L Final     BUN   Date Value Ref Range Status   04/21/2022 27 (H) 6 - 20 mg/dL Final   04/21/2022 29 (H) 6 - 20 mg/dL Final   04/20/2022 31 (H) 6 - 20 mg/dL Final     CREATININE   Date Value Ref Range Status   04/21/2022 0.8 0.7 - 1.2 mg/dL Final   04/21/2022 0.7 0.7 - 1.2 mg/dL Final   04/20/2022 0.7 0.7 - 1.2 mg/dL Final     Glucose   Date Value Ref Range Status   04/21/2022 160 (H) 74 - 99 mg/dL Final   04/21/2022 185 (H) 74 - 99 mg/dL Final   04/20/2022 195 (H) 74 - 99 mg/dL Final     Calcium   Date Value Ref Range Status   04/21/2022 9.8 8.6 - 10.2 mg/dL Final   04/21/2022 9.4 8.6 - 10.2 mg/dL Final   04/20/2022 9.4 8.6 - 10.2 mg/dL Final     Total Protein   Date Value Ref Range Status   04/21/2022 8.1 6.4 - 8.3 g/dL Final   04/21/2022 7.6 6.4 - 8.3 g/dL Final   04/20/2022 7.6 6.4 - 8.3 g/dL Final     Albumin   Date Value Ref Range Status   04/21/2022 3.4 (L) 3.5 - 5.2 g/dL Final   04/21/2022 3.3 (L) 3.5 - 5.2 g/dL Final   04/20/2022 3.2 (L) 3.5 - 5.2 g/dL Final   10/13/2010 4.8 3.2 - 4.8 g/dL Final Total Bilirubin   Date Value Ref Range Status   04/21/2022 0.3 0.0 - 1.2 mg/dL Final   04/21/2022 0.3 0.0 - 1.2 mg/dL Final   04/20/2022 0.3 0.0 - 1.2 mg/dL Final     Alkaline Phosphatase   Date Value Ref Range Status   04/21/2022 175 (H) 40 - 129 U/L Final   04/21/2022 160 (H) 40 - 129 U/L Final   04/20/2022 178 (H) 40 - 129 U/L Final     AST   Date Value Ref Range Status   04/21/2022 237 (H) 0 - 39 U/L Final   04/21/2022 188 (H) 0 - 39 U/L Final   04/20/2022 146 (H) 0 - 39 U/L Final     ALT   Date Value Ref Range Status   04/21/2022 437 (H) 0 - 40 U/L Final   04/21/2022 307 (H) 0 - 40 U/L Final   04/20/2022 252 (H) 0 - 40 U/L Final     GFR Non-   Date Value Ref Range Status   04/21/2022 >60 >=60 mL/min/1.73 Final     Comment:     Chronic Kidney Disease: less than 60 ml/min/1.73 sq.m. Kidney Failure: less than 15 ml/min/1.73 sq.m. Results valid for patients 18 years and older. 04/21/2022 >60 >=60 mL/min/1.73 Final     Comment:     Chronic Kidney Disease: less than 60 ml/min/1.73 sq.m. Kidney Failure: less than 15 ml/min/1.73 sq.m. Results valid for patients 18 years and older. 04/20/2022 >60 >=60 mL/min/1.73 Final     Comment:     Chronic Kidney Disease: less than 60 ml/min/1.73 sq.m. Kidney Failure: less than 15 ml/min/1.73 sq.m. Results valid for patients 18 years and older. GFR    Date Value Ref Range Status   04/21/2022 >60  Final   04/21/2022 >60  Final   04/20/2022 >60  Final     Magnesium   Date Value Ref Range Status   04/21/2022 2.2 1.6 - 2.6 mg/dL Final   04/21/2022 2.2 1.6 - 2.6 mg/dL Final   04/20/2022 2.3 1.6 - 2.6 mg/dL Final     Phosphorus   Date Value Ref Range Status   04/21/2022 4.1 2.5 - 4.5 mg/dL Final   04/21/2022 3.9 2.5 - 4.5 mg/dL Final   04/20/2022 4.4 2.5 - 4.5 mg/dL Final     No results for input(s): PH, PO2, PCO2, HCO3, BE, O2SAT in the last 72 hours.     RADIOLOGY:  XR CHEST ABDOMEN NG PLACEMENT   Final Result   Enteric tube terminates within the stomach. FL MODIFIED BARIUM SWALLOW W VIDEO   Final Result   Swallowing dysfunction as described above including aspiration of nectar and   deep penetration of honey consistency. Please see separate speech pathology report for full discussion of findings   and recommendations. XR CHEST PORTABLE   Final Result   Post endotracheal nasogastric extubation. Bilateral atelectasis and or   infiltrate with slight interval progression in the right mid lung. XR CHEST PORTABLE   Final Result   New atelectasis and or infiltrate at the right mid lung with stable   atelectasis and or infiltrate at the right base. XR CHEST PORTABLE   Final Result   New atelectasis and or infiltrate at the right base. XR ABDOMEN FOR NG/OG/NE TUBE PLACEMENT   Final Result   Nasogastric tube with the tip in the proximal gastric body. XR CHEST PORTABLE   Final Result   Adequately positioned endotracheal tube, orogastric tube and right internal   jugular central venous line. XR CHEST PORTABLE   Final Result   No pneumonia or pleural effusion. PROBLEM LIST:  Principal Problem:    Acute chest pain  Active Problems:    Chest pain  Resolved Problems:    * No resolved hospital problems. *      ATTESTATION:  ICU Staff Physician note of personal involvement in Care  As the attending physician, I certify that I personally reviewed the patients history and personnally examined the patient to confirm the physical findings described above,  And that I reviewed the relevant imaging studies and available reports. I also discussed the differential diagnosis and all of the proposed management plans with the patient and individuals accompanying the patient to this visit. They had the opportunity to ask questions about the proposed management plans and to have those questions answered.      This patient has a high probability of sudden, clinically significant deterioration, which requires the highest level of physician preparedness to intervene urgently. I managed/supervised life or organ supporting interventions that required frequent physician assessment. I devoted my full attention to the direct care of this patient for the amount of time indicated below. Time I spent with the family or surrogate(s) is included only if the patient was incapable of providing the necessary information or participating in medical decisions - Time devoted to teaching and to any procedures I billed separately is not included.      CRITICAL CARE TIME:  30 minutes    Brenda Guerra MD  Pulmonary and Critical Care Medicine

## 2022-04-23 ENCOUNTER — APPOINTMENT (OUTPATIENT)
Dept: ULTRASOUND IMAGING | Age: 57
DRG: 896 | End: 2022-04-23
Payer: MEDICARE

## 2022-04-23 LAB
ALBUMIN SERPL-MCNC: 3.2 G/DL (ref 3.5–5.2)
ALBUMIN SERPL-MCNC: 3.5 G/DL (ref 3.5–5.2)
ALP BLD-CCNC: 168 U/L (ref 40–129)
ALP BLD-CCNC: 182 U/L (ref 40–129)
ALT SERPL-CCNC: 483 U/L (ref 0–40)
ALT SERPL-CCNC: 493 U/L (ref 0–40)
ANION GAP SERPL CALCULATED.3IONS-SCNC: 13 MMOL/L (ref 7–16)
ANION GAP SERPL CALCULATED.3IONS-SCNC: 13 MMOL/L (ref 7–16)
AST SERPL-CCNC: 196 U/L (ref 0–39)
AST SERPL-CCNC: 237 U/L (ref 0–39)
BASOPHILS ABSOLUTE: 0.08 E9/L (ref 0–0.2)
BASOPHILS RELATIVE PERCENT: 0.7 % (ref 0–2)
BILIRUB SERPL-MCNC: 0.3 MG/DL (ref 0–1.2)
BILIRUB SERPL-MCNC: 0.4 MG/DL (ref 0–1.2)
BUN BLDV-MCNC: 30 MG/DL (ref 6–20)
BUN BLDV-MCNC: 31 MG/DL (ref 6–20)
CALCIUM SERPL-MCNC: 10.1 MG/DL (ref 8.6–10.2)
CALCIUM SERPL-MCNC: 9.6 MG/DL (ref 8.6–10.2)
CHLORIDE BLD-SCNC: 100 MMOL/L (ref 98–107)
CHLORIDE BLD-SCNC: 103 MMOL/L (ref 98–107)
CO2: 27 MMOL/L (ref 22–29)
CO2: 28 MMOL/L (ref 22–29)
CREAT SERPL-MCNC: 0.7 MG/DL (ref 0.7–1.2)
CREAT SERPL-MCNC: 0.8 MG/DL (ref 0.7–1.2)
EOSINOPHILS ABSOLUTE: 0.37 E9/L (ref 0.05–0.5)
EOSINOPHILS RELATIVE PERCENT: 3 % (ref 0–6)
GFR AFRICAN AMERICAN: >60
GFR AFRICAN AMERICAN: >60
GFR NON-AFRICAN AMERICAN: >60 ML/MIN/1.73
GFR NON-AFRICAN AMERICAN: >60 ML/MIN/1.73
GLUCOSE BLD-MCNC: 166 MG/DL (ref 74–99)
GLUCOSE BLD-MCNC: 196 MG/DL (ref 74–99)
HCT VFR BLD CALC: 34.4 % (ref 37–54)
HEMOGLOBIN: 10.9 G/DL (ref 12.5–16.5)
IMMATURE GRANULOCYTES #: 0.32 E9/L
IMMATURE GRANULOCYTES %: 2.6 % (ref 0–5)
LYMPHOCYTES ABSOLUTE: 1.22 E9/L (ref 1.5–4)
LYMPHOCYTES RELATIVE PERCENT: 10 % (ref 20–42)
MAGNESIUM: 2.4 MG/DL (ref 1.6–2.6)
MAGNESIUM: 2.5 MG/DL (ref 1.6–2.6)
MCH RBC QN AUTO: 26.6 PG (ref 26–35)
MCHC RBC AUTO-ENTMCNC: 31.7 % (ref 32–34.5)
MCV RBC AUTO: 83.9 FL (ref 80–99.9)
METER GLUCOSE: 148 MG/DL (ref 74–99)
METER GLUCOSE: 192 MG/DL (ref 74–99)
METER GLUCOSE: 200 MG/DL (ref 74–99)
METER GLUCOSE: 211 MG/DL (ref 74–99)
METER GLUCOSE: 247 MG/DL (ref 74–99)
MONOCYTES ABSOLUTE: 1.12 E9/L (ref 0.1–0.95)
MONOCYTES RELATIVE PERCENT: 9.1 % (ref 2–12)
NEUTROPHILS ABSOLUTE: 9.14 E9/L (ref 1.8–7.3)
NEUTROPHILS RELATIVE PERCENT: 74.6 % (ref 43–80)
PDW BLD-RTO: 19.3 FL (ref 11.5–15)
PHOSPHORUS: 4 MG/DL (ref 2.5–4.5)
PHOSPHORUS: 4.4 MG/DL (ref 2.5–4.5)
PLATELET # BLD: 609 E9/L (ref 130–450)
PMV BLD AUTO: 9.8 FL (ref 7–12)
POTASSIUM SERPL-SCNC: 3.5 MMOL/L (ref 3.5–5)
POTASSIUM SERPL-SCNC: 3.9 MMOL/L (ref 3.5–5)
RBC # BLD: 4.1 E12/L (ref 3.8–5.8)
SODIUM BLD-SCNC: 140 MMOL/L (ref 132–146)
SODIUM BLD-SCNC: 144 MMOL/L (ref 132–146)
TOTAL PROTEIN: 8.2 G/DL (ref 6.4–8.3)
TOTAL PROTEIN: 8.5 G/DL (ref 6.4–8.3)
WBC # BLD: 12.3 E9/L (ref 4.5–11.5)

## 2022-04-23 PROCEDURE — 82962 GLUCOSE BLOOD TEST: CPT

## 2022-04-23 PROCEDURE — 85025 COMPLETE CBC W/AUTO DIFF WBC: CPT

## 2022-04-23 PROCEDURE — 99232 SBSQ HOSP IP/OBS MODERATE 35: CPT | Performed by: STUDENT IN AN ORGANIZED HEALTH CARE EDUCATION/TRAINING PROGRAM

## 2022-04-23 PROCEDURE — 2700000000 HC OXYGEN THERAPY PER DAY

## 2022-04-23 PROCEDURE — 6370000000 HC RX 637 (ALT 250 FOR IP): Performed by: INTERNAL MEDICINE

## 2022-04-23 PROCEDURE — 6360000002 HC RX W HCPCS: Performed by: INTERNAL MEDICINE

## 2022-04-23 PROCEDURE — 6360000002 HC RX W HCPCS: Performed by: STUDENT IN AN ORGANIZED HEALTH CARE EDUCATION/TRAINING PROGRAM

## 2022-04-23 PROCEDURE — 80053 COMPREHEN METABOLIC PANEL: CPT

## 2022-04-23 PROCEDURE — 83735 ASSAY OF MAGNESIUM: CPT

## 2022-04-23 PROCEDURE — 76705 ECHO EXAM OF ABDOMEN: CPT

## 2022-04-23 PROCEDURE — 1200000000 HC SEMI PRIVATE

## 2022-04-23 PROCEDURE — 94640 AIRWAY INHALATION TREATMENT: CPT

## 2022-04-23 PROCEDURE — 36415 COLL VENOUS BLD VENIPUNCTURE: CPT

## 2022-04-23 PROCEDURE — 2580000003 HC RX 258: Performed by: STUDENT IN AN ORGANIZED HEALTH CARE EDUCATION/TRAINING PROGRAM

## 2022-04-23 PROCEDURE — 6370000000 HC RX 637 (ALT 250 FOR IP)

## 2022-04-23 PROCEDURE — 6370000000 HC RX 637 (ALT 250 FOR IP): Performed by: STUDENT IN AN ORGANIZED HEALTH CARE EDUCATION/TRAINING PROGRAM

## 2022-04-23 PROCEDURE — 2580000003 HC RX 258: Performed by: INTERNAL MEDICINE

## 2022-04-23 PROCEDURE — 84100 ASSAY OF PHOSPHORUS: CPT

## 2022-04-23 PROCEDURE — 2500000003 HC RX 250 WO HCPCS: Performed by: INTERNAL MEDICINE

## 2022-04-23 RX ORDER — MORPHINE SULFATE 2 MG/ML
2 INJECTION, SOLUTION INTRAMUSCULAR; INTRAVENOUS ONCE
Status: COMPLETED | OUTPATIENT
Start: 2022-04-23 | End: 2022-04-23

## 2022-04-23 RX ORDER — ENOXAPARIN SODIUM 100 MG/ML
30 INJECTION SUBCUTANEOUS 2 TIMES DAILY
Status: DISCONTINUED | OUTPATIENT
Start: 2022-04-23 | End: 2022-04-27 | Stop reason: HOSPADM

## 2022-04-23 RX ADMIN — ANTI-FUNGAL POWDER MICONAZOLE NITRATE TALC FREE: 1.42 POWDER TOPICAL at 09:20

## 2022-04-23 RX ADMIN — METOPROLOL TARTRATE 75 MG: 25 TABLET, FILM COATED ORAL at 20:53

## 2022-04-23 RX ADMIN — OXYCODONE 10 MG: 5 TABLET ORAL at 18:14

## 2022-04-23 RX ADMIN — IPRATROPIUM BROMIDE AND ALBUTEROL SULFATE 1 AMPULE: .5; 2.5 SOLUTION RESPIRATORY (INHALATION) at 14:24

## 2022-04-23 RX ADMIN — INSULIN LISPRO 2 UNITS: 100 INJECTION, SOLUTION INTRAVENOUS; SUBCUTANEOUS at 09:18

## 2022-04-23 RX ADMIN — ISOSORBIDE DINITRATE 10 MG: 10 TABLET ORAL at 20:53

## 2022-04-23 RX ADMIN — IPRATROPIUM BROMIDE AND ALBUTEROL SULFATE 1 AMPULE: .5; 2.5 SOLUTION RESPIRATORY (INHALATION) at 02:15

## 2022-04-23 RX ADMIN — INSULIN LISPRO 1 UNITS: 100 INJECTION, SOLUTION INTRAVENOUS; SUBCUTANEOUS at 21:01

## 2022-04-23 RX ADMIN — Medication 10 ML: at 21:13

## 2022-04-23 RX ADMIN — ENOXAPARIN SODIUM 30 MG: 100 INJECTION SUBCUTANEOUS at 09:15

## 2022-04-23 RX ADMIN — ISOSORBIDE DINITRATE 10 MG: 10 TABLET ORAL at 14:17

## 2022-04-23 RX ADMIN — MULTIPLE VITAMINS W/ MINERALS TAB 1 TABLET: TAB at 09:17

## 2022-04-23 RX ADMIN — THIAMINE HYDROCHLORIDE: 100 INJECTION, SOLUTION INTRAMUSCULAR; INTRAVENOUS at 11:35

## 2022-04-23 RX ADMIN — FUROSEMIDE 40 MG: 40 TABLET ORAL at 16:29

## 2022-04-23 RX ADMIN — IPRATROPIUM BROMIDE AND ALBUTEROL SULFATE 1 AMPULE: .5; 2.5 SOLUTION RESPIRATORY (INHALATION) at 21:59

## 2022-04-23 RX ADMIN — INSULIN LISPRO 2 UNITS: 100 INJECTION, SOLUTION INTRAVENOUS; SUBCUTANEOUS at 16:29

## 2022-04-23 RX ADMIN — ANTI-FUNGAL POWDER MICONAZOLE NITRATE TALC FREE: 1.42 POWDER TOPICAL at 21:57

## 2022-04-23 RX ADMIN — METOPROLOL TARTRATE 75 MG: 25 TABLET, FILM COATED ORAL at 09:16

## 2022-04-23 RX ADMIN — IPRATROPIUM BROMIDE AND ALBUTEROL SULFATE 1 AMPULE: .5; 2.5 SOLUTION RESPIRATORY (INHALATION) at 18:41

## 2022-04-23 RX ADMIN — OXYCODONE 10 MG: 5 TABLET ORAL at 09:16

## 2022-04-23 RX ADMIN — Medication 10 ML: at 09:18

## 2022-04-23 RX ADMIN — ENOXAPARIN SODIUM 30 MG: 100 INJECTION SUBCUTANEOUS at 21:01

## 2022-04-23 RX ADMIN — MORPHINE SULFATE 2 MG: 2 INJECTION, SOLUTION INTRAMUSCULAR; INTRAVENOUS at 21:56

## 2022-04-23 RX ADMIN — ISOSORBIDE DINITRATE 10 MG: 10 TABLET ORAL at 09:17

## 2022-04-23 RX ADMIN — IPRATROPIUM BROMIDE AND ALBUTEROL SULFATE 1 AMPULE: .5; 2.5 SOLUTION RESPIRATORY (INHALATION) at 07:00

## 2022-04-23 RX ADMIN — OXYCODONE 10 MG: 5 TABLET ORAL at 14:22

## 2022-04-23 RX ADMIN — ASPIRIN 81 MG 81 MG: 81 TABLET ORAL at 09:17

## 2022-04-23 RX ADMIN — FUROSEMIDE 40 MG: 40 TABLET ORAL at 09:17

## 2022-04-23 ASSESSMENT — PULMONARY FUNCTION TESTS: PEFR_L/MIN: 14

## 2022-04-23 ASSESSMENT — PAIN DESCRIPTION - LOCATION
LOCATION: BACK

## 2022-04-23 ASSESSMENT — PAIN SCALES - GENERAL
PAINLEVEL_OUTOF10: 6
PAINLEVEL_OUTOF10: 2
PAINLEVEL_OUTOF10: 2
PAINLEVEL_OUTOF10: 9
PAINLEVEL_OUTOF10: 10
PAINLEVEL_OUTOF10: 9
PAINLEVEL_OUTOF10: 9
PAINLEVEL_OUTOF10: 8

## 2022-04-23 ASSESSMENT — PAIN DESCRIPTION - DESCRIPTORS: DESCRIPTORS: PRESSURE;SHARP

## 2022-04-23 ASSESSMENT — PAIN DESCRIPTION - PAIN TYPE: TYPE: CHRONIC PAIN

## 2022-04-23 ASSESSMENT — PAIN DESCRIPTION - FREQUENCY: FREQUENCY: CONTINUOUS

## 2022-04-23 ASSESSMENT — PAIN DESCRIPTION - ONSET: ONSET: ON-GOING

## 2022-04-23 NOTE — PROGRESS NOTES
[address] [place], [place] [address]   Progress Note    Admitting Date and Time: 4/9/2022  4:02 PM  Admit Dx: Hyponatremia [E87.1]  COPD exacerbation ([address] [J44.1]  Acute chest pain [R07.9]  Chest pain, unspecified type [R07.9]  Chest pain [R07.9]    Subjective:    Pt reports that he is feeling ok but still very frustrated with still having NGT and still being in the hospital. No new complaints today. Per RN: Patient complaining of feeling dehydrated      ROS: Patient denies CP, palpitations, fever/chills, SOB.      enoxaparin  30 mg SubCUTAneous BID    metoprolol tartrate  75 mg Per NG tube BID    isosorbide dinitrate  10 mg Per NG tube TID    furosemide  40 mg Per NG tube BID    miconazole   Topical BID    lidocaine  1 patch TransDERmal Daily    aspirin  81 mg Per NG tube Daily    rosuvastatin  10 mg Per NG tube Daily    thiamine and folic acid IVPB   IntraVENous Daily    ipratropium-albuterol  1 ampule Inhalation Q4H    insulin lispro  0-6 Units SubCUTAneous TID WC    insulin lispro  0-3 Units SubCUTAneous Nightly    sodium chloride flush  5-40 mL IntraVENous 2 times per day    multivitamin  1 tablet Oral Daily    [Held by provider] losartan  50 mg Oral Daily    sodium chloride flush  5-40 mL IntraVENous 2 times per day    nicotine  1 patch TransDERmal Daily     oxyCODONE, 5 mg, Q4H PRN  oxyCODONE, 10 mg, Q4H PRN  acetaminophen, 650 mg, Q6H PRN   Or  acetaminophen, 650 mg, Q6H PRN  glycopyrrolate, 1 mg, TID PRN  dextrose bolus (hypoglycemia), 125 mL, PRN   Or  dextrose bolus (hypoglycemia), 250 mL, PRN  glucose, 15 g, PRN  glucagon (rDNA), 1 mg, PRN  dextrose, 100 mL/hr, PRN  hydrALAZINE, 10 mg, Q6H PRN  sodium chloride flush, 5-40 mL, PRN  sodium chloride, , PRN  sodium chloride flush, 5-40 mL, PRN  sodium chloride, , PRN  ondansetron, 4 mg, Q8H PRN   Or  ondansetron, 4 mg, Q6H PRN  polyethylene glycol, 17 g, Daily PRN         Objective:    /74   Pulse 93   Temp 97.8 °F (36.6 °C) (Oral)   Resp 16   Ht 6' (1.829 m)   Wt 257 lb (116.6 kg)   SpO2 98%   BMI 34.86 kg/m²        General Appearance: alert and oriented x3. No acute distress  Skin: warm, slightly diaphoretic  Head: normocephalic and atraumatic  Eyes: pupils equal, round, and reactive to light, extraocular eye movements intact, conjunctivae normal  Neck: neck supple and non tender without mass   Pulmonary/Chest:upper airway congestion, otherwise clear. No respiratory distress on 2L NC  Cardiovascular: normal rate, normal S1 and S2 and no carotid bruits  Abdomen: soft, non-tender, non-distended, normal bowel sounds, no masses or organomegaly  Extremities: no cyanosis, no clubbing and no edema  Neurologic: cranial nerves intact, no focal deficit, speech normal but voice hoarse       Recent Labs     04/21/22 0538 04/21/22 1952 04/23/22  0554    145 144   K 3.5 3.7 3.5    104 103   CO2 26 28 28   BUN 29* 27* 31*   CREATININE 0.7 0.8 0.7   GLUCOSE 185* 160* 196*   CALCIUM 9.4 9.8 10.1       Recent Labs     04/21/22  0538 04/21/22 1952 04/23/22  0554   ALKPHOS 160* 175* 182*   PROT 7.6 8.1 8.5*   LABALBU 3.3* 3.4* 3.5   BILITOT 0.3 0.3 0.4   * 237* 237*   * 437* 493*       Recent Labs     04/21/22  0538 04/23/22  0554   WBC 12.6* 12.3*   RBC 3.77* 4.10   HGB 10.0* 10.9*   HCT 32.0* 34.4*   MCV 84.9 83.9   MCH 26.5 26.6   MCHC 31.3* 31.7*   RDW 19.1* 19.3*   * 609*   MPV 9.3 9.8         Radiology:   US LIVER   Final Result   Limited evaluation due to patient body habitus. No acute sonographic findings. XR ABDOMEN FOR NG/OG/NE TUBE PLACEMENT   Final Result   Satisfactory position of the enteric tube. No change from prior exam.         XR CHEST ABDOMEN NG PLACEMENT   Final Result   Enteric tube terminates within the stomach.          FL MODIFIED BARIUM SWALLOW W VIDEO   Final Result   Swallowing dysfunction as described above including aspiration of nectar and   deep penetration of honey consistency. Please see separate speech pathology report for full discussion of findings   and recommendations. XR CHEST PORTABLE   Final Result   Post endotracheal nasogastric extubation. Bilateral atelectasis and or   infiltrate with slight interval progression in the right mid lung. XR CHEST PORTABLE   Final Result   New atelectasis and or infiltrate at the right mid lung with stable   atelectasis and or infiltrate at the right base. XR CHEST PORTABLE   Final Result   New atelectasis and or infiltrate at the right base. XR ABDOMEN FOR NG/OG/NE TUBE PLACEMENT   Final Result   Nasogastric tube with the tip in the proximal gastric body. XR CHEST PORTABLE   Final Result   Adequately positioned endotracheal tube, orogastric tube and right internal   jugular central venous line. XR CHEST PORTABLE   Final Result   No pneumonia or pleural effusion. Assessment:  Principal Problem:    Acute chest pain  Active Problems:    Chest pain  Resolved Problems:    * No resolved hospital problems. *      Plan:  1. Dysphagia  -Failed multiple swallow studies after extubation and had NG tube in place and is on tube feedings  -Will continue working with speech therapy    2. Chest pain, rule out ACS  -Chest pain/pressure with SOB and diaphoresis and nausea  -Troponin negative x 2, EKG no ST changes  -Patient has SYL score 3, HEART score 5   -Multiple risk factors include HTN, HLD, obesity, smoker, family history (brother)  -Cardio consulted, planning for cath initially however with alcohol withdrawal and intubation previously was put on hold. Plan to do cath when patient more medically stable, tentative plan now for Monday  -Monitor on telemetry     2. Possible COPD exacerbation  -No formal diagnosis of COPD but was given albuterol PRN by PCP  -Wheezing improved  -Off steroids now  -Nebulizers      3.  Severe alcohol withdrawal with DTs (now resolved)  -Patient initially was not truthful with how much he drank, advised patient that was assessing for risk of alcohol withdrawal and still did not admit true amount of alcohol use  -Went into severe alcohol withdrawal with DTs and was eventually intubated for airway protection   -Now off all withdrawal medications, doing well  -Agreeable to peer recovery discussions and seems motivated to stop drinking    4. Hyponatremia (resolved)  -Sodium 123 on admission, was also 123 last month. Today 144  -Was given 1L NS bolus in ER and had about 24hrs of NS 100ml/hr. Now off IVF but on tube feedings with FWF 100ml Q6H  -Possibly chronic from beer potomania as is alcoholic     4. Transaminitis  -LFTs have been trending up during admission  -Was on depakote for alcohol withdrawal previously, now stopped  -Patient also alcoholic  -Liver US checked and was poor study but did not show any acute abnormality   -Monitor LFTs daily    5. HTN  -Home meds HCTZ, lasix, losartan and verapamil  -BP on admission 127/85, this morning 127/74  -Continue home meds and monitor BP     5. HLD  -Continue statin     6. GERD  -Continue PPI     7. Tobacco use disorder  -Smokes 1/2-1PPD for 45 years  -Currently has nicotine patch  -Counseled on smoking cessation     8. Morbid obesity  -BMI 40, counseled on lifestyle changes for once discharged from hospital     NOTE: This report was transcribed using voice recognition software. Every effort was made to ensure accuracy; however, inadvertent computerized transcription errors may be present.      Electronically signed by Juan Ramon He MD on 4/23/2022 at 1:01 PM

## 2022-04-23 NOTE — PROGRESS NOTES
Pulmonary/Critical Care Progress Note    Assessment/Plan:    ETOH withdrawal with delirium tremens - Resolved  --Extubated on 4/19, on 2L NC  --Continue multivitamin,banana bag.     Acute hypoxemic respiratory failure due to staphylococcal pneumonia (MSSA)  --Completed antibiotics  --On 2L NC     Chest pain R/O ACS  --Followed by cardiology  --Plan for heart cath on Monday  --Continue  beta blocker and imdur, ASA and ARB  -- Hold statins due to elevated AST/ALT.     Acute hepatitis  --AST/ALT/ALP continue trending up since 04/18  --Patient has been on hepatotoxic agents such as Depacon recently. --All the hepatic toxic agent discontinued, will also hold statins and the Tylenol for now. -- Liver ultrasound. Chronic diastolic heart failure  --Continue afterload reduction as stated above     COPD exacerbation  --Continue aerosolized bronchodilators including duoneb.     Hyponatremia, chronic possible due to chronic beer intake  -- Resolved     HTN  --ARB on hold, continue BB, Imdur     HLD  -- Hold statins due to elevated AST and ALT.     GERD  --On protonix     Tobacco use disorder  --Continue nicotine patch     Morbid obesity  -Will need counseling on lifestyle modifications     Prolonged QTc  --Avoid medications that prolong the QT     Dysphagia  --NG inserted, tube feeding started, water flush increased in order to prevent dehydration.     DVT prophylaxis with enoxaparin   Nutrition: Tube feedings restarted  Vascular catheters: PIV  GI prophylaxis with PPI  Goals of care: Full code    Case discussed with Dr. Shakir Rodriguez. SUBJECTIVE:  Patient seen and examined on the floor. He is alert and oriented x3, a little bit anxious, fine tremors noted on hands, tachycardia with heart rate about 110. He is on 2 L oxygen, good oxygen saturation, copious pulmonary secretions has resolved. Patient stated he is determined to be sober. Overnight, patient has no fever, chills, night sweats.   He is on tube feeding. MEDICATIONS:   enoxaparin  30 mg SubCUTAneous BID    metoprolol tartrate  75 mg Per NG tube BID    isosorbide dinitrate  10 mg Per NG tube TID    furosemide  40 mg Per NG tube BID    miconazole   Topical BID    lidocaine  1 patch TransDERmal Daily    aspirin  81 mg Per NG tube Daily    [Held by provider] rosuvastatin  10 mg Per NG tube Daily    thiamine and folic acid IVPB   IntraVENous Daily    ipratropium-albuterol  1 ampule Inhalation Q4H    insulin lispro  0-6 Units SubCUTAneous TID WC    insulin lispro  0-3 Units SubCUTAneous Nightly    sodium chloride flush  5-40 mL IntraVENous 2 times per day    multivitamin  1 tablet Oral Daily    [Held by provider] losartan  50 mg Oral Daily    sodium chloride flush  5-40 mL IntraVENous 2 times per day    nicotine  1 patch TransDERmal Daily      dextrose      sodium chloride Stopped (04/20/22 2100)    sodium chloride Stopped (04/16/22 0306)     oxyCODONE, oxyCODONE, [Held by provider] acetaminophen **OR** [Held by provider] acetaminophen, glycopyrrolate, dextrose bolus (hypoglycemia) **OR** dextrose bolus (hypoglycemia), glucose, glucagon (rDNA), dextrose, hydrALAZINE, sodium chloride flush, sodium chloride, sodium chloride flush, sodium chloride, ondansetron **OR** ondansetron, polyethylene glycol      REVIEW OF SYSTEMS:  Constitutional: Denies fever, weight loss, night sweats, and fatigue  Skin: Denies pigmentation, dark lesions, and rashes   HEENT: Denies hearing loss, tinnitus, ear drainage, epistaxis, sore throat, and hoarseness. Cardiovascular: Denies palpitations, chest pain, and chest pressure. Respiratory: Denies cough, dyspnea at rest, hemoptysis, apnea, and choking.   Gastrointestinal: Denies nausea, vomiting, poor appetite, diarrhea, heartburn or reflux  Genitourinary: Denies dysuria, frequency, urgency or hematuria  Musculoskeletal: Denies myalgias, muscle weakness, and bone pain  Neurological: Denies dizziness, vertigo, headache, and focal weakness  Psychological: Denies anxiety and depression  Endocrine: Denies heat intolerance and cold intolerance  Hematopoietic/Lymphatic: Denies bleeding problems and blood transfusions    OBJECTIVE:  Vitals:    04/23/22 0746   BP: (!) 164/88   Pulse: 112   Resp: 16   Temp: 98 °F (36.7 °C)   SpO2: 95%     FiO2 : 55 %  O2 Flow Rate (L/min): 2 L/min  O2 Device: Nasal cannula    PHYSICAL EXAM:  General: Awake ,oriented to place, time and person  HEENT: No head lesions, PERRL, EOMI, mouth without lesions, no nasal lesions, no cervical adenopathy palpated  Respiratory: Lungs with equal breath sounds bilaterally, no adventitious sounds auscultated, no accessory muscle use  CV: Regular rate, no murmurs, no JVD, no leg edema  Abdomen: Soft, non tender, + bowel sounds, no lesions  Skin: Hydrated, adequate turgor, no rash, capillary refill <2 seconds  Extremities: Muscular strength 4/5 in 4 limbs, moves 4 limbs spontaneously, distal pulses present  Neurology: Awake and alert, follows commands, moves 4 limbs on command and spontaneously, neck is supple, no meningitic signs present.     LABS:  WBC   Date Value Ref Range Status   04/23/2022 12.3 (H) 4.5 - 11.5 E9/L Final   04/21/2022 12.6 (H) 4.5 - 11.5 E9/L Final   04/20/2022 19.3 (H) 4.5 - 11.5 E9/L Final     Hemoglobin   Date Value Ref Range Status   04/23/2022 10.9 (L) 12.5 - 16.5 g/dL Final   04/21/2022 10.0 (L) 12.5 - 16.5 g/dL Final   04/20/2022 9.9 (L) 12.5 - 16.5 g/dL Final     Hematocrit   Date Value Ref Range Status   04/23/2022 34.4 (L) 37.0 - 54.0 % Final   04/21/2022 32.0 (L) 37.0 - 54.0 % Final   04/20/2022 31.7 (L) 37.0 - 54.0 % Final     MCV   Date Value Ref Range Status   04/23/2022 83.9 80.0 - 99.9 fL Final   04/21/2022 84.9 80.0 - 99.9 fL Final   04/20/2022 83.0 80.0 - 99.9 fL Final     Platelets   Date Value Ref Range Status   04/23/2022 609 (H) 130 - 450 E9/L Final   04/21/2022 493 (H) 130 - 450 E9/L Final   04/20/2022 389 130 - 450 E9/L Final     Sodium   Date Value Ref Range Status   04/23/2022 144 132 - 146 mmol/L Final   04/21/2022 145 132 - 146 mmol/L Final   04/21/2022 144 132 - 146 mmol/L Final     Potassium   Date Value Ref Range Status   04/23/2022 3.5 3.5 - 5.0 mmol/L Final   04/21/2022 3.7 3.5 - 5.0 mmol/L Final   04/21/2022 3.5 3.5 - 5.0 mmol/L Final     Potassium reflex Magnesium   Date Value Ref Range Status   04/11/2022 4.5 3.5 - 5.0 mmol/L Final   04/10/2022 4.5 3.5 - 5.0 mmol/L Final   04/09/2022 4.3 3.5 - 5.0 mmol/L Final     Chloride   Date Value Ref Range Status   04/23/2022 103 98 - 107 mmol/L Final   04/21/2022 104 98 - 107 mmol/L Final   04/21/2022 105 98 - 107 mmol/L Final     CO2   Date Value Ref Range Status   04/23/2022 28 22 - 29 mmol/L Final   04/21/2022 28 22 - 29 mmol/L Final   04/21/2022 26 22 - 29 mmol/L Final     BUN   Date Value Ref Range Status   04/23/2022 31 (H) 6 - 20 mg/dL Final   04/21/2022 27 (H) 6 - 20 mg/dL Final   04/21/2022 29 (H) 6 - 20 mg/dL Final     CREATININE   Date Value Ref Range Status   04/23/2022 0.7 0.7 - 1.2 mg/dL Final   04/21/2022 0.8 0.7 - 1.2 mg/dL Final   04/21/2022 0.7 0.7 - 1.2 mg/dL Final     Glucose   Date Value Ref Range Status   04/23/2022 196 (H) 74 - 99 mg/dL Final   04/21/2022 160 (H) 74 - 99 mg/dL Final   04/21/2022 185 (H) 74 - 99 mg/dL Final     Calcium   Date Value Ref Range Status   04/23/2022 10.1 8.6 - 10.2 mg/dL Final   04/21/2022 9.8 8.6 - 10.2 mg/dL Final   04/21/2022 9.4 8.6 - 10.2 mg/dL Final     Total Protein   Date Value Ref Range Status   04/23/2022 8.5 (H) 6.4 - 8.3 g/dL Final   04/21/2022 8.1 6.4 - 8.3 g/dL Final   04/21/2022 7.6 6.4 - 8.3 g/dL Final     Albumin   Date Value Ref Range Status   04/23/2022 3.5 3.5 - 5.2 g/dL Final   04/21/2022 3.4 (L) 3.5 - 5.2 g/dL Final   04/21/2022 3.3 (L) 3.5 - 5.2 g/dL Final   10/13/2010 4.8 3.2 - 4.8 g/dL Final     Total Bilirubin   Date Value Ref Range Status   04/23/2022 0.4 0.0 - 1.2 mg/dL Final   04/21/2022 0.3 0.0 - 1.2 mg/dL Final   04/21/2022 0.3 0.0 - 1.2 mg/dL Final     Alkaline Phosphatase   Date Value Ref Range Status   04/23/2022 182 (H) 40 - 129 U/L Final   04/21/2022 175 (H) 40 - 129 U/L Final   04/21/2022 160 (H) 40 - 129 U/L Final     AST   Date Value Ref Range Status   04/23/2022 237 (H) 0 - 39 U/L Final   04/21/2022 237 (H) 0 - 39 U/L Final   04/21/2022 188 (H) 0 - 39 U/L Final     ALT   Date Value Ref Range Status   04/23/2022 493 (H) 0 - 40 U/L Final   04/21/2022 437 (H) 0 - 40 U/L Final   04/21/2022 307 (H) 0 - 40 U/L Final     GFR Non-   Date Value Ref Range Status   04/23/2022 >60 >=60 mL/min/1.73 Final     Comment:     Chronic Kidney Disease: less than 60 ml/min/1.73 sq.m. Kidney Failure: less than 15 ml/min/1.73 sq.m. Results valid for patients 18 years and older. 04/21/2022 >60 >=60 mL/min/1.73 Final     Comment:     Chronic Kidney Disease: less than 60 ml/min/1.73 sq.m. Kidney Failure: less than 15 ml/min/1.73 sq.m. Results valid for patients 18 years and older. 04/21/2022 >60 >=60 mL/min/1.73 Final     Comment:     Chronic Kidney Disease: less than 60 ml/min/1.73 sq.m. Kidney Failure: less than 15 ml/min/1.73 sq.m. Results valid for patients 18 years and older. GFR    Date Value Ref Range Status   04/23/2022 >60  Final   04/21/2022 >60  Final   04/21/2022 >60  Final     Magnesium   Date Value Ref Range Status   04/23/2022 2.5 1.6 - 2.6 mg/dL Final   04/21/2022 2.2 1.6 - 2.6 mg/dL Final   04/21/2022 2.2 1.6 - 2.6 mg/dL Final     Phosphorus   Date Value Ref Range Status   04/23/2022 4.0 2.5 - 4.5 mg/dL Final   04/21/2022 4.1 2.5 - 4.5 mg/dL Final   04/21/2022 3.9 2.5 - 4.5 mg/dL Final     No results for input(s): PH, PO2, PCO2, HCO3, BE, O2SAT in the last 72 hours. RADIOLOGY:  XR ABDOMEN FOR NG/OG/NE TUBE PLACEMENT   Final Result   Satisfactory position of the enteric tube.   No change from prior exam.         XR CHEST ABDOMEN NG PLACEMENT   Final Result   Enteric tube terminates within the stomach. FL MODIFIED BARIUM SWALLOW W VIDEO   Final Result   Swallowing dysfunction as described above including aspiration of nectar and   deep penetration of honey consistency. Please see separate speech pathology report for full discussion of findings   and recommendations. XR CHEST PORTABLE   Final Result   Post endotracheal nasogastric extubation. Bilateral atelectasis and or   infiltrate with slight interval progression in the right mid lung. XR CHEST PORTABLE   Final Result   New atelectasis and or infiltrate at the right mid lung with stable   atelectasis and or infiltrate at the right base. XR CHEST PORTABLE   Final Result   New atelectasis and or infiltrate at the right base. XR ABDOMEN FOR NG/OG/NE TUBE PLACEMENT   Final Result   Nasogastric tube with the tip in the proximal gastric body. XR CHEST PORTABLE   Final Result   Adequately positioned endotracheal tube, orogastric tube and right internal   jugular central venous line. XR CHEST PORTABLE   Final Result   No pneumonia or pleural effusion.              Electronically signed by JOSE Mckeon CNP on 4/23/2022 at 8:18 AM      ATTESTATION:  ICU Staff Physician note of personal involvement in Care  As the attending physician, I certify that I personally reviewed the patients history and personnally examined the patient to confirm the physical findings described above,  And that I reviewed the relevant imaging studies and available reports.  I also discussed the differential diagnosis and all of the proposed management plans with the patient and individuals accompanying the patient to this visit.  They had the opportunity to ask questions about the proposed management plans and to have those questions answered.     I agree with the physical examination, assessment and plan in the note of JOSE Mckeon     Due to clinical improvement, we will not follow daily but PRN  Please call intensivist on call if the patient's condition were to worsen. Thank you for allowing us to participate in the care of Mr Yuliet Conte.       Raul Reyes MD  Pulmonary and Critical Care Medicine

## 2022-04-23 NOTE — PLAN OF CARE
Problem: Pain:  Goal: Pain level will decrease  Description: Pain level will decrease  Outcome: Adequate for Discharge  Goal: Control of acute pain  Description: Control of acute pain  Outcome: Adequate for Discharge  Goal: Control of chronic pain  Description: Control of chronic pain  Outcome: Adequate for Discharge     Problem: Discharge Planning:  Goal: Discharged to appropriate level of care  Description: Discharged to appropriate level of care  Outcome: Progressing     Problem: Pain:  Goal: Pain level will decrease  Description: Pain level will decrease  Outcome: Adequate for Discharge  Goal: Control of acute pain  Description: Control of acute pain  Outcome: Adequate for Discharge  Goal: Control of chronic pain  Description: Control of chronic pain  Outcome: Adequate for Discharge     Problem: Falls - Risk of:  Goal: Will remain free from falls  Description: Will remain free from falls  Outcome: Adequate for Discharge  Goal: Absence of physical injury  Description: Absence of physical injury  Outcome: Adequate for Discharge     Problem: Skin Integrity:  Goal: Will show no infection signs and symptoms  Description: Will show no infection signs and symptoms  Outcome: Adequate for Discharge  Goal: Absence of new skin breakdown  Description: Absence of new skin breakdown  Outcome: Adequate for Discharge

## 2022-04-24 LAB
ALBUMIN SERPL-MCNC: 3 G/DL (ref 3.5–5.2)
ALP BLD-CCNC: 161 U/L (ref 40–129)
ALT SERPL-CCNC: 423 U/L (ref 0–40)
ANION GAP SERPL CALCULATED.3IONS-SCNC: 11 MMOL/L (ref 7–16)
AST SERPL-CCNC: 153 U/L (ref 0–39)
BASOPHILS ABSOLUTE: 0.07 E9/L (ref 0–0.2)
BASOPHILS RELATIVE PERCENT: 0.7 % (ref 0–2)
BILIRUB SERPL-MCNC: 0.4 MG/DL (ref 0–1.2)
BUN BLDV-MCNC: 26 MG/DL (ref 6–20)
CALCIUM SERPL-MCNC: 9.3 MG/DL (ref 8.6–10.2)
CHLORIDE BLD-SCNC: 98 MMOL/L (ref 98–107)
CO2: 28 MMOL/L (ref 22–29)
CREAT SERPL-MCNC: 0.7 MG/DL (ref 0.7–1.2)
EOSINOPHILS ABSOLUTE: 0.5 E9/L (ref 0.05–0.5)
EOSINOPHILS RELATIVE PERCENT: 4.7 % (ref 0–6)
GFR AFRICAN AMERICAN: >60
GFR NON-AFRICAN AMERICAN: >60 ML/MIN/1.73
GLUCOSE BLD-MCNC: 219 MG/DL (ref 74–99)
HCT VFR BLD CALC: 33.5 % (ref 37–54)
HEMOGLOBIN: 10.4 G/DL (ref 12.5–16.5)
IMMATURE GRANULOCYTES #: 0.17 E9/L
IMMATURE GRANULOCYTES %: 1.6 % (ref 0–5)
LYMPHOCYTES ABSOLUTE: 1.16 E9/L (ref 1.5–4)
LYMPHOCYTES RELATIVE PERCENT: 11 % (ref 20–42)
MAGNESIUM: 2.3 MG/DL (ref 1.6–2.6)
MCH RBC QN AUTO: 26.1 PG (ref 26–35)
MCHC RBC AUTO-ENTMCNC: 31 % (ref 32–34.5)
MCV RBC AUTO: 84.2 FL (ref 80–99.9)
METER GLUCOSE: 155 MG/DL (ref 74–99)
METER GLUCOSE: 177 MG/DL (ref 74–99)
METER GLUCOSE: 180 MG/DL (ref 74–99)
METER GLUCOSE: 208 MG/DL (ref 74–99)
METER GLUCOSE: 210 MG/DL (ref 74–99)
MONOCYTES ABSOLUTE: 0.76 E9/L (ref 0.1–0.95)
MONOCYTES RELATIVE PERCENT: 7.2 % (ref 2–12)
NEUTROPHILS ABSOLUTE: 7.9 E9/L (ref 1.8–7.3)
NEUTROPHILS RELATIVE PERCENT: 74.8 % (ref 43–80)
PDW BLD-RTO: 19.2 FL (ref 11.5–15)
PHOSPHORUS: 3.9 MG/DL (ref 2.5–4.5)
PLATELET # BLD: 532 E9/L (ref 130–450)
PMV BLD AUTO: 10.1 FL (ref 7–12)
POTASSIUM SERPL-SCNC: 4.2 MMOL/L (ref 3.5–5)
RBC # BLD: 3.98 E12/L (ref 3.8–5.8)
SODIUM BLD-SCNC: 137 MMOL/L (ref 132–146)
TOTAL PROTEIN: 7.8 G/DL (ref 6.4–8.3)
WBC # BLD: 10.6 E9/L (ref 4.5–11.5)

## 2022-04-24 PROCEDURE — 94640 AIRWAY INHALATION TREATMENT: CPT

## 2022-04-24 PROCEDURE — 99232 SBSQ HOSP IP/OBS MODERATE 35: CPT | Performed by: STUDENT IN AN ORGANIZED HEALTH CARE EDUCATION/TRAINING PROGRAM

## 2022-04-24 PROCEDURE — 6370000000 HC RX 637 (ALT 250 FOR IP): Performed by: INTERNAL MEDICINE

## 2022-04-24 PROCEDURE — 2500000003 HC RX 250 WO HCPCS: Performed by: INTERNAL MEDICINE

## 2022-04-24 PROCEDURE — 82962 GLUCOSE BLOOD TEST: CPT

## 2022-04-24 PROCEDURE — 2580000003 HC RX 258: Performed by: INTERNAL MEDICINE

## 2022-04-24 PROCEDURE — 84100 ASSAY OF PHOSPHORUS: CPT

## 2022-04-24 PROCEDURE — 6370000000 HC RX 637 (ALT 250 FOR IP): Performed by: STUDENT IN AN ORGANIZED HEALTH CARE EDUCATION/TRAINING PROGRAM

## 2022-04-24 PROCEDURE — 97530 THERAPEUTIC ACTIVITIES: CPT

## 2022-04-24 PROCEDURE — 2700000000 HC OXYGEN THERAPY PER DAY

## 2022-04-24 PROCEDURE — 85025 COMPLETE CBC W/AUTO DIFF WBC: CPT

## 2022-04-24 PROCEDURE — 83735 ASSAY OF MAGNESIUM: CPT

## 2022-04-24 PROCEDURE — 6360000002 HC RX W HCPCS: Performed by: INTERNAL MEDICINE

## 2022-04-24 PROCEDURE — 2580000003 HC RX 258: Performed by: STUDENT IN AN ORGANIZED HEALTH CARE EDUCATION/TRAINING PROGRAM

## 2022-04-24 PROCEDURE — 6360000002 HC RX W HCPCS: Performed by: STUDENT IN AN ORGANIZED HEALTH CARE EDUCATION/TRAINING PROGRAM

## 2022-04-24 PROCEDURE — 1200000000 HC SEMI PRIVATE

## 2022-04-24 PROCEDURE — 6370000000 HC RX 637 (ALT 250 FOR IP)

## 2022-04-24 PROCEDURE — 36415 COLL VENOUS BLD VENIPUNCTURE: CPT

## 2022-04-24 PROCEDURE — 80053 COMPREHEN METABOLIC PANEL: CPT

## 2022-04-24 RX ORDER — MORPHINE SULFATE 2 MG/ML
2 INJECTION, SOLUTION INTRAMUSCULAR; INTRAVENOUS EVERY 4 HOURS PRN
Status: DISCONTINUED | OUTPATIENT
Start: 2022-04-24 | End: 2022-04-27 | Stop reason: HOSPADM

## 2022-04-24 RX ADMIN — IPRATROPIUM BROMIDE AND ALBUTEROL SULFATE 1 AMPULE: .5; 2.5 SOLUTION RESPIRATORY (INHALATION) at 17:47

## 2022-04-24 RX ADMIN — ENOXAPARIN SODIUM 30 MG: 100 INJECTION SUBCUTANEOUS at 08:15

## 2022-04-24 RX ADMIN — ANTI-FUNGAL POWDER MICONAZOLE NITRATE TALC FREE: 1.42 POWDER TOPICAL at 08:14

## 2022-04-24 RX ADMIN — FUROSEMIDE 40 MG: 40 TABLET ORAL at 08:15

## 2022-04-24 RX ADMIN — ENOXAPARIN SODIUM 30 MG: 100 INJECTION SUBCUTANEOUS at 20:56

## 2022-04-24 RX ADMIN — ISOSORBIDE DINITRATE 10 MG: 10 TABLET ORAL at 08:14

## 2022-04-24 RX ADMIN — METOPROLOL TARTRATE 75 MG: 25 TABLET, FILM COATED ORAL at 08:14

## 2022-04-24 RX ADMIN — INSULIN LISPRO 2 UNITS: 100 INJECTION, SOLUTION INTRAVENOUS; SUBCUTANEOUS at 11:19

## 2022-04-24 RX ADMIN — THIAMINE HYDROCHLORIDE: 100 INJECTION, SOLUTION INTRAMUSCULAR; INTRAVENOUS at 09:53

## 2022-04-24 RX ADMIN — OXYCODONE 10 MG: 5 TABLET ORAL at 05:37

## 2022-04-24 RX ADMIN — MORPHINE SULFATE 2 MG: 2 INJECTION, SOLUTION INTRAMUSCULAR; INTRAVENOUS at 20:58

## 2022-04-24 RX ADMIN — ISOSORBIDE DINITRATE 10 MG: 10 TABLET ORAL at 14:51

## 2022-04-24 RX ADMIN — IPRATROPIUM BROMIDE AND ALBUTEROL SULFATE 1 AMPULE: .5; 2.5 SOLUTION RESPIRATORY (INHALATION) at 09:53

## 2022-04-24 RX ADMIN — IPRATROPIUM BROMIDE AND ALBUTEROL SULFATE 1 AMPULE: .5; 2.5 SOLUTION RESPIRATORY (INHALATION) at 13:28

## 2022-04-24 RX ADMIN — INSULIN LISPRO 1 UNITS: 100 INJECTION, SOLUTION INTRAVENOUS; SUBCUTANEOUS at 20:59

## 2022-04-24 RX ADMIN — ROSUVASTATIN CALCIUM 10 MG: 10 TABLET, FILM COATED ORAL at 08:15

## 2022-04-24 RX ADMIN — MORPHINE SULFATE 2 MG: 2 INJECTION, SOLUTION INTRAMUSCULAR; INTRAVENOUS at 16:39

## 2022-04-24 RX ADMIN — ASPIRIN 81 MG 81 MG: 81 TABLET ORAL at 08:15

## 2022-04-24 RX ADMIN — IPRATROPIUM BROMIDE AND ALBUTEROL SULFATE 1 AMPULE: .5; 2.5 SOLUTION RESPIRATORY (INHALATION) at 02:06

## 2022-04-24 RX ADMIN — FUROSEMIDE 40 MG: 40 TABLET ORAL at 16:39

## 2022-04-24 RX ADMIN — OXYCODONE 10 MG: 5 TABLET ORAL at 00:13

## 2022-04-24 RX ADMIN — Medication 10 ML: at 20:58

## 2022-04-24 RX ADMIN — MULTIPLE VITAMINS W/ MINERALS TAB 1 TABLET: TAB at 08:14

## 2022-04-24 RX ADMIN — IPRATROPIUM BROMIDE AND ALBUTEROL SULFATE 1 AMPULE: .5; 2.5 SOLUTION RESPIRATORY (INHALATION) at 06:23

## 2022-04-24 RX ADMIN — METOPROLOL TARTRATE 75 MG: 25 TABLET, FILM COATED ORAL at 20:57

## 2022-04-24 RX ADMIN — INSULIN LISPRO 2 UNITS: 100 INJECTION, SOLUTION INTRAVENOUS; SUBCUTANEOUS at 08:17

## 2022-04-24 RX ADMIN — MORPHINE SULFATE 2 MG: 2 INJECTION, SOLUTION INTRAMUSCULAR; INTRAVENOUS at 12:42

## 2022-04-24 RX ADMIN — ISOSORBIDE DINITRATE 10 MG: 10 TABLET ORAL at 20:57

## 2022-04-24 RX ADMIN — Medication 10 ML: at 08:14

## 2022-04-24 RX ADMIN — ANTI-FUNGAL POWDER MICONAZOLE NITRATE TALC FREE: 1.42 POWDER TOPICAL at 20:59

## 2022-04-24 ASSESSMENT — PAIN - FUNCTIONAL ASSESSMENT
PAIN_FUNCTIONAL_ASSESSMENT: PREVENTS OR INTERFERES SOME ACTIVE ACTIVITIES AND ADLS

## 2022-04-24 ASSESSMENT — PAIN DESCRIPTION - PAIN TYPE: TYPE: CHRONIC PAIN

## 2022-04-24 ASSESSMENT — PAIN SCALES - GENERAL
PAINLEVEL_OUTOF10: 9
PAINLEVEL_OUTOF10: 8
PAINLEVEL_OUTOF10: 8
PAINLEVEL_OUTOF10: 7
PAINLEVEL_OUTOF10: 0
PAINLEVEL_OUTOF10: 10
PAINLEVEL_OUTOF10: 9
PAINLEVEL_OUTOF10: 8
PAINLEVEL_OUTOF10: 8
PAINLEVEL_OUTOF10: 10
PAINLEVEL_OUTOF10: 8
PAINLEVEL_OUTOF10: 4
PAINLEVEL_OUTOF10: 5

## 2022-04-24 ASSESSMENT — PAIN DESCRIPTION - LOCATION
LOCATION: BACK

## 2022-04-24 ASSESSMENT — PAIN DESCRIPTION - ORIENTATION
ORIENTATION: LOWER

## 2022-04-24 ASSESSMENT — PAIN DESCRIPTION - DESCRIPTORS
DESCRIPTORS: PRESSURE;SHARP

## 2022-04-24 ASSESSMENT — PAIN DESCRIPTION - ONSET: ONSET: ON-GOING

## 2022-04-24 ASSESSMENT — PAIN DESCRIPTION - FREQUENCY: FREQUENCY: CONTINUOUS

## 2022-04-24 NOTE — PROGRESS NOTES
[address] [place], [place] [address]ist   Progress Note    Admitting Date and Time: 4/9/2022  4:02 PM  Admit Dx: Hyponatremia [E87.1]  COPD exacerbation ([address] [J44.1]  Acute chest pain [R07.9]  Chest pain, unspecified type [R07.9]  Chest pain [R07.9]    Subjective:    Pt reports that he is feeling ok but still very frustrated with still having NGT and still being in the hospital. Complains that his chronic back pain is flaring up from sitting in the bed. Per RN: Nothing to report      ROS: Patient denies CP, palpitations, fever/chills, SOB.      enoxaparin  30 mg SubCUTAneous BID    metoprolol tartrate  75 mg Per NG tube BID    isosorbide dinitrate  10 mg Per NG tube TID    furosemide  40 mg Per NG tube BID    miconazole   Topical BID    lidocaine  1 patch TransDERmal Daily    aspirin  81 mg Per NG tube Daily    rosuvastatin  10 mg Per NG tube Daily    thiamine and folic acid IVPB   IntraVENous Daily    ipratropium-albuterol  1 ampule Inhalation Q4H    insulin lispro  0-6 Units SubCUTAneous TID WC    insulin lispro  0-3 Units SubCUTAneous Nightly    sodium chloride flush  5-40 mL IntraVENous 2 times per day    multivitamin  1 tablet Oral Daily    [Held by provider] losartan  50 mg Oral Daily    sodium chloride flush  5-40 mL IntraVENous 2 times per day    nicotine  1 patch TransDERmal Daily     morphine, 2 mg, Q4H PRN  oxyCODONE, 5 mg, Q4H PRN  acetaminophen, 650 mg, Q6H PRN   Or  acetaminophen, 650 mg, Q6H PRN  glycopyrrolate, 1 mg, TID PRN  dextrose bolus (hypoglycemia), 125 mL, PRN   Or  dextrose bolus (hypoglycemia), 250 mL, PRN  glucose, 15 g, PRN  glucagon (rDNA), 1 mg, PRN  dextrose, 100 mL/hr, PRN  hydrALAZINE, 10 mg, Q6H PRN  sodium chloride flush, 5-40 mL, PRN  sodium chloride, , PRN  sodium chloride flush, 5-40 mL, PRN  sodium chloride, , PRN  ondansetron, 4 mg, Q8H PRN   Or  ondansetron, 4 mg, Q6H PRN  polyethylene glycol, 17 g, Daily PRN         Objective:    BP (!) 138/107   Pulse 97   Temp 98.6 °F (37 °C) (Temporal)   Resp 14   Ht 6' (1.829 m)   Wt 257 lb (116.6 kg)   SpO2 97%   BMI 34.86 kg/m²        General Appearance: alert and oriented x3. No acute distress but easily irritated  Skin: warm, slightly diaphoretic  Head: normocephalic and atraumatic  Eyes: pupils equal, round, and reactive to light, extraocular eye movements intact, conjunctivae normal  Neck: neck supple and non tender without mass   Pulmonary/Chest:upper airway congestion, otherwise clear. No respiratory distress on 2L NC  Cardiovascular: normal rate, normal S1 and S2 and no carotid bruits  Abdomen: soft, non-tender, non-distended, normal bowel sounds, no masses or organomegaly  Extremities: no cyanosis, no clubbing and no edema  Neurologic: cranial nerves intact, no focal deficit, speech normal but voice hoarse (improving)      Recent Labs     04/23/22  0554 04/23/22  1754 04/24/22  0558    140 137   K 3.5 3.9 4.2    100 98   CO2 28 27 28   BUN 31* 30* 26*   CREATININE 0.7 0.8 0.7   GLUCOSE 196* 166* 219*   CALCIUM 10.1 9.6 9.3       Recent Labs     04/23/22  0554 04/23/22  1754 04/24/22  0558   ALKPHOS 182* 168* 161*   PROT 8.5* 8.2 7.8   LABALBU 3.5 3.2* 3.0*   BILITOT 0.4 0.3 0.4   * 196* 153*   * 483* 423*       Recent Labs     04/23/22  0554 04/24/22  0558   WBC 12.3* 10.6   RBC 4.10 3.98   HGB 10.9* 10.4*   HCT 34.4* 33.5*   MCV 83.9 84.2   MCH 26.6 26.1   MCHC 31.7* 31.0*   RDW 19.3* 19.2*   * 532*   MPV 9.8 10.1         Radiology:   US LIVER   Final Result   Limited evaluation due to patient body habitus. No acute sonographic findings. XR ABDOMEN FOR NG/OG/NE TUBE PLACEMENT   Final Result   Satisfactory position of the enteric tube. No change from prior exam.         XR CHEST ABDOMEN NG PLACEMENT   Final Result   Enteric tube terminates within the stomach.          FL MODIFIED BARIUM SWALLOW W VIDEO   Final Result   Swallowing dysfunction as described above    3. Severe alcohol withdrawal with DTs (now resolved)  -Patient initially was not truthful with how much he drank, advised patient that was assessing for risk of alcohol withdrawal and still did not admit true amount of alcohol use  -Went into severe alcohol withdrawal with DTs and was eventually intubated for airway protection   -Now off all withdrawal medications, doing well  -Agreeable to peer recovery discussions and seems motivated to stop drinking    4. Hyponatremia (resolved)  -Sodium 123 on admission, was also 123 last month. Today 137  -Was given 1L NS bolus in ER and had about 24hrs of NS 100ml/hr. Now off IVF but on tube feedings with FWF 100ml Q6H  -Possibly chronic from beer potomania as is alcoholic     4. Transaminitis  -LFTs have been trending up during admission  -Was on depakote for alcohol withdrawal previously, now stopped  -Patient also alcoholic  -Liver US checked and was poor study but did not show any acute abnormality   -Monitor LFTs daily, have been improving starting yesterday    5. HTN  -Home meds HCTZ, lasix, losartan and verapamil  -BP on admission 127/85, this morning 127/72  -Continue home meds and monitor BP     5. HLD  -Continue statin     6. GERD  -Continue PPI     7. Tobacco use disorder  -Smokes 1/2-1PPD for 45 years  -Currently has nicotine patch  -Counseled on smoking cessation     8. Morbid obesity  -BMI 40, counseled on lifestyle changes for once discharged from hospital     NOTE: This report was transcribed using voice recognition software. Every effort was made to ensure accuracy; however, inadvertent computerized transcription errors may be present.      Electronically signed by Arthur Jackson MD on 4/24/2022 at 12:33 PM

## 2022-04-24 NOTE — PLAN OF CARE
Chart reviewed. Discussed with Dr. Zo Reaves. Given ongoing issues with recovering from severe alcohol withdrawal, dysphagia requiring NG tube, liver abnormalities, in the absence of ongoing chest pain will hold off on heart catheterization for now as his troponins were undetectable and EKG showed only nonspecific changes. Recommend pharmacologic stress testing when other issues improved and NG tube removed. If he has further chest pain please reconsult. Otherwise we will be available as needed.     Gege Eli MD

## 2022-04-24 NOTE — PROGRESS NOTES
Pt complaining of 10/10 pain in his lower back that is unrelieved by the oxycodone that he received at 1815. Dr. Gokul Rutledge notified and a one time dose of morphine was ordered.

## 2022-04-24 NOTE — PLAN OF CARE
Problem: Pain:  Goal: Pain level will decrease  Description: Pain level will decrease  4/24/2022 1021 by Lily Ocampo RN  Outcome: Progressing  4/23/2022 2353 by Lynn Ng RN  Outcome: Progressing  Goal: Control of acute pain  Description: Control of acute pain  4/24/2022 1021 by Lily Ocampo RN  Outcome: Progressing  4/23/2022 2353 by Lynn Ng RN  Outcome: Progressing  Goal: Control of chronic pain  Description: Control of chronic pain  4/24/2022 1021 by Lily Ocampo RN  Outcome: Progressing  4/23/2022 2353 by Lynn Ng RN  Outcome: Progressing     Problem: Discharge Planning:  Goal: Discharged to appropriate level of care  Description: Discharged to appropriate level of care  4/24/2022 1021 by Lily Ocampo RN  Outcome: Progressing  4/23/2022 2353 by Lynn Ng RN  Outcome: Progressing     Problem: Pain:  Goal: Pain level will decrease  Description: Pain level will decrease  4/24/2022 1021 by Lily Ocampo RN  Outcome: Progressing  4/23/2022 2353 by Lynn Ng RN  Outcome: Progressing  Goal: Control of acute pain  Description: Control of acute pain  4/24/2022 1021 by Lily Ocampo RN  Outcome: Progressing  4/23/2022 2353 by Lynn Ng RN  Outcome: Progressing  Goal: Control of chronic pain  Description: Control of chronic pain  4/24/2022 1021 by Lily Ocampo RN  Outcome: Progressing  4/23/2022 2353 by Lynn Ng RN  Outcome: Progressing     Problem: Tobacco Use:  Goal: Will participate in inpatient tobacco-use cessation counseling  Description: Will participate in inpatient tobacco-use cessation counseling  4/24/2022 1021 by Lily Ocampo RN  Outcome: Progressing  4/23/2022 2353 by Lynn Ng RN  Outcome: Progressing     Problem: Nutrition Deficit:  Goal: Ability to achieve adequate nutritional intake will improve  Description: Ability to achieve adequate nutritional intake will improve  4/24/2022 1021 by Lily Ocampo RN  Outcome: Progressing  4/23/2022 2353 by Bg Morgan RN  Outcome: Progressing     Problem: Falls - Risk of:  Goal: Will remain free from falls  Description: Will remain free from falls  4/24/2022 1021 by Marilu Yung RN  Outcome: Progressing  4/23/2022 2353 by Bg Morgan RN  Outcome: Progressing  Goal: Absence of physical injury  Description: Absence of physical injury  4/24/2022 1021 by Marilu Yung RN  Outcome: Progressing  4/23/2022 2353 by Bg Morgan RN  Outcome: Progressing     Problem: Skin Integrity:  Goal: Will show no infection signs and symptoms  Description: Will show no infection signs and symptoms  4/24/2022 1021 by Marilu Yung RN  Outcome: Progressing  4/23/2022 2353 by Bg Morgan RN  Outcome: Progressing  Goal: Absence of new skin breakdown  Description: Absence of new skin breakdown  4/24/2022 1021 by Marilu Yung RN  Outcome: Progressing  4/23/2022 2353 by Bg Morgan RN  Outcome: Progressing     Problem: Discharge Planning  Goal: Discharge to home or other facility with appropriate resources  4/24/2022 1021 by Marilu Yung RN  Outcome: Progressing  4/23/2022 2353 by Bg Morgan RN  Outcome: Progressing

## 2022-04-24 NOTE — PROGRESS NOTES
OCCUPATIONAL THERAPY BEDSIDE TREATMENT NOTE   [facility] [employer] [facility]  Joe [place]     Date:2022  Patient Name: Hakeem Montgomery  MRN: [id number]  : 1965  Room: [address] [place] [address]     Evaluating OT: Merissa Tinoco OTR/L #[id number]      Referring Provider and Specific Provider Orders/Date:      22 143   OT eval and treat  Start:  22 143,   End:  22 1430,   ONE TIME,   Standing Count:  1 Occurrences,   R         Rikki Servin MD         Placement Recommendation: Subacute Rehab        Diagnosis:   1. COPD exacerbation (HCC)    2. Chest pain, unspecified type    3. Hyponatremia    4. Alcohol withdrawal delirium [facility])         Surgery: None       Pertinent Medical History:       Past Medical History        Past Medical History:   Diagnosis Date    Arthritis      Back pain, chronic      Hyperlipidemia      Hypertension      Neck pain, chronic              Past Surgical History         Past Surgical History:   Procedure Laterality Date    EXTERNAL EAR SURGERY         i & d left ear.  EYE SURGERY         Lt. Eye  Dart removed    FRACTURE SURGERY         Rt. Tibia FX Rods & Pins    HEMORRHOID SURGERY        SHOULDER ARTHROSCOPY Right 2015     right shoulder bicep tenodesis subacromail decompression and debridement            Precautions:  Fall Risk, falls and alarm, O2, bedrest with bathroom privileges, , , history of ETOH abuse       Assessment of current deficits    [x]? Functional mobility            [x]?ADLs           [x]? Strength                   [x]? Cognition    [x]? Functional transfers          [x]? IADLs         [x]? Safety Awareness   [x]? Endurance    [x]? Fine Coordination              [x]? Balance      []? Vision/perception    []? Sensation      []? Gross Motor Coordination  [x]? ROM           []?  Delirium                   []? Motor Control      OT PLAN OF CARE   OT POC based on physician orders, patient Sequencing: poor              Problem solving: poor              Judgement/safety: poor     AMPAC   How much help for putting on and taking off regular lower body clothing?: A Lot  How much help for Bathing?: A Lot  How much help for Toileting?: A Lot  How much help for putting on and taking off regular upper body clothing?: A Lot  How much help for taking care of personal grooming?: A Little  How much help for eating meals?: Total  AM-PAC Inpatient Daily Activity Raw Score: 12  AM-PAC Inpatient ADL T-Scale Score : 30.6  ADL Inpatient CMS 0-100% Score: 66.57  ADL Inpatient CMS G-Code Modifier : CL                Functional Assessment:     Initial Eval Status  Date: 4/19/22    Treatment Status  Date: 4/24/22 STGs = LTGs  Time frame: 10-14 days   Feeding Maximal Assist   Limited by decreased UE ROM/strength/coordination     N/T  NPO / NGT   Not appropriate - will continue to assess pending swallow evaluation      Grooming Maximal Assist   Decreased functional reach and overall strength. Max A to bring cloth to face.      Minimal Assist simulated  Minimal Assist    UB Dressing Maximal Assist     Moderate Assist for gown mgmt; assist to tie lace around neck. Minimal Assist    LB Dressing Dependent   Assist to lift heels off bed to doff/don heel protectors/socks     N/T  Moderate Assist    Bathing Maximal Assist   Assist x 2 for rolling to reach buttocks/rear area      N/T  Completed with nursing prior to OT session. Moderate Assist    Toileting Dependent   Incontinent of stool. Pt has charles catheter.      N/T  Pt using urinal at bedside, per report. Minimal Assist for transfer on/off commode with cues for use of grab bar. Moderate Assist    Bed Mobility  Supine to sit: Not assessed   Sit to supine: Not assessed      NT due to pt overall debility, decreased activity tolerance, safety and fall risk.         N/T as patient up in chair at start/end of session.     Supine to sit: Minimal Assist   Sit to mobility/transfer techniques   Instruction/training on energy conservation/work simplification for completion of ADLs     Pt has made fair plus progress towards set goals.  Continue with current plan of care       Treatment time includes thorough review of current medical information, gathering information on past medical history/social history and prior level of function, completion of standardized testing/informal observation of tasks, assessment of data, and development of POC/Goals.     Time In:  8:50 AM   Time Out: 9:15 AM                  Min Units   OT Eval Low 23595     OT Eval Medium 54588     OT Eval High 35179     OT Re-Eval 13455          ADL/Self Care 08544     Therapeutic Activities 10337 25 2   Therapeutic Ex 54232     Orthotic Management 41043     Neuro Re-Ed 45172     Non-Billable Time     TOTAL TIMED TREATMENT 25 2     Martin Castillo OTR/L #[id number]

## 2022-04-25 ENCOUNTER — APPOINTMENT (OUTPATIENT)
Dept: GENERAL RADIOLOGY | Age: 57
DRG: 896 | End: 2022-04-25
Payer: MEDICARE

## 2022-04-25 LAB
METER GLUCOSE: 143 MG/DL (ref 74–99)
METER GLUCOSE: 154 MG/DL (ref 74–99)
METER GLUCOSE: 184 MG/DL (ref 74–99)
METER GLUCOSE: 184 MG/DL (ref 74–99)

## 2022-04-25 PROCEDURE — 6370000000 HC RX 637 (ALT 250 FOR IP): Performed by: INTERNAL MEDICINE

## 2022-04-25 PROCEDURE — 6370000000 HC RX 637 (ALT 250 FOR IP)

## 2022-04-25 PROCEDURE — 82962 GLUCOSE BLOOD TEST: CPT

## 2022-04-25 PROCEDURE — 6360000002 HC RX W HCPCS: Performed by: STUDENT IN AN ORGANIZED HEALTH CARE EDUCATION/TRAINING PROGRAM

## 2022-04-25 PROCEDURE — 2500000003 HC RX 250 WO HCPCS: Performed by: STUDENT IN AN ORGANIZED HEALTH CARE EDUCATION/TRAINING PROGRAM

## 2022-04-25 PROCEDURE — 2700000000 HC OXYGEN THERAPY PER DAY

## 2022-04-25 PROCEDURE — 2580000003 HC RX 258: Performed by: INTERNAL MEDICINE

## 2022-04-25 PROCEDURE — 94640 AIRWAY INHALATION TREATMENT: CPT

## 2022-04-25 PROCEDURE — 74230 X-RAY XM SWLNG FUNCJ C+: CPT

## 2022-04-25 PROCEDURE — 6360000002 HC RX W HCPCS: Performed by: INTERNAL MEDICINE

## 2022-04-25 PROCEDURE — 92526 ORAL FUNCTION THERAPY: CPT | Performed by: SPEECH-LANGUAGE PATHOLOGIST

## 2022-04-25 PROCEDURE — 2580000003 HC RX 258: Performed by: STUDENT IN AN ORGANIZED HEALTH CARE EDUCATION/TRAINING PROGRAM

## 2022-04-25 PROCEDURE — 92611 MOTION FLUOROSCOPY/SWALLOW: CPT | Performed by: SPEECH-LANGUAGE PATHOLOGIST

## 2022-04-25 PROCEDURE — 6370000000 HC RX 637 (ALT 250 FOR IP): Performed by: STUDENT IN AN ORGANIZED HEALTH CARE EDUCATION/TRAINING PROGRAM

## 2022-04-25 PROCEDURE — 1200000000 HC SEMI PRIVATE

## 2022-04-25 PROCEDURE — 2500000003 HC RX 250 WO HCPCS: Performed by: INTERNAL MEDICINE

## 2022-04-25 PROCEDURE — 97530 THERAPEUTIC ACTIVITIES: CPT | Performed by: PHYSICAL THERAPIST

## 2022-04-25 PROCEDURE — 99232 SBSQ HOSP IP/OBS MODERATE 35: CPT | Performed by: STUDENT IN AN ORGANIZED HEALTH CARE EDUCATION/TRAINING PROGRAM

## 2022-04-25 RX ORDER — IPRATROPIUM BROMIDE AND ALBUTEROL SULFATE 2.5; .5 MG/3ML; MG/3ML
1 SOLUTION RESPIRATORY (INHALATION)
Status: DISCONTINUED | OUTPATIENT
Start: 2022-04-25 | End: 2022-04-27 | Stop reason: HOSPADM

## 2022-04-25 RX ADMIN — FUROSEMIDE 40 MG: 40 TABLET ORAL at 16:31

## 2022-04-25 RX ADMIN — OXYCODONE HYDROCHLORIDE 5 MG: 5 TABLET ORAL at 23:47

## 2022-04-25 RX ADMIN — INSULIN LISPRO 1 UNITS: 100 INJECTION, SOLUTION INTRAVENOUS; SUBCUTANEOUS at 20:30

## 2022-04-25 RX ADMIN — BARIUM SULFATE 45 ML: 400 PASTE ORAL at 13:35

## 2022-04-25 RX ADMIN — Medication 10 ML: at 09:38

## 2022-04-25 RX ADMIN — THIAMINE HYDROCHLORIDE: 100 INJECTION, SOLUTION INTRAMUSCULAR; INTRAVENOUS at 09:35

## 2022-04-25 RX ADMIN — INSULIN LISPRO 1 UNITS: 100 INJECTION, SOLUTION INTRAVENOUS; SUBCUTANEOUS at 17:19

## 2022-04-25 RX ADMIN — Medication 10 ML: at 20:30

## 2022-04-25 RX ADMIN — ISOSORBIDE DINITRATE 10 MG: 10 TABLET ORAL at 20:29

## 2022-04-25 RX ADMIN — MORPHINE SULFATE 2 MG: 2 INJECTION, SOLUTION INTRAMUSCULAR; INTRAVENOUS at 01:00

## 2022-04-25 RX ADMIN — ROSUVASTATIN CALCIUM 10 MG: 10 TABLET, FILM COATED ORAL at 09:39

## 2022-04-25 RX ADMIN — METOPROLOL TARTRATE 75 MG: 25 TABLET, FILM COATED ORAL at 09:24

## 2022-04-25 RX ADMIN — BARIUM SULFATE 45 ML: 400 SUSPENSION ORAL at 13:36

## 2022-04-25 RX ADMIN — IPRATROPIUM BROMIDE AND ALBUTEROL SULFATE 1 AMPULE: .5; 2.5 SOLUTION RESPIRATORY (INHALATION) at 18:18

## 2022-04-25 RX ADMIN — SODIUM CHLORIDE, PRESERVATIVE FREE 10 ML: 5 INJECTION INTRAVENOUS at 09:38

## 2022-04-25 RX ADMIN — MORPHINE SULFATE 2 MG: 2 INJECTION, SOLUTION INTRAMUSCULAR; INTRAVENOUS at 06:02

## 2022-04-25 RX ADMIN — FUROSEMIDE 40 MG: 40 TABLET ORAL at 09:24

## 2022-04-25 RX ADMIN — INSULIN LISPRO 1 UNITS: 100 INJECTION, SOLUTION INTRAVENOUS; SUBCUTANEOUS at 09:00

## 2022-04-25 RX ADMIN — ASPIRIN 81 MG 81 MG: 81 TABLET ORAL at 09:25

## 2022-04-25 RX ADMIN — ENOXAPARIN SODIUM 30 MG: 100 INJECTION SUBCUTANEOUS at 20:28

## 2022-04-25 RX ADMIN — MULTIPLE VITAMINS W/ MINERALS TAB 1 TABLET: TAB at 09:24

## 2022-04-25 RX ADMIN — Medication 10 ML: at 20:29

## 2022-04-25 RX ADMIN — METOPROLOL TARTRATE 75 MG: 25 TABLET, FILM COATED ORAL at 20:29

## 2022-04-25 RX ADMIN — ANTI-FUNGAL POWDER MICONAZOLE NITRATE TALC FREE: 1.42 POWDER TOPICAL at 20:29

## 2022-04-25 RX ADMIN — ENOXAPARIN SODIUM 30 MG: 100 INJECTION SUBCUTANEOUS at 09:29

## 2022-04-25 RX ADMIN — OXYCODONE HYDROCHLORIDE 5 MG: 5 TABLET ORAL at 16:29

## 2022-04-25 RX ADMIN — MORPHINE SULFATE 2 MG: 2 INJECTION, SOLUTION INTRAMUSCULAR; INTRAVENOUS at 14:04

## 2022-04-25 RX ADMIN — IPRATROPIUM BROMIDE AND ALBUTEROL SULFATE 1 AMPULE: .5; 2.5 SOLUTION RESPIRATORY (INHALATION) at 09:43

## 2022-04-25 RX ADMIN — ISOSORBIDE DINITRATE 10 MG: 10 TABLET ORAL at 09:24

## 2022-04-25 RX ADMIN — BARIUM SULFATE 45 G: 0.81 POWDER, FOR SUSPENSION ORAL at 13:37

## 2022-04-25 RX ADMIN — MORPHINE SULFATE 2 MG: 2 INJECTION, SOLUTION INTRAMUSCULAR; INTRAVENOUS at 10:14

## 2022-04-25 RX ADMIN — ISOSORBIDE DINITRATE 10 MG: 10 TABLET ORAL at 14:04

## 2022-04-25 RX ADMIN — IPRATROPIUM BROMIDE AND ALBUTEROL SULFATE 1 AMPULE: .5; 2.5 SOLUTION RESPIRATORY (INHALATION) at 05:43

## 2022-04-25 RX ADMIN — INSULIN LISPRO 1 UNITS: 100 INJECTION, SOLUTION INTRAVENOUS; SUBCUTANEOUS at 12:45

## 2022-04-25 RX ADMIN — MORPHINE SULFATE 2 MG: 2 INJECTION, SOLUTION INTRAMUSCULAR; INTRAVENOUS at 19:46

## 2022-04-25 RX ADMIN — ANTI-FUNGAL POWDER MICONAZOLE NITRATE TALC FREE: 1.42 POWDER TOPICAL at 09:55

## 2022-04-25 ASSESSMENT — PAIN SCALES - GENERAL
PAINLEVEL_OUTOF10: 8
PAINLEVEL_OUTOF10: 10
PAINLEVEL_OUTOF10: 8
PAINLEVEL_OUTOF10: 0
PAINLEVEL_OUTOF10: 0
PAINLEVEL_OUTOF10: 10
PAINLEVEL_OUTOF10: 0
PAINLEVEL_OUTOF10: 10
PAINLEVEL_OUTOF10: 0

## 2022-04-25 ASSESSMENT — PAIN DESCRIPTION - DESCRIPTORS
DESCRIPTORS: BURNING;SHARP
DESCRIPTORS: ACHING;DISCOMFORT;THROBBING;SHOOTING
DESCRIPTORS: DISCOMFORT;ACHING;THROBBING

## 2022-04-25 ASSESSMENT — PAIN DESCRIPTION - ORIENTATION
ORIENTATION: LOWER

## 2022-04-25 ASSESSMENT — PAIN DESCRIPTION - LOCATION
LOCATION: BACK
LOCATION: BACK
LOCATION: HEAD
LOCATION: BACK
LOCATION: SACRUM
LOCATION: BACK

## 2022-04-25 ASSESSMENT — PAIN SCALES - WONG BAKER
WONGBAKER_NUMERICALRESPONSE: 0

## 2022-04-25 ASSESSMENT — PAIN - FUNCTIONAL ASSESSMENT: PAIN_FUNCTIONAL_ASSESSMENT: PREVENTS OR INTERFERES SOME ACTIVE ACTIVITIES AND ADLS

## 2022-04-25 NOTE — CARE COORDINATION
4-25-Cm note: ( covid neg 4-10) pt has been accepted to [facility], they will start precert after the results of the swallow eval are in, pt will require PRECERT, Signed EBONY, and a HENS . Pt will need a Rapid covid on day of dc . CM/SS will follow .  Electronically signed by Nolan Rene RN on 4/25/2022 at 1:32 PM

## 2022-04-25 NOTE — PROGRESS NOTES
Speech Language Pathology      NAME:  Giovana Pulido  :  1965  DATE: 2022  ROOM:  [facility]/[id number]    Patient seen for dysphagia therapy 15 minutes. Patient with stronger vocal quality and no throat clearing with ice chip trials today. Patient able to complete exercises given to him last week. Recommend repeat MBSS to determine if diet can be initiated discussed with nursing.      Hyponatremia [E87.1]  COPD exacerbation ([facility] [address] [J44.1]  Acute chest pain [R07.9]  Chest pain, unspecified type [R07.9]  Chest pain [R07.9]    63656  dysphagia tx    Concha Sherman MSCCC/SLP  Speech Language Pathologist  [id number]

## 2022-04-25 NOTE — PROGRESS NOTES
SPEECH/LANGUAGE PATHOLOGY  VIDEOFLUOROSCOPIC STUDY OF SWALLOWING (MBS)   and PLAN OF CARE    PATIENT NAME:  Daniel Messer  (male)     MRN:  [id number]    :  1965  (64 y.o.)  STATUS:  Inpatient: Room 0631-    TODAY'S DATE:  2022  REFERRING PROVIDER:   Dr. Nannette Mann: SLP video swallow  Date of order:  2022   REASON FOR REFERRAL: dysphagia    EVALUATING THERAPIST: TAMIA Paredes      RESULTS:      DYSPHAGIA DIAGNOSIS:  moderate-severe oropharyngeal phase dysphagia     DIET RECOMMENDATIONS:  Pureed consistency solids (IDDSI level 4) with  nectar consistency (mildly thick - IDDSI level 2) liquids with a throat clear re-swallow     FEEDING RECOMMENDATIONS:    Assistance level:  No assistance needed     Compensatory strategies recommended: Double swallow, Effortful swallow, Small bites/sips, Alternate solids and liquids and Throat clear     Discussed recommendations with nursing and/or faxed report to referring provider: Yes    Laryngeal Penetration and Aspiration:  Penetration WITHOUT aspiration was observed in today's study with  mildly thick liquid (nectar)  Penetration WITH aspiration was observed in today's study with  thin liquid    SPEECH THERAPY  PLAN OF CARE   The dysphagia POC is established based on physician order and dysphagia diagnosis    Skilled SLP intervention for dysphagia management up to 5x per week until goals met, pt plateaus in function and/or discharged from hospital      Conditions Requiring Skilled Therapeutic Intervention for dysphagia:    Reduced pharyngeal clearing of the bolus  Reduced laryngeal closure resulting in penetration  Reduced laryngeal closure resulting in aspiration   Swallow triggered when bolus head at level of pyriform sinus increasing risk of aspiration    SPECIFIC DYSPHAGIA INTERVENTIONS TO INCLUDE:     Training in positioning for improved integrity of swallow  Compensatory strategy training   Therapeutic exercises  Trials of upgraded diet/liquid     Specific instructions for next treatment:  development and training of compensatory swallow strategies to improve airway protection and swallow function  Treatment Goals:    Short Term Goals:  Pt will implement identified compensatory swallowing strategies on 90% of opportunities or greater to improve airway protection and swallow function. Pt will complete PO trials of upgraded diet textures with SLP only to determine the least restrictive PO diet to maintain adequate nutrition/hydration with no more than 1 overt s/s of pen/asp. Pt will complete BOTR strength/ ROM exercises to reduce pharyngeal residuals and improve epiglottic inversion minimal verbal prompts  Pt will complete laryngeal strength/ ROM therapeutic exercises to improve airway protection for the least restrictive PO diet minimal verbal prompts  Pt will complete Effortful Swallow therapeutically to target increased oral and base of tongue pressure, increased pharyngeal constrictor contractions, and increased UES relaxation duration to reduce pharyngeal residue with minimal verbal prompts   Pt will complete Maria Guadalupe Maneuver therapeutically to target increased pharyngeal constrictor contractions to reduce pharyngeal residue with minimal verbal prompts     Long Term Goals:   Pt will improve oropharyngeal swallow function to ensure airway protection during PO intake to maintain adequate nutrition/hydration and decrease signs/symptoms of aspiration to less than 1 x/day.       Patient/family Goal:    To be able to eat regular foods and drink regular liquids    Plan of care discussed with Patient   The Patient did not demonstrate complete understanding of the diagnosis, prognosis and plan of care     Rehabilitation Potential/Prognosis: good                      ADMITTING DIAGNOSIS: Hyponatremia [E87.1]  COPD exacerbation (HCC) [J44.1]  Acute chest pain [R07.9]  Chest pain, unspecified type [R07.9]  Chest pain [R07.9]     VISIT physiologic impairment. Pharyngoesophageal segment opening was completely distended for complete duration with no obstruction of bolus flow. Tongue base retraction allowed a trace collection of residue between the retracted tongue base and the posterior pharyngeal wall. A trace collection of residue remained within or on the pharyngeal structures Esophageal clearance in the upright position could not be assessed due to logistical reasons not related to physiologic impairment. PENETRATION-ASPIRATION SCALE (PAS):  THIN 8 = Material enters the airway, passes below the vocal folds, and no effort is made to eject   MILDLY THICK 5 = Material enters the airway, contacts the vocal folds, and is not ejected from the airway--unless directed to implement a throat clear re-swallow  MODERATELY THICK 5 = Material enters the airway, contacts the vocal folds, and is not ejected from the airway--unless directed to implement a throat clear re-swallow  PUREE 1 = Material does not enter the airway  HARD SOLID item not administered       COMPENSATORY STRATEGIES    Compensatory strategies that were beneficial included Double swallow, Effortful swallow, Small bites/sips, Alternate solids and liquids and Throat clear      STRUCTURAL/FUNCTIONAL ANOMALIES   No structural/functional anomalies were noted    CERVICAL ESOPHAGEAL STAGE :     The cervical esophagus appeared adequate          ___________    Cognition:   Within functional limits for this exam    Oral Peripheral Examination   Adequate lingual/labial strength     Current Respiratory Status   room air     Parameters of Speech Production  Respiration:  Adequate for speech production  Quality:   Within functional limits  Intensity: Within functional limits    Pain: No pain reported. EDUCATION:   The Speech Language Pathologist (SLP) completed education regarding results of evaluation and that intervention is warranted at this time.   Learner: Patient  Education: Reviewed results and recommendations of this evaluation  Evaluation of Education:  Needs further instruction    This plan may be re-evaluated and revised as warranted. Evaluation Time includes thorough review of current medical information, gathering information on past medical history/social history and prior level of function, completion of standardized testing/informal observation of tasks, assessment of data and education on plan of care and goals. Patient seen for swallow therapy 15 minutes. Reviewed current solid/liquid consistency diet recommendation for   Dysphagia 1, Pureed solids with  nectar consistency (mildly thick - IDDSI level 2) liquidsand discussed compensatory strategies to ensure safe PO intake. Reviewed aspiration precautions. Discussed use of throat clear with immediate re-swallow to decrease risk of aspiration with implementation of strengthening exercises to improve swallow function as the long term goal.  Patient was able to return demonstration of compensatory strategies during session. Patient will require ongoing education regarding dysphagia secondary to decreased ability to restate strategies during session. [x]The admitting diagnosis and active problem list, have been reviewed prior to initiation of this evaluation.     CPT Code: 08806  dysphagia study    ACTIVE PROBLEM LIST:   Patient Active Problem List   Diagnosis    Biceps rupture, proximal    Shoulder pain    Pectoral muscle rupture    Biceps tendonitis    Shoulder impingement    Rotator cuff tear    Acromioclavicular joint arthritis    Acute chest pain    Chest pain       Darren Alcala MSCCC/SLP  Speech Language Pathologist  [id number]

## 2022-04-25 NOTE — DISCHARGE INSTR - COC
Continuity of Care Form    Patient Name: Jose Hernandez   :  1965  MRN:  [id number]    Admit date:  2022  Discharge date:  ***    Code Status Order: Full Code   Advance Directives:      Admitting Physician:  Kurtis Ghotra MD  PCP: Beryle Keto, MD    Discharging Nurse: [facility] Unit/Room#: 4052/6310-84  Discharging Unit Phone Number: ***    Emergency Contact:   Extended Emergency Contact Information  Primary Emergency Contact: Dorothy Garsia, [address] Phone: [phone]  Mobile Phone: [phone]  Relation: Other  Preferred language: English   needed? No  Secondary Emergency Contact: Antonio [address] Phone: [phone]  Mobile Phone: [phone]  Relation: Child   needed? No    Past Surgical History:  Past Surgical History:   Procedure Laterality Date    EXTERNAL EAR SURGERY      i & d left ear. EYE SURGERY      Lt. Eye  Dart removed    FRACTURE SURGERY      Rt. Tibia FX Rods & Pins    HEMORRHOID SURGERY      SHOULDER ARTHROSCOPY Right 2015    right shoulder bicep tenodesis subacromail decompression and debridement       Immunization History:   Immunization History   Administered Date(s) Administered    COVID-19, J&J, PF, 0.5 mL 2021       Active Problems:  Patient Active Problem List   Diagnosis Code    Biceps rupture, proximal S46.119A    Shoulder pain M25.519    Pectoral muscle rupture S29.011A    Biceps tendonitis M75.20    Shoulder impingement M75.40    Rotator cuff tear M75.100    Acromioclavicular joint arthritis M19.019    Acute chest pain R07.9    Chest pain R07.9       Isolation/Infection:   Isolation            No Isolation          Patient Infection Status       Infection Onset Added Last Indicated Last Indicated By Review Planned Expiration Resolved Resolved By    None active    Resolved    COVID-19 (Rule Out) 04/09/22 04/09/22 04/10/22 COVID-19 & Influenza Combo (Ordered)   04/10/22 Rule-Out Test Resulted            Nurse Assessment:  Last Vital Signs: BP (!) 164/83   Pulse 91   Temp 97 °F (36.1 °C) (Infrared)   Resp 18   Ht 6' (1.829 m)   Wt 257 lb (116.6 kg)   SpO2 94%   BMI 34.86 kg/m²     Last documented pain score (0-10 scale): Pain Level: 0  Last Weight:   Wt Readings from Last 1 Encounters:   04/21/22 257 lb (116.6 kg)     Mental Status:  oriented and alert    IV Access:  - None    Nursing Mobility/ADLs:  Walking   Independent  Transfer  Assisted  Bathing  Independent  Dressing  Independent  Toileting  Independent  Feeding  Independent  Med Admin  Assisted  Med Delivery   whole    Wound Care Documentation and Therapy:        Elimination:  Continence: Bowel: Yes  Bladder: Yes  Urinary Catheter: None   Colostomy/Ileostomy/Ileal Conduit: ***       Date of Last BM: ***    Intake/Output Summary (Last 24 hours) at 4/25/2022 1335  Last data filed at 4/25/2022 0745  Gross per 24 hour   Intake 475 ml   Output 150 ml   Net 325 ml     I/O last 3 completed shifts: In: 1071 [NG/GT:1071]  Out: 700 [Urine:700]    Safety Concerns: At Risk for Falls    Impairments/Disabilities:      None    Nutrition Therapy:  Current Nutrition Therapy:   - Oral Diet:  Dysphagia 1 pureed    Routes of Feeding: Oral  Liquids: Nectar Thick Liquids  Daily Fluid Restriction: no  Last Modified Barium Swallow with Video (Video Swallowing Test): done on 4/25/2022    Treatments at the Time of Hospital Discharge:   Respiratory Treatments: ***  Oxygen Therapy:  is not on home oxygen therapy.   Ventilator:    - No ventilator support    Rehab Therapies: Physical Therapy, Occupational Therapy, and Speech/Language Therapy  Weight Bearing Status/Restrictions: No weight bearing restrictions  Other Medical Equipment (for information only, NOT a DME order):  walker  Other Treatments: ***    Patient's personal belongings (please select all that are sent with patient):  Glasses    RN SIGNATURE:  Electronically signed by Tanesha Tony RN on 4/27/22 at 3:41 PM EDT    CASE MANAGEMENT/SOCIAL WORK SECTION    Inpatient Status Date: 4-    Readmission Risk Assessment Score:  Readmission Risk              Risk of Unplanned Readmission:  22           Discharging to Facility/ Agency   Name: [facility]   Address: [address]  [address]   Phone: [phone]  Fax:    Dialysis Facility (if applicable)   Name:  Address:  Dialysis Schedule:  Phone:  Fax:    / signature: Electronically signed by Alyse Tony RN on 4/25/22 at 1:37 PM EDT    PHYSICIAN SECTION    Prognosis: Fair    Condition at Discharge: Stable    Rehab Potential (if transferring to Rehab): Fair    Recommended Labs or Other Treatments After Discharge: PT/OT    Physician Certification: I certify the above information and transfer of Malena Costello  is necessary for the continuing treatment of the diagnosis listed and that he requires [facility] for less 30 days.      Update Admission H&P: See discharge summary for any changes    PHYSICIAN SIGNATURE:  Electronically signed by Emilie Raman MD on 4/27/22 at 3:23 PM EDT

## 2022-04-25 NOTE — PROGRESS NOTES
Dr. Ken Stafford made aware of the pt having a burst of Afib RVR last night. No new orders at this time. Strip is available at the nurses station for review.

## 2022-04-25 NOTE — PLAN OF CARE
Problem: Pain:  Goal: Pain level will decrease  Description: Pain level will decrease  Outcome: Not Progressing  Goal: Control of chronic pain  Description: Control of chronic pain  Outcome: Not Progressing     Problem: Pain:  Goal: Pain level will decrease  Description: Pain level will decrease  Outcome: Not Progressing  Goal: Control of chronic pain  Description: Control of chronic pain  Outcome: Not Progressing

## 2022-04-25 NOTE — CARE COORDINATION
4-25-Cm note: ( covid neg 4-10) pt is scheduled for a swallow eval today, still has NG in place, will follow for results of swallow study, pt chose [address] [place] [address] for placement, faxed referral to [phone] as requested by liaison, will await response, pt will require Precert for JAMESON. Electronically signed by Kait Stevenson RN on 4/25/2022 at 10:35 AM   UPDATE: PT CHANGED HIS MIND, DOESN'T WANT [facility], WANTS [facility], REFERRAL CALLED .  Electronically signed by Kait Stevenson RN on 4/25/2022 at 11:11 AM

## 2022-04-25 NOTE — PROGRESS NOTES
Comprehensive Nutrition Assessment    Type and Reason for Visit:  Reassess    Nutrition Recommendations/Plan: Modify Tube Feeding (Standard with Fiber @ 65mL/hr x24hrs to provide:1560mL TV, 2340 calories, 100g protein, 1186mL water: Flush w/ 190ml water every 4 hours: This regimen will meet 100% of needs     Malnutrition Assessment:  Malnutrition Status: At risk for malnutrition (Comment) (04/13/22 5857)    Context:  Acute Illness     Findings of the 6 clinical characteristics of malnutrition:  Energy Intake:  7 - 50% or less of estimated energy requirements for 5 or more days  Weight Loss:  Unable to assess (no accurate wt hx)     Body Fat Loss:  No significant body fat loss     Muscle Mass Loss:  No significant muscle mass loss    Fluid Accumulation:  No significant fluid accumulation     Strength:  Not Performed    Nutrition Assessment:    Pt remains at nutritional risk, extubated 4/19, EN dependent, NPO 2/2 failed MBS, SLP to continue Tx. Pt w/ H/o ETOH withdrawal/delirium w/ hx polysubstance abuse, COPD, CHF. Tolerating Tf w/ no noted adverse reactions. will update recommendations    Nutrition Related Findings:    A/ox4, extubated 4/19, NPO w/ NGT. SLP thearpy continues, abd WDL, +BS, -I/O -3.2L, LFT's elevated, Glucose elevated Wound Type: None       Current Nutrition Intake & Therapies:    Average Meal Intake: NPO  Average Supplements Intake: NPO  Current Tube Feeding (TF) Orders:  · Feeding Route: Orogastric  · Formula: Standard with Fiber  · Schedule: Continuous (40mL/hr = 960mL TV)  · Feeding Regimen: 40ml/hr x24hrs  · Water Flushes: per critical order  *    TF & Flush Orders Provides: 960mL TV, 1440 calories, 61g protein, 730mL water      Anthropometric Measures:  Height: 6' (182.9 cm)  Ideal Body Weight (IBW): 178 lbs (81 kg)    Admission Body Weight: 300 lb (136.1 kg) (4/10 no method)  Current Body Weight: 257 lb (116.6 kg) (4/25 bed scale), 144.4 % IBW.  Weight Source: Bed Scale  Current BMI (kg/m2): 34.8  Usual Body Weight: 285 lb (129.3 kg) (3/15/2022 per emr review w/ no method)  % Weight Change (Calculated): -1.8  Weight Adjustment For: No Adjustment  BMI Categories: Obese Class 1 (BMI 30.0-34. 9)    Estimated Daily Nutrient Needs:  Energy Requirements Based On: Kcal/kg  Weight Used for Energy Requirements: Current  Energy (kcal/day): 0562-8220 (r79-66igmq/kg)  Weight Used for Protein Requirements: Ideal  Protein (g/day):  (x1.2-1.4gm/kg(monitor LFT's))  Fluid (ml/day): 4393-2350    Nutrition Diagnosis:   · Inadequate oral intake related to impaired respiratory function as evidenced by NPO or clear liquid status due to medical condition,nutrition support - enteral nutrition,swallow study results,lab values      Nutrition Interventions:   Food and/or Nutrient Delivery: Modify Tube Feeding (Standard with Fiber @ 65mL/hr x24hrs to provide:1560mL TV, 2340 calories, 100g protein, 1186mL water: Flush w/ 190ml water every 4 hours)  Nutrition Education/Counseling: No recommendation at this time  Coordination of Nutrition Care: Continue to monitor while inpatient    Goals:  Specify Other Goals: EN tolerance    Nutrition Monitoring and Evaluation:   Behavioral-Environmental Outcomes: None Identified  Food/Nutrient Intake Outcomes: Enteral Nutrition Intake/Tolerance  Physical Signs/Symptoms Outcomes: Biochemical Data,Chewing or Swallowing,GI Status,Fluid Status or Edema,Nutrition Focused Physical Findings,Skin,Weight    Discharge Planning:     Too soon to determine     Romayne David, RD, LD  Contact: [phone]

## 2022-04-25 NOTE — PROGRESS NOTES
[address] [place], [place] [address]ist   Progress Note    Admitting Date and Time: 4/9/2022  4:02 PM  Admit Dx: Hyponatremia [E87.1]  COPD exacerbation ([address] [J44.1]  Acute chest pain [R07.9]  Chest pain, unspecified type [R07.9]  Chest pain [R07.9]    Subjective:    Pt initially stated he wanted to leave today no matter what. Patient then calmed down a little and was more understanding. No new complaints today other than irritation from NGT that has been ongoing and chronic back pain. Per RN: Nothing to report      ROS: Patient denies CP, palpitations, fever/chills, SOB.      enoxaparin  30 mg SubCUTAneous BID    metoprolol tartrate  75 mg Per NG tube BID    isosorbide dinitrate  10 mg Per NG tube TID    furosemide  40 mg Per NG tube BID    miconazole   Topical BID    lidocaine  1 patch TransDERmal Daily    aspirin  81 mg Per NG tube Daily    rosuvastatin  10 mg Per NG tube Daily    thiamine and folic acid IVPB   IntraVENous Daily    ipratropium-albuterol  1 ampule Inhalation Q4H    insulin lispro  0-6 Units SubCUTAneous TID WC    insulin lispro  0-3 Units SubCUTAneous Nightly    sodium chloride flush  5-40 mL IntraVENous 2 times per day    multivitamin  1 tablet Oral Daily    [Held by provider] losartan  50 mg Oral Daily    sodium chloride flush  5-40 mL IntraVENous 2 times per day    nicotine  1 patch TransDERmal Daily     morphine, 2 mg, Q4H PRN  oxyCODONE, 5 mg, Q4H PRN  acetaminophen, 650 mg, Q6H PRN   Or  acetaminophen, 650 mg, Q6H PRN  glycopyrrolate, 1 mg, TID PRN  dextrose bolus (hypoglycemia), 125 mL, PRN   Or  dextrose bolus (hypoglycemia), 250 mL, PRN  glucose, 15 g, PRN  glucagon (rDNA), 1 mg, PRN  dextrose, 100 mL/hr, PRN  hydrALAZINE, 10 mg, Q6H PRN  sodium chloride flush, 5-40 mL, PRN  sodium chloride, , PRN  sodium chloride flush, 5-40 mL, PRN  sodium chloride, , PRN  ondansetron, 4 mg, Q8H PRN   Or  ondansetron, 4 mg, Q6H PRN  polyethylene glycol, 17 g, Daily PRN Objective:    /83   Pulse 75   Temp 98 °F (36.7 °C)   Resp 18   Ht 6' (1.829 m)   Wt 257 lb (116.6 kg)   SpO2 94%   BMI 34.86 kg/m²        General Appearance: alert and oriented x3. No acute distress but easily irritated  Skin: warm, slightly diaphoretic  Head: normocephalic and atraumatic  Eyes: pupils equal, round, and reactive to light, extraocular eye movements intact, conjunctivae normal  Neck: neck supple and non tender without mass   Pulmonary/Chest:upper airway congestion, otherwise clear. No respiratory distress on 2L NC  Cardiovascular: normal rate, normal S1 and S2 and no carotid bruits  Abdomen: soft, non-tender, non-distended, normal bowel sounds, no masses or organomegaly  Extremities: no cyanosis, no clubbing and no edema  Neurologic: cranial nerves intact, no focal deficit, speech normal but voice hoarse (improving)      Recent Labs     04/23/22  0554 04/23/22  1754 04/24/22  0558    140 137   K 3.5 3.9 4.2    100 98   CO2 28 27 28   BUN 31* 30* 26*   CREATININE 0.7 0.8 0.7   GLUCOSE 196* 166* 219*   CALCIUM 10.1 9.6 9.3       Recent Labs     04/23/22  0554 04/23/22  1754 04/24/22  0558   ALKPHOS 182* 168* 161*   PROT 8.5* 8.2 7.8   LABALBU 3.5 3.2* 3.0*   BILITOT 0.4 0.3 0.4   * 196* 153*   * 483* 423*       Recent Labs     04/23/22  0554 04/24/22  0558   WBC 12.3* 10.6   RBC 4.10 3.98   HGB 10.9* 10.4*   HCT 34.4* 33.5*   MCV 83.9 84.2   MCH 26.6 26.1   MCHC 31.7* 31.0*   RDW 19.3* 19.2*   * 532*   MPV 9.8 10.1         Radiology:   US LIVER   Final Result   Limited evaluation due to patient body habitus. No acute sonographic findings. XR ABDOMEN FOR NG/OG/NE TUBE PLACEMENT   Final Result   Satisfactory position of the enteric tube. No change from prior exam.         XR CHEST ABDOMEN NG PLACEMENT   Final Result   Enteric tube terminates within the stomach.          FL MODIFIED BARIUM SWALLOW W VIDEO   Final Result   Swallowing exacerbation  -No formal diagnosis of COPD but was given albuterol PRN by PCP  -Wheezing improved  -Off steroids now  -Nebulizers      3. Severe alcohol withdrawal with DTs (now resolved)  -Patient initially was not truthful with how much he drank, advised patient that was assessing for risk of alcohol withdrawal and still did not admit true amount of alcohol use  -Went into severe alcohol withdrawal with DTs and was eventually intubated for airway protection   -Now off all withdrawal medications, doing well  -Agreeable to peer recovery discussions and seems motivated to stop drinking    4. Hyponatremia (resolved)  -Sodium 123 on admission, was also 123 last month. Today 137  -Was given 1L NS bolus in ER and had about 24hrs of NS 100ml/hr. Now off IVF but on tube feedings with FWF 100ml Q6H  -Possibly chronic from beer potomania as is alcoholic     4. Transaminitis  -LFTs have been trending up during admission  -Was on depakote for alcohol withdrawal previously, now stopped  -Patient also alcoholic  -Liver US checked and was poor study but did not show any acute abnormality   -Monitor LFTs daily, have been improving starting yesterday    5. HTN  -Home meds HCTZ, lasix, losartan and verapamil  -BP on admission 127/85, this morning 139/83  -Continue home meds and monitor BP     5. HLD  -Continue statin     6. GERD  -Continue PPI     7. Tobacco use disorder  -Smokes 1/2-1PPD for 45 years  -Currently has nicotine patch  -Counseled on smoking cessation     8. Morbid obesity  -BMI 40, counseled on lifestyle changes for once discharged from hospital       Patient has been accepted [facility] waiting to start precert once plan for NGT is more concrete (waiting for MBS results today). NOTE: This report was transcribed using voice recognition software. Every effort was made to ensure accuracy; however, inadvertent computerized transcription errors may be present.      Electronically signed by Abby Gunter MD on 4/25/2022 at 3:14 PM

## 2022-04-25 NOTE — PROGRESS NOTES
Pt given his evening medications and he requested that the door be shut to decrease stimulation so that he can rest tonight. He was instructed to utilize the call light should he need help and nursing staff would still be in to round on him per protocol. Pt agreeable.

## 2022-04-25 NOTE — PROGRESS NOTES
Physical Therapy    Physical Therapy Treatment Note/Plan of Care    Room #:  3862/6300-11  Patient Name: Rizwan Dominguez  YOB: 1965  MRN: [id number]    Date of Service: 4/25/2022     Tentative placement recommendation: Inpatient Rehab  Equipment recommendation: To be determined      Evaluating Physical Therapist: Gladis Elmore  #[id number]      Specific Provider Orders/Date/Referring Provider :  04/18/22 1430   PT eval and treat Start: 04/18/22 1430, End: 04/18/22 1430, ONE TIME, Standing Count: 1 Occurrences, Quinn Hubbard MD      Admitting Diagnosis:   Hyponatremia [E87.1]  COPD exacerbation ([facility] [J44.1]  Acute chest pain [R07.9]  Chest pain, unspecified type [R07.9]  Chest pain [R07.9]    Admitted with    shortness of breath and chest pain  Intubated 4/10-4/18  Surgery: none  Visit Diagnoses       Codes    COPD exacerbation ([facility]    -  Primary J44.1    Hyponatremia     E87.1    Alcohol withdrawal delirium ([facility]     F10.231          Patient Active Problem List   Diagnosis    Biceps rupture, proximal    Shoulder pain    Pectoral muscle rupture    Biceps tendonitis    Shoulder impingement    Rotator cuff tear    Acromioclavicular joint arthritis    Acute chest pain    Chest pain        ASSESSMENT of Current Deficits Patient exhibits decreased strength, balance, endurance, range of motion and coordination impairing functional mobility, tolerance to activity and participation along with cognitive im pairments are barriers to d/c and require skilled intervention during hospital stay to attain pre hospital level of function. Decreased strength, balance and endurance  increases patient's risk for fall. Patient able to tolerate chair position exercises in bed today. Patient on a decreased amount of oxygen today in comparison to yesterday. Patient's PO2 fluctuated between 87% and 96% throughout session. Pt much more steady with ambulation with no LOB, dizziness, or SOB. PHYSICAL THERAPY  PLAN OF CARE       Physical therapy plan of care is established based on physician order,  patient diagnosis and clinical assessment    Current Treatment Recommendations:    -Bed Mobility: Lower extremity exercises , Upper extremity exercises  and Trunk control activities   -Sitting Balance: Incorporate reaching activities to activate trunk muscles , Hands on support to maintain midline  and Facilitate postural control in all planes   -Transfers: Provide instruction on proper hand and foot position for adequate transfer of weight onto lower extremities and use of gait device if needed and Cues for hand placement, technique and safety. Provide stabilization to prevent fall   -Endurance: Utilize Supervised activities to increase level of endurance to allow for safe functional mobility including transfers and gait     PT long term treatment goals are located in below grid    Patient and or family understand(s) diagnosis, prognosis, and plan of care. Frequency of treatments: Patient will be seen  3-5 times/week. Prior Level of Function: Patient ambulated independently and driving  Rehab Potential: good   For long term goals    Past medical history:   Past Medical History:   Diagnosis Date    Alcohol abuse     Arthritis     Back pain, chronic     Hyperlipidemia     Hypertension     Hyponatremia     Neck pain, chronic     Tobacco abuse      Past Surgical History:   Procedure Laterality Date    EXTERNAL EAR SURGERY      i & d left ear.  EYE SURGERY      Lt. Eye  Dart removed   [address] [employer]    HEMORRHOID SURGERY      SHOULDER ARTHROSCOPY Right 1/16/2015    right shoulder bicep tenodesis subacromail decompression and debridement       SUBJECTIVE:    Precautions: Bedrest with bathroom Privileges , falls, alarm and O2 , heated hi andrzej 60L/m, 85%   ciwa  Social history: Patient lives with grandson in a mobile home      Equipment owned: unknown, AM-PAC Basic Mobility        AM-PAC Mobility Inpatient   How much difficulty turning over in bed?: A Little  How much difficulty sitting down on / standing up from a chair with arms?: A Little  How much difficulty moving from lying on back to sitting on side of bed?: A Little  How much help from another person moving to and from a bed to a chair?: A Little  How much help from another person needed to walk in hospital room?: A Little  How much help from another person for climbing 3-5 steps with a railing?: A Lot  AM-PAC Inpatient Mobility Raw Score : 17  AM-PAC Inpatient T-Scale Score : 42.13  Mobility Inpatient CMS 0-100% Score: 50.57  Mobility Inpatient CMS G-Code Modifier : CK    Nursing cleared patient for PT treatment. .   OBJECTIVE;   Initial Evaluation  Date: 4/19/2022 Treatment Date:  4/25/2022     Short Term/ Long Term   Goals   Was pt agreeable to Eval/treatment?  Yes yes To be met in 5 days   Pain level   0/10    0/10    Bed Mobility    Rolling: Maximal assist of  2    Supine to sit: Not assessed     Sit to supine: Not assessed     Scooting: Not assessed    Rolling: Supervision    Supine to sit: Supervision    Sit to supine: Supervision    Scooting: Supervision   Rolling: Minimal assist of 1    Supine to sit: Minimal assist of 1    Sit to supine: Minimal assist of 1    Scooting: Minimal assist of 1     Transfers Sit to stand: Not assessed    Sit to stand: Supervision     Sit to stand: Minimal assist of 1     Ambulation    not assessed  2 x 50 feet using  wheeled walker with SBA/Jhonatan   for balance and safety     15  feet using  wheeled walker with Moderate assist of 1    ROM Within functional limits    Increase range of motion 10% of affected joints    Strength BUE:  refer to OT eval  RLE:  3-/5  LLE:  3-/5  Poor coordination and motor contorl with volitional movement  Increase strength in affected mm groups by 1/3 grade   Balance Sitting EOB:  not assessed    Dynamic Standing:  not assessed   Sitting EOB: fair+   Dynamic Standing: fair  Sitting EOB:  good    Dynamic Standing: fair+ with least restrictive device      Patient is Alert & Oriented x person and follows one step directions  With repetition and hands on cuing  Sensation:  Patient  denies numbness/tingling   Edema:  no   Endurance: fair       Vitals: room air   Blood Pressure at rest  Blood Pressure during session    Heart Rate at rest 102 Heart Rate during session 102-126   SPO2 at rest 96%  SPO2 during session 87-96%     Patient education  Patient educated on role of Physical Therapy, risks of immobility, safety and plan of care,  importance of mobility while in hospital  and positioning for skin integrity and comfort     Patient response to education:   Pt verbalized understanding Pt demonstrated skill Pt requires further education in this area   No No Yes      Treatment:  Patient practiced and was instructed/facilitated in the following treatment: Patient performed bed mobility, transfers, side stepping along bed, sat EOB, instructed PLB paced breathing. Therapeutic Exercises:  not performed     At end of session, patient in chair with     call light and phone within reach,  all lines and tubes intact, nursing notified. Patient would benefit from continued skilled Physical Therapy to improve functional independence and quality of life.          Patient's/ family goals   none stated    Time in  856  Time out  924  Total Treatment Time  28 minutes      CPT codes:    Therapeutic activities (30742)   28 minutes  2 unit(s)  Carole Coello PT License Number [id number]

## 2022-04-26 LAB
METER GLUCOSE: 127 MG/DL (ref 74–99)
METER GLUCOSE: 132 MG/DL (ref 74–99)
METER GLUCOSE: 144 MG/DL (ref 74–99)
METER GLUCOSE: 151 MG/DL (ref 74–99)

## 2022-04-26 PROCEDURE — 94640 AIRWAY INHALATION TREATMENT: CPT

## 2022-04-26 PROCEDURE — 1200000000 HC SEMI PRIVATE

## 2022-04-26 PROCEDURE — 6370000000 HC RX 637 (ALT 250 FOR IP): Performed by: INTERNAL MEDICINE

## 2022-04-26 PROCEDURE — 6360000002 HC RX W HCPCS: Performed by: STUDENT IN AN ORGANIZED HEALTH CARE EDUCATION/TRAINING PROGRAM

## 2022-04-26 PROCEDURE — 6370000000 HC RX 637 (ALT 250 FOR IP): Performed by: STUDENT IN AN ORGANIZED HEALTH CARE EDUCATION/TRAINING PROGRAM

## 2022-04-26 PROCEDURE — 99232 SBSQ HOSP IP/OBS MODERATE 35: CPT | Performed by: STUDENT IN AN ORGANIZED HEALTH CARE EDUCATION/TRAINING PROGRAM

## 2022-04-26 PROCEDURE — 6360000002 HC RX W HCPCS: Performed by: INTERNAL MEDICINE

## 2022-04-26 PROCEDURE — 2580000003 HC RX 258: Performed by: INTERNAL MEDICINE

## 2022-04-26 PROCEDURE — 2500000003 HC RX 250 WO HCPCS: Performed by: INTERNAL MEDICINE

## 2022-04-26 PROCEDURE — 2700000000 HC OXYGEN THERAPY PER DAY

## 2022-04-26 PROCEDURE — 94669 MECHANICAL CHEST WALL OSCILL: CPT

## 2022-04-26 PROCEDURE — 2580000003 HC RX 258: Performed by: STUDENT IN AN ORGANIZED HEALTH CARE EDUCATION/TRAINING PROGRAM

## 2022-04-26 PROCEDURE — 82962 GLUCOSE BLOOD TEST: CPT

## 2022-04-26 RX ORDER — BUDESONIDE 0.5 MG/2ML
0.5 INHALANT ORAL 2 TIMES DAILY
Status: DISCONTINUED | OUTPATIENT
Start: 2022-04-26 | End: 2022-04-27 | Stop reason: HOSPADM

## 2022-04-26 RX ORDER — FAMOTIDINE 20 MG/1
20 TABLET, FILM COATED ORAL 2 TIMES DAILY
Status: DISCONTINUED | OUTPATIENT
Start: 2022-04-26 | End: 2022-04-27 | Stop reason: HOSPADM

## 2022-04-26 RX ADMIN — FUROSEMIDE 40 MG: 40 TABLET ORAL at 17:03

## 2022-04-26 RX ADMIN — MORPHINE SULFATE 2 MG: 2 INJECTION, SOLUTION INTRAMUSCULAR; INTRAVENOUS at 14:06

## 2022-04-26 RX ADMIN — MORPHINE SULFATE 2 MG: 2 INJECTION, SOLUTION INTRAMUSCULAR; INTRAVENOUS at 09:30

## 2022-04-26 RX ADMIN — FUROSEMIDE 40 MG: 40 TABLET ORAL at 09:06

## 2022-04-26 RX ADMIN — OXYCODONE HYDROCHLORIDE 5 MG: 5 TABLET ORAL at 17:03

## 2022-04-26 RX ADMIN — IPRATROPIUM BROMIDE AND ALBUTEROL SULFATE 1 AMPULE: .5; 2.5 SOLUTION RESPIRATORY (INHALATION) at 06:08

## 2022-04-26 RX ADMIN — METOPROLOL TARTRATE 75 MG: 25 TABLET, FILM COATED ORAL at 09:06

## 2022-04-26 RX ADMIN — THIAMINE HYDROCHLORIDE: 100 INJECTION, SOLUTION INTRAMUSCULAR; INTRAVENOUS at 09:33

## 2022-04-26 RX ADMIN — ISOSORBIDE DINITRATE 10 MG: 10 TABLET ORAL at 09:07

## 2022-04-26 RX ADMIN — FAMOTIDINE 20 MG: 20 TABLET, FILM COATED ORAL at 20:34

## 2022-04-26 RX ADMIN — MORPHINE SULFATE 2 MG: 2 INJECTION, SOLUTION INTRAMUSCULAR; INTRAVENOUS at 20:41

## 2022-04-26 RX ADMIN — Medication 5 ML: at 10:16

## 2022-04-26 RX ADMIN — ROSUVASTATIN CALCIUM 10 MG: 10 TABLET, FILM COATED ORAL at 09:06

## 2022-04-26 RX ADMIN — ISOSORBIDE DINITRATE 10 MG: 10 TABLET ORAL at 20:34

## 2022-04-26 RX ADMIN — ISOSORBIDE DINITRATE 10 MG: 10 TABLET ORAL at 13:58

## 2022-04-26 RX ADMIN — METOPROLOL TARTRATE 75 MG: 25 TABLET, FILM COATED ORAL at 20:34

## 2022-04-26 RX ADMIN — ENOXAPARIN SODIUM 30 MG: 100 INJECTION SUBCUTANEOUS at 20:34

## 2022-04-26 RX ADMIN — MULTIPLE VITAMINS W/ MINERALS TAB 1 TABLET: TAB at 09:06

## 2022-04-26 RX ADMIN — OXYCODONE HYDROCHLORIDE 5 MG: 5 TABLET ORAL at 05:35

## 2022-04-26 RX ADMIN — INSULIN LISPRO 1 UNITS: 100 INJECTION, SOLUTION INTRAVENOUS; SUBCUTANEOUS at 17:26

## 2022-04-26 RX ADMIN — ANTI-FUNGAL POWDER MICONAZOLE NITRATE TALC FREE: 1.42 POWDER TOPICAL at 20:35

## 2022-04-26 RX ADMIN — BUDESONIDE 500 MCG: 0.5 INHALANT RESPIRATORY (INHALATION) at 18:27

## 2022-04-26 RX ADMIN — ASPIRIN 81 MG 81 MG: 81 TABLET ORAL at 09:06

## 2022-04-26 RX ADMIN — IPRATROPIUM BROMIDE AND ALBUTEROL SULFATE 1 AMPULE: .5; 2.5 SOLUTION RESPIRATORY (INHALATION) at 18:27

## 2022-04-26 RX ADMIN — FAMOTIDINE 20 MG: 20 TABLET, FILM COATED ORAL at 13:58

## 2022-04-26 RX ADMIN — Medication 5 ML: at 10:24

## 2022-04-26 RX ADMIN — OXYCODONE HYDROCHLORIDE 5 MG: 5 TABLET ORAL at 11:22

## 2022-04-26 RX ADMIN — INSULIN LISPRO 1 UNITS: 100 INJECTION, SOLUTION INTRAVENOUS; SUBCUTANEOUS at 09:56

## 2022-04-26 RX ADMIN — ANTI-FUNGAL POWDER MICONAZOLE NITRATE TALC FREE: 1.42 POWDER TOPICAL at 09:27

## 2022-04-26 RX ADMIN — ENOXAPARIN SODIUM 30 MG: 100 INJECTION SUBCUTANEOUS at 09:10

## 2022-04-26 RX ADMIN — Medication 10 ML: at 20:35

## 2022-04-26 ASSESSMENT — PAIN SCALES - WONG BAKER
WONGBAKER_NUMERICALRESPONSE: 8
WONGBAKER_NUMERICALRESPONSE: 8
WONGBAKER_NUMERICALRESPONSE: 10
WONGBAKER_NUMERICALRESPONSE: 8

## 2022-04-26 ASSESSMENT — PAIN SCALES - GENERAL
PAINLEVEL_OUTOF10: 10
PAINLEVEL_OUTOF10: 6
PAINLEVEL_OUTOF10: 10

## 2022-04-26 ASSESSMENT — PAIN DESCRIPTION - DESCRIPTORS
DESCRIPTORS: ACHING;DISCOMFORT;THROBBING
DESCRIPTORS: ACHING;DISCOMFORT
DESCRIPTORS: ACHING;THROBBING
DESCRIPTORS: ACHING;THROBBING
DESCRIPTORS: ACHING

## 2022-04-26 ASSESSMENT — PAIN DESCRIPTION - ORIENTATION
ORIENTATION: LOWER
ORIENTATION: MID
ORIENTATION: LOWER
ORIENTATION: LOWER
ORIENTATION: MID

## 2022-04-26 ASSESSMENT — PAIN DESCRIPTION - LOCATION
LOCATION: BACK;HEAD
LOCATION: BACK
LOCATION: BACK;HEAD
LOCATION: BACK
LOCATION: BACK;HEAD
LOCATION: BACK

## 2022-04-26 ASSESSMENT — PAIN DESCRIPTION - PAIN TYPE: TYPE: CHRONIC PAIN

## 2022-04-26 NOTE — PROGRESS NOTES
[address] [place], [place] [address]ist   Progress Note    Admitting Date and Time: 4/9/2022  4:02 PM  Admit Dx: Hyponatremia [E87.1]  COPD exacerbation ([address] [J44.1]  Acute chest pain [R07.9]  Chest pain, unspecified type [R07.9]  Chest pain [R07.9]    Subjective:    Pt feeling better today now that NGT was removed yesterday. No new complaints today, dneies CP, SOB. Voice hoarseness improving. Per RN: Patient felt some acid reflux with oral pills      ROS: Patient denies CP, palpitations, fever/chills, SOB.      ipratropium-albuterol  1 ampule Inhalation Q4H WA    enoxaparin  30 mg SubCUTAneous BID    metoprolol tartrate  75 mg Per NG tube BID    isosorbide dinitrate  10 mg Per NG tube TID    furosemide  40 mg Per NG tube BID    miconazole   Topical BID    lidocaine  1 patch TransDERmal Daily    aspirin  81 mg Per NG tube Daily    rosuvastatin  10 mg Per NG tube Daily    thiamine and folic acid IVPB   IntraVENous Daily    insulin lispro  0-6 Units SubCUTAneous TID WC    insulin lispro  0-3 Units SubCUTAneous Nightly    sodium chloride flush  5-40 mL IntraVENous 2 times per day    multivitamin  1 tablet Oral Daily    [Held by provider] losartan  50 mg Oral Daily    sodium chloride flush  5-40 mL IntraVENous 2 times per day    nicotine  1 patch TransDERmal Daily     regadenoson, 0.4 mg, ONCE PRN  morphine, 2 mg, Q4H PRN  oxyCODONE, 5 mg, Q4H PRN  acetaminophen, 650 mg, Q6H PRN   Or  acetaminophen, 650 mg, Q6H PRN  glycopyrrolate, 1 mg, TID PRN  dextrose bolus (hypoglycemia), 125 mL, PRN   Or  dextrose bolus (hypoglycemia), 250 mL, PRN  glucose, 15 g, PRN  glucagon (rDNA), 1 mg, PRN  dextrose, 100 mL/hr, PRN  hydrALAZINE, 10 mg, Q6H PRN  sodium chloride flush, 5-40 mL, PRN  sodium chloride, , PRN  sodium chloride flush, 5-40 mL, PRN  sodium chloride, , PRN  ondansetron, 4 mg, Q8H PRN   Or  ondansetron, 4 mg, Q6H PRN  polyethylene glycol, 17 g, Daily PRN         Objective:    /70   Pulse 72 Temp 98.1 °F (36.7 °C) (Oral)   Resp 18   Ht 6' (1.829 m)   Wt 257 lb (116.6 kg)   SpO2 92%   BMI 34.86 kg/m²        General Appearance: alert and oriented x3. No acute distress but easily irritated  Skin: warm, slightly diaphoretic  Head: normocephalic and atraumatic  Eyes: pupils equal, round, and reactive to light, extraocular eye movements intact, conjunctivae normal  Neck: neck supple and non tender without mass   Pulmonary/Chest:upper airway congestion, otherwise clear. No respiratory distress on RA  Cardiovascular: normal rate, normal S1 and S2 and no carotid bruits  Abdomen: soft, non-tender, non-distended, normal bowel sounds, no masses or organomegaly  Extremities: no cyanosis, no clubbing and no edema  Neurologic: cranial nerves intact, no focal deficit, speech normal      Recent Labs     04/23/22  1754 04/24/22  0558    137   K 3.9 4.2    98   CO2 27 28   BUN 30* 26*   CREATININE 0.8 0.7   GLUCOSE 166* 219*   CALCIUM 9.6 9.3       Recent Labs     04/23/22  1754 04/24/22  0558   ALKPHOS 168* 161*   PROT 8.2 7.8   LABALBU 3.2* 3.0*   BILITOT 0.3 0.4   * 153*   * 423*       Recent Labs     04/24/22  0558   WBC 10.6   RBC 3.98   HGB 10.4*   HCT 33.5*   MCV 84.2   MCH 26.1   MCHC 31.0*   RDW 19.2*   *   MPV 10.1         Radiology:   FL MODIFIED BARIUM SWALLOW W VIDEO   Final Result   1. There is both aspiration and penetration with the thin barium. 2. There is deep penetration to the vocal folds with nectar consistency   barium. 3. There is transient penetration with the honey consistency barium there is   no evidence of aspiration. 4. Retention within the valleculae and piriform sinuses. Please see separate speech pathology report for full discussion of findings   and recommendations. US LIVER   Final Result   Limited evaluation due to patient body habitus. No acute sonographic findings.          XR ABDOMEN FOR NG/OG/NE TUBE PLACEMENT   Final Result Satisfactory position of the enteric tube. No change from prior exam.         XR CHEST ABDOMEN NG PLACEMENT   Final Result   Enteric tube terminates within the stomach. FL MODIFIED BARIUM SWALLOW W VIDEO   Final Result   Swallowing dysfunction as described above including aspiration of nectar and   deep penetration of honey consistency. Please see separate speech pathology report for full discussion of findings   and recommendations. XR CHEST PORTABLE   Final Result   Post endotracheal nasogastric extubation. Bilateral atelectasis and or   infiltrate with slight interval progression in the right mid lung. XR CHEST PORTABLE   Final Result   New atelectasis and or infiltrate at the right mid lung with stable   atelectasis and or infiltrate at the right base. XR CHEST PORTABLE   Final Result   New atelectasis and or infiltrate at the right base. XR ABDOMEN FOR NG/OG/NE TUBE PLACEMENT   Final Result   Nasogastric tube with the tip in the proximal gastric body. XR CHEST PORTABLE   Final Result   Adequately positioned endotracheal tube, orogastric tube and right internal   jugular central venous line. XR CHEST PORTABLE   Final Result   No pneumonia or pleural effusion. NM Cardiac Stress Test Nuclear Imaging    (Results Pending)       Assessment:  Principal Problem:    Acute chest pain  Active Problems:    Chest pain  Resolved Problems:    * No resolved hospital problems. *      Plan:  1. Dysphagia  -Failed multiple swallow studies after extubation and had NG tube in place, however repeat swallow study yesterday showed he could tolerate pureed food and NG tube was removed yesterday.  -Will continue working with speech therapy    2.  Chest pain, rule out ACS  -Chest pain/pressure with SOB and diaphoresis and nausea  -Troponin negative x 2, EKG no ST changes  -Patient has SYL score 3, HEART score 5 on admission  -Multiple risk factors include HTN, HLD, obesity, smoker, family history (brother)  -Cardio consulted, planning for cath initially however with alcohol withdrawal and intubation previously was put on hold. Cardiology now planning for stress test tomorrow  -Monitor on telemetry     2. Possible COPD exacerbation  -No formal diagnosis of COPD but was given albuterol PRN by PCP  -Wheezing improved but still present, will start pulmicort nebulizer  -Off steroids now  -Nebulizers      3. Severe alcohol withdrawal with DTs (now resolved)  -Patient initially was not truthful with how much he drank, advised patient that was assessing for risk of alcohol withdrawal and still did not admit true amount of alcohol use  -Went into severe alcohol withdrawal with DTs and was eventually intubated for airway protection   -Now off all withdrawal medications, doing well  -Agreeable to peer recovery discussions and seems motivated to stop drinking    4. Hyponatremia (resolved)  -Sodium 123 on admission, was also 123 last month. Yesterday 137, labs today not drawn yet. -Off IVF and tube feeds now, has pureed diet as recommended per speech therapy  -Possibly chronic from beer potomania as is alcoholic     4. Transaminitis  -LFTs have been trending up during admission  -Was on depakote for alcohol withdrawal previously, now stopped  -Patient also alcoholic  -Liver US checked and was poor study but did not show any acute abnormality   -Monitor LFTs daily, have been improving starting yesterday    5. HTN  -Home meds HCTZ, lasix, losartan and verapamil  -BP on admission 127/85, this morning 130/70  -Continue home meds and monitor BP     5. HLD  -Continue statin     6. GERD  -Continue PPI     7. Tobacco use disorder  -Smokes 1/2-1PPD for 45 years  -Currently has nicotine patch  -Counseled on smoking cessation     8. Morbid obesity  -BMI 40, counseled on lifestyle changes for once discharged from hospital       Patient has been accepted to [facility] and precert was approved today.  If stress test normal, possible discharge tomorrow. NOTE: This report was transcribed using voice recognition software. Every effort was made to ensure accuracy; however, inadvertent computerized transcription errors may be present.      Electronically signed by Peter Barlow MD on 4/26/2022 at 1:28 PM

## 2022-04-26 NOTE — PLAN OF CARE
Problem: Pain:  Goal: Pain level will decrease  Description: Pain level will decrease  Outcome: Progressing  Goal: Control of acute pain  Description: Control of acute pain  Outcome: Progressing  Goal: Control of chronic pain  Description: Control of chronic pain  Outcome: Progressing     Problem: Discharge Planning:  Goal: Discharged to appropriate level of care  Description: Discharged to appropriate level of care  Outcome: Progressing     Problem: Pain:  Goal: Pain level will decrease  Description: Pain level will decrease  Outcome: Progressing  Goal: Control of acute pain  Description: Control of acute pain  Outcome: Progressing  Goal: Control of chronic pain  Description: Control of chronic pain  Outcome: Progressing     Problem: Tobacco Use:  Goal: Will participate in inpatient tobacco-use cessation counseling  Description: Will participate in inpatient tobacco-use cessation counseling  Outcome: Progressing     Problem: Nutrition Deficit:  Goal: Ability to achieve adequate nutritional intake will improve  Description: Ability to achieve adequate nutritional intake will improve  Outcome: Progressing     Problem: Falls - Risk of:  Goal: Will remain free from falls  Description: Will remain free from falls  Outcome: Adequate for Discharge  Goal: Absence of physical injury  Description: Absence of physical injury  Outcome: Adequate for Discharge     Problem: Skin Integrity:  Goal: Will show no infection signs and symptoms  Description: Will show no infection signs and symptoms  Outcome: Adequate for Discharge  Goal: Absence of new skin breakdown  Description: Absence of new skin breakdown  Outcome: Adequate for Discharge     Problem: Discharge Planning  Goal: Discharge to home or other facility with appropriate resources  Outcome: Progressing

## 2022-04-26 NOTE — CARE COORDINATION
4-26- note: ( covid neg 4-10) [facility] has started precert for admit, will await approval, pt will need a signed EBONY, Precert, Rapid covid test day of DC, and a HENS ( Intitiated) .  Electronically signed by Daniel Villa RN on 4/26/2022 at 9:35 AM

## 2022-04-26 NOTE — PROGRESS NOTES
OT SESSION ATTEMPT     Date:2022  Patient Name: Igor Dai  MRN: [id number]  : 1965  Room: [facility]-     OCCUPATIONAL THERAPY TREATMENT NOTE    [address]      Attempted OT session this date:    [] unavailable due to other medical staff currently with pt   [] on hold per nursing staff   [] on hold per nursing staff secondary to lab / radiology results    [x] declined treatment  this date due to increased pain. Benefits of participation in therapy reviewed with pt.    [] off unit   [] Other:     Continue with current OT [facility]. [facility] at a later date/time.       Ayo [place]/DEACON [id number]

## 2022-04-26 NOTE — PROGRESS NOTES
Pt was placed on continuous pulse ox monitoring for HS while on room air. While sleeping pt desaturated to 80% spo2. Pt placed on 2Lt NC for recovery. With in one minute pt recovered to above 90%. Pt was instructed to keep NC on while sleeping. RN will continue to monitor pulse ox while sleeping.

## 2022-04-26 NOTE — CARE COORDINATION
4-26-Cm note: ( covid neg 4-10) [facility] has Precert , pt will need a Rapid Covid test on day of DC , HENS started, will need a signed EBONY. Pt scheduled for a srtress test tomorrow. [facility] is asking for a 6 minute walk test for oxygen needs when he discharges from their facility. CM/SS will follow.  Electronically signed by Liliana Urias RN on 4/26/2022 at 2:20 PM

## 2022-04-26 NOTE — PROGRESS NOTES
Physical Therapy  Room #:   5363/3687-25    Date: 2022       Patient Name: Deirdre Bullock  : 1965      MRN: [id number]     Patient unavailable for physical therapy treatment due to refusal x 2.      Tatiana Mitchell, PT

## 2022-04-27 ENCOUNTER — APPOINTMENT (OUTPATIENT)
Dept: NUCLEAR MEDICINE | Age: 57
DRG: 896 | End: 2022-04-27
Payer: MEDICARE

## 2022-04-27 ENCOUNTER — APPOINTMENT (OUTPATIENT)
Dept: NON INVASIVE DIAGNOSTICS | Age: 57
DRG: 896 | End: 2022-04-27
Payer: MEDICARE

## 2022-04-27 VITALS
DIASTOLIC BLOOD PRESSURE: 67 MMHG | TEMPERATURE: 97.5 F | SYSTOLIC BLOOD PRESSURE: 124 MMHG | BODY MASS INDEX: 34.81 KG/M2 | RESPIRATION RATE: 16 BRPM | OXYGEN SATURATION: 98 % | HEART RATE: 82 BPM | WEIGHT: 257 LBS | HEIGHT: 72 IN

## 2022-04-27 LAB
LV EF: 73 %
LVEF MODALITY: NORMAL
METER GLUCOSE: 135 MG/DL (ref 74–99)
METER GLUCOSE: 135 MG/DL (ref 74–99)
SARS-COV-2, NAAT: NOT DETECTED

## 2022-04-27 PROCEDURE — 6370000000 HC RX 637 (ALT 250 FOR IP): Performed by: INTERNAL MEDICINE

## 2022-04-27 PROCEDURE — 97530 THERAPEUTIC ACTIVITIES: CPT

## 2022-04-27 PROCEDURE — 93017 CV STRESS TEST TRACING ONLY: CPT

## 2022-04-27 PROCEDURE — A9500 TC99M SESTAMIBI: HCPCS | Performed by: RADIOLOGY

## 2022-04-27 PROCEDURE — 6370000000 HC RX 637 (ALT 250 FOR IP): Performed by: STUDENT IN AN ORGANIZED HEALTH CARE EDUCATION/TRAINING PROGRAM

## 2022-04-27 PROCEDURE — 78452 HT MUSCLE IMAGE SPECT MULT: CPT | Performed by: INTERNAL MEDICINE

## 2022-04-27 PROCEDURE — 82962 GLUCOSE BLOOD TEST: CPT

## 2022-04-27 PROCEDURE — 6360000002 HC RX W HCPCS: Performed by: STUDENT IN AN ORGANIZED HEALTH CARE EDUCATION/TRAINING PROGRAM

## 2022-04-27 PROCEDURE — 94640 AIRWAY INHALATION TREATMENT: CPT

## 2022-04-27 PROCEDURE — 87635 SARS-COV-2 COVID-19 AMP PRB: CPT

## 2022-04-27 PROCEDURE — 93018 CV STRESS TEST I&R ONLY: CPT | Performed by: INTERNAL MEDICINE

## 2022-04-27 PROCEDURE — 2580000003 HC RX 258: Performed by: INTERNAL MEDICINE

## 2022-04-27 PROCEDURE — 3430000000 HC RX DIAGNOSTIC RADIOPHARMACEUTICAL: Performed by: RADIOLOGY

## 2022-04-27 PROCEDURE — 2500000003 HC RX 250 WO HCPCS: Performed by: INTERNAL MEDICINE

## 2022-04-27 PROCEDURE — 93016 CV STRESS TEST SUPVJ ONLY: CPT | Performed by: INTERNAL MEDICINE

## 2022-04-27 PROCEDURE — 6360000002 HC RX W HCPCS: Performed by: INTERNAL MEDICINE

## 2022-04-27 PROCEDURE — 99239 HOSP IP/OBS DSCHRG MGMT >30: CPT | Performed by: STUDENT IN AN ORGANIZED HEALTH CARE EDUCATION/TRAINING PROGRAM

## 2022-04-27 PROCEDURE — 78452 HT MUSCLE IMAGE SPECT MULT: CPT

## 2022-04-27 RX ORDER — METOPROLOL TARTRATE 75 MG/1
75 TABLET, FILM COATED ORAL 2 TIMES DAILY
Qty: 60 TABLET | Refills: 0 | DISCHARGE
Start: 2022-04-27

## 2022-04-27 RX ORDER — NICOTINE 21 MG/24HR
1 PATCH, TRANSDERMAL 24 HOURS TRANSDERMAL DAILY
Qty: 30 PATCH | Refills: 0 | DISCHARGE
Start: 2022-04-28

## 2022-04-27 RX ORDER — BUDESONIDE 0.5 MG/2ML
0.5 INHALANT ORAL 2 TIMES DAILY
Qty: 60 EACH | Refills: 0 | DISCHARGE
Start: 2022-04-27

## 2022-04-27 RX ORDER — M-VIT,TX,IRON,MINS/CALC/FOLIC 27MG-0.4MG
1 TABLET ORAL DAILY
DISCHARGE
Start: 2022-04-28 | End: 2022-06-08

## 2022-04-27 RX ORDER — ASPIRIN 81 MG/1
81 TABLET, CHEWABLE ORAL DAILY
Qty: 30 TABLET | Refills: 0 | DISCHARGE
Start: 2022-04-28

## 2022-04-27 RX ORDER — FUROSEMIDE 40 MG/1
40 TABLET ORAL 2 TIMES DAILY
Qty: 60 TABLET | Refills: 3 | Status: ON HOLD | DISCHARGE
Start: 2022-04-27 | End: 2022-09-20 | Stop reason: HOSPADM

## 2022-04-27 RX ORDER — OXYCODONE HYDROCHLORIDE 5 MG/1
5 TABLET ORAL EVERY 6 HOURS PRN
Refills: 0 | Status: SHIPPED | DISCHARGE
Start: 2022-04-27 | End: 2022-04-30

## 2022-04-27 RX ORDER — FOLIC ACID 1 MG/1
1 TABLET ORAL DAILY
Qty: 30 TABLET | Refills: 0 | DISCHARGE
Start: 2022-04-27

## 2022-04-27 RX ORDER — FAMOTIDINE 20 MG/1
20 TABLET, FILM COATED ORAL 2 TIMES DAILY
Qty: 60 TABLET | Refills: 0 | DISCHARGE
Start: 2022-04-27 | End: 2022-06-08

## 2022-04-27 RX ORDER — IPRATROPIUM BROMIDE AND ALBUTEROL SULFATE 2.5; .5 MG/3ML; MG/3ML
3 SOLUTION RESPIRATORY (INHALATION)
Qty: 360 ML | Refills: 0 | DISCHARGE
Start: 2022-04-27

## 2022-04-27 RX ORDER — ISOSORBIDE DINITRATE 10 MG/1
10 TABLET ORAL 3 TIMES DAILY
Qty: 90 TABLET | Refills: 0 | DISCHARGE
Start: 2022-04-27

## 2022-04-27 RX ADMIN — Medication 30 MILLICURIE: at 11:08

## 2022-04-27 RX ADMIN — OXYCODONE HYDROCHLORIDE 5 MG: 5 TABLET ORAL at 04:44

## 2022-04-27 RX ADMIN — ENOXAPARIN SODIUM 30 MG: 100 INJECTION SUBCUTANEOUS at 11:51

## 2022-04-27 RX ADMIN — OXYCODONE HYDROCHLORIDE 5 MG: 5 TABLET ORAL at 12:55

## 2022-04-27 RX ADMIN — ROSUVASTATIN CALCIUM 10 MG: 10 TABLET, FILM COATED ORAL at 11:52

## 2022-04-27 RX ADMIN — REGADENOSON 0.4 MG: 0.08 INJECTION, SOLUTION INTRAVENOUS at 11:05

## 2022-04-27 RX ADMIN — IPRATROPIUM BROMIDE AND ALBUTEROL SULFATE 1 AMPULE: .5; 2.5 SOLUTION RESPIRATORY (INHALATION) at 14:43

## 2022-04-27 RX ADMIN — MORPHINE SULFATE 2 MG: 2 INJECTION, SOLUTION INTRAMUSCULAR; INTRAVENOUS at 10:24

## 2022-04-27 RX ADMIN — MULTIPLE VITAMINS W/ MINERALS TAB 1 TABLET: TAB at 11:52

## 2022-04-27 RX ADMIN — THIAMINE HYDROCHLORIDE: 100 INJECTION, SOLUTION INTRAMUSCULAR; INTRAVENOUS at 12:06

## 2022-04-27 RX ADMIN — ASPIRIN 81 MG 81 MG: 81 TABLET ORAL at 11:53

## 2022-04-27 RX ADMIN — FAMOTIDINE 20 MG: 20 TABLET, FILM COATED ORAL at 11:51

## 2022-04-27 RX ADMIN — ISOSORBIDE DINITRATE 10 MG: 10 TABLET ORAL at 14:47

## 2022-04-27 RX ADMIN — ANTI-FUNGAL POWDER MICONAZOLE NITRATE TALC FREE: 1.42 POWDER TOPICAL at 13:20

## 2022-04-27 RX ADMIN — Medication 10 MILLICURIE: at 07:26

## 2022-04-27 RX ADMIN — FUROSEMIDE 40 MG: 40 TABLET ORAL at 11:52

## 2022-04-27 RX ADMIN — REGADENOSON 0.4 MG: 0.08 INJECTION, SOLUTION INTRAVENOUS at 09:39

## 2022-04-27 RX ADMIN — MORPHINE SULFATE 2 MG: 2 INJECTION, SOLUTION INTRAMUSCULAR; INTRAVENOUS at 14:47

## 2022-04-27 RX ADMIN — METOPROLOL TARTRATE 75 MG: 25 TABLET, FILM COATED ORAL at 11:52

## 2022-04-27 ASSESSMENT — PAIN DESCRIPTION - ONSET: ONSET: ON-GOING

## 2022-04-27 ASSESSMENT — PAIN SCALES - WONG BAKER
WONGBAKER_NUMERICALRESPONSE: 8
WONGBAKER_NUMERICALRESPONSE: 8

## 2022-04-27 ASSESSMENT — PAIN - FUNCTIONAL ASSESSMENT
PAIN_FUNCTIONAL_ASSESSMENT: PREVENTS OR INTERFERES SOME ACTIVE ACTIVITIES AND ADLS
PAIN_FUNCTIONAL_ASSESSMENT: PREVENTS OR INTERFERES SOME ACTIVE ACTIVITIES AND ADLS

## 2022-04-27 ASSESSMENT — PAIN DESCRIPTION - DESCRIPTORS
DESCRIPTORS: ACHING
DESCRIPTORS: ACHING;DISCOMFORT

## 2022-04-27 ASSESSMENT — PAIN DESCRIPTION - PAIN TYPE: TYPE: CHRONIC PAIN

## 2022-04-27 ASSESSMENT — PAIN DESCRIPTION - LOCATION
LOCATION: BACK

## 2022-04-27 ASSESSMENT — PAIN SCALES - GENERAL
PAINLEVEL_OUTOF10: 10

## 2022-04-27 ASSESSMENT — PAIN DESCRIPTION - ORIENTATION
ORIENTATION: LOWER
ORIENTATION: LOWER
ORIENTATION: MID

## 2022-04-27 ASSESSMENT — PAIN DESCRIPTION - DIRECTION: RADIATING_TOWARDS: BACK, LEGS

## 2022-04-27 ASSESSMENT — PAIN DESCRIPTION - FREQUENCY: FREQUENCY: CONTINUOUS

## 2022-04-27 NOTE — PROGRESS NOTES
Start of stress test lexiscan stress test, IV infiltrate. Test terminated prior to nuclear injection. New site obtained and time given prior to repeat.

## 2022-04-27 NOTE — PROGRESS NOTES
OT BEDSIDE TREATMENT NOTE      Date:2022  Patient Name: Jeronimo Vargas  MRN: [id number]  : 1965  Room: [address] [place] [address]     Evaluating OT: Lyla Suárez OTR/L #[id number]      Referring Provider and Specific Provider Orders/Date:      22   OT eval and treat  Start:  22 1430,   End:  22 1430,   ONE TIME,   Standing Count:  1 Occurrences,   R         Anita Ma MD         Placement Recommendation: Subacute Rehab        Diagnosis:   1. COPD exacerbation (HCC)    2. Chest pain, unspecified type    3. Hyponatremia    4. Alcohol withdrawal delirium (HCC)         Surgery: None       Pertinent Medical History:       Past Medical History           Past Medical History:   Diagnosis Date    Arthritis      Back pain, chronic      Hyperlipidemia      Hypertension      Neck pain, chronic              Past Surgical History             Past Surgical History:   Procedure Laterality Date    EXTERNAL EAR SURGERY         i & d left ear.  EYE SURGERY         Lt. Eye  Dart removed    FRACTURE SURGERY         Rt. Tibia FX Rods & Pins    HEMORRHOID SURGERY        SHOULDER ARTHROSCOPY Right 2015     right shoulder bicep tenodesis subacromail decompression and debridement            Precautions:  Fall Risk, falls and alarm, room air with O2 sats after activities being 90-91% with O2 sats increasing with seated rest breaks,  history of ETOH abuse       Assessment of current deficits    [x]? ? Functional mobility            [x]? ?ADLs           [x]? ? Strength                   [x]? ? Cognition    [x]? ? Functional transfers          [x]? ? IADLs         [x]? ? Safety Awareness   [x]? ?Endurance    [x]? ? Fine Coordination              [x]? ? Balance      []? ? Vision/perception    []? ? Sensation      []? ?Gross Motor Coordination  [x]? ? ROM           []? ? Delirium                   []? ? Motor Control      OT PLAN OF CARE   OT POC based on physician orders, patient diagnosis and results of clinical assessment     Frequency/Duration 1-3 days/wk for 2 weeks PRN      Specific OT Treatment Interventions to include:   * Instruction/training on adapted ADL techniques and AE recommendations to increase functional independence within precautions       * Training on energy conservation strategies, correct breathing pattern and techniques to improve independence/tolerance for self-care routine  * Functional transfer/mobility training/DME recommendations for increased independence, safety, and fall prevention  * Patient/Family education to increase follow through with safety techniques and functional independence  * Recommendation of environmental modifications for increased safety with functional transfers/mobility and ADLs  * Cognitive retraining/development of therapeutic activities to improve problem solving, judgement, memory, and attention for increased safety/participation in ADL/IADL tasks  * Therapeutic exercise to improve motor endurance, ROM, and functional strength for ADLs/functional transfers  * Therapeutic activities to facilitate/challenge dynamic balance, stand tolerance for increased safety and independence with ADLs  * Therapeutic activities to facilitate gross/fine motor skills for increased independence with ADLs  * Positioning to improve skin integrity, interaction with environment and functional independence  * Delirium prevention/treatment  * Manual techniques for edema management     Recommended Adaptive Equipment: TBD      Home Living: Lives with 3 y/o grandson who he has custody of, mobile home, Ramp to enter.   Bathroom set-up: Unknown          Equipment owned: None; pt would benefit from use of FWW for increased balance     Prior Level of Function: Independent with ADLs , Independent with IADLs; ambulated independently.      Driving: Yes  Occupation: On SSDI       Pain Level: None                   Cognition: A&O: 4/4 ; Follows 3 step directions                 Memory: fair              Sequencing: fair              Problem solving: fair              Judgement/safety: fair     [facility]   How much help for putting on and taking off regular lower body clothing?: A Lot  How much help for Bathing?: A Lot  How much help for Toileting?: A Little  How much help for putting on and taking off regular upper body clothing?: A Little  How much help for taking care of personal grooming?: A Little  How much help for eating meals?: A Little  AM-PAC Inpatient Daily Activity Raw Score: 16                Functional Assessment:     Initial Eval Status  Date: 4/19/22    Treatment Status  Date: 4/27/22 STGs = LTGs  Time frame: 10-14 days   Feeding Maximal Assist   Limited by decreased UE ROM/strength/coordination     N/T; pt reports being able to eat/drink without assist  Not appropriate - will continue to assess pending swallow evaluation       Grooming Maximal Assist   Decreased functional reach and overall strength. Max A to bring cloth to face.     Pt declined grooming tasks at this time 2* to reports of completing grooming tasks prior to session Minimal Assist    UB Dressing Maximal Assist     Moderate Assist for gown mgmt; assist to tie lace around neck    Minimal Assist    LB Dressing Dependent   Assist to lift heels off bed to doff/don heel protectors/socks     N/T  Moderate Assist    Bathing Maximal Assist   Assist x 2 for rolling to reach buttocks/rear area      N/T  Completed prior to OT session.    Moderate Assist    Toileting Dependent   Incontinent of stool.  Pt has charles catheter.      N/T  Pt declined need to use bathroom at this time    Moderate Assist    Bed Mobility  Supine to sit: Not assessed   Sit to supine: Not assessed      NT due to pt overall debility, decreased activity tolerance, safety and fall risk.        Supervision for supine to sit; supervision for scooting; sit to supine at Supervision; bed alarm on Supine to sit: Minimal Assist   Sit to supine: Minimal Assist  Functional Transfers Not assessed     SBA for sit to stand transfers to/from EOB with wheeled walker    Verbal cues for hand placement to prevent pulling up on walker and improve safety.     Moderate Assist    Functional Mobility Not assessed       Minimal Assist with wheeled walker within room and hallway to simulate household distances. Verbal cues for overall safety. Assist needed to manage medical lines.     Moderate Assist x 1-2 with use of wheeled walker    Balance Sitting:      Static: not assessed     Dynamic: not assessed   Standing: not assessed      Fair plus sitting balance  Fair standing balance with FWW Sitting:     Static: fair     Dynamic: fair   Standing: fair minus/fair    Activity Tolerance poor  Fair/fair plus      SpO2 remained >90% on room air throughout session. Pt denied SOB, however fatigues easily. Increase sitting tolerance >5 minutes for improved engagement with functional activities and prep for functional transfers       Visual/  Perceptual Glasses: N/a      NA         Comments: Patient cleared by nursing staff. Upon arrival pt seated in bed. Pt agreeable to OT tx session. Pt educated with regards to bed mobility, hand placement, safety awareness, static sitting balance,  standing balance, transfer training, functional mobility,  ECT's, UE dressing. At end of session pt seated in bed with HOB elevated  with all lines and tubes intact, call light within reach. Overall, pt demonstrated fair minus independence and safety during completion of ADL/functional transfers/mobility tasks. Pt would benefit from continued skilled OT to increase safety and independence with completion of ADL/IADL tasks for functional independence and quality of life. Pt demo's slight instability during mobility, however increased balance with use of FWW. Increased time d/t decreased pace. Pt only wanted to complete mobility during session to increase balance.      Pt required cues and education as noted above for safe facilitation and completion of tasks. Therapist provided skilled monitoring of patient's response during treatment session. Prior to and at the end of session, environmental modifications  completed for patients safety and efficiency of treatment session. Overall, patient demonstrates minimal difficulties with completion of BADLs and IADLs. Factors contributing to these difficulties include decreased endurance, and generalized weakness. As noted above, patient likely to benefit from further OT intervention to increase independence, safety, and overall quality of life. Treatment:     ? Bed mobility: Facilitated bed mobility with cues for proper body mechanics and sequencing to prepare for ADL completion. ? Functional transfers: Facilitated transfers to/from EOB with cues for body alignment, safety and hand placement. ? Postural Balance: Sitting/standing balance retraining to improve righting reactions with postural changes during ADLs. ? Skilled positioning: Proper positioning to improve interaction with environment, overall functioning      · Pt has made fair  progress towards set goals    · D/C to [facility] with OT PRN      Treatment Time also includes thorough review of current medical information, gathering information on past medical history/social history and prior level of function, informal observation of tasks, assessment of data and education on plan of care and goals.     Treatment Time In: 2: 50 PM     Treatment Time Out: 3:16 PM            Treatment Charges: Mins Units   ADL/Home Mgt     [address]     [id number]   Ther Ex                 [id number]     Manual Therapy    [id number]     Neuro Re-ed         [id number]     Orthotic manage/training                               [id number]     Non Billable Time     Total Timed Treatment 26 610 [facility], [place]/ #[id number]

## 2022-04-27 NOTE — CARE COORDINATION
4-27- note: [facility] is scheduled to transport pt via wheelchair at 4pm to [facility], pt aware of dc time, he will call his family . HENS and Ambulette form done and in soft chart.  Electronically signed by Liliana Urias RN on 4/27/2022 at 3:41 PM

## 2022-04-27 NOTE — PLAN OF CARE
Problem: Pain:  Goal: Pain level will decrease  Description: Pain level will decrease  4/27/2022 0327 by Sean Goldsmith RN  Outcome: Progressing     Problem: Pain:  Goal: Control of chronic pain  Description: Control of chronic pain  4/27/2022 0327 by Sean Goldsmith RN  Outcome: Progressing     Problem: Discharge Planning:  Goal: Discharged to appropriate level of care  Description: Discharged to appropriate level of care  4/27/2022 0327 by Sean Goldsmith RN  Outcome: Progressing     Problem: Pain:  Goal: Pain level will decrease  Description: Pain level will decrease  4/27/2022 0327 by Sean Goldsmith RN  Outcome: Progressing     Problem: Pain:  Goal: Control of acute pain  Description: Control of acute pain  4/27/2022 0327 by Sean Goldsmith RN  Outcome: Progressing     Problem: Pain:  Goal: Control of chronic pain  Description: Control of chronic pain  4/27/2022 0327 by Sean Goldsmith RN  Outcome: Progressing     Problem: Tobacco Use:  Goal: Will participate in inpatient tobacco-use cessation counseling  Description: Will participate in inpatient tobacco-use cessation counseling  4/27/2022 0327 by Sean Goldsmith RN  Outcome: Progressing     Problem: Nutrition Deficit:  Goal: Ability to achieve adequate nutritional intake will improve  Description: Ability to achieve adequate nutritional intake will improve  4/27/2022 0327 by Sean Goldsmith RN  Outcome: Progressing     Problem: Falls - Risk of:  Goal: Will remain free from falls  Description: Will remain free from falls  4/27/2022 0327 by Sean Goldsmith RN  Outcome: Progressing     Problem: Falls - Risk of:  Goal: Absence of physical injury  Description: Absence of physical injury  4/27/2022 0327 by Sean Goldsmith RN  Outcome: Progressing     Problem: Skin Integrity:  Goal: Will show no infection signs and symptoms  Description: Will show no infection signs and symptoms  4/27/2022 0327 by Sean Goldsmith RN  Outcome: Progressing     Problem: Skin Integrity:  Goal: Absence of new skin breakdown  Description: Absence of new skin breakdown  4/27/2022 0327 by Earl Corley RN  Outcome: Progressing     Problem: Discharge Planning  Goal: Discharge to home or other facility with appropriate resources  4/27/2022 0327 by Earl Corley RN  Outcome: Progressing

## 2022-04-27 NOTE — DISCHARGE SUMMARY
[facility] Physician Discharge Summary       Bren Crandall MD  [place]  [phone]    Schedule an appointment as soon as possible for a visit          Schedule an appointment as soon as possible for a visit        Activity level: as tolerated    Diet: ADULT DIET; Dysphagia - Pureed; Mildly Thick ([place])    Labs:as per PCP    Dispo:SNF    Condition at discharge: Stable      Patient ID:  Hakeem [place]  [id number]  4 y.o.  1965    Admit date: 4/9/2022    Discharge date and time:  4/27/2022  3:34 PM    Admission Diagnoses: Principal Problem:    Acute chest pain  Active Problems:    Chest pain  Resolved Problems:    * No resolved hospital problems. *      Discharge Diagnoses: Principal Problem:    Acute chest pain  Active Problems:    Chest pain  Resolved Problems:    * No resolved hospital problems. *      Consults:  IP CONSULT TO CARDIOLOGY  IP CONSULT TO SOCIAL WORK  IP CONSULT TO CASE MANAGEMENT  PHARMACY TO DOSE VANCOMYCIN    Procedures: intubation and central line placement on 4/10. Stress test on 4/27    Hospital Course: Patient was admitted initially with chest pain and COPD exacerbation. He had negative troponins and EKG did not show any changes. Due to very suspicious nature of chest pain and high risk factors, initially patient was planned for cardiac cath. The hospital stay was complicated however, when patient went into severe alcohol withdrawal and was eventually intubated, sedated and on phenobarbital, valproic acid, valium and gabapentin for alcohol withdrawal. Eventually alcohol withdrawal improved and patient was extubated and slowly weaned off all withdrawal medications. After he was extubated, he developed some dysphagia and required NG tube insertion. Cardiology was still following and stated that they would try to do a stress test once NG tube was removed. This was done and the stress test was negative.  Cardiology cleared patient for discharge. He was very weak after being in hospital for over 2 weeks so was working with PT/OT who recommended [facility]. He was sent to facility for rehab in stable condition. He needs to FU with his PCP. He was advised strongly regarding staying sober and away from all drugs and alcohol. He stated he was ready to do this and seemed motivated. Discharge Exam:  Vitals:    04/27/22 1145 04/27/22 1255 04/27/22 1325 04/27/22 1400   BP: 104/77   124/67   Pulse: 101   82   Resp: 20 20 20 16   Temp: 98 °F (36.7 °C)   97.5 °F (36.4 °C)   TempSrc: Oral   Infrared   SpO2: 98%   98%   Weight:       Height:           General Appearance: alert and oriented x3. No acute distress but easily irritated  Skin: warm, slightly diaphoretic  Head: normocephalic and atraumatic  Eyes: pupils equal, round, and reactive to light, extraocular eye movements intact, conjunctivae normal  Neck: neck supple and non tender without mass   Pulmonary/Chest:upper airway congestion, otherwise clear. No respiratory distress on RA  Cardiovascular: normal rate, normal S1 and S2 and no carotid bruits  Abdomen: soft, non-tender, non-distended, normal bowel sounds, no masses or organomegaly  Extremities: no cyanosis, no clubbing and no edema  Neurologic: cranial nerves intact, no focal deficit, speech normal      I/O last 3 completed shifts: In: 200 [P.O.:980]  Out: 2625 [Urine:2625]  I/O this shift:  In: 660 [P.O.:660]  Out: 2250 [Urine:2250]      LABS:  No results for input(s): NA, K, CL, CO2, BUN, CREATININE, GLUCOSE, CALCIUM in the last 72 hours. No results for input(s): WBC, RBC, HGB, HCT, MCV, MCH, MCHC, RDW, PLT, MPV in the last 72 hours. No results for input(s): POCGLU in the last 72 hours. Imaging:   NM Cardiac Stress Test Nuclear Imaging   Final Result   1: The stress EKG report is provided separately. 2  this is a normal myocardial perfusion scan. There is no evidence of   ischemia or infarction.    3: The left ventricle is normal in size with a preserved ejection   fraction. 4: Prognostically, this is a low risk study. 5: No prior study available for comparison. FL MODIFIED BARIUM SWALLOW W VIDEO   Final Result   1. There is both aspiration and penetration with the thin barium. 2. There is deep penetration to the vocal folds with nectar consistency   barium. 3. There is transient penetration with the honey consistency barium there is   no evidence of aspiration. 4. Retention within the valleculae and piriform sinuses. Please see separate speech pathology report for full discussion of findings   and recommendations. US LIVER   Final Result   Limited evaluation due to patient body habitus. No acute sonographic findings. XR ABDOMEN FOR NG/OG/NE TUBE PLACEMENT   Final Result   Satisfactory position of the enteric tube. No change from prior exam.         XR CHEST ABDOMEN NG PLACEMENT   Final Result   Enteric tube terminates within the stomach. FL MODIFIED BARIUM SWALLOW W VIDEO   Final Result   Swallowing dysfunction as described above including aspiration of nectar and   deep penetration of honey consistency. Please see separate speech pathology report for full discussion of findings   and recommendations. XR CHEST PORTABLE   Final Result   Post endotracheal nasogastric extubation. Bilateral atelectasis and or   infiltrate with slight interval progression in the right mid lung. XR CHEST PORTABLE   Final Result   New atelectasis and or infiltrate at the right mid lung with stable   atelectasis and or infiltrate at the right base. XR CHEST PORTABLE   Final Result   New atelectasis and or infiltrate at the right base. XR ABDOMEN FOR NG/OG/NE TUBE PLACEMENT   Final Result   Nasogastric tube with the tip in the proximal gastric body.          XR CHEST PORTABLE   Final Result   Adequately positioned endotracheal tube, orogastric tube and right internal   jugular central venous line. XR CHEST PORTABLE   Final Result   No pneumonia or pleural effusion. Patient Instructions:      Medication List      START taking these medications    aspirin 81 MG chewable tablet  Take 1 tablet by mouth daily  Start taking on: April 28, 2022     budesonide 0.5 MG/2ML nebulizer suspension  Commonly known as: PULMICORT  Take 2 mLs by nebulization 2 times daily     famotidine 20 MG tablet  Commonly known as: PEPCID  Take 1 tablet by mouth 2 times daily     folic acid 1 MG tablet  Commonly known as: FOLVITE  Take 1 tablet by mouth daily     ipratropium-albuterol 0.5-2.5 (3) MG/3ML Soln nebulizer solution  Commonly known as: DUONEB  Inhale 3 mLs into the lungs every 4 hours (while awake)     isosorbide dinitrate 10 MG tablet  Commonly known as: ISORDIL  Take 1 tablet by mouth 3 times daily     Metoprolol Tartrate 75 MG Tabs  Take 75 mg by mouth 2 times daily     miconazole 2 % powder  Commonly known as: MICOTIN  Apply topically 2 times daily. nicotine 14 MG/24HR  Commonly known as: [address] [facility] 1 patch onto the skin daily  Start taking on: April 28, 2022     oxyCODONE 5 MG immediate release tablet  Commonly known as: ROXICODONE  Take 1 tablet by mouth every 6 hours as needed (severe pain) for up to 3 days.   Replaces: oxyCODONE 10 MG extended release tablet     therapeutic multivitamin-minerals tablet  Take 1 tablet by mouth daily  Start taking on: April 28, 2022        CHANGE how you take these medications    furosemide 40 MG tablet  Commonly known as: LASIX  1 tablet by Per NG tube route 2 times daily  What changed:   · medication strength  · how much to take  · how to take this  · when to take this        CONTINUE taking these medications    albuterol sulfate  (90 Base) MCG/ACT inhaler     Crestor 10 MG tablet  Generic drug: rosuvastatin     escitalopram 10 MG tablet  Commonly known as: LEXAPRO        STOP taking these medications    brompheniramine-pseudoephedrine-DM 2-30-10 MG/5ML syrup     hydroCHLOROthiazide 25 MG tablet  Commonly known as: HYDRODIURIL     hydrOXYzine 50 MG tablet  Commonly known as: ATARAX     ibuprofen 200 MG tablet  Commonly known as: ADVIL;MOTRIN     losartan 50 MG tablet  Commonly known as: COZAAR     naproxen 500 MG tablet  Commonly known as: NAPROSYN     NEURONTIN PO     omeprazole 20 MG delayed release capsule  Commonly known as: PRILOSEC     oxyCODONE 10 MG extended release tablet  Commonly known as: OXYCONTIN  Replaced by: oxyCODONE 5 MG immediate release tablet     oxyCODONE 15 MG T12a extended release tablet  Commonly known as: OXYCONTIN     potassium chloride 20 MEQ packet  Commonly known as: KLOR-CON     ROBAXIN-750 PO     traMADol 50 MG tablet  Commonly known as: ULTRAM     verapamil 360 MG extended release capsule  Commonly known as: VERELAN           Where to Get Your Medications      You can get these medications from any pharmacy    Bring a paper prescription for each of these medications  · oxyCODONE 5 MG immediate release tablet     Information about where to get these medications is not yet available    Ask your nurse or doctor about these medications  · aspirin 81 MG chewable tablet  · budesonide 0.5 MG/2ML nebulizer suspension  · famotidine 20 MG tablet  · folic acid 1 MG tablet  · furosemide 40 MG tablet  · ipratropium-albuterol 0.5-2.5 (3) MG/3ML Soln nebulizer solution  · isosorbide dinitrate 10 MG tablet  · Metoprolol Tartrate 75 MG Tabs  · miconazole 2 % powder  · nicotine 14 MG/24HR  · therapeutic multivitamin-minerals tablet           Note that more than 30 minutes was spent in preparing discharge papers, discussing discharge with patient, medication review, etc.    Signed:  Electronically signed by Krystina Pat MD on 4/27/2022 at 3:35 PM    NOTE: This report was transcribed using voice recognition software.  Every effort was made to ensure accuracy; however, inadvertent computerized transcription errors may be present.

## 2022-04-27 NOTE — PLAN OF CARE
Problem: Pain:  Goal: Pain level will decrease  Description: Pain level will decrease  4/27/2022 1303 by Susana Mcnulty RN  Outcome: Progressing  4/27/2022 0327 by Galilea Bingham RN  Outcome: Progressing  Goal: Control of acute pain  Description: Control of acute pain  4/27/2022 1303 by Susana Mcnulty RN  Outcome: Progressing  4/27/2022 0327 by Galilea Bingham RN  Outcome: Progressing  Goal: Control of chronic pain  Description: Control of chronic pain  4/27/2022 1303 by Susana Mcnulty RN  Outcome: Progressing  4/27/2022 0327 by Galilea Bingham RN  Outcome: Progressing     Problem: Discharge Planning:  Goal: Discharged to appropriate level of care  Description: Discharged to appropriate level of care  4/27/2022 1303 by Susana Mcnulty RN  Outcome: Progressing  4/27/2022 0327 by Galilea Bingham RN  Outcome: Progressing     Problem: Pain:  Goal: Pain level will decrease  Description: Pain level will decrease  4/27/2022 1303 by Susana Mcnulty RN  Outcome: Progressing  4/27/2022 0327 by Galilea Bingham RN  Outcome: Progressing  Goal: Control of acute pain  Description: Control of acute pain  4/27/2022 1303 by Susana Mcnulty RN  Outcome: Progressing  4/27/2022 0327 by Galilea Bingham RN  Outcome: Progressing  Goal: Control of chronic pain  Description: Control of chronic pain  4/27/2022 1303 by Susana Mcnulty RN  Outcome: Progressing  4/27/2022 0327 by Galilea Bingham RN  Outcome: Progressing     Problem: Tobacco Use:  Goal: Will participate in inpatient tobacco-use cessation counseling  Description: Will participate in inpatient tobacco-use cessation counseling  4/27/2022 1303 by Susana Mcnulty RN  Outcome: Progressing  4/27/2022 0327 by Galilea Bingham RN  Outcome: Progressing     Problem: Nutrition Deficit:  Goal: Ability to achieve adequate nutritional intake will improve  Description: Ability to achieve adequate nutritional intake will improve  4/27/2022 1303 by Susana Mcnulty RN  Outcome: Progressing  4/27/2022 0327 by Renae Hurd RN  Outcome: Progressing     Problem: Falls - Risk of:  Goal: Will remain free from falls  Description: Will remain free from falls  4/27/2022 1303 by Gena Guy RN  Outcome: Progressing  4/27/2022 0327 by Renae Hurd RN  Outcome: Progressing  Goal: Absence of physical injury  Description: Absence of physical injury  4/27/2022 1303 by Gena Guy RN  Outcome: Progressing  4/27/2022 0327 by Renae Hurd RN  Outcome: Progressing     Problem: Skin Integrity:  Goal: Will show no infection signs and symptoms  Description: Will show no infection signs and symptoms  4/27/2022 1303 by Gena Guy RN  Outcome: Progressing  4/27/2022 0327 by Renae Hurd RN  Outcome: Progressing  Goal: Absence of new skin breakdown  Description: Absence of new skin breakdown  4/27/2022 1303 by Gena Guy RN  Outcome: Progressing  4/27/2022 0327 by Renae Hurd RN  Outcome: Progressing     Problem: Discharge Planning  Goal: Discharge to home or other facility with appropriate resources  4/27/2022 1303 by Gena Guy RN  Outcome: Progressing  4/27/2022 0327 by Renae Hurd RN  Outcome: Progressing

## 2022-04-27 NOTE — PROCEDURES
Pharmacologic Nuclear Stress Test    Date: 4/27/2022    Indication: Chest pain    Description of procedure:    Protocol: Regadenoson stress SPECT myocardial perfusion imaging    Initial IV infiltrated with injection of regadenoson. Unclear if he received any of the medication systemically (did not have any symptoms). Tracer was not injected. Thus postponed procedure, replaced IV, and after about an hour and a half study was repeated with new IV and a new dose of regadenoson followed by tracer. Baseline EKG: NSR 89 bpm. Normal axis/intervals. Nonspecific ST changes  Baseline BP: 130/68 mmHg    0.4 mg of regadenoson was injected followed by radiotracer injection. Patient reported no chest pain. Stress EKG showed no evidence of ischemia, and no arrhythmias were noted. Impression:  1. No evidence of regadenoson induced ischemia on stress EKG. 2. Nuclear images to be reported separately.     Gabbi Colon MD, [id number] [facility]

## 2022-04-29 ENCOUNTER — TELEPHONE (OUTPATIENT)
Dept: CARDIOLOGY CLINIC | Age: 57
End: 2022-04-29

## 2022-04-29 NOTE — ADT AUTH CERT
Chest Pain - Care Day 18 (4/26/2022) by Cici Ludwig RN       Review Status Review Entered   Completed 4/28/2022 16:40      Criteria Review      Care Day: 18 Care Date: 4/26/2022 Level of Care: Intermediate Care    Guideline Day 2    Level Of Care    (X) ICU, intermediate care, or telemetry to discharge    Clinical Status    (X) * Hemodynamic stability    (X) * Acute coronary syndrome ruled out    (X) * Pain absent or managed    (X) * Dangerous arrhythmia absent    (X) * No identification of etiology of pain requiring inpatient care    ( ) * Discharge plans and education understood    Activity    (X) * Ambulatory or acceptable for next level of care    Routes    (X) * Oral hydration    (X) * Oral medications or regimen acceptable for next level of care    (X) * Oral diet or acceptable for next level of care    Medications    (X) Possible aspirin or cardiac medications    * Milestone   Additional Notes   DATE:    4/26/2022      Pertinent Updates:   Pending placement      Vitals:   98.6, 18, 70, 118/70, 93% on room air      Abnl/Pertinent Labs/Radiology/Diagnostic Studies:   Glucose 132      Physical Exam:   General Appearance: alert and oriented x3. No acute distress but easily irritated   Skin: warm, slightly diaphoretic   Head: normocephalic and atraumatic   Eyes: pupils equal, round, and reactive to light, extraocular eye movements intact, conjunctivae normal   Neck: neck supple and non tender without mass    Pulmonary/Chest:upper airway congestion, otherwise clear.  No respiratory distress on RA   Cardiovascular: normal rate, normal S1 and S2 and no carotid bruits   Abdomen: soft, non-tender, non-distended, normal bowel sounds, no masses or organomegaly   Extremities: no cyanosis, no clubbing and no edema   Neurologic: cranial nerves intact, no focal deficit, speech normal      MD Consults/Assessments & Plans:   *Internal Med   Assessment:   Principal Problem:     Acute chest pain   Active Problems:     Chest pain   Plan:   1. Dysphagia   -Failed multiple swallow studies after extubation and had NG tube in place, however repeat swallow study yesterday showed he could tolerate pureed food and NG tube was removed yesterday.   -Will continue working with speech therapy   2. Chest pain, rule out ACS   -Chest pain/pressure with SOB and diaphoresis and nausea   -Troponin negative x 2, EKG no ST changes   -Patient has SYL score 3, HEART score 5 on admission   -Multiple risk factors include HTN, HLD, obesity, smoker, family history (brother)   -Cardio consulted, planning for cath initially however with alcohol withdrawal and intubation previously was put on hold. Cardiology now planning for stress test tomorrow   -Monitor on telemetry   2. Possible COPD exacerbation   -No formal diagnosis of COPD but was given albuterol PRN by PCP   -Wheezing improved but still present, will start pulmicort nebulizer   -Off steroids now   -Nebulizers    3. Severe alcohol withdrawal with DTs (now resolved)   -Patient initially was not truthful with how much he drank, advised patient that was assessing for risk of alcohol withdrawal and still did not admit true amount of alcohol use   -Went into severe alcohol withdrawal with DTs and was eventually intubated for airway protection    -Now off all withdrawal medications, doing well   -Agreeable to peer recovery discussions and seems motivated to stop drinking   4. Hyponatremia (resolved)   -Sodium 123 on admission, was also 123 last month. Yesterday 137, labs today not drawn yet. -Off IVF and tube feeds now, has pureed diet as recommended per speech therapy   -Possibly chronic from beer potomania as is alcoholic   4.  Transaminitis   -LFTs have been trending up during admission   -Was on depakote for alcohol withdrawal previously, now stopped   -Patient also alcoholic   -Liver US checked and was poor study but did not show any acute abnormality    -Monitor LFTs daily, have been improving starting yesterday   5. HTN   -Home meds HCTZ, lasix, losartan and verapamil   -BP on admission 127/85, this morning 130/70   -Continue home meds and monitor BP   5. HLD   -Continue statin   6. GERD   -Continue PPI   7. Tobacco use disorder   -Smokes 1/2-1PPD for 45 years   -Currently has nicotine patch   -Counseled on smoking cessation   8. Morbid obesity   -BMI 40, counseled on lifestyle changes for once discharged from hospital       Medications:   aspirin chewable tablet 81 mg   Dose: 81 mg   Freq: DAILY Route: PER NG TUBE      Pulmicort 0.5 mg Nebulized BID      enoxaparin (LOVENOX) injection 30 mg   Dose: 30 mg   Freq: 2 times daily Route: SC      Pepcid 20 mg PO BID      folic acid 1 mg, thiamine (B-1) 100 mg in dextrose 5 % 50 mL IVPB   Freq: DAILY Route: IV      furosemide (LASIX) tablet 40 mg   Dose: 40 mg   Freq: 2 TIMES DAILY Route: PER NG TUBE      insulin lispro (HUMALOG) injection vial 0-3 Units   Dose: 0-3 Units   Freq: NIGHTLY Route: SC      insulin lispro (HUMALOG) injection vial 0-6 Units   Dose: 0-6 Units   Freq: 3 TIMES DAILY WITH MEALS       ipratropium-albuterol (DUONEB) nebulizer solution 1 ampule   Dose: 1 ampule   Freq: EVERY 4 HOURS Route: IN      isosorbide dinitrate (ISORDIL) tablet 10 mg   Dose: 10 mg   Freq: 3 TIMES DAILY Route: PER NG TUBE      metoprolol tartrate (LOPRESSOR) tablet 75 mg   Dose: 75 mg   Freq: 2 TIMES DAILY Route: PER NG TUBE      nicotine (NICODERM CQ) 14 MG/24HR 1 patch   Dose: 1 patch   Freq: DAILY Route: TD      rosuvastatin (CRESTOR) tablet 10 mg   Dose: 10 mg   Freq: DAILY Route: PER NG TUBE      therapeutic multivitamin-minerals 1 tablet   Dose: 1 tablet   Freq: DAILY Route: PO      Morphine 2 mg IV x 2      Oxycodone 5 mg Peg Tube X 2      PT/OT/SLP/CM Assessments or Notes:   CM   4-26-Cm note: ( covid neg 4-10) [facility] has Precert , pt will need a Rapid Covid test on day of DC , HENS started, will need a signed EBONY. Pt scheduled for a srtress test tomorrow.  [facility] is asking for a 6 minute walk test for oxygen needs when he discharges from their facility.            Concurrent Review  by Mitchel Naidu RN       Review Status Review Entered   In Primary 2022 16:29      Criteria Review   DATE:   2022     Vitals:  BP (!) 150/96   Pulse 120   Temp 99.5 °F (37.5 °C) (Oral)   Resp 21   Ht 6' (1.829 m)   Wt 257 lb (116.6 kg)   SpO2 93%   BMI 34.86 kg/m²   Pulse  Av.8  Min: 85  Max: 634  Systolic (61DLO), OEY:316 , Min:122 , IOL:274    Diastolic (38DOA), RICO:93, Min:75, Max:114     Abnl/Pertinent Labs/Radiology/Diagnostic Studies:  Glucose 181     XR Abd  Satisfactory position of the enteric tube.  No change from prior exam.     Physical Exam:  In general, this is a well developed, well nourished who appears stated age. Pt on vent and sedated.   HEENT: Pt on vent and sedated. Neck-  no stridor, no noted enlargement of the thyroid, no carotid bruit. no jugular venous distention   RESPIRATORY: Chest symmetrical.  No accessory muscle use.   Lung auscultation - few rhonchi  CARDIOVASCULAR:     Heart Ausculation - Regular rate and rhythm, 2/6 systolic murmur, No s3 or rub.   + lower extremity edema,  Distal pulses palpable, no clubbing  ABDOMEN: Bowel sounds present. No Palpable masses. MS: Pt on vent and sedated.    : Deferred  Rectal Exam: Deferred  SKIN: warm and dry   NEURO / PSYCH: Pt on vent and sedated.      MD Consults/Assessments & Plans:  Critical Care  The patient's case was discussed in multidisciplinary rounds including critical care specialist, nursing, RT and pharmacy. [facility] evaluation is as follows:   ETOH withdrawal with delirium tremens - Resolved  --Extubated on , on 2L NC  --Other medications for DTs include gabapentin and clonidine, reduce them slowly  --Continue Thiamine and folic acid, banana bag  Acute hypoxemic respiratory failure due to staphylococcal pneumonia (MSSA)  --Completed antibiotics  --On 2L NC  Chest pain R/O ACS  --Followed by cardiology  --Plan for heart cath when improved  --Continue  beta blocker and imdur, ASA and ARB  --Not on statin as he is unable to take oral route.  If he continues with dysphagia, then will insert NG  Chronic diastolic heart failure  --Continue afterload reduction as stated above  COPD exacerbation  --Continue aerosolized bronchodilators including duoneb and pulmicort  Hyponatremia, chronic possible due to chronic beer intake  --Lasix 40 mg BID with goal of -1L to -2L   --Daily BMP   HTN  --ARB on hold, continue BB, clonidine and hydralazine  HLD  --Continue rosuivastatin  GERD  --On protonix  Tobacco use disorder  --Continue nicotine patch  Morbid obesity  -Will need counseling on lifestyle modifications  Prolonged QTc  --Avoid medications that prolong the QT  Dysphagia  --NG inserted     Internal Med  Assessment:  Principal Problem:    Acute chest pain  Active Problems:    Chest pain  Plan:  1. Dysphagia  -Failed multiple swallow studies after extubation and had NG tube in place and is on tube feedings  -Will continue working with speech therapy  2. Chest pain, rule out ACS  -Chest pain/pressure with SOB and diaphoresis and nausea  -Troponin negative x 2, EKG no ST changes  -Patient has SYL score 3, HEART score 5   -Multiple risk factors include HTN, HLD, obesity, smoker, family history (brother)  -Cardio consulted, planning for cath initially however with alcohol withdrawal and intubation previously was put on hold. Plan to do cath when patient more medically stable  -Monitor on telemetry  2. Possible COPD exacerbation  -No formal diagnosis of COPD but was given albuterol PRN by PCP  -Wheezing improved  -Given 125mg solumedrol in ER and had two days of 40mg BID, now stopped per ICU  -Nebulizers   3.  Severe alcohol withdrawal with DTs (now resolved)  -Patient initially was not truthful with how much he drank, advised patient that was assessing for risk of alcohol withdrawal and still did not admit true amount of alcohol use  -Went into severe alcohol withdrawal with DTs and was eventually intubated for airway protection   -Now off all withdrawal medications, doing well  -Agreeable to peer recovery discussions and seems motivated to stop drinking  4. Hyponatremia (resolved)  -Sodium 123 on admission, was also 123 last month. Today 145  -Was given 1L NS bolus in ER and had about 24hrs of NS 100ml/hr. Now off IVF but on tube feedings  -Possibly chronic from beer potomania as is alcoholic  4. HTN  -Home meds HCTZ, lasix, losartan and verapamil  -BP on admission 127/85, this morning 148/94  -Continue home meds and monitor BP  5. HLD  -Continue statin  6. GERD  -Continue PPI  7. Tobacco use disorder  -Smokes 1/2-1PPD for 45 years  -Currently has nicotine patch  -Counseled on smoking cessation  8. Morbid obesity  -BMI 40, counseled on lifestyle changes for once discharged from hospital      Cardiology  ASSESSMENT / PLAN:  Exertional chest pain - Likely angina (CP with diaphoresis and dyspnea on exertion). Awaiting invasive coronary angiogram once stable potentially on Monday if there are no acute issues over the weekend and when liver function improves  Continue beta-blocker and aspirin and resume statin when liver function test stable  Essential HTN -   Now hypotensive and subsequently I have discontinued clonidine patch. Dyslipidemia  -resume statin therapy when liver function test is stable  Tobacco abuse -  counseled to quit smoking  Chronic HFpEF - Diuretics as neede  Probable KULDIP - Recommend Out-Pt sleep study  Non-morbid Obesity, BMI 38 - Diet, exercise and weight loss will be discussed.   Hx of Marijuana and ETOH use -  counseled to quit  Chronic back pain - on Oxycodone as an outpatient  Family Hx of CAD  Anemia, Chronic - Monitor H/H  History of elevated LFT's in 3/2022 - Monitor LFTs        Medications:  aspirin chewable tablet 81 mg  Dose: 81 mg  Freq: DAILY Route: PER NG TUBE  enoxaparin (LOVENOX) injection 30 mg  Dose: 30 mg  Freq: 2 times daily Route: SC  folic acid 1 mg, thiamine (B-1) 100 mg in dextrose 5 % 50 mL IVPB  Freq: DAILY Route: IV  furosemide (LASIX) injection 40 mg  Dose: 40 mg  Freq: 2 TIMES DAILY Route: IV  furosemide (LASIX) tablet 40 mg  Dose: 40 mg  Freq: 2 TIMES DAILY Route: PER NG TUBE  insulin lispro (HUMALOG) injection vial 0-3 Units  Dose: 0-3 Units  Freq: NIGHTLY Route: SC  insulin lispro (HUMALOG) injection vial 0-6 Units  Dose: 0-6 Units  Freq: 3 TIMES DAILY WITH MEALS   ipratropium-albuterol (DUONEB) nebulizer solution 1 ampule  Dose: 1 ampule  Freq: EVERY 4 HOURS Route: IN  isosorbide dinitrate (ISORDIL) tablet 10 mg  Dose: 10 mg  Freq: 3 TIMES DAILY Route: PER NG TUBE  metoprolol tartrate (LOPRESSOR) tablet 25 mg  Dose: 25 mg  Freq: ONCE Route: PER NG TUBE  metoprolol tartrate (LOPRESSOR) tablet 50 mg  Dose: 50 mg  Freq: 2 TIMES DAILY Route: PER NG TUBE  metoprolol tartrate (LOPRESSOR) tablet 75 mg  Dose: 75 mg  Freq: 2 TIMES DAILY Route: PER NG TUBE  miconazole (MICOTIN) 2 % powder  Freq: 2 TIMES DAILY Route: TOP  nicotine (NICODERM CQ) 14 MG/24HR 1 patch  Dose: 1 patch  Freq: DAILY Route: TD  rosuvastatin (CRESTOR) tablet 10 mg  Dose: 10 mg  Freq: DAILY Route: PER NG TUBE  therapeutic multivitamin-minerals 1 tablet  Dose: 1 tablet  Freq: DAILY Route: PO     PT/OT/SLP/CM Assessments or Notes:  CM  4-22-UPDATE: [facility] cannot accept pt due to ETOH/Marijuana, pt MAD, will not give another choice at this time. CM/SS will follow.  4-22-Cm note: ( covid neg 4-10) pt is on 2l nc , has NG for tube feeding, pt very aggitated today because they did not repeat his swallow evaluation, pt has PRS following him for needs at dc , also pt will need PT/OT on case to determine dc needs, pt was given a list of SNF'S , the only choice he would give at this time is [facility] in [place], referral made, no beds expected till beginning of the week. Will await decision.  Pt will need PRECERT      Chest Pain - Care Day 16 (4/24/2022) by Nina Burnett RN       Review Status Review Entered   Completed 4/26/2022 16:06      Criteria Review      Care Day: 16 Care Date: 4/24/2022 Level of Care: ICU    Guideline Day 2    Level Of Care    (X) ICU, intermediate care, or telemetry to discharge    Clinical Status    ( ) * Hemodynamic stability    4/26/2022 4:06 PM EDT by Adalid Cable      BP (!) 138/107   Pulse 97   Temp 98.6 °F (37 °C) (Temporal)   Resp 14   Ht 6' (1.829 m)   Wt 257 lb (116.6 kg)   SpO2 97%   BMI 34.86 kg/m²    (X) * Acute coronary syndrome ruled out    ( ) * Pain absent or managed    (X) * Dangerous arrhythmia absent    (X) * No identification of etiology of pain requiring inpatient care    ( ) * Discharge plans and education understood    Activity    (X) * Ambulatory or acceptable for next level of care    Routes    (X) * Oral hydration    (X) * Oral medications or regimen acceptable for next level of care    4/26/2022 4:06 PM EDT by Adalid Cable      ASA DAILY, LOVENOX BID, FOLIC ACID/THIAMINE IV DAILY, LASIX BID, HUMALOG 4XDAY SS, DUONEB Q4HR, ISORDIL TID, LOPRESSOR BID, STATIN DAILY, MORPHINE IV X3, ROXICODONE X2    (X) * Oral diet or acceptable for next level of care    Medications    (X) Possible aspirin or cardiac medications    * Milestone   Additional Notes   INTERNAL MED   4/24/22:      Subjective:       Pt reports that he is feeling ok but still very frustrated with still having NGT and still being in the hospital. Complains that his chronic back pain is flaring up from sitting in the bed.       Per RN: Nothing to report           ROS: Patient denies CP, palpitations, fever/chills, SOB.       Scheduled Medications   · enoxaparin 30 mg SubCUTAneous BID   · metoprolol tartrate 75 mg Per NG tube BID   · isosorbide dinitrate 10 mg Per NG tube TID   · furosemide 40 mg Per NG tube BID   · miconazole Topical BID   · lidocaine 1 patch TransDERmal Daily   · aspirin 81 mg Per NG tube Daily   · rosuvastatin 10 mg Per NG tube Daily   · thiamine and folic acid IVPB IntraVENous Daily   · ipratropium-albuterol 1 ampule Inhalation Q4H   · insulin lispro 0-6 Units SubCUTAneous TID WC   · insulin lispro 0-3 Units SubCUTAneous Nightly   · sodium chloride flush 5-40 mL IntraVENous 2 times per day   · multivitamin 1 tablet Oral Daily   · [Held by provider] losartan 50 mg Oral Daily   · sodium chloride flush 5-40 mL IntraVENous 2 times per day   · nicotine 1 patch TransDERmal Daily             PRN Medications    morphine, 2 mg, Q4H PRN   oxyCODONE, 5 mg, Q4H PRN   acetaminophen, 650 mg, Q6H PRN    Or   acetaminophen, 650 mg, Q6H PRN   glycopyrrolate, 1 mg, TID PRN   dextrose bolus (hypoglycemia), 125 mL, PRN    Or   dextrose bolus (hypoglycemia), 250 mL, PRN   glucose, 15 g, PRN   glucagon (rDNA), 1 mg, PRN   dextrose, 100 mL/hr, PRN   hydrALAZINE, 10 mg, Q6H PRN   sodium chloride flush, 5-40 mL, PRN   sodium chloride, , PRN   sodium chloride flush, 5-40 mL, PRN   sodium chloride, , PRN   ondansetron, 4 mg, Q8H PRN    Or   ondansetron, 4 mg, Q6H PRN   polyethylene glycol, 17 g, Daily PRN               Objective:       BP (!) 138/107   Pulse 97   Temp 98.6 °F (37 °C) (Temporal)   Resp 14   Ht 6' (1.829 m)   Wt 257 lb (116.6 kg)   SpO2 97%   BMI 34.86 kg/m²             General Appearance: alert and oriented x3. No acute distress but easily irritated   Skin: warm, slightly diaphoretic   Head: normocephalic and atraumatic   Eyes: pupils equal, round, and reactive to light, extraocular eye movements intact, conjunctivae normal   Neck: neck supple and non tender without mass    Pulmonary/Chest:upper airway congestion, otherwise clear.  No respiratory distress on 2L NC   Cardiovascular: normal rate, normal S1 and S2 and no carotid bruits   Abdomen: soft, non-tender, non-distended, normal bowel sounds, no masses or organomegaly   Extremities: no cyanosis, no clubbing and no edema   Neurologic: cranial nerves intact, no focal deficit, speech normal but voice hoarse (improving)           Recent Labs     04/23/22   0554 04/23/22   1754 04/24/22   0558    140 137   K 3.5 3.9 4.2    100 98   CO2 28 27 28   BUN 31* 30* 26*   CREATININE 0.7 0.8 0.7   GLUCOSE 196* 166* 219*   CALCIUM 10.1 9.6 9.3           Recent Labs     04/23/22   0554 04/23/22   1754 04/24/22   0558   ALKPHOS 182* 168* 161*   PROT 8.5* 8.2 7.8   LABALBU 3.5 3.2* 3.0*   BILITOT 0.4 0.3 0.4   * 196* 153*   * 483* 423*           Recent Labs     04/23/22   0554 04/24/22   0558   WBC 12.3* 10.6   RBC 4.10 3.98   HGB 10.9* 10.4*   HCT 34.4* 33.5*   MCV 83.9 84.2   MCH 26.6 26.1   MCHC 31.7* 31.0*   RDW 19.3* 19.2*   * 532*   MPV 9.8 10.1               Radiology:    US LIVER   Final Result   Limited evaluation due to patient body habitus.       No acute sonographic findings.           XR ABDOMEN FOR NG/OG/NE TUBE PLACEMENT   Final Result   Satisfactory position of the enteric tube.  No change from prior exam.           XR CHEST ABDOMEN NG PLACEMENT   Final Result   Enteric tube terminates within the stomach.           FL MODIFIED BARIUM SWALLOW W VIDEO   Final Result   Swallowing dysfunction as described above including aspiration of nectar and   deep penetration of honey consistency.       Please see separate speech pathology report for full discussion of findings   and recommendations.           XR CHEST PORTABLE   Final Result   Post endotracheal nasogastric extubation.  Bilateral atelectasis and or   infiltrate with slight interval progression in the right mid lung.           XR CHEST PORTABLE   Final Result   New atelectasis and or infiltrate at the right mid lung with stable   atelectasis and or infiltrate at the right base.           XR CHEST PORTABLE   Final Result   New atelectasis and or infiltrate at the right base.           XR ABDOMEN FOR NG/OG/NE TUBE PLACEMENT   Final Result   Nasogastric tube with the tip in the proximal gastric body.           XR CHEST PORTABLE   Final Result   Adequately positioned endotracheal tube, orogastric tube and right internal   jugular central venous line.           XR CHEST PORTABLE   Final Result   No pneumonia or pleural effusion.                   Assessment:   Principal Problem:     Acute chest pain   Active Problems:     Chest pain   Resolved Problems:     * No resolved hospital problems. *           Plan:   1. Dysphagia   -Failed multiple swallow studies after extubation and had NG tube in place and is on tube feedings   -Will continue working with speech therapy       2. Chest pain, rule out ACS   -Chest pain/pressure with SOB and diaphoresis and nausea   -Troponin negative x 2, EKG no ST changes   -Patient has SYL score 3, HEART score 5 on admission   -Multiple risk factors include HTN, HLD, obesity, smoker, family history (brother)   -Cardio consulted, planning for cath initially however with alcohol withdrawal and intubation previously was put on hold. Cardiology now planning for stress test once NG tube out and otherwise more stable. -Monitor on telemetry       2. Possible COPD exacerbation   -No formal diagnosis of COPD but was given albuterol PRN by PCP   -Wheezing improved   -Off steroids now   -Nebulizers        3. Severe alcohol withdrawal with DTs (now resolved)   -Patient initially was not truthful with how much he drank, advised patient that was assessing for risk of alcohol withdrawal and still did not admit true amount of alcohol use   -Went into severe alcohol withdrawal with DTs and was eventually intubated for airway protection    -Now off all withdrawal medications, doing well   -Agreeable to peer recovery discussions and seems motivated to stop drinking       4. Hyponatremia (resolved)   -Sodium 123 on admission, was also 123 last month. Today 137   -Was given 1L NS bolus in ER and had about 24hrs of NS 100ml/hr.  Now off IVF but on tube feedings with FWF 100ml Q6H   -Possibly chronic from beer potomania as is alcoholic       4. Transaminitis   -LFTs have been trending up during admission   -Was on depakote for alcohol withdrawal previously, now stopped   -Patient also alcoholic   -Liver US checked and was poor study but did not show any acute abnormality    -Monitor LFTs daily, have been improving starting yesterday       5. HTN   -Home meds HCTZ, lasix, losartan and verapamil   -BP on admission 127/85, this morning 127/72   -Continue home meds and monitor BP       5. HLD   -Continue statin       6. GERD   -Continue PPI       7. Tobacco use disorder   -Smokes 1/2-1PPD for 45 years   -Currently has nicotine patch   -Counseled on smoking cessation       8. Morbid obesity   -BMI 40, counseled on lifestyle changes for once discharged from hospital        ===========================   CARDIOLOGY   Given ongoing issues with recovering from severe alcohol withdrawal, dysphagia requiring NG tube, liver abnormalities, in the absence of ongoing chest pain will hold off on heart catheterization for now as his troponins were undetectable and EKG showed only nonspecific changes.       Recommend pharmacologic stress testing when other issues improved and NG tube removed.  If he has further chest pain please reconsult.  Otherwise we will be available as needed.

## 2022-05-16 ENCOUNTER — APPOINTMENT (OUTPATIENT)
Dept: GENERAL RADIOLOGY | Age: 57
End: 2022-05-16
Payer: MEDICARE

## 2022-05-16 ENCOUNTER — APPOINTMENT (OUTPATIENT)
Dept: CT IMAGING | Age: 57
End: 2022-05-16
Payer: MEDICARE

## 2022-05-16 ENCOUNTER — HOSPITAL ENCOUNTER (EMERGENCY)
Age: 57
Discharge: HOME | End: 2022-05-16
Attending: EMERGENCY MEDICINE
Payer: MEDICARE

## 2022-05-16 VITALS
WEIGHT: 250 LBS | BODY MASS INDEX: 33.86 KG/M2 | TEMPERATURE: 98.1 F | HEIGHT: 72 IN | HEART RATE: 80 BPM | RESPIRATION RATE: 20 BRPM | DIASTOLIC BLOOD PRESSURE: 91 MMHG | SYSTOLIC BLOOD PRESSURE: 165 MMHG | OXYGEN SATURATION: 96 %

## 2022-05-16 DIAGNOSIS — J90 BILATERAL PLEURAL EFFUSION: ICD-10-CM

## 2022-05-16 DIAGNOSIS — R06.00 DYSPNEA AND RESPIRATORY ABNORMALITIES: Primary | ICD-10-CM

## 2022-05-16 DIAGNOSIS — R06.89 DYSPNEA AND RESPIRATORY ABNORMALITIES: Primary | ICD-10-CM

## 2022-05-16 LAB
ALBUMIN SERPL-MCNC: 3.5 G/DL (ref 3.5–5.2)
ALP BLD-CCNC: 94 U/L (ref 40–129)
ALT SERPL-CCNC: 16 U/L (ref 0–40)
ANION GAP SERPL CALCULATED.3IONS-SCNC: 11 MMOL/L (ref 7–16)
AST SERPL-CCNC: 14 U/L (ref 0–39)
BASOPHILS ABSOLUTE: 0.03 E9/L (ref 0–0.2)
BASOPHILS RELATIVE PERCENT: 0.4 % (ref 0–2)
BILIRUB SERPL-MCNC: 0.4 MG/DL (ref 0–1.2)
BUN BLDV-MCNC: 10 MG/DL (ref 6–20)
CALCIUM SERPL-MCNC: 9.3 MG/DL (ref 8.6–10.2)
CHLORIDE BLD-SCNC: 101 MMOL/L (ref 98–107)
CO2: 27 MMOL/L (ref 22–29)
CREAT SERPL-MCNC: 0.7 MG/DL (ref 0.7–1.2)
D DIMER: 256 NG/ML DDU
EKG ATRIAL RATE: 75 BPM
EKG P AXIS: 31 DEGREES
EKG P-R INTERVAL: 122 MS
EKG Q-T INTERVAL: 430 MS
EKG QRS DURATION: 86 MS
EKG QTC CALCULATION (BAZETT): 480 MS
EKG R AXIS: 44 DEGREES
EKG T AXIS: 28 DEGREES
EKG VENTRICULAR RATE: 75 BPM
EOSINOPHILS ABSOLUTE: 0.14 E9/L (ref 0.05–0.5)
EOSINOPHILS RELATIVE PERCENT: 2 % (ref 0–6)
GFR AFRICAN AMERICAN: >60
GFR NON-AFRICAN AMERICAN: >60 ML/MIN/1.73
GLUCOSE BLD-MCNC: 144 MG/DL (ref 74–99)
HCT VFR BLD CALC: 32.2 % (ref 37–54)
HEMOGLOBIN: 10.1 G/DL (ref 12.5–16.5)
IMMATURE GRANULOCYTES #: 0.06 E9/L
IMMATURE GRANULOCYTES %: 0.9 % (ref 0–5)
INFLUENZA A BY PCR: NOT DETECTED
INFLUENZA B BY PCR: NOT DETECTED
INR BLD: 1.1
LIPASE: 118 U/L (ref 13–60)
LYMPHOCYTES ABSOLUTE: 0.85 E9/L (ref 1.5–4)
LYMPHOCYTES RELATIVE PERCENT: 12.3 % (ref 20–42)
MCH RBC QN AUTO: 27.8 PG (ref 26–35)
MCHC RBC AUTO-ENTMCNC: 31.4 % (ref 32–34.5)
MCV RBC AUTO: 88.7 FL (ref 80–99.9)
MONOCYTES ABSOLUTE: 0.52 E9/L (ref 0.1–0.95)
MONOCYTES RELATIVE PERCENT: 7.5 % (ref 2–12)
NEUTROPHILS ABSOLUTE: 5.3 E9/L (ref 1.8–7.3)
NEUTROPHILS RELATIVE PERCENT: 76.9 % (ref 43–80)
PDW BLD-RTO: 19.7 FL (ref 11.5–15)
PLATELET # BLD: 220 E9/L (ref 130–450)
PMV BLD AUTO: 9.6 FL (ref 7–12)
POTASSIUM REFLEX MAGNESIUM: 4 MMOL/L (ref 3.5–5)
PRO-BNP: 1200 PG/ML (ref 0–125)
PROTHROMBIN TIME: 12.6 SEC (ref 9.3–12.4)
RBC # BLD: 3.63 E12/L (ref 3.8–5.8)
SARS-COV-2, NAAT: NOT DETECTED
SODIUM BLD-SCNC: 139 MMOL/L (ref 132–146)
TOTAL PROTEIN: 6.9 G/DL (ref 6.4–8.3)
TROPONIN, HIGH SENSITIVITY: 9 NG/L (ref 0–11)
WBC # BLD: 6.9 E9/L (ref 4.5–11.5)

## 2022-05-16 PROCEDURE — 93010 ELECTROCARDIOGRAM REPORT: CPT | Performed by: INTERNAL MEDICINE

## 2022-05-16 PROCEDURE — 87502 INFLUENZA DNA AMP PROBE: CPT

## 2022-05-16 PROCEDURE — 80053 COMPREHEN METABOLIC PANEL: CPT

## 2022-05-16 PROCEDURE — 71045 X-RAY EXAM CHEST 1 VIEW: CPT

## 2022-05-16 PROCEDURE — 71275 CT ANGIOGRAPHY CHEST: CPT

## 2022-05-16 PROCEDURE — 36415 COLL VENOUS BLD VENIPUNCTURE: CPT

## 2022-05-16 PROCEDURE — 6360000002 HC RX W HCPCS: Performed by: EMERGENCY MEDICINE

## 2022-05-16 PROCEDURE — 85610 PROTHROMBIN TIME: CPT

## 2022-05-16 PROCEDURE — 83880 ASSAY OF NATRIURETIC PEPTIDE: CPT

## 2022-05-16 PROCEDURE — 85378 FIBRIN DEGRADE SEMIQUANT: CPT

## 2022-05-16 PROCEDURE — 6360000004 HC RX CONTRAST MEDICATION: Performed by: RADIOLOGY

## 2022-05-16 PROCEDURE — 6370000000 HC RX 637 (ALT 250 FOR IP): Performed by: EMERGENCY MEDICINE

## 2022-05-16 PROCEDURE — 99285 EMERGENCY DEPT VISIT HI MDM: CPT

## 2022-05-16 PROCEDURE — 83690 ASSAY OF LIPASE: CPT

## 2022-05-16 PROCEDURE — 96376 TX/PRO/DX INJ SAME DRUG ADON: CPT

## 2022-05-16 PROCEDURE — 96375 TX/PRO/DX INJ NEW DRUG ADDON: CPT

## 2022-05-16 PROCEDURE — 93005 ELECTROCARDIOGRAM TRACING: CPT | Performed by: EMERGENCY MEDICINE

## 2022-05-16 PROCEDURE — 85025 COMPLETE CBC W/AUTO DIFF WBC: CPT

## 2022-05-16 PROCEDURE — 87635 SARS-COV-2 COVID-19 AMP PRB: CPT

## 2022-05-16 PROCEDURE — 84484 ASSAY OF TROPONIN QUANT: CPT

## 2022-05-16 PROCEDURE — 96374 THER/PROPH/DIAG INJ IV PUSH: CPT

## 2022-05-16 RX ORDER — ACETAMINOPHEN 500 MG
1000 TABLET ORAL ONCE
Status: COMPLETED | OUTPATIENT
Start: 2022-05-16 | End: 2022-05-16

## 2022-05-16 RX ORDER — PREDNISONE 50 MG/1
50 TABLET ORAL DAILY
Qty: 5 TABLET | Refills: 0 | Status: SHIPPED | OUTPATIENT
Start: 2022-05-16 | End: 2022-05-21

## 2022-05-16 RX ORDER — FUROSEMIDE 10 MG/ML
40 INJECTION INTRAMUSCULAR; INTRAVENOUS ONCE
Status: COMPLETED | OUTPATIENT
Start: 2022-05-16 | End: 2022-05-16

## 2022-05-16 RX ORDER — IPRATROPIUM BROMIDE AND ALBUTEROL SULFATE 2.5; .5 MG/3ML; MG/3ML
1 SOLUTION RESPIRATORY (INHALATION) ONCE
Status: COMPLETED | OUTPATIENT
Start: 2022-05-16 | End: 2022-05-16

## 2022-05-16 RX ORDER — FENTANYL CITRATE 0.05 MG/ML
50 INJECTION, SOLUTION INTRAMUSCULAR; INTRAVENOUS ONCE
Status: COMPLETED | OUTPATIENT
Start: 2022-05-16 | End: 2022-05-16

## 2022-05-16 RX ADMIN — FUROSEMIDE 40 MG: 10 INJECTION, SOLUTION INTRAMUSCULAR; INTRAVENOUS at 16:11

## 2022-05-16 RX ADMIN — FENTANYL CITRATE 50 MCG: 50 INJECTION INTRAMUSCULAR; INTRAVENOUS at 13:18

## 2022-05-16 RX ADMIN — ACETAMINOPHEN 1000 MG: 500 TABLET ORAL at 17:08

## 2022-05-16 RX ADMIN — IPRATROPIUM BROMIDE AND ALBUTEROL SULFATE 1 AMPULE: .5; 3 SOLUTION RESPIRATORY (INHALATION) at 09:41

## 2022-05-16 RX ADMIN — IOPAMIDOL 75 ML: 755 INJECTION, SOLUTION INTRAVENOUS at 15:45

## 2022-05-16 RX ADMIN — FENTANYL CITRATE 50 MCG: 50 INJECTION INTRAMUSCULAR; INTRAVENOUS at 10:09

## 2022-05-16 ASSESSMENT — PAIN DESCRIPTION - DESCRIPTORS: DESCRIPTORS: ACHING

## 2022-05-16 ASSESSMENT — PAIN DESCRIPTION - LOCATION: LOCATION: HEAD

## 2022-05-16 ASSESSMENT — PAIN SCALES - GENERAL
PAINLEVEL_OUTOF10: 8
PAINLEVEL_OUTOF10: 7
PAINLEVEL_OUTOF10: 10

## 2022-05-16 ASSESSMENT — ENCOUNTER SYMPTOMS
COUGH: 0
ABDOMINAL PAIN: 0
BACK PAIN: 0

## 2022-05-16 ASSESSMENT — PAIN - FUNCTIONAL ASSESSMENT: PAIN_FUNCTIONAL_ASSESSMENT: NONE - DENIES PAIN

## 2022-05-16 NOTE — ED NOTES
Oxygen removed per Dr. Trace Mendieta. Pt currently 95% on room air.       [facility]  05/16/22 2998

## 2022-05-16 NOTE — ACP (ADVANCE CARE PLANNING)
Advance Care Planning     Advance Care Planning Activator (Inpatient)  Conversation Note      Date of ACP Conversation: 5/16/2022     Conversation Conducted with: Patient with Decision Making Capacity    ACP Activator: Francie Tolliver [facility] Decision Maker:     Current Designated Health Care Decision Maker:     Primary Decision Maker: Marti Mendieta - Child - [phone]    Secondary Decision Maker: Liza Lee 1st Alt M-POA - Other - [phone]  Click here to complete Healthcare Decision Makers including section of the Healthcare Decision Maker Relationship (ie \"Primary\")  Today we documented Decision Maker(s) consistent with ACP documents on file. Actually completed POA w/pt today;reflceting above decision makers. Care Preferences    Ventilation: \"If you were in your present state of health and suddenly became very ill and were unable to breathe on your own, what would your preference be about the use of a ventilator (breathing machine) if it were available to you? \"      Would the patient desire the use of ventilator (breathing machine)?: yes    \"If your health worsens and it becomes clear that your chance of recovery is unlikely, what would your preference be about the use of a ventilator (breathing machine) if it were available to you? \"     Would the patient desire the use of ventilator (breathing machine)?: Yes      Resuscitation  \"CPR works best to restart the heart when there is a sudden event, like a heart attack, in someone who is otherwise healthy. Unfortunately, CPR does not typically restart the heart for people who have serious health conditions or who are very sick. \"    \"In the event your heart stopped as a result of an underlying serious health condition, would you want attempts to be made to restart your heart (answer \"yes\" for attempt to resuscitate) or would you prefer a natural death (answer \"no\" for do not attempt to resuscitate)? \" yes       [] Yes   [x] No Educated Patient / Rebbecca Branch regarding differences between Advance Directives and portable DNR orders. Length of ACP Conversation in minutes: 22     Conversation Outcomes:  [x] ACP discussion completed  [] Existing advance directive reviewed with patient; no changes to patient's previously recorded wishes  [] New Advance Directive completed  [] Portable Do Not Rescitate prepared for Provider review and signature  [] POLST/POST/MOLST/MOST prepared for Provider review and signature      Follow-up plan:    [] Schedule follow-up conversation to continue planning  [] Referred individual to Provider for additional questions/concerns   [] Advised patient/agent/surrogate to review completed ACP document and update if needed with changes in condition, patient preferences or care setting    [x] This note routed to one or more involved healthcare providers.
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
## 2022-05-16 NOTE — ED PROVIDER NOTES
This is a 31-year-old male with a past medical history of tobacco use as well as alcohol abuse and hypertension presents to the ED for evaluation of dyspnea. Patient states for the past 3 days or so has been feeling increasingly short of breath whenever he walks anywhere. He states this is somewhat improved with rest.  Patient does not use oxygen at home he does have a chronic cough no new sputum. Patient also states that he has some pain in the left side of his rib cage whenever he does cough or take a deep breath then. No recent travel or recent surgeries. No falls or trauma. No other reported mitigating or exascerbating factors. The history is provided by the patient. No  was used. Review of Systems   Constitutional: Negative for fever. HENT: Negative for congestion. Eyes: Negative for visual disturbance. Respiratory: Negative for cough. Cardiovascular: Negative for chest pain. Gastrointestinal: Negative for abdominal pain. Endocrine: Negative for polyuria. Genitourinary: Negative for dysuria. Musculoskeletal: Negative for back pain. Skin: Negative for rash. Allergic/Immunologic: Negative for immunocompromised state. Neurological: Negative for headaches. Hematological: Does not bruise/bleed easily. Psychiatric/Behavioral: Negative for confusion. Physical Exam  Vitals and nursing note reviewed. Constitutional:       General: He is not in acute distress. Appearance: He is well-developed. HENT:      Head: Normocephalic and atraumatic. Mouth/Throat:      Mouth: Mucous membranes are moist.   Eyes:      Extraocular Movements: Extraocular movements intact. Pupils: Pupils are equal, round, and reactive to light. Neck:      Vascular: No JVD. Cardiovascular:      Rate and Rhythm: Normal rate and regular rhythm. Heart sounds: No murmur heard. Pulmonary:      Effort: Pulmonary effort is normal.      Breath sounds:  No rhonchi or rales. Comments: Expiratory wheezing  Chest:      Chest wall: No tenderness. Abdominal:      General: There is no distension. Palpations: Abdomen is soft. Tenderness: There is no abdominal tenderness. There is no guarding or rebound. Hernia: No hernia is present. Musculoskeletal:      Cervical back: Normal range of motion and neck supple. Right lower leg: Edema present. Left lower leg: Edema present. Skin:     General: Skin is warm and dry. Neurological:      General: No focal deficit present. Mental Status: He is alert and oriented to person, place, and time. Mental status is at baseline. Cranial Nerves: No cranial nerve deficit. Psychiatric:         Mood and Affect: Mood normal.         Behavior: Behavior normal.          Procedures     MDM  Number of Diagnoses or Management Options  Bilateral pleural effusion  Dyspnea and respiratory abnormalities  Diagnosis management comments: Patient is a pleasant 80-year-old male who presented to the ED for evaluation of some dyspnea ongoing for quite some time worse over the past 3 days fortunately the patient's oxygen saturation was well above 90% the entire course of her stay here in the ER not need submental oxygen. Patient is leg effusions bilaterally on CT imaging but no signs of pulmonary emboli as well as bronchial wall thickening. Patient was given a course of steroids for home and was treated with breathing treatment with improvement. Patient's viral panel was negative although there were some groundglass opacities seen on CAT scan given patient's well appearance stable vital signs he was appropriate for outpatient follow-up was given return precautions and was agreeable this plan. ED Course as of 05/17/22 0834   Mon May 16, 2022   1500 EKG: This EKG is signed and interpreted by me.     Rate: 75  Rhythm: Sinus  Interpretation: NSR, Prolonged QT, No st elevation, no t wave inversions  Comparison: was normal  [BP]      ED Course User Index  [BP] Karely Rivers DO             ED Course as of 05/17/22 0834   Mon May 16, 2022   1500 EKG: This EKG is signed and interpreted by me. Rate: 75  Rhythm: Sinus  Interpretation: NSR, Prolonged QT, No st elevation, no t wave inversions  Comparison: was normal  [BP]      ED Course User Index  [BP] Karely Rivers DO       --------------------------------------------- PAST HISTORY ---------------------------------------------  Past Medical History:  has a past medical history of Alcohol abuse, Arthritis, Back pain, chronic, H/O cardiovascular stress test, Hyperlipidemia, Hypertension, Hyponatremia, Neck pain, chronic, and Tobacco abuse. Past Surgical History:  has a past surgical history that includes External ear surgery; fracture surgery; eye surgery; Hemorrhoid surgery; and Shoulder arthroscopy (Right, 1/16/2015). Social History:  reports that he has been smoking cigarettes. He has a 32.25 pack-year smoking history. He has never used smokeless tobacco. He reports current alcohol use of about 28.0 standard drinks of alcohol per week. He reports current drug use. Drug: Marijuana Dardimitriss Meeter). Family History: family history includes Heart Attack in his brother and father; Other in his brother and sister. The patients home medications have been reviewed.     Allergies: Pcn [penicillins]    -------------------------------------------------- RESULTS -------------------------------------------------  Labs:  Results for orders placed or performed during the hospital encounter of 05/16/22   RAPID INFLUENZA A/B ANTIGENS    Specimen: Nasopharyngeal   Result Value Ref Range    Influenza A by PCR Not Detected Not Detected    Influenza B by PCR Not Detected Not Detected   COVID-19, Rapid    Specimen: Nasopharyngeal Swab   Result Value Ref Range    SARS-CoV-2, NAAT Not Detected Not Detected   Comprehensive Metabolic Panel w/ Reflex to MG   Result Value Ref Range Sodium 139 132 - 146 mmol/L    Potassium reflex Magnesium 4.0 3.5 - 5.0 mmol/L    Chloride 101 98 - 107 mmol/L    CO2 27 22 - 29 mmol/L    Anion Gap 11 7 - 16 mmol/L    Glucose 144 (H) 74 - 99 mg/dL    BUN 10 6 - 20 mg/dL    CREATININE 0.7 0.7 - 1.2 mg/dL    GFR Non-African American >60 >=60 mL/min/1.73    GFR African American >60     Calcium 9.3 8.6 - 10.2 mg/dL    Total Protein 6.9 6.4 - 8.3 g/dL    Albumin 3.5 3.5 - 5.2 g/dL    Total Bilirubin 0.4 0.0 - 1.2 mg/dL    Alkaline Phosphatase 94 40 - 129 U/L    ALT 16 0 - 40 U/L    AST 14 0 - 39 U/L   CBC with Auto Differential   Result Value Ref Range    WBC 6.9 4.5 - 11.5 E9/L    RBC 3.63 (L) 3.80 - 5.80 E12/L    Hemoglobin 10.1 (L) 12.5 - 16.5 g/dL    Hematocrit 32.2 (L) 37.0 - 54.0 %    MCV 88.7 80.0 - 99.9 fL    MCH 27.8 26.0 - 35.0 pg    MCHC 31.4 (L) 32.0 - 34.5 %    RDW 19.7 (H) 11.5 - 15.0 fL    Platelets 800 827 - 135 E9/L    MPV 9.6 7.0 - 12.0 fL    Neutrophils % 76.9 43.0 - 80.0 %    Immature Granulocytes % 0.9 0.0 - 5.0 %    Lymphocytes % 12.3 (L) 20.0 - 42.0 %    Monocytes % 7.5 2.0 - 12.0 %    Eosinophils % 2.0 0.0 - 6.0 %    Basophils % 0.4 0.0 - 2.0 %    Neutrophils Absolute 5.30 1.80 - 7.30 E9/L    Immature Granulocytes # 0.06 E9/L    Lymphocytes Absolute 0.85 (L) 1.50 - 4.00 E9/L    Monocytes Absolute 0.52 0.10 - 0.95 E9/L    Eosinophils Absolute 0.14 0.05 - 0.50 E9/L    Basophils Absolute 0.03 0.00 - 0.20 E9/L   Troponin   Result Value Ref Range    Troponin, High Sensitivity 9 0 - 11 ng/L   Lipase   Result Value Ref Range    Lipase 118 (H) 13 - 60 U/L   Brain Natriuretic Peptide   Result Value Ref Range    Pro-BNP 1,200 (H) 0 - 125 pg/mL   Protime-INR   Result Value Ref Range    Protime 12.6 (H) 9.3 - 12.4 sec    INR 1.1    D-Dimer, Quantitative   Result Value Ref Range    D-Dimer, Quant 256 ng/mL DDU   EKG 12 Lead   Result Value Ref Range    Ventricular Rate 75 BPM    Atrial Rate 75 BPM    P-R Interval 122 ms    QRS Duration 86 ms    Q-T Interval 430 ms    QTc Calculation (Bazett) 480 ms    P Axis 31 degrees    R Axis 44 degrees    T Axis 28 degrees       Radiology:  CTA PULMONARY W CONTRAST   Final Result   1. No pulmonary embolism. 2. Scattered ill-defined streaky opacities in the lungs, most notably in the   left upper lobe. They may represent scarring and/or resolving pneumonia. Follow-up CT of the chest is recommended in 3 months. 3. Mild bronchial wall thickening, which may be due to bronchitis, either   acute or chronic. RECOMMENDATIONS:   Unavailable         XR CHEST PORTABLE   Final Result   Persistent bilateral hazy opacification of the lungs. The findings may be   secondary to interstitial fibrosis. Vague ground-glass pneumonia is less   likely but not completely excluded. ------------------------- NURSING NOTES AND VITALS REVIEWED ---------------------------  Date / Time Roomed:  5/16/2022  9:17 AM  ED Bed Assignment:  08/08    The nursing notes within the ED encounter and vital signs as below have been reviewed. BP (!) 165/91   Pulse 80   Temp 98.1 °F (36.7 °C) (Infrared)   Resp 20   Ht 6' (1.829 m)   Wt 250 lb (113.4 kg)   SpO2 96%   BMI 33.91 kg/m²   Oxygen Saturation Interpretation: Normal      ------------------------------------------ PROGRESS NOTES ------------------------------------------  2:41 PM EDT  I have spoken with the patient and discussed todays results, in addition to providing specific details for the plan of care and counseling regarding the diagnosis and prognosis. Their questions are answered at this time and they are agreeable with the plan. I discussed at length with them reasons for immediate return here for re evaluation. They will followup with their PCP.      --------------------------------- ADDITIONAL PROVIDER NOTES ---------------------------------  At this time the patient is without objective evidence of an acute process requiring hospitalization or inpatient management.   They have remained hemodynamically stable throughout their entire ED visit and are stable for discharge with outpatient follow-up. The plan has been discussed in detail and they are aware of the specific conditions for emergent return, as well as the importance of follow-up. Discharge Medication List as of 5/16/2022  5:20 PM      START taking these medications    Details   predniSONE (DELTASONE) 50 MG tablet Take 1 tablet by mouth daily for 5 days, Disp-5 tablet, R-0Print             Diagnosis:  1. Dyspnea and respiratory abnormalities    2. Bilateral pleural effusion        Disposition:  Patient's disposition: Discharge to home  Patient's condition is stable.      Mary Carmen Cheng DO  05/17/22 8830

## 2022-05-16 NOTE — ED NOTES
Dr. Kenzie Andrea informed pt c/o headache. Order received for 1 G of acetaminophen PO.       Zee Huerta  05/16/22 3400

## 2022-05-17 ENCOUNTER — TELEPHONE (OUTPATIENT)
Dept: ADMINISTRATIVE | Age: 57
End: 2022-05-17

## 2022-06-01 ENCOUNTER — OFFICE VISIT (OUTPATIENT)
Dept: CARDIOLOGY CLINIC | Age: 57
End: 2022-06-01
Payer: MEDICARE

## 2022-06-01 VITALS
DIASTOLIC BLOOD PRESSURE: 84 MMHG | HEART RATE: 90 BPM | SYSTOLIC BLOOD PRESSURE: 128 MMHG | BODY MASS INDEX: 38.74 KG/M2 | WEIGHT: 286 LBS | RESPIRATION RATE: 16 BRPM | OXYGEN SATURATION: 99 % | HEIGHT: 72 IN

## 2022-06-01 DIAGNOSIS — R06.09 DOE (DYSPNEA ON EXERTION): Primary | ICD-10-CM

## 2022-06-01 DIAGNOSIS — E78.2 MIXED HYPERLIPIDEMIA: ICD-10-CM

## 2022-06-01 DIAGNOSIS — I20.8 OTHER FORMS OF ANGINA PECTORIS (HCC): ICD-10-CM

## 2022-06-01 DIAGNOSIS — Z72.0 TOBACCO ABUSE: ICD-10-CM

## 2022-06-01 DIAGNOSIS — I10 PRIMARY HYPERTENSION: ICD-10-CM

## 2022-06-01 PROCEDURE — 93000 ELECTROCARDIOGRAM COMPLETE: CPT | Performed by: INTERNAL MEDICINE

## 2022-06-01 PROCEDURE — 99215 OFFICE O/P EST HI 40 MIN: CPT | Performed by: INTERNAL MEDICINE

## 2022-06-01 RX ORDER — ACETAMINOPHEN 500 MG
500 TABLET ORAL EVERY 6 HOURS PRN
COMMUNITY

## 2022-06-01 RX ORDER — PREDNISONE 10 MG/1
10 TABLET ORAL DAILY
COMMUNITY

## 2022-06-01 RX ORDER — MELOXICAM 15 MG/1
15 TABLET ORAL DAILY
Status: ON HOLD | COMMUNITY
End: 2022-09-20 | Stop reason: HOSPADM

## 2022-06-01 RX ORDER — FLUTICASONE PROPIONATE AND SALMETEROL XINAFOATE 230; 21 UG/1; UG/1
AEROSOL, METERED RESPIRATORY (INHALATION)
COMMUNITY
Start: 2022-05-26

## 2022-06-01 RX ORDER — OMEPRAZOLE 20 MG/1
20 CAPSULE, DELAYED RELEASE ORAL DAILY
COMMUNITY

## 2022-06-02 DIAGNOSIS — R06.09 DOE (DYSPNEA ON EXERTION): Primary | ICD-10-CM
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
## 2022-06-06 DIAGNOSIS — Z01.818 PREOP TESTING: Primary | ICD-10-CM

## 2022-06-07 ENCOUNTER — TELEPHONE (OUTPATIENT)
Dept: CARDIAC CATH/INVASIVE PROCEDURES | Age: 57
End: 2022-06-07

## 2022-06-08 ENCOUNTER — HOSPITAL ENCOUNTER (OUTPATIENT)
Dept: CARDIAC CATH/INVASIVE PROCEDURES | Age: 57
Discharge: HOME | End: 2022-06-08
Attending: INTERNAL MEDICINE | Admitting: INTERNAL MEDICINE
Payer: MEDICARE

## 2022-06-08 VITALS
HEIGHT: 72 IN | BODY MASS INDEX: 40.63 KG/M2 | SYSTOLIC BLOOD PRESSURE: 134 MMHG | TEMPERATURE: 96.8 F | RESPIRATION RATE: 18 BRPM | DIASTOLIC BLOOD PRESSURE: 90 MMHG | HEART RATE: 78 BPM | WEIGHT: 300 LBS | OXYGEN SATURATION: 99 %

## 2022-06-08 DIAGNOSIS — I25.10 CAD IN NATIVE ARTERY: ICD-10-CM

## 2022-06-08 LAB
ABO/RH: NORMAL
ANION GAP SERPL CALCULATED.3IONS-SCNC: 9 MMOL/L (ref 7–16)
ANTIBODY SCREEN: NORMAL
BUN BLDV-MCNC: 15 MG/DL (ref 6–20)
CALCIUM SERPL-MCNC: 9.2 MG/DL (ref 8.6–10.2)
CHLORIDE BLD-SCNC: 103 MMOL/L (ref 98–107)
CO2: 27 MMOL/L (ref 22–29)
CREAT SERPL-MCNC: 0.9 MG/DL (ref 0.7–1.2)
GFR AFRICAN AMERICAN: >60
GFR NON-AFRICAN AMERICAN: >60 ML/MIN/1.73
GLUCOSE BLD-MCNC: 185 MG/DL (ref 74–99)
HCT VFR BLD CALC: 36.8 % (ref 37–54)
HEMOGLOBIN: 11.4 G/DL (ref 12.5–16.5)
MCH RBC QN AUTO: 28.1 PG (ref 26–35)
MCHC RBC AUTO-ENTMCNC: 31 % (ref 32–34.5)
MCV RBC AUTO: 90.9 FL (ref 80–99.9)
PDW BLD-RTO: 19.7 FL (ref 11.5–15)
PLATELET # BLD: 263 E9/L (ref 130–450)
PMV BLD AUTO: 9.8 FL (ref 7–12)
POTASSIUM SERPL-SCNC: 4.8 MMOL/L (ref 3.5–5)
RBC # BLD: 4.05 E12/L (ref 3.8–5.8)
REASON FOR REJECTION: NORMAL
REJECTED TEST: NORMAL
SODIUM BLD-SCNC: 139 MMOL/L (ref 132–146)
WBC # BLD: 9.1 E9/L (ref 4.5–11.5)

## 2022-06-08 PROCEDURE — 6370000000 HC RX 637 (ALT 250 FOR IP): Performed by: INTERNAL MEDICINE

## 2022-06-08 PROCEDURE — 80048 BASIC METABOLIC PNL TOTAL CA: CPT

## 2022-06-08 PROCEDURE — 93458 L HRT ARTERY/VENTRICLE ANGIO: CPT | Performed by: INTERNAL MEDICINE

## 2022-06-08 PROCEDURE — 6360000002 HC RX W HCPCS

## 2022-06-08 PROCEDURE — 86901 BLOOD TYPING SEROLOGIC RH(D): CPT

## 2022-06-08 PROCEDURE — 85027 COMPLETE CBC AUTOMATED: CPT

## 2022-06-08 PROCEDURE — 2580000003 HC RX 258: Performed by: INTERNAL MEDICINE

## 2022-06-08 PROCEDURE — 86900 BLOOD TYPING SEROLOGIC ABO: CPT

## 2022-06-08 PROCEDURE — 2500000003 HC RX 250 WO HCPCS

## 2022-06-08 PROCEDURE — C1769 GUIDE WIRE: HCPCS

## 2022-06-08 PROCEDURE — C1894 INTRO/SHEATH, NON-LASER: HCPCS

## 2022-06-08 PROCEDURE — 36415 COLL VENOUS BLD VENIPUNCTURE: CPT

## 2022-06-08 PROCEDURE — 2709999900 HC NON-CHARGEABLE SUPPLY

## 2022-06-08 PROCEDURE — 86850 RBC ANTIBODY SCREEN: CPT

## 2022-06-08 PROCEDURE — 93458 L HRT ARTERY/VENTRICLE ANGIO: CPT

## 2022-06-08 RX ORDER — SODIUM CHLORIDE 0.9 % (FLUSH) 0.9 %
5-40 SYRINGE (ML) INJECTION EVERY 12 HOURS SCHEDULED
Status: DISCONTINUED | OUTPATIENT
Start: 2022-06-08 | End: 2022-06-08 | Stop reason: HOSPADM

## 2022-06-08 RX ORDER — SODIUM CHLORIDE 9 MG/ML
INJECTION, SOLUTION INTRAVENOUS ONCE
Status: COMPLETED | OUTPATIENT
Start: 2022-06-08 | End: 2022-06-08

## 2022-06-08 RX ORDER — ASPIRIN 81 MG/1
243 TABLET, CHEWABLE ORAL ONCE
Status: COMPLETED | OUTPATIENT
Start: 2022-06-08 | End: 2022-06-08

## 2022-06-08 RX ORDER — SODIUM CHLORIDE 9 MG/ML
INJECTION, SOLUTION INTRAVENOUS CONTINUOUS
Status: DISCONTINUED | OUTPATIENT
Start: 2022-06-08 | End: 2022-06-08 | Stop reason: HOSPADM

## 2022-06-08 RX ORDER — OXYCODONE HYDROCHLORIDE AND ACETAMINOPHEN 5; 325 MG/1; MG/1
1 TABLET ORAL ONCE
Status: COMPLETED | OUTPATIENT
Start: 2022-06-08 | End: 2022-06-08

## 2022-06-08 RX ORDER — SODIUM CHLORIDE 9 MG/ML
INJECTION, SOLUTION INTRAVENOUS PRN
Status: DISCONTINUED | OUTPATIENT
Start: 2022-06-08 | End: 2022-06-08 | Stop reason: HOSPADM

## 2022-06-08 RX ORDER — ACETAMINOPHEN 325 MG/1
650 TABLET ORAL EVERY 4 HOURS PRN
Status: DISCONTINUED | OUTPATIENT
Start: 2022-06-08 | End: 2022-06-08 | Stop reason: HOSPADM

## 2022-06-08 RX ORDER — SODIUM CHLORIDE 0.9 % (FLUSH) 0.9 %
5-40 SYRINGE (ML) INJECTION PRN
Status: DISCONTINUED | OUTPATIENT
Start: 2022-06-08 | End: 2022-06-08 | Stop reason: HOSPADM

## 2022-06-08 RX ADMIN — ACETAMINOPHEN 650 MG: 325 TABLET ORAL at 13:48

## 2022-06-08 RX ADMIN — OXYCODONE AND ACETAMINOPHEN 1 TABLET: 5; 325 TABLET ORAL at 11:17

## 2022-06-08 RX ADMIN — SODIUM CHLORIDE: 9 INJECTION, SOLUTION INTRAVENOUS at 10:20

## 2022-06-08 RX ADMIN — SODIUM CHLORIDE: 9 INJECTION, SOLUTION INTRAVENOUS at 12:58

## 2022-06-08 RX ADMIN — ASPIRIN 243 MG: 81 TABLET, CHEWABLE ORAL at 10:21

## 2022-06-08 RX ADMIN — SODIUM CHLORIDE: 9 INJECTION, SOLUTION INTRAVENOUS at 10:19

## 2022-06-08 ASSESSMENT — PAIN SCALES - GENERAL
PAINLEVEL_OUTOF10: 5
PAINLEVEL_OUTOF10: 8

## 2022-06-08 ASSESSMENT — PAIN DESCRIPTION - LOCATION: LOCATION: BACK

## 2022-06-08 NOTE — PROGRESS NOTES
Discharged to home Goals met. Instructions given verbalized an understanding. Monitor removed and placed in nurses station.

## 2022-06-08 NOTE — PROCEDURES
[address]                  [address] [place],  [facility]                            CARDIAC CATHETERIZATION    PATIENT NAME: José Ambriz                       :        1965  MED REC NO:   [id number]                            ROOM:       9621  ACCOUNT NO:   [de-identified]                           ADMIT DATE: 2022  PROVIDER:     Alek Corcoran MD    DATE OF PROCEDURE:  2022    PROCEDURE:  Left heart catheterization, selective coronary angiography  and left ventriculography. The procedure was done through right radial approach using ultrasound  guidance. The patient received intravenous Versed and intravenous fentanyl for  sedation. INDICATION:  Exertional dyspnea of unclear etiology. AUC:  7-19. PRESSURES:  Aorta 131/90 with a mean of 110. Left ventricular systolic pressure 688, left ventricular end-diastolic  pressure 27. There was no gradient across the aortic valve. CORONARY ANGIOGRAPHY:  Left main:  The left main artery did not appear to have any angiographic  disease. LAD:  The left anterior descending artery is a large vessel that wraps  around the left ventricular apex. The left anterior descending artery  and its branches did not appear to have any significant angiographic  disease. LCX:  The left circumflex is a large nondominant vessel. It has a  smooth 30% to 40% tubular narrowing in its proximal third with the rest  of the vessel showing no significant disease. RCA:  The right coronary artery is a large dominant vessel. It has  around 30% smooth narrowing at the junction of the proximal third and  the middle third of the vessel with the rest of the vessel showing no  significant disease. LEFT VENTRICULOGRAPHY:  This was done in the 35-degree CHONG view.   The  left ventricle appeared normal in size and contractility with no  regional wall motion abnormality with an estimated ejection fraction of  60%. The right radial arterial sheath was removed at the end of the  procedure, and a TR band was applied with adequate hemostasis and with  preservation of pulse. The patient tolerated the procedure well and left the cardiac  catheterization laboratory in stable condition. ESTIMATED BLOOD LOSS:  10 mL. CONTRAST USED:  80 mL. CONCLUSIONS:  1. Coronary artery disease. a. Left main, no significant disease. b.  LAD, large vessel with no significant disease. c.  LCX, large nondominant vessel with 30 to 40% proximal/mid vessel  narrowing.  d.  RCA, dominant vessel with 30% mid vessel narrowing. 2.  Normal left ventricular size and systolic function. 3.  Systemic hypertension. 4.  Elevated left ventricular end-diastolic pressure.         Rosamaria Ribera MD    D: 06/08/2022 12:40:21       T: 06/08/2022 12:43:00     [email or web address]  Job#: [id number]     Doc#: [id number]    CC:

## 2022-09-17 ENCOUNTER — HOSPITAL ENCOUNTER (INPATIENT)
Age: 57
LOS: 3 days | Discharge: HOME | DRG: 683 | End: 2022-09-20
Attending: EMERGENCY MEDICINE | Admitting: INTERNAL MEDICINE
Payer: MEDICARE

## 2022-09-17 ENCOUNTER — APPOINTMENT (OUTPATIENT)
Dept: GENERAL RADIOLOGY | Age: 57
DRG: 683 | End: 2022-09-17
Payer: MEDICARE

## 2022-09-17 ENCOUNTER — APPOINTMENT (OUTPATIENT)
Dept: CT IMAGING | Age: 57
DRG: 683 | End: 2022-09-17
Payer: MEDICARE

## 2022-09-17 DIAGNOSIS — R55 SYNCOPE AND COLLAPSE: Primary | ICD-10-CM

## 2022-09-17 DIAGNOSIS — N17.9 AKI (ACUTE KIDNEY INJURY) (HCC): ICD-10-CM

## 2022-09-17 LAB
ACETAMINOPHEN LEVEL: <5 MCG/ML (ref 10–30)
ALBUMIN SERPL-MCNC: 4.1 G/DL (ref 3.5–5.2)
ALP BLD-CCNC: 104 U/L (ref 40–129)
ALT SERPL-CCNC: 45 U/L (ref 0–40)
AMPHETAMINE SCREEN, URINE: NOT DETECTED
ANION GAP SERPL CALCULATED.3IONS-SCNC: 24 MMOL/L (ref 7–16)
AST SERPL-CCNC: 44 U/L (ref 0–39)
BARBITURATE SCREEN URINE: NOT DETECTED
BASOPHILS ABSOLUTE: 0.01 E9/L (ref 0–0.2)
BASOPHILS RELATIVE PERCENT: 0.2 % (ref 0–2)
BENZODIAZEPINE SCREEN, URINE: NOT DETECTED
BILIRUB SERPL-MCNC: 0.8 MG/DL (ref 0–1.2)
BILIRUBIN URINE: NEGATIVE
BLOOD, URINE: NEGATIVE
BUN BLDV-MCNC: 35 MG/DL (ref 6–20)
CALCIUM SERPL-MCNC: 8.9 MG/DL (ref 8.6–10.2)
CANNABINOID SCREEN URINE: NOT DETECTED
CHLORIDE BLD-SCNC: 89 MMOL/L (ref 98–107)
CLARITY: CLEAR
CO2: 16 MMOL/L (ref 22–29)
COCAINE METABOLITE SCREEN URINE: NOT DETECTED
COLOR: YELLOW
CREAT SERPL-MCNC: 2.7 MG/DL (ref 0.7–1.2)
D DIMER: <200 NG/ML DDU
EOSINOPHILS ABSOLUTE: 0 E9/L (ref 0.05–0.5)
EOSINOPHILS RELATIVE PERCENT: 0 % (ref 0–6)
ETHANOL: 153 MG/DL (ref 0–0.08)
FENTANYL SCREEN, URINE: POSITIVE
GFR AFRICAN AMERICAN: 30
GFR NON-AFRICAN AMERICAN: 24 ML/MIN/1.73
GLUCOSE BLD-MCNC: 168 MG/DL (ref 74–99)
GLUCOSE URINE: NEGATIVE MG/DL
HCT VFR BLD CALC: 37.8 % (ref 37–54)
HEMOGLOBIN: 12.5 G/DL (ref 12.5–16.5)
IMMATURE GRANULOCYTES #: 0.05 E9/L
IMMATURE GRANULOCYTES %: 1 % (ref 0–5)
KETONES, URINE: NEGATIVE MG/DL
LACTIC ACID, SEPSIS: 6.5 MMOL/L (ref 0.5–1.9)
LACTIC ACID: 7.1 MMOL/L (ref 0.5–2.2)
LEUKOCYTE ESTERASE, URINE: NEGATIVE
LYMPHOCYTES ABSOLUTE: 0.72 E9/L (ref 1.5–4)
LYMPHOCYTES RELATIVE PERCENT: 14.3 % (ref 20–42)
Lab: ABNORMAL
MCH RBC QN AUTO: 29.3 PG (ref 26–35)
MCHC RBC AUTO-ENTMCNC: 33.1 % (ref 32–34.5)
MCV RBC AUTO: 88.5 FL (ref 80–99.9)
METHADONE SCREEN, URINE: NOT DETECTED
MONOCYTES ABSOLUTE: 0.29 E9/L (ref 0.1–0.95)
MONOCYTES RELATIVE PERCENT: 5.8 % (ref 2–12)
NEUTROPHILS ABSOLUTE: 3.95 E9/L (ref 1.8–7.3)
NEUTROPHILS RELATIVE PERCENT: 78.7 % (ref 43–80)
NITRITE, URINE: NEGATIVE
OPIATE SCREEN URINE: NOT DETECTED
OXYCODONE URINE: NOT DETECTED
PDW BLD-RTO: 17.6 FL (ref 11.5–15)
PH UA: 6 (ref 5–9)
PHENCYCLIDINE SCREEN URINE: NOT DETECTED
PLATELET # BLD: 226 E9/L (ref 130–450)
PMV BLD AUTO: 9.8 FL (ref 7–12)
POTASSIUM REFLEX MAGNESIUM: 4 MMOL/L (ref 3.5–5)
PRO-BNP: 38 PG/ML (ref 0–125)
PROTEIN UA: NEGATIVE MG/DL
RBC # BLD: 4.27 E12/L (ref 3.8–5.8)
SALICYLATE, SERUM: <0.3 MG/DL (ref 0–30)
SARS-COV-2, NAAT: NOT DETECTED
SODIUM BLD-SCNC: 129 MMOL/L (ref 132–146)
SPECIFIC GRAVITY UA: <=1.005 (ref 1–1.03)
TOTAL CK: 61 U/L (ref 20–200)
TOTAL PROTEIN: 7.1 G/DL (ref 6.4–8.3)
TRICYCLIC ANTIDEPRESSANTS SCREEN SERUM: NEGATIVE NG/ML
TROPONIN, HIGH SENSITIVITY: 12 NG/L (ref 0–11)
TROPONIN, HIGH SENSITIVITY: 7 NG/L (ref 0–11)
UROBILINOGEN, URINE: 0.2 E.U./DL
WBC # BLD: 5 E9/L (ref 4.5–11.5)

## 2022-09-17 PROCEDURE — 87040 BLOOD CULTURE FOR BACTERIA: CPT

## 2022-09-17 PROCEDURE — 83880 ASSAY OF NATRIURETIC PEPTIDE: CPT

## 2022-09-17 PROCEDURE — 85378 FIBRIN DEGRADE SEMIQUANT: CPT

## 2022-09-17 PROCEDURE — 71045 X-RAY EXAM CHEST 1 VIEW: CPT

## 2022-09-17 PROCEDURE — 80143 DRUG ASSAY ACETAMINOPHEN: CPT

## 2022-09-17 PROCEDURE — 70450 CT HEAD/BRAIN W/O DYE: CPT

## 2022-09-17 PROCEDURE — 6360000002 HC RX W HCPCS

## 2022-09-17 PROCEDURE — 81003 URINALYSIS AUTO W/O SCOPE: CPT

## 2022-09-17 PROCEDURE — 83605 ASSAY OF LACTIC ACID: CPT

## 2022-09-17 PROCEDURE — 72125 CT NECK SPINE W/O DYE: CPT

## 2022-09-17 PROCEDURE — 36415 COLL VENOUS BLD VENIPUNCTURE: CPT

## 2022-09-17 PROCEDURE — 80307 DRUG TEST PRSMV CHEM ANLYZR: CPT

## 2022-09-17 PROCEDURE — 99285 EMERGENCY DEPT VISIT HI MDM: CPT

## 2022-09-17 PROCEDURE — 2580000003 HC RX 258

## 2022-09-17 PROCEDURE — 93005 ELECTROCARDIOGRAM TRACING: CPT

## 2022-09-17 PROCEDURE — 96376 TX/PRO/DX INJ SAME DRUG ADON: CPT

## 2022-09-17 PROCEDURE — 96361 HYDRATE IV INFUSION ADD-ON: CPT

## 2022-09-17 PROCEDURE — 87088 URINE BACTERIA CULTURE: CPT

## 2022-09-17 PROCEDURE — 6370000000 HC RX 637 (ALT 250 FOR IP)

## 2022-09-17 PROCEDURE — 82077 ASSAY SPEC XCP UR&BREATH IA: CPT

## 2022-09-17 PROCEDURE — 87635 SARS-COV-2 COVID-19 AMP PRB: CPT

## 2022-09-17 PROCEDURE — 2140000000 HC CCU INTERMEDIATE R&B

## 2022-09-17 PROCEDURE — 96374 THER/PROPH/DIAG INJ IV PUSH: CPT

## 2022-09-17 PROCEDURE — 80179 DRUG ASSAY SALICYLATE: CPT

## 2022-09-17 PROCEDURE — 82550 ASSAY OF CK (CPK): CPT

## 2022-09-17 PROCEDURE — 84484 ASSAY OF TROPONIN QUANT: CPT

## 2022-09-17 PROCEDURE — 6360000002 HC RX W HCPCS: Performed by: EMERGENCY MEDICINE

## 2022-09-17 PROCEDURE — 72131 CT LUMBAR SPINE W/O DYE: CPT

## 2022-09-17 PROCEDURE — 80053 COMPREHEN METABOLIC PANEL: CPT

## 2022-09-17 PROCEDURE — 85025 COMPLETE CBC W/AUTO DIFF WBC: CPT

## 2022-09-17 RX ORDER — MORPHINE SULFATE 2 MG/ML
2 INJECTION, SOLUTION INTRAMUSCULAR; INTRAVENOUS ONCE
Status: COMPLETED | OUTPATIENT
Start: 2022-09-17 | End: 2022-09-17

## 2022-09-17 RX ORDER — FENTANYL CITRATE 50 UG/ML
100 INJECTION, SOLUTION INTRAMUSCULAR; INTRAVENOUS ONCE
Status: COMPLETED | OUTPATIENT
Start: 2022-09-17 | End: 2022-09-17

## 2022-09-17 RX ORDER — NICOTINE 21 MG/24HR
1 PATCH, TRANSDERMAL 24 HOURS TRANSDERMAL ONCE
Status: COMPLETED | OUTPATIENT
Start: 2022-09-17 | End: 2022-09-18

## 2022-09-17 RX ORDER — 0.9 % SODIUM CHLORIDE 0.9 %
1000 INTRAVENOUS SOLUTION INTRAVENOUS ONCE
Status: COMPLETED | OUTPATIENT
Start: 2022-09-17 | End: 2022-09-17

## 2022-09-17 RX ORDER — FENTANYL CITRATE 50 UG/ML
50 INJECTION, SOLUTION INTRAMUSCULAR; INTRAVENOUS ONCE
Status: DISCONTINUED | OUTPATIENT
Start: 2022-09-17 | End: 2022-09-17

## 2022-09-17 RX ORDER — FENTANYL CITRATE 50 UG/ML
50 INJECTION, SOLUTION INTRAMUSCULAR; INTRAVENOUS ONCE
Status: COMPLETED | OUTPATIENT
Start: 2022-09-17 | End: 2022-09-17

## 2022-09-17 RX ADMIN — SODIUM CHLORIDE 1000 ML: 9 INJECTION, SOLUTION INTRAVENOUS at 18:11

## 2022-09-17 RX ADMIN — FENTANYL CITRATE 50 MCG: 50 INJECTION, SOLUTION INTRAMUSCULAR; INTRAVENOUS at 18:10

## 2022-09-17 RX ADMIN — FENTANYL CITRATE 100 MCG: 50 INJECTION, SOLUTION INTRAMUSCULAR; INTRAVENOUS at 20:06

## 2022-09-17 RX ADMIN — SODIUM CHLORIDE 1000 ML: 9 INJECTION, SOLUTION INTRAVENOUS at 20:07

## 2022-09-17 RX ADMIN — MORPHINE SULFATE 2 MG: 2 INJECTION, SOLUTION INTRAMUSCULAR; INTRAVENOUS at 22:12

## 2022-09-17 ASSESSMENT — ENCOUNTER SYMPTOMS
BACK PAIN: 1
SHORTNESS OF BREATH: 1
DIARRHEA: 0
SORE THROAT: 0
WHEEZING: 0
TROUBLE SWALLOWING: 0
NAUSEA: 0
VOICE CHANGE: 0
VOMITING: 0
PHOTOPHOBIA: 0
RHINORRHEA: 0
COUGH: 0
ABDOMINAL PAIN: 0

## 2022-09-17 ASSESSMENT — PAIN - FUNCTIONAL ASSESSMENT: PAIN_FUNCTIONAL_ASSESSMENT: 0-10

## 2022-09-17 ASSESSMENT — PAIN DESCRIPTION - LOCATION
LOCATION: NECK
LOCATION: BACK;HEAD;NECK
LOCATION: BACK;NECK

## 2022-09-17 ASSESSMENT — PAIN DESCRIPTION - DESCRIPTORS
DESCRIPTORS: THROBBING;POUNDING
DESCRIPTORS: ACHING

## 2022-09-17 ASSESSMENT — PAIN DESCRIPTION - FREQUENCY: FREQUENCY: CONTINUOUS

## 2022-09-17 ASSESSMENT — PAIN DESCRIPTION - PAIN TYPE: TYPE: CHRONIC PAIN

## 2022-09-17 ASSESSMENT — PAIN SCALES - GENERAL
PAINLEVEL_OUTOF10: 10
PAINLEVEL_OUTOF10: 10

## 2022-09-18 ENCOUNTER — APPOINTMENT (OUTPATIENT)
Dept: MRI IMAGING | Age: 57
DRG: 683 | End: 2022-09-18
Payer: MEDICARE

## 2022-09-18 LAB
ALBUMIN SERPL-MCNC: 3.7 G/DL (ref 3.5–5.2)
ALP BLD-CCNC: 96 U/L (ref 40–129)
ALT SERPL-CCNC: 40 U/L (ref 0–40)
ANION GAP SERPL CALCULATED.3IONS-SCNC: 14 MMOL/L (ref 7–16)
APTT: <20 SEC (ref 24.5–35.1)
AST SERPL-CCNC: 55 U/L (ref 0–39)
BASOPHILS ABSOLUTE: 0.02 E9/L (ref 0–0.2)
BASOPHILS RELATIVE PERCENT: 0.3 % (ref 0–2)
BILIRUB SERPL-MCNC: 0.7 MG/DL (ref 0–1.2)
BUN BLDV-MCNC: 31 MG/DL (ref 6–20)
CALCIUM SERPL-MCNC: 8.5 MG/DL (ref 8.6–10.2)
CHLORIDE BLD-SCNC: 97 MMOL/L (ref 98–107)
CO2: 19 MMOL/L (ref 22–29)
CREAT SERPL-MCNC: 1.8 MG/DL (ref 0.7–1.2)
EOSINOPHILS ABSOLUTE: 0.01 E9/L (ref 0.05–0.5)
EOSINOPHILS RELATIVE PERCENT: 0.1 % (ref 0–6)
GFR AFRICAN AMERICAN: 47
GFR NON-AFRICAN AMERICAN: 39 ML/MIN/1.73
GLUCOSE BLD-MCNC: 125 MG/DL (ref 74–99)
HCT VFR BLD CALC: 34.7 % (ref 37–54)
HEMOGLOBIN: 11.8 G/DL (ref 12.5–16.5)
IMMATURE GRANULOCYTES #: 0.03 E9/L
IMMATURE GRANULOCYTES %: 0.4 % (ref 0–5)
INR BLD: 1.2
LACTIC ACID: 3 MMOL/L (ref 0.5–2.2)
LACTIC ACID: 3.7 MMOL/L (ref 0.5–2.2)
LACTIC ACID: 4.2 MMOL/L (ref 0.5–2.2)
LYMPHOCYTES ABSOLUTE: 1.08 E9/L (ref 1.5–4)
LYMPHOCYTES RELATIVE PERCENT: 16 % (ref 20–42)
MCH RBC QN AUTO: 29.9 PG (ref 26–35)
MCHC RBC AUTO-ENTMCNC: 34 % (ref 32–34.5)
MCV RBC AUTO: 88.1 FL (ref 80–99.9)
MONOCYTES ABSOLUTE: 0.77 E9/L (ref 0.1–0.95)
MONOCYTES RELATIVE PERCENT: 11.4 % (ref 2–12)
NEUTROPHILS ABSOLUTE: 4.82 E9/L (ref 1.8–7.3)
NEUTROPHILS RELATIVE PERCENT: 71.8 % (ref 43–80)
PDW BLD-RTO: 17.7 FL (ref 11.5–15)
PLATELET # BLD: 176 E9/L (ref 130–450)
PMV BLD AUTO: 10.1 FL (ref 7–12)
POTASSIUM REFLEX MAGNESIUM: 4.5 MMOL/L (ref 3.5–5)
PROTHROMBIN TIME: 12.5 SEC (ref 9.3–12.4)
RBC # BLD: 3.94 E12/L (ref 3.8–5.8)
SODIUM BLD-SCNC: 130 MMOL/L (ref 132–146)
TOTAL PROTEIN: 6.5 G/DL (ref 6.4–8.3)
WBC # BLD: 6.7 E9/L (ref 4.5–11.5)

## 2022-09-18 PROCEDURE — 2580000003 HC RX 258: Performed by: PHYSICIAN ASSISTANT

## 2022-09-18 PROCEDURE — 6370000000 HC RX 637 (ALT 250 FOR IP): Performed by: PHYSICIAN ASSISTANT

## 2022-09-18 PROCEDURE — 85610 PROTHROMBIN TIME: CPT

## 2022-09-18 PROCEDURE — 94640 AIRWAY INHALATION TREATMENT: CPT

## 2022-09-18 PROCEDURE — 6360000002 HC RX W HCPCS: Performed by: PHYSICIAN ASSISTANT

## 2022-09-18 PROCEDURE — 2140000000 HC CCU INTERMEDIATE R&B

## 2022-09-18 PROCEDURE — 2580000003 HC RX 258: Performed by: INTERNAL MEDICINE

## 2022-09-18 PROCEDURE — 6370000000 HC RX 637 (ALT 250 FOR IP): Performed by: INTERNAL MEDICINE

## 2022-09-18 PROCEDURE — 85730 THROMBOPLASTIN TIME PARTIAL: CPT

## 2022-09-18 PROCEDURE — 36415 COLL VENOUS BLD VENIPUNCTURE: CPT

## 2022-09-18 PROCEDURE — 85025 COMPLETE CBC W/AUTO DIFF WBC: CPT

## 2022-09-18 PROCEDURE — 83605 ASSAY OF LACTIC ACID: CPT

## 2022-09-18 PROCEDURE — 6360000002 HC RX W HCPCS: Performed by: INTERNAL MEDICINE

## 2022-09-18 PROCEDURE — 80053 COMPREHEN METABOLIC PANEL: CPT

## 2022-09-18 RX ORDER — POLYETHYLENE GLYCOL 3350 17 G/17G
17 POWDER, FOR SOLUTION ORAL DAILY PRN
Status: DISCONTINUED | OUTPATIENT
Start: 2022-09-18 | End: 2022-09-20 | Stop reason: HOSPADM

## 2022-09-18 RX ORDER — SODIUM CHLORIDE 9 MG/ML
25 INJECTION, SOLUTION INTRAVENOUS PRN
Status: DISCONTINUED | OUTPATIENT
Start: 2022-09-18 | End: 2022-09-20 | Stop reason: HOSPADM

## 2022-09-18 RX ORDER — ROSUVASTATIN CALCIUM 10 MG/1
10 TABLET, COATED ORAL DAILY
Status: DISCONTINUED | OUTPATIENT
Start: 2022-09-18 | End: 2022-09-20 | Stop reason: HOSPADM

## 2022-09-18 RX ORDER — PANTOPRAZOLE SODIUM 40 MG/1
40 TABLET, DELAYED RELEASE ORAL
Status: DISCONTINUED | OUTPATIENT
Start: 2022-09-18 | End: 2022-09-20 | Stop reason: HOSPADM

## 2022-09-18 RX ORDER — SODIUM CHLORIDE 0.9 % (FLUSH) 0.9 %
5-40 SYRINGE (ML) INJECTION PRN
Status: DISCONTINUED | OUTPATIENT
Start: 2022-09-18 | End: 2022-09-20 | Stop reason: HOSPADM

## 2022-09-18 RX ORDER — IPRATROPIUM BROMIDE AND ALBUTEROL SULFATE 2.5; .5 MG/3ML; MG/3ML
1 SOLUTION RESPIRATORY (INHALATION) EVERY 4 HOURS PRN
Status: DISCONTINUED | OUTPATIENT
Start: 2022-09-18 | End: 2022-09-20 | Stop reason: HOSPADM

## 2022-09-18 RX ORDER — ONDANSETRON 2 MG/ML
4 INJECTION INTRAMUSCULAR; INTRAVENOUS EVERY 6 HOURS PRN
Status: DISCONTINUED | OUTPATIENT
Start: 2022-09-18 | End: 2022-09-20 | Stop reason: HOSPADM

## 2022-09-18 RX ORDER — OXYCODONE HYDROCHLORIDE 5 MG/1
2.5 TABLET ORAL EVERY 4 HOURS PRN
Status: COMPLETED | OUTPATIENT
Start: 2022-09-18 | End: 2022-09-18

## 2022-09-18 RX ORDER — ACETAMINOPHEN 325 MG/1
650 TABLET ORAL EVERY 6 HOURS PRN
Status: DISCONTINUED | OUTPATIENT
Start: 2022-09-18 | End: 2022-09-20 | Stop reason: HOSPADM

## 2022-09-18 RX ORDER — MELOXICAM 7.5 MG/1
15 TABLET ORAL DAILY
Status: DISCONTINUED | OUTPATIENT
Start: 2022-09-18 | End: 2022-09-18

## 2022-09-18 RX ORDER — ARFORMOTEROL TARTRATE 15 UG/2ML
15 SOLUTION RESPIRATORY (INHALATION) 2 TIMES DAILY
Status: DISCONTINUED | OUTPATIENT
Start: 2022-09-18 | End: 2022-09-20 | Stop reason: HOSPADM

## 2022-09-18 RX ORDER — BUDESONIDE 0.5 MG/2ML
0.5 INHALANT ORAL 2 TIMES DAILY
Status: DISCONTINUED | OUTPATIENT
Start: 2022-09-18 | End: 2022-09-20 | Stop reason: HOSPADM

## 2022-09-18 RX ORDER — ISOSORBIDE DINITRATE 10 MG/1
10 TABLET ORAL 3 TIMES DAILY
Status: DISCONTINUED | OUTPATIENT
Start: 2022-09-18 | End: 2022-09-20 | Stop reason: HOSPADM

## 2022-09-18 RX ORDER — DIAZEPAM 5 MG/1
10 TABLET ORAL ONCE
Status: COMPLETED | OUTPATIENT
Start: 2022-09-18 | End: 2022-09-18

## 2022-09-18 RX ORDER — ENOXAPARIN SODIUM 100 MG/ML
30 INJECTION SUBCUTANEOUS 2 TIMES DAILY
Status: DISCONTINUED | OUTPATIENT
Start: 2022-09-18 | End: 2022-09-20 | Stop reason: HOSPADM

## 2022-09-18 RX ORDER — PROMETHAZINE HYDROCHLORIDE 12.5 MG/1
12.5 TABLET ORAL EVERY 6 HOURS PRN
Status: DISCONTINUED | OUTPATIENT
Start: 2022-09-18 | End: 2022-09-20 | Stop reason: HOSPADM

## 2022-09-18 RX ORDER — ACETAMINOPHEN 650 MG/1
650 SUPPOSITORY RECTAL EVERY 6 HOURS PRN
Status: DISCONTINUED | OUTPATIENT
Start: 2022-09-18 | End: 2022-09-20 | Stop reason: HOSPADM

## 2022-09-18 RX ORDER — SODIUM CHLORIDE 9 MG/ML
INJECTION, SOLUTION INTRAVENOUS CONTINUOUS
Status: DISCONTINUED | OUTPATIENT
Start: 2022-09-18 | End: 2022-09-20

## 2022-09-18 RX ORDER — NICOTINE 21 MG/24HR
1 PATCH, TRANSDERMAL 24 HOURS TRANSDERMAL DAILY
Status: DISCONTINUED | OUTPATIENT
Start: 2022-09-18 | End: 2022-09-20 | Stop reason: HOSPADM

## 2022-09-18 RX ORDER — SODIUM CHLORIDE 0.9 % (FLUSH) 0.9 %
5-40 SYRINGE (ML) INJECTION EVERY 12 HOURS SCHEDULED
Status: DISCONTINUED | OUTPATIENT
Start: 2022-09-18 | End: 2022-09-20 | Stop reason: HOSPADM

## 2022-09-18 RX ORDER — OXYCODONE HYDROCHLORIDE AND ACETAMINOPHEN 5; 325 MG/1; MG/1
1 TABLET ORAL EVERY 6 HOURS PRN
Status: DISCONTINUED | OUTPATIENT
Start: 2022-09-18 | End: 2022-09-20 | Stop reason: HOSPADM

## 2022-09-18 RX ORDER — 0.9 % SODIUM CHLORIDE 0.9 %
1000 INTRAVENOUS SOLUTION INTRAVENOUS ONCE
Status: COMPLETED | OUTPATIENT
Start: 2022-09-18 | End: 2022-09-18

## 2022-09-18 RX ORDER — ASPIRIN 81 MG/1
81 TABLET, CHEWABLE ORAL DAILY
Status: DISCONTINUED | OUTPATIENT
Start: 2022-09-18 | End: 2022-09-20 | Stop reason: HOSPADM

## 2022-09-18 RX ADMIN — ARFORMOTEROL TARTRATE 15 MCG: 15 SOLUTION RESPIRATORY (INHALATION) at 07:19

## 2022-09-18 RX ADMIN — ISOSORBIDE DINITRATE 10 MG: 10 TABLET ORAL at 21:44

## 2022-09-18 RX ADMIN — OXYCODONE 2.5 MG: 5 TABLET ORAL at 11:05

## 2022-09-18 RX ADMIN — OXYCODONE 2.5 MG: 5 TABLET ORAL at 03:20

## 2022-09-18 RX ADMIN — Medication 10 ML: at 08:48

## 2022-09-18 RX ADMIN — OXYCODONE AND ACETAMINOPHEN 1 TABLET: 5; 325 TABLET ORAL at 16:29

## 2022-09-18 RX ADMIN — BUDESONIDE 500 MCG: 0.5 SUSPENSION RESPIRATORY (INHALATION) at 07:19

## 2022-09-18 RX ADMIN — OXYCODONE AND ACETAMINOPHEN 1 TABLET: 5; 325 TABLET ORAL at 21:44

## 2022-09-18 RX ADMIN — SODIUM CHLORIDE 1000 ML: 9 INJECTION, SOLUTION INTRAVENOUS at 03:09

## 2022-09-18 RX ADMIN — ONDANSETRON 4 MG: 2 INJECTION INTRAMUSCULAR; INTRAVENOUS at 06:38

## 2022-09-18 RX ADMIN — ENOXAPARIN SODIUM 30 MG: 100 INJECTION SUBCUTANEOUS at 08:50

## 2022-09-18 RX ADMIN — SODIUM CHLORIDE: 9 INJECTION, SOLUTION INTRAVENOUS at 09:47

## 2022-09-18 RX ADMIN — ISOSORBIDE DINITRATE 10 MG: 10 TABLET ORAL at 13:58

## 2022-09-18 RX ADMIN — ENOXAPARIN SODIUM 30 MG: 100 INJECTION SUBCUTANEOUS at 21:43

## 2022-09-18 RX ADMIN — BUDESONIDE 500 MCG: 0.5 SUSPENSION RESPIRATORY (INHALATION) at 19:12

## 2022-09-18 RX ADMIN — ISOSORBIDE DINITRATE 10 MG: 10 TABLET ORAL at 08:47

## 2022-09-18 RX ADMIN — Medication 5 ML: at 21:46

## 2022-09-18 RX ADMIN — ASPIRIN 81 MG 81 MG: 81 TABLET ORAL at 08:46

## 2022-09-18 RX ADMIN — PANTOPRAZOLE SODIUM 40 MG: 40 TABLET, DELAYED RELEASE ORAL at 08:48

## 2022-09-18 RX ADMIN — ROSUVASTATIN 10 MG: 10 TABLET, FILM COATED ORAL at 21:44

## 2022-09-18 RX ADMIN — DIAZEPAM 10 MG: 5 TABLET ORAL at 13:58

## 2022-09-18 RX ADMIN — IPRATROPIUM BROMIDE AND ALBUTEROL SULFATE 1 AMPULE: .5; 2.5 SOLUTION RESPIRATORY (INHALATION) at 07:19

## 2022-09-18 RX ADMIN — MELOXICAM 15 MG: 7.5 TABLET ORAL at 08:48

## 2022-09-18 RX ADMIN — METOPROLOL TARTRATE 75 MG: 25 TABLET, FILM COATED ORAL at 21:44

## 2022-09-18 RX ADMIN — OXYCODONE 2.5 MG: 5 TABLET ORAL at 07:01

## 2022-09-18 RX ADMIN — METOPROLOL TARTRATE 75 MG: 25 TABLET, FILM COATED ORAL at 08:47

## 2022-09-18 RX ADMIN — ARFORMOTEROL TARTRATE 15 MCG: 15 SOLUTION RESPIRATORY (INHALATION) at 19:12

## 2022-09-18 ASSESSMENT — PAIN SCALES - GENERAL
PAINLEVEL_OUTOF10: 10
PAINLEVEL_OUTOF10: 6
PAINLEVEL_OUTOF10: 8
PAINLEVEL_OUTOF10: 10
PAINLEVEL_OUTOF10: 9
PAINLEVEL_OUTOF10: 10
PAINLEVEL_OUTOF10: 8

## 2022-09-18 ASSESSMENT — PAIN DESCRIPTION - DESCRIPTORS
DESCRIPTORS: ACHING
DESCRIPTORS: ACHING;THROBBING
DESCRIPTORS: ACHING;THROBBING
DESCRIPTORS: DULL;THROBBING;ACHING
DESCRIPTORS: DISCOMFORT;GNAWING;THROBBING

## 2022-09-18 ASSESSMENT — PAIN DESCRIPTION - LOCATION
LOCATION: HEAD;NECK
LOCATION: HEAD;NECK
LOCATION: HEAD
LOCATION: HEAD;BACK;NECK
LOCATION: BACK;HIP;NECK
LOCATION: BACK;HIP;NECK

## 2022-09-18 ASSESSMENT — PAIN DESCRIPTION - PAIN TYPE: TYPE: CHRONIC PAIN

## 2022-09-18 ASSESSMENT — PAIN - FUNCTIONAL ASSESSMENT: PAIN_FUNCTIONAL_ASSESSMENT: PREVENTS OR INTERFERES SOME ACTIVE ACTIVITIES AND ADLS
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
## 2022-09-18 NOTE — ED NOTES
Patient reported lightheadedness during orthostatic vitals. Patient was able to sit on the edge of the bed, reported \"not feeling right\" and hands were shaking. Patient felt that he could stand, while BP was cycling he had to sit down and said that he felt \"very lightheaded\". See vitals in flowsheets.       Rain Piedra RN  09/17/22 1191

## 2022-09-18 NOTE — ED PROVIDER NOTES
64y.o. year old male presenting to the emergency room with concerns of syncopal episodes. Reports that symptom's onset 1 week ago. Worsen with exertion, standing up. Improves with nothing. Severity of 10, with no radiation . Symptoms are constant in timing. Symptoms described as shortness of breath with episodes of lightheadedness worsening by change in position. associated symptoms of fall, head trauma today. . Patient in no acute distress. Chief Complaint   Patient presents with    Dizziness     Pt states he has been experiencing episodes of dizziness/lightheadedness for about a week. Pt reports losing consciousness twice today, pt reports hitting his head above the right eye, -thinners. EMS reports pressure 88/palp. /64 at this time        Review of Systems   Constitutional:  Negative for chills, fatigue and fever. HENT:  Negative for congestion, rhinorrhea, sore throat, trouble swallowing and voice change. Eyes:  Negative for photophobia and visual disturbance. Respiratory:  Positive for shortness of breath. Negative for cough and wheezing. Cardiovascular:  Negative for chest pain and palpitations. Gastrointestinal:  Negative for abdominal pain, diarrhea, nausea and vomiting. Genitourinary:  Negative for flank pain, hematuria and urgency. Musculoskeletal:  Positive for back pain and neck pain. Negative for arthralgias and neck stiffness. Skin:  Negative for rash and wound. Neurological:  Positive for syncope and light-headedness. Negative for dizziness, weakness, numbness and headaches. Psychiatric/Behavioral:  Negative for behavioral problems, confusion and suicidal ideas. The patient is nervous/anxious. The patient is not hyperactive. Physical Exam  Vitals reviewed. Constitutional:       General: He is not in acute distress. Appearance: Normal appearance. He is not ill-appearing. HENT:      Head: Normocephalic.       Right Ear: External ear normal.      Left Ear: collapse  Diagnosis management comments: 26-year-old male presenting to the emergency department complaints of dizziness and lightheadedness x1 week. Patient reports that he had a fall beginning on Saturday to repeat falls today. Patient reporting syncopal episodes. Reports hitting his head. Negative for thinners. Hypotension noted by EMS. Patient given 500 cc fluid prior to arrival.  Initial blood pressure is normotensive. Lactic acid elevated, hyponatremia noted with elevation of BUN and creatinine. Negative for COVID. CT head with no acute abnormality CT cervical and lumbar spine with chronic findings. Chest x-ray negative. Negative troponin negative CK. Spoke to [facility], discussed patient will plan to admit patient at this time. Patient stable at time of admission. Amount and/or Complexity of Data Reviewed  Decide to obtain previous medical records or to obtain history from someone other than the patient: yes         ED Course as of 09/17/22 2302   Sat Sep 17, 2022   2047 Poke to [facility] physicians, discussed patient will plan to admit patient at this time. [KEREN]      ED Course User Index  [KEREN] Ashlie Toussaint DO        ED Course as of 09/17/22 2302   Sat Sep 17, 2022   2047 Poke to [facility] physicians, discussed patient will plan to admit patient at this time. [KEREN]      ED Course User Index  [KEREN] Ashlie Toussaint DO       --------------------------------------------- PAST HISTORY ---------------------------------------------  Past Medical History:  has a past medical history of Alcohol abuse, Arthritis, Back pain, chronic, CAD in native artery, COPD (chronic obstructive pulmonary disease) ([facility] [address] H/O cardiovascular stress test, Hyperlipidemia, Hypertension, Hyponatremia, Neck pain, chronic, and Tobacco abuse. Past Surgical History:  has a past surgical history that includes External ear surgery; fracture surgery; eye surgery; Hemorrhoid surgery;  Shoulder arthroscopy (Right, 1/16/2015); and Cardiac catheterization (06/08/2022). Social History:  reports that he has been smoking cigarettes. He has a 32.25 pack-year smoking history. He has never used smokeless tobacco. He reports current alcohol use of about 28.0 standard drinks per week. He reports current drug use. Drug: Marijuana Juanetta Emanuel). Family History: family history includes Heart Attack in his brother and father; Other in his brother and sister. The patients home medications have been reviewed.     Allergies: Pcn [penicillins]    -------------------------------------------------- RESULTS -------------------------------------------------    LABS:  Results for orders placed or performed during the hospital encounter of 09/17/22   COVID-19, Rapid    Specimen: Nasopharyngeal Swab   Result Value Ref Range    SARS-CoV-2, NAAT Not Detected Not Detected   CBC with Auto Differential   Result Value Ref Range    WBC 5.0 4.5 - 11.5 E9/L    RBC 4.27 3.80 - 5.80 E12/L    Hemoglobin 12.5 12.5 - 16.5 g/dL    Hematocrit 37.8 37.0 - 54.0 %    MCV 88.5 80.0 - 99.9 fL    MCH 29.3 26.0 - 35.0 pg    MCHC 33.1 32.0 - 34.5 %    RDW 17.6 (H) 11.5 - 15.0 fL    Platelets 328 028 - 865 E9/L    MPV 9.8 7.0 - 12.0 fL    Neutrophils % 78.7 43.0 - 80.0 %    Immature Granulocytes % 1.0 0.0 - 5.0 %    Lymphocytes % 14.3 (L) 20.0 - 42.0 %    Monocytes % 5.8 2.0 - 12.0 %    Eosinophils % 0.0 0.0 - 6.0 %    Basophils % 0.2 0.0 - 2.0 %    Neutrophils Absolute 3.95 1.80 - 7.30 E9/L    Immature Granulocytes # 0.05 E9/L    Lymphocytes Absolute 0.72 (L) 1.50 - 4.00 E9/L    Monocytes Absolute 0.29 0.10 - 0.95 E9/L    Eosinophils Absolute 0.00 (L) 0.05 - 0.50 E9/L    Basophils Absolute 0.01 0.00 - 0.20 E9/L   Comprehensive Metabolic Panel w/ Reflex to MG   Result Value Ref Range    Sodium 129 (L) 132 - 146 mmol/L    Potassium reflex Magnesium 4.0 3.5 - 5.0 mmol/L    Chloride 89 (L) 98 - 107 mmol/L    CO2 16 (L) 22 - 29 mmol/L    Anion Gap 24 (H) 7 - 16 mmol/L    Glucose 168 (H) 74 - 99 mg/dL    BUN 35 (H) 6 - 20 mg/dL    Creatinine 2.7 (H) 0.7 - 1.2 mg/dL    GFR Non-African American 24 >=60 mL/min/1.73    GFR African American 30     Calcium 8.9 8.6 - 10.2 mg/dL    Total Protein 7.1 6.4 - 8.3 g/dL    Albumin 4.1 3.5 - 5.2 g/dL    Total Bilirubin 0.8 0.0 - 1.2 mg/dL    Alkaline Phosphatase 104 40 - 129 U/L    ALT 45 (H) 0 - 40 U/L    AST 44 (H) 0 - 39 U/L   Troponin   Result Value Ref Range    Troponin, High Sensitivity 12 (H) 0 - 11 ng/L   Brain Natriuretic Peptide   Result Value Ref Range    Pro-BNP 38 0 - 125 pg/mL   D-Dimer, Quantitative   Result Value Ref Range    D-Dimer, Quant <200 ng/mL DDU   Lactic Acid   Result Value Ref Range    Lactic Acid 7.1 (HH) 0.5 - 2.2 mmol/L   Lactate, Sepsis   Result Value Ref Range    Lactic Acid, Sepsis 6.5 (HH) 0.5 - 1.9 mmol/L   Urinalysis   Result Value Ref Range    Color, UA Yellow Straw/Yellow    Clarity, UA Clear Clear    Glucose, Ur Negative Negative mg/dL    Bilirubin Urine Negative Negative    Ketones, Urine Negative Negative mg/dL    Specific Gravity, UA <=1.005 1.005 - 1.030    Blood, Urine Negative Negative    pH, UA 6.0 5.0 - 9.0    Protein, UA Negative Negative mg/dL    Urobilinogen, Urine 0.2 <2.0 E.U./dL    Nitrite, Urine Negative Negative    Leukocyte Esterase, Urine Negative Negative   CK   Result Value Ref Range    Total CK 61 20 - 200 U/L   Serum Drug Screen   Result Value Ref Range    Ethanol Lvl 153 mg/dL    Acetaminophen Level <5.0 (L) 10.0 - 45.3 mcg/mL    Salicylate, Serum <9.7 0.0 - 30.0 mg/dL    TCA Scrn NEGATIVE Cutoff:300 ng/mL   URINE DRUG SCREEN   Result Value Ref Range    Amphetamine Screen, Urine NOT DETECTED Negative <1000 ng/mL    Barbiturate Screen, Ur NOT DETECTED Negative < 200 ng/mL    Benzodiazepine Screen, Urine NOT DETECTED Negative < 200 ng/mL    Cannabinoid Scrn, Ur NOT DETECTED Negative < 50ng/mL    Cocaine Metabolite Screen, Urine NOT DETECTED Negative < 300 ng/mL    Opiate Scrn, Ur NOT DETECTED Negative < 300ng/mL PROVIDER NOTES ---------------------------------  Consultations:   Spoke with Dr. Mirna Lilly. Discussed case. They will admit the patient. This patient's ED course included: a personal history and physicial examination, re-evaluation prior to disposition, multiple bedside re-evaluations, IV medications, cardiac monitoring, and continuous pulse oximetry    This patient has remained hemodynamically stable during their ED course. Diagnosis:  1. Syncope and collapse    2. ISA (acute kidney injury) ([facility] [address]        Disposition:  Patient's disposition: Admit  Patient's condition is stable. Attending was present and available throughout encounter including all critical portions;  See Attending Note/Attestation for [address], DO  Resident  09/17/22 0330

## 2022-09-19 ENCOUNTER — APPOINTMENT (OUTPATIENT)
Dept: MRI IMAGING | Age: 57
DRG: 683 | End: 2022-09-19
Payer: MEDICARE

## 2022-09-19 LAB
ALBUMIN SERPL-MCNC: 3.5 G/DL (ref 3.5–5.2)
ALP BLD-CCNC: 100 U/L (ref 40–129)
ALT SERPL-CCNC: 32 U/L (ref 0–40)
ANION GAP SERPL CALCULATED.3IONS-SCNC: 12 MMOL/L (ref 7–16)
AST SERPL-CCNC: 32 U/L (ref 0–39)
BASOPHILS ABSOLUTE: 0.02 E9/L (ref 0–0.2)
BASOPHILS RELATIVE PERCENT: 0.6 % (ref 0–2)
BILIRUB SERPL-MCNC: 1.2 MG/DL (ref 0–1.2)
BUN BLDV-MCNC: 20 MG/DL (ref 6–20)
CALCIUM SERPL-MCNC: 8.6 MG/DL (ref 8.6–10.2)
CHLORIDE BLD-SCNC: 103 MMOL/L (ref 98–107)
CO2: 19 MMOL/L (ref 22–29)
CREAT SERPL-MCNC: 1.3 MG/DL (ref 0.7–1.2)
EKG ATRIAL RATE: 92 BPM
EKG P AXIS: 59 DEGREES
EKG P-R INTERVAL: 130 MS
EKG Q-T INTERVAL: 360 MS
EKG QRS DURATION: 90 MS
EKG QTC CALCULATION (BAZETT): 445 MS
EKG R AXIS: 72 DEGREES
EKG T AXIS: 68 DEGREES
EKG VENTRICULAR RATE: 92 BPM
EOSINOPHILS ABSOLUTE: 0.04 E9/L (ref 0.05–0.5)
EOSINOPHILS RELATIVE PERCENT: 1.2 % (ref 0–6)
GFR AFRICAN AMERICAN: >60
GFR NON-AFRICAN AMERICAN: 57 ML/MIN/1.73
GLUCOSE BLD-MCNC: 137 MG/DL (ref 74–99)
HCT VFR BLD CALC: 32.3 % (ref 37–54)
HEMOGLOBIN: 10.4 G/DL (ref 12.5–16.5)
IMMATURE GRANULOCYTES #: 0.03 E9/L
IMMATURE GRANULOCYTES %: 0.9 % (ref 0–5)
LYMPHOCYTES ABSOLUTE: 1.01 E9/L (ref 1.5–4)
LYMPHOCYTES RELATIVE PERCENT: 30.6 % (ref 20–42)
MCH RBC QN AUTO: 29.2 PG (ref 26–35)
MCHC RBC AUTO-ENTMCNC: 32.2 % (ref 32–34.5)
MCV RBC AUTO: 90.7 FL (ref 80–99.9)
MONOCYTES ABSOLUTE: 0.36 E9/L (ref 0.1–0.95)
MONOCYTES RELATIVE PERCENT: 10.9 % (ref 2–12)
NEUTROPHILS ABSOLUTE: 1.84 E9/L (ref 1.8–7.3)
NEUTROPHILS RELATIVE PERCENT: 55.8 % (ref 43–80)
PDW BLD-RTO: 17.6 FL (ref 11.5–15)
PLATELET # BLD: 123 E9/L (ref 130–450)
PMV BLD AUTO: 10.2 FL (ref 7–12)
POTASSIUM REFLEX MAGNESIUM: 4 MMOL/L (ref 3.5–5)
RBC # BLD: 3.56 E12/L (ref 3.8–5.8)
SODIUM BLD-SCNC: 134 MMOL/L (ref 132–146)
TOTAL PROTEIN: 6 G/DL (ref 6.4–8.3)
WBC # BLD: 3.3 E9/L (ref 4.5–11.5)

## 2022-09-19 PROCEDURE — 6360000002 HC RX W HCPCS: Performed by: PHYSICIAN ASSISTANT

## 2022-09-19 PROCEDURE — 6370000000 HC RX 637 (ALT 250 FOR IP): Performed by: INTERNAL MEDICINE

## 2022-09-19 PROCEDURE — 80053 COMPREHEN METABOLIC PANEL: CPT

## 2022-09-19 PROCEDURE — 99222 1ST HOSP IP/OBS MODERATE 55: CPT | Performed by: STUDENT IN AN ORGANIZED HEALTH CARE EDUCATION/TRAINING PROGRAM

## 2022-09-19 PROCEDURE — 6370000000 HC RX 637 (ALT 250 FOR IP): Performed by: PHYSICIAN ASSISTANT

## 2022-09-19 PROCEDURE — 2580000003 HC RX 258: Performed by: INTERNAL MEDICINE

## 2022-09-19 PROCEDURE — 36415 COLL VENOUS BLD VENIPUNCTURE: CPT

## 2022-09-19 PROCEDURE — 2140000000 HC CCU INTERMEDIATE R&B

## 2022-09-19 PROCEDURE — 97161 PT EVAL LOW COMPLEX 20 MIN: CPT

## 2022-09-19 PROCEDURE — 97530 THERAPEUTIC ACTIVITIES: CPT

## 2022-09-19 PROCEDURE — 97535 SELF CARE MNGMENT TRAINING: CPT

## 2022-09-19 PROCEDURE — 70551 MRI BRAIN STEM W/O DYE: CPT

## 2022-09-19 PROCEDURE — 97165 OT EVAL LOW COMPLEX 30 MIN: CPT

## 2022-09-19 PROCEDURE — 94640 AIRWAY INHALATION TREATMENT: CPT

## 2022-09-19 PROCEDURE — 2580000003 HC RX 258: Performed by: PHYSICIAN ASSISTANT

## 2022-09-19 PROCEDURE — 6360000002 HC RX W HCPCS: Performed by: INTERNAL MEDICINE

## 2022-09-19 PROCEDURE — 85025 COMPLETE CBC W/AUTO DIFF WBC: CPT

## 2022-09-19 RX ORDER — DIAZEPAM 5 MG/1
15 TABLET ORAL ONCE
Status: COMPLETED | OUTPATIENT
Start: 2022-09-19 | End: 2022-09-19

## 2022-09-19 RX ADMIN — SODIUM CHLORIDE: 9 INJECTION, SOLUTION INTRAVENOUS at 21:40

## 2022-09-19 RX ADMIN — ARFORMOTEROL TARTRATE 15 MCG: 15 SOLUTION RESPIRATORY (INHALATION) at 22:15

## 2022-09-19 RX ADMIN — ASPIRIN 81 MG 81 MG: 81 TABLET ORAL at 08:22

## 2022-09-19 RX ADMIN — OXYCODONE AND ACETAMINOPHEN 1 TABLET: 5; 325 TABLET ORAL at 15:29

## 2022-09-19 RX ADMIN — ENOXAPARIN SODIUM 30 MG: 100 INJECTION SUBCUTANEOUS at 21:30

## 2022-09-19 RX ADMIN — OXYCODONE AND ACETAMINOPHEN 1 TABLET: 5; 325 TABLET ORAL at 03:35

## 2022-09-19 RX ADMIN — SODIUM CHLORIDE: 9 INJECTION, SOLUTION INTRAVENOUS at 08:28

## 2022-09-19 RX ADMIN — BUDESONIDE 500 MCG: 0.5 SUSPENSION RESPIRATORY (INHALATION) at 22:15

## 2022-09-19 RX ADMIN — METOPROLOL TARTRATE 75 MG: 25 TABLET, FILM COATED ORAL at 21:30

## 2022-09-19 RX ADMIN — ROSUVASTATIN 10 MG: 10 TABLET, FILM COATED ORAL at 21:30

## 2022-09-19 RX ADMIN — OXYCODONE AND ACETAMINOPHEN 1 TABLET: 5; 325 TABLET ORAL at 21:37

## 2022-09-19 RX ADMIN — ENOXAPARIN SODIUM 30 MG: 100 INJECTION SUBCUTANEOUS at 08:23

## 2022-09-19 RX ADMIN — ARFORMOTEROL TARTRATE 15 MCG: 15 SOLUTION RESPIRATORY (INHALATION) at 08:00

## 2022-09-19 RX ADMIN — BUDESONIDE 500 MCG: 0.5 SUSPENSION RESPIRATORY (INHALATION) at 08:00

## 2022-09-19 RX ADMIN — PANTOPRAZOLE SODIUM 40 MG: 40 TABLET, DELAYED RELEASE ORAL at 06:09

## 2022-09-19 RX ADMIN — OXYCODONE AND ACETAMINOPHEN 1 TABLET: 5; 325 TABLET ORAL at 09:31

## 2022-09-19 RX ADMIN — DIAZEPAM 15 MG: 5 TABLET ORAL at 09:33

## 2022-09-19 RX ADMIN — Medication 10 ML: at 21:31

## 2022-09-19 RX ADMIN — ISOSORBIDE DINITRATE 10 MG: 10 TABLET ORAL at 21:37

## 2022-09-19 RX ADMIN — ISOSORBIDE DINITRATE 10 MG: 10 TABLET ORAL at 14:11

## 2022-09-19 RX ADMIN — Medication 10 ML: at 08:25

## 2022-09-19 RX ADMIN — ISOSORBIDE DINITRATE 10 MG: 10 TABLET ORAL at 08:23

## 2022-09-19 RX ADMIN — METOPROLOL TARTRATE 75 MG: 25 TABLET, FILM COATED ORAL at 08:22

## 2022-09-19 ASSESSMENT — PAIN DESCRIPTION - LOCATION
LOCATION: BACK;HEAD;NECK
LOCATION: HEAD;NECK;BACK
LOCATION: NECK;BACK
LOCATION: HIP;HEAD;NECK

## 2022-09-19 ASSESSMENT — PAIN SCALES - GENERAL
PAINLEVEL_OUTOF10: 10

## 2022-09-19 ASSESSMENT — PAIN DESCRIPTION - ORIENTATION: ORIENTATION: POSTERIOR;LOWER

## 2022-09-19 ASSESSMENT — PAIN DESCRIPTION - DESCRIPTORS
DESCRIPTORS: ACHING;TIGHTNESS;DISCOMFORT
DESCRIPTORS: ACHING;THROBBING;TIGHTNESS
DESCRIPTORS: ACHING

## 2022-09-19 ASSESSMENT — PAIN - FUNCTIONAL ASSESSMENT: PAIN_FUNCTIONAL_ASSESSMENT: PREVENTS OR INTERFERES SOME ACTIVE ACTIVITIES AND ADLS

## 2022-09-19 NOTE — CARE COORDINATION
Social Work Discharge Planning:  SW met with patient at bedside. Patient lives alone in a one story home with four steps to gain access to entrance. Patient independent prior to admission with no hx with C. Patient PCP is Dr. Bhargav Dyer and pharmacy is mail order. Patient son will transport home at discharge. SW will continue to follow and assist with transition of care.   Electronically signed by TAISHA Nation on 9/19/2022 at 7:27 PM

## 2022-09-19 NOTE — PROGRESS NOTES
Physical Therapy  Physical Therapy Initial Evaluation    Name: Estuardo Philip  : 1965  MRN: [id number]      Date of Service: 2022    Evaluating PT:  Brad Garcia, PT [id number]    Referring provider/PT Order:  PT Eval and Treat   22 0315  PT evaluation and treat      FRANCISCO Mai     Room #:  0118/8648-Z  Diagnosis:  Syncope and collapse [R55]  ISA (acute kidney injury) ([facility] [address] [N17.9]  PMHx/PSHx:     has a past medical history of Alcohol abuse, Arthritis, Back pain, chronic, CAD in native artery, COPD (chronic obstructive pulmonary disease) ([facility] [address] H/O cardiovascular stress test, Hyperlipidemia, Hypertension, Hyponatremia, Neck pain, chronic, and Tobacco abuse.    has a past surgical history that includes External ear surgery; fracture surgery; eye surgery; Hemorrhoid surgery; Shoulder arthroscopy (Right, 2015); and Cardiac catheterization (2022). Procedure/Surgery:  None  Precautions:  Falls, alarm , FWB (full weight bearing)   Equipment Needs: None,    SUBJECTIVE:    Patient lives alone  in a ranch home  with 4 steps to enter with 1Rail  Bed is on 1 floor and bath is on 1 floor. Patient ambulated independently occasionally uses walking stick PTA. Equipment owned: Nathen Michaels      OBJECTIVE:   Initial Evaluation  Date: 22 Treatment Short Term/ Long Term   Goals   AM-PAC 6 Clicks 79/99     Was pt agreeable to Eval/treatment? yes     Does pt have pain? Yes cervical and headache pain. Bed Mobility  Rolling: SBA  Supine to sit:   SBA   Sit to supine: SBA   Scooting: SBA   Rolling: Ind  Supine to sit: Ind  Sit to supine: Ind  Scooting: Ind   Transfers Sit to stand: SBA  Stand to sit: SBA  Stand pivot: SBA   Sit to stand: Ind  Stand to sit: Ind   Stand pivot: Ind    Ambulation    NT only side steps due to report of dizziness with standing.     100 feet with Ind    Stair negotiation: ascended and descended  NT  4 steps with Ind     Strength/ROM:     RLE grossly 5/5  LLE grossly 5/5  RLE AROM WFL  LLE AROM WFL    Balance:   Static Sitting: Ind  Dynamic Sitting: Ind  Static Standing: SBA   Dynamic Standing: SBA      Patient is Alert & Oriented x person, place, time, and situation and follows directions     Sensation:  Pt denies numbness and tingling to extremities  Edema:  BLE    Vitals:  Supine Seated Stand   Blood Pressure at rest 174/104 Blood Pressure at rest 169/111 Blood Pressure at rest 173/105   Heart Rate at rest *- bpm Heart Rate at rest - bpm Heart Rate at rest - bpm   SPO2 at rest 96% RA SPO2 at rest -% -L SPO2 at rest -% -L     Therapeutic Exercises:  Functional activity as stated above    Patient education  Pt educated regarding role of PT evaluation and need for OOB activity    Patient response to education:   Pt verbalized understanding Pt demonstrated skill Pt requires further education in this area   yews yes Reminders     ASSESSMENT:    Conditions Requiring Skilled Therapeutic Intervention:    [x]Decreased strength     [x]Decreased ROM  [x]Decreased functional mobility  [x]Decreased balance   [x]Decreased endurance   [x]Decreased posture  []Decreased sensation  []Decreased coordination   []Decreased vision  [x]Decreased safety awareness   []Increased pain       Comments:    RN cleared patient for participation in therapy session. Patient was seen this date for PT evaluation. . Patient was agreeable to intervention. Results of the functional assessment are noted above. Upon entering the room patient was found supine in bed. Transitioned to EOB. Sat EOB x 10 minutes to increase dynamic sitting balance and activity tolerance. STS compelted. Reported feeling dizzy when completing transitional movements. At end of session, patient in bed with  call light and phone within reach,  all lines and tubes intact, nursing notified. This patient can benefit from the continuation of skilled PT to maximize functional level and return to PLOF.      Treatment:  Patient completed and was instructed in the following treatment:      Bed mobility training - pt given verbal and tactile cues to facilitate proper sequencing and safety during rolling and supine>sit as well as provided with physical assistance to complete task   STS and transfer training - educated on hand/foot placement, safety, and sequencing during STS and pivot transfers using assistive device  Gait training -   Pt's/ family goals      1. Home when feeling better. Prognosis is good  for reaching above PT goals. Patient and or family understand(s) diagnosis, prognosis, and plan of care.  yes,     PHYSICAL THERAPY PLAN OF CARE:    PT POC is established based on physician order and patient diagnosis     Diagnosis:  Syncope and collapse [R55]  ISA (acute kidney injury) ([facility] [address] [N17.9]  Specific instructions for next treatment:  Increase ambulation distance and BLE therapeutic exercise  Current Treatment Recommendations:     [x] Strengthening to improve independence with functional mobility   [x] ROM to improve independence with functional mobility   [x] Balance Training to improve static/dynamic balance and to reduce fall risk  [x] Endurance Training to improve activity tolerance during functional mobility   [x] Transfer Training to improve safety and independence with all functional transfers   [x] Gait Training to improve gait mechanics, endurance and asses need for appropriate assistive device  [x] Stair Training in preparation for safe discharge home and/or into the community when appropriate  [] Positioning to prevent skin breakdown and contractures  [x] Safety and Education Training   [] Patient/Caregiver Education   [] HEP  [] Other     PT long term treatment goals are located in above grid    Frequency of treatments: 2-5x/week x 1-2 weeks.     Time in  0740  Time out  0815    Total Treatment Time  25 minutes     Evaluation Time includes thorough review of current medical information, gathering information on past medical history/social history and prior level of function, completion of standardized testing/informal observation of tasks, assessment of data and education on plan of care and goals.     CPT codes:  [x] Low Complexity PT evaluation 61240  [] Moderate Complexity PT evaluation 75057  [] High Complexity PT evaluation 73902  [] PT Re-evaluation 14647  [] Gait training 90546 - minutes  [] Manual therapy 80068  minutes  [x] Therapeutic activities 54650 25- minutes  [] Therapeutic exercises 22664 - minutes  [] Neuromuscular reeducation [address] [place] minutes     Maricruz Munroe, [address] [facility]

## 2022-09-19 NOTE — PROGRESS NOTES
Occupational Therapy  OCCUPATIONAL THERAPY INITIAL EVALUATION    [address]I [address]ro [address] [address] [address]      Date:2022                                                Patient Name: Estuardo Philip  MRN: [id number]  : 1965  Room: [address] [place] [address]    Evaluating OT: Leonel Homans OTR/L #[id number]     Referring Provider: FRANCISCO Mai  Specific Provider Orders/Date: OT eval and treat 22    Diagnosis: Syncope and collapse [R55]  ISA (acute kidney injury) ([facility] [address] [N17.9]   Pt admitted to hospital with syncope      Pertinent Medical History:  has a past medical history of Alcohol abuse, Arthritis, Back pain, chronic, CAD in native artery, COPD (chronic obstructive pulmonary disease) ([facility] [address] H/O cardiovascular stress test, Hyperlipidemia, Hypertension, Hyponatremia, Neck pain, chronic, and Tobacco abuse.        Precautions:  Fall Risk    Assessment of current deficits    [x] Functional mobility  [x]ADLs  [x] Strength               []Cognition    [x] Functional transfers   [x] IADLs         [x] Safety Awareness   [x]Endurance    [] Fine Coordination              [x] Balance      [] Vision/perception   []Sensation     []Gross Motor Coordination  [] ROM  [] Delirium                   [] Motor Control     OT PLAN OF CARE   OT POC based on physician orders, patient diagnosis and results of clinical assessment    Frequency/Duration 1-3 days/wk for 2 weeks PRN   Specific OT Treatment Interventions to include:   * Instruction/training on adapted ADL techniques and AE recommendations to increase functional independence within precautions       * Training on energy conservation strategies, correct breathing pattern and techniques to improve independence/tolerance for self-care routine  * Functional transfer/mobility training/DME recommendations for increased independence, safety, and fall prevention  * Patient/Family education to increase follow through with safety techniques and functional independence  * Recommendation of environmental modifications for increased safety with functional transfers/mobility and ADLs  * Therapeutic exercise to improve motor endurance, ROM, and functional strength for ADLs/functional transfers  * Therapeutic activities to facilitate/challenge dynamic balance, stand tolerance for increased safety and independence with ADLs      Recommended Adaptive Equipment:  TBD     Home Living: Pt lives alone in a 1 story home with 4 ZACHARIAH and 1 hand rail    Bathroom setup:  tub/shower    Equipment owned: walking stick     Prior Level of Function: Independent with ADLs , Independent  with IADLs; ambulated without AD   Driving: yes   Occupation: enjoys riding motorcycle    Pain Level: Pt reports 10/10 neck and headache;  Therapist facilitated repositioning to address pain  ; nursing notified     Cognition: A&O: 4/4; Follows 2 step directions   Memory:  good   Sequencing:  good   Problem solving:  good   Judgement/safety:  fair     Functional Assessment:  AM-PAC Daily Activity Raw Score: 19/24   Initial Eval Status  Date: 9/19/22 Treatment Status  Date: STGs = LTGs  Time frame: 10-14 days   Feeding Independent      Grooming Stand by Assist   Modified [place]    UB Dressing Stand by Assist   Modified [place]    LB Dressing Minimal Assist   Modified [place]    Bathing Minimal Assist  Modified [place]    Toileting Stand by Assist   Modified [place]    Bed Mobility  Supine to sit: Stand by Assist   Sit to supine: Stand by Assist   Supine to sit: Modified [place]   Sit to supine: Modified [place]    Functional Transfers Stand by Assist   Modified [place]    Functional Mobility Stand by Assist     Side steps to [address] with ww; limited due to fatigue  Modified [place]    Balance Sitting:     Static:  Sup    Dynamic:SBA  Standing: SBA     Activity Tolerance F-    Limted due to pain, SOB and fatigue  F+   Visual/  Perceptual wfl Vitals:    Supine: 174/102, 90  Seated: 169/111, 92  Standin/105, 102  Nursing notified     Hand Dominance right   Strength ROM Additional Info:    RUE   4/5 wfl good  and wfl FMC/dexterity noted during ADL tasks     LUE 4/5 wfl good  and wfl FMC/dexterity noted during ADL tasks     Hearing: wfl  Sensation:wfl  Tone: wfl  Edema:none noted     Comments: Upon arrival patient supine in bed and agreeable to OT Session. Therapist educated pt on role of OT. At end of session, patient semi-supine in bed with call light and phone within reach, all lines and tubes intact. Overall patient demonstrated decreased independence and safety during completion of ADL/functional transfer/mobility tasks. Pt would benefit from continued skilled OT to increase safety and independence with completion of ADL/IADL tasks for functional independence and quality of life. Treatment: OT treatment provided this date includes: Facilitation of bed mobility, unsupported sitting balance at EOB (impacting ADLs; addressing posture, weight shifting, dynamic reaching), functional transfers, standing tolerance tasks (addressing posture, balance and activity tolerance while incorporating light functional reaching; impacting ADLs and functional activity) and short ambulation task without AD (limited due to pain, SOB, dizziness and fatigue) - skilled cuing on hand placement, posture, body mechanics, energy conservation techniques and safety. Therapist facilitated self-care retraining: UB/LB self-care tasks (gown, pants, socks), simulated toileting hygiene and grooming tasks while educating pt on modified techniques, posture, safety and energy conservation techniques. Skilled monitoring of HR, O2 sats and pts response to treatment. Rehab Potential: Good  for established goals     Patient / Family Goal: return home      Patient and/or family were instructed on functional diagnosis, prognosis/goals and OT plan of care. Demonstrated fair+ understanding. Eval Complexity: Low    Time In: 740  Time Out: 820  Total Treatment Time: 25 minutes     Min Units   OT Eval Low 97165  x  1   OT Eval Medium 09724      OT Eval High 17676      OT Re-Eval [id number]       Therapeutic Ex 29645       Therapeutic Activities 50283  15  1   ADL/Self Care 07462  10  1   Orthotic Management 31483       Manual 46674     Neuro Re-Ed 69944       Non-Billable Time          Evaluation Time additionally includes thorough review of current medical information, gathering information on past medical history/social history and prior level of function, interpretation of standardized testing/informal observation of tasks, assessment of data and development of plan of care and goals.           Nesha Ceja OTR/L #[id number]
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
## 2022-09-19 NOTE — PLAN OF CARE
Problem: Discharge Planning  Goal: Discharge to home or other facility with appropriate resources  9/19/2022 0838 by Rubina Ayala RN  Outcome: Progressing  9/19/2022 0837 by Rubina Ayala RN  Outcome: Progressing  Flowsheets (Taken 9/18/2022 1930 by Cori Esquivel RN)  Discharge to home or other facility with appropriate resources: Identify barriers to discharge with patient and caregiver     Problem: Pain  Goal: Verbalizes/displays adequate comfort level or baseline comfort level  9/19/2022 0838 by Rubina Ayala RN  Outcome: Progressing  9/19/2022 0837 by Rubina Ayala RN  Outcome: Progressing  Flowsheets (Taken 9/18/2022 1930 by Cori Esquivel RN)  Verbalizes/displays adequate comfort level or baseline comfort level: Encourage patient to monitor pain and request assistance     Problem: ABCDS Injury Assessment  Goal: Absence of physical injury  9/19/2022 0838 by Rubina Ayala RN  Outcome: Progressing  9/19/2022 0837 by Rubina Ayala RN  Outcome: Progressing  Flowsheets  Taken 9/19/2022 0834 by Rubina Ayala RN  Absence of Physical Injury: Implement safety measures based on patient assessment  Taken 9/19/2022 0013 by Cori Esquivel RN  Absence of Physical Injury: Implement safety measures based on patient assessment     Problem: Safety - Adult  Goal: Free from fall injury  9/19/2022 0838 by Rubina Ayala RN  Outcome: Progressing  9/19/2022 0837 by Rubina Ayala RN  Outcome: Progressing  Flowsheets  Taken 9/19/2022 0834 by Rubina Ayala RN  Free From Fall Injury: Instruct family/caregiver on patient safety  Taken 9/19/2022 0013 by Cori Esquivel RN  Free From Fall Injury: Instruct family/caregiver on patient safety

## 2022-09-19 NOTE — CONSULTS
[address] [facility] Electrophysiology  Consultation Report  PATIENT: Estuardo Philip  MEDICAL RECORD NUMBER: [id number]  DATE OF SERVICE:  9/19/2022  ATTENDING ELECTROPHYSIOLOGIST: Dr Ko Kohler ELECTROPHYSIOLOGIST: none previous   REFERRING PHYSICIAN: No ref. provider found and Saira Stone MD  CHIEF COMPLAINT: Dizziness  Admitting diagnosis: Dizziness    HPI: This is a 64 y.o. male with the PMH as noted below significant for COPD, hypertension, alcohol abuse, tobacco use, hypertension, mild CAD,  who presents to the hospital complaining of recurrent syncopal episodes. He states his symptoms started about 1 week ago that worsened with exertion or standing up. When EMS was called his blood pressure was 88/d stable in the emergency room. He was found to have ISA with creatinine of 1.8 (baseline normal), lactic acid over 6, CT of head and neck negative chest x-ray negative troponin and CK negative. He was treated with 500 cc of IV fluids and gtt. Regarding his hx, he was hospitalized in April on vent, to Rehab. Home since May and felt overall ok. Slight dizziness at times with standing. Has been worse with syncope and falls for over 1 week. Passed out with quick black out episodes, exacerbated by standing or getting up. Worsened by turning head to side, has headache with it. No symptoms with sitting or lying flat. No excessive alcohol, drank one beer at times in past week. Trying to quit smoking. Still taking lasix daily 40 mg. On isordil TID, had mild CAD on cath. On prednisone for COPD daily since April. Sometimes skips meals- sx no worse those days. BP elevated while here today. He still had some lightheadedness with quick position changes.      Patient Active Problem List    Diagnosis Date Noted    ISA (acute kidney injury) ([facility] 09/17/2022     Priority: Medium    Syncope 09/17/2022     Priority: Medium    CAD in native artery 06/08/2022 Priority: Medium    Shortness of breath      Priority: Medium    COPD exacerbation (HCC)      Priority: Medium    Hyponatremia      Priority: Medium    Primary hypertension      Priority: Medium    Hyperlipidemia      Priority: Medium    Gastroesophageal reflux disease      Priority: Medium    Tobacco use disorder      Priority: Medium    Morbid obesity ([facility]      Priority: Medium    Severe alcohol withdrawal delirium (HCC)      Priority: Medium    Dysphagia      Priority: Medium    Transaminitis      Priority: Medium    Chest pain 04/11/2022    Acute chest pain 04/09/2022    Acromioclavicular joint arthritis 01/25/2016    Rotator cuff tear 08/25/2015    Shoulder impingement 01/08/2015    Biceps tendonitis 12/09/2014    Biceps rupture, proximal 11/25/2014    Shoulder pain 11/25/2014    Pectoral muscle rupture 11/25/2014       Past Medical History:   Diagnosis Date    Alcohol abuse     Arthritis     Back pain, chronic     CAD in native artery 6/8/2022    COP[facility]chronic obstructive pulmonary disease) [facility]     H/O cardiovascular stress test 04/27/2022    Lexiscan    Hyperlipidemia     Hypertension     Hyponatremia     Neck pain, chronic     Tobacco abuse        Family History   Problem Relation Age of Onset    Heart Attack Brother     Other Brother     Heart Attack Father     Other Sister        Social History     Tobacco Use    Smoking status: Every Day     Packs/day: 0.75     Years: 43.00     Pack years: 32.25     Types: Cigarettes    Smokeless tobacco: Never   Substance Use Topics    Alcohol use:  Yes     Alcohol/week: 28.0 standard drinks     Types: 28 Cans of beer per week     Comment: couple beers now and then        Current Facility-Administered Medications   Medication Dose Route Frequency Provider Last Rate Last Admin    aspirin chewable tablet 81 mg  81 mg Oral Daily FRANCISCO Del Rosario   81 mg at 09/19/22 5658    isosorbide dinitrate (ISORDIL) tablet 10 mg  10 mg Oral TID FRANCISCO Del Rosario   10 mg at 09/19/22 6568    metoprolol tartrate (LOPRESSOR) tablet 75 mg  75 mg Oral BID Charma Miracle, PA   75 mg at 09/19/22 2337    pantoprazole (PROTONIX) tablet 40 mg  40 mg Oral QAM AC Charma Miracle, PA   40 mg at 09/19/22 8888    rosuvastatin (CRESTOR) tablet 10 mg  10 mg Oral Daily Charma Miracle, PA   10 mg at 09/18/22 2144    sodium chloride flush 0.9 % injection 5-40 mL  5-40 mL IntraVENous 2 times per day Charma Miracle, PA   10 mL at 09/19/22 0825    sodium chloride flush 0.9 % injection 5-40 mL  5-40 mL IntraVENous PRN Kamlama Miracle, PA        0.9 % sodium chloride infusion  25 mL IntraVENous PRN Twyla Miracle, PA        enoxaparin Sodium (LOVENOX) injection 30 mg  30 mg SubCUTAneous BID Kamlama Miracle, PA   30 mg at 09/19/22 0823    promethazine (PHENERGAN) tablet 12.5 mg  12.5 mg Oral Q6H PRN Twyla Miracle, PA        Or    ondansetron (ZOFRAN) injection 4 mg  4 mg IntraVENous Q6H PRN Twyla Miracle, PA   4 mg at 09/18/22 0638    polyethylene glycol (GLYCOLAX) packet 17 g  17 g Oral Daily PRN Twyla Miracle, PA        acetaminophen (TYLENOL) tablet 650 mg  650 mg Oral Q6H PRN Twyla Miracle, PA        Or    acetaminophen (TYLENOL) suppository 650 mg  650 mg Rectal Q6H PRN Twyla Miracle, PA        ipratropium-albuterol (DUONEB) nebulizer solution 1 ampule  1 ampule Inhalation Q4H PRN Kamlama Miracle, PA   1 ampule at 09/18/22 0719    budesonide (PULMICORT) nebulizer suspension 500 mcg  0.5 mg Nebulization BID Rodger Escalera MD   500 mcg at 09/19/22 0800    And    Arformoterol Tartrate (BROVANA) nebulizer solution 15 mcg  15 mcg Nebulization BID Rodger Escalera MD   15 mcg at 09/19/22 0800    0.9 % sodium chloride infusion   IntraVENous Continuous Harris Arce  mL/hr at 09/19/22 0828 New Bag at 09/19/22 0828    nicotine (NICODERM CQ) 21 MG/24HR 1 patch  1 patch TransDERmal Daily Harris Arce MD   1 patch at 09/19/22 0825    oxyCODONE-acetaminophen (PERCOCET) 5-325 MG per tablet 1 tablet  1 tablet Oral Q6H PRN Charlene Nicholson MD   1 tablet at 09/19/22 0931        Allergies   Allergen Reactions    Pcn [Penicillins] Hives       ROS:   Constitutional: Negative for fever, + activity change and + appetite change. HENT: Negative for epistaxis or JVD + headache   Eyes: Negative for diploplia, blurred vision. Respiratory: Negative for cough, chest tightness, shortness of breath and +wheezing. Cardiovascular: pertinent positives in HPI  Gastrointestinal: Negative for abdominal pain and blood in stool. All other review of systems are negative     PHYSICAL EXAM:   Vitals:    09/18/22 1930 09/19/22 0000 09/19/22 0801 09/19/22 0815   BP: (S) (!) 176/100 (!) 180/95  (!) 163/95   Pulse: 94 86 85 82   Resp: 20 18 18 22   Temp:  97.9 °F (36.6 °C)  97.8 °F (36.6 °C)   TempSrc:  Oral  Oral   SpO2: 100% 100% 99% 99%   Weight: 284 lb 6.3 oz (129 kg)      Height:          Constitutional: Well-developed, no acute distress  Eyes: conjunctivae normal, no xanthelasma   Ears, Nose, Throat: oral mucosa moist, no cyanosis   CV: no JVD. Regular rate and rhythm. Normal S1S2 and no S3. No murmurs, rubs, or gallops. PMI is nondisplaced  Lungs: wheezes/harsh lung sounds noted upper airway bilat  Abdomen: soft, non-tender, bowel sounds present, no masses or hepatomegaly obese. Musculoskeletal: no digital clubbing, no edema   Skin: warm, no rashes     I have personally reviewed the laboratory, cardiac diagnostic and radiographic testing as outlined below:    Data:    Recent Labs     09/17/22 1731 09/18/22  0502 09/19/22  0511   WBC 5.0 6.7 3.3*   HGB 12.5 11.8* 10.4*   HCT 37.8 34.7* 32.3*    176 123*     Recent Labs     09/17/22 1731 09/18/22  0502 09/19/22  0511   * 130* 134   K 4.0 4.5 4.0   CL 89* 97* 103   CO2 16* 19* 19*   BUN 35* 31* 20   CREATININE 2.7* 1.8* 1.3*   CALCIUM 8.9 8.5* 8.6      Lab Results   Component Value Date/Time    MG 2.3 04/24/2022 05:58 AM     No results for input(s): TSH in the last 72 hours.   Recent Labs 09/18/22  0502   INR 1.2     Telemetry: SR/ST 's     EKG: SR rate of 92     Echocardiogram: 4/9/2022  Summary   Technically sub-optimal images - Definity Echo contrast used. Normal left ventricular chamber size. Normal left ventricular systolic function, LVEF 42-62%. Mild left ventricular concentric hypertrophy noted. Normal diastolic function. Interatrial septum not well visualized but appears intact. Normal right ventricle size and function, TAPSE 2.3cm. Valves not well visualized. No hemodynamically significant aortic stenosis. Normal aortic root size. No evidence of pericardial effusion. No intra cardiac mass or thrombus. No comparison study available. Cardiac Catheterization: 6/8/2022  CONCLUSIONS:  1. Coronary artery disease. a. Left main, no significant disease. b.  LAD, large vessel with no significant disease. c.  LCX, large nondominant vessel with 30 to 40% proximal/mid vessel  narrowing.  d.  RCA, dominant vessel with 30% mid vessel narrowing. 2.  Normal left ventricular size and systolic function. 3.  Systemic hypertension. 4.  Elevated left ventricular end-diastolic pressure. Stress Test: 4/9/2022  1: The stress EKG report is provided separately. 2  this is a normal myocardial perfusion scan. There is no evidence of   ischemia or infarction. 3: The left ventricle is normal in size with a preserved ejection   fraction. 4: Prognostically, this is a low risk study. 5: No prior study available for comparison. Assessment/Plan:     1. Syncope, recurrent   - noted low SBP on admit with SBP 88/d. Symptoms with standing or getting up. On lasix at home and isordil.   - likely orthostatic changes, low on admit. Treated with IVF and holding diuretics. - no evidence of arrhythmias that correlate with syncope. - likely dehydration; elevaated Lactic acid and ISA on admit   - worse with changing positions in head also.      2. HTN   - home meds: lopressor 75 mg BID, isordil 10 mg TID, lasix 40 mg daily  - now off lasix, would recommend cardiology for HTN med management. 3. ISA  - improving    4. Chronic prednisone   - on prednisone 10 mg daily since early this year due to ? COPD per patient    5. COPD   - does not follow with pulmonary   - on inhalers, appears stable . - continues to smoke, trying to quit     6. Pancytopenia   - ? Trend labs, possibly needs repeated; ? Error   - WBC down to 3, plt low with no evidence of bleeding, Hgb ~ 10      7. Mild CAD   - cath as above ischemia 30-40% 6/2022    8. Hx of alcohol abuse   - states has had 1-2 beers at times per week  - had hx of withdrawal in 4/2022 which required vent     Recommendations:  Consider cardiology consult for HTN management. ? Change isordil in setting of HA, mild CAD, etc.   No evidence of arrhythmias from EP standpoint  Repeat orthostatics today and document - negative today. Consider carotid US/vascular work up. Increase hydration > 70 Oz dper day or more, avoid alcohol   Consider TREOVR hose if tolerated. I have spent a total of 10 minutes with the patient and the family reviewing the above stated recommendations. And a total of >50% of that time involved face-to-face time providing counseling and or coordination of care with the other providers. Thank you for allowing me to participate in your patient's care. Please call me if there are any questions or concerns. John Kumar, JOSE - CNP  Cardiac Electrophysiology  [facility]) Physicians  [facility] [place]: [facility]  10:10 AM  9/19/2022    Attending Supervising Physicians [address] Statement  I performed at least 51% of the work.  I was present with the nurse practitioner during the history and exam. I discussed the findings and plans with the nurse practitioner and agree as documented in her note     Electronically signed by Leo Treadwell DO on 9/19/22 at 9:57 PM EDT       56 y/o male with a history of HTN, obesity, and EtOH abuse. He is managed by Dr Tamir Zamudio with aspirin 81 mg daily, lasix 40 mg BID, isordil 10 mg TID, metoprolol 75 mg BID, crestor 10 mg daily, and PPI. On 9/18/22, he presented with chief complaint of syncope. He reports syncope events after prolonged exposure to heat and with position change. He was diagnosed with orthostatic hypotension and treated with IV fluids. Orthostatic VS now with significant decrease. He reports dizziness with turning head from side to side. No carotid bruit on exam. Carotid massage without change in rhythm or recreation of symptoms. However, when patient turned head to either side he would have recurrence of symptoms without change in rhythm. Concern for vascular disease. Recommend evaluation. Will defer to Primary Service. EP service will sign off. Patient to follow with Cardiology. No EP issues.      Ramiro Lee DO  [address] [facility] Cardiac Electrophysiology  [address] Physicians

## 2022-09-19 NOTE — PROGRESS NOTES
Physician Progress Note      PATIENT:               Keshawn Corona  CSN #:                  [id number]  :                       1965  ADMIT DATE:       2022 4:59 PM  [address] [place] DATE:  RESPONDING  PROVIDER #:        Terrence Gutierrez MD          QUERY TEXT:    Dear Provider,  Pt admitted with acute renal failure. Pt noted to have lactic acid of 7.1-   6.5-3.7-4.2 . If possible, please document in the progress notes and discharge   summary if you are evaluating and/or treating any of the following: The medical record reflects the following:  Risk Factors: Acute renal failure, ETOH use  Clinical Indicators:  7.1- 6.5-3.7-4.2  Treatment: IVF, Lactic acid levels    Thank you,  Chely Macedo RN  Clinical Documentation Improvement  [phone]  Options provided:  -- Lactic Acidosis  -- Metabolic Acidosis  -- Other - I will add my own diagnosis  -- Disagree - Not applicable / Not valid  -- Disagree - Clinically unable to determine / Unknown  -- Refer to Clinical Documentation Reviewer    PROVIDER RESPONSE TEXT:    This patient has lactic acidosis.     Query created by: Sunday Navarro on 2022 1:06 PM      Electronically signed by:  Terrence Gutierrez MD 2022 1:17 PM

## 2022-09-20 ENCOUNTER — APPOINTMENT (OUTPATIENT)
Dept: ULTRASOUND IMAGING | Age: 57
DRG: 683 | End: 2022-09-20
Payer: MEDICARE

## 2022-09-20 VITALS
SYSTOLIC BLOOD PRESSURE: 151 MMHG | BODY MASS INDEX: 38.52 KG/M2 | OXYGEN SATURATION: 100 % | WEIGHT: 284.39 LBS | HEIGHT: 72 IN | HEART RATE: 83 BPM | DIASTOLIC BLOOD PRESSURE: 98 MMHG | RESPIRATION RATE: 16 BRPM | TEMPERATURE: 98.4 F

## 2022-09-20 LAB
ALBUMIN SERPL-MCNC: 3.4 G/DL (ref 3.5–5.2)
ALP BLD-CCNC: 103 U/L (ref 40–129)
ALT SERPL-CCNC: 34 U/L (ref 0–40)
ANION GAP SERPL CALCULATED.3IONS-SCNC: 13 MMOL/L (ref 7–16)
AST SERPL-CCNC: 41 U/L (ref 0–39)
BASOPHILS ABSOLUTE: 0.02 E9/L (ref 0–0.2)
BASOPHILS RELATIVE PERCENT: 0.5 % (ref 0–2)
BILIRUB SERPL-MCNC: 0.8 MG/DL (ref 0–1.2)
BUN BLDV-MCNC: 15 MG/DL (ref 6–20)
CALCIUM SERPL-MCNC: 8.9 MG/DL (ref 8.6–10.2)
CHLORIDE BLD-SCNC: 105 MMOL/L (ref 98–107)
CO2: 21 MMOL/L (ref 22–29)
CREAT SERPL-MCNC: 1.1 MG/DL (ref 0.7–1.2)
EOSINOPHILS ABSOLUTE: 0.12 E9/L (ref 0.05–0.5)
EOSINOPHILS RELATIVE PERCENT: 3.2 % (ref 0–6)
GFR AFRICAN AMERICAN: >60
GFR NON-AFRICAN AMERICAN: >60 ML/MIN/1.73
GLUCOSE BLD-MCNC: 132 MG/DL (ref 74–99)
HCT VFR BLD CALC: 33.5 % (ref 37–54)
HEMOGLOBIN: 10.9 G/DL (ref 12.5–16.5)
IMMATURE GRANULOCYTES #: 0.06 E9/L
IMMATURE GRANULOCYTES %: 1.6 % (ref 0–5)
LYMPHOCYTES ABSOLUTE: 1.01 E9/L (ref 1.5–4)
LYMPHOCYTES RELATIVE PERCENT: 26.6 % (ref 20–42)
MCH RBC QN AUTO: 30 PG (ref 26–35)
MCHC RBC AUTO-ENTMCNC: 32.5 % (ref 32–34.5)
MCV RBC AUTO: 92.3 FL (ref 80–99.9)
MONOCYTES ABSOLUTE: 0.42 E9/L (ref 0.1–0.95)
MONOCYTES RELATIVE PERCENT: 11.1 % (ref 2–12)
NEUTROPHILS ABSOLUTE: 2.17 E9/L (ref 1.8–7.3)
NEUTROPHILS RELATIVE PERCENT: 57 % (ref 43–80)
PDW BLD-RTO: 17.3 FL (ref 11.5–15)
PLATELET # BLD: 110 E9/L (ref 130–450)
PMV BLD AUTO: 10 FL (ref 7–12)
POTASSIUM REFLEX MAGNESIUM: 4.7 MMOL/L (ref 3.5–5)
RBC # BLD: 3.63 E12/L (ref 3.8–5.8)
SODIUM BLD-SCNC: 139 MMOL/L (ref 132–146)
TOTAL PROTEIN: 6 G/DL (ref 6.4–8.3)
URINE CULTURE, ROUTINE: NORMAL
WBC # BLD: 3.8 E9/L (ref 4.5–11.5)

## 2022-09-20 PROCEDURE — 6360000002 HC RX W HCPCS: Performed by: PHYSICIAN ASSISTANT

## 2022-09-20 PROCEDURE — 93880 EXTRACRANIAL BILAT STUDY: CPT

## 2022-09-20 PROCEDURE — 2580000003 HC RX 258: Performed by: INTERNAL MEDICINE

## 2022-09-20 PROCEDURE — 6360000002 HC RX W HCPCS: Performed by: INTERNAL MEDICINE

## 2022-09-20 PROCEDURE — 85025 COMPLETE CBC W/AUTO DIFF WBC: CPT

## 2022-09-20 PROCEDURE — 6370000000 HC RX 637 (ALT 250 FOR IP): Performed by: INTERNAL MEDICINE

## 2022-09-20 PROCEDURE — 6370000000 HC RX 637 (ALT 250 FOR IP): Performed by: PHYSICIAN ASSISTANT

## 2022-09-20 PROCEDURE — 80053 COMPREHEN METABOLIC PANEL: CPT

## 2022-09-20 PROCEDURE — 94640 AIRWAY INHALATION TREATMENT: CPT

## 2022-09-20 PROCEDURE — 2580000003 HC RX 258: Performed by: PHYSICIAN ASSISTANT

## 2022-09-20 PROCEDURE — 36415 COLL VENOUS BLD VENIPUNCTURE: CPT

## 2022-09-20 RX ORDER — AMLODIPINE BESYLATE 5 MG/1
5 TABLET ORAL DAILY
Qty: 30 TABLET | Refills: 5 | Status: SHIPPED | OUTPATIENT
Start: 2022-09-20

## 2022-09-20 RX ADMIN — ISOSORBIDE DINITRATE 10 MG: 10 TABLET ORAL at 13:20

## 2022-09-20 RX ADMIN — OXYCODONE AND ACETAMINOPHEN 1 TABLET: 5; 325 TABLET ORAL at 15:23

## 2022-09-20 RX ADMIN — OXYCODONE AND ACETAMINOPHEN 1 TABLET: 5; 325 TABLET ORAL at 03:39

## 2022-09-20 RX ADMIN — ASPIRIN 81 MG 81 MG: 81 TABLET ORAL at 08:21

## 2022-09-20 RX ADMIN — SODIUM CHLORIDE: 9 INJECTION, SOLUTION INTRAVENOUS at 08:10

## 2022-09-20 RX ADMIN — ENOXAPARIN SODIUM 30 MG: 100 INJECTION SUBCUTANEOUS at 08:20

## 2022-09-20 RX ADMIN — Medication 10 ML: at 08:21

## 2022-09-20 RX ADMIN — OXYCODONE AND ACETAMINOPHEN 1 TABLET: 5; 325 TABLET ORAL at 09:28

## 2022-09-20 RX ADMIN — ARFORMOTEROL TARTRATE 15 MCG: 15 SOLUTION RESPIRATORY (INHALATION) at 08:12

## 2022-09-20 RX ADMIN — ISOSORBIDE DINITRATE 10 MG: 10 TABLET ORAL at 08:21

## 2022-09-20 RX ADMIN — BUDESONIDE 500 MCG: 0.5 SUSPENSION RESPIRATORY (INHALATION) at 08:12

## 2022-09-20 RX ADMIN — PANTOPRAZOLE SODIUM 40 MG: 40 TABLET, DELAYED RELEASE ORAL at 08:21

## 2022-09-20 RX ADMIN — METOPROLOL TARTRATE 75 MG: 25 TABLET, FILM COATED ORAL at 08:20

## 2022-09-20 ASSESSMENT — PAIN DESCRIPTION - ORIENTATION: ORIENTATION: POSTERIOR;LOWER

## 2022-09-20 ASSESSMENT — PAIN SCALES - GENERAL
PAINLEVEL_OUTOF10: 10
PAINLEVEL_OUTOF10: 10
PAINLEVEL_OUTOF10: 8

## 2022-09-20 ASSESSMENT — PAIN DESCRIPTION - DESCRIPTORS
DESCRIPTORS: POUNDING;THROBBING
DESCRIPTORS: ACHING

## 2022-09-20 ASSESSMENT — PAIN DESCRIPTION - LOCATION
LOCATION: HEAD;NECK;BACK
LOCATION: HEAD
LOCATION: NECK;BACK

## 2022-09-20 NOTE — PLAN OF CARE
Problem: Discharge Planning  Goal: Discharge to home or other facility with appropriate resources  9/20/2022 1528 by Sarwat Zamarripa RN  Outcome: Adequate for Discharge  9/20/2022 1528 by Sarwat Zamarripa RN  Outcome: Adequate for Discharge  9/20/2022 0804 by Tylor Bacon RN  Outcome: Progressing     Problem: Pain  Goal: Verbalizes/displays adequate comfort level or baseline comfort level  9/20/2022 1528 by Sarwat Zamarripa RN  Outcome: Adequate for Discharge  9/20/2022 1528 by Sarwat Zamarripa RN  Outcome: Adequate for Discharge  9/20/2022 0804 by Tylor Bacon RN  Outcome: Progressing     Problem: ABCDS Injury Assessment  Goal: Absence of physical injury  9/20/2022 1528 by Sarwat Zamarripa RN  Outcome: Adequate for Discharge  9/20/2022 1528 by Sarwat Zamarripa RN  Outcome: Adequate for Discharge  Flowsheets (Taken 9/20/2022 0806 by Tylor Bacon RN)  Absence of Physical Injury: Implement safety measures based on patient assessment  9/20/2022 0804 by Tylor Bacon RN  Outcome: Progressing     Problem: Safety - Adult  Goal: Free from fall injury  9/20/2022 1528 by Sarwat Zamarripa RN  Outcome: Adequate for Discharge  9/20/2022 1528 by Sarwat Zamarripa RN  Outcome: Adequate for Discharge  Flowsheets (Taken 9/20/2022 0806 by Tylor Bacon RN)  Free From Fall Injury: Instruct family/caregiver on patient safety  9/20/2022 0804 by Tylor Bacon RN  Outcome: Progressing

## 2022-09-20 NOTE — CARE COORDINATION
EP note from yesterday clearly indicates cardiology consult recommendation  Now I was told EP actually wants a vascular surgery consult for reasons unclear to me
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
## 2022-09-20 NOTE — PROGRESS NOTES
[facility] cardiology progress note:    Spoke with EP and Primary service, no need for cardiology consult at this time. Please call if you have any questions.      Electronically signed by JOSE Stanley CNP on 9/20/22 at 12:15 PM EDT

## 2022-09-22 LAB
BLOOD CULTURE, ROUTINE: NORMAL
CULTURE, BLOOD 2: NORMAL

## 2022-10-10 NOTE — DISCHARGE SUMMARY
Physician Discharge Summary     Patient ID:  Juany Gan  [id number]  64 y.o.  1965    Admit date: 9/17/2022    Discharge date and time: 9/20/2022  5:51 PM     Admission Diagnoses: Syncope and collapse [R55]  [facility] [N17.9]    Discharge Diagnoses:   Syncope from volume depletion  Acute kidney injury  Patient Active Problem List   Diagnosis    Biceps rupture, proximal    Shoulder pain    Pectoral muscle rupture    Biceps tendonitis    Shoulder impingement    Rotator cuff tear    Acromioclavicular joint arthritis    Acute chest pain    Chest pain    COPD exacerbation (HCC)    Hyponatremia    Primary hypertension    Hyperlipidemia    Gastroesophageal reflux disease    Tobacco use disorder    Morbid obesity (HCC)    Severe alcohol withdrawal delirium (HCC)    Dysphagia    Transaminitis    Shortness of breath    CAD in native artery    ISA (acute kidney injury) ([facility]    Syncope       Consults: EP    Procedures:     Hospital Course:   59-year-old morbidly obese  man admitted through emergency room due to a syncopal episode  Responded well to IV fluid resuscitation  Found to be with acute kidney injury  Creatinine improved  Seen by EP  No further recommendations were made  Amlodipine was added for better BP control  Discharged home in stable condition    Discharge Exam:  See progress note from today    Disposition: Stable  Home  Patient Instructions:   Discharge Medication List as of 9/20/2022  3:59 PM        START taking these medications    Details   amLODIPine (NORVASC) 5 MG tablet Take 1 tablet by mouth daily, Disp-30 tablet, R-5Normal           CONTINUE these medications which have NOT CHANGED    Details   acetaminophen (TYLENOL) 500 MG tablet Take 500 mg by mouth every 6 hours as needed for PainHistorical Med      ADVAIR -21 MCG/ACT inhaler inhale 2 puffs by mouth and INTO THE LUNGS every 12 hours, DAWHistorical Med      omeprazole (PRILOSEC) 20 MG delayed release capsule Take 20 mg by mouth dailyHistorical Med      predniSONE (DELTASONE) 10 MG tablet Take 10 mg by mouth dailyHistorical Med      aspirin 81 MG chewable tablet Take 1 tablet by mouth daily, Disp-30 tablet, R-0DC to SNF      isosorbide dinitrate (ISORDIL) 10 MG tablet Take 1 tablet by mouth 3 times daily, Disp-90 tablet, R-0DC to SNF      budesonide (PULMICORT) 0.5 MG/2ML nebulizer suspension Take 2 mLs by nebulization 2 times daily, Disp-60 each, R-0DC to SNF      ipratropium-albuterol (DUONEB) 0.5-2.5 (3) MG/3ML SOLN nebulizer solution Inhale 3 mLs into the lungs every 4 hours (while awake), Disp-360 mL, R-0DC to SNF      metoprolol tartrate 75 MG TABS Take 75 mg by mouth 2 times daily, Disp-60 tablet, R-0DC to SNF      nicotine (NICODERM CQ) 14 MG/24HR Place 1 patch onto the skin daily, Disp-30 patch, B-4MT to SNF      folic acid (FOLVITE) 1 MG tablet Take 1 tablet by mouth daily, Disp-30 tablet, R-0DC to SNF      albuterol sulfate  (90 Base) MCG/ACT inhaler Inhale 2 puffs into the lungs every 8 hours as neededHistorical Med      rosuvastatin (CRESTOR) 10 MG tablet Take 10 mg by mouth daily Historical Med           STOP taking these medications       meloxicam (MOBIC) 15 MG tablet Comments:   Reason for Stopping:         furosemide (LASIX) 40 MG tablet Comments:   Reason for Stopping:             Activity: As tolerated  Diet: General    Follow-up with PCP and EP    Note that over 40 minutes was spent in preparing discharge papers, discussing discharge with patient, medication review, etc.    Signed:  Jing Allison MD  10/10/2022  7:08 PM

## 2022-11-18 ENCOUNTER — APPOINTMENT (OUTPATIENT)
Dept: GENERAL RADIOLOGY | Age: 57
End: 2022-11-18
Payer: MEDICARE

## 2022-11-18 ENCOUNTER — HOSPITAL ENCOUNTER (OUTPATIENT)
Age: 57
Setting detail: OBSERVATION
Discharge: HOME | End: 2022-11-21
Attending: EMERGENCY MEDICINE | Admitting: INTERNAL MEDICINE
Payer: MEDICARE

## 2022-11-18 ENCOUNTER — APPOINTMENT (OUTPATIENT)
Dept: CT IMAGING | Age: 57
End: 2022-11-18
Payer: MEDICARE

## 2022-11-18 DIAGNOSIS — R55 NEAR SYNCOPE: Primary | ICD-10-CM

## 2022-11-18 LAB
ANION GAP SERPL CALCULATED.3IONS-SCNC: 18 MMOL/L (ref 7–16)
BASOPHILS ABSOLUTE: 0.07 E9/L (ref 0–0.2)
BASOPHILS RELATIVE PERCENT: 1 % (ref 0–2)
BETA-HYDROXYBUTYRATE: 0.09 MMOL/L (ref 0.02–0.27)
BUN BLDV-MCNC: 25 MG/DL (ref 6–20)
CALCIUM SERPL-MCNC: 9.7 MG/DL (ref 8.6–10.2)
CHLORIDE BLD-SCNC: 97 MMOL/L (ref 98–107)
CO2: 19 MMOL/L (ref 22–29)
CREAT SERPL-MCNC: 1.2 MG/DL (ref 0.7–1.2)
EKG ATRIAL RATE: 89 BPM
EKG P AXIS: 60 DEGREES
EKG P-R INTERVAL: 124 MS
EKG Q-T INTERVAL: 356 MS
EKG QRS DURATION: 90 MS
EKG QTC CALCULATION (BAZETT): 433 MS
EKG R AXIS: 63 DEGREES
EKG T AXIS: 52 DEGREES
EKG VENTRICULAR RATE: 89 BPM
EOSINOPHILS ABSOLUTE: 0.04 E9/L (ref 0.05–0.5)
EOSINOPHILS RELATIVE PERCENT: 0.6 % (ref 0–6)
GFR SERPL CREATININE-BSD FRML MDRD: >60 ML/MIN/1.73
GLUCOSE BLD-MCNC: 189 MG/DL (ref 74–99)
HCT VFR BLD CALC: 39.5 % (ref 37–54)
HEMOGLOBIN: 13 G/DL (ref 12.5–16.5)
IMMATURE GRANULOCYTES #: 0.22 E9/L
IMMATURE GRANULOCYTES %: 3.2 % (ref 0–5)
LACTIC ACID: 3.2 MMOL/L (ref 0.5–2.2)
LYMPHOCYTES ABSOLUTE: 0.89 E9/L (ref 1.5–4)
LYMPHOCYTES RELATIVE PERCENT: 12.9 % (ref 20–42)
MAGNESIUM: 1.7 MG/DL (ref 1.6–2.6)
MCH RBC QN AUTO: 32.5 PG (ref 26–35)
MCHC RBC AUTO-ENTMCNC: 32.9 % (ref 32–34.5)
MCV RBC AUTO: 98.8 FL (ref 80–99.9)
MONOCYTES ABSOLUTE: 0.73 E9/L (ref 0.1–0.95)
MONOCYTES RELATIVE PERCENT: 10.6 % (ref 2–12)
NEUTROPHILS ABSOLUTE: 4.93 E9/L (ref 1.8–7.3)
NEUTROPHILS RELATIVE PERCENT: 71.7 % (ref 43–80)
PDW BLD-RTO: 17.2 FL (ref 11.5–15)
PLATELET # BLD: 215 E9/L (ref 130–450)
PMV BLD AUTO: 9.6 FL (ref 7–12)
POTASSIUM REFLEX MAGNESIUM: 4.4 MMOL/L (ref 3.5–5)
PRO-BNP: 61 PG/ML (ref 0–125)
RBC # BLD: 4 E12/L (ref 3.8–5.8)
REASON FOR REJECTION: NORMAL
REJECTED TEST: NORMAL
SARS-COV-2, NAAT: NOT DETECTED
SODIUM BLD-SCNC: 134 MMOL/L (ref 132–146)
TROPONIN, HIGH SENSITIVITY: 8 NG/L (ref 0–11)
WBC # BLD: 6.9 E9/L (ref 4.5–11.5)

## 2022-11-18 PROCEDURE — 83880 ASSAY OF NATRIURETIC PEPTIDE: CPT

## 2022-11-18 PROCEDURE — 6360000002 HC RX W HCPCS: Performed by: FAMILY MEDICINE

## 2022-11-18 PROCEDURE — G0378 HOSPITAL OBSERVATION PER HR: HCPCS

## 2022-11-18 PROCEDURE — 6370000000 HC RX 637 (ALT 250 FOR IP)

## 2022-11-18 PROCEDURE — 96375 TX/PRO/DX INJ NEW DRUG ADDON: CPT

## 2022-11-18 PROCEDURE — 2580000003 HC RX 258: Performed by: FAMILY MEDICINE

## 2022-11-18 PROCEDURE — 6360000002 HC RX W HCPCS: Performed by: EMERGENCY MEDICINE

## 2022-11-18 PROCEDURE — 87635 SARS-COV-2 COVID-19 AMP PRB: CPT

## 2022-11-18 PROCEDURE — 6360000004 HC RX CONTRAST MEDICATION: Performed by: RADIOLOGY

## 2022-11-18 PROCEDURE — 2580000003 HC RX 258: Performed by: STUDENT IN AN ORGANIZED HEALTH CARE EDUCATION/TRAINING PROGRAM

## 2022-11-18 PROCEDURE — 83605 ASSAY OF LACTIC ACID: CPT

## 2022-11-18 PROCEDURE — 96361 HYDRATE IV INFUSION ADD-ON: CPT

## 2022-11-18 PROCEDURE — 83735 ASSAY OF MAGNESIUM: CPT

## 2022-11-18 PROCEDURE — 6370000000 HC RX 637 (ALT 250 FOR IP): Performed by: STUDENT IN AN ORGANIZED HEALTH CARE EDUCATION/TRAINING PROGRAM

## 2022-11-18 PROCEDURE — 94640 AIRWAY INHALATION TREATMENT: CPT

## 2022-11-18 PROCEDURE — 96376 TX/PRO/DX INJ SAME DRUG ADON: CPT

## 2022-11-18 PROCEDURE — 85025 COMPLETE CBC W/AUTO DIFF WBC: CPT

## 2022-11-18 PROCEDURE — 71045 X-RAY EXAM CHEST 1 VIEW: CPT

## 2022-11-18 PROCEDURE — 82010 KETONE BODYS QUAN: CPT

## 2022-11-18 PROCEDURE — 96374 THER/PROPH/DIAG INJ IV PUSH: CPT

## 2022-11-18 PROCEDURE — 94664 DEMO&/EVAL PT USE INHALER: CPT

## 2022-11-18 PROCEDURE — 80048 BASIC METABOLIC PNL TOTAL CA: CPT

## 2022-11-18 PROCEDURE — 6360000002 HC RX W HCPCS: Performed by: STUDENT IN AN ORGANIZED HEALTH CARE EDUCATION/TRAINING PROGRAM

## 2022-11-18 PROCEDURE — 6370000000 HC RX 637 (ALT 250 FOR IP): Performed by: FAMILY MEDICINE

## 2022-11-18 PROCEDURE — 71275 CT ANGIOGRAPHY CHEST: CPT

## 2022-11-18 PROCEDURE — 6360000002 HC RX W HCPCS

## 2022-11-18 PROCEDURE — 74174 CTA ABD&PLVS W/CONTRAST: CPT

## 2022-11-18 PROCEDURE — 84484 ASSAY OF TROPONIN QUANT: CPT

## 2022-11-18 PROCEDURE — 99285 EMERGENCY DEPT VISIT HI MDM: CPT

## 2022-11-18 PROCEDURE — 96372 THER/PROPH/DIAG INJ SC/IM: CPT

## 2022-11-18 PROCEDURE — 93005 ELECTROCARDIOGRAM TRACING: CPT | Performed by: STUDENT IN AN ORGANIZED HEALTH CARE EDUCATION/TRAINING PROGRAM

## 2022-11-18 PROCEDURE — 70450 CT HEAD/BRAIN W/O DYE: CPT

## 2022-11-18 RX ORDER — ACETAMINOPHEN 325 MG/1
650 TABLET ORAL EVERY 6 HOURS PRN
Status: DISCONTINUED | OUTPATIENT
Start: 2022-11-18 | End: 2022-11-21 | Stop reason: HOSPADM

## 2022-11-18 RX ORDER — SODIUM CHLORIDE 0.9 % (FLUSH) 0.9 %
10 SYRINGE (ML) INJECTION PRN
Status: DISCONTINUED | OUTPATIENT
Start: 2022-11-18 | End: 2022-11-21 | Stop reason: HOSPADM

## 2022-11-18 RX ORDER — KETOROLAC TROMETHAMINE 30 MG/ML
15 INJECTION, SOLUTION INTRAMUSCULAR; INTRAVENOUS ONCE
Status: COMPLETED | OUTPATIENT
Start: 2022-11-18 | End: 2022-11-18

## 2022-11-18 RX ORDER — BUDESONIDE 0.5 MG/2ML
0.5 INHALANT ORAL 2 TIMES DAILY
Status: DISCONTINUED | OUTPATIENT
Start: 2022-11-18 | End: 2022-11-18 | Stop reason: SDUPTHER

## 2022-11-18 RX ORDER — ENOXAPARIN SODIUM 100 MG/ML
40 INJECTION SUBCUTANEOUS 2 TIMES DAILY
Status: DISCONTINUED | OUTPATIENT
Start: 2022-11-18 | End: 2022-11-21 | Stop reason: HOSPADM

## 2022-11-18 RX ORDER — ARFORMOTEROL TARTRATE 15 UG/2ML
15 SOLUTION RESPIRATORY (INHALATION) 2 TIMES DAILY
Status: DISCONTINUED | OUTPATIENT
Start: 2022-11-18 | End: 2022-11-21 | Stop reason: HOSPADM

## 2022-11-18 RX ORDER — IPRATROPIUM BROMIDE AND ALBUTEROL SULFATE 2.5; .5 MG/3ML; MG/3ML
3 SOLUTION RESPIRATORY (INHALATION)
Status: DISCONTINUED | OUTPATIENT
Start: 2022-11-18 | End: 2022-11-21 | Stop reason: HOSPADM

## 2022-11-18 RX ORDER — ALBUTEROL SULFATE 90 UG/1
2 AEROSOL, METERED RESPIRATORY (INHALATION) EVERY 8 HOURS PRN
Status: DISCONTINUED | OUTPATIENT
Start: 2022-11-18 | End: 2022-11-18 | Stop reason: CLARIF

## 2022-11-18 RX ORDER — POLYETHYLENE GLYCOL 3350 17 G/17G
17 POWDER, FOR SOLUTION ORAL DAILY PRN
Status: DISCONTINUED | OUTPATIENT
Start: 2022-11-18 | End: 2022-11-21 | Stop reason: HOSPADM

## 2022-11-18 RX ORDER — NICOTINE 21 MG/24HR
1 PATCH, TRANSDERMAL 24 HOURS TRANSDERMAL ONCE
Status: COMPLETED | OUTPATIENT
Start: 2022-11-18 | End: 2022-11-19

## 2022-11-18 RX ORDER — ALBUTEROL SULFATE 2.5 MG/3ML
2.5 SOLUTION RESPIRATORY (INHALATION) EVERY 6 HOURS PRN
Status: DISCONTINUED | OUTPATIENT
Start: 2022-11-18 | End: 2022-11-21 | Stop reason: HOSPADM

## 2022-11-18 RX ORDER — NICOTINE 21 MG/24HR
1 PATCH, TRANSDERMAL 24 HOURS TRANSDERMAL DAILY
Status: DISCONTINUED | OUTPATIENT
Start: 2022-11-19 | End: 2022-11-21 | Stop reason: HOSPADM

## 2022-11-18 RX ORDER — HYDROCODONE BITARTRATE AND ACETAMINOPHEN 5; 325 MG/1; MG/1
2 TABLET ORAL EVERY 4 HOURS PRN
Status: DISCONTINUED | OUTPATIENT
Start: 2022-11-18 | End: 2022-11-21 | Stop reason: HOSPADM

## 2022-11-18 RX ORDER — SODIUM CHLORIDE 9 MG/ML
INJECTION, SOLUTION INTRAVENOUS PRN
Status: DISCONTINUED | OUTPATIENT
Start: 2022-11-18 | End: 2022-11-21 | Stop reason: HOSPADM

## 2022-11-18 RX ORDER — SODIUM CHLORIDE 0.9 % (FLUSH) 0.9 %
5-40 SYRINGE (ML) INJECTION EVERY 12 HOURS SCHEDULED
Status: DISCONTINUED | OUTPATIENT
Start: 2022-11-18 | End: 2022-11-21 | Stop reason: HOSPADM

## 2022-11-18 RX ORDER — ISOSORBIDE DINITRATE 10 MG/1
10 TABLET ORAL 3 TIMES DAILY
Status: DISCONTINUED | OUTPATIENT
Start: 2022-11-18 | End: 2022-11-21 | Stop reason: HOSPADM

## 2022-11-18 RX ORDER — ONDANSETRON 2 MG/ML
4 INJECTION INTRAMUSCULAR; INTRAVENOUS EVERY 6 HOURS PRN
Status: DISCONTINUED | OUTPATIENT
Start: 2022-11-18 | End: 2022-11-21 | Stop reason: HOSPADM

## 2022-11-18 RX ORDER — MORPHINE SULFATE 2 MG/ML
2 INJECTION, SOLUTION INTRAMUSCULAR; INTRAVENOUS EVERY 4 HOURS PRN
Status: DISCONTINUED | OUTPATIENT
Start: 2022-11-18 | End: 2022-11-21 | Stop reason: HOSPADM

## 2022-11-18 RX ORDER — 0.9 % SODIUM CHLORIDE 0.9 %
500 INTRAVENOUS SOLUTION INTRAVENOUS ONCE
Status: COMPLETED | OUTPATIENT
Start: 2022-11-18 | End: 2022-11-18

## 2022-11-18 RX ORDER — ACETAMINOPHEN 650 MG/1
650 SUPPOSITORY RECTAL EVERY 6 HOURS PRN
Status: DISCONTINUED | OUTPATIENT
Start: 2022-11-18 | End: 2022-11-21 | Stop reason: HOSPADM

## 2022-11-18 RX ORDER — FENTANYL CITRATE 50 UG/ML
50 INJECTION, SOLUTION INTRAMUSCULAR; INTRAVENOUS ONCE
Status: COMPLETED | OUTPATIENT
Start: 2022-11-18 | End: 2022-11-18

## 2022-11-18 RX ORDER — ROSUVASTATIN CALCIUM 5 MG/1
10 TABLET, COATED ORAL DAILY
Status: DISCONTINUED | OUTPATIENT
Start: 2022-11-19 | End: 2022-11-21 | Stop reason: HOSPADM

## 2022-11-18 RX ORDER — SODIUM CHLORIDE 9 MG/ML
INJECTION, SOLUTION INTRAVENOUS CONTINUOUS
Status: DISCONTINUED | OUTPATIENT
Start: 2022-11-18 | End: 2022-11-20

## 2022-11-18 RX ORDER — PANTOPRAZOLE SODIUM 40 MG/1
40 TABLET, DELAYED RELEASE ORAL
Status: DISCONTINUED | OUTPATIENT
Start: 2022-11-19 | End: 2022-11-21 | Stop reason: HOSPADM

## 2022-11-18 RX ORDER — HYDROCODONE BITARTRATE AND ACETAMINOPHEN 5; 325 MG/1; MG/1
1 TABLET ORAL EVERY 4 HOURS PRN
Status: DISCONTINUED | OUTPATIENT
Start: 2022-11-18 | End: 2022-11-21 | Stop reason: HOSPADM

## 2022-11-18 RX ORDER — 0.9 % SODIUM CHLORIDE 0.9 %
500 INTRAVENOUS SOLUTION INTRAVENOUS ONCE
Status: DISCONTINUED | OUTPATIENT
Start: 2022-11-18 | End: 2022-11-21 | Stop reason: HOSPADM

## 2022-11-18 RX ORDER — ONDANSETRON 4 MG/1
4 TABLET, ORALLY DISINTEGRATING ORAL EVERY 8 HOURS PRN
Status: DISCONTINUED | OUTPATIENT
Start: 2022-11-18 | End: 2022-11-21 | Stop reason: HOSPADM

## 2022-11-18 RX ORDER — ASPIRIN 81 MG/1
81 TABLET, CHEWABLE ORAL DAILY
Status: DISCONTINUED | OUTPATIENT
Start: 2022-11-19 | End: 2022-11-21 | Stop reason: HOSPADM

## 2022-11-18 RX ORDER — BUDESONIDE 0.5 MG/2ML
0.5 INHALANT ORAL 2 TIMES DAILY
Status: DISCONTINUED | OUTPATIENT
Start: 2022-11-18 | End: 2022-11-21 | Stop reason: HOSPADM

## 2022-11-18 RX ORDER — ACETAMINOPHEN 500 MG
500 TABLET ORAL EVERY 6 HOURS PRN
Status: DISCONTINUED | OUTPATIENT
Start: 2022-11-18 | End: 2022-11-18 | Stop reason: SDUPTHER

## 2022-11-18 RX ORDER — AMLODIPINE BESYLATE 5 MG/1
5 TABLET ORAL DAILY
Status: DISCONTINUED | OUTPATIENT
Start: 2022-11-19 | End: 2022-11-21 | Stop reason: HOSPADM

## 2022-11-18 RX ADMIN — ENOXAPARIN SODIUM 40 MG: 100 INJECTION SUBCUTANEOUS at 22:36

## 2022-11-18 RX ADMIN — ARFORMOTEROL TARTRATE 15 MCG: 15 SOLUTION RESPIRATORY (INHALATION) at 19:21

## 2022-11-18 RX ADMIN — HYDROCODONE BITARTRATE AND ACETAMINOPHEN 2 TABLET: 5; 325 TABLET ORAL at 23:07

## 2022-11-18 RX ADMIN — FENTANYL CITRATE 50 MCG: 50 INJECTION, SOLUTION INTRAMUSCULAR; INTRAVENOUS at 17:11

## 2022-11-18 RX ADMIN — ISOSORBIDE DINITRATE 10 MG: 10 TABLET ORAL at 22:36

## 2022-11-18 RX ADMIN — KETOROLAC TROMETHAMINE 15 MG: 30 INJECTION, SOLUTION INTRAMUSCULAR at 15:19

## 2022-11-18 RX ADMIN — MORPHINE SULFATE 2 MG: 2 INJECTION, SOLUTION INTRAMUSCULAR; INTRAVENOUS at 20:08

## 2022-11-18 RX ADMIN — Medication 10 ML: at 23:30

## 2022-11-18 RX ADMIN — METOPROLOL TARTRATE 75 MG: 25 TABLET, FILM COATED ORAL at 22:36

## 2022-11-18 RX ADMIN — HYDROCODONE BITARTRATE AND ACETAMINOPHEN 2 TABLET: 5; 325 TABLET ORAL at 19:10

## 2022-11-18 RX ADMIN — SODIUM CHLORIDE 500 ML: 9 INJECTION, SOLUTION INTRAVENOUS at 12:18

## 2022-11-18 RX ADMIN — IOPAMIDOL 90 ML: 755 INJECTION, SOLUTION INTRAVENOUS at 13:17

## 2022-11-18 RX ADMIN — IPRATROPIUM BROMIDE AND ALBUTEROL SULFATE 3 ML: .5; 2.5 SOLUTION RESPIRATORY (INHALATION) at 19:32

## 2022-11-18 RX ADMIN — FENTANYL CITRATE 50 MCG: 0.05 INJECTION, SOLUTION INTRAMUSCULAR; INTRAVENOUS at 13:16

## 2022-11-18 RX ADMIN — BUDESONIDE 500 MCG: 0.5 SUSPENSION RESPIRATORY (INHALATION) at 19:21

## 2022-11-18 RX ADMIN — SODIUM CHLORIDE: 9 INJECTION, SOLUTION INTRAVENOUS at 20:12

## 2022-11-18 ASSESSMENT — PAIN SCALES - GENERAL
PAINLEVEL_OUTOF10: 9
PAINLEVEL_OUTOF10: 10
PAINLEVEL_OUTOF10: 9
PAINLEVEL_OUTOF10: 2
PAINLEVEL_OUTOF10: 10
PAINLEVEL_OUTOF10: 5
PAINLEVEL_OUTOF10: 8

## 2022-11-18 ASSESSMENT — ENCOUNTER SYMPTOMS
SORE THROAT: 0
SHORTNESS OF BREATH: 1
ABDOMINAL PAIN: 0
NAUSEA: 0
PHOTOPHOBIA: 0
DIARRHEA: 0
COUGH: 1
BACK PAIN: 0

## 2022-11-18 ASSESSMENT — PAIN DESCRIPTION - DESCRIPTORS
DESCRIPTORS: ACHING;DISCOMFORT
DESCRIPTORS: ACHING
DESCRIPTORS: ACHING

## 2022-11-18 ASSESSMENT — PAIN DESCRIPTION - ORIENTATION
ORIENTATION: RIGHT
ORIENTATION: RIGHT;UPPER
ORIENTATION: MID
ORIENTATION: RIGHT;UPPER

## 2022-11-18 ASSESSMENT — PAIN - FUNCTIONAL ASSESSMENT
PAIN_FUNCTIONAL_ASSESSMENT: NONE - DENIES PAIN
PAIN_FUNCTIONAL_ASSESSMENT: ACTIVITIES ARE NOT PREVENTED
PAIN_FUNCTIONAL_ASSESSMENT: PREVENTS OR INTERFERES SOME ACTIVE ACTIVITIES AND ADLS

## 2022-11-18 ASSESSMENT — PAIN DESCRIPTION - LOCATION
LOCATION: ABDOMEN
LOCATION: BACK
LOCATION: ABDOMEN
LOCATION: ABDOMEN;CHEST
LOCATION: ABDOMEN

## 2022-11-18 NOTE — ED PROVIDER NOTES
ATTENDING PROVIDER ATTESTATION:     Sarbjit Romero presented to the emergency department for evaluation of Dizziness (Woke up with dizziness hx cardiac cath with Dr Eric Stone in June)   and was initially evaluated by the Medical Resident. See Original ED Note for H&P and ED course above. I have reviewed and discussed the case, including pertinent history (medical, surgical, family and social) and exam findings with the Medical Resident assigned to Sarbjit Romero. I have personally performed and/or participated in the history, exam, medical decision making, and procedures and agree with all pertinent clinical information and any additional changes or corrections are noted below in my assessment and plan. I have discussed this patient in detail with the resident, and provided the instruction and education,       I have reviewed my findings and recommendations with the assigned Medical Resident, Sarbjit Romero and members of family present at the time of disposition. I have performed a history and physical examination of this patient and directly supervised the resident caring for this patient      History of Present Illness:    Presents to the ED for dizziness and near syncope, beginning prior to arrival.  The complaint has been intermittent, severe in severity, and worsened by nothing. Patient reports that he woke up and felt dizzy/lightheaded. He says that when he stands up he feels like he wants to pass out. He says his blood pressure dropped when he stood up. Also reports pain in his lower back. No chest pain. Reports he has some mild shortness of breath which is chronic from his underlying COPD. Denies any actual syncope. He said he sat down for he passed out. He denies any history of syncope. He denies any other complaints. Reports he had a heart cath in June.         Review of Systems:   A complete review of systems was performed and pertinent positives and negatives are stated within HPI, all other systems reviewed and are negative.    --------------------------------------------- PAST HISTORY ---------------------------------------------  Past Medical History:  has a past medical history of Alcohol abuse, Arthritis, Back pain, chronic, CAD in native artery, COPD (chronic obstructive pulmonary disease) ([facility] [address] H/O cardiovascular stress test, Hyperlipidemia, Hypertension, Hyponatremia, Neck pain, chronic, and Tobacco abuse. Past Surgical History:  has a past surgical history that includes External ear surgery; fracture surgery; eye surgery; Hemorrhoid surgery; Shoulder arthroscopy (Right, 1/16/2015); and Cardiac catheterization (06/08/2022). Social History:  reports that he has been smoking cigarettes. He has a 32.25 pack-year smoking history. He has never used smokeless tobacco. He reports current alcohol use of about 28.0 standard drinks per week. He reports current drug use. Drug: Marijuana Jesus Spina). Family History: family history includes Heart Attack in his brother and father; Other in his brother and sister. Unless otherwise noted, family history is non contributory    The patients home medications have been reviewed. Allergies: Pcn [penicillins]    EKG Interpretation  Interpreted by emergency department physician, Dr. Jose Vela     11/18/22  Time: 0603    Rhythm: normal sinus   Rate: normal  Axis: normal  Conduction: normal  ST Segments: no acute change  T Waves: no acute change    Clinical Impression: Sinus rhythm, no acute ischemic changes    Comparison to Old EKG  Stable from prior EKG        Physical Exam:  Constitutional/General: Alert and oriented x3  Head: Normocephalic and atraumatic  Eyes: PERRL, EOMI, sclera non icteric  ENT: Oropharynx clear, handling secretions  Neck: Supple, full ROM, no stridor, no meningeal signs  Respiratory: Lungs clear to auscultation bilaterally, no wheezes, rales, or rhonchi. Not in respiratory distress  Cardiovascular:  Regular rate. Regular rhythm.  No murmurs, no gallops, no rubs. 2+ distal pulses. Equal extremity pulses. GI:  Abdomen Soft, Non tender, Non distended. No rebound, guarding, or rigidity. No pulsatile masses. Musculoskeletal: Moves all extremities x 4. Warm and well perfused,  no clubbing, no cyanosis, no edema. Palpable peripheral pulses  Integument: skin warm and dry. No rashes. Neurologic: GCS 15, no focal deficits  Psychiatric: Normal Affect      I directly supervised any procedures performed by the resident and was present for the procedure including all critical portions of the procedure      The cardiac monitor revealed sinus rhythm with a heart rate in the 90s as interpreted by me. The cardiac monitor was ordered secondary to the patient's near syncope and to monitor the patient for dysrhythmia. CPT [id number]        Oxygen Saturation Interpretation: 98 % on RA.        I, Dr. Lanie Oleary, am the primary provider of record      Medical Decision Making:   Near syncope  Low BP at home  CTA negative for dissection,   looks dry/dehydrated, IVF ordered  No infectious sx  No chest pain  EKG without ST elevation  Medicine consulted for admission      Name and Route of medications administered in the ED:  Medications   nicotine (NICODERM CQ) 21 MG/24HR 1 patch (1 patch TransDERmal Patch Removed 11/18/22 2008)   0.9 % sodium chloride bolus (has no administration in time range)   amLODIPine (NORVASC) tablet 5 mg (has no administration in time range)   aspirin chewable tablet 81 mg (has no administration in time range)   ipratropium-albuterol (DUONEB) nebulizer solution 3 mL (3 mLs Inhalation Given 11/18/22 1932)   isosorbide dinitrate (ISORDIL) tablet 10 mg (has no administration in time range)   Metoprolol Tartrate TABS 75 mg (has no administration in time range)   nicotine (NICODERM CQ) 14 MG/24HR 1 patch (has no administration in time range)   pantoprazole (PROTONIX) tablet 40 mg (has no administration in time range)   rosuvastatin (CRESTOR) tablet 10 mg (has no administration in time range)   0.9 % sodium chloride infusion ( IntraVENous New Bag 11/18/22 2012)   sodium chloride flush 0.9 % injection 5-40 mL (has no administration in time range)   sodium chloride flush 0.9 % injection 10 mL (has no administration in time range)   0.9 % sodium chloride infusion (has no administration in time range)   enoxaparin (LOVENOX) injection 40 mg (has no administration in time range)   ondansetron (ZOFRAN-ODT) disintegrating tablet 4 mg (has no administration in time range)     Or   ondansetron (ZOFRAN) injection 4 mg (has no administration in time range)   polyethylene glycol (GLYCOLAX) packet 17 g (has no administration in time range)   acetaminophen (TYLENOL) tablet 650 mg (has no administration in time range)     Or   acetaminophen (TYLENOL) suppository 650 mg (has no administration in time range)   albuterol (PROVENTIL) nebulizer solution 2.5 mg (has no administration in time range)   Arformoterol Tartrate (BROVANA) nebulizer solution 15 mcg (15 mcg Nebulization Given 11/18/22 1921)     And   budesonide (PULMICORT) nebulizer suspension 500 mcg (500 mcg Nebulization Given 11/18/22 1921)   HYDROcodone-acetaminophen (NORCO) 5-325 MG per tablet 1 tablet ( Oral See Alternative 11/18/22 1910)     Or   HYDROcodone-acetaminophen (NORCO) 5-325 MG per tablet 2 tablet (2 tablets Oral Given 11/18/22 1910)   morphine (PF) injection 2 mg (2 mg IntraVENous Given 11/18/22 2008)   0.9 % sodium chloride bolus (0 mLs IntraVENous Stopped 11/18/22 1433)   fentaNYL (SUBLIMAZE) injection 50 mcg (50 mcg IntraVENous Given 11/18/22 1316)   iopamidol (ISOVUE-370) 76 % injection 90 mL (90 mLs IntraVENous Given 11/18/22 1317)   ketorolac (TORADOL) injection 15 mg (15 mg IntraVENous Given 11/18/22 1519)   fentaNYL (SUBLIMAZE) injection 50 mcg (50 mcg IntraVENous Given 11/18/22 1711)            Re-Evaluations:  ED Course as of 11/18/22 2032 Fri Nov 18, 2022   1159 EKG @ 1152:   This EKG is signed and interpreted by me  Rate: 89  Rhythm: Sinus  Interpretation: No acute ST elevation or depressions, no acute T wave changes, normal intervals  Comparison: stable as compared to patient's most recent EKG   [AP]      ED Course User Index  [AP] Gustavo Quiles MD             This patient's ED course included: a personal history and physicial examination, re-evaluation prior to disposition, multiple bedside re-evaluations, IV medications, cardiac monitoring, continuous pulse oximetry, and complex medical decision making and emergency management    This patient has been closely monitored during their ED course. Consultations:  Medicine    Critical Care: none <30 min    1.  Near syncope            Radhika Talbot MD  11/18/22 2033

## 2022-11-18 NOTE — ED PROVIDER NOTES
HPI  62 y.o. male presenting for dizziness. Symptoms have been present for 1 day. They have been intermittent and moderate in severity. They are worsened by standing and relieved by rest.  Patient states he stood up today and got lightheaded with ringing in his ears. He took his BP at home and states it was in the 76A systolic. He has an appointment with PCP this afternoon, but called office and was told to come here. He endorses having breath with exertion and some right-sided chest discomfort as well. He complains of cough for about a week as well. He complains of headaches, and has chronic migraines. He denies any fevers, chills, nausea, emesis, diarrhea, or decreased appetite.      --------------------------------------------- PAST HISTORY ---------------------------------------------  Past Medical History:  has a past medical history of Alcohol abuse, Arthritis, Back pain, chronic, CAD in native artery, COPD (chronic obstructive pulmonary disease) ([facility] [address] H/O cardiovascular stress test, Hyperlipidemia, Hypertension, Hyponatremia, Neck pain, chronic, and Tobacco abuse. Past Surgical History:  has a past surgical history that includes External ear surgery; fracture surgery; eye surgery; Hemorrhoid surgery; Shoulder arthroscopy (Right, 1/16/2015); and Cardiac catheterization (06/08/2022). Social History:  reports that he has been smoking cigarettes. He has a 32.25 pack-year smoking history. He has never used smokeless tobacco. He reports current alcohol use of about 28.0 standard drinks per week. He reports current drug use. Drug: Marijuana Sky Obdulio). Family History: family history includes Heart Attack in his brother and father; Other in his brother and sister. The patients home medications have been reviewed. Allergies: Pcn [penicillins]      Review of Systems   Constitutional:  Negative for chills and fever. HENT:  Positive for tinnitus. Negative for congestion and sore throat.     Eyes: Negative for photophobia and visual disturbance. Respiratory:  Positive for cough and shortness of breath. Cardiovascular:  Positive for chest pain. Negative for leg swelling. Gastrointestinal:  Negative for abdominal pain, diarrhea and nausea. Genitourinary:  Negative for dysuria and flank pain. Musculoskeletal:  Negative for back pain and myalgias. Skin:  Negative for rash and wound. Neurological:  Positive for light-headedness and headaches. Negative for dizziness. Physical Exam  Constitutional:       General: He is not in acute distress. Appearance: Normal appearance. He is not ill-appearing. HENT:      Head: Normocephalic and atraumatic. Right Ear: External ear normal.      Left Ear: External ear normal.      Nose: Nose normal.   Eyes:      Conjunctiva/sclera: Conjunctivae normal.   Cardiovascular:      Rate and Rhythm: Normal rate and regular rhythm. Pulmonary:      Effort: Pulmonary effort is normal. No respiratory distress. Breath sounds: No stridor. Wheezing present. No rhonchi or rales. Comments: Scant expiratory wheezes noted  Abdominal:      General: There is no distension. Palpations: Abdomen is soft. Tenderness: There is no abdominal tenderness. Musculoskeletal:         General: No swelling or deformity. Skin:     General: Skin is warm and dry. Neurological:      General: No focal deficit present. Mental Status: He is alert. Psychiatric:         Mood and Affect: Mood normal.        Procedures      ED Course as of 11/19/22 1304   Fri Nov 18, 2022   1159 EKG @ 1152:   This EKG is signed and interpreted by me  Rate: 89  Rhythm: Sinus  Interpretation: No acute ST elevation or depressions, no acute T wave changes, normal intervals  Comparison: stable as compared to patient's most recent EKG   [AP]      ED Course User Index  [AP] Shawanda Infante MD       -------------------------------------------------- RESULTS -------------------------------------------------    LABS:  Results for orders placed or performed during the hospital encounter of 11/18/22   COVID-19, Rapid    Specimen: Nasopharyngeal Swab   Result Value Ref Range    SARS-CoV-2, NAAT Not Detected Not Detected   CBC with Auto Differential   Result Value Ref Range    WBC 6.9 4.5 - 11.5 E9/L    RBC 4.00 3.80 - 5.80 E12/L    Hemoglobin 13.0 12.5 - 16.5 g/dL    Hematocrit 39.5 37.0 - 54.0 %    MCV 98.8 80.0 - 99.9 fL    MCH 32.5 26.0 - 35.0 pg    MCHC 32.9 32.0 - 34.5 %    RDW 17.2 (H) 11.5 - 15.0 fL    Platelets 665 776 - 245 E9/L    MPV 9.6 7.0 - 12.0 fL    Neutrophils % 71.7 43.0 - 80.0 %    Immature Granulocytes % 3.2 0.0 - 5.0 %    Lymphocytes % 12.9 (L) 20.0 - 42.0 %    Monocytes % 10.6 2.0 - 12.0 %    Eosinophils % 0.6 0.0 - 6.0 %    Basophils % 1.0 0.0 - 2.0 %    Neutrophils Absolute 4.93 1.80 - 7.30 E9/L    Immature Granulocytes # 0.22 E9/L    Lymphocytes Absolute 0.89 (L) 1.50 - 4.00 E9/L    Monocytes Absolute 0.73 0.10 - 0.95 E9/L    Eosinophils Absolute 0.04 (L) 0.05 - 0.50 E9/L    Basophils Absolute 0.07 0.00 - 0.20 D1/H   Basic Metabolic Panel w/ Reflex to MG   Result Value Ref Range    Sodium 134 132 - 146 mmol/L    Potassium reflex Magnesium 4.4 3.5 - 5.0 mmol/L    Chloride 97 (L) 98 - 107 mmol/L    CO2 19 (L) 22 - 29 mmol/L    Anion Gap 18 (H) 7 - 16 mmol/L    Glucose 189 (H) 74 - 99 mg/dL    BUN 25 (H) 6 - 20 mg/dL    Creatinine 1.2 0.7 - 1.2 mg/dL    Est, Glom Filt Rate >60 >=60 mL/min/1.73    Calcium 9.7 8.6 - 10.2 mg/dL   Magnesium   Result Value Ref Range    Magnesium 1.7 1.6 - 2.6 mg/dL   Brain Natriuretic Peptide   Result Value Ref Range    Pro-BNP 61 0 - 125 pg/mL   SPECIMEN REJECTION   Result Value Ref Range    Rejected Test trp     Reason for Rejection see below    Troponin   Result Value Ref Range    Troponin, High Sensitivity 8 0 - 11 ng/L   Beta-Hydroxybutyrate   Result Value Ref Range    Beta-Hydroxybutyrate 0.09 0.02 - 0.27 mmol/L Lactic Acid   Result Value Ref Range    Lactic Acid 3.2 (H) 0.5 - 2.2 mmol/L   Basic Metabolic Panel w/ Reflex to MG   Result Value Ref Range    Sodium 130 (L) 132 - 146 mmol/L    Potassium reflex Magnesium 3.5 3.5 - 5.0 mmol/L    Chloride 95 (L) 98 - 107 mmol/L    CO2 19 (L) 22 - 29 mmol/L    Anion Gap 16 7 - 16 mmol/L    Glucose 141 (H) 74 - 99 mg/dL    BUN 25 (H) 6 - 20 mg/dL    Creatinine 1.4 (H) 0.7 - 1.2 mg/dL    Est, Glom Filt Rate 59 >=60 mL/min/1.73    Calcium 8.9 8.6 - 10.2 mg/dL   CBC with Auto Differential   Result Value Ref Range    WBC 6.2 4.5 - 11.5 E9/L    RBC 3.59 (L) 3.80 - 5.80 E12/L    Hemoglobin 12.0 (L) 12.5 - 16.5 g/dL    Hematocrit 35.5 (L) 37.0 - 54.0 %    MCV 98.9 80.0 - 99.9 fL    MCH 33.4 26.0 - 35.0 pg    MCHC 33.8 32.0 - 34.5 %    RDW 17.2 (H) 11.5 - 15.0 fL    Platelets 375 294 - 858 E9/L    MPV 9.5 7.0 - 12.0 fL    Neutrophils % 63.4 43.0 - 80.0 %    Immature Granulocytes % 2.3 0.0 - 5.0 %    Lymphocytes % 22.6 20.0 - 42.0 %    Monocytes % 9.8 2.0 - 12.0 %    Eosinophils % 1.1 0.0 - 6.0 %    Basophils % 0.8 0.0 - 2.0 %    Neutrophils Absolute 3.90 1.80 - 7.30 E9/L    Immature Granulocytes # 0.14 E9/L    Lymphocytes Absolute 1.39 (L) 1.50 - 4.00 E9/L    Monocytes Absolute 0.60 0.10 - 0.95 E9/L    Eosinophils Absolute 0.07 0.05 - 0.50 E9/L    Basophils Absolute 0.05 0.00 - 0.20 E9/L   Magnesium   Result Value Ref Range    Magnesium 1.6 1.6 - 2.6 mg/dL   EKG 12 Lead   Result Value Ref Range    Ventricular Rate 89 BPM    Atrial Rate 89 BPM    P-R Interval 124 ms    QRS Duration 90 ms    Q-T Interval 356 ms    QTc Calculation (Bazett) 433 ms    P Axis 60 degrees    R Axis 63 degrees    T Axis 52 degrees       RADIOLOGY:  CT HEAD WO CONTRAST   Final Result   No acute intracranial abnormality. CTA ABDOMEN PELVIS W CONTRAST   Final Result   No acute findings of the patent thoracoabdominal aorta without evidence for   dissection. No overt edema or pleural effusion.   No acute nonvascular   findings of the abdomen or pelvis with hepatic steatosis noted of diffuse low   attenuation throughout the liver without focal liver lesion or ascites. CTA CHEST W CONTRAST   Final Result   No acute findings of the patent thoracoabdominal aorta without evidence for   dissection. No overt edema or pleural effusion. No acute nonvascular   findings of the abdomen or pelvis with hepatic steatosis noted of diffuse low   attenuation throughout the liver without focal liver lesion or ascites. XR CHEST PORTABLE   Final Result   No acute process. Mild cardiomegaly.         :       ------------------------- NURSING NOTES AND VITALS REVIEWED ---------------------------  Date / Time Roomed:  11/18/2022 11:41 AM  ED Bed Assignment:  [id number]    The nursing notes within the ED encounter and vital signs as below have been reviewed. Patient Vitals for the past 24 hrs:   BP Temp Temp src Pulse Resp SpO2   11/19/22 1119 -- -- -- 74 12 99 %   11/19/22 0735 (!) 165/83 97.9 °F (36.6 °C) Temporal 78 16 98 %   11/19/22 0657 -- -- -- -- 12 --   11/19/22 0421 -- -- -- -- 12 --   11/19/22 0347 (!) 135/96 97.5 °F (36.4 °C) Temporal 84 12 98 %   11/19/22 0330 -- -- -- -- 12 --   11/19/22 0300 -- -- -- -- 12 --   11/19/22 0037 -- -- -- -- 12 --   11/19/22 0007 -- -- -- -- 12 --   11/18/22 2307 -- -- -- -- 12 --   11/18/22 2038 -- -- -- -- 12 --   11/18/22 1956 (!) 140/91 98.4 °F (36.9 °C) Temporal 96 12 98 %       Oxygen Saturation Interpretation: Normal      MDM  Number of Diagnoses or Management Options  Near syncope  Diagnosis management comments: 61 yo male presenting for near syncope. No acute changes on EKG. Lactate mildly elevated at 3.2, but labs otherwise unremarkable. While in the ED, patient complained of severe abdominal pain, so CTA was obtained, but did not show any acute process. Patient given fluids in the ED.  Due to near syncopal event of unknown etiology, patient was admitted to medicine for further evaluation and treatment. Counseling:  I have spoken with the patient and discussed todays results, in addition to providing specific details for the plan of care and counseling regarding the diagnosis and prognosis. Their questions are answered at this time and they are agreeable with the plan of admission.    --------------------------------- ADDITIONAL PROVIDER NOTES ---------------------------------    This patient's ED course included: a personal history and physicial examination, re-evaluation prior to disposition, multiple bedside re-evaluations, cardiac monitoring, and continuous pulse oximetry    This patient has remained hemodynamically stable during their ED course. Diagnosis:  1. Near syncope        Disposition:  Patient's disposition: Admit to telemetry  Patient's condition is stable.        Shannan Denis MD  Resident  11/19/22 3142

## 2022-11-18 NOTE — ED NOTES
Report called to [facility]S CDU for patient transfer to room 8208a. All questions/ concerns addressed. Patient placed in transport.       Asuncion Laureano RN  11/18/22 0887

## 2022-11-18 NOTE — ED NOTES
The following labs were labeled with appropriate pt sticker and tubed to lab:     [] Blue     [] Lavender   [] on ice  [x] Green/yellow  [] Green/black [] on ice  [x] Grey  [x] on ice  [] Yellow  [] Red  [] Type/ Screen  [] ABG  [] VBG    [] COVID-19 swab    [] Rapid  [] PCR  [] Flu swab  [] Peds Viral Panel     [] Urine Sample  [] Pelvic Cultures  [] Blood Cultures  [] X 2  [] STREP Cultures         Azul Dejesus RN  11/18/22 6178

## 2022-11-19 LAB
ANION GAP SERPL CALCULATED.3IONS-SCNC: 16 MMOL/L (ref 7–16)
BASOPHILS ABSOLUTE: 0.05 E9/L (ref 0–0.2)
BASOPHILS RELATIVE PERCENT: 0.8 % (ref 0–2)
BUN BLDV-MCNC: 25 MG/DL (ref 6–20)
CALCIUM SERPL-MCNC: 8.9 MG/DL (ref 8.6–10.2)
CHLORIDE BLD-SCNC: 95 MMOL/L (ref 98–107)
CO2: 19 MMOL/L (ref 22–29)
CREAT SERPL-MCNC: 1.4 MG/DL (ref 0.7–1.2)
EOSINOPHILS ABSOLUTE: 0.07 E9/L (ref 0.05–0.5)
EOSINOPHILS RELATIVE PERCENT: 1.1 % (ref 0–6)
GFR SERPL CREATININE-BSD FRML MDRD: 59 ML/MIN/1.73
GLUCOSE BLD-MCNC: 141 MG/DL (ref 74–99)
HCT VFR BLD CALC: 35.5 % (ref 37–54)
HEMOGLOBIN: 12 G/DL (ref 12.5–16.5)
IMMATURE GRANULOCYTES #: 0.14 E9/L
IMMATURE GRANULOCYTES %: 2.3 % (ref 0–5)
LYMPHOCYTES ABSOLUTE: 1.39 E9/L (ref 1.5–4)
LYMPHOCYTES RELATIVE PERCENT: 22.6 % (ref 20–42)
MAGNESIUM: 1.6 MG/DL (ref 1.6–2.6)
MCH RBC QN AUTO: 33.4 PG (ref 26–35)
MCHC RBC AUTO-ENTMCNC: 33.8 % (ref 32–34.5)
MCV RBC AUTO: 98.9 FL (ref 80–99.9)
MONOCYTES ABSOLUTE: 0.6 E9/L (ref 0.1–0.95)
MONOCYTES RELATIVE PERCENT: 9.8 % (ref 2–12)
NEUTROPHILS ABSOLUTE: 3.9 E9/L (ref 1.8–7.3)
NEUTROPHILS RELATIVE PERCENT: 63.4 % (ref 43–80)
PDW BLD-RTO: 17.2 FL (ref 11.5–15)
PLATELET # BLD: 172 E9/L (ref 130–450)
PMV BLD AUTO: 9.5 FL (ref 7–12)
POTASSIUM REFLEX MAGNESIUM: 3.5 MMOL/L (ref 3.5–5)
RBC # BLD: 3.59 E12/L (ref 3.8–5.8)
SODIUM BLD-SCNC: 130 MMOL/L (ref 132–146)
TROPONIN, HIGH SENSITIVITY: 10 NG/L (ref 0–11)
WBC # BLD: 6.2 E9/L (ref 4.5–11.5)

## 2022-11-19 PROCEDURE — 84484 ASSAY OF TROPONIN QUANT: CPT

## 2022-11-19 PROCEDURE — 6370000000 HC RX 637 (ALT 250 FOR IP)

## 2022-11-19 PROCEDURE — 96361 HYDRATE IV INFUSION ADD-ON: CPT

## 2022-11-19 PROCEDURE — 6360000002 HC RX W HCPCS: Performed by: FAMILY MEDICINE

## 2022-11-19 PROCEDURE — 96376 TX/PRO/DX INJ SAME DRUG ADON: CPT

## 2022-11-19 PROCEDURE — 6360000002 HC RX W HCPCS

## 2022-11-19 PROCEDURE — 6370000000 HC RX 637 (ALT 250 FOR IP): Performed by: FAMILY MEDICINE

## 2022-11-19 PROCEDURE — 83735 ASSAY OF MAGNESIUM: CPT

## 2022-11-19 PROCEDURE — 96372 THER/PROPH/DIAG INJ SC/IM: CPT

## 2022-11-19 PROCEDURE — G0378 HOSPITAL OBSERVATION PER HR: HCPCS

## 2022-11-19 PROCEDURE — 94640 AIRWAY INHALATION TREATMENT: CPT

## 2022-11-19 PROCEDURE — 2580000003 HC RX 258: Performed by: FAMILY MEDICINE

## 2022-11-19 PROCEDURE — 36415 COLL VENOUS BLD VENIPUNCTURE: CPT

## 2022-11-19 PROCEDURE — 80048 BASIC METABOLIC PNL TOTAL CA: CPT

## 2022-11-19 PROCEDURE — 85025 COMPLETE CBC W/AUTO DIFF WBC: CPT

## 2022-11-19 RX ORDER — METHOCARBAMOL 500 MG/1
750 TABLET, FILM COATED ORAL 4 TIMES DAILY
Status: DISCONTINUED | OUTPATIENT
Start: 2022-11-19 | End: 2022-11-21 | Stop reason: HOSPADM

## 2022-11-19 RX ORDER — CALCIUM CARBONATE 200(500)MG
500 TABLET,CHEWABLE ORAL 3 TIMES DAILY PRN
Status: DISCONTINUED | OUTPATIENT
Start: 2022-11-19 | End: 2022-11-21 | Stop reason: HOSPADM

## 2022-11-19 RX ADMIN — ROSUVASTATIN CALCIUM 10 MG: 5 TABLET, COATED ORAL at 08:19

## 2022-11-19 RX ADMIN — ARFORMOTEROL TARTRATE 15 MCG: 15 SOLUTION RESPIRATORY (INHALATION) at 07:59

## 2022-11-19 RX ADMIN — PANTOPRAZOLE SODIUM 40 MG: 40 TABLET, DELAYED RELEASE ORAL at 06:10

## 2022-11-19 RX ADMIN — METHOCARBAMOL 750 MG: 500 TABLET ORAL at 20:25

## 2022-11-19 RX ADMIN — HYDROCODONE BITARTRATE AND ACETAMINOPHEN 2 TABLET: 5; 325 TABLET ORAL at 18:48

## 2022-11-19 RX ADMIN — METOPROLOL TARTRATE 75 MG: 25 TABLET, FILM COATED ORAL at 20:26

## 2022-11-19 RX ADMIN — ISOSORBIDE DINITRATE 10 MG: 10 TABLET ORAL at 20:26

## 2022-11-19 RX ADMIN — MORPHINE SULFATE 2 MG: 2 INJECTION, SOLUTION INTRAMUSCULAR; INTRAVENOUS at 12:07

## 2022-11-19 RX ADMIN — IPRATROPIUM BROMIDE AND ALBUTEROL SULFATE 3 ML: .5; 2.5 SOLUTION RESPIRATORY (INHALATION) at 11:19

## 2022-11-19 RX ADMIN — BUDESONIDE 500 MCG: 0.5 SUSPENSION RESPIRATORY (INHALATION) at 08:02

## 2022-11-19 RX ADMIN — IPRATROPIUM BROMIDE AND ALBUTEROL SULFATE 3 ML: .5; 2.5 SOLUTION RESPIRATORY (INHALATION) at 19:55

## 2022-11-19 RX ADMIN — MORPHINE SULFATE 2 MG: 2 INJECTION, SOLUTION INTRAMUSCULAR; INTRAVENOUS at 04:21

## 2022-11-19 RX ADMIN — HYDROCODONE BITARTRATE AND ACETAMINOPHEN 2 TABLET: 5; 325 TABLET ORAL at 11:00

## 2022-11-19 RX ADMIN — ASPIRIN 81 MG 81 MG: 81 TABLET ORAL at 08:19

## 2022-11-19 RX ADMIN — ENOXAPARIN SODIUM 40 MG: 100 INJECTION SUBCUTANEOUS at 08:19

## 2022-11-19 RX ADMIN — ARFORMOTEROL TARTRATE 15 MCG: 15 SOLUTION RESPIRATORY (INHALATION) at 19:55

## 2022-11-19 RX ADMIN — ENOXAPARIN SODIUM 40 MG: 100 INJECTION SUBCUTANEOUS at 20:26

## 2022-11-19 RX ADMIN — AMLODIPINE BESYLATE 5 MG: 5 TABLET ORAL at 08:19

## 2022-11-19 RX ADMIN — METOPROLOL TARTRATE 75 MG: 25 TABLET, FILM COATED ORAL at 08:19

## 2022-11-19 RX ADMIN — MORPHINE SULFATE 2 MG: 2 INJECTION, SOLUTION INTRAMUSCULAR; INTRAVENOUS at 00:07

## 2022-11-19 RX ADMIN — HYDROCODONE BITARTRATE AND ACETAMINOPHEN 2 TABLET: 5; 325 TABLET ORAL at 15:00

## 2022-11-19 RX ADMIN — MORPHINE SULFATE 2 MG: 2 INJECTION, SOLUTION INTRAMUSCULAR; INTRAVENOUS at 21:06

## 2022-11-19 RX ADMIN — MORPHINE SULFATE 2 MG: 2 INJECTION, SOLUTION INTRAMUSCULAR; INTRAVENOUS at 17:08

## 2022-11-19 RX ADMIN — HYDROCODONE BITARTRATE AND ACETAMINOPHEN 2 TABLET: 5; 325 TABLET ORAL at 23:31

## 2022-11-19 RX ADMIN — HYDROCODONE BITARTRATE AND ACETAMINOPHEN 2 TABLET: 5; 325 TABLET ORAL at 06:57

## 2022-11-19 RX ADMIN — MORPHINE SULFATE 2 MG: 2 INJECTION, SOLUTION INTRAMUSCULAR; INTRAVENOUS at 08:24

## 2022-11-19 RX ADMIN — METHOCARBAMOL 750 MG: 500 TABLET ORAL at 17:08

## 2022-11-19 RX ADMIN — BUDESONIDE 500 MCG: 0.5 SUSPENSION RESPIRATORY (INHALATION) at 19:55

## 2022-11-19 RX ADMIN — SODIUM CHLORIDE: 9 INJECTION, SOLUTION INTRAVENOUS at 12:08

## 2022-11-19 RX ADMIN — HYDROCODONE BITARTRATE AND ACETAMINOPHEN 2 TABLET: 5; 325 TABLET ORAL at 03:00

## 2022-11-19 RX ADMIN — Medication 10 ML: at 20:27

## 2022-11-19 RX ADMIN — ISOSORBIDE DINITRATE 10 MG: 10 TABLET ORAL at 15:01

## 2022-11-19 RX ADMIN — ISOSORBIDE DINITRATE 10 MG: 10 TABLET ORAL at 08:19

## 2022-11-19 RX ADMIN — IPRATROPIUM BROMIDE AND ALBUTEROL SULFATE 3 ML: .5; 2.5 SOLUTION RESPIRATORY (INHALATION) at 07:59

## 2022-11-19 ASSESSMENT — PAIN DESCRIPTION - LOCATION
LOCATION: ABDOMEN
LOCATION: ABDOMEN;BACK
LOCATION: ABDOMEN
LOCATION: ABDOMEN;BACK
LOCATION: ABDOMEN

## 2022-11-19 ASSESSMENT — PAIN SCALES - GENERAL
PAINLEVEL_OUTOF10: 10
PAINLEVEL_OUTOF10: 8
PAINLEVEL_OUTOF10: 0
PAINLEVEL_OUTOF10: 4
PAINLEVEL_OUTOF10: 10
PAINLEVEL_OUTOF10: 8
PAINLEVEL_OUTOF10: 7

## 2022-11-19 ASSESSMENT — PAIN DESCRIPTION - DESCRIPTORS
DESCRIPTORS: ACHING
DESCRIPTORS: ACHING;CRAMPING;DISCOMFORT
DESCRIPTORS: ACHING
DESCRIPTORS: ACHING;DISCOMFORT;SORE
DESCRIPTORS: DISCOMFORT;SQUEEZING;SHARP
DESCRIPTORS: ACHING;DISCOMFORT;SPASM;SHOOTING
DESCRIPTORS: ACHING

## 2022-11-19 ASSESSMENT — PAIN - FUNCTIONAL ASSESSMENT
PAIN_FUNCTIONAL_ASSESSMENT: ACTIVITIES ARE NOT PREVENTED

## 2022-11-19 ASSESSMENT — PAIN DESCRIPTION - ORIENTATION
ORIENTATION: RIGHT;LEFT
ORIENTATION: RIGHT
ORIENTATION: RIGHT;UPPER
ORIENTATION: RIGHT
ORIENTATION: RIGHT;LEFT
ORIENTATION: RIGHT;LEFT;UPPER
ORIENTATION: RIGHT
ORIENTATION: RIGHT

## 2022-11-19 NOTE — H&P
[facility]  History and Physical      CHIEF COMPLAINT:    Chief Complaint   Patient presents with    Dizziness     Woke up with dizziness hx cardiac cath with Dr Andrade Mail in June        Patient of Wil Bower MD presents with:  Pre-syncope    History of Present Illness:   Patient is a 80-year-old male with a past medical history of alcohol abuse, arthritis, CAD, COPD, hyperlipidemia, hypertension, hyponatremia, and tobacco use. Patient presented to the ED with complaints of dizziness and near syncope patient called the EMS. Patient reports that he woke up and he felt dizzy and lightheaded. Patient states when he went to stand up became very dizzy and weak in his knees therefore he sat back down he was concerned that he was going to pass out. He says his blood pressure dropped when he stood up. Patient mitts this happened in the past and there are no aggravating factors or relieving factors. Patient states last time this happened he came to the hospital and there was medications adjustments. Once he was discharged he went to his PCP and his PCP was readjusted his medications. Patient denies any chest pain, shortness of breath, fever, chills,. Patient now complains of upper abdominal pain. Patient is admitted observation to telemetry unit for further testing and treatment. REVIEW OF SYSTEMS:  Pertinent negatives are above in HPI. 10 point ROS otherwise negative. Past Medical History:   Diagnosis Date    Alcohol abuse     Arthritis     Back pain, chronic     CAD in native artery 6/8/2022    COPD (chronic obstructive pulmonary disease) ([facility] [address]     H/O cardiovascular stress test 04/27/2022    Lexiscan    Hyperlipidemia     Hypertension     Hyponatremia     Neck pain, chronic     Tobacco abuse          Past Surgical History:   Procedure Laterality Date    CARDIAC CATHETERIZATION  06/08/2022    Dr. Kvng Mujica      i & d left ear. EYE SURGERY      Lt.  Eye  Dart removed    FRACTURE SURGERY      Rt. Tibia FX Rods & Pins    HEMORRHOID SURGERY      SHOULDER ARTHROSCOPY Right 1/16/2015    right shoulder bicep tenodesis subacromail decompression and debridement       Medications Prior to Admission:    Medications Prior to Admission: amLODIPine (NORVASC) 5 MG tablet, Take 1 tablet by mouth daily  acetaminophen (TYLENOL) 500 MG tablet, Take 500 mg by mouth every 6 hours as needed for Pain  ADVAIR -21 MCG/ACT inhaler, inhale 2 puffs by mouth and INTO THE LUNGS every 12 hours  omeprazole (PRILOSEC) 20 MG delayed release capsule, Take 20 mg by mouth daily  predniSONE (DELTASONE) 10 MG tablet, Take 10 mg by mouth daily  aspirin 81 MG chewable tablet, Take 1 tablet by mouth daily  isosorbide dinitrate (ISORDIL) 10 MG tablet, Take 1 tablet by mouth 3 times daily  budesonide (PULMICORT) 0.5 MG/2ML nebulizer suspension, Take 2 mLs by nebulization 2 times daily  ipratropium-albuterol (DUONEB) 0.5-2.5 (3) MG/3ML SOLN nebulizer solution, Inhale 3 mLs into the lungs every 4 hours (while awake)  metoprolol tartrate 75 MG TABS, Take 75 mg by mouth 2 times daily  nicotine (NICODERM CQ) 14 MG/24HR, Place 1 patch onto the skin daily  folic acid (FOLVITE) 1 MG tablet, Take 1 tablet by mouth daily  albuterol sulfate  (90 Base) MCG/ACT inhaler, Inhale 2 puffs into the lungs every 8 hours as needed  rosuvastatin (CRESTOR) 10 MG tablet, Take 10 mg by mouth daily     Note that the patient's home medications were reviewed and the above list is accurate to the best of my knowledge at the time of the exam.    Allergies:    Pcn [penicillins]    Social History:    reports that he has been smoking cigarettes. He has a 32.25 pack-year smoking history. He has never used smokeless tobacco. He reports current alcohol use of about 28.0 standard drinks per week. He reports current drug use. Drug: Marijuana Laveda Grant).     Family History:   family history includes Heart Attack in his brother and father; Other in his brother and sister. PHYSICAL EXAM:    Vitals:  BP (!) 165/83   Pulse 74   Temp 97.9 °F (36.6 °C) (Temporal)   Resp 12   SpO2 99%       General appearance: NAD, conversant, pleasant  Eyes: Sclerae anicteric, PERRLA  HEENT: AT/NC, MMM  Neck: FROM, supple, no thyromegaly  Lymph: No cervical / supraclavicular lymphadenopathy  Lungs: Clear to auscultation, WOB normal  CV: RRR, no MRGs, no lower extremity edema  Abdomen: Soft, tender; no masses or HSM, +BS  Extremities: FROM without synovitis. No clubbing or cyanosis of the hands. Skin: no rash, induration, lesions, or ulcers  Psych: Calm and cooperative. Normal judgement and insight. Normal mood and affect. Neuro: Alert and interactive, face symmetric, speech fluent. LABS:  All labs reviewed.   Of note:  CBC with Differential:    Lab Results   Component Value Date/Time    WBC 6.2 11/19/2022 05:54 AM    RBC 3.59 11/19/2022 05:54 AM    HGB 12.0 11/19/2022 05:54 AM    HCT 35.5 11/19/2022 05:54 AM     11/19/2022 05:54 AM    MCV 98.9 11/19/2022 05:54 AM    MCH 33.4 11/19/2022 05:54 AM    MCHC 33.8 11/19/2022 05:54 AM    RDW 17.2 11/19/2022 05:54 AM    METASPCT 3.4 04/20/2022 04:22 AM    LYMPHOPCT 22.6 11/19/2022 05:54 AM    PROMYELOPCT 0.8 04/19/2022 04:36 AM    MONOPCT 9.8 11/19/2022 05:54 AM    MYELOPCT 0.9 04/21/2022 05:38 AM    BASOPCT 0.8 11/19/2022 05:54 AM    MONOSABS 0.60 11/19/2022 05:54 AM    LYMPHSABS 1.39 11/19/2022 05:54 AM    EOSABS 0.07 11/19/2022 05:54 AM    BASOSABS 0.05 11/19/2022 05:54 AM     CMP:    Lab Results   Component Value Date/Time     11/19/2022 05:54 AM    K 3.5 11/19/2022 05:54 AM    CL 95 11/19/2022 05:54 AM    CO2 19 11/19/2022 05:54 AM    BUN 25 11/19/2022 05:54 AM    CREATININE 1.4 11/19/2022 05:54 AM    GFRAA >60 09/20/2022 05:23 AM    LABGLOM 59 11/19/2022 05:54 AM    GLUCOSE 141 11/19/2022 05:54 AM    PROT 6.0 09/20/2022 05:23 AM    LABALBU 3.4 09/20/2022 05:23 AM    LABALBU 4.8 10/13/2010 01:57 PM CALCIUM 8.9 11/19/2022 05:54 AM    BILITOT 0.8 09/20/2022 05:23 AM    ALKPHOS 103 09/20/2022 05:23 AM    AST 41 09/20/2022 05:23 AM    ALT 34 09/20/2022 05:23 AM       Imaging:  CT head: No acute intracranial abnormality    CTA chest: No acute findings of the patent thoracoabdominal aorta without evidence for dissection. No overt edema or pleural effusion. No acute nonvascular findings of the abdomen and pelvis with hepatic steatosis noted of diffuse low-attenuation throughout the liver without focal liver lesion or ascites. EKG:  I've personally reviewed the patient's EKG:  NSR    Telemetry:  I've personally reviewed the patient's telemetry:  NSR    ASSESSMENT/PLAN:  Principal Problem:    Pre-syncope  Resolved Problems:    * No resolved hospital problems. *    45-year-old male with a history of hypertension, CAD in native artery and COPD presents to the ED with complaints of dizziness and is admitted to telemetry unit with    Presyncope  IV hydration  Monitor labs-BUN/creatinine 25/1.4  Hold nephrotoxins  TREVOR hose  Position changes  Orthostatic Bps  Opimifit-YX-9-repeat pending  Smoking cessation    Medication for other comorbidities continue as appropriate dose adjustment as necessary. DVT prophylaxis  PT OT  Discharge planning  Case discussed with attending and agreed upon plan of care. Code status: Full  Requires inpatient level of care  JOSE Miranda CNP    1:06 PM  11/19/2022     Above note edited to reflect my thoughts   Pain on left side and tightness across abdomen   Check lipase - drinks 4 - 5 beers   cta chest  and cta abdomen are negative for acute findings   Trops unremarkable   Gas pains ? I personally saw, examined and provided care for the patient. Radiographs, labs and medication list were reviewed by me independently. The case was discussed in detail and plans for care were established.  Review of Radha GALLARDO CNP, documentation was conducted and revisions were made as appropriate directly by me. I agree with the above documented exam, problem list, and plan of care.      Susie Garner MD  7:49 PM  11/19/2022

## 2022-11-20 LAB
ALBUMIN SERPL-MCNC: 3.6 G/DL (ref 3.5–5.2)
ALP BLD-CCNC: 164 U/L (ref 40–129)
ALT SERPL-CCNC: 64 U/L (ref 0–40)
ANION GAP SERPL CALCULATED.3IONS-SCNC: 13 MMOL/L (ref 7–16)
AST SERPL-CCNC: 69 U/L (ref 0–39)
BASOPHILS ABSOLUTE: 0.04 E9/L (ref 0–0.2)
BASOPHILS RELATIVE PERCENT: 0.8 % (ref 0–2)
BILIRUB SERPL-MCNC: 1.4 MG/DL (ref 0–1.2)
BUN BLDV-MCNC: 12 MG/DL (ref 6–20)
CALCIUM SERPL-MCNC: 9 MG/DL (ref 8.6–10.2)
CHLORIDE BLD-SCNC: 95 MMOL/L (ref 98–107)
CO2: 20 MMOL/L (ref 22–29)
CREAT SERPL-MCNC: 1 MG/DL (ref 0.7–1.2)
EOSINOPHILS ABSOLUTE: 0.07 E9/L (ref 0.05–0.5)
EOSINOPHILS RELATIVE PERCENT: 1.5 % (ref 0–6)
GFR SERPL CREATININE-BSD FRML MDRD: >60 ML/MIN/1.73
GLUCOSE BLD-MCNC: 140 MG/DL (ref 74–99)
HCT VFR BLD CALC: 34.5 % (ref 37–54)
HEMOGLOBIN: 11.7 G/DL (ref 12.5–16.5)
IMMATURE GRANULOCYTES #: 0.19 E9/L
IMMATURE GRANULOCYTES %: 4 % (ref 0–5)
LIPASE: 29 U/L (ref 13–60)
LYMPHOCYTES ABSOLUTE: 0.84 E9/L (ref 1.5–4)
LYMPHOCYTES RELATIVE PERCENT: 17.6 % (ref 20–42)
MCH RBC QN AUTO: 33.5 PG (ref 26–35)
MCHC RBC AUTO-ENTMCNC: 33.9 % (ref 32–34.5)
MCV RBC AUTO: 98.9 FL (ref 80–99.9)
MONOCYTES ABSOLUTE: 0.31 E9/L (ref 0.1–0.95)
MONOCYTES RELATIVE PERCENT: 6.5 % (ref 2–12)
NEUTROPHILS ABSOLUTE: 3.32 E9/L (ref 1.8–7.3)
NEUTROPHILS RELATIVE PERCENT: 69.6 % (ref 43–80)
PDW BLD-RTO: 16.6 FL (ref 11.5–15)
PLATELET # BLD: 139 E9/L (ref 130–450)
PMV BLD AUTO: 9.8 FL (ref 7–12)
POTASSIUM SERPL-SCNC: 3.7 MMOL/L (ref 3.5–5)
RBC # BLD: 3.49 E12/L (ref 3.8–5.8)
SODIUM BLD-SCNC: 128 MMOL/L (ref 132–146)
TOTAL PROTEIN: 6.3 G/DL (ref 6.4–8.3)
WBC # BLD: 4.8 E9/L (ref 4.5–11.5)

## 2022-11-20 PROCEDURE — 6360000002 HC RX W HCPCS: Performed by: FAMILY MEDICINE

## 2022-11-20 PROCEDURE — 6370000000 HC RX 637 (ALT 250 FOR IP): Performed by: FAMILY MEDICINE

## 2022-11-20 PROCEDURE — 6370000000 HC RX 637 (ALT 250 FOR IP)

## 2022-11-20 PROCEDURE — 96372 THER/PROPH/DIAG INJ SC/IM: CPT

## 2022-11-20 PROCEDURE — 6360000002 HC RX W HCPCS

## 2022-11-20 PROCEDURE — 85025 COMPLETE CBC W/AUTO DIFF WBC: CPT

## 2022-11-20 PROCEDURE — G0378 HOSPITAL OBSERVATION PER HR: HCPCS

## 2022-11-20 PROCEDURE — 96361 HYDRATE IV INFUSION ADD-ON: CPT

## 2022-11-20 PROCEDURE — 96376 TX/PRO/DX INJ SAME DRUG ADON: CPT

## 2022-11-20 PROCEDURE — 80053 COMPREHEN METABOLIC PANEL: CPT

## 2022-11-20 PROCEDURE — 2580000003 HC RX 258: Performed by: FAMILY MEDICINE

## 2022-11-20 PROCEDURE — 94640 AIRWAY INHALATION TREATMENT: CPT

## 2022-11-20 PROCEDURE — 36415 COLL VENOUS BLD VENIPUNCTURE: CPT

## 2022-11-20 PROCEDURE — 83690 ASSAY OF LIPASE: CPT

## 2022-11-20 RX ADMIN — METHOCARBAMOL 750 MG: 500 TABLET ORAL at 20:54

## 2022-11-20 RX ADMIN — AMLODIPINE BESYLATE 5 MG: 5 TABLET ORAL at 09:01

## 2022-11-20 RX ADMIN — IPRATROPIUM BROMIDE AND ALBUTEROL SULFATE 3 ML: .5; 2.5 SOLUTION RESPIRATORY (INHALATION) at 11:18

## 2022-11-20 RX ADMIN — HYDROCODONE BITARTRATE AND ACETAMINOPHEN 2 TABLET: 5; 325 TABLET ORAL at 03:27

## 2022-11-20 RX ADMIN — ENOXAPARIN SODIUM 40 MG: 100 INJECTION SUBCUTANEOUS at 09:02

## 2022-11-20 RX ADMIN — IPRATROPIUM BROMIDE AND ALBUTEROL SULFATE 3 ML: .5; 2.5 SOLUTION RESPIRATORY (INHALATION) at 20:42

## 2022-11-20 RX ADMIN — HYDROCODONE BITARTRATE AND ACETAMINOPHEN 1 TABLET: 5; 325 TABLET ORAL at 22:31

## 2022-11-20 RX ADMIN — Medication 10 ML: at 20:55

## 2022-11-20 RX ADMIN — ISOSORBIDE DINITRATE 10 MG: 10 TABLET ORAL at 20:53

## 2022-11-20 RX ADMIN — ISOSORBIDE DINITRATE 10 MG: 10 TABLET ORAL at 09:01

## 2022-11-20 RX ADMIN — ISOSORBIDE DINITRATE 10 MG: 10 TABLET ORAL at 14:24

## 2022-11-20 RX ADMIN — PANTOPRAZOLE SODIUM 40 MG: 40 TABLET, DELAYED RELEASE ORAL at 06:09

## 2022-11-20 RX ADMIN — BUDESONIDE 500 MCG: 0.5 SUSPENSION RESPIRATORY (INHALATION) at 20:42

## 2022-11-20 RX ADMIN — MORPHINE SULFATE 2 MG: 2 INJECTION, SOLUTION INTRAMUSCULAR; INTRAVENOUS at 06:09

## 2022-11-20 RX ADMIN — METOPROLOL TARTRATE 75 MG: 25 TABLET, FILM COATED ORAL at 20:54

## 2022-11-20 RX ADMIN — MORPHINE SULFATE 2 MG: 2 INJECTION, SOLUTION INTRAMUSCULAR; INTRAVENOUS at 10:13

## 2022-11-20 RX ADMIN — IPRATROPIUM BROMIDE AND ALBUTEROL SULFATE 3 ML: .5; 2.5 SOLUTION RESPIRATORY (INHALATION) at 18:15

## 2022-11-20 RX ADMIN — ARFORMOTEROL TARTRATE 15 MCG: 15 SOLUTION RESPIRATORY (INHALATION) at 07:47

## 2022-11-20 RX ADMIN — BUDESONIDE 500 MCG: 0.5 SUSPENSION RESPIRATORY (INHALATION) at 07:47

## 2022-11-20 RX ADMIN — HYDROCODONE BITARTRATE AND ACETAMINOPHEN 1 TABLET: 5; 325 TABLET ORAL at 14:21

## 2022-11-20 RX ADMIN — METHOCARBAMOL 750 MG: 500 TABLET ORAL at 17:11

## 2022-11-20 RX ADMIN — ASPIRIN 81 MG 81 MG: 81 TABLET ORAL at 09:01

## 2022-11-20 RX ADMIN — METHOCARBAMOL 750 MG: 500 TABLET ORAL at 14:24

## 2022-11-20 RX ADMIN — METHOCARBAMOL 750 MG: 500 TABLET ORAL at 09:01

## 2022-11-20 RX ADMIN — MORPHINE SULFATE 2 MG: 2 INJECTION, SOLUTION INTRAMUSCULAR; INTRAVENOUS at 01:32

## 2022-11-20 RX ADMIN — ALBUTEROL SULFATE 2.5 MG: 2.5 SOLUTION RESPIRATORY (INHALATION) at 00:02

## 2022-11-20 RX ADMIN — ROSUVASTATIN CALCIUM 10 MG: 5 TABLET, COATED ORAL at 09:01

## 2022-11-20 RX ADMIN — ARFORMOTEROL TARTRATE 15 MCG: 15 SOLUTION RESPIRATORY (INHALATION) at 20:42

## 2022-11-20 RX ADMIN — Medication 40 ML: at 09:00

## 2022-11-20 RX ADMIN — ACETAMINOPHEN 650 MG: 325 TABLET, FILM COATED ORAL at 17:10

## 2022-11-20 RX ADMIN — METOPROLOL TARTRATE 75 MG: 25 TABLET, FILM COATED ORAL at 09:01

## 2022-11-20 RX ADMIN — HYDROCODONE BITARTRATE AND ACETAMINOPHEN 1 TABLET: 5; 325 TABLET ORAL at 18:26

## 2022-11-20 RX ADMIN — HYDROCODONE BITARTRATE AND ACETAMINOPHEN 1 TABLET: 5; 325 TABLET ORAL at 09:02

## 2022-11-20 RX ADMIN — IPRATROPIUM BROMIDE AND ALBUTEROL SULFATE 3 ML: .5; 2.5 SOLUTION RESPIRATORY (INHALATION) at 07:47

## 2022-11-20 RX ADMIN — ENOXAPARIN SODIUM 40 MG: 100 INJECTION SUBCUTANEOUS at 20:53

## 2022-11-20 ASSESSMENT — PAIN SCALES - GENERAL
PAINLEVEL_OUTOF10: 10
PAINLEVEL_OUTOF10: 6
PAINLEVEL_OUTOF10: 10
PAINLEVEL_OUTOF10: 5
PAINLEVEL_OUTOF10: 5
PAINLEVEL_OUTOF10: 6
PAINLEVEL_OUTOF10: 9
PAINLEVEL_OUTOF10: 10
PAINLEVEL_OUTOF10: 5
PAINLEVEL_OUTOF10: 9
PAINLEVEL_OUTOF10: 8
PAINLEVEL_OUTOF10: 10
PAINLEVEL_OUTOF10: 8
PAINLEVEL_OUTOF10: 10
PAINLEVEL_OUTOF10: 9

## 2022-11-20 ASSESSMENT — PAIN DESCRIPTION - ORIENTATION
ORIENTATION: LEFT;RIGHT
ORIENTATION: RIGHT
ORIENTATION: RIGHT;LEFT
ORIENTATION: RIGHT;LEFT
ORIENTATION: LEFT;LOWER;RIGHT
ORIENTATION: RIGHT;LEFT;LOWER

## 2022-11-20 ASSESSMENT — PAIN DESCRIPTION - LOCATION
LOCATION: RIB CAGE
LOCATION: ABDOMEN
LOCATION: ABDOMEN
LOCATION: RIB CAGE
LOCATION: RIB CAGE
LOCATION: ABDOMEN

## 2022-11-20 ASSESSMENT — PAIN - FUNCTIONAL ASSESSMENT
PAIN_FUNCTIONAL_ASSESSMENT: ACTIVITIES ARE NOT PREVENTED
PAIN_FUNCTIONAL_ASSESSMENT: ACTIVITIES ARE NOT PREVENTED
PAIN_FUNCTIONAL_ASSESSMENT: PREVENTS OR INTERFERES SOME ACTIVE ACTIVITIES AND ADLS
PAIN_FUNCTIONAL_ASSESSMENT: PREVENTS OR INTERFERES SOME ACTIVE ACTIVITIES AND ADLS
PAIN_FUNCTIONAL_ASSESSMENT: ACTIVITIES ARE NOT PREVENTED

## 2022-11-20 ASSESSMENT — PAIN DESCRIPTION - DESCRIPTORS
DESCRIPTORS: SHARP;SORE;ACHING
DESCRIPTORS: ACHING;SHARP
DESCRIPTORS: ACHING
DESCRIPTORS: ACHING
DESCRIPTORS: STABBING
DESCRIPTORS: ACHING;SHARP;SORE

## 2022-11-21 VITALS
DIASTOLIC BLOOD PRESSURE: 102 MMHG | RESPIRATION RATE: 18 BRPM | SYSTOLIC BLOOD PRESSURE: 171 MMHG | OXYGEN SATURATION: 99 % | HEART RATE: 105 BPM | TEMPERATURE: 97.5 F

## 2022-11-21 PROCEDURE — 6370000000 HC RX 637 (ALT 250 FOR IP): Performed by: FAMILY MEDICINE

## 2022-11-21 PROCEDURE — G0378 HOSPITAL OBSERVATION PER HR: HCPCS

## 2022-11-21 PROCEDURE — 6370000000 HC RX 637 (ALT 250 FOR IP)

## 2022-11-21 RX ADMIN — AMLODIPINE BESYLATE 5 MG: 5 TABLET ORAL at 08:04

## 2022-11-21 RX ADMIN — ISOSORBIDE DINITRATE 10 MG: 10 TABLET ORAL at 08:04

## 2022-11-21 RX ADMIN — METOPROLOL TARTRATE 75 MG: 25 TABLET, FILM COATED ORAL at 08:04

## 2022-11-21 RX ADMIN — ROSUVASTATIN CALCIUM 10 MG: 5 TABLET, COATED ORAL at 08:04

## 2022-11-21 RX ADMIN — ACETAMINOPHEN 650 MG: 325 TABLET, FILM COATED ORAL at 01:11

## 2022-11-21 RX ADMIN — ASPIRIN 81 MG 81 MG: 81 TABLET ORAL at 08:04

## 2022-11-21 RX ADMIN — HYDROCODONE BITARTRATE AND ACETAMINOPHEN 1 TABLET: 5; 325 TABLET ORAL at 02:33

## 2022-11-21 RX ADMIN — METHOCARBAMOL 750 MG: 500 TABLET ORAL at 08:04

## 2022-11-21 RX ADMIN — PANTOPRAZOLE SODIUM 40 MG: 40 TABLET, DELAYED RELEASE ORAL at 06:38

## 2022-11-21 RX ADMIN — HYDROCODONE BITARTRATE AND ACETAMINOPHEN 1 TABLET: 5; 325 TABLET ORAL at 06:39

## 2022-11-21 ASSESSMENT — PAIN SCALES - GENERAL
PAINLEVEL_OUTOF10: 6
PAINLEVEL_OUTOF10: 6
PAINLEVEL_OUTOF10: 10

## 2022-11-21 NOTE — PROGRESS NOTES
Ambulating pulse ox stayed above 95% the whole time.
Hospitalist Progress Note      PCP: Brock Christensen MD    Date of Admission: 11/18/2022        Hospital Course:    ADMITTED WITH ABD PAIN, WORKUP WAS UNREMARKABLE.  WAS TO DC YESTERDAY AND COULD NOT GET A RIDE. REQUESTING PAIN MEDS AT DISCHARGE,  HAS BEEN POS FOR FENTANYL IN THE PAST, AND NOT IN HIS DRUG HISTORY. PER THE HOSPITALIST , THEY DID NOT TELL HIM HE WOULD GET PAIN MEDS AT DISCHARGE        Subjective: LEFT BEFORE HE COULD BE SEEN           Medications:  Reviewed    Infusion Medications   Scheduled Medications   PRN Meds:       Intake/Output Summary (Last 24 hours) at 11/21/2022 1424  Last data filed at 11/21/2022 1559  Gross per 24 hour   Intake 360 ml   Output --   Net 360 ml       Exam:    BP (!) 171/102   Pulse (!) 105   Temp 97.5 °F (36.4 °C) (Temporal)   Resp 18   SpO2 99%               Labs:   Recent Labs     11/19/22  0554 11/20/22  0726   WBC 6.2 4.8   HGB 12.0* 11.7*   HCT 35.5* 34.5*    139     Recent Labs     11/19/22  0554 11/20/22  0726   * 128*   K 3.5 3.7   CL 95* 95*   CO2 19* 20*   BUN 25* 12   CREATININE 1.4* 1.0   CALCIUM 8.9 9.0     Recent Labs     11/20/22  0726   AST 69*   ALT 64*   BILITOT 1.4*   ALKPHOS 164*     No results for input(s): INR in the last 72 hours. No results for input(s): Lattie Jose in the last 72 hours.   Recent Labs     11/20/22  0726   AST 69*   ALT 64*   BILITOT 1.4*   ALKPHOS 164*     Recent Labs     11/18/22  1435   LACTA 3.2*     No results found for: Laura Wade  Lab Results   Component Value Date    AMMONIA 33.0 04/17/2022    AMMONIA 26.0 04/10/2022       Assessment:    Active Hospital Problems    Diagnosis Date Noted    Pre-syncope [R55] 11/18/2022     Priority: Medium       Plan:  DC HOME, FU WITH PMD    Electronically signed by Chyna Crespo DO on 11/21/2022 at 2:24 PM Artur Ferro
This patient is on DVT Prophylaxis medication that requires a dose adjustment      Date Body Weight IBW  Adjusted BW SCr  CrCl Dialysis status   11/18/2022   Patient weight not recorded Creatinine clearance cannot be calculated (Unknown ideal weight.) N/a       Pharmacy has dose-adjusted the DVT Prophylaxis regimen to match   the recommendations from the following table        Ordered Medication:Lovenox 40mg daily    Order Changed/converted to: Lovenox 40mg BID      These changes were made per protocol according to the [facility] Pharmacist   Review for Appropriate Use and Automatic Dose Adjustments of   Subcutaneous Anticoagulants Policy     *Please note this dose may need readjusted if patient's condition changes. Please contact pharmacy with any questions regarding these changes.     Megan McleanD, BCPS 11/18/2022 9:00 PM
Full  Consultants: none     DVT Prophylaxis   PT/OT  Discharge planning       JOSE Rae CNP  9:52 AM  11/20/2022     Above note edited to reflect my thoughts     I personally saw, examined and provided care for the patient. Radiographs, labs and medication list were reviewed by me independently. The case was discussed in detail and plans for care were established. Review of Radha GALLARDO CNP, documentation was conducted and revisions were made as appropriate directly by me. I agree with the above documented exam, problem list, and plan of care.      Maggi Rodriguez MD  8:28 PM  11/20/2022

## 2022-12-09 NOTE — DISCHARGE SUMMARY
Hospitalist Discharge Summary    Patient ID: Echo Alonzo   Patient : 1965  Patient's PCP: Hu Dey MD    Admit Date: 2022   Admitting Physician: Jose Martin Staples MD    Discharge Date:  2022   Discharge Physician: Carolyn Mills DO   Discharge Condition: Stable  Discharge Disposition: Home      Discharge Diagnoses: Active Hospital Problems    Diagnosis Date Noted    Pre-syncope [R55] 2022     Priority: [address] course in brief:    ADMITTED WITH ABD PAIN, WORKUP WAS UNREMARKABLE.  WAS TO DC YESTERDAY AND COULD NOT GET A RIDE. REQUESTING PAIN MEDS AT DISCHARGE,  HAS BEEN POS FOR FENTANYL IN THE PAST, AND NOT IN HIS DRUG HISTORY. PER THE HOSPITALIST , THEY DID NOT TELL HIM HE WOULD GET PAIN MEDS AT DISCHARGE       PHYSICAL EXAM:    BP (!) 171/102   Pulse (!) 105   Temp 97.5 °F (36.4 °C) (Temporal)   Resp 18   SpO2 99%     Left before he could be seen    Prior to Admission medications    Medication Sig Start Date End Date Taking?  Authorizing Provider   amLODIPine (NORVASC) 5 MG tablet Take 1 tablet by mouth daily 22   Chel Duff MD   acetaminophen (TYLENOL) 500 MG tablet Take 500 mg by mouth every 6 hours as needed for Pain    Historical Provider, MD   ADVAIR -21 MCG/ACT inhaler inhale 2 puffs by mouth and INTO THE LUNGS every 12 hours 22   Historical Provider, MD   omeprazole (PRILOSEC) 20 MG delayed release capsule Take 20 mg by mouth daily    Historical Provider, MD   predniSONE (DELTASONE) 10 MG tablet Take 10 mg by mouth daily    Historical Provider, MD   aspirin 81 MG chewable tablet Take 1 tablet by mouth daily 22   Marcela Hernandez MD   isosorbide dinitrate (ISORDIL) 10 MG tablet Take 1 tablet by mouth 3 times daily 22   Marcela Hernandez MD   budesonide (PULMICORT) 0.5 MG/2ML nebulizer suspension Take 2 mLs by nebulization 2 times daily 22   Marcela Hernandez MD   ipratropium-albuterol (DUONEB) 0.5-2.5 (3) MG/3ML SOLN nebulizer solution Inhale 3 mLs into the lungs every 4 hours (while awake) 4/27/22   Za Alvarez MD   metoprolol tartrate 75 MG TABS Take 75 mg by mouth 2 times daily 4/27/22   Za Alvarez MD   nicotine (NICODERM CQ) 14 MG/24HR Place 1 patch onto the skin daily 4/28/22   Za Alvarez MD   folic acid (FOLVITE) 1 MG tablet Take 1 tablet by mouth daily 4/27/22   Za Alvarez MD   furosemide (LASIX) 40 MG tablet 1 tablet by Per NG tube route 2 times daily 4/27/22 9/20/22  Za Alvarez MD   albuterol sulfate  (90 Base) MCG/ACT inhaler Inhale 2 puffs into the lungs every 8 hours as needed 1/31/22   Historical Provider, MD   rosuvastatin (CRESTOR) 10 MG tablet Take 10 mg by mouth daily     Historical Provider, MD       Consults:   IP CONSULT TO INTERNAL MEDICINE            Discharge Instructions / Follow up:    No future appointments. Continued appropriate risk factor modification of blood pressure, diabetes and serum lipids will remain essential to reducing risk of future atherosclerotic development    Activity: activity as tolerated    Significant labs:  CBC:   No results for input(s): WBC, RBC, HGB, HCT, MCV, RDW, PLT in the last 72 hours. BMP: No results for input(s): NA, K, CL, CO2, BUN, CREATININE, CA, MG, PHOS in the last 72 hours. LFT:  No results for input(s): PROT, ALB, ALKPHOS, ALT, AST, BILITOT, AMYLASE, LIPASE in the last 72 hours. PT/INR: No results for input(s): INR, APTT in the last 72 hours. BNP: No results for input(s): BNP in the last 72 hours.   Hgb A1C:   Lab Results   Component Value Date    LABA1C 6.2 (H) 04/16/2022     Folate and B12: No results found for: Veria Lundborg, No results found for: FOLATE  Thyroid Studies:   Lab Results   Component Value Date    TSH 0.753 04/10/2022       Urinalysis:    Lab Results   Component Value Date/Time    NITRU Negative 09/17/2022 05:31 PM    WBCUA 0-1 04/14/2022 03:22 AM    BACTERIA RARE 04/14/2022 03:22 AM RBCUA 0-1 04/14/2022 03:22 AM    BLOODU Negative 09/17/2022 05:31 PM    SPECGRAV <=1.005 09/17/2022 05:31 PM    GLUCOSEU Negative 09/17/2022 05:31 PM       Imaging:  CT HEAD WO CONTRAST    Result Date: 11/18/2022  EXAMINATION: CT OF THE HEAD WITHOUT CONTRAST  11/18/2022 1:16 pm TECHNIQUE: CT of the head was performed without the administration of intravenous contrast. Automated exposure control, iterative reconstruction, and/or weight based adjustment of the mA/kV was utilized to reduce the radiation dose to as low as reasonably achievable. COMPARISON: MRI of the brain, 09/19/2022. HISTORY: ORDERING SYSTEM PROVIDED HISTORY: dizziness TECHNOLOGIST PROVIDED HISTORY: Reason for exam:->dizziness Has a \"code stroke\" or \"stroke alert\" been called? ->No Decision Support Exception - unselect if not a suspected or confirmed emergency medical condition->Emergency Medical Condition (MA) What reading provider will be dictating this exam?->CRC FINDINGS: BRAIN/VENTRICLES: No mass effect, edema or hemorrhage is seen. Punctate focus of calcification right temporal region may be within the parenchyma or the choroid plexus in the adjacent temporal horn. Mild volume loss is seen in the cerebrum with mild chronic microvascular ischemic changes. No hydrocephalus or extra-axial fluid is seen. ORBITS: The visualized portion of the orbits demonstrate no acute abnormality. SINUSES: The visualized paranasal sinuses and mastoid air cells demonstrate no acute abnormality. SOFT TISSUES/SKULL:  No acute abnormality of the visualized skull or soft tissues. No acute intracranial abnormality. XR CHEST PORTABLE    Result Date: 11/18/2022  EXAMINATION: ONE XRAY VIEW OF THE CHEST 11/18/2022 12:27 pm COMPARISON: None. HISTORY: ORDERING SYSTEM PROVIDED HISTORY: dizziness TECHNOLOGIST PROVIDED HISTORY: Reason for exam:->dizziness What reading provider will be dictating this exam?->CRC FINDINGS: The lungs are without acute focal process.   There is no effusion or pneumothorax. The cardiomediastinal silhouette is without acute process. The osseous structures are without acute process. No acute process. Mild cardiomegaly. CTA CHEST W CONTRAST    Result Date: 11/18/2022  EXAMINATION: CTA OF THE CHEST; CTA OF THE ABDOMEN AND PELVIS WITH CONTRAST 11/18/2022 1:16 pm: TECHNIQUE: CTA of the chest was performed after the administration of intravenous contrast.  Multiplanar reformatted images are provided for review. 3D and MIP images are provided for review. Automated exposure control, iterative reconstruction, and/or weight based adjustment of the mA/kV was utilized to reduce the radiation dose to as low as reasonably achievable.; CTA of the abdomen and pelvis was performed with the administration of intravenous contrast. Multiplanar reformatted images are provided for review. MIP images are provided for review. Automated exposure control, iterative reconstruction, and/or weight based adjustment of the mA/kV was utilized to reduce the radiation dose to as low as reasonably achievable. COMPARISON: CTA chest 05/16/2022 HISTORY: ORDERING SYSTEM PROVIDED HISTORY: pain, r/o dissection TECHNOLOGIST PROVIDED HISTORY: Reason for exam:->pain, r/o dissection Decision Support Exception - unselect if not a suspected or confirmed emergency medical condition->Emergency Medical Condition (MA) What reading provider will be dictating this exam?->CRC; ORDERING SYSTEM PROVIDED HISTORY: back pain, near syncope, r/o AAA TECHNOLOGIST PROVIDED HISTORY: Reason for exam:->back pain, near syncope, r/o AAA Decision Support Exception - unselect if not a suspected or confirmed emergency medical condition->Emergency Medical Condition (MA) What reading provider will be dictating this exam?->CRC FINDINGS: CTA CHEST: Thoracic aorta: No evidence of thoracic aortic aneurysm or dissection. No acute abnormality of the aorta. Mediastinum: No evidence of mediastinal lymphadenopathy.   The heart and pericardium demonstrate no acute abnormality. Lungs/Pleura: The lungs are without acute process. No focal consolidation or pulmonary edema. No evidence of pleural effusion or pneumothorax. Soft Tissues/Bones: No acute bone or soft tissue abnormality. CTA ABDOMEN: Abdominal aorta/Branches: Atherosclerotic nonaneurysmal patent aorta. Patent celiac and SMA branch vessels. Renal arteries are patent bilateral.  SAHARA patent Lower Chest: Lung bases are clear. Organs: Liver without focal lesion. Diffuse low attenuation of mild-to-moderate hepatic steatosis with sparing around the gallbladder fossa gallbladder unremarkable. Pancreas and spleen unremarkable. Adrenals without nodule. Kidneys without suspicious renal lesion and no hydronephrosis. GI/Bowel: No focal thickening or disproportion dilatation of bowel. No inflammatory findings. Appendix visualized and Peritoneum/Retroperitoneum: No bulky retroperitoneal adenopathy. No suspicious peritoneal or mesenteric process Bones/Soft Tissues: No acute osseous or soft tissue findings. CTA PELVIS: Aorta/Iliacs: Iliofemoral vessels are widely patent with only minimal calcific disease in the common and external iliac arteries right greater than however no focal severe stenosis, aneurysm or occlusion. Pelvis: No suspicious pelvic lesion or bulky pelvic adenopathy/free fluid. Bones/Soft Tissues: No acute osseous or soft tissue findings     No acute findings of the patent thoracoabdominal aorta without evidence for dissection. No overt edema or pleural effusion. No acute nonvascular findings of the abdomen or pelvis with hepatic steatosis noted of diffuse low attenuation throughout the liver without focal liver lesion or ascites.      CTA ABDOMEN PELVIS W CONTRAST    Result Date: 11/18/2022  EXAMINATION: CTA OF THE CHEST; CTA OF THE ABDOMEN AND PELVIS WITH CONTRAST 11/18/2022 1:16 pm: TECHNIQUE: CTA of the chest was performed after the administration of intravenous contrast.  Multiplanar reformatted images are provided for review. 3D and MIP images are provided for review. Automated exposure control, iterative reconstruction, and/or weight based adjustment of the mA/kV was utilized to reduce the radiation dose to as low as reasonably achievable.; CTA of the abdomen and pelvis was performed with the administration of intravenous contrast. Multiplanar reformatted images are provided for review. MIP images are provided for review. Automated exposure control, iterative reconstruction, and/or weight based adjustment of the mA/kV was utilized to reduce the radiation dose to as low as reasonably achievable. COMPARISON: CTA chest 05/16/2022 HISTORY: ORDERING SYSTEM PROVIDED HISTORY: pain, r/o dissection TECHNOLOGIST PROVIDED HISTORY: Reason for exam:->pain, r/o dissection Decision Support Exception - unselect if not a suspected or confirmed emergency medical condition->Emergency Medical Condition (MA) What reading provider will be dictating this exam?->CRC; ORDERING SYSTEM PROVIDED HISTORY: back pain, near syncope, r/o AAA TECHNOLOGIST PROVIDED HISTORY: Reason for exam:->back pain, near syncope, r/o AAA Decision Support Exception - unselect if not a suspected or confirmed emergency medical condition->Emergency Medical Condition (MA) What reading provider will be dictating this exam?->CRC FINDINGS: CTA CHEST: Thoracic aorta: No evidence of thoracic aortic aneurysm or dissection. No acute abnormality of the aorta. Mediastinum: No evidence of mediastinal lymphadenopathy. The heart and pericardium demonstrate no acute abnormality. Lungs/Pleura: The lungs are without acute process. No focal consolidation or pulmonary edema. No evidence of pleural effusion or pneumothorax. Soft Tissues/Bones: No acute bone or soft tissue abnormality. CTA ABDOMEN: Abdominal aorta/Branches: Atherosclerotic nonaneurysmal patent aorta. Patent celiac and SMA branch vessels.   Renal arteries are patent bilateral.  SAHARA patent Lower Chest: Lung bases are clear. Organs: Liver without focal lesion. Diffuse low attenuation of mild-to-moderate hepatic steatosis with sparing around the gallbladder fossa gallbladder unremarkable. Pancreas and spleen unremarkable. Adrenals without nodule. Kidneys without suspicious renal lesion and no hydronephrosis. GI/Bowel: No focal thickening or disproportion dilatation of bowel. No inflammatory findings. Appendix visualized and Peritoneum/Retroperitoneum: No bulky retroperitoneal adenopathy. No suspicious peritoneal or mesenteric process Bones/Soft Tissues: No acute osseous or soft tissue findings. CTA PELVIS: Aorta/Iliacs: Iliofemoral vessels are widely patent with only minimal calcific disease in the common and external iliac arteries right greater than however no focal severe stenosis, aneurysm or occlusion. Pelvis: No suspicious pelvic lesion or bulky pelvic adenopathy/free fluid. Bones/Soft Tissues: No acute osseous or soft tissue findings     No acute findings of the patent thoracoabdominal aorta without evidence for dissection. No overt edema or pleural effusion. No acute nonvascular findings of the abdomen or pelvis with hepatic steatosis noted of diffuse low attenuation throughout the liver without focal liver lesion or ascites.        Discharge Medications:      Medication List        CONTINUE taking these medications      acetaminophen 500 MG tablet  Commonly known as: TYLENOL     Advair -21 MCG/ACT inhaler  Generic drug: fluticasone-salmeterol     albuterol sulfate  (90 Base) MCG/ACT inhaler  Commonly known as: PROVENTIL;VENTOLIN;PROAIR     amLODIPine 5 MG tablet  Commonly known as: NORVASC  Take 1 tablet by mouth daily     aspirin 81 MG chewable tablet  Take 1 tablet by mouth daily     budesonide 0.5 MG/2ML nebulizer suspension  Commonly known as: PULMICORT  Take 2 mLs by nebulization 2 times daily     Crestor 10 MG tablet  Generic drug: rosuvastatin folic acid 1 MG tablet  Commonly known as: FOLVITE  Take 1 tablet by mouth daily     ipratropium-albuterol 0.5-2.5 (3) MG/3ML Soln nebulizer solution  Commonly known as: DUONEB  Inhale 3 mLs into the lungs every 4 hours (while awake)     isosorbide dinitrate 10 MG tablet  Commonly known as: ISORDIL  Take 1 tablet by mouth 3 times daily     Metoprolol Tartrate 75 MG Tabs  Take 75 mg by mouth 2 times daily     nicotine 14 MG/24HR  Commonly known as: NICODERM CQ  Place 1 patch onto the skin daily     omeprazole 20 MG delayed release capsule  Commonly known as: PRILOSEC     predniSONE 10 MG tablet  Commonly known as: Fabian Hammonds            STOP taking these medications      furosemide 40 MG tablet  Commonly known as: LASIX              Time Spent on discharge is 30 minutes in the review of medications and discharge plan    +++++++++++++++++++++++++++++++++++++++++++++++++  Alexey Melo, DO  [address], [place]  +++++++++++++++++++++++++++++++++++++++++++++++++  NOTE: This report was transcribed using voice recognition software. Every effort was made to ensure accuracy; however, inadvertent computerized transcription errors may be present.

## 2023-01-01 ENCOUNTER — TELEPHONE (OUTPATIENT)
Dept: CARDIOLOGY CLINIC | Age: 58
End: 2023-01-01

## 2023-04-24 ENCOUNTER — HOSPITAL ENCOUNTER (INPATIENT)
Age: 58
LOS: 4 days | Discharge: HOME | End: 2023-04-28
Attending: EMERGENCY MEDICINE | Admitting: INTERNAL MEDICINE
Payer: MEDICARE

## 2023-04-24 ENCOUNTER — APPOINTMENT (OUTPATIENT)
Dept: GENERAL RADIOLOGY | Age: 58
End: 2023-04-24
Payer: MEDICARE

## 2023-04-24 ENCOUNTER — APPOINTMENT (OUTPATIENT)
Dept: ULTRASOUND IMAGING | Age: 58
End: 2023-04-24
Payer: MEDICARE

## 2023-04-24 DIAGNOSIS — R11.2 NAUSEA AND VOMITING, UNSPECIFIED VOMITING TYPE: ICD-10-CM

## 2023-04-24 DIAGNOSIS — F10.930 ALCOHOL WITHDRAWAL SYNDROME WITHOUT COMPLICATION (HCC): ICD-10-CM

## 2023-04-24 DIAGNOSIS — E87.1 HYPONATREMIA: Primary | ICD-10-CM

## 2023-04-24 DIAGNOSIS — R05.2 SUBACUTE COUGH: ICD-10-CM

## 2023-04-24 PROBLEM — F10.20 SEVERE ALCOHOL USE DISORDER (HCC): Status: ACTIVE | Noted: 2023-04-24

## 2023-04-24 LAB
ANION GAP SERPL CALCULATED.3IONS-SCNC: 17 MMOL/L (ref 7–16)
ANION GAP SERPL CALCULATED.3IONS-SCNC: 17 MMOL/L (ref 7–16)
B PARAP IS1001 DNA NPH QL NAA+NON-PROBE: NOT DETECTED
B PERT.PT PRMT NPH QL NAA+NON-PROBE: NOT DETECTED
BACTERIA URNS QL MICRO: ABNORMAL /HPF
BASOPHILS # BLD: 0 E9/L (ref 0–0.2)
BASOPHILS NFR BLD: 0.3 % (ref 0–2)
BILIRUB UR QL STRIP: NEGATIVE
BUN SERPL-MCNC: 30 MG/DL (ref 6–20)
BUN SERPL-MCNC: 34 MG/DL (ref 6–20)
C PNEUM DNA NPH QL NAA+NON-PROBE: NOT DETECTED
CALCIUM SERPL-MCNC: 9.7 MG/DL (ref 8.6–10.2)
CALCIUM SERPL-MCNC: 9.7 MG/DL (ref 8.6–10.2)
CHLORIDE SERPL-SCNC: 86 MMOL/L (ref 98–107)
CHLORIDE SERPL-SCNC: 89 MMOL/L (ref 98–107)
CHLORIDE UR-SCNC: <20 MMOL/L
CLARITY UR: CLEAR
CO2 SERPL-SCNC: 17 MMOL/L (ref 22–29)
CO2 SERPL-SCNC: 18 MMOL/L (ref 22–29)
COLOR UR: YELLOW
CREAT SERPL-MCNC: 2 MG/DL (ref 0.7–1.2)
CREAT SERPL-MCNC: 2.3 MG/DL (ref 0.7–1.2)
CREAT UR-MCNC: 79 MG/DL (ref 40–278)
EKG ATRIAL RATE: 89 BPM
EKG P AXIS: 59 DEGREES
EKG P-R INTERVAL: 126 MS
EKG Q-T INTERVAL: 344 MS
EKG QRS DURATION: 88 MS
EKG QTC CALCULATION (BAZETT): 418 MS
EKG R AXIS: 73 DEGREES
EKG T AXIS: 39 DEGREES
EKG VENTRICULAR RATE: 89 BPM
EOSINOPHIL # BLD: 0 E9/L (ref 0.05–0.5)
EOSINOPHIL NFR BLD: 0.2 % (ref 0–6)
EPI CELLS #/AREA URNS HPF: ABNORMAL /HPF
ERYTHROCYTE [DISTWIDTH] IN BLOOD BY AUTOMATED COUNT: 14.3 FL (ref 11.5–15)
ETHANOLAMINE SERPL-MCNC: <10 MG/DL (ref 0–0.08)
FLUAV RNA NPH QL NAA+NON-PROBE: NOT DETECTED
FLUBV RNA NPH QL NAA+NON-PROBE: NOT DETECTED
GLUCOSE SERPL-MCNC: 110 MG/DL (ref 74–99)
GLUCOSE SERPL-MCNC: 116 MG/DL (ref 74–99)
GLUCOSE UR STRIP-MCNC: 250 MG/DL
HADV DNA NPH QL NAA+NON-PROBE: NOT DETECTED
HCOV 229E RNA NPH QL NAA+NON-PROBE: NOT DETECTED
HCOV HKU1 RNA NPH QL NAA+NON-PROBE: NOT DETECTED
HCOV NL63 RNA NPH QL NAA+NON-PROBE: NOT DETECTED
HCOV OC43 RNA NPH QL NAA+NON-PROBE: NOT DETECTED
HCT VFR BLD AUTO: 31.1 % (ref 37–54)
HGB BLD-MCNC: 10.5 G/DL (ref 12.5–16.5)
HGB UR QL STRIP: ABNORMAL
HMPV RNA NPH QL NAA+NON-PROBE: NOT DETECTED
HPIV1 RNA NPH QL NAA+NON-PROBE: NOT DETECTED
HPIV2 RNA NPH QL NAA+NON-PROBE: NOT DETECTED
HPIV3 RNA NPH QL NAA+NON-PROBE: NOT DETECTED
HPIV4 RNA NPH QL NAA+NON-PROBE: NOT DETECTED
INFLUENZA A BY PCR: NOT DETECTED
INFLUENZA B BY PCR: NOT DETECTED
KETONES UR STRIP-MCNC: NEGATIVE MG/DL
LEUKOCYTE ESTERASE UR QL STRIP: NEGATIVE
LYMPHOCYTES # BLD: 0.56 E9/L (ref 1.5–4)
LYMPHOCYTES NFR BLD: 5.2 % (ref 20–42)
M PNEUMO DNA NPH QL NAA+NON-PROBE: NOT DETECTED
MAGNESIUM SERPL-MCNC: 1.6 MG/DL (ref 1.6–2.6)
MCH RBC QN AUTO: 33.5 PG (ref 26–35)
MCHC RBC AUTO-ENTMCNC: 33.8 % (ref 32–34.5)
MCV RBC AUTO: 99.4 FL (ref 80–99.9)
MONOCYTES # BLD: 0.44 E9/L (ref 0.1–0.95)
MONOCYTES NFR BLD: 4.4 % (ref 2–12)
MYELOCYTES NFR BLD MANUAL: 1.7 % (ref 0–0)
NEUTROPHILS # BLD: 9.99 E9/L (ref 1.8–7.3)
NEUTS SEG NFR BLD: 88.7 % (ref 43–80)
NITRITE UR QL STRIP: NEGATIVE
OSMOLALITY SERPL CALC.SUM OF ELEC: 268 MOSM/KG (ref 285–310)
OSMOLALITY URINE: 253 MOSM/KG (ref 300–900)
PH UR STRIP: 5.5 [PH] (ref 5–9)
PLATELET # BLD AUTO: 154 E9/L (ref 130–450)
PMV BLD AUTO: 9.6 FL (ref 7–12)
POTASSIUM SERPL-SCNC: 4.4 MMOL/L (ref 3.5–5)
POTASSIUM SERPL-SCNC: 4.6 MMOL/L (ref 3.5–5)
PROT UR STRIP-MCNC: NEGATIVE MG/DL
RBC # BLD AUTO: 3.13 E12/L (ref 3.8–5.8)
RBC #/AREA URNS HPF: ABNORMAL /HPF (ref 0–2)
RSV RNA NPH QL NAA+NON-PROBE: NOT DETECTED
RV+EV RNA NPH QL NAA+NON-PROBE: NOT DETECTED
SARS-COV-2 RDRP RESP QL NAA+PROBE: NOT DETECTED
SARS-COV-2 RNA NPH QL NAA+NON-PROBE: NOT DETECTED
SODIUM SERPL-SCNC: 120 MMOL/L (ref 132–146)
SODIUM SERPL-SCNC: 124 MMOL/L (ref 132–146)
SODIUM UR-SCNC: <20 MMOL/L
SP GR UR STRIP: 1.01 (ref 1–1.03)
TROPONIN, HIGH SENSITIVITY: 15 NG/L (ref 0–11)
TROPONIN, HIGH SENSITIVITY: 17 NG/L (ref 0–11)
UREA NITROGEN, UR: 374 MG/DL (ref 800–1666)
UROBILINOGEN UR STRIP-ACNC: 0.2 E.U./DL
WBC # BLD: 11.1 E9/L (ref 4.5–11.5)
WBC #/AREA URNS HPF: ABNORMAL /HPF (ref 0–5)

## 2023-04-24 PROCEDURE — 6370000000 HC RX 637 (ALT 250 FOR IP): Performed by: INTERNAL MEDICINE

## 2023-04-24 PROCEDURE — 85025 COMPLETE CBC W/AUTO DIFF WBC: CPT

## 2023-04-24 PROCEDURE — 2060000000 HC ICU INTERMEDIATE R&B

## 2023-04-24 PROCEDURE — 94640 AIRWAY INHALATION TREATMENT: CPT

## 2023-04-24 PROCEDURE — 93005 ELECTROCARDIOGRAM TRACING: CPT

## 2023-04-24 PROCEDURE — 6360000002 HC RX W HCPCS: Performed by: INTERNAL MEDICINE

## 2023-04-24 PROCEDURE — 96374 THER/PROPH/DIAG INJ IV PUSH: CPT

## 2023-04-24 PROCEDURE — 99223 1ST HOSP IP/OBS HIGH 75: CPT | Performed by: INTERNAL MEDICINE

## 2023-04-24 PROCEDURE — 82570 ASSAY OF URINE CREATININE: CPT

## 2023-04-24 PROCEDURE — 83935 ASSAY OF URINE OSMOLALITY: CPT

## 2023-04-24 PROCEDURE — 82436 ASSAY OF URINE CHLORIDE: CPT

## 2023-04-24 PROCEDURE — 80048 BASIC METABOLIC PNL TOTAL CA: CPT

## 2023-04-24 PROCEDURE — 76536 US EXAM OF HEAD AND NECK: CPT

## 2023-04-24 PROCEDURE — 71045 X-RAY EXAM CHEST 1 VIEW: CPT

## 2023-04-24 PROCEDURE — 84484 ASSAY OF TROPONIN QUANT: CPT

## 2023-04-24 PROCEDURE — 93010 ELECTROCARDIOGRAM REPORT: CPT | Performed by: INTERNAL MEDICINE

## 2023-04-24 PROCEDURE — 2580000003 HC RX 258: Performed by: INTERNAL MEDICINE

## 2023-04-24 PROCEDURE — 6360000002 HC RX W HCPCS

## 2023-04-24 PROCEDURE — 87635 SARS-COV-2 COVID-19 AMP PRB: CPT

## 2023-04-24 PROCEDURE — HZ2ZZZZ DETOXIFICATION SERVICES FOR SUBSTANCE ABUSE TREATMENT: ICD-10-PCS | Performed by: INTERNAL MEDICINE

## 2023-04-24 PROCEDURE — 96375 TX/PRO/DX INJ NEW DRUG ADDON: CPT

## 2023-04-24 PROCEDURE — 0202U NFCT DS 22 TRGT SARS-COV-2: CPT

## 2023-04-24 PROCEDURE — 82077 ASSAY SPEC XCP UR&BREATH IA: CPT

## 2023-04-24 PROCEDURE — 87449 NOS EACH ORGANISM AG IA: CPT

## 2023-04-24 PROCEDURE — 84540 ASSAY OF URINE/UREA-N: CPT

## 2023-04-24 PROCEDURE — 36415 COLL VENOUS BLD VENIPUNCTURE: CPT

## 2023-04-24 PROCEDURE — 83930 ASSAY OF BLOOD OSMOLALITY: CPT

## 2023-04-24 PROCEDURE — 87502 INFLUENZA DNA AMP PROBE: CPT

## 2023-04-24 PROCEDURE — 99285 EMERGENCY DEPT VISIT HI MDM: CPT

## 2023-04-24 PROCEDURE — 83735 ASSAY OF MAGNESIUM: CPT

## 2023-04-24 PROCEDURE — 84300 ASSAY OF URINE SODIUM: CPT

## 2023-04-24 PROCEDURE — 2580000003 HC RX 258

## 2023-04-24 PROCEDURE — 81001 URINALYSIS AUTO W/SCOPE: CPT

## 2023-04-24 PROCEDURE — 6370000000 HC RX 637 (ALT 250 FOR IP)

## 2023-04-24 RX ORDER — SODIUM CHLORIDE 0.9 % (FLUSH) 0.9 %
5-40 SYRINGE (ML) INJECTION EVERY 12 HOURS SCHEDULED
Status: DISCONTINUED | OUTPATIENT
Start: 2023-04-24 | End: 2023-04-24

## 2023-04-24 RX ORDER — PHENOBARBITAL 32.4 MG/1
64.8 TABLET ORAL EVERY 6 HOURS
Status: COMPLETED | OUTPATIENT
Start: 2023-04-25 | End: 2023-04-26

## 2023-04-24 RX ORDER — BENZONATATE 100 MG/1
100 CAPSULE ORAL ONCE
Status: COMPLETED | OUTPATIENT
Start: 2023-04-24 | End: 2023-04-24

## 2023-04-24 RX ORDER — SODIUM CHLORIDE 0.9 % (FLUSH) 0.9 %
5-40 SYRINGE (ML) INJECTION PRN
Status: DISCONTINUED | OUTPATIENT
Start: 2023-04-24 | End: 2023-04-28 | Stop reason: HOSPADM

## 2023-04-24 RX ORDER — PHENOBARBITAL 32.4 MG/1
32.4 TABLET ORAL EVERY 8 HOURS
Status: COMPLETED | OUTPATIENT
Start: 2023-04-26 | End: 2023-04-27

## 2023-04-24 RX ORDER — SODIUM CHLORIDE 0.9 % (FLUSH) 0.9 %
5-40 SYRINGE (ML) INJECTION EVERY 12 HOURS SCHEDULED
Status: DISCONTINUED | OUTPATIENT
Start: 2023-04-24 | End: 2023-04-28 | Stop reason: HOSPADM

## 2023-04-24 RX ORDER — ISOSORBIDE DINITRATE 10 MG/1
10 TABLET ORAL 2 TIMES DAILY
Status: ON HOLD | COMMUNITY
End: 2023-04-28 | Stop reason: SDUPTHER

## 2023-04-24 RX ORDER — PHENOBARBITAL 16.2 MG/1
16.2 TABLET ORAL 2 TIMES DAILY
Status: DISCONTINUED | OUTPATIENT
Start: 2023-04-27 | End: 2023-04-28 | Stop reason: HOSPADM

## 2023-04-24 RX ORDER — SODIUM CHLORIDE 9 MG/ML
INJECTION, SOLUTION INTRAVENOUS PRN
Status: DISCONTINUED | OUTPATIENT
Start: 2023-04-24 | End: 2023-04-28 | Stop reason: HOSPADM

## 2023-04-24 RX ORDER — FOLIC ACID 1 MG/1
1 TABLET ORAL DAILY
Status: DISCONTINUED | OUTPATIENT
Start: 2023-04-24 | End: 2023-04-28 | Stop reason: HOSPADM

## 2023-04-24 RX ORDER — SODIUM CHLORIDE 0.9 % (FLUSH) 0.9 %
SYRINGE (ML) INJECTION
Status: COMPLETED
Start: 2023-04-24 | End: 2023-04-24

## 2023-04-24 RX ORDER — ESCITALOPRAM OXALATE 10 MG/1
10 TABLET ORAL DAILY
COMMUNITY

## 2023-04-24 RX ORDER — PHENOBARBITAL 32.4 MG/1
97.2 TABLET ORAL EVERY 4 HOURS
Status: DISPENSED | OUTPATIENT
Start: 2023-04-24 | End: 2023-04-25

## 2023-04-24 RX ORDER — BUDESONIDE 0.5 MG/2ML
0.5 INHALANT ORAL 2 TIMES DAILY
Status: DISCONTINUED | OUTPATIENT
Start: 2023-04-24 | End: 2023-04-28 | Stop reason: HOSPADM

## 2023-04-24 RX ORDER — IPRATROPIUM BROMIDE AND ALBUTEROL SULFATE 2.5; .5 MG/3ML; MG/3ML
1 SOLUTION RESPIRATORY (INHALATION) EVERY 4 HOURS PRN
Status: DISCONTINUED | OUTPATIENT
Start: 2023-04-24 | End: 2023-04-28 | Stop reason: HOSPADM

## 2023-04-24 RX ORDER — THIAMINE HYDROCHLORIDE 100 MG/ML
100 INJECTION, SOLUTION INTRAMUSCULAR; INTRAVENOUS DAILY
Status: COMPLETED | OUTPATIENT
Start: 2023-04-25 | End: 2023-04-27

## 2023-04-24 RX ORDER — IBUPROFEN 200 MG
600-800 TABLET ORAL EVERY 6 HOURS PRN
Status: ON HOLD | COMMUNITY
End: 2023-04-28 | Stop reason: HOSPADM

## 2023-04-24 RX ORDER — PHENOBARBITAL SODIUM 65 MG/ML
130 INJECTION INTRAMUSCULAR ONCE
Status: COMPLETED | OUTPATIENT
Start: 2023-04-24 | End: 2023-04-24

## 2023-04-24 RX ORDER — LORAZEPAM 2 MG/ML
2 INJECTION INTRAMUSCULAR
Status: DISCONTINUED | OUTPATIENT
Start: 2023-04-24 | End: 2023-04-28 | Stop reason: HOSPADM

## 2023-04-24 RX ORDER — KETOROLAC TROMETHAMINE 15 MG/ML
15 INJECTION, SOLUTION INTRAMUSCULAR; INTRAVENOUS ONCE
Status: COMPLETED | OUTPATIENT
Start: 2023-04-24 | End: 2023-04-24

## 2023-04-24 RX ORDER — POLYETHYLENE GLYCOL 3350 17 G/17G
17 POWDER, FOR SOLUTION ORAL DAILY PRN
Status: DISCONTINUED | OUTPATIENT
Start: 2023-04-24 | End: 2023-04-28 | Stop reason: HOSPADM

## 2023-04-24 RX ORDER — ASPIRIN 81 MG/1
81 TABLET, CHEWABLE ORAL DAILY
Status: DISCONTINUED | OUTPATIENT
Start: 2023-04-24 | End: 2023-04-28 | Stop reason: HOSPADM

## 2023-04-24 RX ORDER — THIAMINE HYDROCHLORIDE 100 MG/ML
100 INJECTION, SOLUTION INTRAMUSCULAR; INTRAVENOUS DAILY
Status: DISCONTINUED | OUTPATIENT
Start: 2023-04-24 | End: 2023-04-24

## 2023-04-24 RX ORDER — ACETAMINOPHEN 650 MG/1
650 SUPPOSITORY RECTAL EVERY 6 HOURS PRN
Status: DISCONTINUED | OUTPATIENT
Start: 2023-04-24 | End: 2023-04-28 | Stop reason: HOSPADM

## 2023-04-24 RX ORDER — ENOXAPARIN SODIUM 100 MG/ML
30 INJECTION SUBCUTANEOUS 2 TIMES DAILY
Status: DISCONTINUED | OUTPATIENT
Start: 2023-04-24 | End: 2023-04-28 | Stop reason: HOSPADM

## 2023-04-24 RX ORDER — SODIUM CHLORIDE 9 MG/ML
25 INJECTION, SOLUTION INTRAVENOUS PRN
Status: DISCONTINUED | OUTPATIENT
Start: 2023-04-24 | End: 2023-04-28 | Stop reason: HOSPADM

## 2023-04-24 RX ORDER — ONDANSETRON 4 MG/1
4 TABLET, ORALLY DISINTEGRATING ORAL EVERY 8 HOURS PRN
Status: DISCONTINUED | OUTPATIENT
Start: 2023-04-24 | End: 2023-04-28 | Stop reason: HOSPADM

## 2023-04-24 RX ORDER — ONDANSETRON 2 MG/ML
4 INJECTION INTRAMUSCULAR; INTRAVENOUS EVERY 6 HOURS PRN
Status: DISCONTINUED | OUTPATIENT
Start: 2023-04-24 | End: 2023-04-28 | Stop reason: HOSPADM

## 2023-04-24 RX ORDER — LOSARTAN POTASSIUM 50 MG/1
50 TABLET ORAL DAILY
COMMUNITY

## 2023-04-24 RX ORDER — GAUZE BANDAGE 2" X 2"
100 BANDAGE TOPICAL DAILY
Status: DISCONTINUED | OUTPATIENT
Start: 2023-04-27 | End: 2023-04-24

## 2023-04-24 RX ORDER — LORAZEPAM 1 MG/1
3 TABLET ORAL
Status: DISCONTINUED | OUTPATIENT
Start: 2023-04-24 | End: 2023-04-28 | Stop reason: HOSPADM

## 2023-04-24 RX ORDER — GAUZE BANDAGE 2" X 2"
100 BANDAGE TOPICAL DAILY
Status: DISCONTINUED | OUTPATIENT
Start: 2023-04-24 | End: 2023-04-24 | Stop reason: SDUPTHER

## 2023-04-24 RX ORDER — LORAZEPAM 1 MG/1
4 TABLET ORAL
Status: DISCONTINUED | OUTPATIENT
Start: 2023-04-24 | End: 2023-04-28 | Stop reason: HOSPADM

## 2023-04-24 RX ORDER — LORAZEPAM 2 MG/ML
3 INJECTION INTRAMUSCULAR
Status: DISCONTINUED | OUTPATIENT
Start: 2023-04-24 | End: 2023-04-28 | Stop reason: HOSPADM

## 2023-04-24 RX ORDER — LORAZEPAM 2 MG/ML
1 INJECTION INTRAMUSCULAR
Status: DISCONTINUED | OUTPATIENT
Start: 2023-04-24 | End: 2023-04-28 | Stop reason: HOSPADM

## 2023-04-24 RX ORDER — NICOTINE 21 MG/24HR
1 PATCH, TRANSDERMAL 24 HOURS TRANSDERMAL DAILY
Status: DISCONTINUED | OUTPATIENT
Start: 2023-04-24 | End: 2023-04-28 | Stop reason: HOSPADM

## 2023-04-24 RX ORDER — LORAZEPAM 2 MG/ML
4 INJECTION INTRAMUSCULAR
Status: DISCONTINUED | OUTPATIENT
Start: 2023-04-24 | End: 2023-04-28 | Stop reason: HOSPADM

## 2023-04-24 RX ORDER — GAUZE BANDAGE 2" X 2"
100 BANDAGE TOPICAL DAILY
Status: DISCONTINUED | OUTPATIENT
Start: 2023-04-28 | End: 2023-04-28 | Stop reason: HOSPADM

## 2023-04-24 RX ORDER — ISOSORBIDE DINITRATE 10 MG/1
10 TABLET ORAL 2 TIMES DAILY
Status: DISCONTINUED | OUTPATIENT
Start: 2023-04-24 | End: 2023-04-28 | Stop reason: HOSPADM

## 2023-04-24 RX ORDER — PANTOPRAZOLE SODIUM 40 MG/1
40 TABLET, DELAYED RELEASE ORAL
Status: DISCONTINUED | OUTPATIENT
Start: 2023-04-25 | End: 2023-04-28 | Stop reason: HOSPADM

## 2023-04-24 RX ORDER — AMLODIPINE BESYLATE 5 MG/1
5 TABLET ORAL DAILY
Status: DISCONTINUED | OUTPATIENT
Start: 2023-04-24 | End: 2023-04-28 | Stop reason: HOSPADM

## 2023-04-24 RX ORDER — LORAZEPAM 1 MG/1
2 TABLET ORAL
Status: DISCONTINUED | OUTPATIENT
Start: 2023-04-24 | End: 2023-04-28 | Stop reason: HOSPADM

## 2023-04-24 RX ORDER — ACETAMINOPHEN 325 MG/1
650 TABLET ORAL EVERY 6 HOURS PRN
Status: DISCONTINUED | OUTPATIENT
Start: 2023-04-24 | End: 2023-04-28 | Stop reason: HOSPADM

## 2023-04-24 RX ORDER — IPRATROPIUM BROMIDE AND ALBUTEROL SULFATE 2.5; .5 MG/3ML; MG/3ML
1 SOLUTION RESPIRATORY (INHALATION)
Status: DISCONTINUED | OUTPATIENT
Start: 2023-04-24 | End: 2023-04-28 | Stop reason: HOSPADM

## 2023-04-24 RX ORDER — LORAZEPAM 2 MG/ML
1 INJECTION INTRAMUSCULAR ONCE
Status: COMPLETED | OUTPATIENT
Start: 2023-04-24 | End: 2023-04-24

## 2023-04-24 RX ORDER — LORAZEPAM 1 MG/1
1 TABLET ORAL
Status: DISCONTINUED | OUTPATIENT
Start: 2023-04-24 | End: 2023-04-28 | Stop reason: HOSPADM

## 2023-04-24 RX ORDER — SODIUM CHLORIDE 9 MG/ML
INJECTION, SOLUTION INTRAVENOUS CONTINUOUS
Status: DISCONTINUED | OUTPATIENT
Start: 2023-04-24 | End: 2023-04-26

## 2023-04-24 RX ORDER — PRAVASTATIN SODIUM 20 MG
20 TABLET ORAL NIGHTLY
COMMUNITY

## 2023-04-24 RX ORDER — ROSUVASTATIN CALCIUM 10 MG/1
10 TABLET, COATED ORAL DAILY
Status: DISCONTINUED | OUTPATIENT
Start: 2023-04-24 | End: 2023-04-28 | Stop reason: HOSPADM

## 2023-04-24 RX ORDER — ONDANSETRON 2 MG/ML
4 INJECTION INTRAMUSCULAR; INTRAVENOUS ONCE
Status: COMPLETED | OUTPATIENT
Start: 2023-04-24 | End: 2023-04-24

## 2023-04-24 RX ORDER — ESCITALOPRAM OXALATE 10 MG/1
10 TABLET ORAL DAILY
Status: DISCONTINUED | OUTPATIENT
Start: 2023-04-24 | End: 2023-04-28 | Stop reason: HOSPADM

## 2023-04-24 RX ORDER — FUROSEMIDE 40 MG/1
40 TABLET ORAL DAILY
Status: ON HOLD | COMMUNITY
End: 2023-04-28 | Stop reason: HOSPADM

## 2023-04-24 RX ADMIN — ISOSORBIDE DINITRATE 10 MG: 10 TABLET ORAL at 20:14

## 2023-04-24 RX ADMIN — ROSUVASTATIN CALCIUM 10 MG: 10 TABLET, FILM COATED ORAL at 18:48

## 2023-04-24 RX ADMIN — BENZONATATE 100 MG: 100 CAPSULE ORAL at 15:22

## 2023-04-24 RX ADMIN — METOPROLOL TARTRATE 75 MG: 25 TABLET, FILM COATED ORAL at 20:14

## 2023-04-24 RX ADMIN — BUDESONIDE 500 MCG: 0.5 SUSPENSION RESPIRATORY (INHALATION) at 18:24

## 2023-04-24 RX ADMIN — PHENOBARBITAL SODIUM 130 MG: 65 INJECTION INTRAMUSCULAR; INTRAVENOUS at 19:55

## 2023-04-24 RX ADMIN — LORAZEPAM 4 MG: 2 INJECTION INTRAMUSCULAR; INTRAVENOUS at 20:13

## 2023-04-24 RX ADMIN — IPRATROPIUM BROMIDE AND ALBUTEROL SULFATE 1 AMPULE: .5; 2.5 SOLUTION RESPIRATORY (INHALATION) at 18:23

## 2023-04-24 RX ADMIN — LORAZEPAM 1 MG: 2 INJECTION INTRAMUSCULAR; INTRAVENOUS at 15:22

## 2023-04-24 RX ADMIN — ACETAMINOPHEN 650 MG: 325 TABLET ORAL at 18:49

## 2023-04-24 RX ADMIN — KETOROLAC TROMETHAMINE 15 MG: 15 INJECTION, SOLUTION INTRAMUSCULAR; INTRAVENOUS at 14:32

## 2023-04-24 RX ADMIN — SODIUM CHLORIDE: 9 INJECTION, SOLUTION INTRAVENOUS at 18:54

## 2023-04-24 RX ADMIN — THIAMINE HCL TAB 100 MG 100 MG: 100 TAB at 15:22

## 2023-04-24 RX ADMIN — LORAZEPAM 2 MG: 1 TABLET ORAL at 18:48

## 2023-04-24 RX ADMIN — Medication: at 14:33

## 2023-04-24 RX ADMIN — ASPIRIN 81 MG 81 MG: 81 TABLET ORAL at 17:57

## 2023-04-24 RX ADMIN — ENOXAPARIN SODIUM 30 MG: 100 INJECTION SUBCUTANEOUS at 20:14

## 2023-04-24 RX ADMIN — AMLODIPINE BESYLATE 5 MG: 5 TABLET ORAL at 17:57

## 2023-04-24 RX ADMIN — FOLIC ACID 1 MG: 1 TABLET ORAL at 17:57

## 2023-04-24 RX ADMIN — ONDANSETRON 4 MG: 2 INJECTION INTRAMUSCULAR; INTRAVENOUS at 15:22

## 2023-04-24 RX ADMIN — ESCITALOPRAM OXALATE 10 MG: 10 TABLET ORAL at 18:48

## 2023-04-24 ASSESSMENT — PAIN DESCRIPTION - DESCRIPTORS
DESCRIPTORS: ACHING;SORE
DESCRIPTORS: ACHING;DISCOMFORT

## 2023-04-24 ASSESSMENT — PAIN SCALES - GENERAL
PAINLEVEL_OUTOF10: 9
PAINLEVEL_OUTOF10: 9

## 2023-04-24 ASSESSMENT — PAIN DESCRIPTION - LOCATION
LOCATION: HEAD
LOCATION: BACK;HEAD

## 2023-04-24 NOTE — CONSULTS
Patient seen and examined in the emergency room. Consult dictated   Patient is a 62year old gentleman admitted with nausea vomiting diarrhea for 4 days prior to presentation. Now with acute kidney injury most likely secondary to volume depletion secondary to nonsteroidal activity drugs. Patient was taking ibuprofen daily. Hyponatremia may be hypovolemic hyponatremia since patient has history of polydipsia. Patient reassessed son. Chronic hyponatremia. Patient does have a history of significant beer intake and there may be some element of beer drinkers potomania in the background which is also possible volume depletion. We will follow serial lactulose. Hydrate gently. In addition nonsteroidal artery drugs may also induce hyponatremia by reducing renal free water clearance      Dr. Anna Tinoco , Thank You for allowing me to participate in the care of this patient. Will follow the patient with you.     Wesly Gregorio MD  Nephrology    Electronically signed by Sofia Kessler MD on 4/24/2023 at 3:45 PM

## 2023-04-24 NOTE — DISCHARGE INSTRUCTIONS
Nonsteroidal Anti-Inflammatory Drugs (NSAIDs)  Do not take the following medications unless directed by your doctor:     Ibuprofen (Advil, Motrin). Naproxen (Aleve). Aspirin (Khloe, Bufferin) is also an NSAID. But it doesn't work the same way as these other NSAIDs. Some examples of prescription NSAIDs are:  Diclofenac. Indomethacin.   Piroxicam.

## 2023-04-24 NOTE — H&P
[address] [place], [place] [address]ist Group   History and Physical      CHIEF COMPLAINT: Nausea, vomiting, weakness, worsening chronic pain    History of Present Illness:  62 y.o. male with a history of alcohol abuse with delirium tremens requiring intubation in the past, coronary artery disease, COPD, hypertension, hyperlipidemia, tobacco abuse presenting with above complaints. He states over the last week to week and a half he has had nausea and vomiting, unable to tolerate oral intake very well. Also has subjective fevers, shortness of breath, wheezing, and chest heaviness. Denies any syncope, presyncope, melena, hematochezia. No provoking or palliating factors. He notes that he has been unable to do much other than lay around at home over the last week, however when asked about alcohol use he notes that he drinks a few beers a day and most recent consumption was yesterday with friends while watching a race with some friends. Quit smoking cigarettes about 1 week ago. In the ED, vital signs within normal limits and stable. He is very tremulous and required a dose of IM phenobarbital.  Chemistry significant for sodium level of 120, creatinine of 2.3 up from baseline of around 1, bicarbonate 17. Informant(s) for H&P: Patient, ED resident physician, chart review    REVIEW OF SYSTEMS:  no fevers, chills, cp, sob, n/v, ha, vision/hearing changes, wt changes, hot/cold flashes, other open skin lesions, diarrhea, constipation, dysuria/hematuria unless noted in HPI. Complete ROS performed with the patient and is otherwise negative.       PMH:  Past Medical History:   Diagnosis Date    Alcohol abuse     Arthritis     Back pain, chronic     CAD in native artery 6/8/2022    COPD (chronic obstructive pulmonary disease) ([facility] [address]     H/O cardiovascular stress test 04/27/2022    Lexiscan    Hyperlipidemia     Hypertension     Hyponatremia     Neck pain, chronic     Tobacco abuse        Surgical History:  Past

## 2023-04-24 NOTE — ED PROVIDER NOTES
4/24/23 1433)   ondansetron (ZOFRAN) injection 4 mg (4 mg IntraVENous Given 4/24/23 1522)   benzonatate (TESSALON) capsule 100 mg (100 mg Oral Given 4/24/23 1522)   LORazepam (ATIVAN) injection 1 mg (1 mg IntraVENous Given 4/24/23 1522)               Medical Decision Making/Differential Diagnosis:    CC/HPI Summary, Social Determinants of health, Records Reviewed, DDx, testing done/not done, ED Course, Reassessment, disposition considerations/shared decision making with patient, consults, disposition:      ED Course as of 04/24/23 1638   Mon Apr 24, 2023   1622   ATTENDING PROVIDER ATTESTATION:     I have personally performed and/or participated in the history, exam, medical decision making, and procedures and agree with all pertinent clinical information unless otherwise noted. I have also reviewed and agree with the past medical, family and social history unless otherwise noted. I have discussed this patient in detail with the resident and provided the instruction and education regarding the evidence-based evaluation and treatment of nausea and vomiting. Any EKG that may have been performed has been personally reviewed by me and I agree with the documentation as noted by the resident. History: patient complaining of emesis. He states he had two alcohol beverages yesterday. My findings: Brian Ambriz is a 62 y.o. male whom is in no distress. Physical exam reveals chronically ill in appearance. Heart RRR, lungs CTA, abdomen is soft and nontender. Slight tremor of the upper extremities. My plan: Symptomatic and supportive care. Will admit with hyponatremia and dehydration. Electronically signed by Bogdan Lowry DO on 4/24/23 at 4:22 PM EDT       [JS]      ED Course User Index  [JS] Bogdan Lowry DO      Patient presented the emergency department for intermittent cough over the past week and a half and left-sided chest pain.   Differential notes includes pneumonia, pleural

## 2023-04-25 LAB
ANION GAP SERPL CALCULATED.3IONS-SCNC: 11 MMOL/L (ref 7–16)
ANION GAP SERPL CALCULATED.3IONS-SCNC: 14 MMOL/L (ref 7–16)
ANION GAP SERPL CALCULATED.3IONS-SCNC: 14 MMOL/L (ref 7–16)
ANION GAP SERPL CALCULATED.3IONS-SCNC: 15 MMOL/L (ref 7–16)
ANION GAP SERPL CALCULATED.3IONS-SCNC: 15 MMOL/L (ref 7–16)
ANION GAP SERPL CALCULATED.3IONS-SCNC: 16 MMOL/L (ref 7–16)
BASOPHILS # BLD: 0.03 E9/L (ref 0–0.2)
BASOPHILS NFR BLD: 0.3 % (ref 0–2)
BUN SERPL-MCNC: 27 MG/DL (ref 6–20)
BUN SERPL-MCNC: 30 MG/DL (ref 6–20)
BUN SERPL-MCNC: 31 MG/DL (ref 6–20)
BUN SERPL-MCNC: 32 MG/DL (ref 6–20)
BUN SERPL-MCNC: 35 MG/DL (ref 6–20)
BUN SERPL-MCNC: 37 MG/DL (ref 6–20)
CALCIUM SERPL-MCNC: 9.2 MG/DL (ref 8.6–10.2)
CALCIUM SERPL-MCNC: 9.3 MG/DL (ref 8.6–10.2)
CALCIUM SERPL-MCNC: 9.4 MG/DL (ref 8.6–10.2)
CALCIUM SERPL-MCNC: 9.5 MG/DL (ref 8.6–10.2)
CALCIUM SERPL-MCNC: 9.6 MG/DL (ref 8.6–10.2)
CALCIUM SERPL-MCNC: 9.6 MG/DL (ref 8.6–10.2)
CHLORIDE SERPL-SCNC: 92 MMOL/L (ref 98–107)
CHLORIDE SERPL-SCNC: 94 MMOL/L (ref 98–107)
CHLORIDE SERPL-SCNC: 95 MMOL/L (ref 98–107)
CHLORIDE SERPL-SCNC: 97 MMOL/L (ref 98–107)
CO2 SERPL-SCNC: 20 MMOL/L (ref 22–29)
CO2 SERPL-SCNC: 20 MMOL/L (ref 22–29)
CO2 SERPL-SCNC: 21 MMOL/L (ref 22–29)
CO2 SERPL-SCNC: 21 MMOL/L (ref 22–29)
CO2 SERPL-SCNC: 22 MMOL/L (ref 22–29)
CO2 SERPL-SCNC: 22 MMOL/L (ref 22–29)
CREAT SERPL-MCNC: 1.6 MG/DL (ref 0.7–1.2)
CREAT SERPL-MCNC: 1.6 MG/DL (ref 0.7–1.2)
CREAT SERPL-MCNC: 1.7 MG/DL (ref 0.7–1.2)
CREAT SERPL-MCNC: 1.8 MG/DL (ref 0.7–1.2)
CREAT SERPL-MCNC: 2 MG/DL (ref 0.7–1.2)
CREAT SERPL-MCNC: 2.1 MG/DL (ref 0.7–1.2)
EKG ATRIAL RATE: 91 BPM
EKG P AXIS: 49 DEGREES
EKG P-R INTERVAL: 132 MS
EKG Q-T INTERVAL: 358 MS
EKG QRS DURATION: 82 MS
EKG QTC CALCULATION (BAZETT): 440 MS
EKG R AXIS: 70 DEGREES
EKG T AXIS: 62 DEGREES
EKG VENTRICULAR RATE: 91 BPM
EOSINOPHIL # BLD: 0.03 E9/L (ref 0.05–0.5)
EOSINOPHIL NFR BLD: 0.3 % (ref 0–6)
ERYTHROCYTE [DISTWIDTH] IN BLOOD BY AUTOMATED COUNT: 14.6 FL (ref 11.5–15)
GLUCOSE SERPL-MCNC: 115 MG/DL (ref 74–99)
GLUCOSE SERPL-MCNC: 124 MG/DL (ref 74–99)
GLUCOSE SERPL-MCNC: 189 MG/DL (ref 74–99)
GLUCOSE SERPL-MCNC: 208 MG/DL (ref 74–99)
GLUCOSE SERPL-MCNC: 90 MG/DL (ref 74–99)
GLUCOSE SERPL-MCNC: 98 MG/DL (ref 74–99)
HCT VFR BLD AUTO: 30.6 % (ref 37–54)
HGB BLD-MCNC: 10 G/DL (ref 12.5–16.5)
IMM GRANULOCYTES # BLD: 0.29 E9/L
IMM GRANULOCYTES NFR BLD: 3.2 % (ref 0–5)
LEGIONELLA AG UR QL: NORMAL
LYMPHOCYTES # BLD: 0.65 E9/L (ref 1.5–4)
LYMPHOCYTES NFR BLD: 7.1 % (ref 20–42)
MCH RBC QN AUTO: 33.3 PG (ref 26–35)
MCHC RBC AUTO-ENTMCNC: 32.7 % (ref 32–34.5)
MCV RBC AUTO: 102 FL (ref 80–99.9)
MONOCYTES # BLD: 0.61 E9/L (ref 0.1–0.95)
MONOCYTES NFR BLD: 6.7 % (ref 2–12)
NEUTROPHILS # BLD: 7.49 E9/L (ref 1.8–7.3)
NEUTS SEG NFR BLD: 82.4 % (ref 43–80)
PLATELET # BLD AUTO: 152 E9/L (ref 130–450)
PMV BLD AUTO: 9.4 FL (ref 7–12)
POTASSIUM SERPL-SCNC: 3.9 MMOL/L (ref 3.5–5)
POTASSIUM SERPL-SCNC: 4 MMOL/L (ref 3.5–5)
POTASSIUM SERPL-SCNC: 4.2 MMOL/L (ref 3.5–5)
POTASSIUM SERPL-SCNC: 4.3 MMOL/L (ref 3.5–5)
POTASSIUM SERPL-SCNC: 5 MMOL/L (ref 3.5–5)
POTASSIUM SERPL-SCNC: 5.2 MMOL/L (ref 3.5–5)
RBC # BLD AUTO: 3 E12/L (ref 3.8–5.8)
SODIUM SERPL-SCNC: 127 MMOL/L (ref 132–146)
SODIUM SERPL-SCNC: 128 MMOL/L (ref 132–146)
SODIUM SERPL-SCNC: 128 MMOL/L (ref 132–146)
SODIUM SERPL-SCNC: 129 MMOL/L (ref 132–146)
SODIUM SERPL-SCNC: 130 MMOL/L (ref 132–146)
SODIUM SERPL-SCNC: 131 MMOL/L (ref 132–146)
WBC # BLD: 9.1 E9/L (ref 4.5–11.5)

## 2023-04-25 PROCEDURE — 85025 COMPLETE CBC W/AUTO DIFF WBC: CPT

## 2023-04-25 PROCEDURE — 94640 AIRWAY INHALATION TREATMENT: CPT

## 2023-04-25 PROCEDURE — 2060000000 HC ICU INTERMEDIATE R&B

## 2023-04-25 PROCEDURE — 36415 COLL VENOUS BLD VENIPUNCTURE: CPT

## 2023-04-25 PROCEDURE — 2580000003 HC RX 258

## 2023-04-25 PROCEDURE — 80048 BASIC METABOLIC PNL TOTAL CA: CPT

## 2023-04-25 PROCEDURE — 99233 SBSQ HOSP IP/OBS HIGH 50: CPT | Performed by: INTERNAL MEDICINE

## 2023-04-25 PROCEDURE — 6370000000 HC RX 637 (ALT 250 FOR IP): Performed by: INTERNAL MEDICINE

## 2023-04-25 PROCEDURE — 6360000002 HC RX W HCPCS: Performed by: INTERNAL MEDICINE

## 2023-04-25 PROCEDURE — 6370000000 HC RX 637 (ALT 250 FOR IP)

## 2023-04-25 PROCEDURE — 2580000003 HC RX 258: Performed by: INTERNAL MEDICINE

## 2023-04-25 PROCEDURE — 97530 THERAPEUTIC ACTIVITIES: CPT | Performed by: PHYSICAL THERAPIST

## 2023-04-25 PROCEDURE — 97161 PT EVAL LOW COMPLEX 20 MIN: CPT | Performed by: PHYSICAL THERAPIST

## 2023-04-25 RX ORDER — METHYLPREDNISOLONE SODIUM SUCCINATE 40 MG/ML
40 INJECTION, POWDER, LYOPHILIZED, FOR SOLUTION INTRAMUSCULAR; INTRAVENOUS ONCE
Status: COMPLETED | OUTPATIENT
Start: 2023-04-25 | End: 2023-04-25

## 2023-04-25 RX ORDER — HYDROCODONE BITARTRATE AND ACETAMINOPHEN 5; 325 MG/1; MG/1
1 TABLET ORAL EVERY 6 HOURS PRN
Status: DISCONTINUED | OUTPATIENT
Start: 2023-04-25 | End: 2023-04-28 | Stop reason: HOSPADM

## 2023-04-25 RX ADMIN — ESCITALOPRAM OXALATE 10 MG: 10 TABLET ORAL at 08:46

## 2023-04-25 RX ADMIN — LORAZEPAM 2 MG: 1 TABLET ORAL at 18:11

## 2023-04-25 RX ADMIN — AMLODIPINE BESYLATE 5 MG: 5 TABLET ORAL at 08:46

## 2023-04-25 RX ADMIN — LORAZEPAM 2 MG: 2 INJECTION INTRAMUSCULAR; INTRAVENOUS at 09:06

## 2023-04-25 RX ADMIN — PHENOBARBITAL 97.2 MG: 32.4 TABLET ORAL at 01:13

## 2023-04-25 RX ADMIN — SODIUM CHLORIDE: 9 INJECTION, SOLUTION INTRAVENOUS at 09:01

## 2023-04-25 RX ADMIN — ENOXAPARIN SODIUM 30 MG: 100 INJECTION SUBCUTANEOUS at 08:47

## 2023-04-25 RX ADMIN — LORAZEPAM 2 MG: 2 INJECTION INTRAMUSCULAR; INTRAVENOUS at 04:04

## 2023-04-25 RX ADMIN — IPRATROPIUM BROMIDE AND ALBUTEROL SULFATE 1 AMPULE: .5; 2.5 SOLUTION RESPIRATORY (INHALATION) at 06:42

## 2023-04-25 RX ADMIN — ISOSORBIDE DINITRATE 10 MG: 10 TABLET ORAL at 08:47

## 2023-04-25 RX ADMIN — FOLIC ACID 1 MG: 1 TABLET ORAL at 08:46

## 2023-04-25 RX ADMIN — HYDROCODONE BITARTRATE AND ACETAMINOPHEN 1 TABLET: 5; 325 TABLET ORAL at 19:56

## 2023-04-25 RX ADMIN — METHYLPREDNISOLONE SODIUM SUCCINATE 40 MG: 40 INJECTION, POWDER, FOR SOLUTION INTRAMUSCULAR; INTRAVENOUS at 14:30

## 2023-04-25 RX ADMIN — METOPROLOL TARTRATE 75 MG: 25 TABLET, FILM COATED ORAL at 21:25

## 2023-04-25 RX ADMIN — ROSUVASTATIN CALCIUM 10 MG: 10 TABLET, FILM COATED ORAL at 08:47

## 2023-04-25 RX ADMIN — BUDESONIDE 500 MCG: 0.5 SUSPENSION RESPIRATORY (INHALATION) at 06:42

## 2023-04-25 RX ADMIN — ENOXAPARIN SODIUM 30 MG: 100 INJECTION SUBCUTANEOUS at 21:29

## 2023-04-25 RX ADMIN — THIAMINE HYDROCHLORIDE 100 MG: 100 INJECTION, SOLUTION INTRAMUSCULAR; INTRAVENOUS at 08:46

## 2023-04-25 RX ADMIN — SODIUM CHLORIDE: 9 INJECTION, SOLUTION INTRAVENOUS at 21:37

## 2023-04-25 RX ADMIN — IPRATROPIUM BROMIDE AND ALBUTEROL SULFATE 1 AMPULE: .5; 2.5 SOLUTION RESPIRATORY (INHALATION) at 17:06

## 2023-04-25 RX ADMIN — ISOSORBIDE DINITRATE 10 MG: 10 TABLET ORAL at 21:26

## 2023-04-25 RX ADMIN — METOPROLOL TARTRATE 75 MG: 25 TABLET, FILM COATED ORAL at 08:47

## 2023-04-25 RX ADMIN — SODIUM CHLORIDE, PRESERVATIVE FREE 10 ML: 5 INJECTION INTRAVENOUS at 08:49

## 2023-04-25 RX ADMIN — PHENOBARBITAL 97.2 MG: 32.4 TABLET ORAL at 17:15

## 2023-04-25 RX ADMIN — BUDESONIDE 500 MCG: 0.5 SUSPENSION RESPIRATORY (INHALATION) at 17:06

## 2023-04-25 RX ADMIN — PHENOBARBITAL 64.8 MG: 32.4 TABLET ORAL at 21:27

## 2023-04-25 RX ADMIN — PANTOPRAZOLE SODIUM 40 MG: 40 TABLET, DELAYED RELEASE ORAL at 05:44

## 2023-04-25 RX ADMIN — HYDROCODONE BITARTRATE AND ACETAMINOPHEN 1 TABLET: 5; 325 TABLET ORAL at 10:38

## 2023-04-25 RX ADMIN — PHENOBARBITAL 97.2 MG: 32.4 TABLET ORAL at 13:14

## 2023-04-25 RX ADMIN — PHENOBARBITAL 97.2 MG: 32.4 TABLET ORAL at 04:13

## 2023-04-25 RX ADMIN — LORAZEPAM 2 MG: 2 INJECTION INTRAMUSCULAR; INTRAVENOUS at 14:30

## 2023-04-25 RX ADMIN — ASPIRIN 81 MG 81 MG: 81 TABLET ORAL at 08:47

## 2023-04-25 RX ADMIN — PHENOBARBITAL 97.2 MG: 32.4 TABLET ORAL at 08:47

## 2023-04-25 RX ADMIN — IPRATROPIUM BROMIDE AND ALBUTEROL SULFATE 1 AMPULE: .5; 2.5 SOLUTION RESPIRATORY (INHALATION) at 09:35

## 2023-04-25 ASSESSMENT — PAIN SCALES - GENERAL
PAINLEVEL_OUTOF10: 7
PAINLEVEL_OUTOF10: 7
PAINLEVEL_OUTOF10: 10
PAINLEVEL_OUTOF10: 8

## 2023-04-25 ASSESSMENT — PAIN DESCRIPTION - LOCATION
LOCATION: BACK;HEAD
LOCATION: BACK;HEAD
LOCATION: BACK

## 2023-04-25 ASSESSMENT — PAIN DESCRIPTION - DESCRIPTORS: DESCRIPTORS: ACHING

## 2023-04-25 ASSESSMENT — PAIN DESCRIPTION - ORIENTATION: ORIENTATION: LOWER

## 2023-04-25 NOTE — CONSULTS
[address]                                  CONSULTATION    PATIENT NAME: Catherine Alvarez                       :        1965  MED REC NO:   [id number]                            ROOM:       [id number]  ACCOUNT NO:   [de-identified]                           ADMIT DATE: 2023  PROVIDER:     Hilario Love MD    CONSULT DATE:  2023    CONSULTING PROVIDER:  Dov Mujica DO, ER Room physician. REASON FOR CONSULTATION:  Hyponatremia. HISTORY OF PRESENT ILLNESS:  The patient is being seen in consultation  at the request of Dr. Dov Mujica, for hyponatremia. The  patient is a 26-year-old gentleman who presents to the emergency room at  [facility] with a chief complaint of nausea,  vomiting, and diarrhea for four days prior to presentation. He has been  taking over-the-counter ibuprofen and aspirin for relief. There was no  associated fever or chills. Upon presentation, the patient was found to  have hyponatremia with a serum sodium level of 120 mmol/L. Review of  the patient's lab data shows a history of chronic hyponatremia with a  serum sodium of 128 mmol/L in 2022. He was hospitalized at this very  facility a year ago when he did have hyponatremia and that hyponatremia  was deemed to be secondary to use of thiazide diuretic. The patient  denies being on a thiazide diuretic at this time. He states his  diuretics were changed, but in his list of medications, there is no  diuretic. He was complaining of generalized aches and pains. The patient has been  seen in the emergency room. Urine studies have been ordered. He was  given some fluid. He is denying any shortness of breath. The patient  admitted to drinking two to three beers a day. PAST MEDICAL HISTORY:  Past medical history is significant for history  of hypertension.   He has a history of

## 2023-04-25 NOTE — PLAN OF CARE
Problem: Discharge Planning  Goal: Discharge to home or other facility with appropriate resources  Outcome: Progressing  Flowsheets (Taken 4/24/2023 1845 by Scott Alejandro RN)  Discharge to home or other facility with appropriate resources:   Identify barriers to discharge with patient and caregiver   Arrange for needed discharge resources and transportation as appropriate     Problem: Safety - Adult  Goal: Free from fall injury  Outcome: Progressing     Problem: Pain  Goal: Verbalizes/displays adequate comfort level or baseline comfort level  Outcome: Progressing

## 2023-04-25 NOTE — CARE COORDINATION
Case Management Assessment  Initial Evaluation    Date/Time of Evaluation: 4/25/2023 10:58 AM  Assessment Completed by: Stuart Freeman RN    If patient is discharged prior to next notation, then this note serves as note for discharge by case management. Patient Name: Paul Alvarez                   YOB: 1965  Diagnosis: Acute hyponatremia [E87.1]  Hyponatremia [E87.1]  Subacute cough [R05.2]  Alcohol withdrawal syndrome without complication (HCC) [F35.852]  Nausea and vomiting, unspecified vomiting type [R11.2]                   Date / Time: 4/24/2023 12:17 PM    Patient Admission Status: Inpatient   Readmission Risk (Low < 19, Mod (19-27), High > 27): Readmission Risk Score: 14.7    Current PCP: Angi Westfall MD  PCP verified by CM? Yes    Chart Reviewed: Yes      History Provided by: Patient  Patient Orientation: Alert and Oriented    Patient Cognition: Alert    Hospitalization in the last 30 days (Readmission):  No    If yes, Readmission Assessment in  Navigator will be completed. Advance Directives:      Code Status: Full Code   Patient's Primary Decision Maker is: Legal Next of Kin    Primary Decision Maker: Ekaterina Talavera  Child - [phone]    Secondary Decision Maker: Aramis Lombardo [address] [place] [address] M-POA - Other - [phone]    Discharge Planning:    Patient lives with: Alone Type of Home: Trailer/Mobile Home  Primary Care Giver: Self  Patient Support Systems include: Children, Family Members     ADLS  Prior functional level: Independent in ADLs/IADLs  Current functional level: Independent in ADLs/IADLs    Family can provide assistance at DC: Yes  Would you like Case Management to discuss the discharge plan with any other family members/significant others, and if so, who?  No  Plans to Return to Present Housing: Yes  Other Identified Issues/Barriers to RETURNING to current housing: none per pt     Financial    Payor: Sachin Diaz / Plan: [place] / Product Type:

## 2023-04-25 NOTE — CONSULTS
Comprehensive Nutrition Assessment    Type and Reason for Visit:  Initial, Positive Nutrition Screen, Consult    Nutrition Recommendations/Plan:   Continue current diet & ONS and will monitor. Malnutrition Assessment:  Malnutrition Status: At risk for malnutrition (Comment) (04/25/23 1128)    Context:  Acute Illness     Findings of the 6 clinical characteristics of malnutrition:  Energy Intake:  Mild decrease in energy intake (Comment) (Pt poor PO N/V/D x 4 days)  Weight Loss:  No significant weight loss     Body Fat Loss:  No significant body fat loss     Muscle Mass Loss:  No significant muscle mass loss    Fluid Accumulation:  No significant fluid accumulation     Strength:  Not Performed    Nutrition Assessment:    Pt admit d/t N/V/D w/ ISA & hyponatremia. Hx CAD, HTN, COPD, ETOH abuse. Continue current diet & ONS and will monitor. Nutrition Related Findings:    A&Ox4, round distended abd, +BS, edema +1, no recorded I/Os, abnormal renal labs, Na 128. Wound Type: None       Current Nutrition Intake & Therapies:    Average Meal Intake: Unable to assess (No records in EMR)  Average Supplements Intake: Unable to assess (No records in EMR)  ADULT DIET; Regular  ADULT ORAL NUTRITION SUPPLEMENT; Breakfast, Lunch, Dinner; Standard High Calorie/High Protein Oral Supplement    Anthropometric Measures:  Height: 6' (182.9 cm)  Ideal Body Weight (IBW): 178 lbs (81 kg)    Admission Body Weight: 274 lb (124.3 kg) (4/25 Bedscale)  Current Body Weight: 274 lb (124.3 kg) (4/25 Bedscale), 153.9 % IBW.     Current BMI (kg/m2): 37.2  Usual Body Weight: 284 lb 6 oz (129 kg) (9/18/22 Bedscale)  % Weight Change (Calculated): -3.6  Weight Adjustment For: No Adjustment   BMI Categories: Obese Class 2 (BMI 35.0 -39.9)    Estimated Daily Nutrient Needs:  Energy Requirements Based On: Kcal/kg  Weight Used for Energy Requirements: Current  Energy (kcal/day): 3517-2252   Protein (g/day): 105-120 (1.3-1.5 g/kg)   Fluid (ml/day):

## 2023-04-25 NOTE — PLAN OF CARE
Problem: Discharge Planning  Goal: Discharge to home or other facility with appropriate resources  4/25/2023 1719 by Jay Waters RN  Outcome: Progressing     Problem: Safety - Adult  Goal: Free from fall injury  4/25/2023 1719 by Jay Waters RN  Outcome: Progressing

## 2023-04-26 PROBLEM — D53.9 MACROCYTIC ANEMIA: Status: ACTIVE | Noted: 2023-04-26

## 2023-04-26 LAB
ANION GAP SERPL CALCULATED.3IONS-SCNC: 10 MMOL/L (ref 7–16)
ANION GAP SERPL CALCULATED.3IONS-SCNC: 11 MMOL/L (ref 7–16)
ANION GAP SERPL CALCULATED.3IONS-SCNC: 12 MMOL/L (ref 7–16)
ANION GAP SERPL CALCULATED.3IONS-SCNC: 14 MMOL/L (ref 7–16)
BUN SERPL-MCNC: 34 MG/DL (ref 6–20)
BUN SERPL-MCNC: 37 MG/DL (ref 6–20)
BUN SERPL-MCNC: 37 MG/DL (ref 6–20)
BUN SERPL-MCNC: 38 MG/DL (ref 6–20)
BUN SERPL-MCNC: 38 MG/DL (ref 6–20)
BUN SERPL-MCNC: 39 MG/DL (ref 6–20)
CALCIUM SERPL-MCNC: 8.9 MG/DL (ref 8.6–10.2)
CALCIUM SERPL-MCNC: 8.9 MG/DL (ref 8.6–10.2)
CALCIUM SERPL-MCNC: 9 MG/DL (ref 8.6–10.2)
CALCIUM SERPL-MCNC: 9.1 MG/DL (ref 8.6–10.2)
CHLORIDE SERPL-SCNC: 97 MMOL/L (ref 98–107)
CHLORIDE SERPL-SCNC: 98 MMOL/L (ref 98–107)
CHLORIDE SERPL-SCNC: 99 MMOL/L (ref 98–107)
CHLORIDE SERPL-SCNC: 99 MMOL/L (ref 98–107)
CO2 SERPL-SCNC: 19 MMOL/L (ref 22–29)
CO2 SERPL-SCNC: 21 MMOL/L (ref 22–29)
CO2 SERPL-SCNC: 22 MMOL/L (ref 22–29)
CREAT SERPL-MCNC: 1.3 MG/DL (ref 0.7–1.2)
CREAT SERPL-MCNC: 1.4 MG/DL (ref 0.7–1.2)
CREAT SERPL-MCNC: 1.5 MG/DL (ref 0.7–1.2)
CREAT SERPL-MCNC: 1.5 MG/DL (ref 0.7–1.2)
ERYTHROCYTE [DISTWIDTH] IN BLOOD BY AUTOMATED COUNT: 14.2 FL (ref 11.5–15)
FOLATE SERPL-MCNC: 16.1 NG/ML (ref 4.8–24.2)
GLUCOSE SERPL-MCNC: 141 MG/DL (ref 74–99)
GLUCOSE SERPL-MCNC: 154 MG/DL (ref 74–99)
GLUCOSE SERPL-MCNC: 160 MG/DL (ref 74–99)
GLUCOSE SERPL-MCNC: 165 MG/DL (ref 74–99)
GLUCOSE SERPL-MCNC: 175 MG/DL (ref 74–99)
GLUCOSE SERPL-MCNC: 176 MG/DL (ref 74–99)
HCT VFR BLD AUTO: 30.8 % (ref 37–54)
HGB BLD-MCNC: 9.7 G/DL (ref 12.5–16.5)
MAGNESIUM SERPL-MCNC: 1.8 MG/DL (ref 1.6–2.6)
MCH RBC QN AUTO: 32.8 PG (ref 26–35)
MCHC RBC AUTO-ENTMCNC: 31.5 % (ref 32–34.5)
MCV RBC AUTO: 104.1 FL (ref 80–99.9)
PHOSPHATE SERPL-MCNC: 1.9 MG/DL (ref 2.5–4.5)
PLATELET # BLD AUTO: 140 E9/L (ref 130–450)
PMV BLD AUTO: 9.7 FL (ref 7–12)
POTASSIUM SERPL-SCNC: 4.8 MMOL/L (ref 3.5–5)
POTASSIUM SERPL-SCNC: 4.9 MMOL/L (ref 3.5–5)
POTASSIUM SERPL-SCNC: 5 MMOL/L (ref 3.5–5)
POTASSIUM SERPL-SCNC: 5.1 MMOL/L (ref 3.5–5)
POTASSIUM SERPL-SCNC: 5.1 MMOL/L (ref 3.5–5)
POTASSIUM SERPL-SCNC: 5.5 MMOL/L (ref 3.5–5)
RBC # BLD AUTO: 2.96 E12/L (ref 3.8–5.8)
SODIUM SERPL-SCNC: 128 MMOL/L (ref 132–146)
SODIUM SERPL-SCNC: 129 MMOL/L (ref 132–146)
SODIUM SERPL-SCNC: 130 MMOL/L (ref 132–146)
SODIUM SERPL-SCNC: 132 MMOL/L (ref 132–146)
VIT B12 SERPL-MCNC: 640 PG/ML (ref 211–946)
WBC # BLD: 8.3 E9/L (ref 4.5–11.5)

## 2023-04-26 PROCEDURE — 94640 AIRWAY INHALATION TREATMENT: CPT

## 2023-04-26 PROCEDURE — 84100 ASSAY OF PHOSPHORUS: CPT

## 2023-04-26 PROCEDURE — 6370000000 HC RX 637 (ALT 250 FOR IP)

## 2023-04-26 PROCEDURE — 82607 VITAMIN B-12: CPT

## 2023-04-26 PROCEDURE — 85027 COMPLETE CBC AUTOMATED: CPT

## 2023-04-26 PROCEDURE — 2580000003 HC RX 258: Performed by: NURSE PRACTITIONER

## 2023-04-26 PROCEDURE — 97530 THERAPEUTIC ACTIVITIES: CPT | Performed by: PHYSICAL THERAPIST

## 2023-04-26 PROCEDURE — 97535 SELF CARE MNGMENT TRAINING: CPT

## 2023-04-26 PROCEDURE — 6370000000 HC RX 637 (ALT 250 FOR IP): Performed by: INTERNAL MEDICINE

## 2023-04-26 PROCEDURE — 99233 SBSQ HOSP IP/OBS HIGH 50: CPT | Performed by: INTERNAL MEDICINE

## 2023-04-26 PROCEDURE — 2580000003 HC RX 258

## 2023-04-26 PROCEDURE — 2060000000 HC ICU INTERMEDIATE R&B

## 2023-04-26 PROCEDURE — 82746 ASSAY OF FOLIC ACID SERUM: CPT

## 2023-04-26 PROCEDURE — 2500000003 HC RX 250 WO HCPCS: Performed by: NURSE PRACTITIONER

## 2023-04-26 PROCEDURE — 6360000002 HC RX W HCPCS: Performed by: INTERNAL MEDICINE

## 2023-04-26 PROCEDURE — 80048 BASIC METABOLIC PNL TOTAL CA: CPT

## 2023-04-26 PROCEDURE — 36415 COLL VENOUS BLD VENIPUNCTURE: CPT

## 2023-04-26 PROCEDURE — 97165 OT EVAL LOW COMPLEX 30 MIN: CPT

## 2023-04-26 PROCEDURE — 97530 THERAPEUTIC ACTIVITIES: CPT

## 2023-04-26 PROCEDURE — 83735 ASSAY OF MAGNESIUM: CPT

## 2023-04-26 RX ADMIN — PHENOBARBITAL 64.8 MG: 32.4 TABLET ORAL at 15:47

## 2023-04-26 RX ADMIN — IPRATROPIUM BROMIDE AND ALBUTEROL SULFATE 1 AMPULE: .5; 2.5 SOLUTION RESPIRATORY (INHALATION) at 14:07

## 2023-04-26 RX ADMIN — LORAZEPAM 1 MG: 1 TABLET ORAL at 12:03

## 2023-04-26 RX ADMIN — IPRATROPIUM BROMIDE AND ALBUTEROL SULFATE 1 AMPULE: .5; 2.5 SOLUTION RESPIRATORY (INHALATION) at 07:09

## 2023-04-26 RX ADMIN — LORAZEPAM 1 MG: 1 TABLET ORAL at 10:04

## 2023-04-26 RX ADMIN — THIAMINE HYDROCHLORIDE 100 MG: 100 INJECTION, SOLUTION INTRAMUSCULAR; INTRAVENOUS at 08:20

## 2023-04-26 RX ADMIN — HYDROCODONE BITARTRATE AND ACETAMINOPHEN 1 TABLET: 5; 325 TABLET ORAL at 18:42

## 2023-04-26 RX ADMIN — PHENOBARBITAL 64.8 MG: 32.4 TABLET ORAL at 03:41

## 2023-04-26 RX ADMIN — BUDESONIDE 500 MCG: 0.5 SUSPENSION RESPIRATORY (INHALATION) at 18:52

## 2023-04-26 RX ADMIN — PANTOPRAZOLE SODIUM 40 MG: 40 TABLET, DELAYED RELEASE ORAL at 06:40

## 2023-04-26 RX ADMIN — ENOXAPARIN SODIUM 30 MG: 100 INJECTION SUBCUTANEOUS at 08:20

## 2023-04-26 RX ADMIN — ENOXAPARIN SODIUM 30 MG: 100 INJECTION SUBCUTANEOUS at 22:22

## 2023-04-26 RX ADMIN — SODIUM PHOSPHATE, MONOBASIC, MONOHYDRATE AND SODIUM PHOSPHATE, DIBASIC, ANHYDROUS 30 MMOL: 142; 276 INJECTION, SOLUTION INTRAVENOUS at 13:12

## 2023-04-26 RX ADMIN — ISOSORBIDE DINITRATE 10 MG: 10 TABLET ORAL at 08:19

## 2023-04-26 RX ADMIN — HYDROCODONE BITARTRATE AND ACETAMINOPHEN 1 TABLET: 5; 325 TABLET ORAL at 02:01

## 2023-04-26 RX ADMIN — METOPROLOL TARTRATE 75 MG: 25 TABLET, FILM COATED ORAL at 22:23

## 2023-04-26 RX ADMIN — ISOSORBIDE DINITRATE 10 MG: 10 TABLET ORAL at 22:26

## 2023-04-26 RX ADMIN — IPRATROPIUM BROMIDE AND ALBUTEROL SULFATE 1 AMPULE: .5; 2.5 SOLUTION RESPIRATORY (INHALATION) at 18:52

## 2023-04-26 RX ADMIN — PREDNISONE 30 MG: 20 TABLET ORAL at 08:19

## 2023-04-26 RX ADMIN — BUDESONIDE 500 MCG: 0.5 SUSPENSION RESPIRATORY (INHALATION) at 07:09

## 2023-04-26 RX ADMIN — LORAZEPAM 2 MG: 1 TABLET ORAL at 00:44

## 2023-04-26 RX ADMIN — SODIUM CHLORIDE, PRESERVATIVE FREE 10 ML: 5 INJECTION INTRAVENOUS at 08:21

## 2023-04-26 RX ADMIN — FOLIC ACID 1 MG: 1 TABLET ORAL at 08:19

## 2023-04-26 RX ADMIN — AMLODIPINE BESYLATE 5 MG: 5 TABLET ORAL at 08:20

## 2023-04-26 RX ADMIN — ASPIRIN 81 MG 81 MG: 81 TABLET ORAL at 08:19

## 2023-04-26 RX ADMIN — PHENOBARBITAL 64.8 MG: 32.4 TABLET ORAL at 08:19

## 2023-04-26 RX ADMIN — PHENOBARBITAL 32.4 MG: 32.4 TABLET ORAL at 22:41

## 2023-04-26 RX ADMIN — IPRATROPIUM BROMIDE AND ALBUTEROL SULFATE 1 AMPULE: .5; 2.5 SOLUTION RESPIRATORY (INHALATION) at 09:59

## 2023-04-26 RX ADMIN — ROSUVASTATIN CALCIUM 10 MG: 10 TABLET, FILM COATED ORAL at 08:20

## 2023-04-26 RX ADMIN — ESCITALOPRAM OXALATE 10 MG: 10 TABLET ORAL at 08:20

## 2023-04-26 RX ADMIN — SODIUM CHLORIDE, PRESERVATIVE FREE 10 ML: 5 INJECTION INTRAVENOUS at 22:22

## 2023-04-26 RX ADMIN — HYDROCODONE BITARTRATE AND ACETAMINOPHEN 1 TABLET: 5; 325 TABLET ORAL at 09:17

## 2023-04-26 RX ADMIN — METOPROLOL TARTRATE 75 MG: 25 TABLET, FILM COATED ORAL at 08:20

## 2023-04-26 RX ADMIN — LORAZEPAM 2 MG: 1 TABLET ORAL at 22:25

## 2023-04-26 ASSESSMENT — PAIN DESCRIPTION - DESCRIPTORS
DESCRIPTORS: ACHING
DESCRIPTORS: ACHING

## 2023-04-26 ASSESSMENT — PAIN DESCRIPTION - DIRECTION: RADIATING_TOWARDS: DENIES

## 2023-04-26 ASSESSMENT — PAIN SCALES - GENERAL
PAINLEVEL_OUTOF10: 7
PAINLEVEL_OUTOF10: 0
PAINLEVEL_OUTOF10: 2

## 2023-04-26 ASSESSMENT — PAIN DESCRIPTION - LOCATION
LOCATION: BACK
LOCATION: BACK

## 2023-04-26 ASSESSMENT — PAIN DESCRIPTION - ONSET: ONSET: ON-GOING

## 2023-04-26 ASSESSMENT — PAIN - FUNCTIONAL ASSESSMENT: PAIN_FUNCTIONAL_ASSESSMENT: ACTIVITIES ARE NOT PREVENTED

## 2023-04-26 ASSESSMENT — PAIN DESCRIPTION - PAIN TYPE: TYPE: CHRONIC PAIN;ACUTE PAIN

## 2023-04-26 ASSESSMENT — PAIN DESCRIPTION - FREQUENCY: FREQUENCY: CONTINUOUS

## 2023-04-26 ASSESSMENT — PAIN DESCRIPTION - ORIENTATION: ORIENTATION: LOWER

## 2023-04-27 LAB
ANION GAP SERPL CALCULATED.3IONS-SCNC: 10 MMOL/L (ref 7–16)
ANION GAP SERPL CALCULATED.3IONS-SCNC: 11 MMOL/L (ref 7–16)
ANION GAP SERPL CALCULATED.3IONS-SCNC: 11 MMOL/L (ref 7–16)
BUN SERPL-MCNC: 33 MG/DL (ref 6–20)
BUN SERPL-MCNC: 36 MG/DL (ref 6–20)
BUN SERPL-MCNC: 38 MG/DL (ref 6–20)
CALCIUM SERPL-MCNC: 9 MG/DL (ref 8.6–10.2)
CALCIUM SERPL-MCNC: 9.1 MG/DL (ref 8.6–10.2)
CALCIUM SERPL-MCNC: 9.2 MG/DL (ref 8.6–10.2)
CHLORIDE SERPL-SCNC: 100 MMOL/L (ref 98–107)
CHLORIDE SERPL-SCNC: 100 MMOL/L (ref 98–107)
CHLORIDE SERPL-SCNC: 99 MMOL/L (ref 98–107)
CO2 SERPL-SCNC: 19 MMOL/L (ref 22–29)
CO2 SERPL-SCNC: 21 MMOL/L (ref 22–29)
CO2 SERPL-SCNC: 23 MMOL/L (ref 22–29)
CREAT SERPL-MCNC: 1.1 MG/DL (ref 0.7–1.2)
CREAT SERPL-MCNC: 1.2 MG/DL (ref 0.7–1.2)
CREAT SERPL-MCNC: 1.4 MG/DL (ref 0.7–1.2)
ERYTHROCYTE [DISTWIDTH] IN BLOOD BY AUTOMATED COUNT: 14.3 FL (ref 11.5–15)
GLUCOSE SERPL-MCNC: 105 MG/DL (ref 74–99)
GLUCOSE SERPL-MCNC: 134 MG/DL (ref 74–99)
GLUCOSE SERPL-MCNC: 154 MG/DL (ref 74–99)
HCT VFR BLD AUTO: 30.7 % (ref 37–54)
HGB BLD-MCNC: 9.8 G/DL (ref 12.5–16.5)
MAGNESIUM SERPL-MCNC: 1.7 MG/DL (ref 1.6–2.6)
MCH RBC QN AUTO: 33.3 PG (ref 26–35)
MCHC RBC AUTO-ENTMCNC: 31.9 % (ref 32–34.5)
MCV RBC AUTO: 104.4 FL (ref 80–99.9)
PHOSPHATE SERPL-MCNC: 1.7 MG/DL (ref 2.5–4.5)
PLATELET # BLD AUTO: 157 E9/L (ref 130–450)
PMV BLD AUTO: 9.7 FL (ref 7–12)
POTASSIUM SERPL-SCNC: 4 MMOL/L (ref 3.5–5)
POTASSIUM SERPL-SCNC: 4.5 MMOL/L (ref 3.5–5)
POTASSIUM SERPL-SCNC: 5.2 MMOL/L (ref 3.5–5)
RBC # BLD AUTO: 2.94 E12/L (ref 3.8–5.8)
SODIUM SERPL-SCNC: 130 MMOL/L (ref 132–146)
SODIUM SERPL-SCNC: 132 MMOL/L (ref 132–146)
SODIUM SERPL-SCNC: 132 MMOL/L (ref 132–146)
WBC # BLD: 7.9 E9/L (ref 4.5–11.5)

## 2023-04-27 PROCEDURE — 2500000003 HC RX 250 WO HCPCS: Performed by: NURSE PRACTITIONER

## 2023-04-27 PROCEDURE — 36415 COLL VENOUS BLD VENIPUNCTURE: CPT

## 2023-04-27 PROCEDURE — 84100 ASSAY OF PHOSPHORUS: CPT

## 2023-04-27 PROCEDURE — 94640 AIRWAY INHALATION TREATMENT: CPT

## 2023-04-27 PROCEDURE — 85027 COMPLETE CBC AUTOMATED: CPT

## 2023-04-27 PROCEDURE — 6370000000 HC RX 637 (ALT 250 FOR IP): Performed by: INTERNAL MEDICINE

## 2023-04-27 PROCEDURE — 2060000000 HC ICU INTERMEDIATE R&B

## 2023-04-27 PROCEDURE — 99232 SBSQ HOSP IP/OBS MODERATE 35: CPT | Performed by: INTERNAL MEDICINE

## 2023-04-27 PROCEDURE — 94669 MECHANICAL CHEST WALL OSCILL: CPT

## 2023-04-27 PROCEDURE — 6360000002 HC RX W HCPCS: Performed by: INTERNAL MEDICINE

## 2023-04-27 PROCEDURE — 80048 BASIC METABOLIC PNL TOTAL CA: CPT

## 2023-04-27 PROCEDURE — 83735 ASSAY OF MAGNESIUM: CPT

## 2023-04-27 PROCEDURE — 2580000003 HC RX 258

## 2023-04-27 PROCEDURE — 6370000000 HC RX 637 (ALT 250 FOR IP)

## 2023-04-27 PROCEDURE — 2580000003 HC RX 258: Performed by: NURSE PRACTITIONER

## 2023-04-27 RX ORDER — METOPROLOL TARTRATE 50 MG/1
100 TABLET, FILM COATED ORAL 2 TIMES DAILY
Status: DISCONTINUED | OUTPATIENT
Start: 2023-04-27 | End: 2023-04-28 | Stop reason: HOSPADM

## 2023-04-27 RX ADMIN — ENOXAPARIN SODIUM 30 MG: 100 INJECTION SUBCUTANEOUS at 07:39

## 2023-04-27 RX ADMIN — IPRATROPIUM BROMIDE AND ALBUTEROL SULFATE 1 AMPULE: .5; 2.5 SOLUTION RESPIRATORY (INHALATION) at 14:39

## 2023-04-27 RX ADMIN — ACETAMINOPHEN 650 MG: 325 TABLET ORAL at 11:28

## 2023-04-27 RX ADMIN — AMLODIPINE BESYLATE 5 MG: 5 TABLET ORAL at 07:39

## 2023-04-27 RX ADMIN — ISOSORBIDE DINITRATE 10 MG: 10 TABLET ORAL at 20:52

## 2023-04-27 RX ADMIN — LORAZEPAM 1 MG: 1 TABLET ORAL at 18:29

## 2023-04-27 RX ADMIN — THIAMINE HYDROCHLORIDE 100 MG: 100 INJECTION, SOLUTION INTRAMUSCULAR; INTRAVENOUS at 07:39

## 2023-04-27 RX ADMIN — LORAZEPAM 1 MG: 1 TABLET ORAL at 00:52

## 2023-04-27 RX ADMIN — LORAZEPAM 1 MG: 1 TABLET ORAL at 14:43

## 2023-04-27 RX ADMIN — ROSUVASTATIN CALCIUM 10 MG: 10 TABLET, FILM COATED ORAL at 07:39

## 2023-04-27 RX ADMIN — METOPROLOL TARTRATE 100 MG: 50 TABLET ORAL at 20:51

## 2023-04-27 RX ADMIN — IPRATROPIUM BROMIDE AND ALBUTEROL SULFATE 1 AMPULE: .5; 2.5 SOLUTION RESPIRATORY (INHALATION) at 09:25

## 2023-04-27 RX ADMIN — ESCITALOPRAM OXALATE 10 MG: 10 TABLET ORAL at 07:39

## 2023-04-27 RX ADMIN — SODIUM PHOSPHATE, MONOBASIC, MONOHYDRATE AND SODIUM PHOSPHATE, DIBASIC, ANHYDROUS 30 MMOL: 142; 276 INJECTION, SOLUTION INTRAVENOUS at 11:25

## 2023-04-27 RX ADMIN — ISOSORBIDE DINITRATE 10 MG: 10 TABLET ORAL at 07:39

## 2023-04-27 RX ADMIN — SODIUM CHLORIDE, PRESERVATIVE FREE 10 ML: 5 INJECTION INTRAVENOUS at 20:54

## 2023-04-27 RX ADMIN — BUDESONIDE 500 MCG: 0.5 SUSPENSION RESPIRATORY (INHALATION) at 18:19

## 2023-04-27 RX ADMIN — LORAZEPAM 2 MG: 1 TABLET ORAL at 09:38

## 2023-04-27 RX ADMIN — IPRATROPIUM BROMIDE AND ALBUTEROL SULFATE 1 AMPULE: .5; 2.5 SOLUTION RESPIRATORY (INHALATION) at 06:23

## 2023-04-27 RX ADMIN — ENOXAPARIN SODIUM 30 MG: 100 INJECTION SUBCUTANEOUS at 20:53

## 2023-04-27 RX ADMIN — PHENOBARBITAL 32.4 MG: 32.4 TABLET ORAL at 12:42

## 2023-04-27 RX ADMIN — PREDNISONE 30 MG: 20 TABLET ORAL at 07:39

## 2023-04-27 RX ADMIN — HYDROCODONE BITARTRATE AND ACETAMINOPHEN 1 TABLET: 5; 325 TABLET ORAL at 14:43

## 2023-04-27 RX ADMIN — LORAZEPAM 1 MG: 1 TABLET ORAL at 23:26

## 2023-04-27 RX ADMIN — SODIUM CHLORIDE, PRESERVATIVE FREE 10 ML: 5 INJECTION INTRAVENOUS at 07:40

## 2023-04-27 RX ADMIN — ASPIRIN 81 MG 81 MG: 81 TABLET ORAL at 07:39

## 2023-04-27 RX ADMIN — HYDROCODONE BITARTRATE AND ACETAMINOPHEN 1 TABLET: 5; 325 TABLET ORAL at 20:52

## 2023-04-27 RX ADMIN — LORAZEPAM 1 MG: 1 TABLET ORAL at 04:49

## 2023-04-27 RX ADMIN — HYDROCODONE BITARTRATE AND ACETAMINOPHEN 1 TABLET: 5; 325 TABLET ORAL at 00:48

## 2023-04-27 RX ADMIN — PANTOPRAZOLE SODIUM 40 MG: 40 TABLET, DELAYED RELEASE ORAL at 06:36

## 2023-04-27 RX ADMIN — HYDROCODONE BITARTRATE AND ACETAMINOPHEN 1 TABLET: 5; 325 TABLET ORAL at 07:38

## 2023-04-27 RX ADMIN — METOPROLOL TARTRATE 75 MG: 25 TABLET, FILM COATED ORAL at 07:39

## 2023-04-27 RX ADMIN — BUDESONIDE 500 MCG: 0.5 SUSPENSION RESPIRATORY (INHALATION) at 06:23

## 2023-04-27 RX ADMIN — FOLIC ACID 1 MG: 1 TABLET ORAL at 07:39

## 2023-04-27 RX ADMIN — PHENOBARBITAL 32.4 MG: 32.4 TABLET ORAL at 04:49

## 2023-04-27 RX ADMIN — IPRATROPIUM BROMIDE AND ALBUTEROL SULFATE 1 AMPULE: .5; 2.5 SOLUTION RESPIRATORY (INHALATION) at 18:18

## 2023-04-27 ASSESSMENT — PAIN SCALES - GENERAL
PAINLEVEL_OUTOF10: 3
PAINLEVEL_OUTOF10: 10

## 2023-04-28 VITALS
HEIGHT: 72 IN | SYSTOLIC BLOOD PRESSURE: 154 MMHG | RESPIRATION RATE: 20 BRPM | OXYGEN SATURATION: 98 % | BODY MASS INDEX: 37.11 KG/M2 | DIASTOLIC BLOOD PRESSURE: 82 MMHG | TEMPERATURE: 98.5 F | WEIGHT: 274 LBS | HEART RATE: 82 BPM

## 2023-04-28 LAB
ANION GAP SERPL CALCULATED.3IONS-SCNC: 12 MMOL/L (ref 7–16)
BUN SERPL-MCNC: 27 MG/DL (ref 6–20)
CALCIUM SERPL-MCNC: 9.3 MG/DL (ref 8.6–10.2)
CHLORIDE SERPL-SCNC: 97 MMOL/L (ref 98–107)
CO2 SERPL-SCNC: 22 MMOL/L (ref 22–29)
CREAT SERPL-MCNC: 1 MG/DL (ref 0.7–1.2)
ERYTHROCYTE [DISTWIDTH] IN BLOOD BY AUTOMATED COUNT: 14.5 FL (ref 11.5–15)
GLUCOSE SERPL-MCNC: 103 MG/DL (ref 74–99)
HCT VFR BLD AUTO: 31.9 % (ref 37–54)
HGB BLD-MCNC: 10.3 G/DL (ref 12.5–16.5)
MAGNESIUM SERPL-MCNC: 1.6 MG/DL (ref 1.6–2.6)
MCH RBC QN AUTO: 33.1 PG (ref 26–35)
MCHC RBC AUTO-ENTMCNC: 32.3 % (ref 32–34.5)
MCV RBC AUTO: 102.6 FL (ref 80–99.9)
PHOSPHATE SERPL-MCNC: 2.7 MG/DL (ref 2.5–4.5)
PLATELET # BLD AUTO: 161 E9/L (ref 130–450)
PMV BLD AUTO: 9.6 FL (ref 7–12)
POTASSIUM SERPL-SCNC: 4.1 MMOL/L (ref 3.5–5)
PTH-INTACT SERPL-MCNC: 28 PG/ML (ref 15–65)
RBC # BLD AUTO: 3.11 E12/L (ref 3.8–5.8)
SODIUM SERPL-SCNC: 131 MMOL/L (ref 132–146)
VITAMIN D 25-HYDROXY: 10 NG/ML (ref 30–100)
WBC # BLD: 7.8 E9/L (ref 4.5–11.5)

## 2023-04-28 PROCEDURE — 6360000002 HC RX W HCPCS: Performed by: INTERNAL MEDICINE

## 2023-04-28 PROCEDURE — 82306 VITAMIN D 25 HYDROXY: CPT

## 2023-04-28 PROCEDURE — 6370000000 HC RX 637 (ALT 250 FOR IP)

## 2023-04-28 PROCEDURE — 99239 HOSP IP/OBS DSCHRG MGMT >30: CPT | Performed by: INTERNAL MEDICINE

## 2023-04-28 PROCEDURE — 6370000000 HC RX 637 (ALT 250 FOR IP): Performed by: INTERNAL MEDICINE

## 2023-04-28 PROCEDURE — 94640 AIRWAY INHALATION TREATMENT: CPT

## 2023-04-28 PROCEDURE — 2580000003 HC RX 258

## 2023-04-28 PROCEDURE — 83735 ASSAY OF MAGNESIUM: CPT

## 2023-04-28 PROCEDURE — 83970 ASSAY OF PARATHORMONE: CPT

## 2023-04-28 PROCEDURE — 85027 COMPLETE CBC AUTOMATED: CPT

## 2023-04-28 PROCEDURE — 84100 ASSAY OF PHOSPHORUS: CPT

## 2023-04-28 PROCEDURE — 80048 BASIC METABOLIC PNL TOTAL CA: CPT

## 2023-04-28 PROCEDURE — 36415 COLL VENOUS BLD VENIPUNCTURE: CPT

## 2023-04-28 RX ORDER — PREDNISONE 10 MG/1
TABLET ORAL
Qty: 19 TABLET | Refills: 0 | Status: SHIPPED | OUTPATIENT
Start: 2023-04-28 | End: 2023-05-09

## 2023-04-28 RX ORDER — ALBUTEROL SULFATE 90 UG/1
2 AEROSOL, METERED RESPIRATORY (INHALATION) 4 TIMES DAILY PRN
Qty: 18 G | Refills: 0 | Status: SHIPPED | OUTPATIENT
Start: 2023-04-28

## 2023-04-28 RX ORDER — AMLODIPINE BESYLATE 5 MG/1
5 TABLET ORAL DAILY
Qty: 30 TABLET | Refills: 0 | Status: SHIPPED | OUTPATIENT
Start: 2023-04-28

## 2023-04-28 RX ORDER — THIAMINE MONONITRATE (VIT B1) 100 MG
100 TABLET ORAL DAILY
Qty: 30 TABLET | Refills: 0 | COMMUNITY
Start: 2023-04-28

## 2023-04-28 RX ORDER — METOPROLOL TARTRATE 75 MG/1
75 TABLET, FILM COATED ORAL 2 TIMES DAILY
Qty: 60 TABLET | Refills: 0 | Status: SHIPPED | OUTPATIENT
Start: 2023-04-28

## 2023-04-28 RX ORDER — TIOTROPIUM BROMIDE 18 UG/1
18 CAPSULE ORAL; RESPIRATORY (INHALATION) DAILY
Qty: 30 CAPSULE | Refills: 3 | Status: SHIPPED | OUTPATIENT
Start: 2023-04-28

## 2023-04-28 RX ORDER — NICOTINE 21 MG/24HR
1 PATCH, TRANSDERMAL 24 HOURS TRANSDERMAL DAILY
Qty: 30 PATCH | Refills: 3 | COMMUNITY
Start: 2023-04-28

## 2023-04-28 RX ORDER — FLUTICASONE PROPIONATE AND SALMETEROL 250; 50 UG/1; UG/1
1 POWDER RESPIRATORY (INHALATION) EVERY 12 HOURS
Qty: 60 EACH | Refills: 1 | Status: SHIPPED | OUTPATIENT
Start: 2023-04-28

## 2023-04-28 RX ORDER — ISOSORBIDE DINITRATE 10 MG/1
10 TABLET ORAL 2 TIMES DAILY
Qty: 60 TABLET | Refills: 0 | Status: SHIPPED | OUTPATIENT
Start: 2023-04-28

## 2023-04-28 RX ADMIN — FOLIC ACID 1 MG: 1 TABLET ORAL at 09:16

## 2023-04-28 RX ADMIN — PANTOPRAZOLE SODIUM 40 MG: 40 TABLET, DELAYED RELEASE ORAL at 05:25

## 2023-04-28 RX ADMIN — SODIUM CHLORIDE, PRESERVATIVE FREE 10 ML: 5 INJECTION INTRAVENOUS at 09:17

## 2023-04-28 RX ADMIN — ISOSORBIDE DINITRATE 10 MG: 10 TABLET ORAL at 09:17

## 2023-04-28 RX ADMIN — LORAZEPAM 1 MG: 1 TABLET ORAL at 02:52

## 2023-04-28 RX ADMIN — ENOXAPARIN SODIUM 30 MG: 100 INJECTION SUBCUTANEOUS at 09:15

## 2023-04-28 RX ADMIN — Medication 100 MG: at 09:17

## 2023-04-28 RX ADMIN — BUDESONIDE 500 MCG: 0.5 SUSPENSION RESPIRATORY (INHALATION) at 05:01

## 2023-04-28 RX ADMIN — ROSUVASTATIN CALCIUM 10 MG: 10 TABLET, FILM COATED ORAL at 09:17

## 2023-04-28 RX ADMIN — PHENOBARBITAL 16.2 MG: 16.2 TABLET ORAL at 09:25

## 2023-04-28 RX ADMIN — PREDNISONE 30 MG: 20 TABLET ORAL at 09:16

## 2023-04-28 RX ADMIN — AMLODIPINE BESYLATE 5 MG: 5 TABLET ORAL at 09:16

## 2023-04-28 RX ADMIN — ACETAMINOPHEN 650 MG: 325 TABLET ORAL at 09:25

## 2023-04-28 RX ADMIN — METOPROLOL TARTRATE 100 MG: 50 TABLET ORAL at 09:16

## 2023-04-28 RX ADMIN — ESCITALOPRAM OXALATE 10 MG: 10 TABLET ORAL at 09:16

## 2023-04-28 RX ADMIN — HYDROCODONE BITARTRATE AND ACETAMINOPHEN 1 TABLET: 5; 325 TABLET ORAL at 12:03

## 2023-04-28 RX ADMIN — IPRATROPIUM BROMIDE AND ALBUTEROL SULFATE 1 AMPULE: .5; 2.5 SOLUTION RESPIRATORY (INHALATION) at 05:00

## 2023-04-28 RX ADMIN — ASPIRIN 81 MG 81 MG: 81 TABLET ORAL at 09:17

## 2023-04-28 RX ADMIN — HYDROCODONE BITARTRATE AND ACETAMINOPHEN 1 TABLET: 5; 325 TABLET ORAL at 05:26

## 2023-04-28 RX ADMIN — IPRATROPIUM BROMIDE AND ALBUTEROL SULFATE 1 AMPULE: .5; 2.5 SOLUTION RESPIRATORY (INHALATION) at 09:19

## 2023-04-28 ASSESSMENT — PAIN DESCRIPTION - LOCATION
LOCATION: BACK
LOCATION: BACK;GENERALIZED
LOCATION: BACK

## 2023-04-28 ASSESSMENT — PAIN DESCRIPTION - ORIENTATION
ORIENTATION: LOWER
ORIENTATION: LOWER

## 2023-04-28 ASSESSMENT — PAIN SCALES - WONG BAKER: WONGBAKER_NUMERICALRESPONSE: 0

## 2023-04-28 ASSESSMENT — PAIN - FUNCTIONAL ASSESSMENT
PAIN_FUNCTIONAL_ASSESSMENT: ACTIVITIES ARE NOT PREVENTED
PAIN_FUNCTIONAL_ASSESSMENT: ACTIVITIES ARE NOT PREVENTED

## 2023-04-28 ASSESSMENT — PAIN DESCRIPTION - PAIN TYPE
TYPE: CHRONIC PAIN
TYPE: CHRONIC PAIN

## 2023-04-28 ASSESSMENT — PAIN DESCRIPTION - FREQUENCY
FREQUENCY: CONTINUOUS
FREQUENCY: CONTINUOUS

## 2023-04-28 ASSESSMENT — PAIN DESCRIPTION - DESCRIPTORS
DESCRIPTORS: ACHING
DESCRIPTORS: CRAMPING;ACHING;SORE
DESCRIPTORS: ACHING

## 2023-04-28 ASSESSMENT — PAIN SCALES - GENERAL
PAINLEVEL_OUTOF10: 4
PAINLEVEL_OUTOF10: 2
PAINLEVEL_OUTOF10: 8
PAINLEVEL_OUTOF10: 3
PAINLEVEL_OUTOF10: 7

## 2023-04-28 NOTE — CARE COORDINATION
4-28-CM note: pt planning on home today, he has information on seeking assistance with chores at home. Pt denies needs for dc including home care. Family will provide transport home.  Electronically signed by Jarvis Garcia RN on 4/28/2023 at 10:25 AM

## 2023-04-28 NOTE — PROGRESS NOTES
[address] [place], [place] [address]ist   Progress Note    Admitting Date and Time: 4/24/2023 12:17 PM  Admit Dx: Acute hyponatremia [E87.1]  Hyponatremia [E87.1]  Subacute cough [R05.2]  Alcohol withdrawal syndrome without complication (HCC) [V85.388]  Nausea and vomiting, unspecified vomiting type [R11.2]    Subjective/interval history:    4/25: Pt admitted yesterday with acute hyponatremia and alcohol withdrawal syndrome. Today he is sleeping but awakens to voice, denies any complaints. He is on phenobarbital taper, as needed Ativan for severe alcohol withdrawal.    4/26: Patient is awake and alert, states he feels much better than when he came in complaining of chest pain this morning. Reproducible on palpation. Tremor improving. Sodium correcting at appropriate rate.     ROS: denies fever, chills, cp, sob, n/v, HA unless stated above.     sodium phosphate IVPB  30 mmol IntraVENous Once    predniSONE  30 mg Oral Daily    sodium chloride flush  5-40 mL IntraVENous 2 times per day    nicotine  1 patch TransDERmal Daily    budesonide  0.5 mg Nebulization BID    ipratropium-albuterol  1 ampule Inhalation Q4H WA    amLODIPine  5 mg Oral Daily    aspirin  81 mg Oral Daily    folic acid  1 mg Oral Daily    isosorbide dinitrate  10 mg Oral BID    metoprolol tartrate  75 mg Oral BID    pantoprazole  40 mg Oral QAM AC    rosuvastatin  10 mg Oral Daily    enoxaparin  30 mg SubCUTAneous BID    PHENobarbital  64.8 mg Oral Q6H    Followed by    PHENobarbital  32.4 mg Oral q8h    Followed by    Ellie Mac ON 4/27/2023] PHENobarbital  16.2 mg Oral BID    escitalopram  10 mg Oral Daily    thiamine  100 mg IntraMUSCular Daily    Followed by    Ellie Mac ON 4/28/2023] thiamine mononitrate  100 mg Oral Daily     HYDROcodone 5 mg - acetaminophen, 1 tablet, Q6H PRN  sodium chloride flush, 5-40 mL, PRN  sodium chloride, , PRN  ipratropium-albuterol, 1 ampule, Q4H PRN  sodium chloride flush, 5-40 mL, PRN  sodium chloride, 25 mL,
[address] [place], [place] [address]ist   Progress Note    Admitting Date and Time: 4/24/2023 12:17 PM  Admit Dx: Acute hyponatremia [E87.1]  Hyponatremia [E87.1]  Subacute cough [R05.2]  Alcohol withdrawal syndrome without complication (HCC) [P46.782]  Nausea and vomiting, unspecified vomiting type [R11.2]    Subjective/interval history:    4/25: Pt admitted yesterday with acute hyponatremia and alcohol withdrawal syndrome. Today he is sleeping but awakens to voice, denies any complaints. He is on phenobarbital taper, as needed Ativan for severe alcohol withdrawal.    4/26: Patient is awake and alert, states he feels much better than when he came in complaining of chest pain this morning. Reproducible on palpation. Tremor improving. Sodium correcting at appropriate rate. 4/27: Sleeping but awakens easily to voice. Oriented x3. Tremors improving. Doing well with phenobarbital taper. Sodium is now within normal limits, IV fluids discontinued.     ROS: denies fever, chills, cp, sob, n/v, HA unless stated above.     sodium phosphate IVPB  30 mmol IntraVENous Once    predniSONE  30 mg Oral Daily    sodium chloride flush  5-40 mL IntraVENous 2 times per day    nicotine  1 patch TransDERmal Daily    budesonide  0.5 mg Nebulization BID    ipratropium-albuterol  1 ampule Inhalation Q4H WA    amLODIPine  5 mg Oral Daily    aspirin  81 mg Oral Daily    folic acid  1 mg Oral Daily    isosorbide dinitrate  10 mg Oral BID    metoprolol tartrate  75 mg Oral BID    pantoprazole  40 mg Oral QAM AC    rosuvastatin  10 mg Oral Daily    enoxaparin  30 mg SubCUTAneous BID    PHENobarbital  32.4 mg Oral q8h    Followed by    PHENobarbital  16.2 mg Oral BID    escitalopram  10 mg Oral Daily    [START ON 4/28/2023] thiamine mononitrate  100 mg Oral Daily     HYDROcodone 5 mg - acetaminophen, 1 tablet, Q6H PRN  sodium chloride flush, 5-40 mL, PRN  sodium chloride, , PRN  ipratropium-albuterol, 1 ampule, Q4H PRN  sodium chloride
[address] [facility]  [facility] Stephanie Diaz. [place]        Date:2023                                                  Patient Name: Nichol Eugene    MRN: [id number]    : 1965    Room: [id number]      Evaluating OT: Anahi Jones OTR/L #[id number]     Referring Provider and Specific Provider Orders/Date:      23 1000  OT eval and treat  Start:  23 1000,   End:  23 1000,   ONE TIME,   Standing Count:  1 Occurrences,   R         Jeanette Romero, DO      Placement Recommendation: Home with Home Health OT if patient is able to meet goals        Diagnosis:   1. Hyponatremia    2. Nausea and vomiting, unspecified vomiting type    3. Alcohol withdrawal syndrome without complication ([facility]    4. Subacute cough         Surgery: None       Pertinent Medical History:       Past Medical History:   Diagnosis Date    Alcohol abuse     Arthritis     Back pain, chronic     CAD in native artery 2022    COPD (chronic obstructive pulmonary disease) ([facility] [address]     H/O cardiovascular stress test 2022    Lexiscan    Hyperlipidemia     Hypertension     Hyponatremia     Neck pain, chronic     Tobacco abuse          Past Surgical History:   Procedure Laterality Date    CARDIAC CATHETERIZATION  2022    Dr. Terressa Brunner      i & d left ear. EYE SURGERY      Lt. Eye  Dart removed    FRACTURE SURGERY      Rt. Tibia FX Rods & Pins    HEMORRHOID SURGERY      SHOULDER ARTHROSCOPY Right 2015    right shoulder bicep tenodesis subacromail decompression and debridement      Precautions:  Fall Risk, up with assistance, CIWA scale, seizure pads in place     Assessment of current deficits    [x] Functional mobility  [x]ADLs  [x] Strength               []Cognition    [x] Functional transfers   [x] IADLs         [x] Safety Awareness   [x]Endurance    [] Fine Coordination              [x] Balance      []
CLINICAL PHARMACY NOTE: MEDS TO BEDS    Total # of Prescriptions Filled: 5   The following medications were delivered to the patient:  Fluticasone-Salmeterol 250-50 inhaler  Metoprolol Tart 75 mg  Prednisone 10 mg  Albuterol HFA inhaler  Spiriva Handihaler    Additional Documentation:    Amlodipine and Isosorbide were too soon to refill under the patient's insurance.
[facility]  Nephrology Attending Progress Note    SUBJECTIVE:     4/25/23-he is resting in bed in no acute distress. Labs discussed. No visitors at bedside MRI visit. PROBLEM LIST:    Patient Active Problem List   Diagnosis    Biceps rupture, proximal    Shoulder pain    Pectoral muscle rupture    Biceps tendonitis    Shoulder impingement    Rotator cuff tear    Acromioclavicular joint arthritis    Acute chest pain    Chest pain    COPD with acute exacerbation (HCC)    Hyponatremia    Primary hypertension    Hyperlipidemia    Gastroesophageal reflux disease    Tobacco use disorder    Morbid obesity (HCC)    Alcohol withdrawal syndrome with perceptual disturbance (HCC)    Dysphagia    Transaminitis    Shortness of breath    CAD in native artery    Acute kidney injury ([facility] [address]    Syncope    Pre-syncope    Acute hyponatremia    Severe alcohol use disorder ([facility] [address]        PAST MEDICAL HISTORY:    Past Medical History:   Diagnosis Date    Alcohol abuse     Arthritis     Back pain, chronic     CAD in native artery 6/8/2022    COPD (chronic obstructive pulmonary disease) ([facility] [address]     H/O cardiovascular stress test 04/27/2022    Lexiscan    Hyperlipidemia     Hypertension     Hyponatremia     Neck pain, chronic     Tobacco abuse        DIET:    ADULT DIET;  Regular  ADULT ORAL NUTRITION SUPPLEMENT; Breakfast, Lunch, Dinner; Standard High Calorie/High Protein Oral Supplement     PHYSICAL EXAM:     Patient Vitals for the past 24 hrs:   BP Temp Temp src Pulse Resp SpO2 Height Weight   04/25/23 0956 -- -- -- -- -- -- 6' (1.829 m) --   04/25/23 0902 131/83 -- -- 82 -- -- -- --   04/25/23 0731 131/83 97 °F (36.1 °C) Axillary -- -- 93 % -- --   04/25/23 0359 101/81 99.2 °F (37.3 °C) -- 100 20 94 % -- --   04/24/23 2324 (!) 138/91 98.8 °F (37.1 °C) Oral 94 22 93 % -- --   04/24/23 2043 118/70 -- -- 95 -- -- -- --   04/24/23 2000 131/74 99.2 °F (37.3 °C) Oral 100 20 99 % -- --   04/24/23 1858 -- -- -- -- -- -- 6' (1.829 m) 275 lb
[facility]  Nephrology Attending Progress Note    SUBJECTIVE:     4/26/23-patient awake and alert. He was eating lunch at the time of my visit. He denied any acute distress no nausea or vomiting. No diarrhea today    PROBLEM LIST:    Patient Active Problem List   Diagnosis    Biceps rupture, proximal    Shoulder pain    Pectoral muscle rupture    Biceps tendonitis    Shoulder impingement    Rotator cuff tear    Acromioclavicular joint arthritis    Acute chest pain    Chest pain    COPD with acute exacerbation (HCC)    Hyponatremia    Primary hypertension    Hyperlipidemia    Gastroesophageal reflux disease    Tobacco use disorder    Morbid obesity (HCC)    Alcohol withdrawal syndrome with perceptual disturbance (HCC)    Dysphagia    Transaminitis    Shortness of breath    CAD in native artery    Acute kidney injury ([facility] [address]    Syncope    Pre-syncope    Acute hyponatremia    Severe alcohol use disorder ([facility] [address]        PAST MEDICAL HISTORY:    Past Medical History:   Diagnosis Date    Alcohol abuse     Arthritis     Back pain, chronic     CAD in native artery 6/8/2022    COPD (chronic obstructive pulmonary disease) ([facility] [address]     H/O cardiovascular stress test 04/27/2022    Lexiscan    Hyperlipidemia     Hypertension     Hyponatremia     Neck pain, chronic     Tobacco abuse        DIET:    ADULT DIET;  Regular  ADULT ORAL NUTRITION SUPPLEMENT; Breakfast, Lunch, Dinner; Standard High Calorie/High Protein Oral Supplement     PHYSICAL EXAM:     Patient Vitals for the past 24 hrs:   BP Temp Temp src Pulse Resp SpO2   04/26/23 0658 (!) 140/60 97.9 °F (36.6 °C) Axillary 68 15 94 %   04/26/23 0345 105/76 97.9 °F (36.6 °C) Oral 85 16 98 %   04/26/23 0231 -- -- -- -- 16 --   04/26/23 0201 -- -- -- -- 16 --   04/26/23 0031 108/86 97.6 °F (36.4 °C) Oral 75 16 96 %   04/25/23 2139 -- -- -- 80 -- --   04/25/23 2125 96/72 -- -- 83 -- --   04/25/23 1801 110/70 -- -- 82 -- --   04/25/23 1715 (!) 116/96 97.8 °F (36.6 °C) Temporal 83 18
[facility]  Nephrology Attending Progress Note    SUBJECTIVE:     4/28/23-he is for discharge today. Feeling better. PROBLEM LIST:    Patient Active Problem List   Diagnosis    Biceps rupture, proximal    Shoulder pain    Pectoral muscle rupture    Biceps tendonitis    Shoulder impingement    Rotator cuff tear    Acromioclavicular joint arthritis    Acute chest pain    Chest pain    COPD with acute exacerbation (HCC)    Hyponatremia    Primary hypertension    Hyperlipidemia    Gastroesophageal reflux disease    Tobacco use disorder    Morbid obesity (HCC)    Alcohol withdrawal syndrome with perceptual disturbance (HCC)    Dysphagia    Transaminitis    Shortness of breath    CAD in native artery    Acute kidney injury ([address]    Syncope    Pre-syncope    Acute hyponatremia    Severe alcohol use disorder ([address]    Macrocytic anemia        PAST MEDICAL HISTORY:    Past Medical History:   Diagnosis Date    Alcohol abuse     Arthritis     Back pain, chronic     CAD in native artery 6/8/2022    COPD (chronic obstructive pulmonary disease) ([address]     H/O cardiovascular stress test 04/27/2022    Lexiscan    Hyperlipidemia     Hypertension     Hyponatremia     Neck pain, chronic     Tobacco abuse        DIET:    ADULT DIET;  Regular  ADULT ORAL NUTRITION SUPPLEMENT; Breakfast, Lunch, Dinner; Standard High Calorie/High Protein Oral Supplement     PHYSICAL EXAM:     Patient Vitals for the past 24 hrs:   BP Temp Temp src Pulse Resp SpO2   04/28/23 0911 (!) 154/82 98.5 °F (36.9 °C) Oral 82 20 98 %   04/28/23 0556 -- -- -- -- 20 --   04/28/23 0526 -- -- -- -- 20 --   04/28/23 0500 -- -- -- -- -- 98 %   04/28/23 0245 (!) 151/78 -- -- 82 -- --   04/27/23 2320 (!) 151/78 98 °F (36.7 °C) Axillary 82 20 97 %   04/27/23 2122 -- -- -- -- 16 --   04/27/23 2051 (!) 159/81 -- -- 79 -- --   04/27/23 1936 (!) 159/81 97.7 °F (36.5 °C) Oral 79 16 99 %   04/27/23 1831 (!) 142/92 -- -- 81 -- --   04/27/23 1645 (!) 145/95 97.5 °F (36.4
Or  LORazepam, 2 mg, Q1H PRN   Or  LORazepam, 2 mg, Q1H PRN   Or  LORazepam, 3 mg, Q1H PRN   Or  LORazepam, 3 mg, Q1H PRN   Or  LORazepam, 4 mg, Q1H PRN   Or  LORazepam, 4 mg, Q1H PRN         Objective:    /83   Pulse 100   Temp 97 °F (36.1 °C) (Axillary)   Resp 20   Ht 6' (1.829 m)   Wt 275 lb (124.7 kg)   SpO2 93%   BMI 37.30 kg/m²   General Appearance: Sleeping but awakens easily to voice, appears comfortable  Skin: warm and dry with improved turgor  Head: normocephalic and atraumatic  Eyes: pupils equal, round, and reactive to light, extraocular eye movements intact, conjunctivae normal  ENT: oral mucosa moist   Neck: neck supple and non tender without mass   Pulmonary/Chest: Nonlabored on room air. Mildly diminished but otherwise clear to auscultation bilaterally, wheezes have resolved  Cardiovascular: normal rate, normal S1 and S2 and no carotid bruits  Abdomen: soft, non-tender, non-distended, normal bowel sounds, no masses or organomegaly  Extremities: no cyanosis, no clubbing and no edema  Neurologic: no cranial nerve deficit and speech normal      Recent Labs     04/24/23  1918 04/25/23  0146 04/25/23  0533   * 127* 128*   K 4.4 3.9 4.2   CL 89* 92* 92*   CO2 18* 20* 22   BUN 30* 30* 31*   CREATININE 2.0* 2.1* 2.0*   GLUCOSE 116* 124* 115*   CALCIUM 9.7 9.5 9.3       No results for input(s): ALKPHOS, PROT, LABALBU, BILITOT, AST, ALT in the last 72 hours. Recent Labs     04/24/23  1311 04/25/23  0533   WBC 11.1 9.1   RBC 3.13* 3.00*   HGB 10.5* 10.0*   HCT 31.1* 30.6*   MCV 99.4 102.0*   MCH 33.5 33.3   MCHC 33.8 32.7   RDW 14.3 14.6    152   MPV 9.6 9.4       Radiology:   US HEAD NECK SOFT TISSUE THYROID   Final Result   Unremarkable thyroid ultrasound. XR CHEST 1 VIEW   Final Result   No acute process. Mild cardiomegaly.              Assessment and Plan:  Principal Problem:    Acute hyponatremia  Active Problems:    COPD with acute exacerbation ([address]    Primary
**OR** acetaminophen, sodium chloride flush, sodium chloride, LORazepam **OR** LORazepam **OR** LORazepam **OR** LORazepam **OR** LORazepam **OR** LORazepam **OR** LORazepam **OR** LORazepam    DATA:    Recent Labs     04/25/23  0533 04/26/23  0511 04/27/23  0452   WBC 9.1 8.3 7.9   HGB 10.0* 9.7* 9.8*   HCT 30.6* 30.8* 30.7*   .0* 104.1* 104.4*    140 157     Recent Labs     04/24/23  1434 04/24/23  1918 04/26/23  0511 04/26/23  0957 04/27/23  0118 04/27/23  0452 04/27/23  0956   NA  --    < > 130*   < > 130* 132 132   K  --    < > 5.1*   < > 5.2* 4.5 4.0   CL  --    < > 98   < > 100 99 100   CO2  --    < > 21*   < > 19* 23 21*   BUN  --    < > 37*   < > 38* 36* 33*   CREATININE  --    < > 1.5*   < > 1.4* 1.2 1.1   LABGLOM  --    < > 54   < > 58 >60 >60   GLUCOSE  --    < > 141*   < > 134* 105* 154*   CALCIUM  --    < > 8.9   < > 9.0 9.2 9.1   MG 1.6  --  1.8  --   --  1.7  --    PHOS  --   --  1.9*  --   --  1.7*  --     < > = values in this interval not displayed. Lab Results   Component Value Date    LABALBU 3.6 11/20/2022    LABALBU 3.4 (L) 09/20/2022    LABALBU 3.5 09/19/2022     Lab Results   Component Value Date    TSH 0.753 04/10/2022       Iron Studies  No results found for: IRON, TIBC, FERRITIN  Vitamin B-12   Date Value Ref Range Status   04/26/2023 640 211 - 946 pg/mL Final     Folate   Date Value Ref Range Status   04/26/2023 16.1 4.8 - 24.2 ng/mL Final       No results found for: VITD25  No results found for: PTH    No components found for: URIC    Lab Results   Component Value Date/Time    COLORU Yellow 04/24/2023 04:34 AM    NITRU Negative 04/24/2023 04:34 AM    GLUCOSEU 250 04/24/2023 04:34 AM    KETUA Negative 04/24/2023 04:34 AM    UROBILINOGEN 0.2 04/24/2023 04:34 AM    BILIRUBINUR Negative 04/24/2023 04:34 AM       No results found for: FBIOPGPR      IMPRESSION/RECOMMENDATIONS:      Acute kidney injury-  Setting of volume depletion with nausea and vomiting and diarrhea.
Date:     Short Term/ Long Term   Goals   Was pt agreeable to Eval/treatment? Yes  To be met in 3 days   Pain level   7/10   Back and hips     Bed Mobility  Using rails and head of bed elevated:       Rolling: Not assessed     Supine to sit: Supervision     Sit to supine: Supervision     Scooting: Supervision     Rolling: Independent    Supine to sit:  Independent    Sit to supine: Independent    Scooting: Independent     Transfers Sit to stand: Supervision     Sit to stand: Independent     Ambulation     1 x 2 steps forward, unsteady loss of balance posteriorly sitting onto bed, 1 x 40 feet using  IV pole with Minimal assist of 1   for safety and cues for safety and pacing    50 feet using  cane with Modified Independent      Stair negotiation: ascended and descended   Not assessed     4 steps, 1 rail, Supervision        ROM Within functional limits    Increase range of motion 10% of affected joints    Strength BUE:  refer to OT eval  RLE:  4-/5  LLE:  4-/5  Increase strength in affected mm groups by 1/3 grade   Balance Sitting EOB:  good    Dynamic Standing:  fair -  Sitting EOB:  good    Dynamic Standing: good cane     Patient is Alert & Oriented x person, place, time, and situation and follows directions    Sensation:  Patient  reports numbness/tingling bilateral lower extremities   thighs  Edema:  yes bilateral lower extremities   Endurance: fair       Vitals: room air   Blood Pressure at rest  Blood Pressure during session    Heart Rate at rest 80 Heart Rate during session 85   SPO2 at rest 95%  SPO2 during session 96% shortness of breath wheezing     Patient education  Patient educated on role of Physical Therapy, risks of immobility, safety and plan of care,  importance of mobility while in hospital , purse lip breathing, and positioning for skin integrity and comfort     Patient response to education:   Pt verbalized understanding Pt demonstrated skill Pt requires further education in this area   Yes Partial
of mobility while in hospital , purse lip breathing, and positioning for skin integrity and comfort     Patient response to education:   Pt verbalized understanding Pt demonstrated skill Pt requires further education in this area   Yes Partial Yes      Treatment:  Patient practiced and was instructed/facilitated in the following treatment: Patient assisted to edge of bed,   Sat edge of bed 10 minutes with Supervision  to increase dynamic sitting balance and activity tolerance. ambulated in hallway returned seated Edge of bed 5 minutes. Returned to bed per patient request.       Therapeutic Exercises:  not performed      At end of session, patient in bed with alarm call light and phone within reach,  all lines and tubes intact, nursing notified. Patient would benefit from continued skilled Physical Therapy to improve functional independence and quality of life.          Patient's/ family goals   home    Time in  1237  Time out  1310    Total Treatment Time  33 minutes    CPT codes:  Therapeutic activities (93421)   33 minutes  2 unit(s)    Andrzej Draper PT

## 2023-04-28 NOTE — DISCHARGE SUMMARY
process. The osseous structures are without acute process. Focal scarring, right lung base. No acute process. Mild cardiomegaly. Patient Instructions:   Current Discharge Medication List        START taking these medications    Details   fluticasone-salmeterol (ADVAIR DISKUS) 250-50 MCG/ACT AEPB diskus inhaler Inhale 1 puff into the lungs in the morning and 1 puff in the evening. Qty: 60 each, Refills: 1      albuterol sulfate HFA (VENTOLIN HFA) 108 (90 Base) MCG/ACT inhaler Inhale 2 puffs into the lungs 4 times daily as needed for Wheezing  Qty: 18 g, Refills: 0      tiotropium (SPIRIVA HANDIHALER) 18 MCG inhalation capsule Inhale 1 capsule into the lungs daily  Qty: 30 capsule, Refills: 3      nicotine (NICODERM CQ) 21 MG/24HR Place 1 patch onto the skin daily  Qty: 30 patch, Refills: 3      thiamine mononitrate (THIAMINE) 100 MG tablet Take 1 tablet by mouth daily  Qty: 30 tablet, Refills: 0           CONTINUE these medications which have CHANGED    Details   isosorbide dinitrate (ISORDIL) 10 MG tablet Take 1 tablet by mouth 2 times daily  Qty: 60 tablet, Refills: 0      Metoprolol Tartrate 75 MG TABS Take 75 mg by mouth 2 times daily  Qty: 60 tablet, Refills: 0      amLODIPine (NORVASC) 5 MG tablet Take 1 tablet by mouth daily  Qty: 30 tablet, Refills: 0      predniSONE (DELTASONE) 10 MG tablet Take 3 tablets by mouth daily for 3 days, THEN 2 tablets daily for 3 days, THEN 1 tablet daily for 3 days, THEN 0.5 tablets daily for 2 days.   Qty: 19 tablet, Refills: 0           CONTINUE these medications which have NOT CHANGED    Details   pravastatin (PRAVACHOL) 20 MG tablet Take 1 tablet by mouth nightly      losartan (COZAAR) 50 MG tablet Take 1 tablet by mouth daily      escitalopram (LEXAPRO) 10 MG tablet Take 1 tablet by mouth daily      omeprazole (PRILOSEC) 20 MG delayed release capsule Take 2 capsules by mouth daily      aspirin 81 MG chewable tablet Take 1 tablet by mouth daily  Qty: 30 tablet,

## 2023-04-28 NOTE — PLAN OF CARE
Problem: Discharge Planning  Goal: Discharge to home or other facility with appropriate resources  4/28/2023 1048 by Priti Vieyra RN  Outcome: Completed  4/28/2023 0112 by Erik Hummel RN  Outcome: Progressing     Problem: Safety - Adult  Goal: Free from fall injury  4/28/2023 1048 by Priti Vieyra RN  Outcome: Completed  4/28/2023 0112 by Erik Hummel RN  Outcome: Progressing     Problem: Pain  Goal: Verbalizes/displays adequate comfort level or baseline comfort level  4/28/2023 1048 by Priti Vieyra RN  Outcome: Completed  4/28/2023 0112 by Erik Hummel RN  Outcome: Progressing     Problem: Nutrition Deficit:  Goal: Optimize nutritional status  4/28/2023 1048 by Priti Vieyra RN  Outcome: Completed  4/28/2023 0112 by Erik Hummel RN  Outcome: Progressing

## 2023-07-04 ENCOUNTER — HOSPITAL ENCOUNTER (INPATIENT)
Age: 58
LOS: 6 days | Discharge: HOME | DRG: 682 | End: 2023-07-11
Attending: EMERGENCY MEDICINE | Admitting: INTERNAL MEDICINE
Payer: MEDICARE

## 2023-07-04 ENCOUNTER — APPOINTMENT (OUTPATIENT)
Dept: GENERAL RADIOLOGY | Age: 58
DRG: 682 | End: 2023-07-04
Payer: MEDICARE

## 2023-07-04 ENCOUNTER — APPOINTMENT (OUTPATIENT)
Dept: CT IMAGING | Age: 58
DRG: 682 | End: 2023-07-04
Payer: MEDICARE

## 2023-07-04 DIAGNOSIS — M79.604 RIGHT LEG PAIN: ICD-10-CM

## 2023-07-04 DIAGNOSIS — N17.9 ACUTE KIDNEY INJURY (HCC): Primary | ICD-10-CM

## 2023-07-04 LAB
ANION GAP SERPL CALCULATED.3IONS-SCNC: 17 MMOL/L (ref 7–16)
BASOPHILS # BLD: 0.05 E9/L (ref 0–0.2)
BASOPHILS NFR BLD: 0.5 % (ref 0–2)
BUN SERPL-MCNC: 17 MG/DL (ref 6–20)
CALCIUM SERPL-MCNC: 8.7 MG/DL (ref 8.6–10.2)
CHLORIDE SERPL-SCNC: 96 MMOL/L (ref 98–107)
CO2 SERPL-SCNC: 20 MMOL/L (ref 22–29)
CREAT SERPL-MCNC: 2.4 MG/DL (ref 0.7–1.2)
EOSINOPHIL # BLD: 0.09 E9/L (ref 0.05–0.5)
EOSINOPHIL NFR BLD: 0.9 % (ref 0–6)
ERYTHROCYTE [DISTWIDTH] IN BLOOD BY AUTOMATED COUNT: 15.2 FL (ref 11.5–15)
GLUCOSE SERPL-MCNC: 108 MG/DL (ref 74–99)
HCT VFR BLD AUTO: 34.4 % (ref 37–54)
HGB BLD-MCNC: 11 G/DL (ref 12.5–16.5)
IMM GRANULOCYTES # BLD: 0.23 E9/L
IMM GRANULOCYTES NFR BLD: 2.2 % (ref 0–5)
LYMPHOCYTES # BLD: 0.87 E9/L (ref 1.5–4)
LYMPHOCYTES NFR BLD: 8.4 % (ref 20–42)
MCH RBC QN AUTO: 32.9 PG (ref 26–35)
MCHC RBC AUTO-ENTMCNC: 32 % (ref 32–34.5)
MCV RBC AUTO: 103 FL (ref 80–99.9)
MONOCYTES # BLD: 0.98 E9/L (ref 0.1–0.95)
MONOCYTES NFR BLD: 9.5 % (ref 2–12)
NEUTROPHILS # BLD: 8.13 E9/L (ref 1.8–7.3)
NEUTS SEG NFR BLD: 78.5 % (ref 43–80)
PLATELET # BLD AUTO: 170 E9/L (ref 130–450)
PMV BLD AUTO: 10.2 FL (ref 7–12)
POTASSIUM SERPL-SCNC: 3.7 MMOL/L (ref 3.5–5)
RBC # BLD AUTO: 3.34 E12/L (ref 3.8–5.8)
SODIUM SERPL-SCNC: 133 MMOL/L (ref 132–146)
TROPONIN, HIGH SENSITIVITY: 23 NG/L (ref 0–11)
TROPONIN, HIGH SENSITIVITY: 32 NG/L (ref 0–11)
WBC # BLD: 10.4 E9/L (ref 4.5–11.5)

## 2023-07-04 PROCEDURE — 6360000002 HC RX W HCPCS

## 2023-07-04 PROCEDURE — 73560 X-RAY EXAM OF KNEE 1 OR 2: CPT

## 2023-07-04 PROCEDURE — 2580000003 HC RX 258

## 2023-07-04 PROCEDURE — 84484 ASSAY OF TROPONIN QUANT: CPT

## 2023-07-04 PROCEDURE — 96374 THER/PROPH/DIAG INJ IV PUSH: CPT

## 2023-07-04 PROCEDURE — 6370000000 HC RX 637 (ALT 250 FOR IP)

## 2023-07-04 PROCEDURE — 73502 X-RAY EXAM HIP UNI 2-3 VIEWS: CPT

## 2023-07-04 PROCEDURE — 93005 ELECTROCARDIOGRAM TRACING: CPT | Performed by: EMERGENCY MEDICINE

## 2023-07-04 PROCEDURE — 85025 COMPLETE CBC W/AUTO DIFF WBC: CPT

## 2023-07-04 PROCEDURE — 96361 HYDRATE IV INFUSION ADD-ON: CPT

## 2023-07-04 PROCEDURE — 96375 TX/PRO/DX INJ NEW DRUG ADDON: CPT

## 2023-07-04 PROCEDURE — 2580000003 HC RX 258: Performed by: EMERGENCY MEDICINE

## 2023-07-04 PROCEDURE — 70450 CT HEAD/BRAIN W/O DYE: CPT

## 2023-07-04 PROCEDURE — 6360000002 HC RX W HCPCS: Performed by: EMERGENCY MEDICINE

## 2023-07-04 PROCEDURE — 80048 BASIC METABOLIC PNL TOTAL CA: CPT

## 2023-07-04 PROCEDURE — 71045 X-RAY EXAM CHEST 1 VIEW: CPT

## 2023-07-04 PROCEDURE — 99285 EMERGENCY DEPT VISIT HI MDM: CPT

## 2023-07-04 RX ORDER — 0.9 % SODIUM CHLORIDE 0.9 %
1000 INTRAVENOUS SOLUTION INTRAVENOUS ONCE
Status: COMPLETED | OUTPATIENT
Start: 2023-07-04 | End: 2023-07-05

## 2023-07-04 RX ORDER — KETOROLAC TROMETHAMINE 30 MG/ML
15 INJECTION, SOLUTION INTRAMUSCULAR; INTRAVENOUS ONCE
Status: COMPLETED | OUTPATIENT
Start: 2023-07-04 | End: 2023-07-04

## 2023-07-04 RX ORDER — FENTANYL CITRATE 50 UG/ML
50 INJECTION, SOLUTION INTRAMUSCULAR; INTRAVENOUS
Status: COMPLETED | OUTPATIENT
Start: 2023-07-04 | End: 2023-07-05

## 2023-07-04 RX ORDER — METOCLOPRAMIDE HYDROCHLORIDE 5 MG/ML
10 INJECTION INTRAMUSCULAR; INTRAVENOUS ONCE
Status: COMPLETED | OUTPATIENT
Start: 2023-07-04 | End: 2023-07-04

## 2023-07-04 RX ORDER — DIPHENHYDRAMINE HYDROCHLORIDE 50 MG/ML
25 INJECTION INTRAMUSCULAR; INTRAVENOUS ONCE
Status: COMPLETED | OUTPATIENT
Start: 2023-07-04 | End: 2023-07-04

## 2023-07-04 RX ORDER — HYDROCODONE BITARTRATE AND ACETAMINOPHEN 5; 325 MG/1; MG/1
1 TABLET ORAL ONCE
Status: COMPLETED | OUTPATIENT
Start: 2023-07-04 | End: 2023-07-04

## 2023-07-04 RX ORDER — 0.9 % SODIUM CHLORIDE 0.9 %
1000 INTRAVENOUS SOLUTION INTRAVENOUS ONCE
Status: COMPLETED | OUTPATIENT
Start: 2023-07-04 | End: 2023-07-04

## 2023-07-04 RX ORDER — HYDROCODONE BITARTRATE AND ACETAMINOPHEN 5; 325 MG/1; MG/1
1 TABLET ORAL EVERY 6 HOURS PRN
Qty: 12 TABLET | Refills: 0 | Status: SHIPPED | OUTPATIENT
Start: 2023-07-04 | End: 2023-07-07

## 2023-07-04 RX ADMIN — KETOROLAC TROMETHAMINE 15 MG: 30 INJECTION, SOLUTION INTRAMUSCULAR; INTRAVENOUS at 18:31

## 2023-07-04 RX ADMIN — SODIUM CHLORIDE 1000 ML: 9 INJECTION, SOLUTION INTRAVENOUS at 23:22

## 2023-07-04 RX ADMIN — FENTANYL CITRATE 50 MCG: 50 INJECTION INTRAMUSCULAR; INTRAVENOUS at 23:24

## 2023-07-04 RX ADMIN — SODIUM CHLORIDE 1000 ML: 9 INJECTION, SOLUTION INTRAVENOUS at 22:17

## 2023-07-04 RX ADMIN — SODIUM CHLORIDE 1000 ML: 9 INJECTION, SOLUTION INTRAVENOUS at 15:24

## 2023-07-04 RX ADMIN — DIPHENHYDRAMINE HYDROCHLORIDE 25 MG: 50 INJECTION, SOLUTION INTRAMUSCULAR; INTRAVENOUS at 15:25

## 2023-07-04 RX ADMIN — METOCLOPRAMIDE 10 MG: 5 INJECTION, SOLUTION INTRAMUSCULAR; INTRAVENOUS at 15:26

## 2023-07-04 RX ADMIN — HYDROCODONE BITARTRATE AND ACETAMINOPHEN 1 TABLET: 5; 325 TABLET ORAL at 15:22

## 2023-07-04 ASSESSMENT — PAIN SCALES - GENERAL
PAINLEVEL_OUTOF10: 10
PAINLEVEL_OUTOF10: 7

## 2023-07-04 ASSESSMENT — PAIN - FUNCTIONAL ASSESSMENT: PAIN_FUNCTIONAL_ASSESSMENT: NONE - DENIES PAIN

## 2023-07-04 NOTE — ED PROVIDER NOTES
[facility]        Pt Name: Latrell Yates  MRN: [id number]  [address] 1965  Date of evaluation: 7/4/2023  Provider: Daina Sanchez DO  PCP: Be Roy MD  Note Started: 4:34 PM EDT 7/4/23    CHIEF COMPLAINT       Chief Complaint   Patient presents with    Leg Pain     Right leg/hip pain x 1 week. Per EMS patient was in an accident years ago and still has pain. Patient states he was going to pain management but it not anymore       HISTORY OF PRESENT ILLNESS: 1 or more Elements   History From: Patient    Limitations to history : None    Latrell Yates is a 62 y.o. male who presents to the emergency department with chief complaint of right leg and hip pain. Patient states has been going on for 1 week. He states that he had an accident several years ago and has been having chronic pain since that time and that recently he fell and hurt his right hip. States that the right hip and right knee have been causing a lot more pain and he is having difficulty walking on it without pain symptoms. Patient states he was seeing pain management in the past but has not been for a while. Patient also states that he thinks he hit his head when he fell and has been having a little bit of a headache worsening gradually recently 2. Patient otherwise denies any fever, chills, nausea, vomiting, chest pain, shortness of breath, abdominal pain, hematuria or dysuria, constipation or diarrhea. Nursing Notes were all reviewed and agreed with or any disagreements were addressed in the HPI. REVIEW OF SYSTEMS :      Positives and Pertinent negatives as per HPI.      PAST MEDICAL HISTORY/Chronic Conditions Affecting Care      has a past medical history of Alcohol abuse, Arthritis, Back pain, chronic, CAD in native artery (6/8/2022), COPD (chronic obstructive pulmonary disease) ([facility]), H/O cardiovascular stress test (04/27/2022),

## 2023-07-05 ENCOUNTER — APPOINTMENT (OUTPATIENT)
Dept: CT IMAGING | Age: 58
DRG: 682 | End: 2023-07-05
Payer: MEDICARE

## 2023-07-05 ENCOUNTER — APPOINTMENT (OUTPATIENT)
Dept: GENERAL RADIOLOGY | Age: 58
DRG: 682 | End: 2023-07-05
Payer: MEDICARE

## 2023-07-05 ENCOUNTER — APPOINTMENT (OUTPATIENT)
Dept: CT IMAGING | Age: 58
DRG: 682 | End: 2023-07-05
Attending: EMERGENCY MEDICINE
Payer: MEDICARE

## 2023-07-05 PROBLEM — R77.8 ELEVATED TROPONIN: Status: ACTIVE | Noted: 2023-07-05

## 2023-07-05 PROBLEM — R79.89 ELEVATED TROPONIN: Status: ACTIVE | Noted: 2023-07-05

## 2023-07-05 PROBLEM — N17.9 AKI (ACUTE KIDNEY INJURY) (HCC): Status: ACTIVE | Noted: 2023-07-05

## 2023-07-05 LAB
ALBUMIN SERPL-MCNC: 3.8 G/DL (ref 3.5–5.2)
ALP SERPL-CCNC: 92 U/L (ref 40–129)
ALT SERPL-CCNC: 62 U/L (ref 0–40)
ANION GAP SERPL CALCULATED.3IONS-SCNC: 14 MMOL/L (ref 7–16)
AST SERPL-CCNC: 77 U/L (ref 0–39)
BASOPHILS # BLD: 0.04 E9/L (ref 0–0.2)
BASOPHILS NFR BLD: 0.5 % (ref 0–2)
BILIRUB SERPL-MCNC: 0.8 MG/DL (ref 0–1.2)
BUN SERPL-MCNC: 17 MG/DL (ref 6–20)
CALCIUM SERPL-MCNC: 8.4 MG/DL (ref 8.6–10.2)
CHLORIDE SERPL-SCNC: 99 MMOL/L (ref 98–107)
CHLORIDE UR-SCNC: 37 MMOL/L
CO2 SERPL-SCNC: 22 MMOL/L (ref 22–29)
CREAT SERPL-MCNC: 2.1 MG/DL (ref 0.7–1.2)
EKG ATRIAL RATE: 80 BPM
EKG P AXIS: 43 DEGREES
EKG P-R INTERVAL: 136 MS
EKG Q-T INTERVAL: 424 MS
EKG QRS DURATION: 92 MS
EKG QTC CALCULATION (BAZETT): 489 MS
EKG R AXIS: 65 DEGREES
EKG T AXIS: 68 DEGREES
EKG VENTRICULAR RATE: 80 BPM
EOSINOPHIL # BLD: 0.12 E9/L (ref 0.05–0.5)
EOSINOPHIL NFR BLD: 1.6 % (ref 0–6)
ERYTHROCYTE [DISTWIDTH] IN BLOOD BY AUTOMATED COUNT: 15.2 FL (ref 11.5–15)
GLUCOSE SERPL-MCNC: 104 MG/DL (ref 74–99)
HCT VFR BLD AUTO: 31.6 % (ref 37–54)
HGB BLD-MCNC: 10.3 G/DL (ref 12.5–16.5)
IMM GRANULOCYTES # BLD: 0.12 E9/L
IMM GRANULOCYTES NFR BLD: 1.6 % (ref 0–5)
LYMPHOCYTES # BLD: 0.77 E9/L (ref 1.5–4)
LYMPHOCYTES NFR BLD: 10.3 % (ref 20–42)
MAGNESIUM SERPL-MCNC: 1.5 MG/DL (ref 1.6–2.6)
MCH RBC QN AUTO: 32.8 PG (ref 26–35)
MCHC RBC AUTO-ENTMCNC: 32.6 % (ref 32–34.5)
MCV RBC AUTO: 100.6 FL (ref 80–99.9)
MONOCYTES # BLD: 0.76 E9/L (ref 0.1–0.95)
MONOCYTES NFR BLD: 10.2 % (ref 2–12)
NEUTROPHILS # BLD: 5.67 E9/L (ref 1.8–7.3)
NEUTS SEG NFR BLD: 75.8 % (ref 43–80)
OSMOLALITY URINE: 327 MOSM/KG (ref 300–900)
PLATELET # BLD AUTO: 128 E9/L (ref 130–450)
PMV BLD AUTO: 10.1 FL (ref 7–12)
POTASSIUM SERPL-SCNC: 3 MMOL/L (ref 3.5–5)
POTASSIUM UR-SCNC: 30.3 MMOL/L
PROT SERPL-MCNC: 6.6 G/DL (ref 6.4–8.3)
RBC # BLD AUTO: 3.14 E12/L (ref 3.8–5.8)
SODIUM SERPL-SCNC: 135 MMOL/L (ref 132–146)
SODIUM UR-SCNC: 41 MMOL/L
TROPONIN, HIGH SENSITIVITY: 37 NG/L (ref 0–11)
UREA NITROGEN, UR: 239 MG/DL (ref 800–1666)
WBC # BLD: 7.5 E9/L (ref 4.5–11.5)

## 2023-07-05 PROCEDURE — 94640 AIRWAY INHALATION TREATMENT: CPT

## 2023-07-05 PROCEDURE — 6360000002 HC RX W HCPCS: Performed by: NURSE PRACTITIONER

## 2023-07-05 PROCEDURE — 6360000004 HC RX CONTRAST MEDICATION: Performed by: RADIOLOGY

## 2023-07-05 PROCEDURE — 73502 X-RAY EXAM HIP UNI 2-3 VIEWS: CPT

## 2023-07-05 PROCEDURE — 6360000002 HC RX W HCPCS: Performed by: INTERNAL MEDICINE

## 2023-07-05 PROCEDURE — 80053 COMPREHEN METABOLIC PANEL: CPT

## 2023-07-05 PROCEDURE — 6370000000 HC RX 637 (ALT 250 FOR IP): Performed by: INTERNAL MEDICINE

## 2023-07-05 PROCEDURE — 6370000000 HC RX 637 (ALT 250 FOR IP): Performed by: FAMILY MEDICINE

## 2023-07-05 PROCEDURE — 6360000002 HC RX W HCPCS: Performed by: EMERGENCY MEDICINE

## 2023-07-05 PROCEDURE — 74177 CT ABD & PELVIS W/CONTRAST: CPT

## 2023-07-05 PROCEDURE — 94664 DEMO&/EVAL PT USE INHALER: CPT

## 2023-07-05 PROCEDURE — 6370000000 HC RX 637 (ALT 250 FOR IP): Performed by: NURSE PRACTITIONER

## 2023-07-05 PROCEDURE — 83935 ASSAY OF URINE OSMOLALITY: CPT

## 2023-07-05 PROCEDURE — 84300 ASSAY OF URINE SODIUM: CPT

## 2023-07-05 PROCEDURE — 87040 BLOOD CULTURE FOR BACTERIA: CPT

## 2023-07-05 PROCEDURE — 82436 ASSAY OF URINE CHLORIDE: CPT

## 2023-07-05 PROCEDURE — 71275 CT ANGIOGRAPHY CHEST: CPT

## 2023-07-05 PROCEDURE — 84133 ASSAY OF URINE POTASSIUM: CPT

## 2023-07-05 PROCEDURE — 85025 COMPLETE CBC W/AUTO DIFF WBC: CPT

## 2023-07-05 PROCEDURE — 2580000003 HC RX 258: Performed by: NURSE PRACTITIONER

## 2023-07-05 PROCEDURE — 83735 ASSAY OF MAGNESIUM: CPT

## 2023-07-05 PROCEDURE — 1200000000 HC SEMI PRIVATE

## 2023-07-05 PROCEDURE — 84540 ASSAY OF URINE/UREA-N: CPT

## 2023-07-05 PROCEDURE — 93010 ELECTROCARDIOGRAM REPORT: CPT | Performed by: INTERNAL MEDICINE

## 2023-07-05 PROCEDURE — 84484 ASSAY OF TROPONIN QUANT: CPT

## 2023-07-05 RX ORDER — BUDESONIDE 0.5 MG/2ML
0.5 INHALANT ORAL
Status: DISCONTINUED | OUTPATIENT
Start: 2023-07-05 | End: 2023-07-11 | Stop reason: HOSPADM

## 2023-07-05 RX ORDER — ALBUTEROL SULFATE 2.5 MG/3ML
2.5 SOLUTION RESPIRATORY (INHALATION)
Status: DISCONTINUED | OUTPATIENT
Start: 2023-07-05 | End: 2023-07-10

## 2023-07-05 RX ORDER — LOSARTAN POTASSIUM 50 MG/1
50 TABLET ORAL DAILY
Status: DISCONTINUED | OUTPATIENT
Start: 2023-07-05 | End: 2023-07-11 | Stop reason: HOSPADM

## 2023-07-05 RX ORDER — ESCITALOPRAM OXALATE 10 MG/1
10 TABLET ORAL DAILY
Status: DISCONTINUED | OUTPATIENT
Start: 2023-07-05 | End: 2023-07-11 | Stop reason: HOSPADM

## 2023-07-05 RX ORDER — SODIUM CHLORIDE 0.9 % (FLUSH) 0.9 %
5-40 SYRINGE (ML) INJECTION EVERY 12 HOURS SCHEDULED
Status: DISCONTINUED | OUTPATIENT
Start: 2023-07-05 | End: 2023-07-11 | Stop reason: HOSPADM

## 2023-07-05 RX ORDER — MAGNESIUM SULFATE IN WATER 40 MG/ML
2000 INJECTION, SOLUTION INTRAVENOUS ONCE
Status: COMPLETED | OUTPATIENT
Start: 2023-07-05 | End: 2023-07-05

## 2023-07-05 RX ORDER — ACETAMINOPHEN 650 MG/1
650 SUPPOSITORY RECTAL EVERY 6 HOURS PRN
Status: DISCONTINUED | OUTPATIENT
Start: 2023-07-05 | End: 2023-07-11 | Stop reason: HOSPADM

## 2023-07-05 RX ORDER — SODIUM CHLORIDE 9 MG/ML
INJECTION, SOLUTION INTRAVENOUS CONTINUOUS
Status: DISCONTINUED | OUTPATIENT
Start: 2023-07-05 | End: 2023-07-06

## 2023-07-05 RX ORDER — SODIUM CHLORIDE 9 MG/ML
INJECTION, SOLUTION INTRAVENOUS PRN
Status: DISCONTINUED | OUTPATIENT
Start: 2023-07-05 | End: 2023-07-07

## 2023-07-05 RX ORDER — HYDROCODONE BITARTRATE AND ACETAMINOPHEN 7.5; 325 MG/1; MG/1
1 TABLET ORAL EVERY 4 HOURS PRN
Status: DISCONTINUED | OUTPATIENT
Start: 2023-07-05 | End: 2023-07-10

## 2023-07-05 RX ORDER — PRAVASTATIN SODIUM 20 MG
20 TABLET ORAL NIGHTLY
Status: DISCONTINUED | OUTPATIENT
Start: 2023-07-05 | End: 2023-07-11 | Stop reason: HOSPADM

## 2023-07-05 RX ORDER — POTASSIUM CHLORIDE 20 MEQ/1
40 TABLET, EXTENDED RELEASE ORAL ONCE
Status: COMPLETED | OUTPATIENT
Start: 2023-07-05 | End: 2023-07-05

## 2023-07-05 RX ORDER — NICOTINE 21 MG/24HR
1 PATCH, TRANSDERMAL 24 HOURS TRANSDERMAL DAILY
Status: DISCONTINUED | OUTPATIENT
Start: 2023-07-05 | End: 2023-07-11 | Stop reason: HOSPADM

## 2023-07-05 RX ORDER — LANOLIN ALCOHOL/MO/W.PET/CERES
100 CREAM (GRAM) TOPICAL DAILY
Status: DISCONTINUED | OUTPATIENT
Start: 2023-07-05 | End: 2023-07-06

## 2023-07-05 RX ORDER — FLUTICASONE PROPIONATE AND SALMETEROL XINAFOATE 230; 21 UG/1; UG/1
2 AEROSOL, METERED RESPIRATORY (INHALATION) 2 TIMES DAILY
Status: ON HOLD | COMMUNITY

## 2023-07-05 RX ORDER — ASPIRIN 81 MG/1
81 TABLET, CHEWABLE ORAL DAILY
Status: DISCONTINUED | OUTPATIENT
Start: 2023-07-05 | End: 2023-07-11 | Stop reason: HOSPADM

## 2023-07-05 RX ORDER — HEPARIN SODIUM 10000 [USP'U]/ML
5000 INJECTION, SOLUTION INTRAVENOUS; SUBCUTANEOUS EVERY 8 HOURS SCHEDULED
Status: DISCONTINUED | OUTPATIENT
Start: 2023-07-05 | End: 2023-07-11 | Stop reason: HOSPADM

## 2023-07-05 RX ORDER — ISOSORBIDE DINITRATE 10 MG/1
10 TABLET ORAL 2 TIMES DAILY
Status: DISCONTINUED | OUTPATIENT
Start: 2023-07-05 | End: 2023-07-11 | Stop reason: HOSPADM

## 2023-07-05 RX ORDER — AMLODIPINE BESYLATE 5 MG/1
5 TABLET ORAL DAILY
Status: DISCONTINUED | OUTPATIENT
Start: 2023-07-05 | End: 2023-07-11 | Stop reason: HOSPADM

## 2023-07-05 RX ORDER — PREDNISONE 20 MG/1
20 TABLET ORAL DAILY PRN
Status: ON HOLD | COMMUNITY

## 2023-07-05 RX ORDER — PANTOPRAZOLE SODIUM 40 MG/1
40 TABLET, DELAYED RELEASE ORAL
Status: DISCONTINUED | OUTPATIENT
Start: 2023-07-05 | End: 2023-07-06

## 2023-07-05 RX ORDER — ARFORMOTEROL TARTRATE 15 UG/2ML
15 SOLUTION RESPIRATORY (INHALATION)
Status: DISCONTINUED | OUTPATIENT
Start: 2023-07-05 | End: 2023-07-11 | Stop reason: HOSPADM

## 2023-07-05 RX ORDER — HYDROCODONE BITARTRATE AND ACETAMINOPHEN 7.5; 325 MG/1; MG/1
1 TABLET ORAL EVERY 6 HOURS PRN
Status: DISCONTINUED | OUTPATIENT
Start: 2023-07-05 | End: 2023-07-05

## 2023-07-05 RX ORDER — ACETAMINOPHEN 325 MG/1
650 TABLET ORAL EVERY 6 HOURS PRN
Status: DISCONTINUED | OUTPATIENT
Start: 2023-07-05 | End: 2023-07-11 | Stop reason: HOSPADM

## 2023-07-05 RX ORDER — SODIUM CHLORIDE 0.9 % (FLUSH) 0.9 %
5-40 SYRINGE (ML) INJECTION PRN
Status: DISCONTINUED | OUTPATIENT
Start: 2023-07-05 | End: 2023-07-11 | Stop reason: HOSPADM

## 2023-07-05 RX ADMIN — PANTOPRAZOLE SODIUM 40 MG: 40 TABLET, DELAYED RELEASE ORAL at 09:16

## 2023-07-05 RX ADMIN — ESCITALOPRAM OXALATE 10 MG: 10 TABLET ORAL at 08:33

## 2023-07-05 RX ADMIN — Medication 10 ML: at 09:10

## 2023-07-05 RX ADMIN — IOPAMIDOL 75 ML: 755 INJECTION, SOLUTION INTRAVENOUS at 00:23

## 2023-07-05 RX ADMIN — ISOSORBIDE DINITRATE 10 MG: 10 TABLET ORAL at 21:51

## 2023-07-05 RX ADMIN — ARFORMOTEROL TARTRATE 15 MCG: 15 SOLUTION RESPIRATORY (INHALATION) at 08:37

## 2023-07-05 RX ADMIN — HYDROCODONE BITARTRATE AND ACETAMINOPHEN 1 TABLET: 7.5; 325 TABLET ORAL at 13:12

## 2023-07-05 RX ADMIN — BUDESONIDE 500 MCG: 0.5 SUSPENSION RESPIRATORY (INHALATION) at 19:59

## 2023-07-05 RX ADMIN — METOPROLOL TARTRATE 75 MG: 25 TABLET, FILM COATED ORAL at 08:33

## 2023-07-05 RX ADMIN — HYDROCODONE BITARTRATE AND ACETAMINOPHEN 1 TABLET: 7.5; 325 TABLET ORAL at 17:38

## 2023-07-05 RX ADMIN — ISOSORBIDE DINITRATE 10 MG: 10 TABLET ORAL at 08:33

## 2023-07-05 RX ADMIN — FENTANYL CITRATE 50 MCG: 50 INJECTION INTRAMUSCULAR; INTRAVENOUS at 04:09

## 2023-07-05 RX ADMIN — HEPARIN SODIUM 5000 UNITS: 10000 INJECTION INTRAVENOUS; SUBCUTANEOUS at 13:24

## 2023-07-05 RX ADMIN — HYDROCODONE BITARTRATE AND ACETAMINOPHEN 1 TABLET: 7.5; 325 TABLET ORAL at 09:17

## 2023-07-05 RX ADMIN — IPRATROPIUM BROMIDE 0.5 MG: 0.5 SOLUTION RESPIRATORY (INHALATION) at 19:57

## 2023-07-05 RX ADMIN — IPRATROPIUM BROMIDE 0.5 MG: 0.5 SOLUTION RESPIRATORY (INHALATION) at 08:37

## 2023-07-05 RX ADMIN — Medication 100 MG: at 08:33

## 2023-07-05 RX ADMIN — SODIUM CHLORIDE: 9 INJECTION, SOLUTION INTRAVENOUS at 06:05

## 2023-07-05 RX ADMIN — Medication 10 ML: at 21:52

## 2023-07-05 RX ADMIN — ALBUTEROL SULFATE 2.5 MG: 2.5 SOLUTION RESPIRATORY (INHALATION) at 19:56

## 2023-07-05 RX ADMIN — HYDROCODONE BITARTRATE AND ACETAMINOPHEN 1 TABLET: 7.5; 325 TABLET ORAL at 21:51

## 2023-07-05 RX ADMIN — PRAVASTATIN SODIUM 20 MG: 20 TABLET ORAL at 21:52

## 2023-07-05 RX ADMIN — ASPIRIN 81 MG CHEWABLE TABLET 81 MG: 81 TABLET CHEWABLE at 08:33

## 2023-07-05 RX ADMIN — METOPROLOL TARTRATE 75 MG: 25 TABLET, FILM COATED ORAL at 21:51

## 2023-07-05 RX ADMIN — ARFORMOTEROL TARTRATE 15 MCG: 15 SOLUTION RESPIRATORY (INHALATION) at 19:58

## 2023-07-05 RX ADMIN — AMLODIPINE BESYLATE 5 MG: 5 TABLET ORAL at 08:33

## 2023-07-05 RX ADMIN — IPRATROPIUM BROMIDE 0.5 MG: 0.5 SOLUTION RESPIRATORY (INHALATION) at 13:10

## 2023-07-05 RX ADMIN — LOSARTAN POTASSIUM 50 MG: 50 TABLET, FILM COATED ORAL at 08:33

## 2023-07-05 RX ADMIN — HEPARIN SODIUM 5000 UNITS: 10000 INJECTION INTRAVENOUS; SUBCUTANEOUS at 06:06

## 2023-07-05 RX ADMIN — MAGNESIUM SULFATE HEPTAHYDRATE 2000 MG: 40 INJECTION, SOLUTION INTRAVENOUS at 11:24

## 2023-07-05 RX ADMIN — HEPARIN SODIUM 5000 UNITS: 10000 INJECTION INTRAVENOUS; SUBCUTANEOUS at 21:51

## 2023-07-05 RX ADMIN — POTASSIUM CHLORIDE 40 MEQ: 1500 TABLET, EXTENDED RELEASE ORAL at 11:23

## 2023-07-05 RX ADMIN — FENTANYL CITRATE 50 MCG: 50 INJECTION INTRAMUSCULAR; INTRAVENOUS at 00:59

## 2023-07-05 RX ADMIN — BUDESONIDE 500 MCG: 0.5 SUSPENSION RESPIRATORY (INHALATION) at 08:37

## 2023-07-05 RX ADMIN — SODIUM CHLORIDE: 9 INJECTION, SOLUTION INTRAVENOUS at 16:05

## 2023-07-05 ASSESSMENT — PAIN SCALES - GENERAL
PAINLEVEL_OUTOF10: 9
PAINLEVEL_OUTOF10: 10
PAINLEVEL_OUTOF10: 9

## 2023-07-05 ASSESSMENT — PAIN DESCRIPTION - ORIENTATION
ORIENTATION: RIGHT
ORIENTATION: RIGHT;LOWER

## 2023-07-05 ASSESSMENT — PAIN DESCRIPTION - DESCRIPTORS
DESCRIPTORS: NAGGING;ACHING
DESCRIPTORS: SORE;SHARP;ACHING

## 2023-07-05 ASSESSMENT — PAIN DESCRIPTION - LOCATION
LOCATION: HIP;BACK
LOCATION: LEG;HIP

## 2023-07-05 NOTE — PROGRESS NOTES
4 Eyes Skin Assessment     NAME:  Sade Mcqueen  YOB: 1965  MEDICAL RECORD NUMBER:  [id number]    The patient is being assessed for  Admission    I agree that at least one RN has performed a thorough Head to Toe Skin Assessment on the patient. ALL assessment sites listed below have been assessed. Areas assessed by both nurses:    Head, Face, Ears, Shoulders, Back, Chest, Arms, Elbows, Hands, Sacrum. Buttock, Coccyx, Ischium, and Legs. Feet and Heels        Does the Patient have a Wound? Yes wound(s) were present on assessment.  LDA wound assessment was Initiated and completed by RN pt has dry skin to bilat feet, bruising to right upper back, abrasion to mid abdomen, and bunyons to bilat feet with blanchable redness       Tay Prevention initiated by RN: No  Wound Care Orders initiated by RN: No    Pressure Injury (Stage 3,4, Unstageable, DTI, NWPT, and Complex wounds) if present, place Wound referral order by RN under : No    New Ostomies, if present place, Ostomy referral order under : No     Nurse 1 eSignature: Electronically signed by Elizabeth Guallpa RN on 7/5/23 at 7:58 PM EDT    **SHARE this note so that the co-signing nurse can place an eSignature**    Nurse 2 eSignature: Electronically signed by Jenna Aguilera RN on 7/5/23 at 8:01 PM EDT

## 2023-07-05 NOTE — ED NOTES
Left message to Baron Marta CNP, patient requesting pain medications and nicotine patch.       Nina Faust RN  07/05/23 4209

## 2023-07-05 NOTE — CARE COORDINATION
Social Work /Transition of Care:    Pt presents to the ED secondary to right leg/hip pain after a fall at home. Pt is admitted inpatient with acute kidney injury. SW met with pt who was alert and oriented x3, lying in bed. Pt reports living alone in a one floor mobile home with 3 steps to the entrance. Pt reports he has a dog that got up under his feet and pt fell down the stairs. Pt reports he has a walking stick and a nebulizer that he uses at home. Pt reports he is mostly independent with ADLs but recently has been more short of breath and has needed assistance with laundry, cleaning, and shopping. Pt reports hx at [facility] last year after his heart attack. Pt reports hx of [facility] but was not able to remember name of provider. Pt reports his PCP is Henri and he uses [facility] in [facility] to fill prescriptions. SW made initial referral to [facility] to see if pt is eligible for programming to assist with some ADLs. Pt plan is to return home upon discharge. Pt is open to [facility] if ordered upon discharge.

## 2023-07-05 NOTE — ED NOTES
Radiology Procedure Waiver   Name: Sandra Osborn  : 1965  MRN: [id number]    Date:  23    Time: 11:40 PM EDT    Benefits of immediately proceeding with Radiology exam(s) without pre-testing outweigh the risks or are not indicated as specified below and therefore the following is/are being waived:    [] Pregnancy test   [] Patients LMP on-time and regular.   [] Patient had Tubal Ligation or has other Contraception Device. [] Patient  is Menopausal or Premenarcheal.    [] Patient had Full or Partial Hysterectomy. [] Protocol for Iodine allergy    [] MRI Questionnaire     [x] BUN/Creatinine   [] Patient age w/no hx of renal dysfunction. [] Patient on Dialysis. [] Recent Normal Labs.   Electronically signed by Anabella Heard DO on 23 at 11:40 PM EDT               Anabella Heard DO  Resident  [phone]

## 2023-07-06 ENCOUNTER — APPOINTMENT (OUTPATIENT)
Dept: GENERAL RADIOLOGY | Age: 58
DRG: 682 | End: 2023-07-06
Payer: MEDICARE

## 2023-07-06 LAB
AADO2: 241.6 MMHG
ALBUMIN SERPL-MCNC: 3.4 G/DL (ref 3.5–5.2)
ALP SERPL-CCNC: 98 U/L (ref 40–129)
ALT SERPL-CCNC: 44 U/L (ref 0–40)
AMMONIA PLAS-SCNC: 29 UMOL/L (ref 16–60)
AMPHET UR QL SCN: NOT DETECTED
ANION GAP SERPL CALCULATED.3IONS-SCNC: 12 MMOL/L (ref 7–16)
ANION GAP SERPL CALCULATED.3IONS-SCNC: 15 MMOL/L (ref 7–16)
ANISOCYTOSIS: ABNORMAL
APAP SERPL-MCNC: <5 MCG/ML (ref 10–30)
AST SERPL-CCNC: 50 U/L (ref 0–39)
B.E.: -3 MMOL/L (ref -3–3)
B.E.: -3.2 MMOL/L (ref -3–3)
BARBITURATES UR QL SCN: POSITIVE
BASOPHILS # BLD: 0 E9/L (ref 0–0.2)
BASOPHILS NFR BLD: 0.9 % (ref 0–2)
BENZODIAZ UR QL SCN: POSITIVE
BILIRUB SERPL-MCNC: 0.4 MG/DL (ref 0–1.2)
BUN SERPL-MCNC: 12 MG/DL (ref 6–20)
BUN SERPL-MCNC: 13 MG/DL (ref 6–20)
CALCIUM SERPL-MCNC: 8.3 MG/DL (ref 8.6–10.2)
CALCIUM SERPL-MCNC: 8.3 MG/DL (ref 8.6–10.2)
CANNABINOIDS UR QL SCN: POSITIVE
CHLORIDE SERPL-SCNC: 102 MMOL/L (ref 98–107)
CHLORIDE SERPL-SCNC: 105 MMOL/L (ref 98–107)
CO2 SERPL-SCNC: 18 MMOL/L (ref 22–29)
CO2 SERPL-SCNC: 20 MMOL/L (ref 22–29)
COCAINE UR QL SCN: NOT DETECTED
COHB: 0.2 % (ref 0–1.5)
COHB: 0.7 % (ref 0–1.5)
CREAT SERPL-MCNC: 1.2 MG/DL (ref 0.7–1.2)
CREAT SERPL-MCNC: 1.3 MG/DL (ref 0.7–1.2)
CRITICAL: ABNORMAL
CRITICAL: ABNORMAL
DATE ANALYZED: ABNORMAL
DATE ANALYZED: ABNORMAL
DATE OF COLLECTION: ABNORMAL
DATE OF COLLECTION: ABNORMAL
DRUG SCREEN COMMENT UR-IMP: ABNORMAL
EOSINOPHIL # BLD: 0.19 E9/L (ref 0.05–0.5)
EOSINOPHIL NFR BLD: 3.5 % (ref 0–6)
ERYTHROCYTE [DISTWIDTH] IN BLOOD BY AUTOMATED COUNT: 15.2 FL (ref 11.5–15)
ETHANOLAMINE SERPL-MCNC: <10 MG/DL (ref 0–0.08)
FENTANYL SCREEN, URINE: POSITIVE
FIO2: 85 %
GLUCOSE SERPL-MCNC: 104 MG/DL (ref 74–99)
GLUCOSE SERPL-MCNC: 130 MG/DL (ref 74–99)
HCO3: 22.6 MMOL/L (ref 22–26)
HCO3: 23.6 MMOL/L (ref 22–26)
HCT VFR BLD AUTO: 31 % (ref 37–54)
HGB BLD-MCNC: 9.8 G/DL (ref 12.5–16.5)
HHB: 0.5 % (ref 0–5)
HHB: 2.3 % (ref 0–5)
LAB: ABNORMAL
LAB: ABNORMAL
LACTATE BLDV-SCNC: 1.2 MMOL/L (ref 0.5–2.2)
LYMPHOCYTES # BLD: 0.28 E9/L (ref 1.5–4)
LYMPHOCYTES NFR BLD: 5.2 % (ref 20–42)
Lab: ABNORMAL
Lab: ABNORMAL
MAGNESIUM SERPL-MCNC: 1.8 MG/DL (ref 1.6–2.6)
MAGNESIUM SERPL-MCNC: 2 MG/DL (ref 1.6–2.6)
MCH RBC QN AUTO: 33.2 PG (ref 26–35)
MCHC RBC AUTO-ENTMCNC: 31.6 % (ref 32–34.5)
MCV RBC AUTO: 105.1 FL (ref 80–99.9)
METER GLUCOSE: 138 MG/DL (ref 74–99)
METHADONE UR QL SCN: NOT DETECTED
METHB: 0.4 % (ref 0–1.5)
METHB: 0.6 % (ref 0–1.5)
MODE: ABNORMAL
MODE: AC
MONOCYTES # BLD: 0.16 E9/L (ref 0.1–0.95)
MONOCYTES NFR BLD: 2.6 % (ref 2–12)
NEUTROPHILS # BLD: 4.9 E9/L (ref 1.8–7.3)
NEUTS SEG NFR BLD: 88.7 % (ref 43–80)
O2 CONTENT: 15.4 ML/DL
O2 CONTENT: 15.8 ML/DL
O2 SATURATION: 97.7 % (ref 92–98.5)
O2 SATURATION: 99.5 % (ref 92–98.5)
O2HB: 96.6 % (ref 94–97)
O2HB: 98.7 % (ref 94–97)
OPERATOR ID: 8217
OPERATOR ID: ABNORMAL
OPIATES UR QL SCN: POSITIVE
OVALOCYTES: ABNORMAL
OXYCODONE URINE: NOT DETECTED
PATIENT TEMP: 37 C
PATIENT TEMP: 37 C
PCO2: 42.9 MMHG (ref 35–45)
PCO2: 50.1 MMHG (ref 35–45)
PCP UR QL SCN: NOT DETECTED
PEEP/CPAP: 8 CMH2O
PFO2: 3.51 MMHG/%
PH BLOOD GAS: 7.29 (ref 7.35–7.45)
PH BLOOD GAS: 7.34 (ref 7.35–7.45)
PHOSPHATE SERPL-MCNC: 2.6 MG/DL (ref 2.5–4.5)
PLATELET # BLD AUTO: 147 E9/L (ref 130–450)
PMV BLD AUTO: 10.6 FL (ref 7–12)
PO2: 110.6 MMHG (ref 75–100)
PO2: 298.7 MMHG (ref 75–100)
POIKILOCYTES: ABNORMAL
POLYCHROMASIA: ABNORMAL
POTASSIUM SERPL-SCNC: 3.5 MMOL/L (ref 3.5–5)
POTASSIUM SERPL-SCNC: 3.57 MMOL/L (ref 3.5–5)
POTASSIUM SERPL-SCNC: 3.7 MMOL/L (ref 3.5–5)
PROT SERPL-MCNC: 6.4 G/DL (ref 6.4–8.3)
RBC # BLD AUTO: 2.95 E12/L (ref 3.8–5.8)
RI(T): 0.81
RR MECHANICAL: 16 B/MIN
SALICYLATES SERPL-MCNC: <0.3 MG/DL (ref 0–30)
SODIUM SERPL-SCNC: 135 MMOL/L (ref 132–146)
SODIUM SERPL-SCNC: 137 MMOL/L (ref 132–146)
SOURCE, BLOOD GAS: ABNORMAL
SOURCE, BLOOD GAS: ABNORMAL
TEAR DROP CELLS: ABNORMAL
THB: 10.8 G/DL (ref 11.5–16.5)
THB: 11.2 G/DL (ref 11.5–16.5)
TIME ANALYZED: 1544
TIME ANALYZED: 1747
TRICYCLIC ANTIDEPRESSANTS SCREEN SERUM: NEGATIVE NG/ML
VT MECHANICAL: 500 ML
WBC # BLD: 5.5 E9/L (ref 4.5–11.5)

## 2023-07-06 PROCEDURE — 6360000002 HC RX W HCPCS: Performed by: STUDENT IN AN ORGANIZED HEALTH CARE EDUCATION/TRAINING PROGRAM

## 2023-07-06 PROCEDURE — 5A1945Z RESPIRATORY VENTILATION, 24-96 CONSECUTIVE HOURS: ICD-10-PCS | Performed by: SPECIALIST

## 2023-07-06 PROCEDURE — 6360000002 HC RX W HCPCS: Performed by: NURSE PRACTITIONER

## 2023-07-06 PROCEDURE — 2500000003 HC RX 250 WO HCPCS

## 2023-07-06 PROCEDURE — 31500 INSERT EMERGENCY AIRWAY: CPT

## 2023-07-06 PROCEDURE — 97165 OT EVAL LOW COMPLEX 30 MIN: CPT

## 2023-07-06 PROCEDURE — 80179 DRUG ASSAY SALICYLATE: CPT

## 2023-07-06 PROCEDURE — 0BH17EZ INSERTION OF ENDOTRACHEAL AIRWAY INTO TRACHEA, VIA NATURAL OR ARTIFICIAL OPENING: ICD-10-PCS | Performed by: SPECIALIST

## 2023-07-06 PROCEDURE — 93005 ELECTROCARDIOGRAM TRACING: CPT

## 2023-07-06 PROCEDURE — 80143 DRUG ASSAY ACETAMINOPHEN: CPT

## 2023-07-06 PROCEDURE — 97161 PT EVAL LOW COMPLEX 20 MIN: CPT

## 2023-07-06 PROCEDURE — 97535 SELF CARE MNGMENT TRAINING: CPT

## 2023-07-06 PROCEDURE — 2580000003 HC RX 258: Performed by: STUDENT IN AN ORGANIZED HEALTH CARE EDUCATION/TRAINING PROGRAM

## 2023-07-06 PROCEDURE — 97530 THERAPEUTIC ACTIVITIES: CPT

## 2023-07-06 PROCEDURE — 94002 VENT MGMT INPAT INIT DAY: CPT

## 2023-07-06 PROCEDURE — 2580000003 HC RX 258: Performed by: INTERNAL MEDICINE

## 2023-07-06 PROCEDURE — 6360000002 HC RX W HCPCS: Performed by: INTERNAL MEDICINE

## 2023-07-06 PROCEDURE — 83605 ASSAY OF LACTIC ACID: CPT

## 2023-07-06 PROCEDURE — 82805 BLOOD GASES W/O2 SATURATION: CPT

## 2023-07-06 PROCEDURE — 2580000003 HC RX 258: Performed by: NURSE PRACTITIONER

## 2023-07-06 PROCEDURE — 36415 COLL VENOUS BLD VENIPUNCTURE: CPT

## 2023-07-06 PROCEDURE — 82140 ASSAY OF AMMONIA: CPT

## 2023-07-06 PROCEDURE — 74018 RADEX ABDOMEN 1 VIEW: CPT

## 2023-07-06 PROCEDURE — 2000000000 HC ICU R&B

## 2023-07-06 PROCEDURE — 6370000000 HC RX 637 (ALT 250 FOR IP): Performed by: NURSE PRACTITIONER

## 2023-07-06 PROCEDURE — 71045 X-RAY EXAM CHEST 1 VIEW: CPT

## 2023-07-06 PROCEDURE — 6370000000 HC RX 637 (ALT 250 FOR IP): Performed by: INTERNAL MEDICINE

## 2023-07-06 PROCEDURE — 6370000000 HC RX 637 (ALT 250 FOR IP): Performed by: STUDENT IN AN ORGANIZED HEALTH CARE EDUCATION/TRAINING PROGRAM

## 2023-07-06 PROCEDURE — 85025 COMPLETE CBC W/AUTO DIFF WBC: CPT

## 2023-07-06 PROCEDURE — 82077 ASSAY SPEC XCP UR&BREATH IA: CPT

## 2023-07-06 PROCEDURE — 99223 1ST HOSP IP/OBS HIGH 75: CPT | Performed by: INTERNAL MEDICINE

## 2023-07-06 PROCEDURE — 83735 ASSAY OF MAGNESIUM: CPT

## 2023-07-06 PROCEDURE — A4216 STERILE WATER/SALINE, 10 ML: HCPCS | Performed by: STUDENT IN AN ORGANIZED HEALTH CARE EDUCATION/TRAINING PROGRAM

## 2023-07-06 PROCEDURE — 80048 BASIC METABOLIC PNL TOTAL CA: CPT

## 2023-07-06 PROCEDURE — 84132 ASSAY OF SERUM POTASSIUM: CPT

## 2023-07-06 PROCEDURE — 2500000003 HC RX 250 WO HCPCS: Performed by: STUDENT IN AN ORGANIZED HEALTH CARE EDUCATION/TRAINING PROGRAM

## 2023-07-06 PROCEDURE — 84100 ASSAY OF PHOSPHORUS: CPT

## 2023-07-06 PROCEDURE — 31500 INSERT EMERGENCY AIRWAY: CPT | Performed by: INTERNAL MEDICINE

## 2023-07-06 PROCEDURE — 80307 DRUG TEST PRSMV CHEM ANLYZR: CPT

## 2023-07-06 PROCEDURE — 82962 GLUCOSE BLOOD TEST: CPT

## 2023-07-06 PROCEDURE — 94640 AIRWAY INHALATION TREATMENT: CPT

## 2023-07-06 PROCEDURE — 6360000002 HC RX W HCPCS

## 2023-07-06 PROCEDURE — C9113 INJ PANTOPRAZOLE SODIUM, VIA: HCPCS | Performed by: STUDENT IN AN ORGANIZED HEALTH CARE EDUCATION/TRAINING PROGRAM

## 2023-07-06 PROCEDURE — 80053 COMPREHEN METABOLIC PANEL: CPT

## 2023-07-06 RX ORDER — LABETALOL HYDROCHLORIDE 5 MG/ML
10 INJECTION, SOLUTION INTRAVENOUS EVERY 6 HOURS PRN
Status: DISCONTINUED | OUTPATIENT
Start: 2023-07-06 | End: 2023-07-11 | Stop reason: HOSPADM

## 2023-07-06 RX ORDER — LANOLIN ALCOHOL/MO/W.PET/CERES
100 CREAM (GRAM) TOPICAL DAILY
Status: DISCONTINUED | OUTPATIENT
Start: 2023-07-06 | End: 2023-07-06

## 2023-07-06 RX ORDER — LORAZEPAM 1 MG/1
1 TABLET ORAL
Status: DISCONTINUED | OUTPATIENT
Start: 2023-07-06 | End: 2023-07-10

## 2023-07-06 RX ORDER — ETOMIDATE 2 MG/ML
30 INJECTION INTRAVENOUS ONCE
Status: COMPLETED | OUTPATIENT
Start: 2023-07-06 | End: 2023-07-06

## 2023-07-06 RX ORDER — LORAZEPAM 2 MG/ML
2 INJECTION INTRAMUSCULAR
Status: DISCONTINUED | OUTPATIENT
Start: 2023-07-06 | End: 2023-07-10

## 2023-07-06 RX ORDER — THIAMINE HYDROCHLORIDE 100 MG/ML
100 INJECTION, SOLUTION INTRAMUSCULAR; INTRAVENOUS DAILY
Status: DISCONTINUED | OUTPATIENT
Start: 2023-07-06 | End: 2023-07-06

## 2023-07-06 RX ORDER — SUCCINYLCHOLINE CHLORIDE 20 MG/ML
INJECTION INTRAMUSCULAR; INTRAVENOUS
Status: COMPLETED
Start: 2023-07-06 | End: 2023-07-06

## 2023-07-06 RX ORDER — ETOMIDATE 2 MG/ML
INJECTION INTRAVENOUS
Status: COMPLETED
Start: 2023-07-06 | End: 2023-07-06

## 2023-07-06 RX ORDER — FENTANYL CITRATE 50 UG/ML
200 INJECTION, SOLUTION INTRAMUSCULAR; INTRAVENOUS ONCE
Status: COMPLETED | OUTPATIENT
Start: 2023-07-06 | End: 2023-07-06

## 2023-07-06 RX ORDER — SUCCINYLCHOLINE CHLORIDE 20 MG/ML
180 INJECTION INTRAMUSCULAR; INTRAVENOUS ONCE
Status: COMPLETED | OUTPATIENT
Start: 2023-07-06 | End: 2023-07-06

## 2023-07-06 RX ORDER — LORAZEPAM 1 MG/1
3 TABLET ORAL
Status: DISCONTINUED | OUTPATIENT
Start: 2023-07-06 | End: 2023-07-10

## 2023-07-06 RX ORDER — LORAZEPAM 2 MG/ML
1 INJECTION INTRAMUSCULAR
Status: DISCONTINUED | OUTPATIENT
Start: 2023-07-06 | End: 2023-07-10

## 2023-07-06 RX ORDER — FOLIC ACID 1 MG/1
1 TABLET ORAL DAILY
Status: DISCONTINUED | OUTPATIENT
Start: 2023-07-06 | End: 2023-07-06

## 2023-07-06 RX ORDER — LORAZEPAM 2 MG/ML
2 INJECTION INTRAMUSCULAR
Status: DISCONTINUED | OUTPATIENT
Start: 2023-07-06 | End: 2023-07-06

## 2023-07-06 RX ORDER — MINERAL OIL AND WHITE PETROLATUM 150; 830 MG/G; MG/G
OINTMENT OPHTHALMIC EVERY 4 HOURS
Status: DISCONTINUED | OUTPATIENT
Start: 2023-07-06 | End: 2023-07-11

## 2023-07-06 RX ORDER — SODIUM CHLORIDE 0.9 % (FLUSH) 0.9 %
5-40 SYRINGE (ML) INJECTION EVERY 12 HOURS SCHEDULED
Status: DISCONTINUED | OUTPATIENT
Start: 2023-07-06 | End: 2023-07-11 | Stop reason: HOSPADM

## 2023-07-06 RX ORDER — THIAMINE HYDROCHLORIDE 100 MG/ML
200 INJECTION, SOLUTION INTRAMUSCULAR; INTRAVENOUS EVERY 8 HOURS
Status: COMPLETED | OUTPATIENT
Start: 2023-07-06 | End: 2023-07-09

## 2023-07-06 RX ORDER — POLYVINYL ALCOHOL 14 MG/ML
1 SOLUTION/ DROPS OPHTHALMIC EVERY 4 HOURS
Status: DISCONTINUED | OUTPATIENT
Start: 2023-07-06 | End: 2023-07-11

## 2023-07-06 RX ORDER — PROPOFOL 10 MG/ML
5-50 INJECTION, EMULSION INTRAVENOUS CONTINUOUS
Status: DISCONTINUED | OUTPATIENT
Start: 2023-07-06 | End: 2023-07-06

## 2023-07-06 RX ORDER — MIDAZOLAM HYDROCHLORIDE 1 MG/ML
1-10 INJECTION, SOLUTION INTRAVENOUS CONTINUOUS
Status: DISCONTINUED | OUTPATIENT
Start: 2023-07-06 | End: 2023-07-11

## 2023-07-06 RX ORDER — SODIUM CHLORIDE 0.9 % (FLUSH) 0.9 %
5-40 SYRINGE (ML) INJECTION PRN
Status: DISCONTINUED | OUTPATIENT
Start: 2023-07-06 | End: 2023-07-11 | Stop reason: HOSPADM

## 2023-07-06 RX ORDER — LORAZEPAM 2 MG/ML
4 INJECTION INTRAMUSCULAR
Status: DISCONTINUED | OUTPATIENT
Start: 2023-07-06 | End: 2023-07-10

## 2023-07-06 RX ORDER — CHLORHEXIDINE GLUCONATE 0.12 MG/ML
15 RINSE ORAL 2 TIMES DAILY
Status: DISCONTINUED | OUTPATIENT
Start: 2023-07-06 | End: 2023-07-11

## 2023-07-06 RX ORDER — MIDAZOLAM HYDROCHLORIDE 1 MG/ML
1-10 INJECTION, SOLUTION INTRAVENOUS CONTINUOUS
Status: DISCONTINUED | OUTPATIENT
Start: 2023-07-06 | End: 2023-07-06

## 2023-07-06 RX ORDER — MIDAZOLAM HYDROCHLORIDE 2 MG/2ML
4 INJECTION, SOLUTION INTRAMUSCULAR; INTRAVENOUS ONCE
Status: COMPLETED | OUTPATIENT
Start: 2023-07-06 | End: 2023-07-06

## 2023-07-06 RX ORDER — FOLIC ACID 5 MG/ML
1 INJECTION, SOLUTION INTRAMUSCULAR; INTRAVENOUS; SUBCUTANEOUS DAILY
Status: DISCONTINUED | OUTPATIENT
Start: 2023-07-07 | End: 2023-07-11 | Stop reason: CLARIF

## 2023-07-06 RX ORDER — SODIUM CHLORIDE 9 MG/ML
INJECTION, SOLUTION INTRAVENOUS PRN
Status: DISCONTINUED | OUTPATIENT
Start: 2023-07-06 | End: 2023-07-11 | Stop reason: HOSPADM

## 2023-07-06 RX ORDER — LORAZEPAM 1 MG/1
4 TABLET ORAL
Status: DISCONTINUED | OUTPATIENT
Start: 2023-07-06 | End: 2023-07-10

## 2023-07-06 RX ORDER — THIAMINE HYDROCHLORIDE 100 MG/ML
100 INJECTION, SOLUTION INTRAMUSCULAR; INTRAVENOUS DAILY
Status: DISCONTINUED | OUTPATIENT
Start: 2023-07-10 | End: 2023-07-11 | Stop reason: CLARIF

## 2023-07-06 RX ORDER — LORAZEPAM 2 MG/ML
2 INJECTION INTRAMUSCULAR ONCE
Status: COMPLETED | OUTPATIENT
Start: 2023-07-06 | End: 2023-07-06

## 2023-07-06 RX ORDER — LORAZEPAM 2 MG/ML
3 INJECTION INTRAMUSCULAR
Status: DISCONTINUED | OUTPATIENT
Start: 2023-07-06 | End: 2023-07-10

## 2023-07-06 RX ORDER — LORAZEPAM 1 MG/1
2 TABLET ORAL
Status: DISCONTINUED | OUTPATIENT
Start: 2023-07-06 | End: 2023-07-10

## 2023-07-06 RX ORDER — PANTOPRAZOLE SODIUM 40 MG/10ML
40 INJECTION, POWDER, LYOPHILIZED, FOR SOLUTION INTRAVENOUS DAILY
Status: DISCONTINUED | OUTPATIENT
Start: 2023-07-06 | End: 2023-07-06 | Stop reason: ALTCHOICE

## 2023-07-06 RX ADMIN — PANTOPRAZOLE SODIUM 40 MG: 40 TABLET, DELAYED RELEASE ORAL at 05:07

## 2023-07-06 RX ADMIN — POLYVINYL ALCOHOL 1 DROP: 14 SOLUTION/ DROPS OPHTHALMIC at 23:06

## 2023-07-06 RX ADMIN — FENTANYL CITRATE 200 MCG: 50 INJECTION INTRAMUSCULAR; INTRAVENOUS at 16:15

## 2023-07-06 RX ADMIN — ARFORMOTEROL TARTRATE 15 MCG: 15 SOLUTION RESPIRATORY (INHALATION) at 20:13

## 2023-07-06 RX ADMIN — ALBUTEROL SULFATE 2.5 MG: 2.5 SOLUTION RESPIRATORY (INHALATION) at 20:13

## 2023-07-06 RX ADMIN — ARFORMOTEROL TARTRATE 15 MCG: 15 SOLUTION RESPIRATORY (INHALATION) at 07:33

## 2023-07-06 RX ADMIN — HYDROCODONE BITARTRATE AND ACETAMINOPHEN 1 TABLET: 7.5; 325 TABLET ORAL at 10:20

## 2023-07-06 RX ADMIN — IPRATROPIUM BROMIDE 0.5 MG: 0.5 SOLUTION RESPIRATORY (INHALATION) at 07:33

## 2023-07-06 RX ADMIN — HYDROCODONE BITARTRATE AND ACETAMINOPHEN 1 TABLET: 7.5; 325 TABLET ORAL at 06:13

## 2023-07-06 RX ADMIN — ALBUTEROL SULFATE 2.5 MG: 2.5 SOLUTION RESPIRATORY (INHALATION) at 11:58

## 2023-07-06 RX ADMIN — HEPARIN SODIUM 5000 UNITS: 10000 INJECTION INTRAVENOUS; SUBCUTANEOUS at 05:07

## 2023-07-06 RX ADMIN — PRAVASTATIN SODIUM 20 MG: 20 TABLET ORAL at 20:47

## 2023-07-06 RX ADMIN — AMLODIPINE BESYLATE 5 MG: 5 TABLET ORAL at 08:22

## 2023-07-06 RX ADMIN — FOLIC ACID 1 MG: 1 TABLET ORAL at 11:45

## 2023-07-06 RX ADMIN — LOSARTAN POTASSIUM 50 MG: 50 TABLET, FILM COATED ORAL at 08:22

## 2023-07-06 RX ADMIN — ALBUTEROL SULFATE 2.5 MG: 2.5 SOLUTION RESPIRATORY (INHALATION) at 07:33

## 2023-07-06 RX ADMIN — Medication 100 MG: at 08:22

## 2023-07-06 RX ADMIN — ALBUTEROL SULFATE 2.5 MG: 2.5 SOLUTION RESPIRATORY (INHALATION) at 17:05

## 2023-07-06 RX ADMIN — IPRATROPIUM BROMIDE 0.5 MG: 0.5 SOLUTION RESPIRATORY (INHALATION) at 20:13

## 2023-07-06 RX ADMIN — Medication 100 MG: at 11:45

## 2023-07-06 RX ADMIN — ESCITALOPRAM OXALATE 10 MG: 10 TABLET ORAL at 08:22

## 2023-07-06 RX ADMIN — HEPARIN SODIUM 5000 UNITS: 10000 INJECTION INTRAVENOUS; SUBCUTANEOUS at 22:11

## 2023-07-06 RX ADMIN — SODIUM CHLORIDE, PRESERVATIVE FREE 40 MG: 5 INJECTION INTRAVENOUS at 17:49

## 2023-07-06 RX ADMIN — POLYVINYL ALCOHOL 1 DROP: 14 SOLUTION/ DROPS OPHTHALMIC at 20:46

## 2023-07-06 RX ADMIN — LORAZEPAM 4 MG: 2 INJECTION INTRAMUSCULAR; INTRAVENOUS at 14:01

## 2023-07-06 RX ADMIN — ASPIRIN 81 MG CHEWABLE TABLET 81 MG: 81 TABLET CHEWABLE at 08:24

## 2023-07-06 RX ADMIN — Medication 2 MG/HR: at 16:41

## 2023-07-06 RX ADMIN — SODIUM CHLORIDE, PRESERVATIVE FREE 10 ML: 5 INJECTION INTRAVENOUS at 20:46

## 2023-07-06 RX ADMIN — 0.12% CHLORHEXIDINE GLUCONATE 15 ML: 1.2 RINSE ORAL at 20:46

## 2023-07-06 RX ADMIN — Medication 10 ML: at 20:47

## 2023-07-06 RX ADMIN — LORAZEPAM 2 MG: 2 INJECTION INTRAMUSCULAR; INTRAVENOUS at 16:37

## 2023-07-06 RX ADMIN — SODIUM CHLORIDE: 9 INJECTION, SOLUTION INTRAVENOUS at 17:55

## 2023-07-06 RX ADMIN — HYDROCODONE BITARTRATE AND ACETAMINOPHEN 1 TABLET: 7.5; 325 TABLET ORAL at 02:01

## 2023-07-06 RX ADMIN — IPRATROPIUM BROMIDE 0.5 MG: 0.5 SOLUTION RESPIRATORY (INHALATION) at 17:04

## 2023-07-06 RX ADMIN — ETOMIDATE 30 MG: 2 INJECTION, SOLUTION INTRAVENOUS at 16:16

## 2023-07-06 RX ADMIN — LORAZEPAM 4 MG: 1 TABLET ORAL at 10:39

## 2023-07-06 RX ADMIN — LORAZEPAM 2 MG: 2 INJECTION INTRAMUSCULAR; INTRAVENOUS at 12:46

## 2023-07-06 RX ADMIN — THIAMINE HYDROCHLORIDE 200 MG: 100 INJECTION, SOLUTION INTRAMUSCULAR; INTRAVENOUS at 17:48

## 2023-07-06 RX ADMIN — ISOSORBIDE DINITRATE 10 MG: 10 TABLET ORAL at 08:22

## 2023-07-06 RX ADMIN — ETOMIDATE 30 MG: 2 INJECTION INTRAVENOUS at 16:16

## 2023-07-06 RX ADMIN — MIDAZOLAM 4 MG: 1 INJECTION INTRAMUSCULAR; INTRAVENOUS at 16:21

## 2023-07-06 RX ADMIN — IPRATROPIUM BROMIDE 0.5 MG: 0.5 SOLUTION RESPIRATORY (INHALATION) at 11:58

## 2023-07-06 RX ADMIN — BUDESONIDE 500 MCG: 0.5 SUSPENSION RESPIRATORY (INHALATION) at 07:33

## 2023-07-06 RX ADMIN — SUCCINYLCHOLINE CHLORIDE 180 MG: 20 INJECTION INTRAMUSCULAR; INTRAVENOUS at 16:18

## 2023-07-06 RX ADMIN — Medication 10 ML: at 08:22

## 2023-07-06 RX ADMIN — BUDESONIDE 500 MCG: 0.5 SUSPENSION RESPIRATORY (INHALATION) at 20:13

## 2023-07-06 RX ADMIN — METOPROLOL TARTRATE 75 MG: 25 TABLET, FILM COATED ORAL at 08:22

## 2023-07-06 RX ADMIN — SODIUM CHLORIDE, PRESERVATIVE FREE 10 ML: 5 INJECTION INTRAVENOUS at 12:16

## 2023-07-06 RX ADMIN — Medication 50 MCG/HR: at 16:40

## 2023-07-06 RX ADMIN — HEPARIN SODIUM 5000 UNITS: 10000 INJECTION INTRAVENOUS; SUBCUTANEOUS at 17:48

## 2023-07-06 RX ADMIN — SUCCINYLCHOLINE CHLORIDE 180 MG: 20 INJECTION, SOLUTION INTRAMUSCULAR; INTRAVENOUS at 16:18

## 2023-07-06 ASSESSMENT — PULMONARY FUNCTION TESTS
PIF_VALUE: 23
PIF_VALUE: 25
PIF_VALUE: 23
PIF_VALUE: 32
PIF_VALUE: 28
PIF_VALUE: 28
PIF_VALUE: 29
PIF_VALUE: 23
PIF_VALUE: 25
PIF_VALUE: 24
PIF_VALUE: 27

## 2023-07-06 ASSESSMENT — PAIN DESCRIPTION - LOCATION
LOCATION: BACK;BUTTOCKS
LOCATION: LEG;HIP
LOCATION: BACK;HIP

## 2023-07-06 ASSESSMENT — PAIN DESCRIPTION - ORIENTATION
ORIENTATION: RIGHT
ORIENTATION: RIGHT

## 2023-07-06 ASSESSMENT — PAIN SCALES - GENERAL
PAINLEVEL_OUTOF10: 6
PAINLEVEL_OUTOF10: 7
PAINLEVEL_OUTOF10: 0
PAINLEVEL_OUTOF10: 0
PAINLEVEL_OUTOF10: 9

## 2023-07-06 ASSESSMENT — PAIN DESCRIPTION - DESCRIPTORS
DESCRIPTORS: ACHING
DESCRIPTORS: ACHING;CRAMPING
DESCRIPTORS: ACHING;THROBBING

## 2023-07-06 NOTE — PROGRESS NOTES
education:   Pt verbalized understanding Pt demonstrated skill Pt requires further education in this area   yes yes Reinforcement      ASSESSMENT:    Conditions Requiring Skilled Therapeutic Intervention:    [x]Decreased strength     []Decreased ROM  [x]Decreased functional mobility  [x]Decreased balance   [x]Decreased endurance   []Decreased posture  []Decreased sensation  [x]Decreased coordination   []Decreased vision  [x]Decreased safety awareness   [x]Increased pain       Comments:  Pt supine in bed upon entering, agreeable to participate; pt son in the room. Pt c/o SOB with doing activity; SpO2 at 97% and HR within 70-90 BPM. Pt transferred from supine to sit with assist; pt showed normal sitting balance. Pt reporting hallucinations at times, pt's safety maintained throughout. Pt performed STS with assist and no AD; pt given verbal cueing for hand placement. Pt ambulated with a fair michael and a good step length; pt given cueing to take rest breaks if needed due to SOB. Pt SpO2 after ambulating back to room at 95%; pt given verbal cueing for pursed lip breathing. Pt SpO2 increased but wanted to use inhaler. Pt transferred back to supine in bed with assist and remained there with all needs met, call light in reach, TSM, and son in the room. Treatment:  Patient practiced and was instructed in the following treatment:    Bed mobility training - pt given verbal and tactile cues to facilitate proper sequencing and safety during rolling and supine<>sit as well as provided with stand by assistance to complete task   STS and pivot transfer training - pt educated on proper hand and foot placement, safety and sequencing, and use of verbal and tactile cues to safely complete sit<>stand and pivot transfers with stand by assistance to complete task safely   Gait training- pt was given verbal and tactile cues to facilitate safety during ambulation as well as provided with stand by assistance. Pt's/ family goals   1.

## 2023-07-06 NOTE — PLAN OF CARE
Problem: Safety - Adult  Goal: Free from fall injury  7/6/2023 1120 by Tyree Hemphill RN  Outcome: Progressing  7/6/2023 0136 by Sondra Workman RN  Outcome: Progressing     Problem: Pain  Goal: Verbalizes/displays adequate comfort level or baseline comfort level  7/6/2023 1120 by Tyree Hemphill RN  Outcome: Progressing  7/6/2023 0136 by Sondra Workman RN  Outcome: Progressing

## 2023-07-06 NOTE — CONSULTS
Medical Intensive Care Unit     [facility]  Resident Consult Note    Date and time: 7/6/2023 5:30 PM  Patient's name:  Madhavi Edmondson  Medical Record Number: [id number]  Patient's account/billing number: [de-identified]  Patient's YOB: 1965  Age: 62 y.o. Date of Admission: 7/4/2023  2:14 PM  Length of stay during current admission: 1    Primary Care Physician: Rogelio Eubanks MD  ICU Attending Physician: Dr. Shelley Sites Status: Full Code    Reason for ICU admission: intubated for airway protection    History of Present Illness:   Mr. Enrique Obrien is a 61 yo male with PMHx of COPD, alcohol abuse, tobacco abuse, marijuana abuse who presented to [facility] ED on 7/4 after a mechnaical fall at home. History is obtained via chart. Pt was being managed on the floor for right hip pain and ISA. Right hip xray was negative for fracture. ISA resolved with fluid administration. This morning, patient began experiencing hallucinations. Per [facility] protocol, he received 8mg lorazepam total for worsening withdrawal symptoms until ultimately RRT was called. RRT was called this afternoon after patient's mental status worsened and he became unresponsive to verbal stimuli, snoring loudly. He was able to be aroused to noxious stimuli and was oriented to self one time after noxious stimuli. EKG showed no acute changes and labwork was benign. ABG showed mild hypercarbia with PCO2 50. Patient's son was present at the bedside for visiting hours. He states the patient had been intubated during a previous admission for airway protection in the setting of alcohol withdrawal and consented to intubated. Patient was brought down to MICU and intubated.       CURRENT VENTILATION STATUS:   [x] Ventilator  [] BIPAP  [] Nasal Cannula [] Room Air      SECRETIONS   Amount:  [] Small [] Moderate  [] Large [x] None    Color:     [] White [] Colored  [] Bloody    SEDATION:  RAAS Score:  [] Propofol gtt  [x] Versed

## 2023-07-06 NOTE — CARE COORDINATION
Plan is home when medically ready. Cardiology was consulted for [facility] TT  AND EKG CHANGES. Await input and plan. Pt reports he has a walking stick and a nebulizer that he uses at home. Pt reports he is mostly independent with ADLs but recently has been more short of breath and has needed assistance with laundry, cleaning, and shopping. SW made initial referral to [facility] to see if pt is eligible for programming to assist with some ADLs. Await PT/OT evals to make sure patient is safe to return home. Emily Brock RN CM  [phone]          I met with patient and son Cris Rodrigez in room to discuss therapy evals and transition of care. Patient had just received some ativan for ETOH withdrawal and was not able to participate in the conversation at this time. Son Cris Rodrigez said the plan is for his Dad to return home but would like home health care and does not have a preference for an agency. Referral made to Tika at [facility]. Home health care orders are in. Referral also made to Nury Trejo at [facility]. Await input and plan. Jay Rodrigez requested DPOA/ Living Will forms. I explained that patient would pepper to be A&O x 3-4 to fill out forms. Son voiced understanding. Forms provided. Jay Rodrigez will transport his father home at time of discharge.   Emily Brock RN CM  [phone]

## 2023-07-06 NOTE — CONSULTS
INPATIENT CARDIOLOGY CONSULT    Name: Kenia Feliciano    Age: 62 y.o. Date of Service: 7/6/2023    Chief Complaint: Follow-up for dyspnea on exertion, exertional chest pain, elevated chest pain    Referring Physician: Muna Coulter MD    History of Present Illness:  Kenia Feliciano is a 62 y.o. male who presented to [facility] on 7/4/23 for further evaluation of right leg/hip pain s/p mechanical fall. Cardiology consulted for elevated troponin. Patient initially evaluated by me at [facility] during his 4/2022 hospitalization --> progressive LANGFORD and exertional chest pain/diaphoresis x ~ 3 months at that time --> \"chest tightness. .something laying on my chest\"/diaphoresis, occurs with exertion and resolves with rest, +associated SOB, occurring more frequently and with lesser levels of exertion over the past ~ 3 days. Cardiac catheterization postponed at that time d/t ETOH withdrawal while hospitalized. He ultimately underwent a stress test and echocardiogram (negative stress test, normal EF on both studies) and he was treated medically with BB and nitrate --> he eventually underwent a cardiac catheterization in 6/2022 (mild CAD). He continues to complain of the above symptoms intermittently. He denies recent palpitations, syncope, or orthopnea. He is currently with no active cardiac complaints at rest. SR on EKG and telemetry.     Review of Systems:   Cardiac: As per HPI  General: No fever, chills  Pulmonary: As per HPI  HEENT: No visual disturbances, difficult swallowing  GI: No nausea, vomiting  : No dysuria, hematuria  Endocrine: No thyroid disease or DM  Musculoskeletal: SHUKLA x 4, no focal motor deficits  Skin: Intact, no rashes  Neuro: No headache, seizures  Psych: Currently with no depression, anxiety    Past Medical History:  Past Medical History:   Diagnosis Date    Alcohol abuse     Arthritis     Back pain, chronic     CAD in native artery 6/8/2022    COPD (chronic obstructive pulmonary disease) ([address])     H/O

## 2023-07-06 NOTE — PROCEDURES
Intubation  [facility] 7/6/2023 [id number]  Performed by:Nata Cohen MD    Consent: The procedure was performed in an emergent situation. Indications: respiratory failure, airway protection, hypoxemia and hypercapnia  Intubation method: direct  Patient status: sedated  Pretreatment medications: midazolam, fentanyl, etomidate, succinate  Laryngoscope size: C-MAC, D scope  Tube size: 8.0 mm  Tube type: uncuffed  Number of attempts: 1  Ventilation between attempts: BVM  Cords visualized: yes  Post-procedure assessment: ETCO2 monitor and chest rise  Breath sounds: equal  Cuff inflated: yes   Tube secured at 24 cm at the lip with: ETT patel  Chest x-ray was ordered to confirm placement. Patient tolerance: Patient tolerated the procedure well with no immediate complications. Electronically signed by Tsering Renae MD on 7/6/2023 at 5:32 PM    [facility]  Department of Pulmonary, Critical Care and Sleep Medicine  [address] [place] [address]  Department of Internal Medicine  Attending Procedural Note Addendum Statement    This procedure was supervised. The critical elements of this procedure was monitored and, if needed, I was available for the needs of the procedure.      Kaci Mtz DO, FCCP, Anu Granger

## 2023-07-06 NOTE — PROGRESS NOTES
Patient arrived to room 4425. Patient unresponsive with sonorous respirations. Resident and Dr. Krueger Clbryce at bedside to intubate.

## 2023-07-06 NOTE — HOME CARE
[facility] referral received. Spoke with patient, demographics verified. Plan to see after discharge. Dean Baldwin  [facility].

## 2023-07-06 NOTE — PROGRESS NOTES
Patient admitted to MICU with the following belongings:  Lg Carter. The following belongings admitted with the patient, None, were sent home with the patient's family. Two rings at bedside and placed in container.

## 2023-07-06 NOTE — PROGRESS NOTES
4 Eyes Skin Assessment     NAME:  Hedy Balbuena  YOB: 1965  MEDICAL RECORD NUMBER:  [id number]    The patient is being assessed for  Transfer to New Unit    I agree that at least one RN has performed a thorough Head to Toe Skin Assessment on the patient. ALL assessment sites listed below have been assessed. Areas assessed by both nurses:    Shoulders, Back, Chest, Arms, Elbows, Hands, Sacrum. Buttock, Coccyx, Ischium, and Legs. Feet and Heels        Does the Patient have a Wound?  No noted wound(s)       Tay Prevention initiated by RN: Yes  Wound Care Orders initiated by RN: No    Pressure Injury (Stage 3,4, Unstageable, DTI, NWPT, and Complex wounds) if present, place Wound referral order by RN under : No    New Ostomies, if present place, Ostomy referral order under : No     Nurse 1 eSignature: Electronically signed by Brittany Nunez RN on 7/6/23 at 7:56 PM EDT    **SHARE this note so that the co-signing nurse can place an eSignature**    Nurse 2 eSignature: Electronically signed by Anisha Bingham RN on 7/6/23 at 7:56 PM EDT

## 2023-07-06 NOTE — PLAN OF CARE
Problem: Discharge Planning  Goal: Discharge to home or other facility with appropriate resources  Outcome: Not Progressing  Flowsheets (Taken 7/6/2023 1800)  Discharge to home or other facility with appropriate resources:   Identify barriers to discharge with patient and caregiver   Arrange for needed discharge resources and transportation as appropriate     Problem: Neurosensory - Adult  Goal: Achieves stable or improved neurological status  Outcome: Not Progressing  Flowsheets (Taken 7/6/2023 1800)  Achieves stable or improved neurological status: Assess for and report changes in neurological status     Problem: Gastrointestinal - Adult  Goal: Maintains adequate nutritional intake  Outcome: Not Progressing     Problem: Safety - Adult  Goal: Free from fall injury  7/6/2023 1952 by Arturo Marion RN  Outcome: Progressing  Flowsheets (Taken 7/6/2023 1800)  Free From Fall Injury:   Instruct family/caregiver on patient safety   Based on caregiver fall risk screen, instruct family/caregiver to ask for assistance with transferring infant if caregiver noted to have fall risk factors  7/6/2023 1120 by Joseph Tam RN  Outcome: Progressing     Problem: Pain  Goal: Verbalizes/displays adequate comfort level or baseline comfort level  7/6/2023 1952 by Arturo Marion RN  Outcome: Progressing  Flowsheets (Taken 7/6/2023 1800)  Verbalizes/displays adequate comfort level or baseline comfort level:   Encourage patient to monitor pain and request assistance   Assess pain using appropriate pain scale   Administer analgesics based on type and severity of pain and evaluate response   Implement non-pharmacological measures as appropriate and evaluate response   Consider cultural and social influences on pain and pain management   Notify Licensed Independent Practitioner if interventions unsuccessful or patient reports new pain  7/6/2023 1120 by Joseph Tam RN  Outcome: Progressing     Problem: Safety - Medical Restraint  Goal:

## 2023-07-06 NOTE — PROGRESS NOTES
Patient agitated and attempting to pull at lines/tubes when restraints are loosened. Patient unable to be verbally redirected. Order obtained for bilateral wrist and ankle restraints. Son, Anusha Choudhury, called and updated that patient remains in restraints and also updated on patient's new room number and visiting hours.

## 2023-07-06 NOTE — PROGRESS NOTES
Patient's son Carleen Dockery, was present during RRT on 80. He was given his dads belongings and new room number.

## 2023-07-06 NOTE — PROGRESS NOTES
[place] [address] [address], [place]        Date:2023                                                  Patient Name: Nely Dominguez    MRN: [id number]    : 1965    Room: 0-A      Evaluating OT: Mehran Toledo OTR/L; [id number]      Referring Provider: JOSE Felix CNP    Specific Provider Orders/Date: OT Eval and Treat 23      Diagnosis: ISA (acute kidney injury)   Elevated troponin    Surgery: none this admission     Pertinent Medical History:  has a past medical history of Alcohol abuse, Arthritis, Back pain, chronic, CAD in native artery, COPD (chronic obstructive pulmonary disease) ([address]), H/O cardiovascular stress test, Hyperlipidemia, Hypertension, Hyponatremia, Neck pain, chronic, and Tobacco abuse.       Reason for Admission: fall down steps tripped by dog with R leg and hip pain    Recommended Adaptive Equipment:  TBD pending progression     Precautions:  Fall Risk, Bed Alarm, TSM, Cognition, Hallucinations, Impulsive, Hx of Alcohol Abuse     Assessment of current deficits:    [x] Functional mobility  [x]ADLs  [x] Strength               [x]Cognition    [x] Functional transfers   [x] IADLs         [x] Safety Awareness   [x]Endurance    [x] Fine Coordination              [x] Balance      [] Vision/perception   []Sensation     []Gross Motor Coordination  [] ROM  [] Delirium                   [] Motor Control     OT PLAN OF CARE   OT POC based on physician orders, patient diagnosis and results of clinical assessment    Frequency/Duration: 1-3 days/wk for 2 weeks PRN   Specific OT Treatment Interventions to include:   * Instruction/training on adapted ADL techniques and AE recommendations to increase functional independence within precautions       * Training on energy conservation strategies, correct breathing pattern and techniques to improve independence/tolerance for

## 2023-07-07 ENCOUNTER — APPOINTMENT (OUTPATIENT)
Dept: GENERAL RADIOLOGY | Age: 58
DRG: 682 | End: 2023-07-07
Payer: MEDICARE

## 2023-07-07 PROBLEM — I50.32 CHRONIC HEART FAILURE WITH PRESERVED EJECTION FRACTION (HFPEF) (HCC): Status: ACTIVE | Noted: 2023-07-07

## 2023-07-07 LAB
AADO2: 182.7 MMHG
ANION GAP SERPL CALCULATED.3IONS-SCNC: 15 MMOL/L (ref 7–16)
B.E.: -1.2 MMOL/L (ref -3–3)
BUN SERPL-MCNC: 9 MG/DL (ref 6–20)
CALCIUM SERPL-MCNC: 8 MG/DL (ref 8.6–10.2)
CHLORIDE SERPL-SCNC: 106 MMOL/L (ref 98–107)
CO2 SERPL-SCNC: 18 MMOL/L (ref 22–29)
COHB: 0.6 % (ref 0–1.5)
CREAT SERPL-MCNC: 1 MG/DL (ref 0.7–1.2)
CRITICAL: ABNORMAL
DATE ANALYZED: ABNORMAL
DATE OF COLLECTION: ABNORMAL
EKG ATRIAL RATE: 82 BPM
EKG P AXIS: 46 DEGREES
EKG P-R INTERVAL: 142 MS
EKG Q-T INTERVAL: 406 MS
EKG QRS DURATION: 92 MS
EKG QTC CALCULATION (BAZETT): 474 MS
EKG R AXIS: 60 DEGREES
EKG T AXIS: 23 DEGREES
EKG VENTRICULAR RATE: 82 BPM
FIO2: 60 %
GLUCOSE SERPL-MCNC: 84 MG/DL (ref 74–99)
HCO3: 23.3 MMOL/L (ref 22–26)
HHB: 0.8 % (ref 0–5)
LAB: ABNORMAL
Lab: ABNORMAL
METHB: 0.5 % (ref 0–1.5)
MODE: AC
O2 CONTENT: 12.9 ML/DL
O2 SATURATION: 99.2 % (ref 92–98.5)
O2HB: 98.1 % (ref 94–97)
OPERATOR ID: 2860
PATIENT TEMP: 37 C
PCO2: 37.9 MMHG (ref 35–45)
PEEP/CPAP: 8 CMH2O
PFO2: 3.14 MMHG/%
PH BLOOD GAS: 7.41 (ref 7.35–7.45)
PO2: 188.4 MMHG (ref 75–100)
POTASSIUM SERPL-SCNC: 3.7 MMOL/L (ref 3.5–5)
RI(T): 0.97
RR MECHANICAL: 16 B/MIN
SODIUM SERPL-SCNC: 139 MMOL/L (ref 132–146)
SOURCE, BLOOD GAS: ABNORMAL
THB: 9 G/DL (ref 11.5–16.5)
TIME ANALYZED: 451
VT MECHANICAL: 500 ML

## 2023-07-07 PROCEDURE — 82805 BLOOD GASES W/O2 SATURATION: CPT

## 2023-07-07 PROCEDURE — 6360000002 HC RX W HCPCS: Performed by: INTERNAL MEDICINE

## 2023-07-07 PROCEDURE — 6360000002 HC RX W HCPCS: Performed by: STUDENT IN AN ORGANIZED HEALTH CARE EDUCATION/TRAINING PROGRAM

## 2023-07-07 PROCEDURE — 6360000002 HC RX W HCPCS: Performed by: NURSE PRACTITIONER

## 2023-07-07 PROCEDURE — 6370000000 HC RX 637 (ALT 250 FOR IP): Performed by: STUDENT IN AN ORGANIZED HEALTH CARE EDUCATION/TRAINING PROGRAM

## 2023-07-07 PROCEDURE — 99233 SBSQ HOSP IP/OBS HIGH 50: CPT | Performed by: INTERNAL MEDICINE

## 2023-07-07 PROCEDURE — 51701 INSERT BLADDER CATHETER: CPT

## 2023-07-07 PROCEDURE — C9113 INJ PANTOPRAZOLE SODIUM, VIA: HCPCS | Performed by: STUDENT IN AN ORGANIZED HEALTH CARE EDUCATION/TRAINING PROGRAM

## 2023-07-07 PROCEDURE — 2580000003 HC RX 258: Performed by: STUDENT IN AN ORGANIZED HEALTH CARE EDUCATION/TRAINING PROGRAM

## 2023-07-07 PROCEDURE — 51798 US URINE CAPACITY MEASURE: CPT

## 2023-07-07 PROCEDURE — 94003 VENT MGMT INPAT SUBQ DAY: CPT

## 2023-07-07 PROCEDURE — 93010 ELECTROCARDIOGRAM REPORT: CPT | Performed by: INTERNAL MEDICINE

## 2023-07-07 PROCEDURE — 99291 CRITICAL CARE FIRST HOUR: CPT | Performed by: INTERNAL MEDICINE

## 2023-07-07 PROCEDURE — 2500000003 HC RX 250 WO HCPCS: Performed by: STUDENT IN AN ORGANIZED HEALTH CARE EDUCATION/TRAINING PROGRAM

## 2023-07-07 PROCEDURE — 6370000000 HC RX 637 (ALT 250 FOR IP): Performed by: NURSE PRACTITIONER

## 2023-07-07 PROCEDURE — 2000000000 HC ICU R&B

## 2023-07-07 PROCEDURE — 94640 AIRWAY INHALATION TREATMENT: CPT

## 2023-07-07 PROCEDURE — 71045 X-RAY EXAM CHEST 1 VIEW: CPT

## 2023-07-07 PROCEDURE — 2580000003 HC RX 258: Performed by: INTERNAL MEDICINE

## 2023-07-07 PROCEDURE — 80048 BASIC METABOLIC PNL TOTAL CA: CPT

## 2023-07-07 RX ORDER — POTASSIUM CHLORIDE 7.45 MG/ML
10 INJECTION INTRAVENOUS
Status: COMPLETED | OUTPATIENT
Start: 2023-07-07 | End: 2023-07-07

## 2023-07-07 RX ORDER — SODIUM CHLORIDE, SODIUM LACTATE, POTASSIUM CHLORIDE, CALCIUM CHLORIDE 600; 310; 30; 20 MG/100ML; MG/100ML; MG/100ML; MG/100ML
INJECTION, SOLUTION INTRAVENOUS CONTINUOUS
Status: ACTIVE | OUTPATIENT
Start: 2023-07-07 | End: 2023-07-08

## 2023-07-07 RX ADMIN — THIAMINE HYDROCHLORIDE 200 MG: 100 INJECTION, SOLUTION INTRAMUSCULAR; INTRAVENOUS at 02:30

## 2023-07-07 RX ADMIN — Medication 10 MEQ: at 09:44

## 2023-07-07 RX ADMIN — SODIUM CHLORIDE, PRESERVATIVE FREE 10 ML: 5 INJECTION INTRAVENOUS at 09:26

## 2023-07-07 RX ADMIN — METOPROLOL TARTRATE 75 MG: 25 TABLET, FILM COATED ORAL at 09:24

## 2023-07-07 RX ADMIN — 0.12% CHLORHEXIDINE GLUCONATE 15 ML: 1.2 RINSE ORAL at 20:27

## 2023-07-07 RX ADMIN — SODIUM CHLORIDE, PRESERVATIVE FREE 40 MG: 5 INJECTION INTRAVENOUS at 09:25

## 2023-07-07 RX ADMIN — POLYVINYL ALCOHOL 1 DROP: 14 SOLUTION/ DROPS OPHTHALMIC at 20:34

## 2023-07-07 RX ADMIN — PRAVASTATIN SODIUM 20 MG: 20 TABLET ORAL at 20:24

## 2023-07-07 RX ADMIN — ARFORMOTEROL TARTRATE 15 MCG: 15 SOLUTION RESPIRATORY (INHALATION) at 08:55

## 2023-07-07 RX ADMIN — HEPARIN SODIUM 5000 UNITS: 10000 INJECTION INTRAVENOUS; SUBCUTANEOUS at 18:20

## 2023-07-07 RX ADMIN — IPRATROPIUM BROMIDE 0.5 MG: 0.5 SOLUTION RESPIRATORY (INHALATION) at 20:32

## 2023-07-07 RX ADMIN — ARFORMOTEROL TARTRATE 15 MCG: 15 SOLUTION RESPIRATORY (INHALATION) at 20:32

## 2023-07-07 RX ADMIN — Medication 4 MG/HR: at 16:02

## 2023-07-07 RX ADMIN — ALBUTEROL SULFATE 2.5 MG: 2.5 SOLUTION RESPIRATORY (INHALATION) at 13:17

## 2023-07-07 RX ADMIN — IPRATROPIUM BROMIDE 0.5 MG: 0.5 SOLUTION RESPIRATORY (INHALATION) at 13:17

## 2023-07-07 RX ADMIN — ALBUTEROL SULFATE 2.5 MG: 2.5 SOLUTION RESPIRATORY (INHALATION) at 08:54

## 2023-07-07 RX ADMIN — ESCITALOPRAM OXALATE 10 MG: 10 TABLET ORAL at 09:24

## 2023-07-07 RX ADMIN — Medication 10 MEQ: at 06:36

## 2023-07-07 RX ADMIN — Medication 50 MCG/HR: at 16:11

## 2023-07-07 RX ADMIN — LORAZEPAM 2 MG: 2 INJECTION INTRAMUSCULAR; INTRAVENOUS at 21:08

## 2023-07-07 RX ADMIN — IPRATROPIUM BROMIDE 0.5 MG: 0.5 SOLUTION RESPIRATORY (INHALATION) at 16:56

## 2023-07-07 RX ADMIN — BUDESONIDE 500 MCG: 0.5 SUSPENSION RESPIRATORY (INHALATION) at 08:55

## 2023-07-07 RX ADMIN — IPRATROPIUM BROMIDE 0.5 MG: 0.5 SOLUTION RESPIRATORY (INHALATION) at 08:55

## 2023-07-07 RX ADMIN — LORAZEPAM 2 MG: 2 INJECTION INTRAMUSCULAR; INTRAVENOUS at 19:37

## 2023-07-07 RX ADMIN — ASPIRIN 81 MG CHEWABLE TABLET 81 MG: 81 TABLET CHEWABLE at 09:24

## 2023-07-07 RX ADMIN — ALBUTEROL SULFATE 2.5 MG: 2.5 SOLUTION RESPIRATORY (INHALATION) at 20:32

## 2023-07-07 RX ADMIN — FOLIC ACID 1 MG: 5 INJECTION, SOLUTION INTRAMUSCULAR; INTRAVENOUS; SUBCUTANEOUS at 09:26

## 2023-07-07 RX ADMIN — BUDESONIDE 500 MCG: 0.5 SUSPENSION RESPIRATORY (INHALATION) at 20:32

## 2023-07-07 RX ADMIN — ALBUTEROL SULFATE 2.5 MG: 2.5 SOLUTION RESPIRATORY (INHALATION) at 16:55

## 2023-07-07 RX ADMIN — HEPARIN SODIUM 5000 UNITS: 10000 INJECTION INTRAVENOUS; SUBCUTANEOUS at 06:40

## 2023-07-07 RX ADMIN — LORAZEPAM 2 MG: 2 INJECTION INTRAMUSCULAR; INTRAVENOUS at 22:31

## 2023-07-07 RX ADMIN — SODIUM CHLORIDE, POTASSIUM CHLORIDE, SODIUM LACTATE AND CALCIUM CHLORIDE: 600; 310; 30; 20 INJECTION, SOLUTION INTRAVENOUS at 18:19

## 2023-07-07 RX ADMIN — THIAMINE HYDROCHLORIDE 200 MG: 100 INJECTION, SOLUTION INTRAMUSCULAR; INTRAVENOUS at 18:20

## 2023-07-07 RX ADMIN — POLYVINYL ALCOHOL 1 DROP: 14 SOLUTION/ DROPS OPHTHALMIC at 05:41

## 2023-07-07 RX ADMIN — METOPROLOL TARTRATE 75 MG: 25 TABLET, FILM COATED ORAL at 20:24

## 2023-07-07 RX ADMIN — THIAMINE HYDROCHLORIDE 200 MG: 100 INJECTION, SOLUTION INTRAMUSCULAR; INTRAVENOUS at 09:25

## 2023-07-07 RX ADMIN — HEPARIN SODIUM 5000 UNITS: 10000 INJECTION INTRAVENOUS; SUBCUTANEOUS at 22:54

## 2023-07-07 RX ADMIN — SODIUM CHLORIDE, PRESERVATIVE FREE 10 ML: 5 INJECTION INTRAVENOUS at 20:27

## 2023-07-07 RX ADMIN — 0.12% CHLORHEXIDINE GLUCONATE 15 ML: 1.2 RINSE ORAL at 09:26

## 2023-07-07 ASSESSMENT — PULMONARY FUNCTION TESTS
PIF_VALUE: 26
PIF_VALUE: 24
PIF_VALUE: 23
PIF_VALUE: 29
PIF_VALUE: 27
PIF_VALUE: 26
PIF_VALUE: 25
PIF_VALUE: 34
PIF_VALUE: 29
PIF_VALUE: 26
PIF_VALUE: 24
PIF_VALUE: 25
PIF_VALUE: 25
PIF_VALUE: 22
PIF_VALUE: 23
PIF_VALUE: 25
PIF_VALUE: 29
PIF_VALUE: 24
PIF_VALUE: 24
PIF_VALUE: 26
PIF_VALUE: 21

## 2023-07-07 ASSESSMENT — PAIN SCALES - GENERAL
PAINLEVEL_OUTOF10: 0
PAINLEVEL_OUTOF10: 0

## 2023-07-07 NOTE — PROGRESS NOTES
Patient agitated and attempting to pull at lines/tubes when restraints are loosened. Patient unable to be verbally redirected. Restraints remain in place to prevent displacement of ETT and other lines in place.

## 2023-07-07 NOTE — PLAN OF CARE
Problem: Safety - Medical Restraint  Goal: Remains free of injury from restraints (Restraint for Interference with Medical Device)  Description: INTERVENTIONS:  1. Determine that other, less restrictive measures have been tried or would not be effective before applying the restraint  2. Evaluate the patient's condition at the time of restraint application  3. Inform patient/family regarding the reason for restraint  4. Q2H: Monitor safety, psychosocial status, comfort, nutrition and hydration  7/6/2023 2334 by Martha Gary RN  Outcome: Progressing  Flowsheets (Taken 7/6/2023 2334)  Remains free of injury from restraints (restraint for interference with medical device):   Every 2 hours: Monitor safety, psychosocial status, comfort, nutrition and hydration   Evaluate the patient's condition at the time of restraint application   Determine that other, less restrictive measures have been tried or would not be effective before applying the restraint   Inform patient/family regarding the reason for restraint  7/6/2023 1952 by Melani Jameson RN  Outcome: Progressing  Flowsheets (Taken 7/6/2023 1800)  Remains free of injury from restraints (restraint for interference with medical device):   Determine that other, less restrictive measures have been tried or would not be effective before applying the restraint   Evaluate the patient's condition at the time of restraint application   Inform patient/family regarding the reason for restraint   Every 2 hours: Monitor safety, psychosocial status, comfort, nutrition and hydration     Problem: Neurosensory - Adult  Goal: Achieves stable or improved neurological status  7/6/2023 2334 by Martha Gary RN  Outcome: Progressing  Flowsheets (Taken 7/6/2023 2334)  Achieves stable or improved neurological status:   Monitor temperature, glucose, and sodium.  Initiate appropriate interventions as ordered   Maintain blood pressure and fluid volume within ordered parameters to optimize

## 2023-07-07 NOTE — PROGRESS NOTES
Physical Therapy    Pt on PT caseload. Will require re-evaluation following change in status and transfer to intensive care. Pt not appropriate for PT services at this time d/t medical and cognitive status. Will sign off for now, please re-consult when needed. Thank you.     Lucian Corley, PT, DPT  [id number]

## 2023-07-07 NOTE — PROGRESS NOTES
INPATIENT CARDIOLOGY FOLLOW-UP    Name: Moira De Leon    Age: 62 y.o. Date of Service: 7/7/2023    Chief Complaint: Follow-up for dyspnea on exertion, exertional chest pain, elevated chest pain    Referring Physician: Lauri Etienne MD    History of Present Illness:  Moira De Leon is a 62 y.o. male who presented to [facility] on 7/4/23 for further evaluation of right leg/hip pain s/p mechanical fall. Cardiology consulted for elevated troponin. Patient initially evaluated by me at [facility] during his 4/2022 hospitalization --> progressive LANGFORD and exertional chest pain/diaphoresis x ~ 3 months at that time --> \"chest tightness. .something laying on my chest\"/diaphoresis, occurs with exertion and resolves with rest, +associated SOB, occurring more frequently and with lesser levels of exertion. Cardiac catheterization postponed at that time d/t ETOH withdrawal while hospitalized. He ultimately underwent a stress test and echocardiogram (negative stress test, normal EF on both studies) and he was treated medically with BB and nitrate --> he eventually underwent a cardiac catheterization in 6/2022 (mild CAD). He continues to complain of the above symptoms intermittently. He denies recent palpitations, syncope, or orthopnea. SR on EKG and telemetry. +ETOH withdrawal/respiratory distress --> intubated on 7/6/23. He follows commands.     Review of Systems:   Cardiac: As per HPI  General: No fever, chills  Pulmonary: As per HPI  HEENT: No visual disturbances, difficult swallowing  GI: No nausea, vomiting  : No dysuria, hematuria  Endocrine: No thyroid disease or DM  Musculoskeletal: SHUKLA x 4, no focal motor deficits  Skin: Intact, no rashes  Neuro: No headache, seizures  Psych: Currently with no depression, anxiety    Past Medical History:  Past Medical History:   Diagnosis Date    Alcohol abuse     Arthritis     Back pain, chronic     CAD in native artery 6/8/2022    COPD (chronic obstructive pulmonary disease) ([address])     H/O

## 2023-07-07 NOTE — CARE COORDINATION
Care Coordination: LOS 2 Day. RRT from 80, AMS 2/2 Alcohol withdrawal, inability to protect airway, intubated and transfer to MICU, Fentanyl, Versed. Plan per prev CM is home with skyler Vyas with [facility]. Referral made to [facility], referral to Hafsa Lloyd at [facility]. Skyler Vyas to transport home. Would benefit from therapy evals when medically appropriate.     Electronically signed by Jeromy Jeffers RN on 7/7/2023 at 12:34 PM

## 2023-07-07 NOTE — PROGRESS NOTES
(Results Pending)        Resident's Assessment and Plan     Assessment and Plan:    Cielo Overton is a 62 y.o. male      HE/She is currently being  managed for       Neurologic  Altered mental status 2/2 alcohol withdrawal  -Thiamin 200mg TID  - Intubated to protect airway  -Midazolam  -fentanyl  Cardiovascular  PM Hx of CAD   Hypertension  -Cardiology consulted  -per Cardiologist   -limited echocardiogram to reassess ventricular function  - Continue current medications  - Monitor labs  - Aggressive risk factor modifications / tobacco/ETOH/marijuana cessation  - Outpatient sleep study if no prior study / cardiac conditions associated with untreated KULDIP   -heparin 5000 units suvcutaneus TID  Pulmonary  PM HX of COPD  -Ipratropium 0.5 mg nebulization 4 times per day  -Budenoside 0.5,mg nebulization BID  -albuterol 2.5mg nebulization 4 times per day      Gastrointestinal  Nutrition needs  -We are going to start enteral feeding  -Keep an eye on BMP, Mg, phos to prevent refeeding syndrom    Genitourinary/Renal  Jayshree 2/2 Ibuprofen resolved with fluid intake    Hematology  Macrocytic Anemia Hb 9.6  2/2 alcohol use disorder and malnutrition  -folic acid    # Peptic ulcer prophylaxis:PPI  # DVT Prophylaxis: Heparin  # Disposition: Cont current care   Ion Lou MD, PGY-1    Attending Physician: Dr. Pj Martinez        Senior Resident Addendum:  I have seen and examined the patient with the intern. I have discussed the case, including pertinent history and exam findings with the intern.  I agree with the assessment, plan and orders as documented above with the following additions:    Changes made as necessary above    Jas Saleh MD, PGY-3  7/7/2023  5:45 PM      [address] [facility]  Department of Pulmonary, Critical Care and Sleep Medicine  [address] [place] [address]   Department of Internal Medicine      During multidisciplinary team rounds Cielo Overton is a 62

## 2023-07-07 NOTE — PROGRESS NOTES
Comprehensive Nutrition Assessment    Type and Reason for Visit:  Initial, Consult (New TF)    Nutrition Recommendations/Plan:     Continue NPO, Start Tube Feeding     Standard with fiber (Jevity 1.5) @ 65 ml/hr. Will provide: 1560 ml tv, 2340 kcals, 99 gm pro, 1185 ml free water         Malnutrition Assessment:  Malnutrition Status:  Insufficient data (07/07/23 1534)    Context:  Chronic Illness     Findings of the 6 clinical characteristics of malnutrition:  Energy Intake:  Mild decrease in energy intake   Weight Loss:  Unable to assess     Body Fat Loss:  No significant body fat loss     Muscle Mass Loss:  No significant muscle mass loss    Fluid Accumulation:  No significant fluid accumulation     Strength:  Not Performed    Nutrition Assessment:    Pt admit s/p fall w/ AMS 2/2 Alcohol withdrawal now RRT intubated transfer to MICU 7/6. Noted ISA. PMHx COPD, CAD, ETOH abuse. Plans to start EN support- will provide TF recs & monitor. Nutrition Related Findings:    Pt intubated/ sedated, MAP WNL, +I/Os, +1 pitting edema, active BS, NGT clamped Wound Type: None       Current Nutrition Intake & Therapies:    Average Meal Intake: NPO     ADULT TUBE FEEDING; Nasogastric; Standard with Fiber; Continuous; 10; No; 30; Q 3 hours    Anthropometric Measures:  Height: 6' (182.9 cm)  Ideal Body Weight (IBW): 178 lbs (81 kg)    Admission Body Weight: 258 lb 8.3 oz (117.3 kg) (7/6 first measured)  Current Body Weight: 267 lb 6.4 oz (121.3 kg) (7/7 actual), 150.2 % IBW.     Current BMI (kg/m2): 36.3  Usual Body Weight:  (wt fluctuations 277-284lb EMR measured within 1 year+ back)                       BMI Categories: Obese Class 2 (BMI 35.0 -39.9)    Estimated Daily Nutrient Needs:  Energy Requirements Based On: Formula  Weight Used for Energy Requirements: Current  Energy (kcal/day): PS3B [id number]  Weight Used for Protein Requirements: Ideal  Protein (g/day): 1.2-1.4 g/kg IBW;      Fluid (ml/day): per

## 2023-07-08 ENCOUNTER — APPOINTMENT (OUTPATIENT)
Dept: GENERAL RADIOLOGY | Age: 58
DRG: 682 | End: 2023-07-08
Payer: MEDICARE

## 2023-07-08 LAB
AADO2: 131.8 MMHG
ANION GAP SERPL CALCULATED.3IONS-SCNC: 10 MMOL/L (ref 7–16)
ANION GAP SERPL CALCULATED.3IONS-SCNC: 12 MMOL/L (ref 7–16)
B.E.: -2.1 MMOL/L (ref -3–3)
BASOPHILS # BLD: 0.05 E9/L (ref 0–0.2)
BASOPHILS NFR BLD: 0.8 % (ref 0–2)
BUN SERPL-MCNC: 7 MG/DL (ref 6–20)
BUN SERPL-MCNC: 8 MG/DL (ref 6–20)
CALCIUM SERPL-MCNC: 8 MG/DL (ref 8.6–10.2)
CALCIUM SERPL-MCNC: 8.5 MG/DL (ref 8.6–10.2)
CHLORIDE SERPL-SCNC: 101 MMOL/L (ref 98–107)
CHLORIDE SERPL-SCNC: 105 MMOL/L (ref 98–107)
CO2 SERPL-SCNC: 19 MMOL/L (ref 22–29)
CO2 SERPL-SCNC: 19 MMOL/L (ref 22–29)
COHB: 0.6 % (ref 0–1.5)
CREAT SERPL-MCNC: 0.7 MG/DL (ref 0.7–1.2)
CREAT SERPL-MCNC: 0.8 MG/DL (ref 0.7–1.2)
CRITICAL: ABNORMAL
DATE ANALYZED: ABNORMAL
DATE OF COLLECTION: ABNORMAL
EOSINOPHIL # BLD: 0.23 E9/L (ref 0.05–0.5)
EOSINOPHIL NFR BLD: 3.8 % (ref 0–6)
ERYTHROCYTE [DISTWIDTH] IN BLOOD BY AUTOMATED COUNT: 15.2 FL (ref 11.5–15)
FIO2: 40 %
GLUCOSE SERPL-MCNC: 100 MG/DL (ref 74–99)
GLUCOSE SERPL-MCNC: 102 MG/DL (ref 74–99)
HCO3: 20.9 MMOL/L (ref 22–26)
HCT VFR BLD AUTO: 34.2 % (ref 37–54)
HGB BLD-MCNC: 10.7 G/DL (ref 12.5–16.5)
HHB: 1.7 % (ref 0–5)
IMM GRANULOCYTES # BLD: 0.2 E9/L
IMM GRANULOCYTES NFR BLD: 3.3 % (ref 0–5)
LAB: ABNORMAL
LV EF: 68 %
LVEF MODALITY: NORMAL
LYMPHOCYTES # BLD: 0.68 E9/L (ref 1.5–4)
LYMPHOCYTES NFR BLD: 11.3 % (ref 20–42)
Lab: ABNORMAL
MAGNESIUM SERPL-MCNC: 1.8 MG/DL (ref 1.6–2.6)
MCH RBC QN AUTO: 32.8 PG (ref 26–35)
MCHC RBC AUTO-ENTMCNC: 31.3 % (ref 32–34.5)
MCV RBC AUTO: 104.9 FL (ref 80–99.9)
METHB: 0.3 % (ref 0–1.5)
MODE: AC
MONOCYTES # BLD: 0.88 E9/L (ref 0.1–0.95)
MONOCYTES NFR BLD: 14.7 % (ref 2–12)
NEUTROPHILS # BLD: 3.96 E9/L (ref 1.8–7.3)
NEUTS SEG NFR BLD: 66.1 % (ref 43–80)
O2 CONTENT: 16.6 ML/DL
O2 SATURATION: 98.3 % (ref 92–98.5)
O2HB: 97.4 % (ref 94–97)
OPERATOR ID: ABNORMAL
PATIENT TEMP: 37 C
PCO2: 30.2 MMHG (ref 35–45)
PEEP/CPAP: 8 CMH2O
PFO2: 2.72 MMHG/%
PH BLOOD GAS: 7.46 (ref 7.35–7.45)
PHOSPHATE SERPL-MCNC: 1.9 MG/DL (ref 2.5–4.5)
PLATELET # BLD AUTO: 206 E9/L (ref 130–450)
PMV BLD AUTO: 10.2 FL (ref 7–12)
PO2: 108.7 MMHG (ref 75–100)
POTASSIUM SERPL-SCNC: 3.9 MMOL/L (ref 3.5–5)
POTASSIUM SERPL-SCNC: 4.2 MMOL/L (ref 3.5–5)
RBC # BLD AUTO: 3.26 E12/L (ref 3.8–5.8)
RI(T): 1.21
RR MECHANICAL: 16 B/MIN
SODIUM SERPL-SCNC: 132 MMOL/L (ref 132–146)
SODIUM SERPL-SCNC: 134 MMOL/L (ref 132–146)
SOURCE, BLOOD GAS: ABNORMAL
THB: 12 G/DL (ref 11.5–16.5)
TIME ANALYZED: 507
VT MECHANICAL: 500 ML
WBC # BLD: 6 E9/L (ref 4.5–11.5)

## 2023-07-08 PROCEDURE — 87077 CULTURE AEROBIC IDENTIFY: CPT

## 2023-07-08 PROCEDURE — 85025 COMPLETE CBC W/AUTO DIFF WBC: CPT

## 2023-07-08 PROCEDURE — 94003 VENT MGMT INPAT SUBQ DAY: CPT

## 2023-07-08 PROCEDURE — 84100 ASSAY OF PHOSPHORUS: CPT

## 2023-07-08 PROCEDURE — 82805 BLOOD GASES W/O2 SATURATION: CPT

## 2023-07-08 PROCEDURE — 2000000000 HC ICU R&B

## 2023-07-08 PROCEDURE — 71045 X-RAY EXAM CHEST 1 VIEW: CPT

## 2023-07-08 PROCEDURE — 6360000002 HC RX W HCPCS: Performed by: INTERNAL MEDICINE

## 2023-07-08 PROCEDURE — 83735 ASSAY OF MAGNESIUM: CPT

## 2023-07-08 PROCEDURE — 2580000003 HC RX 258: Performed by: INTERNAL MEDICINE

## 2023-07-08 PROCEDURE — 87088 URINE BACTERIA CULTURE: CPT

## 2023-07-08 PROCEDURE — 80048 BASIC METABOLIC PNL TOTAL CA: CPT

## 2023-07-08 PROCEDURE — 2580000003 HC RX 258: Performed by: STUDENT IN AN ORGANIZED HEALTH CARE EDUCATION/TRAINING PROGRAM

## 2023-07-08 PROCEDURE — 6360000002 HC RX W HCPCS: Performed by: NURSE PRACTITIONER

## 2023-07-08 PROCEDURE — C9113 INJ PANTOPRAZOLE SODIUM, VIA: HCPCS | Performed by: STUDENT IN AN ORGANIZED HEALTH CARE EDUCATION/TRAINING PROGRAM

## 2023-07-08 PROCEDURE — 93308 TTE F-UP OR LMTD: CPT

## 2023-07-08 PROCEDURE — 87186 SC STD MICRODIL/AGAR DIL: CPT

## 2023-07-08 PROCEDURE — 6370000000 HC RX 637 (ALT 250 FOR IP): Performed by: NURSE PRACTITIONER

## 2023-07-08 PROCEDURE — 2500000003 HC RX 250 WO HCPCS: Performed by: STUDENT IN AN ORGANIZED HEALTH CARE EDUCATION/TRAINING PROGRAM

## 2023-07-08 PROCEDURE — 6360000002 HC RX W HCPCS: Performed by: STUDENT IN AN ORGANIZED HEALTH CARE EDUCATION/TRAINING PROGRAM

## 2023-07-08 PROCEDURE — 6360000004 HC RX CONTRAST MEDICATION

## 2023-07-08 PROCEDURE — 6360000002 HC RX W HCPCS

## 2023-07-08 PROCEDURE — 51798 US URINE CAPACITY MEASURE: CPT

## 2023-07-08 PROCEDURE — 36415 COLL VENOUS BLD VENIPUNCTURE: CPT

## 2023-07-08 PROCEDURE — 6370000000 HC RX 637 (ALT 250 FOR IP): Performed by: STUDENT IN AN ORGANIZED HEALTH CARE EDUCATION/TRAINING PROGRAM

## 2023-07-08 PROCEDURE — 87206 SMEAR FLUORESCENT/ACID STAI: CPT

## 2023-07-08 PROCEDURE — 87070 CULTURE OTHR SPECIMN AEROBIC: CPT

## 2023-07-08 PROCEDURE — 87081 CULTURE SCREEN ONLY: CPT

## 2023-07-08 PROCEDURE — 99291 CRITICAL CARE FIRST HOUR: CPT | Performed by: INTERNAL MEDICINE

## 2023-07-08 PROCEDURE — 94640 AIRWAY INHALATION TREATMENT: CPT

## 2023-07-08 PROCEDURE — 51702 INSERT TEMP BLADDER CATH: CPT

## 2023-07-08 RX ORDER — PROPOFOL 10 MG/ML
5-50 INJECTION, EMULSION INTRAVENOUS CONTINUOUS
Status: DISCONTINUED | OUTPATIENT
Start: 2023-07-08 | End: 2023-07-10

## 2023-07-08 RX ADMIN — IPRATROPIUM BROMIDE 0.5 MG: 0.5 SOLUTION RESPIRATORY (INHALATION) at 20:16

## 2023-07-08 RX ADMIN — SODIUM CHLORIDE, PRESERVATIVE FREE 40 MG: 5 INJECTION INTRAVENOUS at 08:22

## 2023-07-08 RX ADMIN — POLYVINYL ALCOHOL 1 DROP: 14 SOLUTION/ DROPS OPHTHALMIC at 20:30

## 2023-07-08 RX ADMIN — PROPOFOL 20 MCG/KG/MIN: 10 INJECTION, EMULSION INTRAVENOUS at 17:32

## 2023-07-08 RX ADMIN — Medication 7 MG/HR: at 08:22

## 2023-07-08 RX ADMIN — VANCOMYCIN HYDROCHLORIDE 1750 MG: 10 INJECTION, POWDER, LYOPHILIZED, FOR SOLUTION INTRAVENOUS at 17:26

## 2023-07-08 RX ADMIN — IPRATROPIUM BROMIDE 0.5 MG: 0.5 SOLUTION RESPIRATORY (INHALATION) at 12:32

## 2023-07-08 RX ADMIN — IPRATROPIUM BROMIDE 0.5 MG: 0.5 SOLUTION RESPIRATORY (INHALATION) at 08:39

## 2023-07-08 RX ADMIN — SODIUM CHLORIDE, PRESERVATIVE FREE 10 ML: 5 INJECTION INTRAVENOUS at 08:27

## 2023-07-08 RX ADMIN — LORAZEPAM 2 MG: 2 INJECTION INTRAMUSCULAR; INTRAVENOUS at 04:55

## 2023-07-08 RX ADMIN — ESCITALOPRAM OXALATE 10 MG: 10 TABLET ORAL at 08:26

## 2023-07-08 RX ADMIN — THIAMINE HYDROCHLORIDE 200 MG: 100 INJECTION, SOLUTION INTRAMUSCULAR; INTRAVENOUS at 01:33

## 2023-07-08 RX ADMIN — HEPARIN SODIUM 5000 UNITS: 10000 INJECTION INTRAVENOUS; SUBCUTANEOUS at 13:38

## 2023-07-08 RX ADMIN — ALBUTEROL SULFATE 2.5 MG: 2.5 SOLUTION RESPIRATORY (INHALATION) at 12:32

## 2023-07-08 RX ADMIN — ARFORMOTEROL TARTRATE 15 MCG: 15 SOLUTION RESPIRATORY (INHALATION) at 20:16

## 2023-07-08 RX ADMIN — Medication 100 MCG/HR: at 17:28

## 2023-07-08 RX ADMIN — POLYVINYL ALCOHOL 1 DROP: 14 SOLUTION/ DROPS OPHTHALMIC at 23:48

## 2023-07-08 RX ADMIN — Medication 100 MCG/HR: at 08:20

## 2023-07-08 RX ADMIN — SODIUM PHOSPHATE, MONOBASIC, MONOHYDRATE AND SODIUM PHOSPHATE, DIBASIC, ANHYDROUS 20 MMOL: 276; 142 INJECTION, SOLUTION INTRAVENOUS at 10:19

## 2023-07-08 RX ADMIN — ASPIRIN 81 MG CHEWABLE TABLET 81 MG: 81 TABLET CHEWABLE at 08:26

## 2023-07-08 RX ADMIN — FOLIC ACID 1 MG: 5 INJECTION, SOLUTION INTRAMUSCULAR; INTRAVENOUS; SUBCUTANEOUS at 10:13

## 2023-07-08 RX ADMIN — METOPROLOL TARTRATE 75 MG: 25 TABLET, FILM COATED ORAL at 08:23

## 2023-07-08 RX ADMIN — 0.12% CHLORHEXIDINE GLUCONATE 15 ML: 1.2 RINSE ORAL at 20:29

## 2023-07-08 RX ADMIN — 0.12% CHLORHEXIDINE GLUCONATE 15 ML: 1.2 RINSE ORAL at 08:26

## 2023-07-08 RX ADMIN — METOPROLOL TARTRATE 75 MG: 25 TABLET, FILM COATED ORAL at 20:29

## 2023-07-08 RX ADMIN — Medication 100 MCG/HR: at 22:08

## 2023-07-08 RX ADMIN — ALBUTEROL SULFATE 2.5 MG: 2.5 SOLUTION RESPIRATORY (INHALATION) at 20:16

## 2023-07-08 RX ADMIN — IPRATROPIUM BROMIDE 0.5 MG: 0.5 SOLUTION RESPIRATORY (INHALATION) at 16:10

## 2023-07-08 RX ADMIN — ALBUTEROL SULFATE 2.5 MG: 2.5 SOLUTION RESPIRATORY (INHALATION) at 08:38

## 2023-07-08 RX ADMIN — PROPOFOL 40 MCG/KG/MIN: 10 INJECTION, EMULSION INTRAVENOUS at 23:26

## 2023-07-08 RX ADMIN — ACETAMINOPHEN 650 MG: 325 TABLET ORAL at 05:26

## 2023-07-08 RX ADMIN — SODIUM CHLORIDE, PRESERVATIVE FREE 10 ML: 5 INJECTION INTRAVENOUS at 20:30

## 2023-07-08 RX ADMIN — CEFEPIME 2000 MG: 2 INJECTION, POWDER, FOR SOLUTION INTRAVENOUS at 14:32

## 2023-07-08 RX ADMIN — ALBUTEROL SULFATE 2.5 MG: 2.5 SOLUTION RESPIRATORY (INHALATION) at 16:10

## 2023-07-08 RX ADMIN — BUDESONIDE 500 MCG: 0.5 SUSPENSION RESPIRATORY (INHALATION) at 20:16

## 2023-07-08 RX ADMIN — CEFEPIME 2000 MG: 2 INJECTION, POWDER, FOR SOLUTION INTRAVENOUS at 22:06

## 2023-07-08 RX ADMIN — BUDESONIDE 500 MCG: 0.5 SUSPENSION RESPIRATORY (INHALATION) at 08:38

## 2023-07-08 RX ADMIN — THIAMINE HYDROCHLORIDE 200 MG: 100 INJECTION, SOLUTION INTRAMUSCULAR; INTRAVENOUS at 08:22

## 2023-07-08 RX ADMIN — ARFORMOTEROL TARTRATE 15 MCG: 15 SOLUTION RESPIRATORY (INHALATION) at 08:38

## 2023-07-08 RX ADMIN — LORAZEPAM 2 MG: 2 INJECTION INTRAMUSCULAR; INTRAVENOUS at 14:06

## 2023-07-08 RX ADMIN — HEPARIN SODIUM 5000 UNITS: 10000 INJECTION INTRAVENOUS; SUBCUTANEOUS at 22:08

## 2023-07-08 RX ADMIN — HEPARIN SODIUM 5000 UNITS: 10000 INJECTION INTRAVENOUS; SUBCUTANEOUS at 05:40

## 2023-07-08 RX ADMIN — PROPOFOL 40 MCG/KG/MIN: 10 INJECTION, EMULSION INTRAVENOUS at 20:30

## 2023-07-08 RX ADMIN — PRAVASTATIN SODIUM 20 MG: 20 TABLET ORAL at 20:34

## 2023-07-08 RX ADMIN — THIAMINE HYDROCHLORIDE 200 MG: 100 INJECTION, SOLUTION INTRAMUSCULAR; INTRAVENOUS at 17:32

## 2023-07-08 ASSESSMENT — PAIN SCALES - GENERAL
PAINLEVEL_OUTOF10: 0

## 2023-07-08 ASSESSMENT — PULMONARY FUNCTION TESTS
PIF_VALUE: 24
PIF_VALUE: 34
PIF_VALUE: 4
PIF_VALUE: 24
PIF_VALUE: 27
PIF_VALUE: 34
PIF_VALUE: 29
PIF_VALUE: 25
PIF_VALUE: 41
PIF_VALUE: 29
PIF_VALUE: 32
PIF_VALUE: 25
PIF_VALUE: 26
PIF_VALUE: 34
PIF_VALUE: 26
PIF_VALUE: 27
PIF_VALUE: 24
PIF_VALUE: 26
PIF_VALUE: 28
PIF_VALUE: 26
PIF_VALUE: 29
PIF_VALUE: 28
PIF_VALUE: 29
PIF_VALUE: 13
PIF_VALUE: 24
PIF_VALUE: 33
PIF_VALUE: 24

## 2023-07-08 NOTE — PROGRESS NOTES
Pharmacy Consultation Note  (Antibiotic Dosing and Monitoring)    Initial consult date: 7/8/23  Consulting physician/provider: Dr. Bryant Osgood  Drug: Vancomycin  Indication: PNA    Age/  Gender Height Weight IBW  Allergy Information   57 y.o./male 6' (182.9 cm) 268 lb 8.3 oz (121.8 kg)     Ideal body weight: 77.6 kg (171 lb 1.2 oz)  Adjusted ideal body weight: 94 kg (207 lb 2 oz)   Pcn [penicillins]      Renal Function:  Recent Labs     07/07/23  0401 07/08/23  0442 07/08/23  1440   BUN 9 8 7   CREATININE 1.0 0.8 0.7       Intake/Output Summary (Last 24 hours) at 7/8/2023 1717  Last data filed at 7/8/2023 1400  Gross per 24 hour   Intake 2446.38 ml   Output 940 ml   Net 1506.38 ml       Vancomycin Monitoring:  Trough:  No results for input(s): VANCOTROUGH in the last 72 hours. Random:  No results for input(s): VANCORANDOM in the last 72 hours. No results for input(s): Nickola Slay in the last 72 hours. Historical Cultures:  Organism   Date Value Ref Range Status   04/14/2022 Staphylococcus coagulase-negative (A)  Final     No results for input(s): BC in the last 72 hours. Vancomycin Administration Times:  Recent vancomycin administrations        No vancomycin IV orders with administrations found. Orders not given:            vancomycin (VANCOCIN) 1,750 mg in sodium chloride 0.9 % 500 mL IVPB                    Assessment:  Patient is a 62 y.o. male who has been initiated on vancomycin  Estimated Creatinine Clearance: 155 mL/min (based on SCr of 0.7 mg/dL). To dose vancomycin, pharmacy will be utilizing [employer] to target AUC/ANAHI of 400-600    Plan:  Start vancomycin 1500 mg IV Q12h.   Estimated AUC/ANAHI of 542 and trough of 17.3  Will check vancomycin levels when appropriate  Will continue to monitor renal function   Pharmacy to follow    Pineda Rutledge PharmD, [address] [place] 7/8/2023 5:17 PM  Pharmacy Clinical Specialist, Emergency Medicine  [phone]
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
## 2023-07-09 ENCOUNTER — APPOINTMENT (OUTPATIENT)
Dept: ULTRASOUND IMAGING | Age: 58
DRG: 682 | End: 2023-07-09
Payer: MEDICARE

## 2023-07-09 ENCOUNTER — APPOINTMENT (OUTPATIENT)
Dept: GENERAL RADIOLOGY | Age: 58
DRG: 682 | End: 2023-07-09
Payer: MEDICARE

## 2023-07-09 LAB
AADO2: 127.7 MMHG
ALBUMIN SERPL-MCNC: 3.3 G/DL (ref 3.5–5.2)
ALP SERPL-CCNC: 138 U/L (ref 40–129)
ALT SERPL-CCNC: 29 U/L (ref 0–40)
ANION GAP SERPL CALCULATED.3IONS-SCNC: 10 MMOL/L (ref 7–16)
ANION GAP SERPL CALCULATED.3IONS-SCNC: 9 MMOL/L (ref 7–16)
ANISOCYTOSIS: ABNORMAL
AST SERPL-CCNC: 32 U/L (ref 0–39)
B.E.: -1.4 MMOL/L (ref -3–3)
BASOPHILS # BLD: 0.05 E9/L (ref 0–0.2)
BASOPHILS NFR BLD: 0.9 % (ref 0–2)
BILIRUB DIRECT SERPL-MCNC: 0.4 MG/DL (ref 0–0.3)
BILIRUB INDIRECT SERPL-MCNC: 0.2 MG/DL (ref 0–1)
BILIRUB SERPL-MCNC: 0.6 MG/DL (ref 0–1.2)
BUN SERPL-MCNC: 5 MG/DL (ref 6–20)
BUN SERPL-MCNC: 6 MG/DL (ref 6–20)
BURR CELLS: ABNORMAL
CALCIUM SERPL-MCNC: 8.3 MG/DL (ref 8.6–10.2)
CALCIUM SERPL-MCNC: 8.8 MG/DL (ref 8.6–10.2)
CHLORIDE SERPL-SCNC: 101 MMOL/L (ref 98–107)
CHLORIDE SERPL-SCNC: 105 MMOL/L (ref 98–107)
CO2 SERPL-SCNC: 21 MMOL/L (ref 22–29)
CO2 SERPL-SCNC: 24 MMOL/L (ref 22–29)
COHB: 0.8 % (ref 0–1.5)
CREAT SERPL-MCNC: 0.7 MG/DL (ref 0.7–1.2)
CREAT SERPL-MCNC: 0.7 MG/DL (ref 0.7–1.2)
CRITICAL: ABNORMAL
DATE ANALYZED: ABNORMAL
DATE OF COLLECTION: ABNORMAL
EOSINOPHIL # BLD: 0.14 E9/L (ref 0.05–0.5)
EOSINOPHIL NFR BLD: 2.6 % (ref 0–6)
ERYTHROCYTE [DISTWIDTH] IN BLOOD BY AUTOMATED COUNT: 14.9 FL (ref 11.5–15)
FIO2: 40 %
GLUCOSE SERPL-MCNC: 113 MG/DL (ref 74–99)
GLUCOSE SERPL-MCNC: 138 MG/DL (ref 74–99)
HCO3: 22.9 MMOL/L (ref 22–26)
HCT VFR BLD AUTO: 33.7 % (ref 37–54)
HGB BLD-MCNC: 10.6 G/DL (ref 12.5–16.5)
HHB: 2.1 % (ref 0–5)
LAB: ABNORMAL
LYMPHOCYTES # BLD: 0.53 E9/L (ref 1.5–4)
LYMPHOCYTES NFR BLD: 9.6 % (ref 20–42)
Lab: ABNORMAL
MAGNESIUM SERPL-MCNC: 1.5 MG/DL (ref 1.6–2.6)
MAGNESIUM SERPL-MCNC: 1.7 MG/DL (ref 1.6–2.6)
MCH RBC QN AUTO: 32.8 PG (ref 26–35)
MCHC RBC AUTO-ENTMCNC: 31.5 % (ref 32–34.5)
MCV RBC AUTO: 104.3 FL (ref 80–99.9)
METHB: 0.5 % (ref 0–1.5)
MODE: AC
MONOCYTES # BLD: 0.42 E9/L (ref 0.1–0.95)
MONOCYTES NFR BLD: 7.9 % (ref 2–12)
MYELOCYTES NFR BLD MANUAL: 0.9 % (ref 0–0)
NEUTROPHILS # BLD: 4.19 E9/L (ref 1.8–7.3)
NEUTS SEG NFR BLD: 78.1 % (ref 43–80)
O2 CONTENT: 16 ML/DL
O2 SATURATION: 97.9 % (ref 92–98.5)
O2HB: 96.6 % (ref 94–97)
OPERATOR ID: ABNORMAL
OVALOCYTES: ABNORMAL
PATIENT TEMP: 37 C
PCO2: 37.1 MMHG (ref 35–45)
PEEP/CPAP: 5 CMH2O
PFO2: 2.62 MMHG/%
PH BLOOD GAS: 7.41 (ref 7.35–7.45)
PHOSPHATE SERPL-MCNC: 3.2 MG/DL (ref 2.5–4.5)
PHOSPHATE SERPL-MCNC: 3.9 MG/DL (ref 2.5–4.5)
PLATELET # BLD AUTO: 208 E9/L (ref 130–450)
PMV BLD AUTO: 9.7 FL (ref 7–12)
PO2: 104.8 MMHG (ref 75–100)
POIKILOCYTES: ABNORMAL
POLYCHROMASIA: ABNORMAL
POTASSIUM SERPL-SCNC: 3.4 MMOL/L (ref 3.5–5)
POTASSIUM SERPL-SCNC: 4.3 MMOL/L (ref 3.5–5)
PROT SERPL-MCNC: 6.6 G/DL (ref 6.4–8.3)
RBC # BLD AUTO: 3.23 E12/L (ref 3.8–5.8)
RI(T): 1.22
RR MECHANICAL: 16 B/MIN
SODIUM SERPL-SCNC: 134 MMOL/L (ref 132–146)
SODIUM SERPL-SCNC: 136 MMOL/L (ref 132–146)
SOURCE, BLOOD GAS: ABNORMAL
THB: 11.7 G/DL (ref 11.5–16.5)
TIME ANALYZED: 445
VT MECHANICAL: 500 ML
WBC # BLD: 5.3 E9/L (ref 4.5–11.5)

## 2023-07-09 PROCEDURE — 2500000003 HC RX 250 WO HCPCS: Performed by: STUDENT IN AN ORGANIZED HEALTH CARE EDUCATION/TRAINING PROGRAM

## 2023-07-09 PROCEDURE — 6360000002 HC RX W HCPCS: Performed by: STUDENT IN AN ORGANIZED HEALTH CARE EDUCATION/TRAINING PROGRAM

## 2023-07-09 PROCEDURE — 6370000000 HC RX 637 (ALT 250 FOR IP): Performed by: STUDENT IN AN ORGANIZED HEALTH CARE EDUCATION/TRAINING PROGRAM

## 2023-07-09 PROCEDURE — 83735 ASSAY OF MAGNESIUM: CPT

## 2023-07-09 PROCEDURE — 2700000000 HC OXYGEN THERAPY PER DAY

## 2023-07-09 PROCEDURE — 6370000000 HC RX 637 (ALT 250 FOR IP)

## 2023-07-09 PROCEDURE — 2580000003 HC RX 258: Performed by: STUDENT IN AN ORGANIZED HEALTH CARE EDUCATION/TRAINING PROGRAM

## 2023-07-09 PROCEDURE — 6360000002 HC RX W HCPCS

## 2023-07-09 PROCEDURE — 94640 AIRWAY INHALATION TREATMENT: CPT

## 2023-07-09 PROCEDURE — C9113 INJ PANTOPRAZOLE SODIUM, VIA: HCPCS | Performed by: STUDENT IN AN ORGANIZED HEALTH CARE EDUCATION/TRAINING PROGRAM

## 2023-07-09 PROCEDURE — 6360000002 HC RX W HCPCS: Performed by: NURSE PRACTITIONER

## 2023-07-09 PROCEDURE — 6370000000 HC RX 637 (ALT 250 FOR IP): Performed by: INTERNAL MEDICINE

## 2023-07-09 PROCEDURE — 80048 BASIC METABOLIC PNL TOTAL CA: CPT

## 2023-07-09 PROCEDURE — 93970 EXTREMITY STUDY: CPT

## 2023-07-09 PROCEDURE — 82805 BLOOD GASES W/O2 SATURATION: CPT

## 2023-07-09 PROCEDURE — 85025 COMPLETE CBC W/AUTO DIFF WBC: CPT

## 2023-07-09 PROCEDURE — 6370000000 HC RX 637 (ALT 250 FOR IP): Performed by: NURSE PRACTITIONER

## 2023-07-09 PROCEDURE — 6360000002 HC RX W HCPCS: Performed by: INTERNAL MEDICINE

## 2023-07-09 PROCEDURE — 99291 CRITICAL CARE FIRST HOUR: CPT | Performed by: INTERNAL MEDICINE

## 2023-07-09 PROCEDURE — 2580000003 HC RX 258: Performed by: INTERNAL MEDICINE

## 2023-07-09 PROCEDURE — 2000000000 HC ICU R&B

## 2023-07-09 PROCEDURE — 71045 X-RAY EXAM CHEST 1 VIEW: CPT

## 2023-07-09 PROCEDURE — 80076 HEPATIC FUNCTION PANEL: CPT

## 2023-07-09 PROCEDURE — 2500000003 HC RX 250 WO HCPCS

## 2023-07-09 PROCEDURE — 94003 VENT MGMT INPAT SUBQ DAY: CPT

## 2023-07-09 PROCEDURE — 84100 ASSAY OF PHOSPHORUS: CPT

## 2023-07-09 RX ORDER — DEXAMETHASONE SODIUM PHOSPHATE 10 MG/ML
10 INJECTION, SOLUTION INTRAMUSCULAR; INTRAVENOUS ONCE
Status: COMPLETED | OUTPATIENT
Start: 2023-07-09 | End: 2023-07-09

## 2023-07-09 RX ORDER — BUMETANIDE 0.25 MG/ML
1 INJECTION INTRAMUSCULAR; INTRAVENOUS ONCE
Status: COMPLETED | OUTPATIENT
Start: 2023-07-09 | End: 2023-07-09

## 2023-07-09 RX ORDER — MAGNESIUM SULFATE IN WATER 40 MG/ML
2000 INJECTION, SOLUTION INTRAVENOUS ONCE
Status: COMPLETED | OUTPATIENT
Start: 2023-07-09 | End: 2023-07-09

## 2023-07-09 RX ADMIN — ASPIRIN 81 MG CHEWABLE TABLET 81 MG: 81 TABLET CHEWABLE at 09:15

## 2023-07-09 RX ADMIN — ALBUTEROL SULFATE 2.5 MG: 2.5 SOLUTION RESPIRATORY (INHALATION) at 09:14

## 2023-07-09 RX ADMIN — MAGNESIUM SULFATE HEPTAHYDRATE 2000 MG: 40 INJECTION, SOLUTION INTRAVENOUS at 20:05

## 2023-07-09 RX ADMIN — ACETAMINOPHEN 650 MG: 325 TABLET ORAL at 11:39

## 2023-07-09 RX ADMIN — THIAMINE HYDROCHLORIDE 200 MG: 100 INJECTION, SOLUTION INTRAMUSCULAR; INTRAVENOUS at 02:45

## 2023-07-09 RX ADMIN — IPRATROPIUM BROMIDE 0.5 MG: 0.5 SOLUTION RESPIRATORY (INHALATION) at 12:16

## 2023-07-09 RX ADMIN — ARFORMOTEROL TARTRATE 15 MCG: 15 SOLUTION RESPIRATORY (INHALATION) at 19:57

## 2023-07-09 RX ADMIN — RACEPINEPHRINE HYDROCHLORIDE 11.25 MG: 11.25 SOLUTION RESPIRATORY (INHALATION) at 17:58

## 2023-07-09 RX ADMIN — METOPROLOL TARTRATE 75 MG: 25 TABLET, FILM COATED ORAL at 19:58

## 2023-07-09 RX ADMIN — IPRATROPIUM BROMIDE 0.5 MG: 0.5 SOLUTION RESPIRATORY (INHALATION) at 09:14

## 2023-07-09 RX ADMIN — BUMETANIDE 1 MG: 0.25 INJECTION INTRAMUSCULAR; INTRAVENOUS at 19:58

## 2023-07-09 RX ADMIN — VANCOMYCIN HYDROCHLORIDE 1500 MG: 10 INJECTION, POWDER, LYOPHILIZED, FOR SOLUTION INTRAVENOUS at 05:41

## 2023-07-09 RX ADMIN — HYDROCODONE BITARTRATE AND ACETAMINOPHEN 1 TABLET: 7.5; 325 TABLET ORAL at 21:35

## 2023-07-09 RX ADMIN — ALBUTEROL SULFATE 2.5 MG: 2.5 SOLUTION RESPIRATORY (INHALATION) at 12:16

## 2023-07-09 RX ADMIN — HEPARIN SODIUM 5000 UNITS: 10000 INJECTION INTRAVENOUS; SUBCUTANEOUS at 05:35

## 2023-07-09 RX ADMIN — CEFEPIME 2000 MG: 2 INJECTION, POWDER, FOR SOLUTION INTRAVENOUS at 16:52

## 2023-07-09 RX ADMIN — SODIUM CHLORIDE, PRESERVATIVE FREE 40 MG: 5 INJECTION INTRAVENOUS at 08:58

## 2023-07-09 RX ADMIN — PROPOFOL 40 MCG/KG/MIN: 10 INJECTION, EMULSION INTRAVENOUS at 02:41

## 2023-07-09 RX ADMIN — ALBUTEROL SULFATE 2.5 MG: 2.5 SOLUTION RESPIRATORY (INHALATION) at 19:58

## 2023-07-09 RX ADMIN — PROPOFOL 40 MCG/KG/MIN: 10 INJECTION, EMULSION INTRAVENOUS at 05:57

## 2023-07-09 RX ADMIN — HEPARIN SODIUM 5000 UNITS: 10000 INJECTION INTRAVENOUS; SUBCUTANEOUS at 21:41

## 2023-07-09 RX ADMIN — SODIUM CHLORIDE, PRESERVATIVE FREE 10 ML: 5 INJECTION INTRAVENOUS at 20:02

## 2023-07-09 RX ADMIN — BUDESONIDE 500 MCG: 0.5 SUSPENSION RESPIRATORY (INHALATION) at 09:14

## 2023-07-09 RX ADMIN — CEFEPIME 2000 MG: 2 INJECTION, POWDER, FOR SOLUTION INTRAVENOUS at 07:18

## 2023-07-09 RX ADMIN — CEFEPIME 2000 MG: 2 INJECTION, POWDER, FOR SOLUTION INTRAVENOUS at 21:41

## 2023-07-09 RX ADMIN — IPRATROPIUM BROMIDE 0.5 MG: 0.5 SOLUTION RESPIRATORY (INHALATION) at 16:20

## 2023-07-09 RX ADMIN — 0.12% CHLORHEXIDINE GLUCONATE 15 ML: 1.2 RINSE ORAL at 19:59

## 2023-07-09 RX ADMIN — ESCITALOPRAM OXALATE 10 MG: 10 TABLET ORAL at 08:56

## 2023-07-09 RX ADMIN — METOPROLOL TARTRATE 75 MG: 25 TABLET, FILM COATED ORAL at 08:56

## 2023-07-09 RX ADMIN — POLYVINYL ALCOHOL 1 DROP: 14 SOLUTION/ DROPS OPHTHALMIC at 11:32

## 2023-07-09 RX ADMIN — Medication 75 MCG/HR: at 10:46

## 2023-07-09 RX ADMIN — DEXAMETHASONE SODIUM PHOSPHATE 10 MG: 10 INJECTION, SOLUTION INTRAMUSCULAR; INTRAVENOUS at 18:05

## 2023-07-09 RX ADMIN — THIAMINE HYDROCHLORIDE 200 MG: 100 INJECTION, SOLUTION INTRAMUSCULAR; INTRAVENOUS at 08:57

## 2023-07-09 RX ADMIN — POLYVINYL ALCOHOL 1 DROP: 14 SOLUTION/ DROPS OPHTHALMIC at 04:00

## 2023-07-09 RX ADMIN — PRAVASTATIN SODIUM 20 MG: 20 TABLET ORAL at 21:35

## 2023-07-09 RX ADMIN — FOLIC ACID 1 MG: 5 INJECTION, SOLUTION INTRAMUSCULAR; INTRAVENOUS; SUBCUTANEOUS at 08:57

## 2023-07-09 RX ADMIN — POTASSIUM BICARBONATE 40 MEQ: 782 TABLET, EFFERVESCENT ORAL at 16:55

## 2023-07-09 RX ADMIN — BUMETANIDE 1 MG: 0.25 INJECTION INTRAMUSCULAR; INTRAVENOUS at 11:32

## 2023-07-09 RX ADMIN — BUDESONIDE 500 MCG: 0.5 SUSPENSION RESPIRATORY (INHALATION) at 19:58

## 2023-07-09 RX ADMIN — RACEPINEPHRINE HYDROCHLORIDE 11.25 MG: 11.25 SOLUTION RESPIRATORY (INHALATION) at 19:58

## 2023-07-09 RX ADMIN — POTASSIUM BICARBONATE 40 MEQ: 782 TABLET, EFFERVESCENT ORAL at 11:32

## 2023-07-09 RX ADMIN — HEPARIN SODIUM 5000 UNITS: 10000 INJECTION INTRAVENOUS; SUBCUTANEOUS at 16:53

## 2023-07-09 RX ADMIN — VANCOMYCIN HYDROCHLORIDE 1500 MG: 10 INJECTION, POWDER, LYOPHILIZED, FOR SOLUTION INTRAVENOUS at 18:08

## 2023-07-09 RX ADMIN — ARFORMOTEROL TARTRATE 15 MCG: 15 SOLUTION RESPIRATORY (INHALATION) at 09:14

## 2023-07-09 RX ADMIN — SODIUM CHLORIDE, PRESERVATIVE FREE 10 ML: 5 INJECTION INTRAVENOUS at 08:57

## 2023-07-09 RX ADMIN — ALBUTEROL SULFATE 2.5 MG: 2.5 SOLUTION RESPIRATORY (INHALATION) at 16:20

## 2023-07-09 RX ADMIN — IPRATROPIUM BROMIDE 0.5 MG: 0.5 SOLUTION RESPIRATORY (INHALATION) at 19:57

## 2023-07-09 RX ADMIN — PROPOFOL 30 MCG/KG/MIN: 10 INJECTION, EMULSION INTRAVENOUS at 09:14

## 2023-07-09 RX ADMIN — POLYVINYL ALCOHOL 1 DROP: 14 SOLUTION/ DROPS OPHTHALMIC at 08:58

## 2023-07-09 RX ADMIN — 0.12% CHLORHEXIDINE GLUCONATE 15 ML: 1.2 RINSE ORAL at 08:57

## 2023-07-09 ASSESSMENT — PULMONARY FUNCTION TESTS
PIF_VALUE: 33
PIF_VALUE: 34
PIF_VALUE: 31
PIF_VALUE: 35
PIF_VALUE: 35
PIF_VALUE: 33
PIF_VALUE: 29
PIF_VALUE: 37
PIF_VALUE: 29
PIF_VALUE: 27
PIF_VALUE: 36
PIF_VALUE: 33
PIF_VALUE: 17
PIF_VALUE: 31
PIF_VALUE: 33
PIF_VALUE: 41

## 2023-07-09 ASSESSMENT — PAIN DESCRIPTION - DESCRIPTORS: DESCRIPTORS: ACHING;CRUSHING;JABBING

## 2023-07-09 ASSESSMENT — PAIN DESCRIPTION - ORIENTATION: ORIENTATION: MID;LOWER

## 2023-07-09 ASSESSMENT — PAIN DESCRIPTION - LOCATION: LOCATION: BACK;THROAT

## 2023-07-09 ASSESSMENT — PAIN SCALES - GENERAL
PAINLEVEL_OUTOF10: 0
PAINLEVEL_OUTOF10: 0
PAINLEVEL_OUTOF10: 8
PAINLEVEL_OUTOF10: 0

## 2023-07-09 NOTE — PLAN OF CARE
Problem: Safety - Adult  Goal: Free from fall injury  7/9/2023 1204 by Elías Urrutia RN  Outcome: Progressing     Problem: Pain  Goal: Verbalizes/displays adequate comfort level or baseline comfort level  7/9/2023 1204 by Elías Urrutia RN  Outcome: Progressing     Problem: Discharge Planning  Goal: Discharge to home or other facility with appropriate resources  7/9/2023 1204 by Elías Urrutia RN  Outcome: Not Progressing     Problem: Safety - Medical Restraint  Goal: Remains free of injury from restraints (Restraint for Interference with Medical Device)  Description: INTERVENTIONS:  1. Determine that other, less restrictive measures have been tried or would not be effective before applying the restraint  2. Evaluate the patient's condition at the time of restraint application  3. Inform patient/family regarding the reason for restraint  4. Q2H: Monitor safety, psychosocial status, comfort, nutrition and hydration  7/9/2023 1204 by Elías Urrutia RN  Outcome: Progressing  Flowsheets  Taken 7/9/2023 0800 by Elías Urrutia RN  Remains free of injury from restraints (restraint for interference with medical device): Every 2 hours: Monitor safety, psychosocial status, comfort, nutrition and hydration  Taken 7/9/2023 0600 by Marcel Sevilla RN  Remains free of injury from restraints (restraint for interference with medical device): Every 2 hours: Monitor safety, psychosocial status, comfort, nutrition and hydration     Problem: Skin/Tissue Integrity  Goal: Absence of new skin breakdown  Description: 1. Monitor for areas of redness and/or skin breakdown  2. Assess vascular access sites hourly  3. Every 4-6 hours minimum:  Change oxygen saturation probe site  4. Every 4-6 hours:  If on nasal continuous positive airway pressure, respiratory therapy assess nares and determine need for appliance change or resting period.   7/9/2023 1204 by Elías Urrutia RN  Outcome: Progressing     Problem: ABCDS Injury

## 2023-07-09 NOTE — PLAN OF CARE
Problem: Safety - Adult  Goal: Free from fall injury  Outcome: Progressing     Problem: Pain  Goal: Verbalizes/displays adequate comfort level or baseline comfort level  Outcome: Progressing     Problem: Discharge Planning  Goal: Discharge to home or other facility with appropriate resources  Outcome: Progressing     Problem: Safety - Medical Restraint  Goal: Remains free of injury from restraints (Restraint for Interference with Medical Device)  Description: INTERVENTIONS:  1. Determine that other, less restrictive measures have been tried or would not be effective before applying the restraint  2. Evaluate the patient's condition at the time of restraint application  3. Inform patient/family regarding the reason for restraint  4. Q2H: Monitor safety, psychosocial status, comfort, nutrition and hydration  Outcome: Progressing  Flowsheets  Taken 7/9/2023 0400  Remains free of injury from restraints (restraint for interference with medical device): Every 2 hours: Monitor safety, psychosocial status, comfort, nutrition and hydration  Taken 7/9/2023 0200  Remains free of injury from restraints (restraint for interference with medical device): Every 2 hours: Monitor safety, psychosocial status, comfort, nutrition and hydration  Taken 7/9/2023 0000  Remains free of injury from restraints (restraint for interference with medical device): Every 2 hours: Monitor safety, psychosocial status, comfort, nutrition and hydration  Taken 7/8/2023 2200  Remains free of injury from restraints (restraint for interference with medical device): Every 2 hours: Monitor safety, psychosocial status, comfort, nutrition and hydration  Taken 7/8/2023 2000  Remains free of injury from restraints (restraint for interference with medical device): Every 2 hours: Monitor safety, psychosocial status, comfort, nutrition and hydration     Problem: Skin/Tissue Integrity  Goal: Absence of new skin breakdown  Description: 1.   Monitor for areas of

## 2023-07-09 NOTE — PROCEDURES
Spontaneous Parameters performed    VT = 656 ml  f = 18  B/M  Ve = 11.8 L/M  NIF = -42  cmH2O  VC = N/A  RSBI = 27      Performed by Leonie Vieira RCP
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
## 2023-07-09 NOTE — PROCEDURES
Patient was extubated to 5 liters/min via nasal cannula. Breath Sounds post extubation were equal. Stridor was not present post extubation. SPO2 was 96%.       Performed by  Cr Robert RCP

## 2023-07-09 NOTE — PROGRESS NOTES
Pharmacy Consultation Note  (Antibiotic Dosing and Monitoring)    Initial consult date: 7/8/23  Consulting physician/provider: Dr. Nani Blackwood  Drug: Vancomycin  Indication: PNA    Age/  Gender Height Weight IBW  Allergy Information   57 y.o./male 6' (182.9 cm) 268 lb 8.3 oz (121.8 kg)     Ideal body weight: 77.6 kg (171 lb 1.2 oz)  Adjusted ideal body weight: 94.5 kg (208 lb 3.9 oz)   Pcn [penicillins]      Renal Function:  Recent Labs     07/08/23  0442 07/08/23  1440 07/09/23  0431   BUN 8 7 6   CREATININE 0.8 0.7 0.7       Intake/Output Summary (Last 24 hours) at 7/9/2023 1215  Last data filed at 7/9/2023 0900  Gross per 24 hour   Intake 471 ml   Output 1605 ml   Net -1134 ml       Vancomycin Monitoring:  Trough:  No results for input(s): VANCOTROUGH in the last 72 hours. Random:  No results for input(s): VANCORANDOM in the last 72 hours. No results for input(s): Vincenzo Bake in the last 72 hours. Historical Cultures:  Organism   Date Value Ref Range Status   04/14/2022 Staphylococcus coagulase-negative (A)  Final     No results for input(s): BC in the last 72 hours. Vancomycin Administration Times:  Recent vancomycin administrations        No vancomycin IV orders with administrations found. Orders not given:            vancomycin (VANCOCIN) 1,750 mg in sodium chloride 0.9 % 500 mL IVPB                    Assessment:  Patient is a 62 y.o. male who has been initiated on vancomycin  Estimated Creatinine Clearance: 156 mL/min (based on SCr of 0.7 mg/dL). To dose vancomycin, pharmacy will be utilizing [employer] to target AUC/ANAHI of 400-600    Plan:  Continue vancomycin 1500 mg IV Q12h. Estimated AUC/ANAHI of 542 and trough of 17.3  Will check vancomycin levels tomorrow at 5 am with labs.   Will continue to monitor renal function   Pharmacy to follow    Kathy Sarah, PharmD Candidate 2024 7/9/2023 12:15 PM

## 2023-07-10 ENCOUNTER — APPOINTMENT (OUTPATIENT)
Dept: GENERAL RADIOLOGY | Age: 58
DRG: 682 | End: 2023-07-10
Payer: MEDICARE

## 2023-07-10 LAB
ANION GAP SERPL CALCULATED.3IONS-SCNC: 11 MMOL/L (ref 7–16)
ANISOCYTOSIS: ABNORMAL
B.E.: 1.3 MMOL/L (ref -3–3)
BACTERIA BLD CULT ORG #2: NORMAL
BACTERIA BLD CULT: NORMAL
BACTERIA SPEC RESP CULT: NORMAL
BACTERIA UR CULT: ABNORMAL
BASOPHILS # BLD: 0 E9/L (ref 0–0.2)
BASOPHILS NFR BLD: 0.5 % (ref 0–2)
BUN SERPL-MCNC: 10 MG/DL (ref 6–20)
CALCIUM SERPL-MCNC: 9 MG/DL (ref 8.6–10.2)
CHLORIDE SERPL-SCNC: 99 MMOL/L (ref 98–107)
CO2 SERPL-SCNC: 25 MMOL/L (ref 22–29)
COHB: 0.4 % (ref 0–1.5)
CREAT SERPL-MCNC: 0.7 MG/DL (ref 0.7–1.2)
CRITICAL: ABNORMAL
DATE ANALYZED: ABNORMAL
DATE OF COLLECTION: ABNORMAL
EOSINOPHIL # BLD: 0 E9/L (ref 0.05–0.5)
EOSINOPHIL NFR BLD: 0 % (ref 0–6)
ERYTHROCYTE [DISTWIDTH] IN BLOOD BY AUTOMATED COUNT: 14.3 FL (ref 11.5–15)
GLUCOSE SERPL-MCNC: 161 MG/DL (ref 74–99)
HCO3: 25.8 MMOL/L (ref 22–26)
HCT VFR BLD AUTO: 36.6 % (ref 37–54)
HGB BLD-MCNC: 11.6 G/DL (ref 12.5–16.5)
HHB: 12.1 % (ref 0–5)
LAB: ABNORMAL
LYMPHOCYTES # BLD: 0.16 E9/L (ref 1.5–4)
LYMPHOCYTES NFR BLD: 1.7 % (ref 20–42)
Lab: ABNORMAL
MAGNESIUM SERPL-MCNC: 2 MG/DL (ref 1.6–2.6)
MCH RBC QN AUTO: 32.8 PG (ref 26–35)
MCHC RBC AUTO-ENTMCNC: 31.7 % (ref 32–34.5)
MCV RBC AUTO: 103.4 FL (ref 80–99.9)
METHB: 0.2 % (ref 0–1.5)
MODE: ABNORMAL
MONOCYTES # BLD: 0.41 E9/L (ref 0.1–0.95)
MONOCYTES NFR BLD: 5.2 % (ref 2–12)
MRSA SPEC QL CULT: NORMAL
MYELOCYTES NFR BLD MANUAL: 4.4 % (ref 0–0)
NEUTROPHILS # BLD: 7.63 E9/L (ref 1.8–7.3)
NEUTS SEG NFR BLD: 88.7 % (ref 43–80)
O2 CONTENT: 14.7 ML/DL
O2 SATURATION: 87.8 % (ref 92–98.5)
O2HB: 87.3 % (ref 94–97)
OPERATOR ID: ABNORMAL
ORGANISM: ABNORMAL
OVALOCYTES: ABNORMAL
PATIENT TEMP: 37 C
PCO2: 40.4 MMHG (ref 35–45)
PH BLOOD GAS: 7.42 (ref 7.35–7.45)
PHOSPHATE SERPL-MCNC: 3.5 MG/DL (ref 2.5–4.5)
PLATELET # BLD AUTO: 283 E9/L (ref 130–450)
PMV BLD AUTO: 9.9 FL (ref 7–12)
PO2: 52.3 MMHG (ref 75–100)
POIKILOCYTES: ABNORMAL
POLYCHROMASIA: ABNORMAL
POTASSIUM SERPL-SCNC: 4.7 MMOL/L (ref 3.5–5)
RBC # BLD AUTO: 3.54 E12/L (ref 3.8–5.8)
SMEAR, RESPIRATORY: NORMAL
SODIUM SERPL-SCNC: 135 MMOL/L (ref 132–146)
SOURCE, BLOOD GAS: ABNORMAL
THB: 12 G/DL (ref 11.5–16.5)
TIME ANALYZED: 553
VANCOMYCIN TROUGH SERPL-MCNC: 15 MCG/ML (ref 5–16)
WBC # BLD: 8.2 E9/L (ref 4.5–11.5)

## 2023-07-10 PROCEDURE — 94640 AIRWAY INHALATION TREATMENT: CPT

## 2023-07-10 PROCEDURE — 6360000002 HC RX W HCPCS: Performed by: NURSE PRACTITIONER

## 2023-07-10 PROCEDURE — 6370000000 HC RX 637 (ALT 250 FOR IP): Performed by: STUDENT IN AN ORGANIZED HEALTH CARE EDUCATION/TRAINING PROGRAM

## 2023-07-10 PROCEDURE — 71045 X-RAY EXAM CHEST 1 VIEW: CPT

## 2023-07-10 PROCEDURE — 99233 SBSQ HOSP IP/OBS HIGH 50: CPT | Performed by: INTERNAL MEDICINE

## 2023-07-10 PROCEDURE — 6370000000 HC RX 637 (ALT 250 FOR IP): Performed by: NURSE PRACTITIONER

## 2023-07-10 PROCEDURE — 84100 ASSAY OF PHOSPHORUS: CPT

## 2023-07-10 PROCEDURE — 6360000002 HC RX W HCPCS: Performed by: STUDENT IN AN ORGANIZED HEALTH CARE EDUCATION/TRAINING PROGRAM

## 2023-07-10 PROCEDURE — 80202 ASSAY OF VANCOMYCIN: CPT

## 2023-07-10 PROCEDURE — 80048 BASIC METABOLIC PNL TOTAL CA: CPT

## 2023-07-10 PROCEDURE — 6370000000 HC RX 637 (ALT 250 FOR IP): Performed by: INTERNAL MEDICINE

## 2023-07-10 PROCEDURE — A4216 STERILE WATER/SALINE, 10 ML: HCPCS | Performed by: STUDENT IN AN ORGANIZED HEALTH CARE EDUCATION/TRAINING PROGRAM

## 2023-07-10 PROCEDURE — 2580000003 HC RX 258: Performed by: INTERNAL MEDICINE

## 2023-07-10 PROCEDURE — 97164 PT RE-EVAL EST PLAN CARE: CPT

## 2023-07-10 PROCEDURE — 2580000003 HC RX 258: Performed by: STUDENT IN AN ORGANIZED HEALTH CARE EDUCATION/TRAINING PROGRAM

## 2023-07-10 PROCEDURE — 83735 ASSAY OF MAGNESIUM: CPT

## 2023-07-10 PROCEDURE — 97530 THERAPEUTIC ACTIVITIES: CPT

## 2023-07-10 PROCEDURE — 36415 COLL VENOUS BLD VENIPUNCTURE: CPT

## 2023-07-10 PROCEDURE — 2500000003 HC RX 250 WO HCPCS: Performed by: STUDENT IN AN ORGANIZED HEALTH CARE EDUCATION/TRAINING PROGRAM

## 2023-07-10 PROCEDURE — 85025 COMPLETE CBC W/AUTO DIFF WBC: CPT

## 2023-07-10 PROCEDURE — 97168 OT RE-EVAL EST PLAN CARE: CPT

## 2023-07-10 PROCEDURE — 97535 SELF CARE MNGMENT TRAINING: CPT

## 2023-07-10 PROCEDURE — 82805 BLOOD GASES W/O2 SATURATION: CPT

## 2023-07-10 PROCEDURE — 1200000000 HC SEMI PRIVATE

## 2023-07-10 PROCEDURE — C9113 INJ PANTOPRAZOLE SODIUM, VIA: HCPCS | Performed by: STUDENT IN AN ORGANIZED HEALTH CARE EDUCATION/TRAINING PROGRAM

## 2023-07-10 PROCEDURE — 2500000003 HC RX 250 WO HCPCS: Performed by: INTERNAL MEDICINE

## 2023-07-10 PROCEDURE — 6370000000 HC RX 637 (ALT 250 FOR IP)

## 2023-07-10 PROCEDURE — 2580000003 HC RX 258: Performed by: NURSE PRACTITIONER

## 2023-07-10 PROCEDURE — 6360000002 HC RX W HCPCS: Performed by: INTERNAL MEDICINE

## 2023-07-10 PROCEDURE — 2700000000 HC OXYGEN THERAPY PER DAY

## 2023-07-10 RX ORDER — IPRATROPIUM BROMIDE AND ALBUTEROL SULFATE 2.5; .5 MG/3ML; MG/3ML
1 SOLUTION RESPIRATORY (INHALATION)
Status: DISCONTINUED | OUTPATIENT
Start: 2023-07-10 | End: 2023-07-11 | Stop reason: HOSPADM

## 2023-07-10 RX ORDER — LIDOCAINE HYDROCHLORIDE 20 MG/ML
15 SOLUTION OROPHARYNGEAL
Status: DISCONTINUED | OUTPATIENT
Start: 2023-07-10 | End: 2023-07-11 | Stop reason: HOSPADM

## 2023-07-10 RX ORDER — HYDROCODONE BITARTRATE AND ACETAMINOPHEN 7.5; 325 MG/1; MG/1
1 TABLET ORAL EVERY 4 HOURS PRN
Status: DISCONTINUED | OUTPATIENT
Start: 2023-07-10 | End: 2023-07-11 | Stop reason: HOSPADM

## 2023-07-10 RX ADMIN — SODIUM CHLORIDE, PRESERVATIVE FREE 40 MG: 5 INJECTION INTRAVENOUS at 09:13

## 2023-07-10 RX ADMIN — HYDROCODONE BITARTRATE AND ACETAMINOPHEN 1 TABLET: 7.5; 325 TABLET ORAL at 01:32

## 2023-07-10 RX ADMIN — IPRATROPIUM BROMIDE AND ALBUTEROL SULFATE 1 DOSE: 2.5; .5 SOLUTION RESPIRATORY (INHALATION) at 21:12

## 2023-07-10 RX ADMIN — IPRATROPIUM BROMIDE 0.5 MG: 0.5 SOLUTION RESPIRATORY (INHALATION) at 11:58

## 2023-07-10 RX ADMIN — BENZOCAINE, BUTAMBEN, AND TETRACAINE HYDROCHLORIDE 1 SPRAY: .028; .004; .004 AEROSOL, SPRAY TOPICAL at 01:36

## 2023-07-10 RX ADMIN — ALBUTEROL SULFATE 2.5 MG: 2.5 SOLUTION RESPIRATORY (INHALATION) at 11:58

## 2023-07-10 RX ADMIN — Medication 10 ML: at 09:14

## 2023-07-10 RX ADMIN — LOSARTAN POTASSIUM 50 MG: 50 TABLET, FILM COATED ORAL at 09:12

## 2023-07-10 RX ADMIN — FOLIC ACID 1 MG: 5 INJECTION, SOLUTION INTRAMUSCULAR; INTRAVENOUS; SUBCUTANEOUS at 09:12

## 2023-07-10 RX ADMIN — HYDROCODONE BITARTRATE AND ACETAMINOPHEN 1 TABLET: 7.5; 325 TABLET ORAL at 21:28

## 2023-07-10 RX ADMIN — ALBUTEROL SULFATE 2.5 MG: 2.5 SOLUTION RESPIRATORY (INHALATION) at 08:53

## 2023-07-10 RX ADMIN — HYDROCODONE BITARTRATE AND ACETAMINOPHEN 1 TABLET: 7.5; 325 TABLET ORAL at 10:50

## 2023-07-10 RX ADMIN — ARFORMOTEROL TARTRATE 15 MCG: 15 SOLUTION RESPIRATORY (INHALATION) at 21:12

## 2023-07-10 RX ADMIN — SODIUM CHLORIDE, PRESERVATIVE FREE 10 ML: 5 INJECTION INTRAVENOUS at 09:14

## 2023-07-10 RX ADMIN — VANCOMYCIN HYDROCHLORIDE 1500 MG: 10 INJECTION, POWDER, LYOPHILIZED, FOR SOLUTION INTRAVENOUS at 08:40

## 2023-07-10 RX ADMIN — HYDROCODONE BITARTRATE AND ACETAMINOPHEN 1 TABLET: 7.5; 325 TABLET ORAL at 17:31

## 2023-07-10 RX ADMIN — IPRATROPIUM BROMIDE AND ALBUTEROL SULFATE 1 DOSE: 2.5; .5 SOLUTION RESPIRATORY (INHALATION) at 17:03

## 2023-07-10 RX ADMIN — METOPROLOL TARTRATE 75 MG: 25 TABLET, FILM COATED ORAL at 22:42

## 2023-07-10 RX ADMIN — METOPROLOL TARTRATE 75 MG: 25 TABLET, FILM COATED ORAL at 09:13

## 2023-07-10 RX ADMIN — SODIUM CHLORIDE, PRESERVATIVE FREE 10 ML: 5 INJECTION INTRAVENOUS at 21:24

## 2023-07-10 RX ADMIN — ASPIRIN 81 MG CHEWABLE TABLET 81 MG: 81 TABLET CHEWABLE at 09:11

## 2023-07-10 RX ADMIN — PRAVASTATIN SODIUM 20 MG: 20 TABLET ORAL at 21:28

## 2023-07-10 RX ADMIN — BUDESONIDE 500 MCG: 0.5 SUSPENSION RESPIRATORY (INHALATION) at 08:53

## 2023-07-10 RX ADMIN — ESCITALOPRAM OXALATE 10 MG: 10 TABLET ORAL at 09:11

## 2023-07-10 RX ADMIN — CEFEPIME 2000 MG: 2 INJECTION, POWDER, FOR SOLUTION INTRAVENOUS at 05:56

## 2023-07-10 RX ADMIN — HYDROCODONE BITARTRATE AND ACETAMINOPHEN 1 TABLET: 7.5; 325 TABLET ORAL at 05:51

## 2023-07-10 RX ADMIN — IPRATROPIUM BROMIDE 0.5 MG: 0.5 SOLUTION RESPIRATORY (INHALATION) at 08:53

## 2023-07-10 RX ADMIN — HEPARIN SODIUM 5000 UNITS: 10000 INJECTION INTRAVENOUS; SUBCUTANEOUS at 13:39

## 2023-07-10 RX ADMIN — BUDESONIDE 500 MCG: 0.5 SUSPENSION RESPIRATORY (INHALATION) at 21:12

## 2023-07-10 RX ADMIN — THIAMINE HYDROCHLORIDE 100 MG: 100 INJECTION, SOLUTION INTRAMUSCULAR; INTRAVENOUS at 09:14

## 2023-07-10 RX ADMIN — DOXYCYCLINE 100 MG: 100 INJECTION, POWDER, LYOPHILIZED, FOR SOLUTION INTRAVENOUS at 11:50

## 2023-07-10 RX ADMIN — HEPARIN SODIUM 5000 UNITS: 10000 INJECTION INTRAVENOUS; SUBCUTANEOUS at 21:25

## 2023-07-10 RX ADMIN — HEPARIN SODIUM 5000 UNITS: 10000 INJECTION INTRAVENOUS; SUBCUTANEOUS at 06:03

## 2023-07-10 RX ADMIN — WHITE PETROLATUM: .15; .85 OINTMENT OPHTHALMIC at 17:32

## 2023-07-10 RX ADMIN — ARFORMOTEROL TARTRATE 15 MCG: 15 SOLUTION RESPIRATORY (INHALATION) at 08:53

## 2023-07-10 RX ADMIN — RACEPINEPHRINE HYDROCHLORIDE 11.25 MG: 11.25 SOLUTION RESPIRATORY (INHALATION) at 00:35

## 2023-07-10 RX ADMIN — WHITE PETROLATUM: .15; .85 OINTMENT OPHTHALMIC at 21:32

## 2023-07-10 RX ADMIN — 0.12% CHLORHEXIDINE GLUCONATE 15 ML: 1.2 RINSE ORAL at 21:25

## 2023-07-10 ASSESSMENT — PAIN SCALES - GENERAL
PAINLEVEL_OUTOF10: 0
PAINLEVEL_OUTOF10: 8
PAINLEVEL_OUTOF10: 2
PAINLEVEL_OUTOF10: 5
PAINLEVEL_OUTOF10: 8
PAINLEVEL_OUTOF10: 8
PAINLEVEL_OUTOF10: 0
PAINLEVEL_OUTOF10: 10
PAINLEVEL_OUTOF10: 7
PAINLEVEL_OUTOF10: 7

## 2023-07-10 ASSESSMENT — PAIN - FUNCTIONAL ASSESSMENT
PAIN_FUNCTIONAL_ASSESSMENT: PREVENTS OR INTERFERES SOME ACTIVE ACTIVITIES AND ADLS
PAIN_FUNCTIONAL_ASSESSMENT: PREVENTS OR INTERFERES SOME ACTIVE ACTIVITIES AND ADLS
PAIN_FUNCTIONAL_ASSESSMENT: ACTIVITIES ARE NOT PREVENTED

## 2023-07-10 ASSESSMENT — PAIN DESCRIPTION - DESCRIPTORS
DESCRIPTORS: ACHING;DISCOMFORT
DESCRIPTORS: ACHING;DISCOMFORT
DESCRIPTORS: ACHING
DESCRIPTORS: ACHING;DISCOMFORT
DESCRIPTORS: ACHING;DISCOMFORT;THROBBING

## 2023-07-10 ASSESSMENT — PAIN DESCRIPTION - PAIN TYPE
TYPE: ACUTE PAIN
TYPE: CHRONIC PAIN

## 2023-07-10 ASSESSMENT — PAIN DESCRIPTION - ORIENTATION
ORIENTATION: LOWER
ORIENTATION: POSTERIOR
ORIENTATION: POSTERIOR
ORIENTATION: MID;LOWER

## 2023-07-10 ASSESSMENT — PAIN DESCRIPTION - ONSET: ONSET: ON-GOING

## 2023-07-10 ASSESSMENT — PAIN DESCRIPTION - LOCATION
LOCATION: BACK
LOCATION: BACK;THROAT
LOCATION: BACK
LOCATION: BACK
LOCATION: BACK;THROAT
LOCATION: BACK
LOCATION: BACK

## 2023-07-10 ASSESSMENT — PAIN DESCRIPTION - FREQUENCY: FREQUENCY: CONTINUOUS
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
## 2023-07-10 NOTE — PROGRESS NOTES
Upon assessment patient had stridor and is very rhonchorous. Patient o2 saturation 92%. Resident notified. Will continue to monitor.

## 2023-07-10 NOTE — PROGRESS NOTES
Physical Therapy  Physical Therapy Re-Assessment     Name: Zay Myles  : 1965  MRN: [id number]      Date of Service: 7/10/2023    Re-Evaluating PT:  Curtis Méndez, PT, DPT  [id number]     Room #:  2323/8359-G  Diagnosis:  Right leg pain [M79.604]  ISA (acute kidney injury) ([address]) [N17.9]  Acute kidney injury ([address]) [N17.9]  PMHx/PSHx:   has a past medical history of Alcohol abuse, Arthritis, Back pain, chronic, CAD in native artery, COPD (chronic obstructive pulmonary disease) ([address]), H/O cardiovascular stress test, Hyperlipidemia, Hypertension, Hyponatremia, Neck pain, chronic, and Tobacco abuse. Procedure/Surgery:  Intubated ; Extubated   Precautions:  Fall risk, O2, NG tube  Equipment Needs:  TBD    Reason for re-evaluation:  RRT  due to worsening withdrawal symptoms, resulting in MICU stay  Date of re-evaluation:  7/10/23    SUBJECTIVE:    Pt lives with alone in a 1 story mobile home with 4 stairs to enter and 1 rail. Bedroom and bathroom are on the main level. Pt ambulated with walking cane PTA. OBJECTIVE:   Re-Evaluation  Date: 7/10/23 Treatment Short Term/ Long Term   Goals   AM-PAC 6 Clicks 59/25     Was pt agreeable to Re-Eval/treatment? Yes     Does pt have pain? Mild pain in back; unable to rate     Bed Mobility  Rolling: NT  Supine to sit: Mod A  Sit to supine: Min A  Scooting: SBA  Rolling: Independent   Supine to sit: Independent   Sit to supine: Independent   Scooting: Independent    Transfers Sit to stand: Min A  Stand to sit: Min A  Stand pivot: Min A with [address] [place] [address]  Sit to stand: Independent   Stand to sit:  Independent   Stand pivot: Independent  with AAD   Ambulation    150 feet with Min A with [address] [place] [address] Street  >450 feet with Independent  with AAD   Stair negotiation: ascended and descended NT  >4 steps with 1 rail Independent    ROM BUE:  Per OT eval   BLE:  WFL     Strength BUE:  Per OT eval   BLE:  WFL     Balance Sitting EOB:  SBA  Dynamic Standing:  Min A with AAD  Sitting EOB:  Independent and Education Training   [x] Patient/Caregiver Education   [] HEP  [] Other     PT long term treatment goals are located in above grid    Frequency of treatments: 2-5x/week x 1-2 weeks. Time in  1006  Time out  1044    Total Treatment Time  23 minutes     Re-Evaluation Time includes thorough review of current medical information, gathering information on past medical history/social history and prior level of function, completion of standardized testing/informal observation of tasks, assessment of data and education on plan of care and goals.     CPT codes:  [] Low Complexity PT evaluation 17899  [] Moderate Complexity PT evaluation 14700  [] High Complexity PT evaluation 65859  [x] PT Re-evaluation 55403  [] Gait training 73613 -- minutes  [] Manual therapy 45108 -- minutes  [x] Therapeutic activities 11557 23 minutes  [] Therapeutic exercises 70000 - minutes  [] Neuromuscular reeducation 52197 -- minutes     Dane Olivera, YI Anders, PT, DPT  [id number]

## 2023-07-10 NOTE — PROGRESS NOTES
Perfect Serve sent to NP Maria Teresa Player learn in regards to patient wanting something more for pain.
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
## 2023-07-10 NOTE — PLAN OF CARE
Problem: Safety - Adult  Goal: Free from fall injury  7/10/2023 0811 by Lazaro Goddard RN  Outcome: Progressing     Problem: Pain  Goal: Verbalizes/displays adequate comfort level or baseline comfort level  7/10/2023 0811 by Lazaro Goddard RN  Outcome: Progressing     Problem: Skin/Tissue Integrity  Goal: Absence of new skin breakdown  Description: 1. Monitor for areas of redness and/or skin breakdown  2. Assess vascular access sites hourly  3. Every 4-6 hours minimum:  Change oxygen saturation probe site  4. Every 4-6 hours:  If on nasal continuous positive airway pressure, respiratory therapy assess nares and determine need for appliance change or resting period. 7/10/2023 0811 by Lazaro Goddard RN  Outcome: Progressing     Problem: ABCDS Injury Assessment  Goal: Absence of physical injury  7/10/2023 0811 by Lazaro Goddard RN  Outcome: Progressing     Problem: Neurosensory - Adult  Goal: Achieves stable or improved neurological status  7/10/2023 0811 by Lazaro Goddard RN  Outcome: Progressing     Problem: Neurosensory - Adult  Goal: Absence of seizures  7/10/2023 0811 by Lazaro Goddard RN  Outcome: Progressing     Problem: Respiratory - Adult  Goal: Achieves optimal ventilation and oxygenation  Outcome: Progressing   Plan of care discussed with patient / family.

## 2023-07-10 NOTE — PLAN OF CARE
Problem: Safety - Adult  Goal: Free from fall injury  Outcome: Progressing     Problem: Pain  Goal: Verbalizes/displays adequate comfort level or baseline comfort level  Outcome: Progressing     Problem: Discharge Planning  Goal: Discharge to home or other facility with appropriate resources  Outcome: Progressing     Problem: Skin/Tissue Integrity  Goal: Absence of new skin breakdown  Description: 1. Monitor for areas of redness and/or skin breakdown  2. Assess vascular access sites hourly  3. Every 4-6 hours minimum:  Change oxygen saturation probe site  4. Every 4-6 hours:  If on nasal continuous positive airway pressure, respiratory therapy assess nares and determine need for appliance change or resting period.   Outcome: Progressing     Problem: ABCDS Injury Assessment  Goal: Absence of physical injury  Outcome: Progressing     Problem: Neurosensory - Adult  Goal: Achieves stable or improved neurological status  Outcome: Progressing  Goal: Absence of seizures  Outcome: Progressing  Goal: Remains free of injury related to seizures activity  Outcome: Progressing

## 2023-07-10 NOTE — PROGRESS NOTES
4 Eyes Skin Assessment     NAME:  Savanna Tucker  YOB: 1965  MEDICAL RECORD NUMBER:  [id number]    The patient is being assessed for  Transfer to New Unit    I agree that at least one RN has performed a thorough Head to Toe Skin Assessment on the patient. ALL assessment sites listed below have been assessed. Areas assessed by both nurses:    Head, Face, Ears, Shoulders, Back, Chest, Arms, Elbows, Hands, Sacrum. Buttock, Coccyx, Ischium, and Legs. Feet and Heels        Does the Patient have a Wound?  No noted wound(s)       Tay Prevention initiated by RN: No  Wound Care Orders initiated by RN: No    Pressure Injury (Stage 3,4, Unstageable, DTI, NWPT, and Complex wounds) if present, place Wound referral order by RN under : No    New Ostomies, if present place, Ostomy referral order under : No     Nurse 1 eSignature: Electronically signed by Iggy Torres RN on 7/10/23 at 3:40 PM EDT    **SHARE this note so that the co-signing nurse can place an eSignature**    Nurse 2 eSignature: Electronically signed by Brian Irving RN on 7/10/23 at 6:30 PM EDT

## 2023-07-11 VITALS
HEART RATE: 71 BPM | SYSTOLIC BLOOD PRESSURE: 135 MMHG | HEIGHT: 72 IN | BODY MASS INDEX: 36.44 KG/M2 | OXYGEN SATURATION: 98 % | WEIGHT: 269 LBS | RESPIRATION RATE: 20 BRPM | TEMPERATURE: 97 F | DIASTOLIC BLOOD PRESSURE: 84 MMHG

## 2023-07-11 LAB
ANION GAP SERPL CALCULATED.3IONS-SCNC: 13 MMOL/L (ref 7–16)
BASOPHILS # BLD: 0.04 E9/L (ref 0–0.2)
BASOPHILS NFR BLD: 0.4 % (ref 0–2)
BUN SERPL-MCNC: 17 MG/DL (ref 6–20)
CALCIUM SERPL-MCNC: 9.6 MG/DL (ref 8.6–10.2)
CHLORIDE SERPL-SCNC: 98 MMOL/L (ref 98–107)
CO2 SERPL-SCNC: 24 MMOL/L (ref 22–29)
CREAT SERPL-MCNC: 0.8 MG/DL (ref 0.7–1.2)
EOSINOPHIL # BLD: 0.01 E9/L (ref 0.05–0.5)
EOSINOPHIL NFR BLD: 0.1 % (ref 0–6)
ERYTHROCYTE [DISTWIDTH] IN BLOOD BY AUTOMATED COUNT: 14.3 FL (ref 11.5–15)
GLUCOSE SERPL-MCNC: 145 MG/DL (ref 74–99)
HCT VFR BLD AUTO: 32.5 % (ref 37–54)
HGB BLD-MCNC: 10.5 G/DL (ref 12.5–16.5)
IMM GRANULOCYTES # BLD: 0.4 E9/L
IMM GRANULOCYTES NFR BLD: 3.6 % (ref 0–5)
LYMPHOCYTES # BLD: 0.9 E9/L (ref 1.5–4)
LYMPHOCYTES NFR BLD: 8.2 % (ref 20–42)
MAGNESIUM SERPL-MCNC: 2.1 MG/DL (ref 1.6–2.6)
MCH RBC QN AUTO: 32.8 PG (ref 26–35)
MCHC RBC AUTO-ENTMCNC: 32.3 % (ref 32–34.5)
MCV RBC AUTO: 101.6 FL (ref 80–99.9)
MONOCYTES # BLD: 1.43 E9/L (ref 0.1–0.95)
MONOCYTES NFR BLD: 13 % (ref 2–12)
NEUTROPHILS # BLD: 8.22 E9/L (ref 1.8–7.3)
NEUTS SEG NFR BLD: 74.7 % (ref 43–80)
PHOSPHATE SERPL-MCNC: 2.4 MG/DL (ref 2.5–4.5)
PLATELET # BLD AUTO: 346 E9/L (ref 130–450)
PMV BLD AUTO: 9.8 FL (ref 7–12)
POTASSIUM SERPL-SCNC: 3.9 MMOL/L (ref 3.5–5)
RBC # BLD AUTO: 3.2 E12/L (ref 3.8–5.8)
SODIUM SERPL-SCNC: 135 MMOL/L (ref 132–146)
WBC # BLD: 11 E9/L (ref 4.5–11.5)

## 2023-07-11 PROCEDURE — 6370000000 HC RX 637 (ALT 250 FOR IP): Performed by: NURSE PRACTITIONER

## 2023-07-11 PROCEDURE — 85025 COMPLETE CBC W/AUTO DIFF WBC: CPT

## 2023-07-11 PROCEDURE — 6370000000 HC RX 637 (ALT 250 FOR IP): Performed by: INTERNAL MEDICINE

## 2023-07-11 PROCEDURE — 2580000003 HC RX 258: Performed by: INTERNAL MEDICINE

## 2023-07-11 PROCEDURE — 2580000003 HC RX 258: Performed by: STUDENT IN AN ORGANIZED HEALTH CARE EDUCATION/TRAINING PROGRAM

## 2023-07-11 PROCEDURE — 6360000002 HC RX W HCPCS: Performed by: STUDENT IN AN ORGANIZED HEALTH CARE EDUCATION/TRAINING PROGRAM

## 2023-07-11 PROCEDURE — C9113 INJ PANTOPRAZOLE SODIUM, VIA: HCPCS | Performed by: STUDENT IN AN ORGANIZED HEALTH CARE EDUCATION/TRAINING PROGRAM

## 2023-07-11 PROCEDURE — 83735 ASSAY OF MAGNESIUM: CPT

## 2023-07-11 PROCEDURE — 6370000000 HC RX 637 (ALT 250 FOR IP): Performed by: STUDENT IN AN ORGANIZED HEALTH CARE EDUCATION/TRAINING PROGRAM

## 2023-07-11 PROCEDURE — 84100 ASSAY OF PHOSPHORUS: CPT

## 2023-07-11 PROCEDURE — 94640 AIRWAY INHALATION TREATMENT: CPT

## 2023-07-11 PROCEDURE — 6360000002 HC RX W HCPCS: Performed by: NURSE PRACTITIONER

## 2023-07-11 PROCEDURE — 2500000003 HC RX 250 WO HCPCS: Performed by: STUDENT IN AN ORGANIZED HEALTH CARE EDUCATION/TRAINING PROGRAM

## 2023-07-11 PROCEDURE — 80048 BASIC METABOLIC PNL TOTAL CA: CPT

## 2023-07-11 PROCEDURE — 2580000003 HC RX 258: Performed by: NURSE PRACTITIONER

## 2023-07-11 PROCEDURE — 2500000003 HC RX 250 WO HCPCS: Performed by: INTERNAL MEDICINE

## 2023-07-11 RX ORDER — LANOLIN ALCOHOL/MO/W.PET/CERES
100 CREAM (GRAM) TOPICAL DAILY
Status: DISCONTINUED | OUTPATIENT
Start: 2023-07-12 | End: 2023-07-11 | Stop reason: HOSPADM

## 2023-07-11 RX ORDER — DOXYCYCLINE HYCLATE 100 MG/1
100 CAPSULE ORAL EVERY 12 HOURS SCHEDULED
Qty: 2 CAPSULE | Refills: 0 | Status: SHIPPED | OUTPATIENT
Start: 2023-07-11 | End: 2023-07-12

## 2023-07-11 RX ORDER — FOLIC ACID 1 MG/1
1 TABLET ORAL DAILY
Qty: 30 TABLET | Refills: 3 | Status: ON HOLD | OUTPATIENT
Start: 2023-07-12

## 2023-07-11 RX ORDER — DOXYCYCLINE HYCLATE 100 MG/1
100 CAPSULE ORAL EVERY 12 HOURS SCHEDULED
Status: DISCONTINUED | OUTPATIENT
Start: 2023-07-11 | End: 2023-07-11 | Stop reason: HOSPADM

## 2023-07-11 RX ORDER — LANOLIN ALCOHOL/MO/W.PET/CERES
100 CREAM (GRAM) TOPICAL DAILY
Qty: 30 TABLET | Refills: 3 | Status: ON HOLD | OUTPATIENT
Start: 2023-07-12

## 2023-07-11 RX ORDER — POLYVINYL ALCOHOL 14 MG/ML
1 SOLUTION/ DROPS OPHTHALMIC EVERY 4 HOURS PRN
Status: DISCONTINUED | OUTPATIENT
Start: 2023-07-11 | End: 2023-07-11 | Stop reason: HOSPADM

## 2023-07-11 RX ORDER — METOPROLOL TARTRATE 75 MG/1
75 TABLET, FILM COATED ORAL 2 TIMES DAILY
Qty: 60 TABLET | Refills: 3 | Status: ON HOLD | OUTPATIENT
Start: 2023-07-11

## 2023-07-11 RX ORDER — MINERAL OIL AND WHITE PETROLATUM 150; 830 MG/G; MG/G
OINTMENT OPHTHALMIC EVERY 4 HOURS PRN
Status: DISCONTINUED | OUTPATIENT
Start: 2023-07-11 | End: 2023-07-11 | Stop reason: HOSPADM

## 2023-07-11 RX ORDER — FOLIC ACID 1 MG/1
1 TABLET ORAL DAILY
Status: DISCONTINUED | OUTPATIENT
Start: 2023-07-12 | End: 2023-07-11 | Stop reason: HOSPADM

## 2023-07-11 RX ORDER — PANTOPRAZOLE SODIUM 40 MG/1
40 TABLET, DELAYED RELEASE ORAL
Status: DISCONTINUED | OUTPATIENT
Start: 2023-07-12 | End: 2023-07-11 | Stop reason: HOSPADM

## 2023-07-11 RX ADMIN — SODIUM CHLORIDE, PRESERVATIVE FREE 40 MG: 5 INJECTION INTRAVENOUS at 08:07

## 2023-07-11 RX ADMIN — THIAMINE HYDROCHLORIDE 100 MG: 100 INJECTION, SOLUTION INTRAMUSCULAR; INTRAVENOUS at 08:07

## 2023-07-11 RX ADMIN — HEPARIN SODIUM 5000 UNITS: 10000 INJECTION INTRAVENOUS; SUBCUTANEOUS at 07:00

## 2023-07-11 RX ADMIN — HYDROCODONE BITARTRATE AND ACETAMINOPHEN 1 TABLET: 7.5; 325 TABLET ORAL at 14:12

## 2023-07-11 RX ADMIN — HYDROCODONE BITARTRATE AND ACETAMINOPHEN 1 TABLET: 7.5; 325 TABLET ORAL at 06:06

## 2023-07-11 RX ADMIN — FOLIC ACID 1 MG: 5 INJECTION, SOLUTION INTRAMUSCULAR; INTRAVENOUS; SUBCUTANEOUS at 08:07

## 2023-07-11 RX ADMIN — HYDROCODONE BITARTRATE AND ACETAMINOPHEN 1 TABLET: 7.5; 325 TABLET ORAL at 10:07

## 2023-07-11 RX ADMIN — DOXYCYCLINE HYCLATE 100 MG: 100 CAPSULE ORAL at 13:01

## 2023-07-11 RX ADMIN — IPRATROPIUM BROMIDE AND ALBUTEROL SULFATE 1 DOSE: 2.5; .5 SOLUTION RESPIRATORY (INHALATION) at 12:35

## 2023-07-11 RX ADMIN — METOPROLOL TARTRATE 75 MG: 25 TABLET, FILM COATED ORAL at 08:07

## 2023-07-11 RX ADMIN — DOXYCYCLINE 100 MG: 100 INJECTION, POWDER, LYOPHILIZED, FOR SOLUTION INTRAVENOUS at 00:53

## 2023-07-11 RX ADMIN — WHITE PETROLATUM: .15; .85 OINTMENT OPHTHALMIC at 01:12

## 2023-07-11 RX ADMIN — ARFORMOTEROL TARTRATE 15 MCG: 15 SOLUTION RESPIRATORY (INHALATION) at 08:26

## 2023-07-11 RX ADMIN — Medication 10 ML: at 08:10

## 2023-07-11 RX ADMIN — LOSARTAN POTASSIUM 50 MG: 50 TABLET, FILM COATED ORAL at 08:07

## 2023-07-11 RX ADMIN — 0.12% CHLORHEXIDINE GLUCONATE 15 ML: 1.2 RINSE ORAL at 08:07

## 2023-07-11 RX ADMIN — HYDROCODONE BITARTRATE AND ACETAMINOPHEN 1 TABLET: 7.5; 325 TABLET ORAL at 01:46

## 2023-07-11 RX ADMIN — ASPIRIN 81 MG CHEWABLE TABLET 81 MG: 81 TABLET CHEWABLE at 08:07

## 2023-07-11 RX ADMIN — BUDESONIDE 500 MCG: 0.5 SUSPENSION RESPIRATORY (INHALATION) at 08:26

## 2023-07-11 RX ADMIN — WHITE PETROLATUM: .15; .85 OINTMENT OPHTHALMIC at 04:29

## 2023-07-11 RX ADMIN — ESCITALOPRAM OXALATE 10 MG: 10 TABLET ORAL at 08:07

## 2023-07-11 RX ADMIN — IPRATROPIUM BROMIDE AND ALBUTEROL SULFATE 1 DOSE: 2.5; .5 SOLUTION RESPIRATORY (INHALATION) at 08:26

## 2023-07-11 ASSESSMENT — PAIN DESCRIPTION - ORIENTATION
ORIENTATION: POSTERIOR
ORIENTATION: POSTERIOR

## 2023-07-11 ASSESSMENT — PAIN DESCRIPTION - LOCATION
LOCATION: BACK
LOCATION: BACK

## 2023-07-11 ASSESSMENT — PAIN SCALES - GENERAL
PAINLEVEL_OUTOF10: 7
PAINLEVEL_OUTOF10: 7
PAINLEVEL_OUTOF10: 9

## 2023-07-11 ASSESSMENT — PAIN DESCRIPTION - DESCRIPTORS
DESCRIPTORS: ACHING
DESCRIPTORS: ACHING

## 2023-07-11 NOTE — PROGRESS NOTES
Pharmacy Consultation Note  (Antibiotic Dosing and Monitoring)    Initial consult date: 7/8/23  Consulting physician/provider: Dr. Librado Ramirez  Drug: Vancomycin  Indication: PNA    Age/  Gender Height Weight IBW  Allergy Information   57 y.o./male 6' (182.9 cm) 268 lb 8.3 oz (121.8 kg)     Ideal body weight: 77.6 kg (171 lb 1.2 oz)  Adjusted ideal body weight: 95.4 kg (210 lb 3.9 oz)   Pcn [penicillins]      Renal Function:  Recent Labs     07/09/23  1732 07/10/23  0509 07/11/23  0609   BUN 5* 10 17   CREATININE 0.7 0.7 0.8         Intake/Output Summary (Last 24 hours) at 7/11/2023 0831  Last data filed at 7/10/2023 2124  Gross per 24 hour   Intake 2522.77 ml   Output 245 ml   Net 2277.77 ml         Vancomycin Monitoring:  Trough:    Recent Labs     07/10/23  0509   VANCOTROUGH 15.0     Random:  No results for input(s): VANCORANDOM in the last 72 hours. No results for input(s): Grzegorz Lies in the last 72 hours. Historical Cultures:  Organism   Date Value Ref Range Status   07/08/2023 Staphylococcus haemolyticus (A)  Final     No results for input(s): BC in the last 72 hours. Vancomycin Administration Times:  Recent vancomycin administrations                     vancomycin 1500 mg in sodium chloride 0.9% 300 mL IVPB (mg) 1,500 mg New Bag 07/10/23 0840     1,500 mg New Bag 07/09/23 1808     1,500 mg New Bag  0541    vancomycin (VANCOCIN) 1,750 mg in sodium chloride 0.9 % 500 mL IVPB (mg) 1,750 mg New Bag 07/08/23 1726                  Assessment:  Patient is a 62 y.o. male who has been initiated on vancomycin. Estimated Creatinine Clearance: 137 mL/min (based on SCr of 0.8 mg/dL). To dose vancomycin, pharmacy will be utilizing [facility] to target AUC/ANAHI of 400-600.  7/11: vancomycin and cefepime changed to doxycycline on 7/10/2023. Plan:  Pharmacy sign off.     Michelle Amador, PharmD  7/11/2023  8:32 AM

## 2023-07-11 NOTE — CARE COORDINATION
7/11. Discharge plan is home with son when medically stable. [facility] following.  Matthew Cummings RN

## 2023-07-11 NOTE — PLAN OF CARE
Problem: Safety - Adult  Goal: Free from fall injury  Outcome: Progressing  Flowsheets (Taken 7/10/2023 2015)  Free From Fall Injury: Based on caregiver fall risk screen, instruct family/caregiver to ask for assistance with transferring infant if caregiver noted to have fall risk factors     Problem: Pain  Goal: Verbalizes/displays adequate comfort level or baseline comfort level  Outcome: Progressing     Problem: Discharge Planning  Goal: Discharge to home or other facility with appropriate resources  Outcome: Progressing     Problem: Skin/Tissue Integrity  Goal: Absence of new skin breakdown  Description: 1. Monitor for areas of redness and/or skin breakdown  2. Assess vascular access sites hourly  3. Every 4-6 hours minimum:  Change oxygen saturation probe site  4. Every 4-6 hours:  If on nasal continuous positive airway pressure, respiratory therapy assess nares and determine need for appliance change or resting period. Outcome: Progressing     Problem: ABCDS Injury Assessment  Goal: Absence of physical injury  Outcome: Progressing     Problem: Neurosensory - Adult  Goal: Achieves stable or improved neurological status  Outcome: Progressing  Flowsheets (Taken 7/10/2023 2015)  Achieves stable or improved neurological status: Assess for and report changes in neurological status  Goal: Absence of seizures  Outcome: Progressing  Flowsheets (Taken 7/10/2023 2015)  Absence of seizures: Monitor for seizure activity.   If seizure occurs, document type and location of movements and any associated apnea  Goal: Achieves maximal functionality and self care  Outcome: Progressing  Flowsheets (Taken 7/10/2023 2015)  Achieves maximal functionality and self care: Monitor swallowing and airway patency with patient fatigue and changes in neurological status     Problem: Cardiovascular - Adult  Goal: Maintains optimal cardiac output and hemodynamic stability  Outcome: Progressing     Problem: Skin/Tissue Integrity - Adult  Goal: Skin integrity remains intact  Outcome: Progressing  Flowsheets (Taken 7/10/2023 2015)  Skin Integrity Remains Intact: Monitor for areas of redness and/or skin breakdown     Problem: Musculoskeletal - Adult  Goal: Return mobility to safest level of function  Outcome: Progressing  Goal: Return ADL status to a safe level of function  Outcome: Progressing

## 2023-07-11 NOTE — PROGRESS NOTES
CLINICAL PHARMACY NOTE: MEDS TO BEDS    Total # of Prescriptions Filled: 4   The following medications were delivered to the patient:  Metoprolol tartrate 50 mg  Folic acid 1 mg  Doxycycline hyclate 100 mg caps  Thiamine mononitrate 100 mg    Additional Documentation:   RN picked up in the pharmacy at register
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
## 2023-07-28 ENCOUNTER — APPOINTMENT (OUTPATIENT)
Dept: GENERAL RADIOLOGY | Age: 58
DRG: 190 | End: 2023-07-28
Payer: MEDICARE

## 2023-07-28 ENCOUNTER — HOSPITAL ENCOUNTER (INPATIENT)
Age: 58
LOS: 3 days | Discharge: HOME | DRG: 190 | End: 2023-07-31
Attending: EMERGENCY MEDICINE | Admitting: FAMILY MEDICINE
Payer: MEDICARE

## 2023-07-28 ENCOUNTER — APPOINTMENT (OUTPATIENT)
Dept: CT IMAGING | Age: 58
DRG: 190 | End: 2023-07-28
Payer: MEDICARE

## 2023-07-28 DIAGNOSIS — J18.9 PNEUMONIA OF BOTH LUNGS DUE TO INFECTIOUS ORGANISM, UNSPECIFIED PART OF LUNG: ICD-10-CM

## 2023-07-28 DIAGNOSIS — F17.203 NICOTINE WITHDRAWAL: ICD-10-CM

## 2023-07-28 DIAGNOSIS — J44.1 ACUTE EXACERBATION OF CHRONIC OBSTRUCTIVE PULMONARY DISEASE (COPD) (HCC): Primary | ICD-10-CM

## 2023-07-28 LAB
ALBUMIN SERPL-MCNC: 3.8 G/DL (ref 3.5–5.2)
ALP SERPL-CCNC: 141 U/L (ref 40–129)
ALT SERPL-CCNC: 73 U/L (ref 0–40)
AMMONIA PLAS-SCNC: 30 UMOL/L (ref 16–60)
AMORPH SED URNS QL MICRO: PRESENT
ANION GAP SERPL CALCULATED.3IONS-SCNC: 14 MMOL/L (ref 7–16)
APAP SERPL-MCNC: <5 UG/ML (ref 10–30)
AST SERPL-CCNC: 96 U/L (ref 0–39)
BACTERIA URNS QL MICRO: ABNORMAL
BASOPHILS # BLD: 0.04 K/UL (ref 0–0.2)
BASOPHILS NFR BLD: 1 % (ref 0–2)
BILIRUB DIRECT SERPL-MCNC: 0.3 MG/DL (ref 0–0.3)
BILIRUB INDIRECT SERPL-MCNC: 0.6 MG/DL (ref 0–1)
BILIRUB SERPL-MCNC: 0.9 MG/DL (ref 0–1.2)
BILIRUB UR QL STRIP: ABNORMAL
BNP SERPL-MCNC: 78 PG/ML (ref 0–450)
BUN SERPL-MCNC: 9 MG/DL (ref 6–20)
CALCIUM SERPL-MCNC: 9.4 MG/DL (ref 8.6–10.2)
CHLORIDE SERPL-SCNC: 93 MMOL/L (ref 98–107)
CLARITY UR: ABNORMAL
CO2 SERPL-SCNC: 26 MMOL/L (ref 22–29)
COLOR UR: YELLOW
CREAT SERPL-MCNC: 0.7 MG/DL (ref 0.7–1.2)
D DIMER: 694 NG/ML DDU (ref 0–232)
EOSINOPHIL # BLD: 0.24 K/UL (ref 0.05–0.5)
EOSINOPHILS RELATIVE PERCENT: 3 % (ref 0–6)
EPI CELLS #/AREA URNS HPF: ABNORMAL /HPF
ERYTHROCYTE [DISTWIDTH] IN BLOOD BY AUTOMATED COUNT: 14.3 % (ref 11.5–15)
ETHANOLAMINE SERPL-MCNC: <10 MG/DL
GFR SERPL CREATININE-BSD FRML MDRD: >60 ML/MIN/1.73M2
GLUCOSE SERPL-MCNC: 115 MG/DL (ref 74–99)
GLUCOSE UR STRIP-MCNC: NEGATIVE MG/DL
HCT VFR BLD AUTO: 33.6 % (ref 37–54)
HGB BLD-MCNC: 10.9 G/DL (ref 12.5–16.5)
HGB UR QL STRIP.AUTO: NEGATIVE
IMM GRANULOCYTES # BLD AUTO: 0.08 K/UL (ref 0–0.58)
IMM GRANULOCYTES NFR BLD: 1 % (ref 0–5)
KETONES UR STRIP-MCNC: ABNORMAL MG/DL
LEUKOCYTE ESTERASE UR QL STRIP: NEGATIVE
LYMPHOCYTES NFR BLD: 1 K/UL (ref 1.5–4)
LYMPHOCYTES RELATIVE PERCENT: 11 % (ref 20–42)
MCH RBC QN AUTO: 32.1 PG (ref 26–35)
MCHC RBC AUTO-ENTMCNC: 32.4 G/DL (ref 32–34.5)
MCV RBC AUTO: 98.8 FL (ref 80–99.9)
MONOCYTES NFR BLD: 0.82 K/UL (ref 0.1–0.95)
MONOCYTES NFR BLD: 9 % (ref 2–12)
NEUTROPHILS NFR BLD: 75 % (ref 43–80)
NEUTS SEG NFR BLD: 6.63 K/UL (ref 1.8–7.3)
NITRITE UR QL STRIP: NEGATIVE
PH UR STRIP: 5.5 [PH] (ref 5–9)
PLATELET # BLD AUTO: 174 K/UL (ref 130–450)
PMV BLD AUTO: 10.6 FL (ref 7–12)
POTASSIUM SERPL-SCNC: 3.7 MMOL/L (ref 3.5–5)
PROT SERPL-MCNC: 7.1 G/DL (ref 6.4–8.3)
PROT UR STRIP-MCNC: 30 MG/DL
RBC # BLD AUTO: 3.4 M/UL (ref 3.8–5.8)
RBC #/AREA URNS HPF: ABNORMAL /HPF
SALICYLATES SERPL-MCNC: <0.3 MG/DL (ref 0–30)
SODIUM SERPL-SCNC: 133 MMOL/L (ref 132–146)
SP GR UR STRIP: 1.02 (ref 1–1.03)
TOXIC TRICYCLIC SC,BLOOD: NEGATIVE
TROPONIN I SERPL HS-MCNC: 15 NG/L (ref 0–11)
TROPONIN I SERPL HS-MCNC: 18 NG/L (ref 0–11)
UROBILINOGEN UR STRIP-ACNC: 1 EU/DL (ref 0–1)
WBC #/AREA URNS HPF: ABNORMAL /HPF
WBC OTHER # BLD: 8.8 K/UL (ref 4.5–11.5)

## 2023-07-28 PROCEDURE — 71275 CT ANGIOGRAPHY CHEST: CPT

## 2023-07-28 PROCEDURE — 1200000000 HC SEMI PRIVATE

## 2023-07-28 PROCEDURE — 2500000003 HC RX 250 WO HCPCS

## 2023-07-28 PROCEDURE — 74177 CT ABD & PELVIS W/CONTRAST: CPT

## 2023-07-28 PROCEDURE — 96375 TX/PRO/DX INJ NEW DRUG ADDON: CPT

## 2023-07-28 PROCEDURE — 6370000000 HC RX 637 (ALT 250 FOR IP)

## 2023-07-28 PROCEDURE — 6370000000 HC RX 637 (ALT 250 FOR IP): Performed by: FAMILY MEDICINE

## 2023-07-28 PROCEDURE — 80143 DRUG ASSAY ACETAMINOPHEN: CPT

## 2023-07-28 PROCEDURE — 82140 ASSAY OF AMMONIA: CPT

## 2023-07-28 PROCEDURE — 80048 BASIC METABOLIC PNL TOTAL CA: CPT

## 2023-07-28 PROCEDURE — HZ2ZZZZ DETOXIFICATION SERVICES FOR SUBSTANCE ABUSE TREATMENT: ICD-10-PCS | Performed by: FAMILY MEDICINE

## 2023-07-28 PROCEDURE — G0480 DRUG TEST DEF 1-7 CLASSES: HCPCS

## 2023-07-28 PROCEDURE — 84484 ASSAY OF TROPONIN QUANT: CPT

## 2023-07-28 PROCEDURE — 83880 ASSAY OF NATRIURETIC PEPTIDE: CPT

## 2023-07-28 PROCEDURE — 96374 THER/PROPH/DIAG INJ IV PUSH: CPT

## 2023-07-28 PROCEDURE — 80179 DRUG ASSAY SALICYLATE: CPT

## 2023-07-28 PROCEDURE — 99285 EMERGENCY DEPT VISIT HI MDM: CPT

## 2023-07-28 PROCEDURE — 99223 1ST HOSP IP/OBS HIGH 75: CPT | Performed by: FAMILY MEDICINE

## 2023-07-28 PROCEDURE — 94640 AIRWAY INHALATION TREATMENT: CPT

## 2023-07-28 PROCEDURE — 6360000004 HC RX CONTRAST MEDICATION: Performed by: RADIOLOGY

## 2023-07-28 PROCEDURE — 6360000002 HC RX W HCPCS

## 2023-07-28 PROCEDURE — 2580000003 HC RX 258

## 2023-07-28 PROCEDURE — 85027 COMPLETE CBC AUTOMATED: CPT

## 2023-07-28 PROCEDURE — 80307 DRUG TEST PRSMV CHEM ANLYZR: CPT

## 2023-07-28 PROCEDURE — 80076 HEPATIC FUNCTION PANEL: CPT

## 2023-07-28 PROCEDURE — 81001 URINALYSIS AUTO W/SCOPE: CPT

## 2023-07-28 PROCEDURE — 71045 X-RAY EXAM CHEST 1 VIEW: CPT

## 2023-07-28 PROCEDURE — 94664 DEMO&/EVAL PT USE INHALER: CPT

## 2023-07-28 PROCEDURE — 85379 FIBRIN DEGRADATION QUANT: CPT

## 2023-07-28 PROCEDURE — 93005 ELECTROCARDIOGRAM TRACING: CPT

## 2023-07-28 RX ORDER — MORPHINE SULFATE 4 MG/ML
4 INJECTION, SOLUTION INTRAMUSCULAR; INTRAVENOUS ONCE
Status: COMPLETED | OUTPATIENT
Start: 2023-07-28 | End: 2023-07-28

## 2023-07-28 RX ORDER — IPRATROPIUM BROMIDE AND ALBUTEROL SULFATE 2.5; .5 MG/3ML; MG/3ML
3 SOLUTION RESPIRATORY (INHALATION) ONCE
Status: COMPLETED | OUTPATIENT
Start: 2023-07-28 | End: 2023-07-28

## 2023-07-28 RX ORDER — GAUZE BANDAGE 2" X 2"
100 BANDAGE TOPICAL DAILY
Status: DISCONTINUED | OUTPATIENT
Start: 2023-07-29 | End: 2023-07-31 | Stop reason: HOSPADM

## 2023-07-28 RX ORDER — NICOTINE 21 MG/24HR
1 PATCH, TRANSDERMAL 24 HOURS TRANSDERMAL DAILY
Status: DISCONTINUED | OUTPATIENT
Start: 2023-07-28 | End: 2023-07-31 | Stop reason: HOSPADM

## 2023-07-28 RX ORDER — LORAZEPAM 2 MG/ML
1 INJECTION INTRAMUSCULAR
Status: DISCONTINUED | OUTPATIENT
Start: 2023-07-28 | End: 2023-07-31

## 2023-07-28 RX ORDER — SODIUM CHLORIDE 0.9 % (FLUSH) 0.9 %
5-40 SYRINGE (ML) INJECTION EVERY 12 HOURS SCHEDULED
Status: DISCONTINUED | OUTPATIENT
Start: 2023-07-28 | End: 2023-07-31 | Stop reason: HOSPADM

## 2023-07-28 RX ORDER — ENOXAPARIN SODIUM 100 MG/ML
30 INJECTION SUBCUTANEOUS 2 TIMES DAILY
Status: DISCONTINUED | OUTPATIENT
Start: 2023-07-28 | End: 2023-07-31 | Stop reason: HOSPADM

## 2023-07-28 RX ORDER — BENZONATATE 100 MG/1
100 CAPSULE ORAL 3 TIMES DAILY PRN
Status: DISCONTINUED | OUTPATIENT
Start: 2023-07-28 | End: 2023-07-31 | Stop reason: HOSPADM

## 2023-07-28 RX ORDER — DOXYCYCLINE HYCLATE 100 MG/1
100 CAPSULE ORAL EVERY 12 HOURS SCHEDULED
Status: DISCONTINUED | OUTPATIENT
Start: 2023-07-29 | End: 2023-07-31 | Stop reason: HOSPADM

## 2023-07-28 RX ORDER — LORAZEPAM 2 MG/ML
4 INJECTION INTRAMUSCULAR
Status: DISCONTINUED | OUTPATIENT
Start: 2023-07-28 | End: 2023-07-31

## 2023-07-28 RX ORDER — LORAZEPAM 2 MG/ML
3 INJECTION INTRAMUSCULAR
Status: DISCONTINUED | OUTPATIENT
Start: 2023-07-28 | End: 2023-07-31

## 2023-07-28 RX ORDER — GUAIFENESIN 600 MG/1
600 TABLET, EXTENDED RELEASE ORAL 2 TIMES DAILY
Status: DISCONTINUED | OUTPATIENT
Start: 2023-07-28 | End: 2023-07-31 | Stop reason: HOSPADM

## 2023-07-28 RX ORDER — M-VIT,TX,IRON,MINS/CALC/FOLIC 27MG-0.4MG
1 TABLET ORAL DAILY
Status: DISCONTINUED | OUTPATIENT
Start: 2023-07-29 | End: 2023-07-31 | Stop reason: HOSPADM

## 2023-07-28 RX ORDER — LORAZEPAM 1 MG/1
4 TABLET ORAL
Status: DISCONTINUED | OUTPATIENT
Start: 2023-07-28 | End: 2023-07-31

## 2023-07-28 RX ORDER — ACETAMINOPHEN 650 MG/1
650 SUPPOSITORY RECTAL EVERY 6 HOURS PRN
Status: DISCONTINUED | OUTPATIENT
Start: 2023-07-28 | End: 2023-07-31 | Stop reason: HOSPADM

## 2023-07-28 RX ORDER — SODIUM CHLORIDE 9 MG/ML
INJECTION, SOLUTION INTRAVENOUS PRN
Status: DISCONTINUED | OUTPATIENT
Start: 2023-07-28 | End: 2023-07-31 | Stop reason: HOSPADM

## 2023-07-28 RX ORDER — SODIUM CHLORIDE 9 MG/ML
INJECTION, SOLUTION INTRAVENOUS PRN
Status: DISCONTINUED | OUTPATIENT
Start: 2023-07-28 | End: 2023-07-28 | Stop reason: SDUPTHER

## 2023-07-28 RX ORDER — IPRATROPIUM BROMIDE AND ALBUTEROL SULFATE 2.5; .5 MG/3ML; MG/3ML
1 SOLUTION RESPIRATORY (INHALATION)
Status: DISCONTINUED | OUTPATIENT
Start: 2023-07-29 | End: 2023-07-31 | Stop reason: HOSPADM

## 2023-07-28 RX ORDER — HYDROMORPHONE HYDROCHLORIDE 1 MG/ML
1 INJECTION, SOLUTION INTRAMUSCULAR; INTRAVENOUS; SUBCUTANEOUS ONCE
Status: CANCELLED | OUTPATIENT
Start: 2023-07-28 | End: 2023-07-28

## 2023-07-28 RX ORDER — HYDROCODONE BITARTRATE AND ACETAMINOPHEN 5; 325 MG/1; MG/1
1 TABLET ORAL EVERY 6 HOURS PRN
Status: DISCONTINUED | OUTPATIENT
Start: 2023-07-28 | End: 2023-07-31 | Stop reason: HOSPADM

## 2023-07-28 RX ORDER — LORAZEPAM 1 MG/1
2 TABLET ORAL
Status: DISCONTINUED | OUTPATIENT
Start: 2023-07-28 | End: 2023-07-31

## 2023-07-28 RX ORDER — SODIUM CHLORIDE 0.9 % (FLUSH) 0.9 %
5-40 SYRINGE (ML) INJECTION PRN
Status: DISCONTINUED | OUTPATIENT
Start: 2023-07-28 | End: 2023-07-31 | Stop reason: HOSPADM

## 2023-07-28 RX ORDER — SODIUM CHLORIDE 9 MG/ML
INJECTION, SOLUTION INTRAVENOUS CONTINUOUS
Status: DISCONTINUED | OUTPATIENT
Start: 2023-07-28 | End: 2023-07-30

## 2023-07-28 RX ORDER — LORAZEPAM 2 MG/ML
2 INJECTION INTRAMUSCULAR
Status: DISCONTINUED | OUTPATIENT
Start: 2023-07-28 | End: 2023-07-31

## 2023-07-28 RX ORDER — FENTANYL CITRATE 0.05 MG/ML
50 INJECTION, SOLUTION INTRAMUSCULAR; INTRAVENOUS ONCE
Status: COMPLETED | OUTPATIENT
Start: 2023-07-28 | End: 2023-07-28

## 2023-07-28 RX ORDER — LORAZEPAM 1 MG/1
3 TABLET ORAL
Status: DISCONTINUED | OUTPATIENT
Start: 2023-07-28 | End: 2023-07-31

## 2023-07-28 RX ORDER — 0.9 % SODIUM CHLORIDE 0.9 %
1000 INTRAVENOUS SOLUTION INTRAVENOUS ONCE
Status: COMPLETED | OUTPATIENT
Start: 2023-07-28 | End: 2023-07-28

## 2023-07-28 RX ORDER — LORAZEPAM 1 MG/1
1 TABLET ORAL
Status: DISCONTINUED | OUTPATIENT
Start: 2023-07-28 | End: 2023-07-31

## 2023-07-28 RX ORDER — ACETAMINOPHEN 325 MG/1
650 TABLET ORAL EVERY 6 HOURS PRN
Status: DISCONTINUED | OUTPATIENT
Start: 2023-07-28 | End: 2023-07-31 | Stop reason: HOSPADM

## 2023-07-28 RX ADMIN — CEFTRIAXONE 1000 MG: 1 INJECTION, POWDER, FOR SOLUTION INTRAMUSCULAR; INTRAVENOUS at 22:02

## 2023-07-28 RX ADMIN — MORPHINE SULFATE 4 MG: 4 INJECTION, SOLUTION INTRAMUSCULAR; INTRAVENOUS at 18:23

## 2023-07-28 RX ADMIN — METHYLPREDNISOLONE SODIUM SUCCINATE 125 MG: 125 INJECTION, POWDER, FOR SOLUTION INTRAMUSCULAR; INTRAVENOUS at 17:25

## 2023-07-28 RX ADMIN — DOXYCYCLINE 100 MG: 100 INJECTION, POWDER, LYOPHILIZED, FOR SOLUTION INTRAVENOUS at 22:12

## 2023-07-28 RX ADMIN — FENTANYL CITRATE 50 MCG: 50 INJECTION INTRAMUSCULAR; INTRAVENOUS at 17:25

## 2023-07-28 RX ADMIN — SODIUM CHLORIDE 1000 ML: 9 INJECTION, SOLUTION INTRAVENOUS at 17:24

## 2023-07-28 RX ADMIN — IOPAMIDOL 70 ML: 755 INJECTION, SOLUTION INTRAVENOUS at 19:33

## 2023-07-28 RX ADMIN — ACETAMINOPHEN 650 MG: 325 TABLET ORAL at 21:59

## 2023-07-28 RX ADMIN — IPRATROPIUM BROMIDE AND ALBUTEROL SULFATE 3 DOSE: .5; 3 SOLUTION RESPIRATORY (INHALATION) at 16:54

## 2023-07-28 ASSESSMENT — PAIN DESCRIPTION - LOCATION: LOCATION: BACK;SHOULDER

## 2023-07-28 ASSESSMENT — PAIN SCALES - GENERAL: PAINLEVEL_OUTOF10: 10

## 2023-07-29 PROBLEM — R07.9 EXERTIONAL CHEST PAIN: Status: RESOLVED | Noted: 2022-04-11 | Resolved: 2023-07-29

## 2023-07-29 PROBLEM — R07.9 ACUTE CHEST PAIN: Status: RESOLVED | Noted: 2022-04-09 | Resolved: 2023-07-29

## 2023-07-29 PROBLEM — E87.1 ACUTE HYPONATREMIA: Status: RESOLVED | Noted: 2023-04-24 | Resolved: 2023-07-29

## 2023-07-29 PROBLEM — R77.8 ELEVATED TROPONIN: Status: RESOLVED | Noted: 2023-07-05 | Resolved: 2023-07-29

## 2023-07-29 PROBLEM — N17.9 AKI (ACUTE KIDNEY INJURY) (HCC): Status: RESOLVED | Noted: 2023-07-05 | Resolved: 2023-07-29

## 2023-07-29 PROBLEM — N17.9 ACUTE KIDNEY INJURY (HCC): Status: RESOLVED | Noted: 2022-09-17 | Resolved: 2023-07-29

## 2023-07-29 PROBLEM — R55 SYNCOPE: Status: RESOLVED | Noted: 2022-09-17 | Resolved: 2023-07-29

## 2023-07-29 PROBLEM — R55 PRE-SYNCOPE: Status: RESOLVED | Noted: 2022-11-18 | Resolved: 2023-07-29

## 2023-07-29 PROBLEM — D53.9 MACROCYTIC ANEMIA: Status: RESOLVED | Noted: 2023-04-26 | Resolved: 2023-07-29

## 2023-07-29 PROBLEM — R79.89 ELEVATED TROPONIN: Status: RESOLVED | Noted: 2023-07-05 | Resolved: 2023-07-29

## 2023-07-29 LAB
AMPHET UR QL SCN: NEGATIVE
ANION GAP SERPL CALCULATED.3IONS-SCNC: 14 MMOL/L (ref 7–16)
BARBITURATES UR QL SCN: NEGATIVE
BASOPHILS # BLD: 0 K/UL (ref 0–0.2)
BASOPHILS NFR BLD: 0 % (ref 0–2)
BENZODIAZ UR QL: NEGATIVE
BUN SERPL-MCNC: 11 MG/DL (ref 6–20)
BUPRENORPHINE UR QL: NEGATIVE
CALCIUM SERPL-MCNC: 8.5 MG/DL (ref 8.6–10.2)
CANNABINOIDS UR QL SCN: POSITIVE
CHLORIDE SERPL-SCNC: 94 MMOL/L (ref 98–107)
CO2 SERPL-SCNC: 22 MMOL/L (ref 22–29)
COCAINE UR QL SCN: NEGATIVE
CREAT SERPL-MCNC: 0.7 MG/DL (ref 0.7–1.2)
EKG ATRIAL RATE: 84 BPM
EKG P AXIS: 42 DEGREES
EKG P-R INTERVAL: 136 MS
EKG Q-T INTERVAL: 452 MS
EKG QRS DURATION: 88 MS
EKG QTC CALCULATION (BAZETT): 534 MS
EKG R AXIS: 60 DEGREES
EKG T AXIS: 56 DEGREES
EKG VENTRICULAR RATE: 84 BPM
EOSINOPHIL # BLD: 0 K/UL (ref 0.05–0.5)
EOSINOPHILS RELATIVE PERCENT: 0 % (ref 0–6)
ERYTHROCYTE [DISTWIDTH] IN BLOOD BY AUTOMATED COUNT: 14.3 % (ref 11.5–15)
FENTANYL UR QL: NEGATIVE
GFR SERPL CREATININE-BSD FRML MDRD: >60 ML/MIN/1.73M2
GLUCOSE SERPL-MCNC: 209 MG/DL (ref 74–99)
HCT VFR BLD AUTO: 33 % (ref 37–54)
HGB BLD-MCNC: 10.8 G/DL (ref 12.5–16.5)
LYMPHOCYTES NFR BLD: 0.11 K/UL (ref 1.5–4)
LYMPHOCYTES RELATIVE PERCENT: 2 % (ref 20–42)
MCH RBC QN AUTO: 31.9 PG (ref 26–35)
MCHC RBC AUTO-ENTMCNC: 32.7 G/DL (ref 32–34.5)
MCV RBC AUTO: 97.3 FL (ref 80–99.9)
METHADONE UR QL: NEGATIVE
MONOCYTES NFR BLD: 0 % (ref 2–12)
MONOCYTES NFR BLD: 0 K/UL (ref 0.1–0.95)
NEUTROPHILS NFR BLD: 98 % (ref 43–80)
NEUTS SEG NFR BLD: 6.39 K/UL (ref 1.8–7.3)
OPIATES UR QL SCN: NEGATIVE
OXYCODONE UR QL SCN: NEGATIVE
PCP UR QL SCN: NEGATIVE
PLATELET # BLD AUTO: 149 K/UL (ref 130–450)
PMV BLD AUTO: 10.7 FL (ref 7–12)
POTASSIUM SERPL-SCNC: 4.1 MMOL/L (ref 3.5–5)
RBC # BLD AUTO: 3.39 M/UL (ref 3.8–5.8)
RBC # BLD: ABNORMAL 10*6/UL
SODIUM SERPL-SCNC: 130 MMOL/L (ref 132–146)
TEST INFORMATION: ABNORMAL
WBC OTHER # BLD: 6.5 K/UL (ref 4.5–11.5)

## 2023-07-29 PROCEDURE — 36415 COLL VENOUS BLD VENIPUNCTURE: CPT

## 2023-07-29 PROCEDURE — 99233 SBSQ HOSP IP/OBS HIGH 50: CPT | Performed by: INTERNAL MEDICINE

## 2023-07-29 PROCEDURE — 93010 ELECTROCARDIOGRAM REPORT: CPT | Performed by: INTERNAL MEDICINE

## 2023-07-29 PROCEDURE — 87899 AGENT NOS ASSAY W/OPTIC: CPT

## 2023-07-29 PROCEDURE — 6360000002 HC RX W HCPCS: Performed by: FAMILY MEDICINE

## 2023-07-29 PROCEDURE — 80048 BASIC METABOLIC PNL TOTAL CA: CPT

## 2023-07-29 PROCEDURE — 85027 COMPLETE CBC AUTOMATED: CPT

## 2023-07-29 PROCEDURE — 2580000003 HC RX 258: Performed by: FAMILY MEDICINE

## 2023-07-29 PROCEDURE — 6370000000 HC RX 637 (ALT 250 FOR IP): Performed by: FAMILY MEDICINE

## 2023-07-29 PROCEDURE — 1200000000 HC SEMI PRIVATE

## 2023-07-29 PROCEDURE — 6370000000 HC RX 637 (ALT 250 FOR IP)

## 2023-07-29 PROCEDURE — 76937 US GUIDE VASCULAR ACCESS: CPT

## 2023-07-29 PROCEDURE — 86738 MYCOPLASMA ANTIBODY: CPT

## 2023-07-29 PROCEDURE — 6370000000 HC RX 637 (ALT 250 FOR IP): Performed by: INTERNAL MEDICINE

## 2023-07-29 PROCEDURE — 94640 AIRWAY INHALATION TREATMENT: CPT

## 2023-07-29 RX ORDER — ESCITALOPRAM OXALATE 10 MG/1
10 TABLET ORAL DAILY
Status: DISCONTINUED | OUTPATIENT
Start: 2023-07-29 | End: 2023-07-31 | Stop reason: HOSPADM

## 2023-07-29 RX ORDER — OXYCODONE HYDROCHLORIDE AND ACETAMINOPHEN 5; 325 MG/1; MG/1
1 TABLET ORAL EVERY 4 HOURS PRN
Status: DISCONTINUED | OUTPATIENT
Start: 2023-07-29 | End: 2023-07-30

## 2023-07-29 RX ORDER — FUROSEMIDE 40 MG/1
40 TABLET ORAL DAILY
COMMUNITY
Start: 2023-06-29

## 2023-07-29 RX ORDER — LOSARTAN POTASSIUM 50 MG/1
50 TABLET ORAL DAILY
Status: DISCONTINUED | OUTPATIENT
Start: 2023-07-29 | End: 2023-07-31 | Stop reason: HOSPADM

## 2023-07-29 RX ORDER — FUROSEMIDE 40 MG/1
40 TABLET ORAL DAILY
Status: DISCONTINUED | OUTPATIENT
Start: 2023-07-29 | End: 2023-07-31 | Stop reason: HOSPADM

## 2023-07-29 RX ORDER — FOLIC ACID 1 MG/1
1 TABLET ORAL DAILY
Status: DISCONTINUED | OUTPATIENT
Start: 2023-07-29 | End: 2023-07-31 | Stop reason: HOSPADM

## 2023-07-29 RX ORDER — PANTOPRAZOLE SODIUM 40 MG/1
40 TABLET, DELAYED RELEASE ORAL
Status: DISCONTINUED | OUTPATIENT
Start: 2023-07-29 | End: 2023-07-31 | Stop reason: HOSPADM

## 2023-07-29 RX ORDER — ASPIRIN 81 MG/1
81 TABLET, CHEWABLE ORAL DAILY
Status: DISCONTINUED | OUTPATIENT
Start: 2023-07-29 | End: 2023-07-31 | Stop reason: HOSPADM

## 2023-07-29 RX ORDER — PREDNISONE 20 MG/1
40 TABLET ORAL DAILY
Status: DISCONTINUED | OUTPATIENT
Start: 2023-07-29 | End: 2023-07-31 | Stop reason: HOSPADM

## 2023-07-29 RX ADMIN — ASPIRIN 81 MG: 81 TABLET, CHEWABLE ORAL at 08:19

## 2023-07-29 RX ADMIN — IPRATROPIUM BROMIDE AND ALBUTEROL SULFATE 1 DOSE: .5; 2.5 SOLUTION RESPIRATORY (INHALATION) at 13:09

## 2023-07-29 RX ADMIN — METOPROLOL TARTRATE 75 MG: 25 TABLET, FILM COATED ORAL at 08:19

## 2023-07-29 RX ADMIN — LOSARTAN POTASSIUM 50 MG: 50 TABLET, FILM COATED ORAL at 08:19

## 2023-07-29 RX ADMIN — LORAZEPAM 2 MG: 2 INJECTION INTRAMUSCULAR; INTRAVENOUS at 00:19

## 2023-07-29 RX ADMIN — IPRATROPIUM BROMIDE AND ALBUTEROL SULFATE 1 DOSE: .5; 2.5 SOLUTION RESPIRATORY (INHALATION) at 17:01

## 2023-07-29 RX ADMIN — SODIUM CHLORIDE: 9 INJECTION, SOLUTION INTRAVENOUS at 00:13

## 2023-07-29 RX ADMIN — ENOXAPARIN SODIUM 30 MG: 100 INJECTION SUBCUTANEOUS at 20:08

## 2023-07-29 RX ADMIN — PANTOPRAZOLE SODIUM 40 MG: 40 TABLET, DELAYED RELEASE ORAL at 06:28

## 2023-07-29 RX ADMIN — ENOXAPARIN SODIUM 30 MG: 100 INJECTION SUBCUTANEOUS at 08:19

## 2023-07-29 RX ADMIN — ACETAMINOPHEN 650 MG: 325 TABLET ORAL at 14:10

## 2023-07-29 RX ADMIN — METOPROLOL TARTRATE 75 MG: 25 TABLET, FILM COATED ORAL at 03:59

## 2023-07-29 RX ADMIN — IPRATROPIUM BROMIDE AND ALBUTEROL SULFATE 1 DOSE: .5; 2.5 SOLUTION RESPIRATORY (INHALATION) at 06:23

## 2023-07-29 RX ADMIN — LORAZEPAM 4 MG: 2 INJECTION INTRAMUSCULAR; INTRAVENOUS at 08:34

## 2023-07-29 RX ADMIN — HYDROCODONE BITARTRATE AND ACETAMINOPHEN 1 TABLET: 5; 325 TABLET ORAL at 18:05

## 2023-07-29 RX ADMIN — SODIUM CHLORIDE: 9 INJECTION, SOLUTION INTRAVENOUS at 20:03

## 2023-07-29 RX ADMIN — MULTIPLE VITAMINS W/ MINERALS TAB 1 TABLET: TAB at 08:19

## 2023-07-29 RX ADMIN — HYDROCODONE BITARTRATE AND ACETAMINOPHEN 1 TABLET: 5; 325 TABLET ORAL at 11:34

## 2023-07-29 RX ADMIN — HYDROCODONE BITARTRATE AND ACETAMINOPHEN 1 TABLET: 5; 325 TABLET ORAL at 06:28

## 2023-07-29 RX ADMIN — SODIUM CHLORIDE: 9 INJECTION, SOLUTION INTRAVENOUS at 08:19

## 2023-07-29 RX ADMIN — ENOXAPARIN SODIUM 30 MG: 100 INJECTION SUBCUTANEOUS at 00:21

## 2023-07-29 RX ADMIN — HYDROCODONE BITARTRATE AND ACETAMINOPHEN 1 TABLET: 5; 325 TABLET ORAL at 00:21

## 2023-07-29 RX ADMIN — LORAZEPAM 4 MG: 2 INJECTION INTRAMUSCULAR; INTRAVENOUS at 12:37

## 2023-07-29 RX ADMIN — FUROSEMIDE 40 MG: 40 TABLET ORAL at 08:19

## 2023-07-29 RX ADMIN — GUAIFENESIN 600 MG: 600 TABLET, EXTENDED RELEASE ORAL at 19:58

## 2023-07-29 RX ADMIN — GUAIFENESIN 600 MG: 600 TABLET, EXTENDED RELEASE ORAL at 08:27

## 2023-07-29 RX ADMIN — FOLIC ACID 1 MG: 1 TABLET ORAL at 08:19

## 2023-07-29 RX ADMIN — LORAZEPAM 2 MG: 2 INJECTION INTRAMUSCULAR; INTRAVENOUS at 06:28

## 2023-07-29 RX ADMIN — DOXYCYCLINE HYCLATE 100 MG: 100 CAPSULE ORAL at 19:58

## 2023-07-29 RX ADMIN — DOXYCYCLINE HYCLATE 100 MG: 100 CAPSULE ORAL at 08:19

## 2023-07-29 RX ADMIN — CEFTRIAXONE 1000 MG: 1 INJECTION, POWDER, FOR SOLUTION INTRAMUSCULAR; INTRAVENOUS at 22:17

## 2023-07-29 RX ADMIN — ESCITALOPRAM OXALATE 10 MG: 10 TABLET ORAL at 08:19

## 2023-07-29 RX ADMIN — OXYCODONE AND ACETAMINOPHEN 1 TABLET: 5; 325 TABLET ORAL at 19:58

## 2023-07-29 RX ADMIN — LORAZEPAM 4 MG: 2 INJECTION INTRAMUSCULAR; INTRAVENOUS at 04:02

## 2023-07-29 RX ADMIN — ACETAMINOPHEN 650 MG: 325 TABLET ORAL at 03:56

## 2023-07-29 RX ADMIN — GUAIFENESIN 600 MG: 600 TABLET, EXTENDED RELEASE ORAL at 00:20

## 2023-07-29 RX ADMIN — PREDNISONE 40 MG: 20 TABLET ORAL at 08:19

## 2023-07-29 RX ADMIN — METOPROLOL TARTRATE 75 MG: 25 TABLET, FILM COATED ORAL at 19:58

## 2023-07-29 RX ADMIN — Medication 100 MG: at 08:19

## 2023-07-29 RX ADMIN — LORAZEPAM 4 MG: 2 INJECTION INTRAMUSCULAR; INTRAVENOUS at 01:15

## 2023-07-29 ASSESSMENT — LIFESTYLE VARIABLES
HOW MANY STANDARD DRINKS CONTAINING ALCOHOL DO YOU HAVE ON A TYPICAL DAY: 5 OR 6
HOW OFTEN DO YOU HAVE A DRINK CONTAINING ALCOHOL: 4 OR MORE TIMES A WEEK

## 2023-07-29 ASSESSMENT — PAIN SCALES - GENERAL
PAINLEVEL_OUTOF10: 10
PAINLEVEL_OUTOF10: 0
PAINLEVEL_OUTOF10: 9
PAINLEVEL_OUTOF10: 3
PAINLEVEL_OUTOF10: 10

## 2023-07-29 ASSESSMENT — PAIN DESCRIPTION - LOCATION
LOCATION: GENERALIZED
LOCATION: GENERALIZED
LOCATION: BACK
LOCATION: BACK
LOCATION: GENERALIZED
LOCATION: BACK

## 2023-07-29 ASSESSMENT — PAIN DESCRIPTION - DESCRIPTORS
DESCRIPTORS: ACHING
DESCRIPTORS: ACHING
DESCRIPTORS: ACHING;STABBING;SHOOTING
DESCRIPTORS: ACHING

## 2023-07-29 NOTE — PROGRESS NOTES
RN heard a thump, found patient on the floor. Patient stated \"I was trying to go around the bed to get my brush\" \"when am I going home? \" VS 97.9 oral, P-85, R-20, /96, assisted back to bed, SHUKLA, no c/o pain, charge nurse notified, who notified physician.

## 2023-07-29 NOTE — ED NOTES
Attempted to call report to 3rd floor. Staff member stated that the nurses were not at the desk to take report, but would call down when available.      Tianna Pina, DEONDRE  97/[phone]

## 2023-07-29 NOTE — CARE COORDINATION
the hospital earlier this month and does not feel he needs help with alcohol resources at this time. [facility] does not work weekend, but if patient interested in speaking with them Monday can call them and have them see him. He states he uses a \"home made cane\" at home and he has a nebulizer. Patient currently on room air. Patient has a history of [facility] and [id number] [place] [address]. Doesn't feel he needs rehab at this time, states he gets around ok, just short of breath. Therapy is not currently ordered. Plan home at [place], will follow for any potential needs.        [address] [facility], Madiha Webb  Case Management Department  Ph: [phone]

## 2023-07-29 NOTE — PROGRESS NOTES
Pharmacist Review and Automatic Dose Adjustment of Prophylactic Enoxaparin         The reviewing pharmacist has made an adjustment to the ordered enoxaparin dose or converted to UFH per the approved [facility][1] protocol and table as identified below. Hedy Balbuena is a 62 y.o. male. Recent Labs     07/28/23  1648   CREATININE 0.7       Estimated Creatinine Clearance: 157 mL/min (based on SCr of 0.7 mg/dL). Recent Labs     07/28/23  1648   HGB 10.9*   HCT 33.6*        No results for input(s): INR in the last 72 hours.     Height:   Ht Readings from Last 1 Encounters:   07/07/23 6' (1.829 m)     Weight:  Wt Readings from Last 1 Encounters:   07/28/23 268 lb 15.4 oz (122 kg)               Plan: Based upon the patient's weight and renal function    Ordered: Enoxaparin 40mg SUBQ Daily    Changed/converted to    New Order: Enoxaparin 30mg SUBQ BID      Thank you,  Shay Subramanian, [facility]  7/28/2023, 9:36 PM

## 2023-07-30 LAB
ANION GAP SERPL CALCULATED.3IONS-SCNC: 12 MMOL/L (ref 7–16)
BUN SERPL-MCNC: 15 MG/DL (ref 6–20)
CALCIUM SERPL-MCNC: 8.3 MG/DL (ref 8.6–10.2)
CHLORIDE SERPL-SCNC: 98 MMOL/L (ref 98–107)
CO2 SERPL-SCNC: 22 MMOL/L (ref 22–29)
CREAT SERPL-MCNC: 0.8 MG/DL (ref 0.7–1.2)
ERYTHROCYTE [DISTWIDTH] IN BLOOD BY AUTOMATED COUNT: 14.9 % (ref 11.5–15)
GFR SERPL CREATININE-BSD FRML MDRD: >60 ML/MIN/1.73M2
GLUCOSE SERPL-MCNC: 207 MG/DL (ref 74–99)
HCT VFR BLD AUTO: 32 % (ref 37–54)
HGB BLD-MCNC: 10.3 G/DL (ref 12.5–16.5)
MCH RBC QN AUTO: 32.5 PG (ref 26–35)
MCHC RBC AUTO-ENTMCNC: 32.2 G/DL (ref 32–34.5)
MCV RBC AUTO: 100.9 FL (ref 80–99.9)
PLATELET # BLD AUTO: 126 K/UL (ref 130–450)
PMV BLD AUTO: 10.8 FL (ref 7–12)
POTASSIUM SERPL-SCNC: 3.8 MMOL/L (ref 3.5–5)
RBC # BLD AUTO: 3.17 M/UL (ref 3.8–5.8)
S PNEUM AG SPEC QL: NEGATIVE
SODIUM SERPL-SCNC: 132 MMOL/L (ref 132–146)
SPECIMEN SOURCE: NORMAL
WBC OTHER # BLD: 9 K/UL (ref 4.5–11.5)

## 2023-07-30 PROCEDURE — 6370000000 HC RX 637 (ALT 250 FOR IP): Performed by: FAMILY MEDICINE

## 2023-07-30 PROCEDURE — 99232 SBSQ HOSP IP/OBS MODERATE 35: CPT | Performed by: INTERNAL MEDICINE

## 2023-07-30 PROCEDURE — 87449 NOS EACH ORGANISM AG IA: CPT

## 2023-07-30 PROCEDURE — 6370000000 HC RX 637 (ALT 250 FOR IP)

## 2023-07-30 PROCEDURE — 36415 COLL VENOUS BLD VENIPUNCTURE: CPT

## 2023-07-30 PROCEDURE — 2580000003 HC RX 258: Performed by: FAMILY MEDICINE

## 2023-07-30 PROCEDURE — 6370000000 HC RX 637 (ALT 250 FOR IP): Performed by: INTERNAL MEDICINE

## 2023-07-30 PROCEDURE — 80048 BASIC METABOLIC PNL TOTAL CA: CPT

## 2023-07-30 PROCEDURE — 85027 COMPLETE CBC AUTOMATED: CPT

## 2023-07-30 PROCEDURE — 1200000000 HC SEMI PRIVATE

## 2023-07-30 PROCEDURE — 6360000002 HC RX W HCPCS: Performed by: FAMILY MEDICINE

## 2023-07-30 PROCEDURE — 94640 AIRWAY INHALATION TREATMENT: CPT

## 2023-07-30 PROCEDURE — 87899 AGENT NOS ASSAY W/OPTIC: CPT

## 2023-07-30 PROCEDURE — 6360000002 HC RX W HCPCS: Performed by: INTERNAL MEDICINE

## 2023-07-30 RX ORDER — BUDESONIDE 0.5 MG/2ML
500 INHALANT ORAL
Status: DISCONTINUED | OUTPATIENT
Start: 2023-07-30 | End: 2023-07-31 | Stop reason: HOSPADM

## 2023-07-30 RX ORDER — ARFORMOTEROL TARTRATE 15 UG/2ML
15 SOLUTION RESPIRATORY (INHALATION)
Status: DISCONTINUED | OUTPATIENT
Start: 2023-07-30 | End: 2023-07-31 | Stop reason: HOSPADM

## 2023-07-30 RX ORDER — CEFDINIR 300 MG/1
600 CAPSULE ORAL ONCE
Status: COMPLETED | OUTPATIENT
Start: 2023-07-30 | End: 2023-07-30

## 2023-07-30 RX ORDER — CEFDINIR 300 MG/1
300 CAPSULE ORAL EVERY 12 HOURS SCHEDULED
Status: DISCONTINUED | OUTPATIENT
Start: 2023-07-31 | End: 2023-07-31 | Stop reason: HOSPADM

## 2023-07-30 RX ADMIN — FUROSEMIDE 40 MG: 40 TABLET ORAL at 08:12

## 2023-07-30 RX ADMIN — LORAZEPAM 2 MG: 1 TABLET ORAL at 23:32

## 2023-07-30 RX ADMIN — OXYCODONE AND ACETAMINOPHEN 1 TABLET: 5; 325 TABLET ORAL at 02:19

## 2023-07-30 RX ADMIN — METOPROLOL TARTRATE 75 MG: 25 TABLET, FILM COATED ORAL at 08:12

## 2023-07-30 RX ADMIN — ESCITALOPRAM OXALATE 10 MG: 10 TABLET ORAL at 08:12

## 2023-07-30 RX ADMIN — FOLIC ACID 1 MG: 1 TABLET ORAL at 08:12

## 2023-07-30 RX ADMIN — ENOXAPARIN SODIUM 30 MG: 100 INJECTION SUBCUTANEOUS at 08:11

## 2023-07-30 RX ADMIN — ACETAMINOPHEN 650 MG: 325 TABLET ORAL at 18:37

## 2023-07-30 RX ADMIN — CEFDINIR 600 MG: 300 CAPSULE ORAL at 20:23

## 2023-07-30 RX ADMIN — ASPIRIN 81 MG: 81 TABLET, CHEWABLE ORAL at 08:12

## 2023-07-30 RX ADMIN — SODIUM CHLORIDE: 9 INJECTION, SOLUTION INTRAVENOUS at 13:39

## 2023-07-30 RX ADMIN — IPRATROPIUM BROMIDE AND ALBUTEROL SULFATE 1 DOSE: .5; 2.5 SOLUTION RESPIRATORY (INHALATION) at 02:24

## 2023-07-30 RX ADMIN — Medication 100 MG: at 08:12

## 2023-07-30 RX ADMIN — BUDESONIDE 500 MCG: 0.5 SUSPENSION RESPIRATORY (INHALATION) at 17:24

## 2023-07-30 RX ADMIN — METOPROLOL TARTRATE 75 MG: 25 TABLET, FILM COATED ORAL at 20:23

## 2023-07-30 RX ADMIN — LORAZEPAM 1 MG: 2 INJECTION INTRAMUSCULAR; INTRAVENOUS at 14:41

## 2023-07-30 RX ADMIN — GUAIFENESIN 600 MG: 600 TABLET, EXTENDED RELEASE ORAL at 20:23

## 2023-07-30 RX ADMIN — LOSARTAN POTASSIUM 50 MG: 50 TABLET, FILM COATED ORAL at 08:12

## 2023-07-30 RX ADMIN — DOXYCYCLINE HYCLATE 100 MG: 100 CAPSULE ORAL at 20:23

## 2023-07-30 RX ADMIN — HYDROCODONE BITARTRATE AND ACETAMINOPHEN 1 TABLET: 5; 325 TABLET ORAL at 00:12

## 2023-07-30 RX ADMIN — IPRATROPIUM BROMIDE AND ALBUTEROL SULFATE 1 DOSE: .5; 2.5 SOLUTION RESPIRATORY (INHALATION) at 13:27

## 2023-07-30 RX ADMIN — HYDROCODONE BITARTRATE AND ACETAMINOPHEN 1 TABLET: 5; 325 TABLET ORAL at 20:24

## 2023-07-30 RX ADMIN — DOXYCYCLINE HYCLATE 100 MG: 100 CAPSULE ORAL at 08:12

## 2023-07-30 RX ADMIN — GUAIFENESIN 600 MG: 600 TABLET, EXTENDED RELEASE ORAL at 08:12

## 2023-07-30 RX ADMIN — MULTIPLE VITAMINS W/ MINERALS TAB 1 TABLET: TAB at 08:12

## 2023-07-30 RX ADMIN — IPRATROPIUM BROMIDE AND ALBUTEROL SULFATE 1 DOSE: .5; 2.5 SOLUTION RESPIRATORY (INHALATION) at 05:44

## 2023-07-30 RX ADMIN — HYDROCODONE BITARTRATE AND ACETAMINOPHEN 1 TABLET: 5; 325 TABLET ORAL at 08:12

## 2023-07-30 RX ADMIN — IPRATROPIUM BROMIDE AND ALBUTEROL SULFATE 1 DOSE: .5; 2.5 SOLUTION RESPIRATORY (INHALATION) at 10:03

## 2023-07-30 RX ADMIN — HYDROCODONE BITARTRATE AND ACETAMINOPHEN 1 TABLET: 5; 325 TABLET ORAL at 14:13

## 2023-07-30 RX ADMIN — ENOXAPARIN SODIUM 30 MG: 100 INJECTION SUBCUTANEOUS at 20:24

## 2023-07-30 RX ADMIN — PANTOPRAZOLE SODIUM 40 MG: 40 TABLET, DELAYED RELEASE ORAL at 05:18

## 2023-07-30 RX ADMIN — LORAZEPAM 2 MG: 1 TABLET ORAL at 17:44

## 2023-07-30 RX ADMIN — PREDNISONE 40 MG: 20 TABLET ORAL at 08:12

## 2023-07-30 RX ADMIN — ARFORMOTEROL TARTRATE 15 MCG: 15 SOLUTION RESPIRATORY (INHALATION) at 17:24

## 2023-07-30 RX ADMIN — IPRATROPIUM BROMIDE AND ALBUTEROL SULFATE 1 DOSE: .5; 2.5 SOLUTION RESPIRATORY (INHALATION) at 17:24

## 2023-07-30 ASSESSMENT — PAIN SCALES - GENERAL
PAINLEVEL_OUTOF10: 10

## 2023-07-30 ASSESSMENT — PAIN DESCRIPTION - LOCATION
LOCATION: GENERALIZED
LOCATION: GENERALIZED
LOCATION: BACK

## 2023-07-30 ASSESSMENT — PAIN DESCRIPTION - DESCRIPTORS
DESCRIPTORS: ACHING
DESCRIPTORS: ACHING;SORE
DESCRIPTORS: ACHING

## 2023-07-30 NOTE — PROGRESS NOTES
[facility] Hospitalist   Progress Note    Admitting Date and Time: 7/28/2023  4:26 PM  Admit Dx: Community acquired pneumonia, unspecified laterality [J18.9]    Subjective:    7/29: Pt feels somewhat better. He states he was having increased SOB and decreased endurance prior to admission. Complains of back pain related to being in bed. Per RN, found patient on the floor. Patient stated \"I was trying to go around the bed to get my brush\" \"when am I going home? \" VS 97.9 oral, P-85, R-20, /96, assisted back to bed, SHUKLA, no c/o pain, charge nurse notified, who notified physician    7/30: Patient sitting up in bed using his incentive spirometer. He was doing fairly well but a reeducated him on proper technique and he was able to to get up to 2500 mL. He is complaining of his chest still feeling tight when he takes a deep breath    Per RN: Lost IV access and unable to obtain peripheral IV.   Patient is getting IV fluids as well as IV antibiotics     predniSONE  40 mg Oral Daily    aspirin  81 mg Oral Daily    escitalopram  10 mg Oral Daily    folic acid  1 mg Oral Daily    furosemide  40 mg Oral Daily    losartan  50 mg Oral Daily    metoprolol tartrate  75 mg Oral BID    pantoprazole  40 mg Oral QAM AC    nicotine  1 patch TransDERmal Daily    sodium chloride flush  5-40 mL IntraVENous 2 times per day    enoxaparin  30 mg SubCUTAneous BID    ipratropium 0.5 mg-albuterol 2.5 mg  1 Dose Inhalation Q4H WA RT    guaiFENesin  600 mg Oral BID    cefTRIAXone (ROCEPHIN) IV  1,000 mg IntraVENous Q24H    And    doxycycline  100 mg Oral 2 times per day    sodium chloride flush  5-40 mL IntraVENous 2 times per day    thiamine  100 mg Oral Daily    multivitamin  1 tablet Oral Daily     sodium chloride flush, 5-40 mL, PRN  acetaminophen, 650 mg, Q6H PRN   Or  acetaminophen, 650 mg, Q6H PRN  benzonatate, 100 mg, TID PRN  sodium chloride flush, 5-40 mL, PRN  sodium chloride, , PRN  LORazepam, 1 mg, Q1H PRN

## 2023-07-31 VITALS
HEIGHT: 72 IN | WEIGHT: 258.6 LBS | SYSTOLIC BLOOD PRESSURE: 160 MMHG | DIASTOLIC BLOOD PRESSURE: 90 MMHG | TEMPERATURE: 98.1 F | RESPIRATION RATE: 16 BRPM | BODY MASS INDEX: 35.03 KG/M2 | OXYGEN SATURATION: 99 % | HEART RATE: 88 BPM

## 2023-07-31 LAB
ALBUMIN SERPL-MCNC: 3.5 G/DL (ref 3.5–5.2)
ALP SERPL-CCNC: 90 U/L (ref 40–129)
ALT SERPL-CCNC: 51 U/L (ref 0–40)
ANION GAP SERPL CALCULATED.3IONS-SCNC: 9 MMOL/L (ref 7–16)
AST SERPL-CCNC: 56 U/L (ref 0–39)
BILIRUB SERPL-MCNC: 0.5 MG/DL (ref 0–1.2)
BUN SERPL-MCNC: 12 MG/DL (ref 6–20)
CALCIUM SERPL-MCNC: 8.2 MG/DL (ref 8.6–10.2)
CHLORIDE SERPL-SCNC: 101 MMOL/L (ref 98–107)
CO2 SERPL-SCNC: 24 MMOL/L (ref 22–29)
CREAT SERPL-MCNC: 0.7 MG/DL (ref 0.7–1.2)
ERYTHROCYTE [DISTWIDTH] IN BLOOD BY AUTOMATED COUNT: 14.3 % (ref 11.5–15)
GFR SERPL CREATININE-BSD FRML MDRD: >60 ML/MIN/1.73M2
GLUCOSE SERPL-MCNC: 117 MG/DL (ref 74–99)
HCT VFR BLD AUTO: 32.1 % (ref 37–54)
HGB BLD-MCNC: 10.1 G/DL (ref 12.5–16.5)
L PNEUMO1 AG UR QL IA.RAPID: NEGATIVE
MCH RBC QN AUTO: 32.6 PG (ref 26–35)
MCHC RBC AUTO-ENTMCNC: 31.5 G/DL (ref 32–34.5)
MCV RBC AUTO: 103.5 FL (ref 80–99.9)
PLATELET, FLUORESCENCE: 104 K/UL (ref 130–450)
PMV BLD AUTO: 11.3 FL (ref 7–12)
POTASSIUM SERPL-SCNC: 3.7 MMOL/L (ref 3.5–5)
PROT SERPL-MCNC: 6.2 G/DL (ref 6.4–8.3)
RBC # BLD AUTO: 3.1 M/UL (ref 3.8–5.8)
S PNEUM AG SPEC QL: NEGATIVE
SODIUM SERPL-SCNC: 134 MMOL/L (ref 132–146)
SPECIMEN SOURCE: NORMAL
WBC OTHER # BLD: 5.9 K/UL (ref 4.5–11.5)

## 2023-07-31 PROCEDURE — 85027 COMPLETE CBC AUTOMATED: CPT

## 2023-07-31 PROCEDURE — 6370000000 HC RX 637 (ALT 250 FOR IP): Performed by: FAMILY MEDICINE

## 2023-07-31 PROCEDURE — 80053 COMPREHEN METABOLIC PANEL: CPT

## 2023-07-31 PROCEDURE — 6370000000 HC RX 637 (ALT 250 FOR IP): Performed by: INTERNAL MEDICINE

## 2023-07-31 PROCEDURE — 36415 COLL VENOUS BLD VENIPUNCTURE: CPT

## 2023-07-31 PROCEDURE — 6370000000 HC RX 637 (ALT 250 FOR IP)

## 2023-07-31 PROCEDURE — 6360000002 HC RX W HCPCS: Performed by: INTERNAL MEDICINE

## 2023-07-31 PROCEDURE — 94640 AIRWAY INHALATION TREATMENT: CPT

## 2023-07-31 PROCEDURE — 6360000002 HC RX W HCPCS: Performed by: FAMILY MEDICINE

## 2023-07-31 RX ORDER — BUDESONIDE AND FORMOTEROL FUMARATE DIHYDRATE 160; 4.5 UG/1; UG/1
2 AEROSOL RESPIRATORY (INHALATION) 2 TIMES DAILY
Qty: 10.2 G | Refills: 3 | Status: CANCELLED | OUTPATIENT
Start: 2023-07-31

## 2023-07-31 RX ORDER — M-VIT,TX,IRON,MINS/CALC/FOLIC 27MG-0.4MG
1 TABLET ORAL DAILY
COMMUNITY
Start: 2023-08-01

## 2023-07-31 RX ORDER — METOPROLOL TARTRATE 75 MG/1
75 TABLET, FILM COATED ORAL 2 TIMES DAILY
Qty: 60 TABLET | Refills: 3 | Status: SHIPPED | OUTPATIENT
Start: 2023-07-31

## 2023-07-31 RX ORDER — PREDNISONE 20 MG/1
40 TABLET ORAL DAILY
Qty: 2 TABLET | Refills: 0 | Status: SHIPPED | OUTPATIENT
Start: 2023-08-01 | End: 2023-08-02

## 2023-07-31 RX ORDER — CEFDINIR 300 MG/1
300 CAPSULE ORAL EVERY 12 HOURS SCHEDULED
Qty: 14 CAPSULE | Refills: 0 | Status: SHIPPED | OUTPATIENT
Start: 2023-07-31 | End: 2023-08-07

## 2023-07-31 RX ORDER — DOXYCYCLINE HYCLATE 100 MG/1
100 CAPSULE ORAL EVERY 12 HOURS SCHEDULED
Qty: 14 CAPSULE | Refills: 0 | Status: SHIPPED | OUTPATIENT
Start: 2023-07-31 | End: 2023-08-07

## 2023-07-31 RX ORDER — ALBUTEROL SULFATE 90 UG/1
2 AEROSOL, METERED RESPIRATORY (INHALATION) 4 TIMES DAILY PRN
Qty: 18 G | Refills: 0 | Status: SHIPPED | OUTPATIENT
Start: 2023-07-31

## 2023-07-31 RX ORDER — BENZONATATE 100 MG/1
100 CAPSULE ORAL 3 TIMES DAILY PRN
Qty: 21 CAPSULE | Refills: 0 | Status: SHIPPED | OUTPATIENT
Start: 2023-07-31 | End: 2023-08-07

## 2023-07-31 RX ORDER — ESCITALOPRAM OXALATE 10 MG/1
10 TABLET ORAL DAILY
Qty: 30 TABLET | Refills: 0 | Status: SHIPPED | OUTPATIENT
Start: 2023-07-31

## 2023-07-31 RX ORDER — GUAIFENESIN 600 MG/1
600 TABLET, EXTENDED RELEASE ORAL 2 TIMES DAILY
COMMUNITY
Start: 2023-07-31

## 2023-07-31 RX ORDER — LOSARTAN POTASSIUM 50 MG/1
50 TABLET ORAL DAILY
Qty: 30 TABLET | Refills: 0 | Status: SHIPPED | OUTPATIENT
Start: 2023-07-31

## 2023-07-31 RX ORDER — ASPIRIN 81 MG/1
81 TABLET, CHEWABLE ORAL DAILY
Qty: 30 TABLET | Refills: 0 | Status: SHIPPED | OUTPATIENT
Start: 2023-07-31

## 2023-07-31 RX ADMIN — ESCITALOPRAM OXALATE 10 MG: 10 TABLET ORAL at 09:19

## 2023-07-31 RX ADMIN — HYDROCODONE BITARTRATE AND ACETAMINOPHEN 1 TABLET: 5; 325 TABLET ORAL at 06:24

## 2023-07-31 RX ADMIN — ASPIRIN 81 MG: 81 TABLET, CHEWABLE ORAL at 09:18

## 2023-07-31 RX ADMIN — GUAIFENESIN 600 MG: 600 TABLET, EXTENDED RELEASE ORAL at 09:20

## 2023-07-31 RX ADMIN — ARFORMOTEROL TARTRATE 15 MCG: 15 SOLUTION RESPIRATORY (INHALATION) at 04:18

## 2023-07-31 RX ADMIN — LORAZEPAM 1 MG: 1 TABLET ORAL at 09:20

## 2023-07-31 RX ADMIN — FOLIC ACID 1 MG: 1 TABLET ORAL at 09:20

## 2023-07-31 RX ADMIN — FUROSEMIDE 40 MG: 40 TABLET ORAL at 09:20

## 2023-07-31 RX ADMIN — Medication 100 MG: at 09:23

## 2023-07-31 RX ADMIN — IPRATROPIUM BROMIDE AND ALBUTEROL SULFATE 1 DOSE: .5; 2.5 SOLUTION RESPIRATORY (INHALATION) at 10:00

## 2023-07-31 RX ADMIN — HYDROCODONE BITARTRATE AND ACETAMINOPHEN 1 TABLET: 5; 325 TABLET ORAL at 12:30

## 2023-07-31 RX ADMIN — DOXYCYCLINE HYCLATE 100 MG: 100 CAPSULE ORAL at 09:18

## 2023-07-31 RX ADMIN — CEFDINIR 300 MG: 300 CAPSULE ORAL at 09:18

## 2023-07-31 RX ADMIN — MULTIPLE VITAMINS W/ MINERALS TAB 1 TABLET: TAB at 09:23

## 2023-07-31 RX ADMIN — BENZONATATE 100 MG: 100 CAPSULE ORAL at 09:18

## 2023-07-31 RX ADMIN — IPRATROPIUM BROMIDE AND ALBUTEROL SULFATE 1 DOSE: .5; 2.5 SOLUTION RESPIRATORY (INHALATION) at 04:18

## 2023-07-31 RX ADMIN — LOSARTAN POTASSIUM 50 MG: 50 TABLET, FILM COATED ORAL at 09:21

## 2023-07-31 RX ADMIN — IPRATROPIUM BROMIDE AND ALBUTEROL SULFATE 1 DOSE: .5; 2.5 SOLUTION RESPIRATORY (INHALATION) at 13:25

## 2023-07-31 RX ADMIN — METOPROLOL TARTRATE 75 MG: 25 TABLET, FILM COATED ORAL at 09:21

## 2023-07-31 RX ADMIN — BUDESONIDE 500 MCG: 0.5 SUSPENSION RESPIRATORY (INHALATION) at 04:18

## 2023-07-31 RX ADMIN — PREDNISONE 40 MG: 20 TABLET ORAL at 09:22

## 2023-07-31 RX ADMIN — ENOXAPARIN SODIUM 30 MG: 100 INJECTION SUBCUTANEOUS at 09:19

## 2023-07-31 RX ADMIN — PANTOPRAZOLE SODIUM 40 MG: 40 TABLET, DELAYED RELEASE ORAL at 06:24

## 2023-07-31 ASSESSMENT — PAIN DESCRIPTION - LOCATION
LOCATION: BACK;HIP
LOCATION: BACK
LOCATION: BACK

## 2023-07-31 ASSESSMENT — PAIN DESCRIPTION - PAIN TYPE: TYPE: CHRONIC PAIN

## 2023-07-31 ASSESSMENT — PAIN DESCRIPTION - ONSET: ONSET: ON-GOING

## 2023-07-31 ASSESSMENT — PAIN DESCRIPTION - ORIENTATION
ORIENTATION: LOWER
ORIENTATION: LOWER

## 2023-07-31 ASSESSMENT — PAIN DESCRIPTION - FREQUENCY: FREQUENCY: CONTINUOUS

## 2023-07-31 ASSESSMENT — PAIN DESCRIPTION - DESCRIPTORS
DESCRIPTORS: ACHING;DISCOMFORT;SORE;TENDER
DESCRIPTORS: ACHING;SORE;TENDER

## 2023-07-31 ASSESSMENT — PAIN SCALES - GENERAL
PAINLEVEL_OUTOF10: 9
PAINLEVEL_OUTOF10: 9
PAINLEVEL_OUTOF10: 8

## 2023-07-31 NOTE — PROGRESS NOTES
CLINICAL PHARMACY NOTE: MEDS TO BEDS    Total # of Prescriptions Filled: 7   The following medications were delivered to the patient:  Aspirin 81 mg  Benzonatate 100 mg  Cefdinir 300 mg  Doxycycline Hyc 100 mg  Losartan 50 mg  Metoprolol Tart 75 mg  Prednisone 20 mg    Additional Documentation:

## 2023-07-31 NOTE — DISCHARGE SUMMARY
[facility] Physician Discharge Summary       Robson Ascencio MD  [address] [id number] [facility],[address]  [phone]    Call today        Activity level: As tolerated    Diet: ADULT DIET; Regular    Labs: Per primary care physician    Condition at discharge: Stable/good    Dispo: Home        Patient ID:  Akua Mead  [id number]  62 y.o.  1965    Admit date: 7/28/2023    Discharge date and time:  7/31/2023  10:45 AM    Admission Diagnoses: Principal Problem:    Community acquired pneumonia, unspecified laterality  Active Problems:    COPD with acute exacerbation ([address])    Primary hypertension    Hyperlipidemia    Gastroesophageal reflux disease    Tobacco use disorder    Transaminitis    LANGFORD (dyspnea on exertion)    CAD in native artery    Severe alcohol use disorder (HCC)    Chronic heart failure with preserved ejection fraction (HFpEF) ([address])  Resolved Problems:    * No resolved hospital problems. *      Discharge Diagnoses: Principal Problem:    Community acquired pneumonia, unspecified laterality  Active Problems:    COPD with acute exacerbation ([address])    Primary hypertension    Hyperlipidemia    Gastroesophageal reflux disease    Tobacco use disorder    Transaminitis    LANGFORD (dyspnea on exertion)    CAD in native artery    Severe alcohol use disorder (HCC)    Chronic heart failure with preserved ejection fraction (HFpEF) ([address])  Resolved Problems:    * No resolved hospital problems. *      Consults:  IP CONSULT TO SOCIAL WORK  IP CONSULT TO SOCIAL WORK    Procedures: None    Hospital Course: This is a 63-year-old male with a history significant for alcohol abuse, COPD, heart failure with preserved ejection fraction, primary hypertension, hyperlipidemia, obesity that was admitted to the hospital with community-acquired pneumonia and mild alcohol withdrawal syndrome.   He had multiple recent hospitalizations for alcohol withdrawal syndrome including at [facility]

## 2023-07-31 NOTE — DISCHARGE INSTRUCTIONS
Your information:  Name: Jovanna Nicolas  : 1965    Your instructions:  Discharge home    Signs and symptoms to watch out for:  Call 911 anytime you think you may need emergency care. For example, call if:    You have severe trouble breathing. Call your doctor now or seek immediate medical care if:    You cough up dark brown or bloody mucus (sputum). You have new or worse trouble breathing. You are dizzy or lightheaded, or you feel like you may faint. Watch closely for changes in your health, and be sure to contact your doctor if:    You have a new or higher fever. You are coughing more deeply or more often. You are not getting better after 2 days (48 hours). You do not get better as expected. What to do after you leave the hospital:    Recommended diet: regular diet    Recommended activity: activity as tolerated        The following personal items were collected during your admission and were returned to you:    Belongings  Dental Appliances: None  Vision - Corrective Lenses: Eyeglasses  Hearing Aid: None  Clothing: Shirt, Pants, Socks  Jewelry: None  Body Piercings Removed: No  Electronic Devices: Cell Phone  Weapons (Notify Protective Services/Security): None  Other Valuables: Wallet  Home Medications: None  Valuables Given To: Patient  Provide Name(s) of Who Valuable(s) Were Given To: self  Patient approves for provider to speak to responsible person post operatively: Yes    Information obtained by:  By signing below, I understand that if any problems occur once I leave the hospital I am to contact Dr. Deandar Olivo. I understand and acknowledge receipt of the instructions indicated above.

## 2023-08-01 ENCOUNTER — TELEPHONE (OUTPATIENT)
Dept: CARDIOLOGY CLINIC | Age: 58
End: 2023-08-01

## 2023-09-13 ENCOUNTER — APPOINTMENT (OUTPATIENT)
Dept: GENERAL RADIOLOGY | Age: 58
DRG: 191 | End: 2023-09-13
Payer: MEDICARE

## 2023-09-13 ENCOUNTER — HOSPITAL ENCOUNTER (INPATIENT)
Age: 58
LOS: 7 days | Discharge: HOME | DRG: 191 | End: 2023-09-20
Attending: EMERGENCY MEDICINE | Admitting: FAMILY MEDICINE
Payer: MEDICARE

## 2023-09-13 DIAGNOSIS — S80.12XD LEG HEMATOMA, LEFT, SUBSEQUENT ENCOUNTER: ICD-10-CM

## 2023-09-13 DIAGNOSIS — J44.1 COPD EXACERBATION (HCC): Primary | ICD-10-CM
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
## 2023-09-13 PROBLEM — J18.9 COMMUNITY ACQUIRED PNEUMONIA, UNSPECIFIED LATERALITY: Status: RESOLVED | Noted: 2023-07-28 | Resolved: 2023-09-13

## 2023-09-13 LAB
ALBUMIN SERPL-MCNC: 4.2 G/DL (ref 3.5–5.2)
ALP SERPL-CCNC: 170 U/L (ref 40–129)
ALT SERPL-CCNC: 52 U/L (ref 0–40)
ANION GAP SERPL CALCULATED.3IONS-SCNC: 21 MMOL/L (ref 7–16)
AST SERPL-CCNC: 87 U/L (ref 0–39)
BASOPHILS # BLD: 0.03 K/UL (ref 0–0.2)
BASOPHILS NFR BLD: 1 % (ref 0–2)
BILIRUB SERPL-MCNC: 0.3 MG/DL (ref 0–1.2)
BNP SERPL-MCNC: <36 PG/ML (ref 0–450)
BUN SERPL-MCNC: 7 MG/DL (ref 6–20)
CALCIUM SERPL-MCNC: 9.1 MG/DL (ref 8.6–10.2)
CHLORIDE SERPL-SCNC: 100 MMOL/L (ref 98–107)
CO2 SERPL-SCNC: 22 MMOL/L (ref 22–29)
CREAT SERPL-MCNC: 0.7 MG/DL (ref 0.7–1.2)
EOSINOPHIL # BLD: 0.05 K/UL (ref 0.05–0.5)
EOSINOPHILS RELATIVE PERCENT: 1 % (ref 0–6)
ERYTHROCYTE [DISTWIDTH] IN BLOOD BY AUTOMATED COUNT: 15.7 % (ref 11.5–15)
GFR SERPL CREATININE-BSD FRML MDRD: >60 ML/MIN/1.73M2
GLUCOSE SERPL-MCNC: 163 MG/DL (ref 74–99)
HCT VFR BLD AUTO: 31.7 % (ref 37–54)
HGB BLD-MCNC: 10.2 G/DL (ref 12.5–16.5)
IMM GRANULOCYTES # BLD AUTO: 0.03 K/UL (ref 0–0.58)
IMM GRANULOCYTES NFR BLD: 1 % (ref 0–5)
INR PPP: 1
LYMPHOCYTES NFR BLD: 0.8 K/UL (ref 1.5–4)
LYMPHOCYTES RELATIVE PERCENT: 16 % (ref 20–42)
MCH RBC QN AUTO: 30.5 PG (ref 26–35)
MCHC RBC AUTO-ENTMCNC: 32.2 G/DL (ref 32–34.5)
MCV RBC AUTO: 94.9 FL (ref 80–99.9)
MONOCYTES NFR BLD: 0.35 K/UL (ref 0.1–0.95)
MONOCYTES NFR BLD: 7 % (ref 2–12)
NEUTROPHILS NFR BLD: 75 % (ref 43–80)
NEUTS SEG NFR BLD: 3.75 K/UL (ref 1.8–7.3)
PLATELET # BLD AUTO: 143 K/UL (ref 130–450)
PMV BLD AUTO: 9.9 FL (ref 7–12)
POTASSIUM SERPL-SCNC: 3.3 MMOL/L (ref 3.5–5)
PROT SERPL-MCNC: 7.4 G/DL (ref 6.4–8.3)
PROTHROMBIN TIME: 11.9 SEC (ref 9.3–12.4)
RBC # BLD AUTO: 3.34 M/UL (ref 3.8–5.8)
SARS-COV-2 RDRP RESP QL NAA+PROBE: NOT DETECTED
SODIUM SERPL-SCNC: 143 MMOL/L (ref 132–146)
SPECIMEN DESCRIPTION: NORMAL
TROPONIN I SERPL HS-MCNC: 16 NG/L (ref 0–11)
TROPONIN I SERPL HS-MCNC: 18 NG/L (ref 0–11)
WBC OTHER # BLD: 5 K/UL (ref 4.5–11.5)

## 2023-09-13 PROCEDURE — 6370000000 HC RX 637 (ALT 250 FOR IP): Performed by: FAMILY MEDICINE

## 2023-09-13 PROCEDURE — 85025 COMPLETE CBC W/AUTO DIFF WBC: CPT

## 2023-09-13 PROCEDURE — 2580000003 HC RX 258: Performed by: FAMILY MEDICINE

## 2023-09-13 PROCEDURE — 36415 COLL VENOUS BLD VENIPUNCTURE: CPT

## 2023-09-13 PROCEDURE — 85610 PROTHROMBIN TIME: CPT

## 2023-09-13 PROCEDURE — 96374 THER/PROPH/DIAG INJ IV PUSH: CPT

## 2023-09-13 PROCEDURE — 6370000000 HC RX 637 (ALT 250 FOR IP): Performed by: EMERGENCY MEDICINE

## 2023-09-13 PROCEDURE — 99285 EMERGENCY DEPT VISIT HI MDM: CPT

## 2023-09-13 PROCEDURE — 93005 ELECTROCARDIOGRAM TRACING: CPT | Performed by: EMERGENCY MEDICINE

## 2023-09-13 PROCEDURE — 71045 X-RAY EXAM CHEST 1 VIEW: CPT

## 2023-09-13 PROCEDURE — 6360000002 HC RX W HCPCS: Performed by: EMERGENCY MEDICINE

## 2023-09-13 PROCEDURE — 99223 1ST HOSP IP/OBS HIGH 75: CPT | Performed by: FAMILY MEDICINE

## 2023-09-13 PROCEDURE — 83880 ASSAY OF NATRIURETIC PEPTIDE: CPT

## 2023-09-13 PROCEDURE — 84484 ASSAY OF TROPONIN QUANT: CPT

## 2023-09-13 PROCEDURE — 80053 COMPREHEN METABOLIC PANEL: CPT

## 2023-09-13 PROCEDURE — 96376 TX/PRO/DX INJ SAME DRUG ADON: CPT

## 2023-09-13 PROCEDURE — 87635 SARS-COV-2 COVID-19 AMP PRB: CPT

## 2023-09-13 PROCEDURE — 94640 AIRWAY INHALATION TREATMENT: CPT

## 2023-09-13 PROCEDURE — 2580000003 HC RX 258: Performed by: EMERGENCY MEDICINE

## 2023-09-13 PROCEDURE — 2060000000 HC ICU INTERMEDIATE R&B

## 2023-09-13 PROCEDURE — 6360000002 HC RX W HCPCS: Performed by: FAMILY MEDICINE

## 2023-09-13 RX ORDER — SODIUM CHLORIDE 0.9 % (FLUSH) 0.9 %
5-40 SYRINGE (ML) INJECTION EVERY 12 HOURS SCHEDULED
Status: DISCONTINUED | OUTPATIENT
Start: 2023-09-13 | End: 2023-09-20 | Stop reason: HOSPADM

## 2023-09-13 RX ORDER — LORAZEPAM 1 MG/1
2 TABLET ORAL
Status: DISCONTINUED | OUTPATIENT
Start: 2023-09-13 | End: 2023-09-20 | Stop reason: HOSPADM

## 2023-09-13 RX ORDER — IPRATROPIUM BROMIDE AND ALBUTEROL SULFATE 2.5; .5 MG/3ML; MG/3ML
1 SOLUTION RESPIRATORY (INHALATION) EVERY 4 HOURS PRN
Status: DISCONTINUED | OUTPATIENT
Start: 2023-09-13 | End: 2023-09-17

## 2023-09-13 RX ORDER — ACETAMINOPHEN 650 MG/1
650 SUPPOSITORY RECTAL EVERY 6 HOURS PRN
Status: DISCONTINUED | OUTPATIENT
Start: 2023-09-13 | End: 2023-09-20 | Stop reason: HOSPADM

## 2023-09-13 RX ORDER — IPRATROPIUM BROMIDE AND ALBUTEROL SULFATE 2.5; .5 MG/3ML; MG/3ML
1 SOLUTION RESPIRATORY (INHALATION)
Status: DISCONTINUED | OUTPATIENT
Start: 2023-09-14 | End: 2023-09-17

## 2023-09-13 RX ORDER — LORAZEPAM 1 MG/1
3 TABLET ORAL
Status: DISCONTINUED | OUTPATIENT
Start: 2023-09-13 | End: 2023-09-20 | Stop reason: HOSPADM

## 2023-09-13 RX ORDER — ISOSORBIDE DINITRATE 10 MG/1
10 TABLET ORAL 2 TIMES DAILY
COMMUNITY
Start: 2023-09-05

## 2023-09-13 RX ORDER — ALBUTEROL SULFATE 0.63 MG/3ML
1 SOLUTION RESPIRATORY (INHALATION) EVERY 6 HOURS
Status: ON HOLD | COMMUNITY
End: 2023-09-20 | Stop reason: HOSPADM

## 2023-09-13 RX ORDER — NICOTINE 21 MG/24HR
1 PATCH, TRANSDERMAL 24 HOURS TRANSDERMAL DAILY
Status: DISCONTINUED | OUTPATIENT
Start: 2023-09-13 | End: 2023-09-13

## 2023-09-13 RX ORDER — SODIUM CHLORIDE 0.9 % (FLUSH) 0.9 %
5-40 SYRINGE (ML) INJECTION PRN
Status: DISCONTINUED | OUTPATIENT
Start: 2023-09-13 | End: 2023-09-20 | Stop reason: HOSPADM

## 2023-09-13 RX ORDER — IPRATROPIUM BROMIDE AND ALBUTEROL SULFATE 2.5; .5 MG/3ML; MG/3ML
1 SOLUTION RESPIRATORY (INHALATION) ONCE
Status: COMPLETED | OUTPATIENT
Start: 2023-09-13 | End: 2023-09-13

## 2023-09-13 RX ORDER — LEVOFLOXACIN 500 MG/1
500 TABLET, FILM COATED ORAL DAILY
Status: COMPLETED | OUTPATIENT
Start: 2023-09-13 | End: 2023-09-17

## 2023-09-13 RX ORDER — BUDESONIDE 0.5 MG/2ML
0.5 INHALANT ORAL
Status: DISCONTINUED | OUTPATIENT
Start: 2023-09-14 | End: 2023-09-17

## 2023-09-13 RX ORDER — OXYCODONE HYDROCHLORIDE AND ACETAMINOPHEN 5; 325 MG/1; MG/1
1 TABLET ORAL EVERY 4 HOURS PRN
Status: DISCONTINUED | OUTPATIENT
Start: 2023-09-13 | End: 2023-09-14

## 2023-09-13 RX ORDER — FENTANYL CITRATE 0.05 MG/ML
50 INJECTION, SOLUTION INTRAMUSCULAR; INTRAVENOUS ONCE
Status: COMPLETED | OUTPATIENT
Start: 2023-09-13 | End: 2023-09-13

## 2023-09-13 RX ORDER — M-VIT,TX,IRON,MINS/CALC/FOLIC 27MG-0.4MG
1 TABLET ORAL DAILY
Status: DISCONTINUED | OUTPATIENT
Start: 2023-09-14 | End: 2023-09-20 | Stop reason: HOSPADM

## 2023-09-13 RX ORDER — ASPIRIN 81 MG/1
81 TABLET, CHEWABLE ORAL DAILY
Status: DISCONTINUED | OUTPATIENT
Start: 2023-09-14 | End: 2023-09-20 | Stop reason: HOSPADM

## 2023-09-13 RX ORDER — FUROSEMIDE 40 MG/1
40 TABLET ORAL DAILY
Status: DISCONTINUED | OUTPATIENT
Start: 2023-09-14 | End: 2023-09-20 | Stop reason: HOSPADM

## 2023-09-13 RX ORDER — ESCITALOPRAM OXALATE 10 MG/1
10 TABLET ORAL DAILY
Status: DISCONTINUED | OUTPATIENT
Start: 2023-09-14 | End: 2023-09-20 | Stop reason: HOSPADM

## 2023-09-13 RX ORDER — FOLIC ACID 1 MG/1
1 TABLET ORAL DAILY
Status: DISCONTINUED | OUTPATIENT
Start: 2023-09-14 | End: 2023-09-20 | Stop reason: HOSPADM

## 2023-09-13 RX ORDER — ENOXAPARIN SODIUM 100 MG/ML
30 INJECTION SUBCUTANEOUS 2 TIMES DAILY
Status: DISCONTINUED | OUTPATIENT
Start: 2023-09-13 | End: 2023-09-20 | Stop reason: HOSPADM

## 2023-09-13 RX ORDER — 0.9 % SODIUM CHLORIDE 0.9 %
1000 INTRAVENOUS SOLUTION INTRAVENOUS ONCE
Status: COMPLETED | OUTPATIENT
Start: 2023-09-13 | End: 2023-09-13

## 2023-09-13 RX ORDER — LORAZEPAM 2 MG/ML
4 INJECTION INTRAMUSCULAR
Status: DISCONTINUED | OUTPATIENT
Start: 2023-09-13 | End: 2023-09-20 | Stop reason: HOSPADM

## 2023-09-13 RX ORDER — ARFORMOTEROL TARTRATE 15 UG/2ML
15 SOLUTION RESPIRATORY (INHALATION)
Status: DISCONTINUED | OUTPATIENT
Start: 2023-09-14 | End: 2023-09-17

## 2023-09-13 RX ORDER — SODIUM CHLORIDE 9 MG/ML
INJECTION, SOLUTION INTRAVENOUS PRN
Status: DISCONTINUED | OUTPATIENT
Start: 2023-09-13 | End: 2023-09-20 | Stop reason: HOSPADM

## 2023-09-13 RX ORDER — LOSARTAN POTASSIUM 50 MG/1
50 TABLET ORAL DAILY
Status: DISCONTINUED | OUTPATIENT
Start: 2023-09-14 | End: 2023-09-20 | Stop reason: HOSPADM

## 2023-09-13 RX ORDER — PANTOPRAZOLE SODIUM 20 MG/1
20 TABLET, DELAYED RELEASE ORAL
Status: DISCONTINUED | OUTPATIENT
Start: 2023-09-14 | End: 2023-09-20 | Stop reason: HOSPADM

## 2023-09-13 RX ORDER — ACETAMINOPHEN 325 MG/1
650 TABLET ORAL EVERY 6 HOURS PRN
Status: DISCONTINUED | OUTPATIENT
Start: 2023-09-13 | End: 2023-09-20 | Stop reason: HOSPADM

## 2023-09-13 RX ORDER — LORAZEPAM 2 MG/ML
1 INJECTION INTRAMUSCULAR
Status: DISCONTINUED | OUTPATIENT
Start: 2023-09-13 | End: 2023-09-20 | Stop reason: HOSPADM

## 2023-09-13 RX ORDER — PREDNISONE 20 MG/1
40 TABLET ORAL DAILY
Status: COMPLETED | OUTPATIENT
Start: 2023-09-14 | End: 2023-09-18

## 2023-09-13 RX ORDER — LORAZEPAM 1 MG/1
4 TABLET ORAL
Status: DISCONTINUED | OUTPATIENT
Start: 2023-09-13 | End: 2023-09-20 | Stop reason: HOSPADM

## 2023-09-13 RX ORDER — BENZONATATE 100 MG/1
100 CAPSULE ORAL 3 TIMES DAILY PRN
Status: DISCONTINUED | OUTPATIENT
Start: 2023-09-13 | End: 2023-09-20 | Stop reason: HOSPADM

## 2023-09-13 RX ORDER — LORAZEPAM 1 MG/1
1 TABLET ORAL
Status: DISCONTINUED | OUTPATIENT
Start: 2023-09-13 | End: 2023-09-20 | Stop reason: HOSPADM

## 2023-09-13 RX ORDER — HYDROCODONE BITARTRATE AND ACETAMINOPHEN 7.5; 325 MG/1; MG/1
1 TABLET ORAL EVERY 6 HOURS PRN
Status: DISCONTINUED | OUTPATIENT
Start: 2023-09-13 | End: 2023-09-14

## 2023-09-13 RX ORDER — GUAIFENESIN 600 MG/1
600 TABLET, EXTENDED RELEASE ORAL 2 TIMES DAILY
Status: DISCONTINUED | OUTPATIENT
Start: 2023-09-13 | End: 2023-09-20 | Stop reason: HOSPADM

## 2023-09-13 RX ORDER — ISOSORBIDE DINITRATE 10 MG/1
10 TABLET ORAL 2 TIMES DAILY
Status: DISCONTINUED | OUTPATIENT
Start: 2023-09-14 | End: 2023-09-20 | Stop reason: HOSPADM

## 2023-09-13 RX ORDER — LORAZEPAM 2 MG/ML
3 INJECTION INTRAMUSCULAR
Status: DISCONTINUED | OUTPATIENT
Start: 2023-09-13 | End: 2023-09-20 | Stop reason: HOSPADM

## 2023-09-13 RX ORDER — LORAZEPAM 2 MG/ML
2 INJECTION INTRAMUSCULAR
Status: DISCONTINUED | OUTPATIENT
Start: 2023-09-13 | End: 2023-09-20 | Stop reason: HOSPADM

## 2023-09-13 RX ORDER — GAUZE BANDAGE 2" X 2"
100 BANDAGE TOPICAL DAILY
Status: DISCONTINUED | OUTPATIENT
Start: 2023-09-13 | End: 2023-09-15 | Stop reason: SDUPTHER

## 2023-09-13 RX ADMIN — GUAIFENESIN 600 MG: 600 TABLET, EXTENDED RELEASE ORAL at 23:18

## 2023-09-13 RX ADMIN — IPRATROPIUM BROMIDE AND ALBUTEROL SULFATE 1 DOSE: 2.5; .5 SOLUTION RESPIRATORY (INHALATION) at 18:27

## 2023-09-13 RX ADMIN — SODIUM CHLORIDE, PRESERVATIVE FREE 10 ML: 5 INJECTION INTRAVENOUS at 23:36

## 2023-09-13 RX ADMIN — Medication 100 MG: at 21:21

## 2023-09-13 RX ADMIN — FENTANYL CITRATE 50 MCG: 0.05 INJECTION, SOLUTION INTRAMUSCULAR; INTRAVENOUS at 19:09

## 2023-09-13 RX ADMIN — SODIUM CHLORIDE, PRESERVATIVE FREE 10 ML: 5 INJECTION INTRAVENOUS at 23:28

## 2023-09-13 RX ADMIN — ENOXAPARIN SODIUM 30 MG: 100 INJECTION SUBCUTANEOUS at 23:18

## 2023-09-13 RX ADMIN — BENZONATATE 100 MG: 100 CAPSULE ORAL at 23:18

## 2023-09-13 RX ADMIN — SODIUM CHLORIDE 1000 ML: 9 INJECTION, SOLUTION INTRAVENOUS at 20:28

## 2023-09-13 RX ADMIN — IPRATROPIUM BROMIDE AND ALBUTEROL SULFATE 1 DOSE: .5; 2.5 SOLUTION RESPIRATORY (INHALATION) at 21:14

## 2023-09-13 RX ADMIN — LORAZEPAM 1 MG: 2 INJECTION INTRAMUSCULAR; INTRAVENOUS at 23:28

## 2023-09-13 RX ADMIN — LORAZEPAM 3 MG: 2 INJECTION INTRAMUSCULAR; INTRAVENOUS at 21:20

## 2023-09-13 RX ADMIN — FENTANYL CITRATE 50 MCG: 0.05 INJECTION, SOLUTION INTRAMUSCULAR; INTRAVENOUS at 20:17

## 2023-09-13 RX ADMIN — OXYCODONE AND ACETAMINOPHEN 1 TABLET: 325; 5 TABLET ORAL at 23:36

## 2023-09-13 RX ADMIN — ACETAMINOPHEN 650 MG: 325 TABLET ORAL at 23:51

## 2023-09-13 RX ADMIN — LEVOFLOXACIN 500 MG: 500 TABLET, FILM COATED ORAL at 23:18

## 2023-09-13 ASSESSMENT — PAIN DESCRIPTION - DESCRIPTORS
DESCRIPTORS: ACHING;DISCOMFORT
DESCRIPTORS: ACHING;SORE
DESCRIPTORS: ACHING

## 2023-09-13 ASSESSMENT — PAIN DESCRIPTION - ONSET
ONSET: ON-GOING
ONSET: ON-GOING

## 2023-09-13 ASSESSMENT — PAIN DESCRIPTION - ORIENTATION
ORIENTATION: LOWER
ORIENTATION: LOWER

## 2023-09-13 ASSESSMENT — PAIN SCALES - GENERAL
PAINLEVEL_OUTOF10: 6
PAINLEVEL_OUTOF10: 10

## 2023-09-13 ASSESSMENT — PAIN DESCRIPTION - LOCATION
LOCATION: BACK
LOCATION: HEAD
LOCATION: BACK

## 2023-09-13 ASSESSMENT — PAIN DESCRIPTION - PAIN TYPE
TYPE: CHRONIC PAIN
TYPE: CHRONIC PAIN

## 2023-09-13 ASSESSMENT — PAIN DESCRIPTION - FREQUENCY
FREQUENCY: CONTINUOUS
FREQUENCY: CONTINUOUS

## 2023-09-13 NOTE — ED PROVIDER NOTES
[address]        Pt Name: Ruth Harris  MRN: [id number]  [address] [facility] [place] 1965  Date of evaluation: 9/13/2023  Provider: Ignacio Hebert MD  PCP: Diamond Bonilla MD  Note Started: 7:04 PM EDT 9/13/23    CHIEF COMPLAINT       Chief Complaint   Patient presents with    Shortness of Breath       HISTORY OF PRESENT ILLNESS: 1 or more Elements   History From: Patient    Limitations to history : None  Social Determinants : None    Ruth Harris is a 62 y.o. male who presents to the ED with complains of sob. Patient states that patient woke up this morning gasping for air. Patient has a hx of COPD and states that he took a breathing treatment of albuterol. Patient states that he was feeling a little bit better but was still short of breath. Patient also states that he fell down when dog went under legs and hit back. Denies any fever, chills, n/v, headache, dizziness, vision changes, neck tenderness or stiffness, weakness, palpitations, leg swelling/tenderness, abd pain, dysuria, hematuria, diarrhea, constipation, bloody stools. Nursing Notes were all reviewed and agreed with or any disagreements were addressed in the HPI.    ROS:   Pertinent positives and negatives are stated within HPI, all other systems reviewed and are negative.      --------------------------------------------- PAST HISTORY ---------------------------------------------  Past Medical History:  has a past medical history of Alcohol abuse, Arthritis, Back pain, chronic, CAD in native artery, COPD (chronic obstructive pulmonary disease) ([address]), H/O cardiovascular stress test, Hyperlipidemia, Hypertension, Hyponatremia, Neck pain, chronic, and Tobacco abuse. Past Surgical History:  has a past surgical history that includes External ear surgery; fracture surgery; eye surgery; Hemorrhoid surgery;  Shoulder arthroscopy (Right, 1/16/2015); and

## 2023-09-14 LAB
AMPHET UR QL SCN: NEGATIVE
ANION GAP SERPL CALCULATED.3IONS-SCNC: 17 MMOL/L (ref 7–16)
BARBITURATES UR QL SCN: NEGATIVE
BASOPHILS # BLD: 0 K/UL (ref 0–0.2)
BASOPHILS NFR BLD: 0 % (ref 0–2)
BENZODIAZ UR QL: NEGATIVE
BUN SERPL-MCNC: 8 MG/DL (ref 6–20)
BUPRENORPHINE UR QL: NEGATIVE
CALCIUM SERPL-MCNC: 8.2 MG/DL (ref 8.6–10.2)
CANNABINOIDS UR QL SCN: POSITIVE
CHLORIDE SERPL-SCNC: 94 MMOL/L (ref 98–107)
CO2 SERPL-SCNC: 20 MMOL/L (ref 22–29)
COCAINE UR QL SCN: NEGATIVE
CREAT SERPL-MCNC: 0.7 MG/DL (ref 0.7–1.2)
EKG ATRIAL RATE: 112 BPM
EKG P AXIS: 35 DEGREES
EKG P-R INTERVAL: 148 MS
EKG Q-T INTERVAL: 356 MS
EKG QRS DURATION: 92 MS
EKG QTC CALCULATION (BAZETT): 485 MS
EKG R AXIS: 60 DEGREES
EKG T AXIS: 25 DEGREES
EKG VENTRICULAR RATE: 112 BPM
EOSINOPHIL # BLD: 0 K/UL (ref 0.05–0.5)
EOSINOPHILS RELATIVE PERCENT: 0 % (ref 0–6)
ERYTHROCYTE [DISTWIDTH] IN BLOOD BY AUTOMATED COUNT: 15.6 % (ref 11.5–15)
FENTANYL UR QL: POSITIVE
GFR SERPL CREATININE-BSD FRML MDRD: >60 ML/MIN/1.73M2
GLUCOSE SERPL-MCNC: 273 MG/DL (ref 74–99)
HCT VFR BLD AUTO: 28.1 % (ref 37–54)
HGB BLD-MCNC: 9 G/DL (ref 12.5–16.5)
LYMPHOCYTES NFR BLD: 0.07 K/UL (ref 1.5–4)
LYMPHOCYTES RELATIVE PERCENT: 2 % (ref 20–42)
MCH RBC QN AUTO: 30.4 PG (ref 26–35)
MCHC RBC AUTO-ENTMCNC: 32 G/DL (ref 32–34.5)
MCV RBC AUTO: 94.9 FL (ref 80–99.9)
METHADONE UR QL: NEGATIVE
MONOCYTES NFR BLD: 0.1 K/UL (ref 0.1–0.95)
MONOCYTES NFR BLD: 3 % (ref 2–12)
NEUTROPHILS NFR BLD: 96 % (ref 43–80)
NEUTS SEG NFR BLD: 3.63 K/UL (ref 1.8–7.3)
NUCLEATED RED BLOOD CELLS: 1 PER 100 WBC
OPIATES UR QL SCN: NEGATIVE
OXYCODONE UR QL SCN: NEGATIVE
PCP UR QL SCN: NEGATIVE
PLATELET # BLD AUTO: 120 K/UL (ref 130–450)
PMV BLD AUTO: 10 FL (ref 7–12)
POTASSIUM SERPL-SCNC: 4 MMOL/L (ref 3.5–5)
RBC # BLD AUTO: 2.96 M/UL (ref 3.8–5.8)
RBC # BLD: ABNORMAL 10*6/UL
SODIUM SERPL-SCNC: 131 MMOL/L (ref 132–146)
TEST INFORMATION: ABNORMAL
VIT B12 SERPL-MCNC: 422 PG/ML (ref 211–946)
WBC OTHER # BLD: 3.8 K/UL (ref 4.5–11.5)

## 2023-09-14 PROCEDURE — 6370000000 HC RX 637 (ALT 250 FOR IP): Performed by: EMERGENCY MEDICINE

## 2023-09-14 PROCEDURE — 99233 SBSQ HOSP IP/OBS HIGH 50: CPT | Performed by: INTERNAL MEDICINE

## 2023-09-14 PROCEDURE — 80048 BASIC METABOLIC PNL TOTAL CA: CPT

## 2023-09-14 PROCEDURE — 6360000002 HC RX W HCPCS: Performed by: FAMILY MEDICINE

## 2023-09-14 PROCEDURE — 6370000000 HC RX 637 (ALT 250 FOR IP): Performed by: FAMILY MEDICINE

## 2023-09-14 PROCEDURE — 94640 AIRWAY INHALATION TREATMENT: CPT

## 2023-09-14 PROCEDURE — 6370000000 HC RX 637 (ALT 250 FOR IP): Performed by: INTERNAL MEDICINE

## 2023-09-14 PROCEDURE — 2580000003 HC RX 258: Performed by: EMERGENCY MEDICINE

## 2023-09-14 PROCEDURE — 85025 COMPLETE CBC W/AUTO DIFF WBC: CPT

## 2023-09-14 PROCEDURE — 36415 COLL VENOUS BLD VENIPUNCTURE: CPT

## 2023-09-14 PROCEDURE — 93010 ELECTROCARDIOGRAM REPORT: CPT | Performed by: INTERNAL MEDICINE

## 2023-09-14 PROCEDURE — 82607 VITAMIN B-12: CPT

## 2023-09-14 PROCEDURE — 2060000000 HC ICU INTERMEDIATE R&B

## 2023-09-14 PROCEDURE — 6360000002 HC RX W HCPCS: Performed by: EMERGENCY MEDICINE

## 2023-09-14 PROCEDURE — 80307 DRUG TEST PRSMV CHEM ANLYZR: CPT

## 2023-09-14 PROCEDURE — 2580000003 HC RX 258: Performed by: FAMILY MEDICINE

## 2023-09-14 RX ORDER — HYDROCODONE BITARTRATE AND ACETAMINOPHEN 5; 325 MG/1; MG/1
2 TABLET ORAL EVERY 4 HOURS PRN
Status: DISCONTINUED | OUTPATIENT
Start: 2023-09-14 | End: 2023-09-20 | Stop reason: HOSPADM

## 2023-09-14 RX ORDER — HYDROCODONE BITARTRATE AND ACETAMINOPHEN 5; 325 MG/1; MG/1
1 TABLET ORAL EVERY 4 HOURS PRN
Status: DISCONTINUED | OUTPATIENT
Start: 2023-09-14 | End: 2023-09-20 | Stop reason: HOSPADM

## 2023-09-14 RX ADMIN — ENOXAPARIN SODIUM 30 MG: 100 INJECTION SUBCUTANEOUS at 08:35

## 2023-09-14 RX ADMIN — SODIUM CHLORIDE, PRESERVATIVE FREE 10 ML: 5 INJECTION INTRAVENOUS at 20:39

## 2023-09-14 RX ADMIN — GUAIFENESIN 600 MG: 600 TABLET, EXTENDED RELEASE ORAL at 20:39

## 2023-09-14 RX ADMIN — SODIUM CHLORIDE, PRESERVATIVE FREE 10 ML: 5 INJECTION INTRAVENOUS at 08:24

## 2023-09-14 RX ADMIN — OXYCODONE AND ACETAMINOPHEN 1 TABLET: 325; 5 TABLET ORAL at 03:41

## 2023-09-14 RX ADMIN — ISOSORBIDE DINITRATE 10 MG: 10 TABLET ORAL at 00:00

## 2023-09-14 RX ADMIN — FOLIC ACID 1 MG: 1 TABLET ORAL at 08:29

## 2023-09-14 RX ADMIN — ARFORMOTEROL TARTRATE 15 MCG: 15 SOLUTION RESPIRATORY (INHALATION) at 06:03

## 2023-09-14 RX ADMIN — Medication 100 MG: at 08:29

## 2023-09-14 RX ADMIN — LORAZEPAM 2 MG: 2 INJECTION INTRAMUSCULAR; INTRAVENOUS at 14:31

## 2023-09-14 RX ADMIN — ENOXAPARIN SODIUM 30 MG: 100 INJECTION SUBCUTANEOUS at 20:38

## 2023-09-14 RX ADMIN — IPRATROPIUM BROMIDE AND ALBUTEROL SULFATE 1 DOSE: .5; 2.5 SOLUTION RESPIRATORY (INHALATION) at 06:03

## 2023-09-14 RX ADMIN — LEVOFLOXACIN 500 MG: 500 TABLET, FILM COATED ORAL at 22:33

## 2023-09-14 RX ADMIN — IPRATROPIUM BROMIDE AND ALBUTEROL SULFATE 1 DOSE: .5; 2.5 SOLUTION RESPIRATORY (INHALATION) at 01:13

## 2023-09-14 RX ADMIN — HYDROCODONE BITARTRATE AND ACETAMINOPHEN 1 TABLET: 7.5; 325 TABLET ORAL at 11:30

## 2023-09-14 RX ADMIN — METOPROLOL TARTRATE 75 MG: 25 TABLET, FILM COATED ORAL at 00:00

## 2023-09-14 RX ADMIN — OXYCODONE AND ACETAMINOPHEN 1 TABLET: 325; 5 TABLET ORAL at 08:30

## 2023-09-14 RX ADMIN — ISOSORBIDE DINITRATE 10 MG: 10 TABLET ORAL at 20:38

## 2023-09-14 RX ADMIN — PANTOPRAZOLE SODIUM 20 MG: 20 TABLET, DELAYED RELEASE ORAL at 06:20

## 2023-09-14 RX ADMIN — METOPROLOL TARTRATE 75 MG: 25 TABLET, FILM COATED ORAL at 20:38

## 2023-09-14 RX ADMIN — IPRATROPIUM BROMIDE AND ALBUTEROL SULFATE 1 DOSE: .5; 2.5 SOLUTION RESPIRATORY (INHALATION) at 09:29

## 2023-09-14 RX ADMIN — SODIUM CHLORIDE, PRESERVATIVE FREE 10 ML: 5 INJECTION INTRAVENOUS at 01:53

## 2023-09-14 RX ADMIN — HYDROCODONE BITARTRATE AND ACETAMINOPHEN 1 TABLET: 5; 325 TABLET ORAL at 20:37

## 2023-09-14 RX ADMIN — ASPIRIN 81 MG CHEWABLE TABLET 81 MG: 81 TABLET CHEWABLE at 08:28

## 2023-09-14 RX ADMIN — LORAZEPAM 2 MG: 2 INJECTION INTRAMUSCULAR; INTRAVENOUS at 21:41

## 2023-09-14 RX ADMIN — MULTIPLE VITAMINS W/ MINERALS TAB 1 TABLET: TAB at 08:28

## 2023-09-14 RX ADMIN — LORAZEPAM 2 MG: 2 INJECTION INTRAMUSCULAR; INTRAVENOUS at 08:34

## 2023-09-14 RX ADMIN — FUROSEMIDE 40 MG: 40 TABLET ORAL at 08:29

## 2023-09-14 RX ADMIN — LORAZEPAM 2 MG: 2 INJECTION INTRAMUSCULAR; INTRAVENOUS at 01:52

## 2023-09-14 RX ADMIN — IPRATROPIUM BROMIDE AND ALBUTEROL SULFATE 1 DOSE: .5; 2.5 SOLUTION RESPIRATORY (INHALATION) at 13:33

## 2023-09-14 RX ADMIN — LORAZEPAM 2 MG: 2 INJECTION INTRAMUSCULAR; INTRAVENOUS at 18:23

## 2023-09-14 RX ADMIN — METOPROLOL TARTRATE 75 MG: 25 TABLET, FILM COATED ORAL at 08:29

## 2023-09-14 RX ADMIN — OXYCODONE AND ACETAMINOPHEN 1 TABLET: 325; 5 TABLET ORAL at 17:42

## 2023-09-14 RX ADMIN — PREDNISONE 40 MG: 20 TABLET ORAL at 08:35

## 2023-09-14 RX ADMIN — ACETAMINOPHEN 650 MG: 325 TABLET ORAL at 14:32

## 2023-09-14 RX ADMIN — IPRATROPIUM BROMIDE AND ALBUTEROL SULFATE 1 DOSE: .5; 2.5 SOLUTION RESPIRATORY (INHALATION) at 16:08

## 2023-09-14 RX ADMIN — ARFORMOTEROL TARTRATE 15 MCG: 15 SOLUTION RESPIRATORY (INHALATION) at 17:05

## 2023-09-14 RX ADMIN — BUDESONIDE INHALATION 500 MCG: 0.5 SUSPENSION RESPIRATORY (INHALATION) at 17:05

## 2023-09-14 RX ADMIN — LOSARTAN POTASSIUM 50 MG: 50 TABLET, FILM COATED ORAL at 08:29

## 2023-09-14 RX ADMIN — GUAIFENESIN 600 MG: 600 TABLET, EXTENDED RELEASE ORAL at 08:29

## 2023-09-14 RX ADMIN — BENZONATATE 100 MG: 100 CAPSULE ORAL at 08:29

## 2023-09-14 RX ADMIN — IPRATROPIUM BROMIDE AND ALBUTEROL SULFATE 1 DOSE: .5; 2.5 SOLUTION RESPIRATORY (INHALATION) at 21:38

## 2023-09-14 RX ADMIN — ISOSORBIDE DINITRATE 10 MG: 10 TABLET ORAL at 08:28

## 2023-09-14 RX ADMIN — LORAZEPAM 2 MG: 2 INJECTION INTRAMUSCULAR; INTRAVENOUS at 12:46

## 2023-09-14 RX ADMIN — BUDESONIDE INHALATION 500 MCG: 0.5 SUSPENSION RESPIRATORY (INHALATION) at 06:03

## 2023-09-14 RX ADMIN — ESCITALOPRAM OXALATE 10 MG: 10 TABLET ORAL at 08:29

## 2023-09-14 RX ADMIN — LORAZEPAM 2 MG: 2 INJECTION INTRAMUSCULAR; INTRAVENOUS at 20:38

## 2023-09-14 RX ADMIN — LORAZEPAM 2 MG: 2 INJECTION INTRAMUSCULAR; INTRAVENOUS at 11:30

## 2023-09-14 ASSESSMENT — PAIN DESCRIPTION - DESCRIPTORS
DESCRIPTORS: ACHING;DISCOMFORT
DESCRIPTORS: ACHING;DISCOMFORT;SORE
DESCRIPTORS: ACHING;DISCOMFORT
DESCRIPTORS: ACHING

## 2023-09-14 ASSESSMENT — PAIN SCALES - GENERAL
PAINLEVEL_OUTOF10: 10
PAINLEVEL_OUTOF10: 10
PAINLEVEL_OUTOF10: 6
PAINLEVEL_OUTOF10: 8
PAINLEVEL_OUTOF10: 6
PAINLEVEL_OUTOF10: 9
PAINLEVEL_OUTOF10: 10
PAINLEVEL_OUTOF10: 6
PAINLEVEL_OUTOF10: 10

## 2023-09-14 ASSESSMENT — PAIN DESCRIPTION - LOCATION
LOCATION: BACK;HIP
LOCATION: HEAD;BACK
LOCATION: BACK
LOCATION: HEAD
LOCATION: BACK

## 2023-09-14 ASSESSMENT — PAIN DESCRIPTION - ORIENTATION
ORIENTATION: LEFT;RIGHT;LOWER
ORIENTATION: LOWER
ORIENTATION: LOWER

## 2023-09-14 ASSESSMENT — PAIN DESCRIPTION - DIRECTION: RADIATING_TOWARDS: B/L HIPS

## 2023-09-14 ASSESSMENT — PAIN DESCRIPTION - ONSET: ONSET: ON-GOING

## 2023-09-14 ASSESSMENT — PAIN DESCRIPTION - FREQUENCY: FREQUENCY: CONTINUOUS

## 2023-09-14 ASSESSMENT — PAIN DESCRIPTION - PAIN TYPE: TYPE: CHRONIC PAIN

## 2023-09-14 NOTE — CARE COORDINATION
Case Management Assessment  Initial Evaluation    Date/Time of Evaluation: 9/14/2023 4:49 PM  Assessment Completed by: Coty Marin RN    If patient is discharged prior to next notation, then this note serves as note for discharge by case management. Patient Name: Macey Martinez                   YOB: 1965  Diagnosis: COPD exacerbation ([address]) [J44.1]  COPD with acute exacerbation ([address]) [J44.1]                   Date / Time: 9/13/2023  6:19 PM    Patient Admission Status: Inpatient   Readmission Risk (Low < 19, Mod (19-27), High > 27): Readmission Risk Score: 28.1    Current PCP: Molly Jimenez MD  PCP verified by CM? Yes    Chart Reviewed: Yes      History Provided by: Patient  Patient Orientation: Alert and Oriented    Patient Cognition: Alert    Hospitalization in the last 30 days (Readmission):  No    If yes, Readmission Assessment in CM Navigator will be completed. Advance Directives:      Code Status: Full Code   Patient's Primary Decision Maker is: Legal Next of Kin    Primary Decision MakerTemilysupa Collin  Child - [phone]    Secondary Decision Maker: Daina Hawkins  Other - [phone]    Discharge Planning:    Patient lives with: Alone Type of Home: Trailer/Mobile Home  Primary Care Giver: Self  Patient Support Systems include: Children, Friends/Neighbors   Current Financial resources:    Current community resources:    Current services prior to admission: None            Current DME:              Type of Home Care services:  None    ADLS  Prior functional level: Independent in ADLs/IADLs  Current functional level: Independent in ADLs/IADLs    PT AM-PAC:   /24  OT AM-PAC:   /24    Family can provide assistance at DC: Other (comment) (no needs identified)  Would you like Case Management to discuss the discharge plan with any other family members/significant others, and if so, who?  No  Plans to Return to Present Housing: Yes  Other Identified Issues/Barriers to RETURNING to

## 2023-09-14 NOTE — ACP (ADVANCE CARE PLANNING)
Advance Care Planning   Healthcare Decision Maker:    Primary Decision Maker: Denise Polanco [facility] - [phone]    Secondary Decision Maker: Daina Arauz - [phone]

## 2023-09-14 NOTE — H&P
[facility] Hospitalist [facility]   History and Physical      CHIEF COMPLAINT:  SOB, cough    History of Present Illness:  62 y.o. male with a history of ETOH, COPD, HTN, HLD presents with SOB, productive cough for 2 days. Given duonebs and solu-medrol. Ran out of his albuterol at home, still has his advair inhaler and was trying to use that as a rescue inhaler without relief. Has a history of ETOH (1/5 of whiskey a week, sometimes beer as well), last drink was reportedly yesterday. CIWA score 16 in ED, given ativan. Patient also reported falling down 4 stairs tonight while walking out to the ambulance because of his dog, was given fentanyl twice in ED for this. States he has chronic back pain from multiple accidents in the past, used to be on opiates but no longer on anything so he smokes marijuana and drinks alcohol to self-treat his pain. Workup in ED significant for K 3.3, glucose 163, troponin 18 and 16, alk phos 170, ALT 52, AST 87, hgb 10.2. Informant(s) for H&P: patient, chart    REVIEW OF SYSTEMS:  no fevers, chills, cp, sob, n/v, ha, vision/hearing changes, wt changes, hot/cold flashes, other open skin lesions, diarrhea, constipation, dysuria/hematuria unless noted in HPI. Complete ROS performed with the patient and is otherwise negative. PMH:  Past Medical History:   Diagnosis Date    Alcohol abuse     Arthritis     Back pain, chronic     CAD in native artery 6/8/2022    COPD (chronic obstructive pulmonary disease) ([address])     H/O cardiovascular stress test 04/27/2022    Lexiscan    Hyperlipidemia     Hypertension     Hyponatremia     Neck pain, chronic     Tobacco abuse        Surgical History:  Past Surgical History:   Procedure Laterality Date    CARDIAC CATHETERIZATION  06/08/2022    Dr. Ewa Varghese      i & d left ear. EYE SURGERY      Lt. Eye  Dart removed    FRACTURE SURGERY      Rt. [facility] [employer] Pins    HEMORRHOID SURGERY      SHOULDER

## 2023-09-14 NOTE — PLAN OF CARE
Problem: Discharge Planning  Goal: Discharge to home or other facility with appropriate resources  Outcome: Progressing  Flowsheets (Taken 9/13/2023 6301)  Discharge to home or other facility with appropriate resources:   Identify barriers to discharge with patient and caregiver   Identify discharge learning needs (meds, wound care, etc)     Problem: Pain  Goal: Verbalizes/displays adequate comfort level or baseline comfort level  Outcome: Progressing     Problem: Safety - Adult  Goal: Free from fall injury  Outcome: Progressing     Problem: Respiratory - Adult  Goal: Achieves optimal ventilation and oxygenation  Outcome: Progressing

## 2023-09-15 ENCOUNTER — APPOINTMENT (OUTPATIENT)
Dept: CT IMAGING | Age: 58
DRG: 191 | End: 2023-09-15
Payer: MEDICARE

## 2023-09-15 PROBLEM — F10.930 ALCOHOL WITHDRAWAL SYNDROME WITHOUT COMPLICATION (HCC): Status: ACTIVE | Noted: 2023-09-15
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
## 2023-09-15 PROCEDURE — 6360000002 HC RX W HCPCS: Performed by: FAMILY MEDICINE

## 2023-09-15 PROCEDURE — 6370000000 HC RX 637 (ALT 250 FOR IP): Performed by: FAMILY MEDICINE

## 2023-09-15 PROCEDURE — 6370000000 HC RX 637 (ALT 250 FOR IP): Performed by: INTERNAL MEDICINE

## 2023-09-15 PROCEDURE — 2060000000 HC ICU INTERMEDIATE R&B

## 2023-09-15 PROCEDURE — 70450 CT HEAD/BRAIN W/O DYE: CPT

## 2023-09-15 PROCEDURE — 99233 SBSQ HOSP IP/OBS HIGH 50: CPT | Performed by: FAMILY MEDICINE

## 2023-09-15 PROCEDURE — 72125 CT NECK SPINE W/O DYE: CPT

## 2023-09-15 PROCEDURE — 6370000000 HC RX 637 (ALT 250 FOR IP): Performed by: EMERGENCY MEDICINE

## 2023-09-15 PROCEDURE — 6360000002 HC RX W HCPCS: Performed by: EMERGENCY MEDICINE

## 2023-09-15 PROCEDURE — 2580000003 HC RX 258: Performed by: EMERGENCY MEDICINE

## 2023-09-15 PROCEDURE — 94640 AIRWAY INHALATION TREATMENT: CPT

## 2023-09-15 PROCEDURE — 2580000003 HC RX 258: Performed by: FAMILY MEDICINE

## 2023-09-15 RX ORDER — THIAMINE HYDROCHLORIDE 100 MG/ML
100 INJECTION, SOLUTION INTRAMUSCULAR; INTRAVENOUS 2 TIMES DAILY
Status: DISCONTINUED | OUTPATIENT
Start: 2023-09-15 | End: 2023-09-20 | Stop reason: HOSPADM

## 2023-09-15 RX ADMIN — LORAZEPAM 3 MG: 1 TABLET ORAL at 10:12

## 2023-09-15 RX ADMIN — METOPROLOL TARTRATE 75 MG: 25 TABLET, FILM COATED ORAL at 21:05

## 2023-09-15 RX ADMIN — ARFORMOTEROL TARTRATE 15 MCG: 15 SOLUTION RESPIRATORY (INHALATION) at 17:21

## 2023-09-15 RX ADMIN — IPRATROPIUM BROMIDE AND ALBUTEROL SULFATE 1 DOSE: .5; 2.5 SOLUTION RESPIRATORY (INHALATION) at 14:13

## 2023-09-15 RX ADMIN — BUDESONIDE INHALATION 500 MCG: 0.5 SUSPENSION RESPIRATORY (INHALATION) at 17:21

## 2023-09-15 RX ADMIN — ARFORMOTEROL TARTRATE 15 MCG: 15 SOLUTION RESPIRATORY (INHALATION) at 06:16

## 2023-09-15 RX ADMIN — ACETAMINOPHEN 650 MG: 325 TABLET ORAL at 14:06

## 2023-09-15 RX ADMIN — SODIUM CHLORIDE, PRESERVATIVE FREE 10 ML: 5 INJECTION INTRAVENOUS at 22:21

## 2023-09-15 RX ADMIN — LORAZEPAM 3 MG: 1 TABLET ORAL at 12:32

## 2023-09-15 RX ADMIN — IPRATROPIUM BROMIDE AND ALBUTEROL SULFATE 1 DOSE: .5; 2.5 SOLUTION RESPIRATORY (INHALATION) at 06:16

## 2023-09-15 RX ADMIN — PANTOPRAZOLE SODIUM 20 MG: 20 TABLET, DELAYED RELEASE ORAL at 05:25

## 2023-09-15 RX ADMIN — LORAZEPAM 2 MG: 1 TABLET ORAL at 16:08

## 2023-09-15 RX ADMIN — ENOXAPARIN SODIUM 30 MG: 100 INJECTION SUBCUTANEOUS at 21:05

## 2023-09-15 RX ADMIN — HYDROCODONE BITARTRATE AND ACETAMINOPHEN 1 TABLET: 5; 325 TABLET ORAL at 22:58

## 2023-09-15 RX ADMIN — FOLIC ACID 1 MG: 1 TABLET ORAL at 10:10

## 2023-09-15 RX ADMIN — HYDROCODONE BITARTRATE AND ACETAMINOPHEN 1 TABLET: 5; 325 TABLET ORAL at 00:23

## 2023-09-15 RX ADMIN — PREDNISONE 40 MG: 20 TABLET ORAL at 10:10

## 2023-09-15 RX ADMIN — GUAIFENESIN 600 MG: 600 TABLET, EXTENDED RELEASE ORAL at 21:05

## 2023-09-15 RX ADMIN — IPRATROPIUM BROMIDE AND ALBUTEROL SULFATE 1 DOSE: .5; 2.5 SOLUTION RESPIRATORY (INHALATION) at 17:21

## 2023-09-15 RX ADMIN — LOSARTAN POTASSIUM 50 MG: 50 TABLET, FILM COATED ORAL at 10:11

## 2023-09-15 RX ADMIN — SODIUM CHLORIDE, PRESERVATIVE FREE 10 ML: 5 INJECTION INTRAVENOUS at 10:12

## 2023-09-15 RX ADMIN — MULTIPLE VITAMINS W/ MINERALS TAB 1 TABLET: TAB at 10:11

## 2023-09-15 RX ADMIN — ISOSORBIDE DINITRATE 10 MG: 10 TABLET ORAL at 10:12

## 2023-09-15 RX ADMIN — LORAZEPAM 3 MG: 1 TABLET ORAL at 21:05

## 2023-09-15 RX ADMIN — LORAZEPAM 4 MG: 2 INJECTION INTRAMUSCULAR; INTRAVENOUS at 07:19

## 2023-09-15 RX ADMIN — LEVOFLOXACIN 500 MG: 500 TABLET, FILM COATED ORAL at 22:20

## 2023-09-15 RX ADMIN — HYDROCODONE BITARTRATE AND ACETAMINOPHEN 2 TABLET: 5; 325 TABLET ORAL at 16:07

## 2023-09-15 RX ADMIN — THIAMINE HYDROCHLORIDE 100 MG: 100 INJECTION, SOLUTION INTRAMUSCULAR; INTRAVENOUS at 10:13

## 2023-09-15 RX ADMIN — METOPROLOL TARTRATE 75 MG: 25 TABLET, FILM COATED ORAL at 10:10

## 2023-09-15 RX ADMIN — GUAIFENESIN 600 MG: 600 TABLET, EXTENDED RELEASE ORAL at 10:11

## 2023-09-15 RX ADMIN — FUROSEMIDE 40 MG: 40 TABLET ORAL at 10:11

## 2023-09-15 RX ADMIN — HYDROCODONE BITARTRATE AND ACETAMINOPHEN 1 TABLET: 5; 325 TABLET ORAL at 05:25

## 2023-09-15 RX ADMIN — IPRATROPIUM BROMIDE AND ALBUTEROL SULFATE 1 DOSE: .5; 2.5 SOLUTION RESPIRATORY (INHALATION) at 09:37

## 2023-09-15 RX ADMIN — THIAMINE HYDROCHLORIDE 100 MG: 100 INJECTION, SOLUTION INTRAMUSCULAR; INTRAVENOUS at 21:05

## 2023-09-15 RX ADMIN — HYDROCODONE BITARTRATE AND ACETAMINOPHEN 2 TABLET: 5; 325 TABLET ORAL at 11:43

## 2023-09-15 RX ADMIN — LORAZEPAM 2 MG: 2 INJECTION INTRAMUSCULAR; INTRAVENOUS at 00:23

## 2023-09-15 RX ADMIN — BUDESONIDE INHALATION 500 MCG: 0.5 SUSPENSION RESPIRATORY (INHALATION) at 06:17

## 2023-09-15 RX ADMIN — ISOSORBIDE DINITRATE 10 MG: 10 TABLET ORAL at 21:05

## 2023-09-15 RX ADMIN — ESCITALOPRAM OXALATE 10 MG: 10 TABLET ORAL at 10:11

## 2023-09-15 RX ADMIN — LORAZEPAM 4 MG: 1 TABLET ORAL at 05:25

## 2023-09-15 RX ADMIN — LORAZEPAM 4 MG: 2 INJECTION INTRAMUSCULAR; INTRAVENOUS at 02:01

## 2023-09-15 ASSESSMENT — PAIN DESCRIPTION - DESCRIPTORS
DESCRIPTORS: ACHING

## 2023-09-15 ASSESSMENT — PAIN SCALES - GENERAL
PAINLEVEL_OUTOF10: 5
PAINLEVEL_OUTOF10: 7
PAINLEVEL_OUTOF10: 0
PAINLEVEL_OUTOF10: 10
PAINLEVEL_OUTOF10: 8
PAINLEVEL_OUTOF10: 8

## 2023-09-15 ASSESSMENT — PAIN DESCRIPTION - PAIN TYPE: TYPE: CHRONIC PAIN

## 2023-09-15 ASSESSMENT — PAIN DESCRIPTION - ORIENTATION
ORIENTATION: LEFT
ORIENTATION: MID
ORIENTATION: LEFT

## 2023-09-15 ASSESSMENT — PAIN DESCRIPTION - LOCATION
LOCATION: BACK
LOCATION: BACK
LOCATION: HEAD;BACK
LOCATION: BACK
LOCATION: HEAD

## 2023-09-15 ASSESSMENT — PAIN - FUNCTIONAL ASSESSMENT: PAIN_FUNCTIONAL_ASSESSMENT: PREVENTS OR INTERFERES SOME ACTIVE ACTIVITIES AND ADLS

## 2023-09-15 NOTE — PLAN OF CARE
Problem: Safety - Adult  Goal: Free from fall injury  9/15/2023 0756 by Nyasia Richardson RN  Outcome: Not Progressing     Problem: Safety - Adult  Goal: Free from fall injury  9/15/2023 0756 by Nyasia Richardson RN  Outcome: Not Progressing

## 2023-09-15 NOTE — CARE COORDINATION
9/15/2023  [facility], MARILU- pt sleeping, thiamine iv, room air. Fall last night- CT head and spine pending. POA's are aware of fall. AHMET spoke to Concepcion Sanchez and Christine [address] will provide a ride home at discharge. Yared Murrieta no longer has custody of his young grandson- Jose A Jodi is now living with his father. CM/WOLF assigned to follow.   Electronically signed by Kayleen Inman RN-BC on 9/15/2023 at 9:39 AM

## 2023-09-15 NOTE — PLAN OF CARE
Problem: Discharge Planning  Goal: Discharge to home or other facility with appropriate resources  9/15/2023 1613 by Bess Colbert RN  Outcome: Progressing  Flowsheets (Taken 9/15/2023 0800)  Discharge to home or other facility with appropriate resources: Identify barriers to discharge with patient and caregiver  9/15/2023 0429 by Swapnil Kaminski RN  Outcome: Progressing     Problem: Pain  Goal: Verbalizes/displays adequate comfort level or baseline comfort level  9/15/2023 1613 by Bess Colbert RN  Outcome: Progressing  9/15/2023 0429 by Swapnil Kaminski RN  Outcome: Progressing     Problem: Safety - Adult  Goal: Free from fall injury  9/15/2023 1613 by Bess Colbert RN  Outcome: Progressing  9/15/2023 0756 by Swapnil Kaminski RN  Outcome: Not Progressing  9/15/2023 0429 by Swapnil Kaminski RN  Outcome: Progressing     Problem: Respiratory - Adult  Goal: Achieves optimal ventilation and oxygenation  9/15/2023 1613 by Bess Colbert RN  Outcome: Progressing  Flowsheets (Taken 9/15/2023 0800)  Achieves optimal ventilation and oxygenation: Assess for changes in respiratory status  9/15/2023 0429 by Swapnil Kaminski RN  Outcome: Progressing     Problem: Safety - Adult  Goal: Free from fall injury  9/15/2023 1613 by Bess Colbert RN  Outcome: Progressing  9/15/2023 0756 by Swapnil Kaminski RN  Outcome: Not Progressing  9/15/2023 0429 by Swapnil Kaminski RN  Outcome: Progressing

## 2023-09-16 ENCOUNTER — APPOINTMENT (OUTPATIENT)
Dept: GENERAL RADIOLOGY | Age: 58
DRG: 191 | End: 2023-09-16
Payer: MEDICARE

## 2023-09-16 PROBLEM — E83.39 HYPOPHOSPHATEMIA: Status: ACTIVE | Noted: 2023-09-16

## 2023-09-16 PROBLEM — E83.42 HYPOMAGNESEMIA: Status: ACTIVE | Noted: 2023-09-16

## 2023-09-16 LAB
ALBUMIN SERPL-MCNC: 4 G/DL (ref 3.5–5.2)
ALP SERPL-CCNC: 118 U/L (ref 40–129)
ALT SERPL-CCNC: 55 U/L (ref 0–40)
AST SERPL-CCNC: 85 U/L (ref 0–39)
BILIRUB DIRECT SERPL-MCNC: 0.3 MG/DL (ref 0–0.3)
BILIRUB INDIRECT SERPL-MCNC: 0.6 MG/DL (ref 0–1)
BILIRUB SERPL-MCNC: 0.9 MG/DL (ref 0–1.2)
LIPASE SERPL-CCNC: 32 U/L (ref 13–60)
MAGNESIUM SERPL-MCNC: 1.5 MG/DL (ref 1.6–2.6)
PHOSPHATE SERPL-MCNC: 1.7 MG/DL (ref 2.5–4.5)
PROT SERPL-MCNC: 6.9 G/DL (ref 6.4–8.3)

## 2023-09-16 PROCEDURE — 94640 AIRWAY INHALATION TREATMENT: CPT

## 2023-09-16 PROCEDURE — 6370000000 HC RX 637 (ALT 250 FOR IP): Performed by: FAMILY MEDICINE

## 2023-09-16 PROCEDURE — 99232 SBSQ HOSP IP/OBS MODERATE 35: CPT | Performed by: FAMILY MEDICINE

## 2023-09-16 PROCEDURE — 6360000002 HC RX W HCPCS: Performed by: FAMILY MEDICINE

## 2023-09-16 PROCEDURE — 83735 ASSAY OF MAGNESIUM: CPT

## 2023-09-16 PROCEDURE — 2580000003 HC RX 258: Performed by: EMERGENCY MEDICINE

## 2023-09-16 PROCEDURE — 36415 COLL VENOUS BLD VENIPUNCTURE: CPT

## 2023-09-16 PROCEDURE — 6370000000 HC RX 637 (ALT 250 FOR IP): Performed by: EMERGENCY MEDICINE

## 2023-09-16 PROCEDURE — 80076 HEPATIC FUNCTION PANEL: CPT

## 2023-09-16 PROCEDURE — 2060000000 HC ICU INTERMEDIATE R&B

## 2023-09-16 PROCEDURE — 73502 X-RAY EXAM HIP UNI 2-3 VIEWS: CPT

## 2023-09-16 PROCEDURE — 6370000000 HC RX 637 (ALT 250 FOR IP): Performed by: INTERNAL MEDICINE

## 2023-09-16 PROCEDURE — 83690 ASSAY OF LIPASE: CPT

## 2023-09-16 PROCEDURE — 2580000003 HC RX 258: Performed by: FAMILY MEDICINE

## 2023-09-16 PROCEDURE — 84100 ASSAY OF PHOSPHORUS: CPT

## 2023-09-16 RX ORDER — ONDANSETRON 2 MG/ML
4 INJECTION INTRAMUSCULAR; INTRAVENOUS EVERY 6 HOURS PRN
Status: DISCONTINUED | OUTPATIENT
Start: 2023-09-16 | End: 2023-09-20 | Stop reason: HOSPADM

## 2023-09-16 RX ORDER — MAGNESIUM SULFATE IN WATER 40 MG/ML
2000 INJECTION, SOLUTION INTRAVENOUS ONCE
Status: COMPLETED | OUTPATIENT
Start: 2023-09-16 | End: 2023-09-16

## 2023-09-16 RX ORDER — IBUPROFEN 200 MG
TABLET ORAL 2 TIMES DAILY
Status: DISCONTINUED | OUTPATIENT
Start: 2023-09-16 | End: 2023-09-20 | Stop reason: HOSPADM

## 2023-09-16 RX ADMIN — ENOXAPARIN SODIUM 30 MG: 100 INJECTION SUBCUTANEOUS at 20:37

## 2023-09-16 RX ADMIN — BUDESONIDE INHALATION 500 MCG: 0.5 SUSPENSION RESPIRATORY (INHALATION) at 06:19

## 2023-09-16 RX ADMIN — LORAZEPAM 2 MG: 1 TABLET ORAL at 11:28

## 2023-09-16 RX ADMIN — BENZONATATE 100 MG: 100 CAPSULE ORAL at 08:03

## 2023-09-16 RX ADMIN — BACITRACIN ZINC, NEOMYCIN SULFATE, POLYMYXIN B SULFATE: 3.5; 5000; 4 OINTMENT TOPICAL at 14:57

## 2023-09-16 RX ADMIN — MULTIPLE VITAMINS W/ MINERALS TAB 1 TABLET: TAB at 08:03

## 2023-09-16 RX ADMIN — ESCITALOPRAM OXALATE 10 MG: 10 TABLET ORAL at 08:04

## 2023-09-16 RX ADMIN — SODIUM CHLORIDE, PRESERVATIVE FREE 10 ML: 5 INJECTION INTRAVENOUS at 20:38

## 2023-09-16 RX ADMIN — LORAZEPAM 2 MG: 1 TABLET ORAL at 20:36

## 2023-09-16 RX ADMIN — BUDESONIDE INHALATION 500 MCG: 0.5 SUSPENSION RESPIRATORY (INHALATION) at 17:15

## 2023-09-16 RX ADMIN — LEVOFLOXACIN 500 MG: 500 TABLET, FILM COATED ORAL at 21:58

## 2023-09-16 RX ADMIN — HYDROCODONE BITARTRATE AND ACETAMINOPHEN 2 TABLET: 5; 325 TABLET ORAL at 14:38

## 2023-09-16 RX ADMIN — Medication 500 MG: at 15:41

## 2023-09-16 RX ADMIN — THIAMINE HYDROCHLORIDE 100 MG: 100 INJECTION, SOLUTION INTRAMUSCULAR; INTRAVENOUS at 20:36

## 2023-09-16 RX ADMIN — THIAMINE HYDROCHLORIDE 100 MG: 100 INJECTION, SOLUTION INTRAMUSCULAR; INTRAVENOUS at 08:04

## 2023-09-16 RX ADMIN — ASPIRIN 81 MG CHEWABLE TABLET 81 MG: 81 TABLET CHEWABLE at 08:03

## 2023-09-16 RX ADMIN — SODIUM CHLORIDE, PRESERVATIVE FREE 10 ML: 5 INJECTION INTRAVENOUS at 08:04

## 2023-09-16 RX ADMIN — FOLIC ACID 1 MG: 1 TABLET ORAL at 08:03

## 2023-09-16 RX ADMIN — IPRATROPIUM BROMIDE AND ALBUTEROL SULFATE 1 DOSE: .5; 2.5 SOLUTION RESPIRATORY (INHALATION) at 09:57

## 2023-09-16 RX ADMIN — IPRATROPIUM BROMIDE AND ALBUTEROL SULFATE 1 DOSE: .5; 2.5 SOLUTION RESPIRATORY (INHALATION) at 00:11

## 2023-09-16 RX ADMIN — ENOXAPARIN SODIUM 30 MG: 100 INJECTION SUBCUTANEOUS at 08:03

## 2023-09-16 RX ADMIN — ARFORMOTEROL TARTRATE 15 MCG: 15 SOLUTION RESPIRATORY (INHALATION) at 06:19

## 2023-09-16 RX ADMIN — METOPROLOL TARTRATE 75 MG: 25 TABLET, FILM COATED ORAL at 08:04

## 2023-09-16 RX ADMIN — MAGNESIUM SULFATE HEPTAHYDRATE 2000 MG: 2 INJECTION, SOLUTION INTRAVENOUS at 15:43

## 2023-09-16 RX ADMIN — HYDROCODONE BITARTRATE AND ACETAMINOPHEN 2 TABLET: 5; 325 TABLET ORAL at 10:08

## 2023-09-16 RX ADMIN — HYDROCODONE BITARTRATE AND ACETAMINOPHEN 1 TABLET: 5; 325 TABLET ORAL at 22:00

## 2023-09-16 RX ADMIN — LORAZEPAM 1 MG: 1 TABLET ORAL at 15:05

## 2023-09-16 RX ADMIN — PANTOPRAZOLE SODIUM 20 MG: 20 TABLET, DELAYED RELEASE ORAL at 06:12

## 2023-09-16 RX ADMIN — ISOSORBIDE DINITRATE 10 MG: 10 TABLET ORAL at 08:03

## 2023-09-16 RX ADMIN — BACITRACIN ZINC, NEOMYCIN SULFATE, POLYMYXIN B SULFATE: 3.5; 5000; 4 OINTMENT TOPICAL at 20:37

## 2023-09-16 RX ADMIN — FUROSEMIDE 40 MG: 40 TABLET ORAL at 08:04

## 2023-09-16 RX ADMIN — ISOSORBIDE DINITRATE 10 MG: 10 TABLET ORAL at 20:37

## 2023-09-16 RX ADMIN — HYDROCODONE BITARTRATE AND ACETAMINOPHEN 2 TABLET: 5; 325 TABLET ORAL at 18:45

## 2023-09-16 RX ADMIN — HYDROCODONE BITARTRATE AND ACETAMINOPHEN 1 TABLET: 5; 325 TABLET ORAL at 06:12

## 2023-09-16 RX ADMIN — IPRATROPIUM BROMIDE AND ALBUTEROL SULFATE 1 DOSE: .5; 2.5 SOLUTION RESPIRATORY (INHALATION) at 17:16

## 2023-09-16 RX ADMIN — METOPROLOL TARTRATE 75 MG: 25 TABLET, FILM COATED ORAL at 20:37

## 2023-09-16 RX ADMIN — LOSARTAN POTASSIUM 50 MG: 50 TABLET, FILM COATED ORAL at 08:04

## 2023-09-16 RX ADMIN — PREDNISONE 40 MG: 20 TABLET ORAL at 08:04

## 2023-09-16 RX ADMIN — LORAZEPAM 1 MG: 1 TABLET ORAL at 23:05

## 2023-09-16 RX ADMIN — IPRATROPIUM BROMIDE AND ALBUTEROL SULFATE 1 DOSE: .5; 2.5 SOLUTION RESPIRATORY (INHALATION) at 06:19

## 2023-09-16 RX ADMIN — ARFORMOTEROL TARTRATE 15 MCG: 15 SOLUTION RESPIRATORY (INHALATION) at 17:16

## 2023-09-16 RX ADMIN — GUAIFENESIN 600 MG: 600 TABLET, EXTENDED RELEASE ORAL at 08:04

## 2023-09-16 RX ADMIN — LORAZEPAM 3 MG: 1 TABLET ORAL at 06:12

## 2023-09-16 RX ADMIN — GUAIFENESIN 600 MG: 600 TABLET, EXTENDED RELEASE ORAL at 20:37

## 2023-09-16 RX ADMIN — ACETAMINOPHEN 650 MG: 325 TABLET ORAL at 18:13

## 2023-09-16 RX ADMIN — IPRATROPIUM BROMIDE AND ALBUTEROL SULFATE 1 DOSE: .5; 2.5 SOLUTION RESPIRATORY (INHALATION) at 14:36

## 2023-09-16 RX ADMIN — LORAZEPAM 2 MG: 1 TABLET ORAL at 02:24

## 2023-09-16 ASSESSMENT — PAIN DESCRIPTION - DESCRIPTORS
DESCRIPTORS: DISCOMFORT;ACHING;SORE
DESCRIPTORS: ACHING
DESCRIPTORS: ACHING;DISCOMFORT
DESCRIPTORS: ACHING;SORE
DESCRIPTORS: DISCOMFORT

## 2023-09-16 ASSESSMENT — PAIN DESCRIPTION - ORIENTATION: ORIENTATION: LEFT

## 2023-09-16 ASSESSMENT — PAIN DESCRIPTION - LOCATION
LOCATION: HEAD
LOCATION: HEAD
LOCATION: BACK;NECK
LOCATION: HEAD;BACK
LOCATION: BACK;HEAD;NECK
LOCATION: HIP

## 2023-09-16 ASSESSMENT — PAIN SCALES - GENERAL
PAINLEVEL_OUTOF10: 7
PAINLEVEL_OUTOF10: 9
PAINLEVEL_OUTOF10: 9
PAINLEVEL_OUTOF10: 8
PAINLEVEL_OUTOF10: 9
PAINLEVEL_OUTOF10: 6

## 2023-09-16 ASSESSMENT — PAIN - FUNCTIONAL ASSESSMENT
PAIN_FUNCTIONAL_ASSESSMENT: PREVENTS OR INTERFERES SOME ACTIVE ACTIVITIES AND ADLS

## 2023-09-16 NOTE — PLAN OF CARE
Problem: Discharge Planning  Goal: Discharge to home or other facility with appropriate resources  9/16/2023 1028 by Mona Pendleton RN  Outcome: Progressing     Problem: Pain  Goal: Verbalizes/displays adequate comfort level or baseline comfort level  9/16/2023 1028 by Mona Pendleton RN  Outcome: Progressing     Problem: Safety - Adult  Goal: Free from fall injury  9/16/2023 1028 by Mona Pendleton RN  Outcome: Progressing     Problem: Respiratory - Adult  Goal: Achieves optimal ventilation and oxygenation  Outcome: Progressing

## 2023-09-17 ENCOUNTER — APPOINTMENT (OUTPATIENT)
Dept: GENERAL RADIOLOGY | Age: 58
DRG: 191 | End: 2023-09-17
Payer: MEDICARE

## 2023-09-17 PROBLEM — S00.03XA SCALP HEMATOMA: Status: ACTIVE | Noted: 2023-09-17

## 2023-09-17 PROBLEM — S80.12XD LEG HEMATOMA, LEFT, SUBSEQUENT ENCOUNTER: Status: ACTIVE | Noted: 2023-09-17

## 2023-09-17 LAB
MAGNESIUM SERPL-MCNC: 1.7 MG/DL (ref 1.6–2.6)
PHOSPHATE SERPL-MCNC: 1.7 MG/DL (ref 2.5–4.5)

## 2023-09-17 PROCEDURE — 6370000000 HC RX 637 (ALT 250 FOR IP): Performed by: FAMILY MEDICINE

## 2023-09-17 PROCEDURE — 6370000000 HC RX 637 (ALT 250 FOR IP): Performed by: EMERGENCY MEDICINE

## 2023-09-17 PROCEDURE — 99233 SBSQ HOSP IP/OBS HIGH 50: CPT | Performed by: FAMILY MEDICINE

## 2023-09-17 PROCEDURE — 6360000002 HC RX W HCPCS: Performed by: FAMILY MEDICINE

## 2023-09-17 PROCEDURE — 94640 AIRWAY INHALATION TREATMENT: CPT

## 2023-09-17 PROCEDURE — 2580000003 HC RX 258: Performed by: FAMILY MEDICINE

## 2023-09-17 PROCEDURE — 73562 X-RAY EXAM OF KNEE 3: CPT

## 2023-09-17 PROCEDURE — 6370000000 HC RX 637 (ALT 250 FOR IP): Performed by: INTERNAL MEDICINE

## 2023-09-17 PROCEDURE — 2580000003 HC RX 258: Performed by: EMERGENCY MEDICINE

## 2023-09-17 PROCEDURE — 83735 ASSAY OF MAGNESIUM: CPT

## 2023-09-17 PROCEDURE — 84100 ASSAY OF PHOSPHORUS: CPT

## 2023-09-17 PROCEDURE — 73552 X-RAY EXAM OF FEMUR 2/>: CPT

## 2023-09-17 PROCEDURE — 36415 COLL VENOUS BLD VENIPUNCTURE: CPT

## 2023-09-17 PROCEDURE — 2060000000 HC ICU INTERMEDIATE R&B

## 2023-09-17 RX ORDER — MAGNESIUM SULFATE IN WATER 40 MG/ML
2000 INJECTION, SOLUTION INTRAVENOUS ONCE
Status: COMPLETED | OUTPATIENT
Start: 2023-09-17 | End: 2023-09-17

## 2023-09-17 RX ORDER — METHOCARBAMOL 500 MG/1
750 TABLET, FILM COATED ORAL 3 TIMES DAILY PRN
Status: DISCONTINUED | OUTPATIENT
Start: 2023-09-17 | End: 2023-09-20 | Stop reason: HOSPADM

## 2023-09-17 RX ORDER — IPRATROPIUM BROMIDE AND ALBUTEROL SULFATE 2.5; .5 MG/3ML; MG/3ML
1 SOLUTION RESPIRATORY (INHALATION)
Status: DISCONTINUED | OUTPATIENT
Start: 2023-09-17 | End: 2023-09-20 | Stop reason: HOSPADM

## 2023-09-17 RX ADMIN — BACITRACIN ZINC, NEOMYCIN SULFATE, POLYMYXIN B SULFATE: 3.5; 5000; 4 OINTMENT TOPICAL at 20:28

## 2023-09-17 RX ADMIN — FOLIC ACID 1 MG: 1 TABLET ORAL at 08:09

## 2023-09-17 RX ADMIN — IPRATROPIUM BROMIDE AND ALBUTEROL SULFATE 1 DOSE: .5; 2.5 SOLUTION RESPIRATORY (INHALATION) at 18:05

## 2023-09-17 RX ADMIN — Medication 500 MG: at 10:34

## 2023-09-17 RX ADMIN — IPRATROPIUM BROMIDE AND ALBUTEROL SULFATE 1 DOSE: .5; 2.5 SOLUTION RESPIRATORY (INHALATION) at 04:53

## 2023-09-17 RX ADMIN — SODIUM CHLORIDE, PRESERVATIVE FREE 10 ML: 5 INJECTION INTRAVENOUS at 20:32

## 2023-09-17 RX ADMIN — BACITRACIN ZINC, NEOMYCIN SULFATE, POLYMYXIN B SULFATE 1 G: 3.5; 5000; 4 OINTMENT TOPICAL at 08:09

## 2023-09-17 RX ADMIN — HYDROCODONE BITARTRATE AND ACETAMINOPHEN 2 TABLET: 5; 325 TABLET ORAL at 14:21

## 2023-09-17 RX ADMIN — LORAZEPAM 3 MG: 1 TABLET ORAL at 20:28

## 2023-09-17 RX ADMIN — METHOCARBAMOL 750 MG: 500 TABLET ORAL at 18:41

## 2023-09-17 RX ADMIN — ASPIRIN 81 MG CHEWABLE TABLET 81 MG: 81 TABLET CHEWABLE at 08:09

## 2023-09-17 RX ADMIN — HYDROCODONE BITARTRATE AND ACETAMINOPHEN 1 TABLET: 5; 325 TABLET ORAL at 04:42

## 2023-09-17 RX ADMIN — ENOXAPARIN SODIUM 30 MG: 100 INJECTION SUBCUTANEOUS at 20:28

## 2023-09-17 RX ADMIN — METOPROLOL TARTRATE 75 MG: 25 TABLET, FILM COATED ORAL at 20:28

## 2023-09-17 RX ADMIN — HYDROCODONE BITARTRATE AND ACETAMINOPHEN 2 TABLET: 5; 325 TABLET ORAL at 10:34

## 2023-09-17 RX ADMIN — PREDNISONE 40 MG: 20 TABLET ORAL at 08:13

## 2023-09-17 RX ADMIN — PANTOPRAZOLE SODIUM 20 MG: 20 TABLET, DELAYED RELEASE ORAL at 06:13

## 2023-09-17 RX ADMIN — ARFORMOTEROL TARTRATE 15 MCG: 15 SOLUTION RESPIRATORY (INHALATION) at 04:53

## 2023-09-17 RX ADMIN — LORAZEPAM 2 MG: 1 TABLET ORAL at 14:54

## 2023-09-17 RX ADMIN — IPRATROPIUM BROMIDE AND ALBUTEROL SULFATE 1 DOSE: .5; 2.5 SOLUTION RESPIRATORY (INHALATION) at 09:49

## 2023-09-17 RX ADMIN — FUROSEMIDE 40 MG: 40 TABLET ORAL at 08:09

## 2023-09-17 RX ADMIN — LOSARTAN POTASSIUM 50 MG: 50 TABLET, FILM COATED ORAL at 08:09

## 2023-09-17 RX ADMIN — LORAZEPAM 1 MG: 1 TABLET ORAL at 04:42

## 2023-09-17 RX ADMIN — BUDESONIDE INHALATION 500 MCG: 0.5 SUSPENSION RESPIRATORY (INHALATION) at 04:53

## 2023-09-17 RX ADMIN — ISOSORBIDE DINITRATE 10 MG: 10 TABLET ORAL at 08:09

## 2023-09-17 RX ADMIN — LORAZEPAM 1 MG: 1 TABLET ORAL at 12:37

## 2023-09-17 RX ADMIN — LORAZEPAM 2 MG: 1 TABLET ORAL at 08:37

## 2023-09-17 RX ADMIN — HYDROCODONE BITARTRATE AND ACETAMINOPHEN 2 TABLET: 5; 325 TABLET ORAL at 20:29

## 2023-09-17 RX ADMIN — LORAZEPAM 3 MG: 1 TABLET ORAL at 18:07

## 2023-09-17 RX ADMIN — SODIUM CHLORIDE, PRESERVATIVE FREE 10 ML: 5 INJECTION INTRAVENOUS at 08:10

## 2023-09-17 RX ADMIN — MAGNESIUM SULFATE HEPTAHYDRATE 2000 MG: 2 INJECTION, SOLUTION INTRAVENOUS at 10:39

## 2023-09-17 RX ADMIN — METOPROLOL TARTRATE 75 MG: 25 TABLET, FILM COATED ORAL at 08:09

## 2023-09-17 RX ADMIN — IPRATROPIUM BROMIDE AND ALBUTEROL SULFATE 1 DOSE: .5; 2.5 SOLUTION RESPIRATORY (INHALATION) at 13:42

## 2023-09-17 RX ADMIN — GUAIFENESIN 600 MG: 600 TABLET, EXTENDED RELEASE ORAL at 20:29

## 2023-09-17 RX ADMIN — THIAMINE HYDROCHLORIDE 100 MG: 100 INJECTION, SOLUTION INTRAMUSCULAR; INTRAVENOUS at 08:13

## 2023-09-17 RX ADMIN — MULTIPLE VITAMINS W/ MINERALS TAB 1 TABLET: TAB at 08:13

## 2023-09-17 RX ADMIN — LEVOFLOXACIN 500 MG: 500 TABLET, FILM COATED ORAL at 22:23

## 2023-09-17 RX ADMIN — SODIUM CHLORIDE, PRESERVATIVE FREE 10 ML: 5 INJECTION INTRAVENOUS at 08:13

## 2023-09-17 RX ADMIN — ESCITALOPRAM OXALATE 10 MG: 10 TABLET ORAL at 08:09

## 2023-09-17 RX ADMIN — ISOSORBIDE DINITRATE 10 MG: 10 TABLET ORAL at 20:29

## 2023-09-17 RX ADMIN — GUAIFENESIN 600 MG: 600 TABLET, EXTENDED RELEASE ORAL at 08:09

## 2023-09-17 RX ADMIN — BENZONATATE 100 MG: 100 CAPSULE ORAL at 20:28

## 2023-09-17 RX ADMIN — LORAZEPAM 3 MG: 1 TABLET ORAL at 22:23

## 2023-09-17 RX ADMIN — ENOXAPARIN SODIUM 30 MG: 100 INJECTION SUBCUTANEOUS at 08:09

## 2023-09-17 RX ADMIN — THIAMINE HYDROCHLORIDE 100 MG: 100 INJECTION, SOLUTION INTRAMUSCULAR; INTRAVENOUS at 20:29

## 2023-09-17 RX ADMIN — BENZONATATE 100 MG: 100 CAPSULE ORAL at 08:09

## 2023-09-17 ASSESSMENT — PAIN - FUNCTIONAL ASSESSMENT
PAIN_FUNCTIONAL_ASSESSMENT: PREVENTS OR INTERFERES SOME ACTIVE ACTIVITIES AND ADLS

## 2023-09-17 ASSESSMENT — PAIN DESCRIPTION - LOCATION
LOCATION: HEAD
LOCATION: HIP
LOCATION: HIP;LEG;HEAD
LOCATION: BACK;HIP

## 2023-09-17 ASSESSMENT — PAIN DESCRIPTION - DESCRIPTORS
DESCRIPTORS: DISCOMFORT
DESCRIPTORS: ACHING;SORE
DESCRIPTORS: ACHING;SORE;DISCOMFORT
DESCRIPTORS: DISCOMFORT;ACHING

## 2023-09-17 ASSESSMENT — PAIN SCALES - GENERAL
PAINLEVEL_OUTOF10: 10

## 2023-09-17 NOTE — PLAN OF CARE
Problem: Discharge Planning  Goal: Discharge to home or other facility with appropriate resources  9/17/2023 0958 by Malcolm Lainez RN  Outcome: Progressing     Problem: Pain  Goal: Verbalizes/displays adequate comfort level or baseline comfort level  9/17/2023 0958 by Malcolm Lainez RN  Outcome: Progressing     Problem: Safety - Adult  Goal: Free from fall injury  9/17/2023 0958 by Malcolm Lainez RN  Outcome: Progressing     Problem: Respiratory - Adult  Goal: Achieves optimal ventilation and oxygenation  9/17/2023 0958 by Malcolm Lainez RN  Outcome: Progressing

## 2023-09-18 LAB
ANION GAP SERPL CALCULATED.3IONS-SCNC: 11 MMOL/L (ref 7–16)
BUN SERPL-MCNC: 13 MG/DL (ref 6–20)
CALCIUM SERPL-MCNC: 8.9 MG/DL (ref 8.6–10.2)
CHLORIDE SERPL-SCNC: 96 MMOL/L (ref 98–107)
CO2 SERPL-SCNC: 25 MMOL/L (ref 22–29)
CREAT SERPL-MCNC: 0.8 MG/DL (ref 0.7–1.2)
GFR SERPL CREATININE-BSD FRML MDRD: >60 ML/MIN/1.73M2
GLUCOSE SERPL-MCNC: 116 MG/DL (ref 74–99)
MAGNESIUM SERPL-MCNC: 2 MG/DL (ref 1.6–2.6)
PHOSPHATE SERPL-MCNC: 2.3 MG/DL (ref 2.5–4.5)
POTASSIUM SERPL-SCNC: 3.4 MMOL/L (ref 3.5–5)
SODIUM SERPL-SCNC: 132 MMOL/L (ref 132–146)

## 2023-09-18 PROCEDURE — 6370000000 HC RX 637 (ALT 250 FOR IP): Performed by: EMERGENCY MEDICINE

## 2023-09-18 PROCEDURE — 6360000002 HC RX W HCPCS: Performed by: EMERGENCY MEDICINE

## 2023-09-18 PROCEDURE — 94640 AIRWAY INHALATION TREATMENT: CPT

## 2023-09-18 PROCEDURE — 6360000002 HC RX W HCPCS: Performed by: FAMILY MEDICINE

## 2023-09-18 PROCEDURE — 80048 BASIC METABOLIC PNL TOTAL CA: CPT

## 2023-09-18 PROCEDURE — 97161 PT EVAL LOW COMPLEX 20 MIN: CPT

## 2023-09-18 PROCEDURE — 2580000003 HC RX 258: Performed by: EMERGENCY MEDICINE

## 2023-09-18 PROCEDURE — 97110 THERAPEUTIC EXERCISES: CPT

## 2023-09-18 PROCEDURE — 2060000000 HC ICU INTERMEDIATE R&B

## 2023-09-18 PROCEDURE — 36415 COLL VENOUS BLD VENIPUNCTURE: CPT

## 2023-09-18 PROCEDURE — 2500000003 HC RX 250 WO HCPCS: Performed by: FAMILY MEDICINE

## 2023-09-18 PROCEDURE — 6370000000 HC RX 637 (ALT 250 FOR IP): Performed by: FAMILY MEDICINE

## 2023-09-18 PROCEDURE — 99232 SBSQ HOSP IP/OBS MODERATE 35: CPT | Performed by: INTERNAL MEDICINE

## 2023-09-18 PROCEDURE — 84100 ASSAY OF PHOSPHORUS: CPT

## 2023-09-18 PROCEDURE — 6370000000 HC RX 637 (ALT 250 FOR IP): Performed by: INTERNAL MEDICINE

## 2023-09-18 PROCEDURE — 2580000003 HC RX 258: Performed by: FAMILY MEDICINE

## 2023-09-18 PROCEDURE — 83735 ASSAY OF MAGNESIUM: CPT

## 2023-09-18 PROCEDURE — 6360000002 HC RX W HCPCS: Performed by: INTERNAL MEDICINE

## 2023-09-18 PROCEDURE — 97165 OT EVAL LOW COMPLEX 30 MIN: CPT

## 2023-09-18 RX ORDER — PHENOBARBITAL SODIUM 65 MG/ML
32.5 INJECTION INTRAMUSCULAR 4 TIMES DAILY
Status: COMPLETED | OUTPATIENT
Start: 2023-09-18 | End: 2023-09-19

## 2023-09-18 RX ORDER — PHENOBARBITAL SODIUM 65 MG/ML
16.2 INJECTION INTRAMUSCULAR 2 TIMES DAILY
Status: DISCONTINUED | OUTPATIENT
Start: 2023-09-20 | End: 2023-09-20 | Stop reason: HOSPADM

## 2023-09-18 RX ORDER — PHENOBARBITAL SODIUM 65 MG/ML
130 INJECTION INTRAMUSCULAR ONCE
Status: COMPLETED | OUTPATIENT
Start: 2023-09-18 | End: 2023-09-18

## 2023-09-18 RX ORDER — PHENOBARBITAL SODIUM 65 MG/ML
32.5 INJECTION INTRAMUSCULAR 2 TIMES DAILY
Status: COMPLETED | OUTPATIENT
Start: 2023-09-19 | End: 2023-09-20

## 2023-09-18 RX ORDER — NICOTINE 21 MG/24HR
1 PATCH, TRANSDERMAL 24 HOURS TRANSDERMAL DAILY
Status: DISCONTINUED | OUTPATIENT
Start: 2023-09-18 | End: 2023-09-20 | Stop reason: HOSPADM

## 2023-09-18 RX ORDER — PHENOBARBITAL SODIUM 65 MG/ML
32.5 INJECTION INTRAMUSCULAR EVERY 6 HOURS PRN
Status: DISCONTINUED | OUTPATIENT
Start: 2023-09-18 | End: 2023-09-20 | Stop reason: HOSPADM

## 2023-09-18 RX ORDER — CLONIDINE HYDROCHLORIDE 0.1 MG/1
0.1 TABLET ORAL 3 TIMES DAILY
Status: DISCONTINUED | OUTPATIENT
Start: 2023-09-18 | End: 2023-09-20 | Stop reason: HOSPADM

## 2023-09-18 RX ORDER — PHENOBARBITAL SODIUM 65 MG/ML
16.2 INJECTION INTRAMUSCULAR EVERY 6 HOURS PRN
Status: DISCONTINUED | OUTPATIENT
Start: 2023-09-20 | End: 2023-09-20 | Stop reason: HOSPADM

## 2023-09-18 RX ADMIN — PANTOPRAZOLE SODIUM 20 MG: 20 TABLET, DELAYED RELEASE ORAL at 10:48

## 2023-09-18 RX ADMIN — BACITRACIN ZINC, NEOMYCIN SULFATE, POLYMYXIN B SULFATE 1 G: 3.5; 5000; 4 OINTMENT TOPICAL at 07:23

## 2023-09-18 RX ADMIN — POTASSIUM PHOSPHATE, MONOBASIC POTASSIUM PHOSPHATE, DIBASIC 20 MMOL: 224; 236 INJECTION, SOLUTION, CONCENTRATE INTRAVENOUS at 07:57

## 2023-09-18 RX ADMIN — SODIUM CHLORIDE, PRESERVATIVE FREE 10 ML: 5 INJECTION INTRAVENOUS at 20:09

## 2023-09-18 RX ADMIN — HYDROCODONE BITARTRATE AND ACETAMINOPHEN 2 TABLET: 5; 325 TABLET ORAL at 20:06

## 2023-09-18 RX ADMIN — IPRATROPIUM BROMIDE AND ALBUTEROL SULFATE 1 DOSE: .5; 2.5 SOLUTION RESPIRATORY (INHALATION) at 09:11

## 2023-09-18 RX ADMIN — GUAIFENESIN 600 MG: 600 TABLET, EXTENDED RELEASE ORAL at 20:06

## 2023-09-18 RX ADMIN — LORAZEPAM 4 MG: 2 INJECTION INTRAMUSCULAR; INTRAVENOUS at 00:20

## 2023-09-18 RX ADMIN — PHENOBARBITAL SODIUM 32.5 MG: 65 INJECTION INTRAMUSCULAR; INTRAVENOUS at 20:07

## 2023-09-18 RX ADMIN — FOLIC ACID 1 MG: 1 TABLET ORAL at 07:23

## 2023-09-18 RX ADMIN — ESCITALOPRAM OXALATE 10 MG: 10 TABLET ORAL at 07:24

## 2023-09-18 RX ADMIN — SODIUM CHLORIDE, PRESERVATIVE FREE 10 ML: 5 INJECTION INTRAVENOUS at 20:10

## 2023-09-18 RX ADMIN — PHENOBARBITAL SODIUM 130 MG: 65 INJECTION INTRAMUSCULAR; INTRAVENOUS at 06:05

## 2023-09-18 RX ADMIN — ISOSORBIDE DINITRATE 10 MG: 10 TABLET ORAL at 20:10

## 2023-09-18 RX ADMIN — PHENOBARBITAL SODIUM 32.5 MG: 65 INJECTION INTRAMUSCULAR; INTRAVENOUS at 16:36

## 2023-09-18 RX ADMIN — FUROSEMIDE 40 MG: 40 TABLET ORAL at 07:23

## 2023-09-18 RX ADMIN — ENOXAPARIN SODIUM 30 MG: 100 INJECTION SUBCUTANEOUS at 07:22

## 2023-09-18 RX ADMIN — CLONIDINE HYDROCHLORIDE 0.1 MG: 0.1 TABLET ORAL at 20:06

## 2023-09-18 RX ADMIN — MULTIPLE VITAMINS W/ MINERALS TAB 1 TABLET: TAB at 07:53

## 2023-09-18 RX ADMIN — THIAMINE HYDROCHLORIDE 100 MG: 100 INJECTION, SOLUTION INTRAMUSCULAR; INTRAVENOUS at 07:23

## 2023-09-18 RX ADMIN — ONDANSETRON 4 MG: 2 INJECTION INTRAMUSCULAR; INTRAVENOUS at 16:34

## 2023-09-18 RX ADMIN — THIAMINE HYDROCHLORIDE 100 MG: 100 INJECTION, SOLUTION INTRAMUSCULAR; INTRAVENOUS at 20:07

## 2023-09-18 RX ADMIN — CLONIDINE HYDROCHLORIDE 0.1 MG: 0.1 TABLET ORAL at 07:22

## 2023-09-18 RX ADMIN — PREDNISONE 40 MG: 20 TABLET ORAL at 07:23

## 2023-09-18 RX ADMIN — LOSARTAN POTASSIUM 50 MG: 50 TABLET, FILM COATED ORAL at 07:53

## 2023-09-18 RX ADMIN — BACITRACIN ZINC, NEOMYCIN SULFATE, POLYMYXIN B SULFATE 1 G: 3.5; 5000; 4 OINTMENT TOPICAL at 20:06

## 2023-09-18 RX ADMIN — LORAZEPAM 4 MG: 2 INJECTION INTRAMUSCULAR; INTRAVENOUS at 01:43

## 2023-09-18 RX ADMIN — HYDROCODONE BITARTRATE AND ACETAMINOPHEN 2 TABLET: 5; 325 TABLET ORAL at 00:19

## 2023-09-18 RX ADMIN — LORAZEPAM 4 MG: 1 TABLET ORAL at 11:12

## 2023-09-18 RX ADMIN — BENZONATATE 100 MG: 100 CAPSULE ORAL at 07:23

## 2023-09-18 RX ADMIN — LORAZEPAM 4 MG: 2 INJECTION INTRAMUSCULAR; INTRAVENOUS at 02:36

## 2023-09-18 RX ADMIN — METOPROLOL TARTRATE 75 MG: 25 TABLET, FILM COATED ORAL at 07:23

## 2023-09-18 RX ADMIN — LORAZEPAM 4 MG: 1 TABLET ORAL at 07:22

## 2023-09-18 RX ADMIN — ACETAMINOPHEN 650 MG: 325 TABLET ORAL at 18:44

## 2023-09-18 RX ADMIN — LORAZEPAM 2 MG: 1 TABLET ORAL at 18:43

## 2023-09-18 RX ADMIN — ASPIRIN 81 MG CHEWABLE TABLET 81 MG: 81 TABLET CHEWABLE at 07:22

## 2023-09-18 RX ADMIN — PHENOBARBITAL SODIUM 130 MG: 65 INJECTION INTRAMUSCULAR; INTRAVENOUS at 04:28

## 2023-09-18 RX ADMIN — ISOSORBIDE DINITRATE 10 MG: 10 TABLET ORAL at 07:22

## 2023-09-18 RX ADMIN — LORAZEPAM 3 MG: 1 TABLET ORAL at 15:28

## 2023-09-18 RX ADMIN — CLONIDINE HYDROCHLORIDE 0.1 MG: 0.1 TABLET ORAL at 13:45

## 2023-09-18 RX ADMIN — LORAZEPAM 4 MG: 2 INJECTION INTRAMUSCULAR; INTRAVENOUS at 03:50

## 2023-09-18 RX ADMIN — PHENOBARBITAL SODIUM 32.5 MG: 65 INJECTION INTRAMUSCULAR; INTRAVENOUS at 13:05

## 2023-09-18 RX ADMIN — GUAIFENESIN 600 MG: 600 TABLET, EXTENDED RELEASE ORAL at 07:23

## 2023-09-18 RX ADMIN — METOPROLOL TARTRATE 75 MG: 25 TABLET, FILM COATED ORAL at 20:07

## 2023-09-18 RX ADMIN — ENOXAPARIN SODIUM 30 MG: 100 INJECTION SUBCUTANEOUS at 20:09

## 2023-09-18 RX ADMIN — LORAZEPAM 2 MG: 1 TABLET ORAL at 20:05

## 2023-09-18 RX ADMIN — HYDROCODONE BITARTRATE AND ACETAMINOPHEN 2 TABLET: 5; 325 TABLET ORAL at 10:47

## 2023-09-18 RX ADMIN — SODIUM CHLORIDE, PRESERVATIVE FREE 10 ML: 5 INJECTION INTRAVENOUS at 07:24

## 2023-09-18 ASSESSMENT — PAIN SCALES - GENERAL
PAINLEVEL_OUTOF10: 8
PAINLEVEL_OUTOF10: 10
PAINLEVEL_OUTOF10: 9
PAINLEVEL_OUTOF10: 10
PAINLEVEL_OUTOF10: 8
PAINLEVEL_OUTOF10: 8
PAINLEVEL_OUTOF10: 5

## 2023-09-18 ASSESSMENT — PAIN DESCRIPTION - LOCATION
LOCATION: BACK;HEAD
LOCATION: BACK;HEAD
LOCATION: BACK
LOCATION: BACK;HIP
LOCATION: HEAD;BACK
LOCATION: BACK

## 2023-09-18 ASSESSMENT — PAIN DESCRIPTION - DESCRIPTORS
DESCRIPTORS: ACHING
DESCRIPTORS: ACHING
DESCRIPTORS: ACHING;DISCOMFORT
DESCRIPTORS: ACHING
DESCRIPTORS: ACHING;DISCOMFORT

## 2023-09-18 ASSESSMENT — PAIN DESCRIPTION - ORIENTATION
ORIENTATION: RIGHT;LEFT
ORIENTATION: MID

## 2023-09-18 NOTE — PLAN OF CARE
Problem: Discharge Planning  Goal: Discharge to home or other facility with appropriate resources  9/18/2023 0950 by Jessenia Saunders RN  Outcome: Progressing     Problem: Pain  Goal: Verbalizes/displays adequate comfort level or baseline comfort level  9/18/2023 0950 by Jessenia Saunders RN  Outcome: Progressing     Problem: Safety - Adult  Goal: Free from fall injury  9/18/2023 0950 by Jessenia Saunders RN  Outcome: Progressing     Problem: Respiratory - Adult  Goal: Achieves optimal ventilation and oxygenation  9/18/2023 0950 by Jessenia Saunders RN  Outcome: Progressing

## 2023-09-18 NOTE — CARE COORDINATION
9/18/2023 1330 CM Note:  pt is confused, hallucinating has a 1:1 sitter and on the CIWA scale. Per Dr Leta Disla she would like him to go to inpt rehab but d/t his insurance certain programs won't be an option. He would have to go to a SNF that does both physical therapy rehab and alcohol too. Referral was made to [facility], she said he will have to be sitter free, detoxed and oriented. Mayco Ledbetter will follow-up tomorrow to see if his mental status is improved because he has consent to going. Will NEED HENS, signed EBONY and PRECERT. CM will follow.  Electronically signed by Rosio Blanco RN on 9/18/2023 at 1:53 PM

## 2023-09-19 LAB
ALBUMIN SERPL-MCNC: 3.7 G/DL (ref 3.5–5.2)
ALBUMIN SERPL-MCNC: 3.9 G/DL (ref 3.5–5.2)
ALP SERPL-CCNC: 123 U/L (ref 40–129)
ALP SERPL-CCNC: 125 U/L (ref 40–129)
ALT SERPL-CCNC: 128 U/L (ref 0–40)
ALT SERPL-CCNC: 142 U/L (ref 0–40)
ANION GAP SERPL CALCULATED.3IONS-SCNC: 9 MMOL/L (ref 7–16)
AST SERPL-CCNC: 148 U/L (ref 0–39)
AST SERPL-CCNC: 156 U/L (ref 0–39)
BASOPHILS # BLD: 0 K/UL (ref 0–0.2)
BASOPHILS NFR BLD: 0 % (ref 0–2)
BILIRUB DIRECT SERPL-MCNC: <0.2 MG/DL (ref 0–0.3)
BILIRUB INDIRECT SERPL-MCNC: ABNORMAL MG/DL (ref 0–1)
BILIRUB SERPL-MCNC: 0.5 MG/DL (ref 0–1.2)
BILIRUB SERPL-MCNC: 0.6 MG/DL (ref 0–1.2)
BUN SERPL-MCNC: 14 MG/DL (ref 6–20)
CALCIUM SERPL-MCNC: 8.8 MG/DL (ref 8.6–10.2)
CHLORIDE SERPL-SCNC: 100 MMOL/L (ref 98–107)
CO2 SERPL-SCNC: 25 MMOL/L (ref 22–29)
CREAT SERPL-MCNC: 0.8 MG/DL (ref 0.7–1.2)
EOSINOPHIL # BLD: 0 K/UL (ref 0.05–0.5)
EOSINOPHILS RELATIVE PERCENT: 0 % (ref 0–6)
ERYTHROCYTE [DISTWIDTH] IN BLOOD BY AUTOMATED COUNT: 15.9 % (ref 11.5–15)
GFR SERPL CREATININE-BSD FRML MDRD: >60 ML/MIN/1.73M2
GLUCOSE SERPL-MCNC: 104 MG/DL (ref 74–99)
HCT VFR BLD AUTO: 29.9 % (ref 37–54)
HGB BLD-MCNC: 9.3 G/DL (ref 12.5–16.5)
LYMPHOCYTES NFR BLD: 0.84 K/UL (ref 1.5–4)
LYMPHOCYTES RELATIVE PERCENT: 13 % (ref 20–42)
MCH RBC QN AUTO: 29.9 PG (ref 26–35)
MCHC RBC AUTO-ENTMCNC: 31.1 G/DL (ref 32–34.5)
MCV RBC AUTO: 96.1 FL (ref 80–99.9)
MONOCYTES NFR BLD: 0.11 K/UL (ref 0.1–0.95)
MONOCYTES NFR BLD: 2 % (ref 2–12)
MYELOCYTES ABSOLUTE COUNT: 0.06 K/UL
MYELOCYTES: 1 %
NEUTROPHILS NFR BLD: 84 % (ref 43–80)
NEUTS SEG NFR BLD: 5.39 K/UL (ref 1.8–7.3)
PLATELET # BLD AUTO: 147 K/UL (ref 130–450)
PMV BLD AUTO: 10.9 FL (ref 7–12)
POTASSIUM SERPL-SCNC: 3.6 MMOL/L (ref 3.5–5)
PROT SERPL-MCNC: 6.3 G/DL (ref 6.4–8.3)
PROT SERPL-MCNC: 6.5 G/DL (ref 6.4–8.3)
RBC # BLD AUTO: 3.11 M/UL (ref 3.8–5.8)
RBC # BLD: ABNORMAL 10*6/UL
RBC # BLD: ABNORMAL 10*6/UL
SODIUM SERPL-SCNC: 134 MMOL/L (ref 132–146)
WBC OTHER # BLD: 6.4 K/UL (ref 4.5–11.5)

## 2023-09-19 PROCEDURE — 97110 THERAPEUTIC EXERCISES: CPT

## 2023-09-19 PROCEDURE — 99232 SBSQ HOSP IP/OBS MODERATE 35: CPT | Performed by: INTERNAL MEDICINE

## 2023-09-19 PROCEDURE — 6370000000 HC RX 637 (ALT 250 FOR IP): Performed by: FAMILY MEDICINE

## 2023-09-19 PROCEDURE — 2060000000 HC ICU INTERMEDIATE R&B

## 2023-09-19 PROCEDURE — 36415 COLL VENOUS BLD VENIPUNCTURE: CPT

## 2023-09-19 PROCEDURE — 80076 HEPATIC FUNCTION PANEL: CPT

## 2023-09-19 PROCEDURE — 2580000003 HC RX 258: Performed by: FAMILY MEDICINE

## 2023-09-19 PROCEDURE — 6360000002 HC RX W HCPCS: Performed by: INTERNAL MEDICINE

## 2023-09-19 PROCEDURE — 6360000002 HC RX W HCPCS: Performed by: FAMILY MEDICINE

## 2023-09-19 PROCEDURE — 6370000000 HC RX 637 (ALT 250 FOR IP): Performed by: INTERNAL MEDICINE

## 2023-09-19 PROCEDURE — 76937 US GUIDE VASCULAR ACCESS: CPT

## 2023-09-19 PROCEDURE — 80053 COMPREHEN METABOLIC PANEL: CPT

## 2023-09-19 PROCEDURE — 85025 COMPLETE CBC W/AUTO DIFF WBC: CPT

## 2023-09-19 PROCEDURE — 97530 THERAPEUTIC ACTIVITIES: CPT

## 2023-09-19 PROCEDURE — 94640 AIRWAY INHALATION TREATMENT: CPT

## 2023-09-19 PROCEDURE — 6370000000 HC RX 637 (ALT 250 FOR IP): Performed by: EMERGENCY MEDICINE

## 2023-09-19 RX ADMIN — LORAZEPAM 2 MG: 1 TABLET ORAL at 06:17

## 2023-09-19 RX ADMIN — IPRATROPIUM BROMIDE AND ALBUTEROL SULFATE 1 DOSE: .5; 2.5 SOLUTION RESPIRATORY (INHALATION) at 22:20

## 2023-09-19 RX ADMIN — ESCITALOPRAM OXALATE 10 MG: 10 TABLET ORAL at 09:10

## 2023-09-19 RX ADMIN — SODIUM CHLORIDE, PRESERVATIVE FREE 10 ML: 5 INJECTION INTRAVENOUS at 19:58

## 2023-09-19 RX ADMIN — METOPROLOL TARTRATE 75 MG: 25 TABLET, FILM COATED ORAL at 09:11

## 2023-09-19 RX ADMIN — CLONIDINE HYDROCHLORIDE 0.1 MG: 0.1 TABLET ORAL at 20:02

## 2023-09-19 RX ADMIN — FOLIC ACID 1 MG: 1 TABLET ORAL at 09:10

## 2023-09-19 RX ADMIN — SODIUM CHLORIDE, PRESERVATIVE FREE 10 ML: 5 INJECTION INTRAVENOUS at 10:12

## 2023-09-19 RX ADMIN — THIAMINE HYDROCHLORIDE 100 MG: 100 INJECTION, SOLUTION INTRAMUSCULAR; INTRAVENOUS at 09:12

## 2023-09-19 RX ADMIN — ISOSORBIDE DINITRATE 10 MG: 10 TABLET ORAL at 09:11

## 2023-09-19 RX ADMIN — LOSARTAN POTASSIUM 50 MG: 50 TABLET, FILM COATED ORAL at 09:12

## 2023-09-19 RX ADMIN — THIAMINE HYDROCHLORIDE 100 MG: 100 INJECTION, SOLUTION INTRAMUSCULAR; INTRAVENOUS at 19:56

## 2023-09-19 RX ADMIN — IPRATROPIUM BROMIDE AND ALBUTEROL SULFATE 1 DOSE: .5; 2.5 SOLUTION RESPIRATORY (INHALATION) at 05:17

## 2023-09-19 RX ADMIN — HYDROCODONE BITARTRATE AND ACETAMINOPHEN 2 TABLET: 5; 325 TABLET ORAL at 19:57

## 2023-09-19 RX ADMIN — LORAZEPAM 3 MG: 1 TABLET ORAL at 02:28

## 2023-09-19 RX ADMIN — BACITRACIN ZINC, NEOMYCIN SULFATE, POLYMYXIN B SULFATE 1 G: 3.5; 5000; 4 OINTMENT TOPICAL at 09:13

## 2023-09-19 RX ADMIN — ENOXAPARIN SODIUM 30 MG: 100 INJECTION SUBCUTANEOUS at 19:56

## 2023-09-19 RX ADMIN — HYDROCODONE BITARTRATE AND ACETAMINOPHEN 1 TABLET: 5; 325 TABLET ORAL at 13:56

## 2023-09-19 RX ADMIN — BACITRACIN ZINC, NEOMYCIN SULFATE, POLYMYXIN B SULFATE 1 G: 3.5; 5000; 4 OINTMENT TOPICAL at 19:57

## 2023-09-19 RX ADMIN — MULTIPLE VITAMINS W/ MINERALS TAB 1 TABLET: TAB at 09:10

## 2023-09-19 RX ADMIN — PHENOBARBITAL SODIUM 32.5 MG: 65 INJECTION INTRAMUSCULAR; INTRAVENOUS at 09:12

## 2023-09-19 RX ADMIN — ENOXAPARIN SODIUM 30 MG: 100 INJECTION SUBCUTANEOUS at 09:11

## 2023-09-19 RX ADMIN — ASPIRIN 81 MG CHEWABLE TABLET 81 MG: 81 TABLET CHEWABLE at 09:09

## 2023-09-19 RX ADMIN — SODIUM CHLORIDE, PRESERVATIVE FREE 10 ML: 5 INJECTION INTRAVENOUS at 09:13

## 2023-09-19 RX ADMIN — METOPROLOL TARTRATE 75 MG: 25 TABLET, FILM COATED ORAL at 19:57

## 2023-09-19 RX ADMIN — GUAIFENESIN 600 MG: 600 TABLET, EXTENDED RELEASE ORAL at 19:57

## 2023-09-19 RX ADMIN — HYDROCODONE BITARTRATE AND ACETAMINOPHEN 2 TABLET: 5; 325 TABLET ORAL at 23:48

## 2023-09-19 RX ADMIN — IPRATROPIUM BROMIDE AND ALBUTEROL SULFATE 1 DOSE: .5; 2.5 SOLUTION RESPIRATORY (INHALATION) at 09:33

## 2023-09-19 RX ADMIN — GUAIFENESIN 600 MG: 600 TABLET, EXTENDED RELEASE ORAL at 09:12

## 2023-09-19 RX ADMIN — CLONIDINE HYDROCHLORIDE 0.1 MG: 0.1 TABLET ORAL at 14:13

## 2023-09-19 RX ADMIN — PANTOPRAZOLE SODIUM 20 MG: 20 TABLET, DELAYED RELEASE ORAL at 06:17

## 2023-09-19 RX ADMIN — IPRATROPIUM BROMIDE AND ALBUTEROL SULFATE 1 DOSE: .5; 2.5 SOLUTION RESPIRATORY (INHALATION) at 15:54

## 2023-09-19 RX ADMIN — ISOSORBIDE DINITRATE 10 MG: 10 TABLET ORAL at 20:02

## 2023-09-19 RX ADMIN — FUROSEMIDE 40 MG: 40 TABLET ORAL at 09:14

## 2023-09-19 RX ADMIN — HYDROCODONE BITARTRATE AND ACETAMINOPHEN 1 TABLET: 5; 325 TABLET ORAL at 09:08

## 2023-09-19 RX ADMIN — HYDROCODONE BITARTRATE AND ACETAMINOPHEN 2 TABLET: 5; 325 TABLET ORAL at 02:28

## 2023-09-19 RX ADMIN — PHENOBARBITAL SODIUM 32.5 MG: 65 INJECTION INTRAMUSCULAR; INTRAVENOUS at 19:58

## 2023-09-19 ASSESSMENT — PAIN - FUNCTIONAL ASSESSMENT
PAIN_FUNCTIONAL_ASSESSMENT: ACTIVITIES ARE NOT PREVENTED
PAIN_FUNCTIONAL_ASSESSMENT: PREVENTS OR INTERFERES SOME ACTIVE ACTIVITIES AND ADLS

## 2023-09-19 ASSESSMENT — PAIN DESCRIPTION - ORIENTATION
ORIENTATION: MID;LOWER
ORIENTATION: MID

## 2023-09-19 ASSESSMENT — PAIN DESCRIPTION - LOCATION
LOCATION: BACK
LOCATION: NECK;BACK
LOCATION: BACK

## 2023-09-19 ASSESSMENT — PAIN DESCRIPTION - DESCRIPTORS
DESCRIPTORS: ACHING
DESCRIPTORS: ACHING;DISCOMFORT
DESCRIPTORS: DISCOMFORT

## 2023-09-19 ASSESSMENT — PAIN SCALES - GENERAL
PAINLEVEL_OUTOF10: 8
PAINLEVEL_OUTOF10: 10
PAINLEVEL_OUTOF10: 5
PAINLEVEL_OUTOF10: 3
PAINLEVEL_OUTOF10: 6
PAINLEVEL_OUTOF10: 6
PAINLEVEL_OUTOF10: 7

## 2023-09-19 ASSESSMENT — PAIN SCALES - WONG BAKER: WONGBAKER_NUMERICALRESPONSE: 0

## 2023-09-19 NOTE — CARE COORDINATION
9/19/2023 1530 CM Note:  Pt is still using  CIWA as needed, but is alert and oriented today. Pt declines to speak to PRS and doesn't want outpt rehab resources. Referral was made to [facility] for physical and alcohol rehab to [facility]. She stated that pt has to be agreeable to go there. When asked pt said \"I'm not going anywhere but home\". Pt states a friend can provide transportation home. CM will follow.  Electronically signed by Robert Barrera RN on 9/19/2023 at 3:39 PM

## 2023-09-19 NOTE — PLAN OF CARE
Problem: Discharge Planning  Goal: Discharge to home or other facility with appropriate resources  Outcome: Progressing  Flowsheets (Taken 9/18/2023 1515 by Dalia Chinchilla, RN)  Discharge to home or other facility with appropriate resources:   Arrange for needed discharge resources and transportation as appropriate   Identify barriers to discharge with patient and caregiver   Identify discharge learning needs (meds, wound care, etc)   Refer to discharge planning if patient needs post-hospital services based on physician order or complex needs related to functional status, cognitive ability or social support system     Problem: Pain  Goal: Verbalizes/displays adequate comfort level or baseline comfort level  Outcome: Progressing  Flowsheets (Taken 9/18/2023 1515 by Dalia Chinchilla RN)  Verbalizes/displays adequate comfort level or baseline comfort level:   Encourage patient to monitor pain and request assistance   Assess pain using appropriate pain scale   Administer analgesics based on type and severity of pain and evaluate response   Implement non-pharmacological measures as appropriate and evaluate response   Notify Licensed Independent Practitioner if interventions unsuccessful or patient reports new pain     Problem: Safety - Adult  Goal: Free from fall injury  Outcome: Progressing     Problem: Respiratory - Adult  Goal: Achieves optimal ventilation and oxygenation  Outcome: Progressing  Flowsheets (Taken 9/18/2023 1515 by Dalia Chinchilla RN)  Achieves optimal ventilation and oxygenation:   Assess for changes in respiratory status   Assess for changes in mentation and behavior   Position to facilitate oxygenation and minimize respiratory effort   Oxygen supplementation based on oxygen saturation or arterial blood gases   Assess and instruct to report shortness of breath or any respiratory difficulty

## 2023-09-20 VITALS
TEMPERATURE: 98.5 F | DIASTOLIC BLOOD PRESSURE: 75 MMHG | HEIGHT: 72 IN | HEART RATE: 84 BPM | RESPIRATION RATE: 19 BRPM | WEIGHT: 260 LBS | OXYGEN SATURATION: 90 % | BODY MASS INDEX: 35.21 KG/M2 | SYSTOLIC BLOOD PRESSURE: 135 MMHG

## 2023-09-20 LAB
ALBUMIN SERPL-MCNC: 3.5 G/DL (ref 3.5–5.2)
ALP SERPL-CCNC: 121 U/L (ref 40–129)
ALT SERPL-CCNC: 129 U/L (ref 0–40)
ANION GAP SERPL CALCULATED.3IONS-SCNC: 13 MMOL/L (ref 7–16)
AST SERPL-CCNC: 130 U/L (ref 0–39)
BASOPHILS # BLD: 0.03 K/UL (ref 0–0.2)
BASOPHILS NFR BLD: 1 % (ref 0–2)
BILIRUB SERPL-MCNC: 0.4 MG/DL (ref 0–1.2)
BUN SERPL-MCNC: 10 MG/DL (ref 6–20)
CALCIUM SERPL-MCNC: 8.4 MG/DL (ref 8.6–10.2)
CHLORIDE SERPL-SCNC: 95 MMOL/L (ref 98–107)
CO2 SERPL-SCNC: 22 MMOL/L (ref 22–29)
CREAT SERPL-MCNC: 0.8 MG/DL (ref 0.7–1.2)
EOSINOPHIL # BLD: 0 K/UL (ref 0.05–0.5)
EOSINOPHILS RELATIVE PERCENT: 0 % (ref 0–6)
ERYTHROCYTE [DISTWIDTH] IN BLOOD BY AUTOMATED COUNT: 15.9 % (ref 11.5–15)
GFR SERPL CREATININE-BSD FRML MDRD: >60 ML/MIN/1.73M2
GLUCOSE SERPL-MCNC: 98 MG/DL (ref 74–99)
HCT VFR BLD AUTO: 28.1 % (ref 37–54)
HGB BLD-MCNC: 9.1 G/DL (ref 12.5–16.5)
LYMPHOCYTES NFR BLD: 0.61 K/UL (ref 1.5–4)
LYMPHOCYTES RELATIVE PERCENT: 17 % (ref 20–42)
MCH RBC QN AUTO: 30.3 PG (ref 26–35)
MCHC RBC AUTO-ENTMCNC: 32.4 G/DL (ref 32–34.5)
MCV RBC AUTO: 93.7 FL (ref 80–99.9)
METAMYELOCYTES ABSOLUTE COUNT: 0.03 K/UL (ref 0–0.12)
METAMYELOCYTES: 1 % (ref 0–1)
MONOCYTES NFR BLD: 0.32 K/UL (ref 0.1–0.95)
MONOCYTES NFR BLD: 9 % (ref 2–12)
NEUTROPHILS NFR BLD: 73 % (ref 43–80)
NEUTS SEG NFR BLD: 2.7 K/UL (ref 1.8–7.3)
PLATELET # BLD AUTO: 140 K/UL (ref 130–450)
PMV BLD AUTO: 10 FL (ref 7–12)
POTASSIUM SERPL-SCNC: 3.6 MMOL/L (ref 3.5–5)
PROT SERPL-MCNC: 6.1 G/DL (ref 6.4–8.3)
RBC # BLD AUTO: 3 M/UL (ref 3.8–5.8)
RBC # BLD: ABNORMAL 10*6/UL
RBC # BLD: ABNORMAL 10*6/UL
SODIUM SERPL-SCNC: 130 MMOL/L (ref 132–146)
WBC OTHER # BLD: 3.7 K/UL (ref 4.5–11.5)

## 2023-09-20 PROCEDURE — 2580000003 HC RX 258: Performed by: FAMILY MEDICINE

## 2023-09-20 PROCEDURE — 6370000000 HC RX 637 (ALT 250 FOR IP): Performed by: FAMILY MEDICINE

## 2023-09-20 PROCEDURE — 6360000002 HC RX W HCPCS: Performed by: FAMILY MEDICINE

## 2023-09-20 PROCEDURE — 36415 COLL VENOUS BLD VENIPUNCTURE: CPT

## 2023-09-20 PROCEDURE — 80053 COMPREHEN METABOLIC PANEL: CPT

## 2023-09-20 PROCEDURE — 97116 GAIT TRAINING THERAPY: CPT

## 2023-09-20 PROCEDURE — 99239 HOSP IP/OBS DSCHRG MGMT >30: CPT | Performed by: INTERNAL MEDICINE

## 2023-09-20 PROCEDURE — 97530 THERAPEUTIC ACTIVITIES: CPT

## 2023-09-20 PROCEDURE — 6370000000 HC RX 637 (ALT 250 FOR IP): Performed by: INTERNAL MEDICINE

## 2023-09-20 PROCEDURE — 94640 AIRWAY INHALATION TREATMENT: CPT

## 2023-09-20 PROCEDURE — 6360000002 HC RX W HCPCS: Performed by: INTERNAL MEDICINE

## 2023-09-20 PROCEDURE — 85025 COMPLETE CBC W/AUTO DIFF WBC: CPT

## 2023-09-20 RX ORDER — CLONIDINE HYDROCHLORIDE 0.1 MG/1
0.1 TABLET ORAL 3 TIMES DAILY
Qty: 90 TABLET | Refills: 1 | Status: SHIPPED | OUTPATIENT
Start: 2023-09-20

## 2023-09-20 RX ORDER — LANOLIN ALCOHOL/MO/W.PET/CERES
100 CREAM (GRAM) TOPICAL DAILY
Qty: 30 TABLET | Refills: 3 | Status: SHIPPED | OUTPATIENT
Start: 2023-09-20

## 2023-09-20 RX ORDER — HYDROCODONE BITARTRATE AND ACETAMINOPHEN 5; 325 MG/1; MG/1
1 TABLET ORAL EVERY 8 HOURS PRN
Qty: 6 TABLET | Refills: 0 | Status: SHIPPED | OUTPATIENT
Start: 2023-09-20 | End: 2023-09-23

## 2023-09-20 RX ORDER — IPRATROPIUM BROMIDE AND ALBUTEROL SULFATE 2.5; .5 MG/3ML; MG/3ML
3 SOLUTION RESPIRATORY (INHALATION)
Qty: 360 ML | Refills: 1 | Status: SHIPPED | OUTPATIENT
Start: 2023-09-20

## 2023-09-20 RX ADMIN — MULTIPLE VITAMINS W/ MINERALS TAB 1 TABLET: TAB at 09:11

## 2023-09-20 RX ADMIN — IPRATROPIUM BROMIDE AND ALBUTEROL SULFATE 1 DOSE: .5; 2.5 SOLUTION RESPIRATORY (INHALATION) at 06:01

## 2023-09-20 RX ADMIN — IPRATROPIUM BROMIDE AND ALBUTEROL SULFATE 1 DOSE: .5; 2.5 SOLUTION RESPIRATORY (INHALATION) at 09:20

## 2023-09-20 RX ADMIN — METOPROLOL TARTRATE 75 MG: 25 TABLET, FILM COATED ORAL at 09:11

## 2023-09-20 RX ADMIN — ASPIRIN 81 MG CHEWABLE TABLET 81 MG: 81 TABLET CHEWABLE at 09:10

## 2023-09-20 RX ADMIN — SODIUM CHLORIDE, PRESERVATIVE FREE 10 ML: 5 INJECTION INTRAVENOUS at 09:13

## 2023-09-20 RX ADMIN — ACETAMINOPHEN 650 MG: 325 TABLET ORAL at 06:03

## 2023-09-20 RX ADMIN — LOSARTAN POTASSIUM 50 MG: 50 TABLET, FILM COATED ORAL at 09:10

## 2023-09-20 RX ADMIN — CLONIDINE HYDROCHLORIDE 0.1 MG: 0.1 TABLET ORAL at 09:10

## 2023-09-20 RX ADMIN — PHENOBARBITAL SODIUM 32.5 MG: 65 INJECTION INTRAMUSCULAR; INTRAVENOUS at 09:13

## 2023-09-20 RX ADMIN — SODIUM CHLORIDE, PRESERVATIVE FREE 10 ML: 5 INJECTION INTRAVENOUS at 09:15

## 2023-09-20 RX ADMIN — FOLIC ACID 1 MG: 1 TABLET ORAL at 09:11

## 2023-09-20 RX ADMIN — THIAMINE HYDROCHLORIDE 100 MG: 100 INJECTION, SOLUTION INTRAMUSCULAR; INTRAVENOUS at 09:12

## 2023-09-20 RX ADMIN — BACITRACIN ZINC, NEOMYCIN SULFATE, POLYMYXIN B SULFATE 1 G: 3.5; 5000; 4 OINTMENT TOPICAL at 09:12

## 2023-09-20 RX ADMIN — ISOSORBIDE DINITRATE 10 MG: 10 TABLET ORAL at 09:12

## 2023-09-20 RX ADMIN — HYDROCODONE BITARTRATE AND ACETAMINOPHEN 2 TABLET: 5; 325 TABLET ORAL at 03:31

## 2023-09-20 RX ADMIN — ESCITALOPRAM OXALATE 10 MG: 10 TABLET ORAL at 09:12

## 2023-09-20 RX ADMIN — GUAIFENESIN 600 MG: 600 TABLET, EXTENDED RELEASE ORAL at 09:11

## 2023-09-20 RX ADMIN — ENOXAPARIN SODIUM 30 MG: 100 INJECTION SUBCUTANEOUS at 09:13

## 2023-09-20 RX ADMIN — PANTOPRAZOLE SODIUM 20 MG: 20 TABLET, DELAYED RELEASE ORAL at 06:04

## 2023-09-20 RX ADMIN — HYDROCODONE BITARTRATE AND ACETAMINOPHEN 2 TABLET: 5; 325 TABLET ORAL at 09:21

## 2023-09-20 RX ADMIN — FUROSEMIDE 40 MG: 40 TABLET ORAL at 09:12

## 2023-09-20 ASSESSMENT — PAIN SCALES - GENERAL
PAINLEVEL_OUTOF10: 4
PAINLEVEL_OUTOF10: 10
PAINLEVEL_OUTOF10: 0
PAINLEVEL_OUTOF10: 7

## 2023-09-20 ASSESSMENT — PAIN DESCRIPTION - DESCRIPTORS
DESCRIPTORS: SHARP;ACHING;DISCOMFORT
DESCRIPTORS: ACHING
DESCRIPTORS: ACHING

## 2023-09-20 ASSESSMENT — PAIN DESCRIPTION - LOCATION
LOCATION: BACK
LOCATION: BACK;HIP
LOCATION: BACK;HIP

## 2023-09-20 ASSESSMENT — PAIN DESCRIPTION - ORIENTATION: ORIENTATION: LOWER

## 2023-09-20 NOTE — DISCHARGE SUMMARY
[facility] Physician Discharge Summary       No follow-up provider specified. Activity level: up as tolerated     Diet: No diet orders on file      Condition at discharge: guarded due alcohl and tobacco abuse     Dispo:home     Continue CPAP / BiPAP during sleep as prior to admission. Patient ID:  Eliazar Alicia  [id number]  62 y.o.  1965    Admit date: 9/13/2023    Discharge date and time:  9/20/2023  5:23 PM    Admission Diagnoses: Principal Problem:    COPD with acute exacerbation ([address])  Active Problems:    Primary hypertension    Hyperlipidemia    Tobacco use disorder    CAD in native artery    Severe alcohol use disorder (HCC)    Chronic heart failure with preserved ejection fraction (HFpEF) (HCC)    Alcohol withdrawal syndrome without complication ([address])    Fall    Hypomagnesemia    Hypophosphatemia    Leg hematoma, left, subsequent encounter    Scalp hematoma  Resolved Problems:    * No resolved hospital problems. *      Discharge Diagnoses: Principal Problem:    COPD with acute exacerbation ([address])  Active Problems:    Primary hypertension    Hyperlipidemia    Tobacco use disorder    CAD in native artery    Severe alcohol use disorder (HCC)    Chronic heart failure with preserved ejection fraction (HFpEF) (HCC)    Alcohol withdrawal syndrome without complication ([address])    Fall    Hypomagnesemia    Hypophosphatemia    Leg hematoma, left, subsequent encounter    Scalp hematoma  Resolved Problems:    * No resolved hospital problems.  *      Consults:  IP CONSULT TO SOCIAL WORK  IP CONSULT TO SOCIAL WORK  IP CONSULT TO PAIN MANAGEMENT    Procedures: none     Hospital Course: this is a 62years old white gentleman with significant PMH of HTN,  hyperlipid, COPD, alcohol abuse , chronic back pain ,  Who presented to ER because of SOB and cough x 2 days , he ran out of the albuterol  and was using advair as rescue inhaler and didn't help and came to ER for that  , pt

## 2023-09-20 NOTE — CARE COORDINATION
9/20/2023 1000 CM for transition of care needs at d/c. Pt is discharged home today. He declined to speak to [facility] or go for alcohol rehab. Referral to [facility] was cancelled , [facility] aware. Pt declined list of outpt rehab centers as well but CM put list in his folder with his d/c instructions. Pt's friend can provide transportation home.   Electronically signed by Mandy Alonzo RN on 9/20/2023 at 12:51 PM

## 2023-09-20 NOTE — PROGRESS NOTES
1:1 implemented for patient safety. Pt going through withdrawals and hallucinating. Pt getting out of bed every 15 min and unable to be redirected by [facility]. Found out of bed multiple times before staff could get to room.
Dr Dmitry Elias made aware of pt refusing to wear heart monitor.  No new orders at this time
Dr Sonny Herrera aware of earlier incident. No new orders at this time.  Patient continues to deny any knee pain
[facility] Progress Note    Admitting Date and Time: 9/13/2023  6:19 PM  Admit Dx: COPD exacerbation ([address]) [J44.1]  COPD with acute exacerbation ([address]) [J44.1]    Subjective:  Patient is being followed for COPD exacerbation ([address]) [J44.1]  COPD with acute exacerbation ([address]) [J44.1]     Pt fell in his bathroom on his left forearm -- has lac -- unwitnessed, around 6:30 AM.  No fevers. Admits to headache and neck pain -- but has had these prior. Neck pain is his chronic neck pain. No chest pain. Denies dizziness. Per RN: fell unwitnessed, has scalp hematoma left parietal area, lac on left forearm, on Lovenox    ROS: denies fever, chills, cp, sob, n/v, HA unless stated above.       thiamine  100 mg IntraVENous BID    sodium chloride flush  5-40 mL IntraVENous 2 times per day    sodium chloride flush  5-40 mL IntraVENous 2 times per day    enoxaparin  30 mg SubCUTAneous BID    levoFLOXacin  500 mg Oral Daily    ipratropium 0.5 mg-albuterol 2.5 mg  1 Dose Inhalation Q4H WA RT    guaiFENesin  600 mg Oral BID    predniSONE  40 mg Oral Daily    sodium chloride flush  5-40 mL IntraVENous 2 times per day    multivitamin  1 tablet Oral Daily    folic acid  1 mg Oral Daily    arformoterol tartrate  15 mcg Nebulization BID RT    budesonide  0.5 mg Nebulization BID RT    nicotine  1 patch TransDERmal Nightly    aspirin  81 mg Oral Daily    escitalopram  10 mg Oral Daily    furosemide  40 mg Oral Daily    isosorbide dinitrate  10 mg Oral BID    losartan  50 mg Oral Daily    metoprolol tartrate  75 mg Oral BID    pantoprazole  20 mg Oral QAM AC     HYDROcodone 5 mg - acetaminophen, 1 tablet, Q4H PRN   Or  HYDROcodone 5 mg - acetaminophen, 2 tablet, Q4H PRN  sodium chloride flush, 5-40 mL, PRN  sodium chloride, , PRN  LORazepam, 1 mg, Q1H PRN   Or  LORazepam, 1 mg, Q1H PRN   Or  LORazepam, 2 mg, Q1H PRN   Or  LORazepam, 2 mg, Q1H PRN   Or  LORazepam, 3 mg, Q1H PRN   Or  LORazepam, 3 mg, Q1H PRN
[facility] Progress Note    Admitting Date and Time: 9/13/2023  6:19 PM  Admit Dx: COPD exacerbation ([address]) [J44.1]  COPD with acute exacerbation ([address]) [J44.1]    Subjective:  Patient is being followed for COPD exacerbation ([address]) [J44.1]  COPD with acute exacerbation ([address]) [J44.1]     Pt is confused , and walking , states he is looking  for his cigarette,         ROS: denies fever, chills, cp, sob, n/v, HA unless stated above.       nicotine  1 patch TransDERmal Daily    cloNIDine  0.1 mg Oral TID    PHENobarbital  32.5 mg IntraVENous 4x daily    Followed by    Sadia Kee ON 9/19/2023] PHENobarbital  32.5 mg IntraVENous BID    Followed by    Sadia Kee ON 9/20/2023] PHENobarbital  16.2 mg IntraVENous BID    ipratropium 0.5 mg-albuterol 2.5 mg  1 Dose Inhalation 4x Daily RT    neomycin-bacitracin-polymyxin   Topical BID    thiamine  100 mg IntraVENous BID    sodium chloride flush  5-40 mL IntraVENous 2 times per day    sodium chloride flush  5-40 mL IntraVENous 2 times per day    enoxaparin  30 mg SubCUTAneous BID    guaiFENesin  600 mg Oral BID    sodium chloride flush  5-40 mL IntraVENous 2 times per day    multivitamin  1 tablet Oral Daily    folic acid  1 mg Oral Daily    aspirin  81 mg Oral Daily    escitalopram  10 mg Oral Daily    furosemide  40 mg Oral Daily    isosorbide dinitrate  10 mg Oral BID    losartan  50 mg Oral Daily    metoprolol tartrate  75 mg Oral BID    pantoprazole  20 mg Oral QAM AC     PHENobarbital, 32.5 mg, Q6H PRN   Followed by  Sadia Kee ON 9/20/2023] PHENobarbital, 16.2 mg, Q6H PRN  methocarbamol, 750 mg, TID PRN  ondansetron, 4 mg, Q6H PRN  HYDROcodone 5 mg - acetaminophen, 1 tablet, Q4H PRN   Or  HYDROcodone 5 mg - acetaminophen, 2 tablet, Q4H PRN  sodium chloride flush, 5-40 mL, PRN  sodium chloride, , PRN  LORazepam, 1 mg, Q1H PRN   Or  LORazepam, 1 mg, Q1H PRN   Or  LORazepam, 2 mg, Q1H PRN   Or  LORazepam, 2 mg, Q1H PRN   Or  LORazepam, 3 mg, Q1H PRN   Or  LORazepam, 3 mg,
[facility]  [address]        Date:2023                                                  Patient Name: Bozena Quinones    MRN: [id number]    : 1965    Room: [address] [place] [address]      Evaluating OT: Brett Gibbons OTR/L; #[id number]     Referring Provider and Specific Provider Orders/Date:      23 1100  OT eval and treat  Start:  23 1100,   End:  23 1100,   ONE TIME,   Standing Count:  1 Occurrences,   R         Janki Arrington, DO [place] Recommendation: Rehab       Diagnosis:   1. COPD exacerbation [facility])         Surgery: None      Pertinent Medical History:       Past Medical History:   Diagnosis Date    Alcohol abuse     Arthritis     Asthma     Back pain, chronic     CAD in native artery 2022    COPD (chronic obstructive pulmonary disease) ([address])     H/O cardiovascular stress test 2022    Lexiscan    Hyperlipidemia     Hypertension     Hyponatremia     Neck pain, chronic     Pneumonia     Tobacco abuse          Past Surgical History:   Procedure Laterality Date    CARDIAC CATHETERIZATION  2022    Dr. Ansley Graves      i & d left ear. EYE SURGERY      Lt. Eye  Dart removed    FRACTURE SURGERY      Rt. Tibia FX Rods & Pins    HEMORRHOID SURGERY      SHOULDER ARTHROSCOPY Right 2015    right shoulder bicep tenodesis subacromail decompression and debridement        Precautions:  Fall Risk, Up with Assistance      Assessment of current deficits:     [x] Functional mobility  [x]ADLs  [x] Strength               []Cognition    [x] Functional transfers   [x] IADLs         [] Safety Awareness   [x]Endurance    [] Fine Coordination              [x] Balance      [] Vision/perception   []Sensation     []Gross Motor Coordination  [] ROM  [] Delirium                   [] Motor Control     OT PLAN OF CARE   OT POC based on physician
[facility] Progress Note    Admitting Date and Time: 9/13/2023  6:19 PM  Admit Dx: COPD exacerbation ([address]) [J44.1]  COPD with acute exacerbation ([address]) [J44.1]    Subjective:  Patient is being followed for COPD exacerbation ([address]) [J44.1]  COPD with acute exacerbation ([address]) [J44.1]     Pt is alert , awake and oriented x 3 feels good         ROS: denies fever, chills, cp, sob, n/v, HA unless stated above.       nicotine  1 patch TransDERmal Daily    cloNIDine  0.1 mg Oral TID    PHENobarbital  32.5 mg IntraVENous BID    Followed by    Irish Pro ON 9/20/2023] PHENobarbital  16.2 mg IntraVENous BID    ipratropium 0.5 mg-albuterol 2.5 mg  1 Dose Inhalation 4x Daily RT    neomycin-bacitracin-polymyxin   Topical BID    thiamine  100 mg IntraVENous BID    sodium chloride flush  5-40 mL IntraVENous 2 times per day    sodium chloride flush  5-40 mL IntraVENous 2 times per day    enoxaparin  30 mg SubCUTAneous BID    guaiFENesin  600 mg Oral BID    sodium chloride flush  5-40 mL IntraVENous 2 times per day    multivitamin  1 tablet Oral Daily    folic acid  1 mg Oral Daily    aspirin  81 mg Oral Daily    escitalopram  10 mg Oral Daily    furosemide  40 mg Oral Daily    isosorbide dinitrate  10 mg Oral BID    losartan  50 mg Oral Daily    metoprolol tartrate  75 mg Oral BID    pantoprazole  20 mg Oral QAM AC     PHENobarbital, 32.5 mg, Q6H PRN   Followed by  Irish Pro ON 9/20/2023] PHENobarbital, 16.2 mg, Q6H PRN  methocarbamol, 750 mg, TID PRN  ondansetron, 4 mg, Q6H PRN  HYDROcodone 5 mg - acetaminophen, 1 tablet, Q4H PRN   Or  HYDROcodone 5 mg - acetaminophen, 2 tablet, Q4H PRN  sodium chloride flush, 5-40 mL, PRN  sodium chloride, , PRN  LORazepam, 1 mg, Q1H PRN   Or  LORazepam, 1 mg, Q1H PRN   Or  LORazepam, 2 mg, Q1H PRN   Or  LORazepam, 2 mg, Q1H PRN   Or  LORazepam, 3 mg, Q1H PRN   Or  LORazepam, 3 mg, Q1H PRN   Or  LORazepam, 4 mg, Q1H PRN   Or  LORazepam, 4 mg, Q1H PRN  sodium chloride flush, 5-40 mL,
Patient becoming increasingly agitated and impulsive with severe hallucinations. Physician notified, see new orders. Patient states he is at [employer]. Requested Norco to crush and snort.
Patient is medicated with 3mg PO Ativan at this time due to pacing, attempting to [facility] home to smoke\", and \"get my dog\". Patient is reassured and is redirectable. BP elevated at this time. CIWA 15.  Will continue to monitor
Per sitter patient attempted to stand from bed, and \"went to his knees, but got up immediately\". Patient has no s/s of injury. VSS denies pain in knees. Manager is aware.
Physical Therapy Treatment Note/Plan of Care    Room #:  0615/0615-02  Patient Name: Krista Apgar  YOB: 1965  MRN: [id number]    Date of Service: 2023     Tentative placement recommendation: Subuacute  Equipment recommendation: To be determined      Evaluating Physical Therapist: Alvin Cohen [place] #[id number]      Specific Provider Orders/Date/Referring Provider :   23 1100    PT eval and treat  Start:  23 1100,   End:  23 1100,   ONE TIME,   Standing Count:  1 Occurrences,   R         Nereyda Arrington DO     Admitting Diagnosis:   COPD exacerbation ([address]) [J44.1]  COPD with acute exacerbation ([address]) [J44.1]      Surgery:     Visit Diagnoses         Codes    COPD exacerbation ([address])    -  Primary J44.1            Patient Active Problem List   Diagnosis    COPD with acute exacerbation ([address])    Primary hypertension    Hyperlipidemia    Gastroesophageal reflux disease    Tobacco use disorder    Transaminitis    CAD in native artery    Severe alcohol use disorder (HCC)    Chronic heart failure with preserved ejection fraction (HFpEF) ([facility])    Alcohol withdrawal syndrome without complication ([address])    Fall    Hypomagnesemia    Hypophosphatemia    Leg hematoma, left, subsequent encounter    Scalp hematoma        ASSESSMENT of Current Deficits Patient exhibits decreased strength, balance, and endurance impairing functional mobility, transfers, gait distance, and tolerance to activity. Patient pleasant throughout session. Ambulated with wheeled walker with supervision,  monitored spo2 and HR all within good ranges, patient on room air. Pt states he can get walker from a friend to use at home. The patient will benefit from continued skilled therapy to increase strength and improve balance for safe functional mobility, to decrease risk of falls, and to meet goals at discharge.          PHYSICAL THERAPY  PLAN OF CARE       Physical therapy plan of care is established based on physician order,
Physical Therapy Treatment Note/Plan of Care    Room #:  0615/0615-02  Patient Name: Macey Martinez  YOB: 1965  MRN: [id number]    Date of Service: 9/19/2023     Tentative placement recommendation: Subuacute  Equipment recommendation: To be determined      Evaluating Physical Therapist: Cyndy Kang [place] #[id number]      Specific Provider Orders/Date/Referring Provider :   09/16/23 1100    PT eval and treat  Start:  09/16/23 1100,   End:  09/16/23 1100,   ONE TIME,   Standing Count:  1 Occurrences,   R         Nereyda Arrington DO     Admitting Diagnosis:   COPD exacerbation ([address]) [J44.1]  COPD with acute exacerbation ([address]) [J44.1]      Surgery:     Visit Diagnoses         Codes    COPD exacerbation ([address])    -  Primary J44.1            Patient Active Problem List   Diagnosis    COPD with acute exacerbation ([address])    Primary hypertension    Hyperlipidemia    Gastroesophageal reflux disease    Tobacco use disorder    Transaminitis    CAD in native artery    Severe alcohol use disorder (HCC)    Chronic heart failure with preserved ejection fraction (HFpEF) ([facility])    Alcohol withdrawal syndrome without complication ([address])    Fall    Hypomagnesemia    Hypophosphatemia    Leg hematoma, left, subsequent encounter    Scalp hematoma        ASSESSMENT of Current Deficits Patient exhibits decreased strength, balance, and endurance impairing functional mobility, transfers, gait distance, and tolerance to activity. Patient pleasant throughout session. Ambulated with wheeled walker, states \"if I don't have this [wheeled walker] I feel unstable and might fall\". Pt able to follow commands but required cueing and redirection to stay on task. Low tolerance to standing requires seated rest breaks, monitored spo2 and HR all within good ranges, patient on room air. The patient will benefit from continued skilled therapy to increase strength and improve balance for safe functional mobility, to decrease risk of falls, and to meet
[facility] Progress Note    Admitting Date and Time: 9/13/2023  6:19 PM  Admit Dx: COPD exacerbation ([address]) [J44.1]  COPD with acute exacerbation ([address]) [J44.1]    Subjective:  Patient is being followed for COPD exacerbation ([address]) [J44.1]  COPD with acute exacerbation ([address]) [J44.1]     Pt feels better overall. No events overnight. Eating better. No fevers. Left scalp hematoma -- hurts, causing same headache. Left hip/left leg/knee pain since fall -- hematoma there. SOB greatly improved, much less wheezing/cough    Per RN: Left forearm wound -- Neosporin applied, abrasion of left abdomen -- improved, no further falls    ROS: denies fever, chills, cp, sob, n/v, HA unless stated above.       ipratropium 0.5 mg-albuterol 2.5 mg  1 Dose Inhalation 4x Daily RT    magnesium sulfate  2,000 mg IntraVENous Once    neomycin-bacitracin-polymyxin   Topical BID    thiamine  100 mg IntraVENous BID    sodium chloride flush  5-40 mL IntraVENous 2 times per day    sodium chloride flush  5-40 mL IntraVENous 2 times per day    enoxaparin  30 mg SubCUTAneous BID    levoFLOXacin  500 mg Oral Daily    guaiFENesin  600 mg Oral BID    predniSONE  40 mg Oral Daily    sodium chloride flush  5-40 mL IntraVENous 2 times per day    multivitamin  1 tablet Oral Daily    folic acid  1 mg Oral Daily    nicotine  1 patch TransDERmal Nightly    aspirin  81 mg Oral Daily    escitalopram  10 mg Oral Daily    furosemide  40 mg Oral Daily    isosorbide dinitrate  10 mg Oral BID    losartan  50 mg Oral Daily    metoprolol tartrate  75 mg Oral BID    pantoprazole  20 mg Oral QAM AC     methocarbamol, 750 mg, TID PRN  ondansetron, 4 mg, Q6H PRN  HYDROcodone 5 mg - acetaminophen, 1 tablet, Q4H PRN   Or  HYDROcodone 5 mg - acetaminophen, 2 tablet, Q4H PRN  sodium chloride flush, 5-40 mL, PRN  sodium chloride, , PRN  LORazepam, 1 mg, Q1H PRN   Or  LORazepam, 1 mg, Q1H PRN   Or  LORazepam, 2 mg, Q1H PRN   Or  LORazepam, 2 mg, Q1H PRN
Pt found in bed with blood running down arm. Pt said he fell and hit head on left temple against wall in bathroom. Stated he had put his light on but had to urgently use the bathroom and went on his own. Pt vitals stable and examined by Dr. Lorena Angelo. New orders placed.
Pt refusing to wear heart monitor at this time
RN provided both written and verbal discharge instruction-Addiction Rehabilitation packet was also provided to patient. All patient's questions were answered to patient's satisfaction. IV access discontinued without difficulty-patient did not have telemetry monitor on at time of discharge.
Resting in bed with eyes closed and snoring loudly. No s/s of withdrawals or distress. On RA.  Will allow for rest at this time
SURGERY      Rt. Tibia FX Rods & Pins    HEMORRHOID SURGERY      SHOULDER ARTHROSCOPY Right 01/16/2015    right shoulder bicep tenodesis subacromail decompression and debridement       SUBJECTIVE:    Precautions: Up with assistance, falls, CIWA , and seizures , impulsive     Social history: Patient lives with his dog Peanut  in a mobile home  with 4 steps, 1 rail  to enter home. Tub shower       Equipment owned: Shower chair,      [address] - Inpatient   How much help is needed turning from your back to your side while in a flat bed without using bedrails?: A Little  How much help is needed moving from lying on your back to sitting on the side of a flat bed without using bedrails?: A Little  How much help is needed moving to and from a bed to a chair?: A Little  How much help is needed standing up from a chair using your arms?: A Little  How much help is needed walking in hospital room?: A Little  How much help is needed climbing 3-5 steps with a railing?: A Lot  AM-PAC Inpatient Mobility Raw Score : 17  AM-PAC Inpatient T-Scale Score : 42.13  Mobility Inpatient CMS 0-100% Score: 50.57  Mobility Inpatient CMS G-Code Modifier : CK    Nursing cleared patient for PT evaluation. The admitting diagnosis and active problem list as listed above have been reviewed prior to the initiation of this evaluation. OBJECTIVE:   Initial Evaluation  Date: 9/18/2023 Treatment Date:     Short Term/ Long Term   Goals   Was pt agreeable to Eval/treatment? Yes  To be met in 4 days   Pain level   8/10  Low back and hips      Bed Mobility  Using rails and head of bed elevated:     Rolling: Supervision     Supine to sit: Supervision     Sit to supine: Not assessed patient in chair    Scooting: Not assessed patient in chair    Rolling: Independent    Supine to sit:  Independent    Sit to supine: Independent    Scooting: Independent     Transfers Sit to stand: Minimal assist of 1   Sit to stand:
Screen, Ur NEGATIVE    Comment: Cutoff: 1000 ng/mL       Barbiturate Screen, Ur NEGATIVE    Comment: Cutoff: 200 ng/ml       Benzodiazepine Screen, Urine NEGATIVE    Comment: Cutoff: 200 ng/ml       Cocaine Metabolite, Urine NEGATIVE    Comment: Cutoff: 300 ng/ml       Methadone Screen, Urine NEGATIVE    Comment: Cutoff: 300 ng/ml       Opiates, Urine NEGATIVE    Comment: Cutoff: 300 ng/ml   Note: The Opiate screen is not intended to detect Oxycodone. Phencyclidine, Urine NEGATIVE    Comment: Cutoff: 25 ng/ml       Cannabinoid Scrn, Ur POSITIVE Abnormal     Comment: Cutoff: 50 ng/ml       Oxycodone Screen, Ur NEGATIVE    Comment: Cutoff: 100 ng/ml       Fentanyl, Ur POSITIVE Abnormal     Comment: Cutoff: 1.0 ng/ml         Note: Therapeutic levels of some antipsychotics (e.g., Haloperidal, Risperidone),   antidepressants (e.g., Trazadone), Methampthetamine, and Labetalol are known to show   cross-reactivity and cause false positive results. Therefore, confirmatory testing for Fentanyl and metabolites should be considered if   clinically indicated. Buprenorphine Urine NEGATIVE    Comment: Cutoff: 5 ng/ml       Test Information These drug screen results are for medical purposes only and should not be considered definitive or confirmed. Comment: The drug methodology concentration value must be greater than or equal to the cutoff to be   reported as positive. Confirmtory testing orders and/or interpretive sceening questions can be directed to   toxicology at [phone]. The absence of expected drug(s) and/or metabolite(s) may be due to inappropriate timing of   specimen collection relative to drug administration, poor drug absorption,   diluted/adulterated urine, or limitations of screening methodology. Radiology:   XR CHEST PORTABLE   Final Result   No active pulmonary disease.              Assessment:  Principal Problem:    COPD with acute exacerbation (HCC)  Active Problems:
errors may be present.     Electronically signed by Gamaliel Garcia DO on 9/16/2023 at 10:44 AM

## 2023-09-27 ENCOUNTER — HOSPITAL ENCOUNTER (EMERGENCY)
Age: 58
Discharge: HOME | End: 2023-09-28
Attending: EMERGENCY MEDICINE
Payer: MEDICARE

## 2023-09-27 ENCOUNTER — APPOINTMENT (OUTPATIENT)
Dept: CT IMAGING | Age: 58
End: 2023-09-27
Payer: MEDICARE

## 2023-09-27 ENCOUNTER — APPOINTMENT (OUTPATIENT)
Dept: GENERAL RADIOLOGY | Age: 58
End: 2023-09-27
Payer: MEDICARE

## 2023-09-27 VITALS
HEART RATE: 78 BPM | HEIGHT: 72 IN | BODY MASS INDEX: 35.89 KG/M2 | RESPIRATION RATE: 22 BRPM | SYSTOLIC BLOOD PRESSURE: 142 MMHG | TEMPERATURE: 97.7 F | OXYGEN SATURATION: 97 % | DIASTOLIC BLOOD PRESSURE: 85 MMHG | WEIGHT: 265 LBS

## 2023-09-27 DIAGNOSIS — F10.920 ACUTE ALCOHOLIC INTOXICATION WITHOUT COMPLICATION (HCC): ICD-10-CM

## 2023-09-27 DIAGNOSIS — U07.1 COVID-19: Primary | ICD-10-CM

## 2023-09-27 DIAGNOSIS — I95.9 HYPOTENSION, UNSPECIFIED HYPOTENSION TYPE: ICD-10-CM

## 2023-09-27 LAB
ALBUMIN SERPL-MCNC: 4.1 G/DL (ref 3.5–5.2)
ALP SERPL-CCNC: 130 U/L (ref 40–129)
ALT SERPL-CCNC: 102 U/L (ref 0–40)
ANION GAP SERPL CALCULATED.3IONS-SCNC: 18 MMOL/L (ref 7–16)
AST SERPL-CCNC: 113 U/L (ref 0–39)
BASOPHILS # BLD: 0.03 K/UL (ref 0–0.2)
BASOPHILS NFR BLD: 1 % (ref 0–2)
BILIRUB SERPL-MCNC: 0.4 MG/DL (ref 0–1.2)
BILIRUB UR QL STRIP: NEGATIVE
BUN SERPL-MCNC: 8 MG/DL (ref 6–20)
CALCIUM SERPL-MCNC: 9 MG/DL (ref 8.6–10.2)
CHLORIDE SERPL-SCNC: 99 MMOL/L (ref 98–107)
CLARITY UR: CLEAR
CO2 SERPL-SCNC: 22 MMOL/L (ref 22–29)
COLOR UR: YELLOW
COMMENT: NORMAL
CREAT SERPL-MCNC: 1 MG/DL (ref 0.7–1.2)
EOSINOPHIL # BLD: 0.02 K/UL (ref 0.05–0.5)
EOSINOPHILS RELATIVE PERCENT: 0 % (ref 0–6)
ERYTHROCYTE [DISTWIDTH] IN BLOOD BY AUTOMATED COUNT: 15.5 % (ref 11.5–15)
ETHANOLAMINE SERPL-MCNC: 62 MG/DL
GFR SERPL CREATININE-BSD FRML MDRD: >60 ML/MIN/1.73M2
GLUCOSE SERPL-MCNC: 133 MG/DL (ref 74–99)
GLUCOSE UR STRIP-MCNC: NEGATIVE MG/DL
HCT VFR BLD AUTO: 34 % (ref 37–54)
HGB BLD-MCNC: 10.9 G/DL (ref 12.5–16.5)
HGB UR QL STRIP.AUTO: NEGATIVE
IMM GRANULOCYTES # BLD AUTO: 0.15 K/UL (ref 0–0.58)
IMM GRANULOCYTES NFR BLD: 3 % (ref 0–5)
KETONES UR STRIP-MCNC: NEGATIVE MG/DL
LEUKOCYTE ESTERASE UR QL STRIP: NEGATIVE
LYMPHOCYTES NFR BLD: 0.69 K/UL (ref 1.5–4)
LYMPHOCYTES RELATIVE PERCENT: 12 % (ref 20–42)
MCH RBC QN AUTO: 29.5 PG (ref 26–35)
MCHC RBC AUTO-ENTMCNC: 32.1 G/DL (ref 32–34.5)
MCV RBC AUTO: 92.1 FL (ref 80–99.9)
MONOCYTES NFR BLD: 0.54 K/UL (ref 0.1–0.95)
MONOCYTES NFR BLD: 9 % (ref 2–12)
NEUTROPHILS NFR BLD: 76 % (ref 43–80)
NEUTS SEG NFR BLD: 4.58 K/UL (ref 1.8–7.3)
NITRITE UR QL STRIP: NEGATIVE
PH UR STRIP: 6.5 [PH] (ref 5–9)
PLATELET # BLD AUTO: 302 K/UL (ref 130–450)
PMV BLD AUTO: 9.8 FL (ref 7–12)
POTASSIUM SERPL-SCNC: 3.3 MMOL/L (ref 3.5–5)
PROT SERPL-MCNC: 7.2 G/DL (ref 6.4–8.3)
PROT UR STRIP-MCNC: NEGATIVE MG/DL
RBC # BLD AUTO: 3.69 M/UL (ref 3.8–5.8)
SARS-COV-2 RDRP RESP QL NAA+PROBE: DETECTED
SODIUM SERPL-SCNC: 139 MMOL/L (ref 132–146)
SP GR UR STRIP: 1.01 (ref 1–1.03)
SPECIMEN DESCRIPTION: ABNORMAL
TROPONIN I SERPL HS-MCNC: 35 NG/L (ref 0–11)
TROPONIN I SERPL HS-MCNC: 38 NG/L (ref 0–11)
UROBILINOGEN UR STRIP-ACNC: 0.2 EU/DL (ref 0–1)
WBC OTHER # BLD: 6 K/UL (ref 4.5–11.5)

## 2023-09-27 PROCEDURE — 87635 SARS-COV-2 COVID-19 AMP PRB: CPT

## 2023-09-27 PROCEDURE — 93005 ELECTROCARDIOGRAM TRACING: CPT | Performed by: EMERGENCY MEDICINE

## 2023-09-27 PROCEDURE — 96361 HYDRATE IV INFUSION ADD-ON: CPT

## 2023-09-27 PROCEDURE — G0480 DRUG TEST DEF 1-7 CLASSES: HCPCS

## 2023-09-27 PROCEDURE — 84484 ASSAY OF TROPONIN QUANT: CPT

## 2023-09-27 PROCEDURE — 85025 COMPLETE CBC W/AUTO DIFF WBC: CPT

## 2023-09-27 PROCEDURE — 6370000000 HC RX 637 (ALT 250 FOR IP): Performed by: EMERGENCY MEDICINE

## 2023-09-27 PROCEDURE — 6360000002 HC RX W HCPCS: Performed by: EMERGENCY MEDICINE

## 2023-09-27 PROCEDURE — 96374 THER/PROPH/DIAG INJ IV PUSH: CPT

## 2023-09-27 PROCEDURE — 99285 EMERGENCY DEPT VISIT HI MDM: CPT

## 2023-09-27 PROCEDURE — 81003 URINALYSIS AUTO W/O SCOPE: CPT

## 2023-09-27 PROCEDURE — 70450 CT HEAD/BRAIN W/O DYE: CPT

## 2023-09-27 PROCEDURE — 71045 X-RAY EXAM CHEST 1 VIEW: CPT

## 2023-09-27 PROCEDURE — 2580000003 HC RX 258: Performed by: EMERGENCY MEDICINE

## 2023-09-27 PROCEDURE — 2580000003 HC RX 258

## 2023-09-27 PROCEDURE — 80053 COMPREHEN METABOLIC PANEL: CPT

## 2023-09-27 RX ORDER — DEXAMETHASONE 6 MG/1
6 TABLET ORAL
Qty: 10 TABLET | Refills: 0 | Status: SHIPPED | OUTPATIENT
Start: 2023-09-27 | End: 2023-10-07

## 2023-09-27 RX ORDER — OXYCODONE HYDROCHLORIDE AND ACETAMINOPHEN 5; 325 MG/1; MG/1
1 TABLET ORAL ONCE
Status: COMPLETED | OUTPATIENT
Start: 2023-09-27 | End: 2023-09-27

## 2023-09-27 RX ORDER — 0.9 % SODIUM CHLORIDE 0.9 %
500 INTRAVENOUS SOLUTION INTRAVENOUS ONCE
Status: COMPLETED | OUTPATIENT
Start: 2023-09-27 | End: 2023-09-27

## 2023-09-27 RX ORDER — 0.9 % SODIUM CHLORIDE 0.9 %
1000 INTRAVENOUS SOLUTION INTRAVENOUS ONCE
Status: COMPLETED | OUTPATIENT
Start: 2023-09-27 | End: 2023-09-27

## 2023-09-27 RX ORDER — DEXAMETHASONE SODIUM PHOSPHATE 10 MG/ML
6 INJECTION INTRAMUSCULAR; INTRAVENOUS ONCE
Status: COMPLETED | OUTPATIENT
Start: 2023-09-27 | End: 2023-09-27

## 2023-09-27 RX ORDER — 0.9 % SODIUM CHLORIDE 0.9 %
1000 INTRAVENOUS SOLUTION INTRAVENOUS ONCE
Status: COMPLETED | OUTPATIENT
Start: 2023-09-27 | End: 2023-09-28

## 2023-09-27 RX ORDER — SODIUM CHLORIDE 9 MG/ML
INJECTION, SOLUTION INTRAVENOUS
Status: COMPLETED
Start: 2023-09-27 | End: 2023-09-27

## 2023-09-27 RX ORDER — 0.9 % SODIUM CHLORIDE 0.9 %
1000 INTRAVENOUS SOLUTION INTRAVENOUS ONCE
Status: DISCONTINUED | OUTPATIENT
Start: 2023-09-27 | End: 2023-09-27

## 2023-09-27 RX ADMIN — DEXAMETHASONE SODIUM PHOSPHATE 6 MG: 10 INJECTION INTRAMUSCULAR; INTRAVENOUS at 19:15

## 2023-09-27 RX ADMIN — SODIUM CHLORIDE 1000 ML: 9 INJECTION, SOLUTION INTRAVENOUS at 22:15

## 2023-09-27 RX ADMIN — SODIUM CHLORIDE 500 ML: 9 INJECTION, SOLUTION INTRAVENOUS at 17:04

## 2023-09-27 RX ADMIN — Medication 500 ML: at 17:04

## 2023-09-27 RX ADMIN — OXYCODONE AND ACETAMINOPHEN 1 TABLET: 5; 325 TABLET ORAL at 17:03

## 2023-09-27 RX ADMIN — SODIUM CHLORIDE 1000 ML: 9 INJECTION, SOLUTION INTRAVENOUS at 19:13

## 2023-09-27 ASSESSMENT — PAIN DESCRIPTION - DESCRIPTORS: DESCRIPTORS: ACHING

## 2023-09-27 ASSESSMENT — PAIN - FUNCTIONAL ASSESSMENT: PAIN_FUNCTIONAL_ASSESSMENT: 0-10

## 2023-09-27 ASSESSMENT — PAIN DESCRIPTION - PAIN TYPE: TYPE: CHRONIC PAIN

## 2023-09-27 ASSESSMENT — PAIN SCALES - GENERAL
PAINLEVEL_OUTOF10: 10
PAINLEVEL_OUTOF10: 8
PAINLEVEL_OUTOF10: 10

## 2023-09-27 ASSESSMENT — PAIN DESCRIPTION - ORIENTATION: ORIENTATION: LOWER

## 2023-09-27 ASSESSMENT — PAIN DESCRIPTION - FREQUENCY: FREQUENCY: CONTINUOUS

## 2023-09-27 ASSESSMENT — PAIN DESCRIPTION - LOCATION: LOCATION: BACK

## 2023-09-27 NOTE — ED PROVIDER NOTES
[address]        Pt Name: Sandra Osborn  MRN: [id number]  [address] [facility] [place] 1965  Date of evaluation: 9/27/2023  Provider: Christian Martinez DO  PCP: Remus Cushing, MD  Note Started: 4:26 PM EDT 9/27/23    CHIEF COMPLAINT       Chief Complaint   Patient presents with    Dizziness     Feeling dizzy, possible syncopal episode at home    Anxiety    Shortness of Breath       HISTORY OF PRESENT ILLNESS: 1 or more Elements        Limitations to history : None    Sandra Osborn is a 62 y.o. male brought in by EMS after a syncopal episode at home. He is reportedly sitting in a chair with a neighbor and he was on the phone with his son when he had a syncopal episode. He did not fall to the ground. He notes chronic shortness of breath that is unchanged. He also admits to anxiety and chronic back pain. He states he is no longer allowed to have narcotics as he was told he was a \"drug addict\". He has been using marijuana for pain control. Denies any chest pain. Denies fever or chills. Denies nausea or vomiting. Denies headache, vision changes or focal neurological deficits. Nursing Notes were all reviewed and agreed with or any disagreements were addressed in the HPI. REVIEW OF EXTERNAL NOTE :       Reviewed previous admission on 9/13/2023 for COPD exacerbation. He has had previous admissions for syncope, near syncope and hyponatremia. Chart Review/External Note Review    Last Echo reviewed by Me:  Lab Results   Component Value Date    LVEF 68 07/08/2023             Controlled Substance Monitoring:    Acute and Chronic Pain Monitoring:        No data to display                    REVIEW OF SYSTEMS :      Positives and Pertinent negatives as per HPI.      SURGICAL HISTORY     Past Surgical History:   Procedure Laterality Date    CARDIAC CATHETERIZATION  06/08/2022    Dr. Anika Singh i

## 2023-09-27 NOTE — ED NOTES
Attempted to do orthostatic VS, only able to do laying and sitting, patient unable to tolerate standing.      Natalie Kowalski RN  09/27/23 [id number]

## 2023-09-28 PROCEDURE — 96361 HYDRATE IV INFUSION ADD-ON: CPT

## 2023-09-28 NOTE — ED NOTES
Patient states his son is his only ride home.  His [facility] Charge nurse made aware     Paige Vazquez RN  09/28/23 0193

## 2023-09-28 NOTE — ED NOTES
Patient c/o dizziness with position change during orthostatic blood pressures. Dr. Jori Guardado notified.      Anthony Najera RN  09/27/23 1141

## 2023-09-29 LAB
EKG ATRIAL RATE: 75 BPM
EKG P AXIS: 37 DEGREES
EKG P-R INTERVAL: 124 MS
EKG Q-T INTERVAL: 476 MS
EKG QRS DURATION: 82 MS
EKG QTC CALCULATION (BAZETT): 531 MS
EKG R AXIS: 53 DEGREES
EKG T AXIS: 67 DEGREES
EKG VENTRICULAR RATE: 75 BPM
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
## 2023-10-26 ENCOUNTER — APPOINTMENT (OUTPATIENT)
Dept: GENERAL RADIOLOGY | Age: 58
DRG: 394 | End: 2023-10-26
Payer: MEDICARE

## 2023-10-26 ENCOUNTER — APPOINTMENT (OUTPATIENT)
Dept: CT IMAGING | Age: 58
DRG: 394 | End: 2023-10-26
Payer: MEDICARE

## 2023-10-26 ENCOUNTER — HOSPITAL ENCOUNTER (INPATIENT)
Age: 58
LOS: 7 days | Discharge: HOME | DRG: 394 | End: 2023-11-02
Attending: STUDENT IN AN ORGANIZED HEALTH CARE EDUCATION/TRAINING PROGRAM | Admitting: FAMILY MEDICINE
Payer: MEDICARE

## 2023-10-26 DIAGNOSIS — K92.2 GASTROINTESTINAL HEMORRHAGE, UNSPECIFIED GASTROINTESTINAL HEMORRHAGE TYPE: ICD-10-CM

## 2023-10-26 DIAGNOSIS — F10.939 ALCOHOL WITHDRAWAL SYNDROME WITH COMPLICATION (HCC): ICD-10-CM

## 2023-10-26 DIAGNOSIS — E83.42 HYPOMAGNESEMIA: ICD-10-CM

## 2023-10-26 DIAGNOSIS — J44.1 COPD EXACERBATION (HCC): Primary | ICD-10-CM

## 2023-10-26 DIAGNOSIS — D64.9 ANEMIA, UNSPECIFIED TYPE: ICD-10-CM

## 2023-10-26 DIAGNOSIS — G89.29 OTHER CHRONIC PAIN: ICD-10-CM

## 2023-10-26 DIAGNOSIS — E87.6 HYPOKALEMIA: ICD-10-CM

## 2023-10-26 PROBLEM — S00.03XA SCALP HEMATOMA: Status: RESOLVED | Noted: 2023-09-17 | Resolved: 2023-10-26

## 2023-10-26 PROBLEM — S80.12XD LEG HEMATOMA, LEFT, SUBSEQUENT ENCOUNTER: Status: RESOLVED | Noted: 2023-09-17 | Resolved: 2023-10-26

## 2023-10-26 LAB
ALBUMIN SERPL-MCNC: 4 G/DL (ref 3.5–5.2)
ALP SERPL-CCNC: 158 U/L (ref 40–129)
ALT SERPL-CCNC: 43 U/L (ref 0–40)
AMMONIA PLAS-SCNC: 22 UMOL/L (ref 16–60)
ANION GAP SERPL CALCULATED.3IONS-SCNC: 24 MMOL/L (ref 7–16)
AST SERPL-CCNC: 88 U/L (ref 0–39)
BASOPHILS # BLD: 0 K/UL (ref 0–0.2)
BASOPHILS NFR BLD: 0 % (ref 0–2)
BILIRUB SERPL-MCNC: 0.8 MG/DL (ref 0–1.2)
BUN SERPL-MCNC: 34 MG/DL (ref 6–20)
CALCIUM SERPL-MCNC: 9.2 MG/DL (ref 8.6–10.2)
CHLORIDE SERPL-SCNC: 95 MMOL/L (ref 98–107)
CO2 SERPL-SCNC: 19 MMOL/L (ref 22–29)
CREAT SERPL-MCNC: 1.5 MG/DL (ref 0.7–1.2)
EKG ATRIAL RATE: 108 BPM
EKG P AXIS: 86 DEGREES
EKG P-R INTERVAL: 128 MS
EKG Q-T INTERVAL: 350 MS
EKG QRS DURATION: 82 MS
EKG QTC CALCULATION (BAZETT): 469 MS
EKG R AXIS: 70 DEGREES
EKG T AXIS: 24 DEGREES
EKG VENTRICULAR RATE: 108 BPM
EOSINOPHIL # BLD: 0.03 K/UL (ref 0.05–0.5)
EOSINOPHILS RELATIVE PERCENT: 1 % (ref 0–6)
ERYTHROCYTE [DISTWIDTH] IN BLOOD BY AUTOMATED COUNT: 16.3 % (ref 11.5–15)
GFR SERPL CREATININE-BSD FRML MDRD: 52 ML/MIN/1.73M2
GLUCOSE SERPL-MCNC: 148 MG/DL (ref 74–99)
HCT VFR BLD AUTO: 23.3 % (ref 37–54)
HGB BLD-MCNC: 7.8 G/DL (ref 12.5–16.5)
LYMPHOCYTES NFR BLD: 0.54 K/UL (ref 1.5–4)
LYMPHOCYTES RELATIVE PERCENT: 14 % (ref 20–42)
MAGNESIUM SERPL-MCNC: 0.9 MG/DL (ref 1.6–2.6)
MCH RBC QN AUTO: 30.2 PG (ref 26–35)
MCHC RBC AUTO-ENTMCNC: 33.5 G/DL (ref 32–34.5)
MCV RBC AUTO: 90.3 FL (ref 80–99.9)
METAMYELOCYTES ABSOLUTE COUNT: 0.03 K/UL (ref 0–0.12)
METAMYELOCYTES: 1 % (ref 0–1)
MONOCYTES NFR BLD: 0.1 K/UL (ref 0.1–0.95)
MONOCYTES NFR BLD: 3 % (ref 2–12)
NEUTROPHILS NFR BLD: 82 % (ref 43–80)
NEUTS SEG NFR BLD: 3.19 K/UL (ref 1.8–7.3)
PH VENOUS: 7.43 (ref 7.35–7.45)
PLATELET # BLD AUTO: 162 K/UL (ref 130–450)
PMV BLD AUTO: 10.2 FL (ref 7–12)
POTASSIUM SERPL-SCNC: 3.4 MMOL/L (ref 3.5–5)
PROT SERPL-MCNC: 7.2 G/DL (ref 6.4–8.3)
RBC # BLD AUTO: 2.58 M/UL (ref 3.8–5.8)
RBC # BLD: ABNORMAL 10*6/UL
SARS-COV-2 RDRP RESP QL NAA+PROBE: NOT DETECTED
SODIUM SERPL-SCNC: 138 MMOL/L (ref 132–146)
SPECIMEN DESCRIPTION: NORMAL
TROPONIN I SERPL HS-MCNC: 32 NG/L (ref 0–11)
TROPONIN I SERPL HS-MCNC: 37 NG/L (ref 0–11)
WBC OTHER # BLD: 3.9 K/UL (ref 4.5–11.5)

## 2023-10-26 PROCEDURE — 84484 ASSAY OF TROPONIN QUANT: CPT

## 2023-10-26 PROCEDURE — 80053 COMPREHEN METABOLIC PANEL: CPT

## 2023-10-26 PROCEDURE — 6370000000 HC RX 637 (ALT 250 FOR IP): Performed by: FAMILY MEDICINE

## 2023-10-26 PROCEDURE — 93005 ELECTROCARDIOGRAM TRACING: CPT

## 2023-10-26 PROCEDURE — 74177 CT ABD & PELVIS W/CONTRAST: CPT

## 2023-10-26 PROCEDURE — 87635 SARS-COV-2 COVID-19 AMP PRB: CPT

## 2023-10-26 PROCEDURE — 83036 HEMOGLOBIN GLYCOSYLATED A1C: CPT

## 2023-10-26 PROCEDURE — 6360000002 HC RX W HCPCS: Performed by: FAMILY MEDICINE

## 2023-10-26 PROCEDURE — A4216 STERILE WATER/SALINE, 10 ML: HCPCS

## 2023-10-26 PROCEDURE — 96366 THER/PROPH/DIAG IV INF ADDON: CPT

## 2023-10-26 PROCEDURE — 82570 ASSAY OF URINE CREATININE: CPT

## 2023-10-26 PROCEDURE — 6360000002 HC RX W HCPCS

## 2023-10-26 PROCEDURE — 96365 THER/PROPH/DIAG IV INF INIT: CPT

## 2023-10-26 PROCEDURE — 96361 HYDRATE IV INFUSION ADD-ON: CPT

## 2023-10-26 PROCEDURE — 84300 ASSAY OF URINE SODIUM: CPT

## 2023-10-26 PROCEDURE — 73502 X-RAY EXAM HIP UNI 2-3 VIEWS: CPT

## 2023-10-26 PROCEDURE — 83935 ASSAY OF URINE OSMOLALITY: CPT

## 2023-10-26 PROCEDURE — 94640 AIRWAY INHALATION TREATMENT: CPT

## 2023-10-26 PROCEDURE — 2580000003 HC RX 258: Performed by: FAMILY MEDICINE

## 2023-10-26 PROCEDURE — 6360000002 HC RX W HCPCS: Performed by: STUDENT IN AN ORGANIZED HEALTH CARE EDUCATION/TRAINING PROGRAM

## 2023-10-26 PROCEDURE — 70450 CT HEAD/BRAIN W/O DYE: CPT

## 2023-10-26 PROCEDURE — 6370000000 HC RX 637 (ALT 250 FOR IP)

## 2023-10-26 PROCEDURE — 72128 CT CHEST SPINE W/O DYE: CPT

## 2023-10-26 PROCEDURE — 2580000003 HC RX 258

## 2023-10-26 PROCEDURE — C9113 INJ PANTOPRAZOLE SODIUM, VIA: HCPCS

## 2023-10-26 PROCEDURE — 96374 THER/PROPH/DIAG INJ IV PUSH: CPT

## 2023-10-26 PROCEDURE — 84133 ASSAY OF URINE POTASSIUM: CPT

## 2023-10-26 PROCEDURE — 6360000004 HC RX CONTRAST MEDICATION: Performed by: RADIOLOGY

## 2023-10-26 PROCEDURE — 96368 THER/DIAG CONCURRENT INF: CPT

## 2023-10-26 PROCEDURE — 96375 TX/PRO/DX INJ NEW DRUG ADDON: CPT

## 2023-10-26 PROCEDURE — 99223 1ST HOSP IP/OBS HIGH 75: CPT | Performed by: FAMILY MEDICINE

## 2023-10-26 PROCEDURE — 82140 ASSAY OF AMMONIA: CPT

## 2023-10-26 PROCEDURE — 96376 TX/PRO/DX INJ SAME DRUG ADON: CPT

## 2023-10-26 PROCEDURE — 99285 EMERGENCY DEPT VISIT HI MDM: CPT

## 2023-10-26 PROCEDURE — 82800 BLOOD PH: CPT

## 2023-10-26 PROCEDURE — 85025 COMPLETE CBC W/AUTO DIFF WBC: CPT

## 2023-10-26 PROCEDURE — 93010 ELECTROCARDIOGRAM REPORT: CPT | Performed by: INTERNAL MEDICINE

## 2023-10-26 PROCEDURE — 72125 CT NECK SPINE W/O DYE: CPT

## 2023-10-26 PROCEDURE — 2060000000 HC ICU INTERMEDIATE R&B

## 2023-10-26 PROCEDURE — 80307 DRUG TEST PRSMV CHEM ANLYZR: CPT

## 2023-10-26 PROCEDURE — 71260 CT THORAX DX C+: CPT

## 2023-10-26 PROCEDURE — 83735 ASSAY OF MAGNESIUM: CPT

## 2023-10-26 PROCEDURE — 72131 CT LUMBAR SPINE W/O DYE: CPT

## 2023-10-26 PROCEDURE — 84156 ASSAY OF PROTEIN URINE: CPT

## 2023-10-26 PROCEDURE — 71046 X-RAY EXAM CHEST 2 VIEWS: CPT

## 2023-10-26 RX ORDER — ACETAMINOPHEN 325 MG/1
650 TABLET ORAL EVERY 6 HOURS PRN
Status: DISCONTINUED | OUTPATIENT
Start: 2023-10-26 | End: 2023-10-27

## 2023-10-26 RX ORDER — ESCITALOPRAM OXALATE 10 MG/1
10 TABLET ORAL DAILY
Status: DISCONTINUED | OUTPATIENT
Start: 2023-10-27 | End: 2023-11-02 | Stop reason: HOSPADM

## 2023-10-26 RX ORDER — LORAZEPAM 2 MG/ML
2 INJECTION INTRAMUSCULAR ONCE
Status: COMPLETED | OUTPATIENT
Start: 2023-10-26 | End: 2023-10-26

## 2023-10-26 RX ORDER — PROCHLORPERAZINE EDISYLATE 5 MG/ML
10 INJECTION INTRAMUSCULAR; INTRAVENOUS EVERY 6 HOURS PRN
Status: DISCONTINUED | OUTPATIENT
Start: 2023-10-26 | End: 2023-11-02 | Stop reason: HOSPADM

## 2023-10-26 RX ORDER — SODIUM CHLORIDE 9 MG/ML
INJECTION, SOLUTION INTRAVENOUS PRN
Status: DISCONTINUED | OUTPATIENT
Start: 2023-10-26 | End: 2023-11-02 | Stop reason: HOSPADM

## 2023-10-26 RX ORDER — SODIUM CHLORIDE 0.9 % (FLUSH) 0.9 %
5-40 SYRINGE (ML) INJECTION EVERY 12 HOURS SCHEDULED
Status: DISCONTINUED | OUTPATIENT
Start: 2023-10-26 | End: 2023-11-02 | Stop reason: HOSPADM

## 2023-10-26 RX ORDER — LEVOFLOXACIN 500 MG/1
500 TABLET, FILM COATED ORAL DAILY
Status: COMPLETED | OUTPATIENT
Start: 2023-10-26 | End: 2023-10-30

## 2023-10-26 RX ORDER — LORAZEPAM 2 MG/ML
1 INJECTION INTRAMUSCULAR
Status: DISCONTINUED | OUTPATIENT
Start: 2023-10-26 | End: 2023-11-02 | Stop reason: HOSPADM

## 2023-10-26 RX ORDER — PHENOBARBITAL SODIUM 65 MG/ML
130 INJECTION INTRAMUSCULAR ONCE
Status: COMPLETED | OUTPATIENT
Start: 2023-10-26 | End: 2023-10-26

## 2023-10-26 RX ORDER — LORAZEPAM 2 MG/ML
4 INJECTION INTRAMUSCULAR
Status: DISCONTINUED | OUTPATIENT
Start: 2023-10-26 | End: 2023-11-02 | Stop reason: HOSPADM

## 2023-10-26 RX ORDER — ISOSORBIDE DINITRATE 20 MG/1
10 TABLET ORAL 2 TIMES DAILY
Status: DISCONTINUED | OUTPATIENT
Start: 2023-10-26 | End: 2023-11-02 | Stop reason: HOSPADM

## 2023-10-26 RX ORDER — MORPHINE SULFATE 4 MG/ML
4 INJECTION, SOLUTION INTRAMUSCULAR; INTRAVENOUS ONCE
Status: COMPLETED | OUTPATIENT
Start: 2023-10-26 | End: 2023-10-26

## 2023-10-26 RX ORDER — ONDANSETRON 2 MG/ML
4 INJECTION INTRAMUSCULAR; INTRAVENOUS ONCE
Status: COMPLETED | OUTPATIENT
Start: 2023-10-26 | End: 2023-10-26

## 2023-10-26 RX ORDER — PHENOBARBITAL SODIUM 65 MG/ML
65 INJECTION INTRAMUSCULAR 4 TIMES DAILY
Status: COMPLETED | OUTPATIENT
Start: 2023-10-27 | End: 2023-10-28

## 2023-10-26 RX ORDER — IPRATROPIUM BROMIDE AND ALBUTEROL SULFATE 2.5; .5 MG/3ML; MG/3ML
1 SOLUTION RESPIRATORY (INHALATION)
Status: DISCONTINUED | OUTPATIENT
Start: 2023-10-26 | End: 2023-11-02 | Stop reason: HOSPADM

## 2023-10-26 RX ORDER — HYDROMORPHONE HYDROCHLORIDE 1 MG/ML
0.5 INJECTION, SOLUTION INTRAMUSCULAR; INTRAVENOUS; SUBCUTANEOUS ONCE
Status: COMPLETED | OUTPATIENT
Start: 2023-10-26 | End: 2023-10-26

## 2023-10-26 RX ORDER — LORAZEPAM 1 MG/1
1 TABLET ORAL
Status: DISCONTINUED | OUTPATIENT
Start: 2023-10-26 | End: 2023-11-02 | Stop reason: HOSPADM

## 2023-10-26 RX ORDER — SODIUM CHLORIDE, SODIUM LACTATE, POTASSIUM CHLORIDE, CALCIUM CHLORIDE 600; 310; 30; 20 MG/100ML; MG/100ML; MG/100ML; MG/100ML
INJECTION, SOLUTION INTRAVENOUS CONTINUOUS
Status: DISCONTINUED | OUTPATIENT
Start: 2023-10-26 | End: 2023-10-29

## 2023-10-26 RX ORDER — LORAZEPAM 2 MG/ML
2 INJECTION INTRAMUSCULAR
Status: DISCONTINUED | OUTPATIENT
Start: 2023-10-26 | End: 2023-11-02 | Stop reason: HOSPADM

## 2023-10-26 RX ORDER — LORAZEPAM 2 MG/ML
3 INJECTION INTRAMUSCULAR
Status: DISCONTINUED | OUTPATIENT
Start: 2023-10-26 | End: 2023-11-02 | Stop reason: HOSPADM

## 2023-10-26 RX ORDER — LORAZEPAM 1 MG/1
4 TABLET ORAL
Status: DISCONTINUED | OUTPATIENT
Start: 2023-10-26 | End: 2023-11-02 | Stop reason: HOSPADM

## 2023-10-26 RX ORDER — NICOTINE 21 MG/24HR
1 PATCH, TRANSDERMAL 24 HOURS TRANSDERMAL DAILY
Status: DISCONTINUED | OUTPATIENT
Start: 2023-10-26 | End: 2023-11-02 | Stop reason: HOSPADM

## 2023-10-26 RX ORDER — PHENOBARBITAL SODIUM 65 MG/ML
65 INJECTION INTRAMUSCULAR EVERY 6 HOURS PRN
Status: DISPENSED | OUTPATIENT
Start: 2023-10-26 | End: 2023-10-27

## 2023-10-26 RX ORDER — IPRATROPIUM BROMIDE AND ALBUTEROL SULFATE 2.5; .5 MG/3ML; MG/3ML
1 SOLUTION RESPIRATORY (INHALATION) EVERY 4 HOURS PRN
Status: DISCONTINUED | OUTPATIENT
Start: 2023-10-26 | End: 2023-11-02 | Stop reason: HOSPADM

## 2023-10-26 RX ORDER — PHENOBARBITAL SODIUM 65 MG/ML
32.5 INJECTION INTRAMUSCULAR EVERY 6 HOURS PRN
Status: DISPENSED | OUTPATIENT
Start: 2023-10-27 | End: 2023-10-29

## 2023-10-26 RX ORDER — LIDOCAINE 4 G/G
1 PATCH TOPICAL DAILY
Status: DISCONTINUED | OUTPATIENT
Start: 2023-10-26 | End: 2023-11-02 | Stop reason: HOSPADM

## 2023-10-26 RX ORDER — ACETAMINOPHEN 325 MG/1
650 TABLET ORAL EVERY 6 HOURS PRN
Status: DISCONTINUED | OUTPATIENT
Start: 2023-10-26 | End: 2023-10-29

## 2023-10-26 RX ORDER — PHENOBARBITAL SODIUM 65 MG/ML
16.2 INJECTION INTRAMUSCULAR 2 TIMES DAILY
Status: DISCONTINUED | OUTPATIENT
Start: 2023-10-30 | End: 2023-10-30 | Stop reason: ALTCHOICE

## 2023-10-26 RX ORDER — ARFORMOTEROL TARTRATE 15 UG/2ML
15 SOLUTION RESPIRATORY (INHALATION)
Status: DISCONTINUED | OUTPATIENT
Start: 2023-10-26 | End: 2023-11-02 | Stop reason: HOSPADM

## 2023-10-26 RX ORDER — CLONIDINE HYDROCHLORIDE 0.2 MG/1
0.2 TABLET ORAL 3 TIMES DAILY
Status: DISCONTINUED | OUTPATIENT
Start: 2023-10-26 | End: 2023-11-02 | Stop reason: HOSPADM

## 2023-10-26 RX ORDER — LORAZEPAM 1 MG/1
2 TABLET ORAL
Status: DISCONTINUED | OUTPATIENT
Start: 2023-10-26 | End: 2023-11-02 | Stop reason: HOSPADM

## 2023-10-26 RX ORDER — SODIUM CHLORIDE 0.9 % (FLUSH) 0.9 %
5-40 SYRINGE (ML) INJECTION PRN
Status: DISCONTINUED | OUTPATIENT
Start: 2023-10-26 | End: 2023-11-02 | Stop reason: HOSPADM

## 2023-10-26 RX ORDER — BUDESONIDE 0.5 MG/2ML
0.5 INHALANT ORAL
Status: DISCONTINUED | OUTPATIENT
Start: 2023-10-26 | End: 2023-11-02 | Stop reason: HOSPADM

## 2023-10-26 RX ORDER — FOLIC ACID 1 MG/1
1 TABLET ORAL ONCE
Status: COMPLETED | OUTPATIENT
Start: 2023-10-26 | End: 2023-10-26

## 2023-10-26 RX ORDER — FENTANYL CITRATE 0.05 MG/ML
50 INJECTION, SOLUTION INTRAMUSCULAR; INTRAVENOUS ONCE
Status: COMPLETED | OUTPATIENT
Start: 2023-10-26 | End: 2023-10-26

## 2023-10-26 RX ORDER — GUAIFENESIN 600 MG/1
600 TABLET, EXTENDED RELEASE ORAL 2 TIMES DAILY
Status: DISCONTINUED | OUTPATIENT
Start: 2023-10-26 | End: 2023-11-02 | Stop reason: HOSPADM

## 2023-10-26 RX ORDER — MAGNESIUM SULFATE IN WATER 40 MG/ML
2000 INJECTION, SOLUTION INTRAVENOUS ONCE
Status: COMPLETED | OUTPATIENT
Start: 2023-10-26 | End: 2023-10-27

## 2023-10-26 RX ORDER — LORAZEPAM 1 MG/1
3 TABLET ORAL
Status: DISCONTINUED | OUTPATIENT
Start: 2023-10-26 | End: 2023-11-02 | Stop reason: HOSPADM

## 2023-10-26 RX ORDER — M-VIT,TX,IRON,MINS/CALC/FOLIC 27MG-0.4MG
1 TABLET ORAL DAILY
Status: DISCONTINUED | OUTPATIENT
Start: 2023-10-26 | End: 2023-11-02 | Stop reason: HOSPADM

## 2023-10-26 RX ORDER — PHENOBARBITAL SODIUM 65 MG/ML
16.2 INJECTION INTRAMUSCULAR EVERY 6 HOURS PRN
Status: DISCONTINUED | OUTPATIENT
Start: 2023-10-29 | End: 2023-10-30 | Stop reason: ALTCHOICE

## 2023-10-26 RX ORDER — IPRATROPIUM BROMIDE AND ALBUTEROL SULFATE 2.5; .5 MG/3ML; MG/3ML
1 SOLUTION RESPIRATORY (INHALATION)
Status: COMPLETED | OUTPATIENT
Start: 2023-10-26 | End: 2023-10-26

## 2023-10-26 RX ORDER — GAUZE BANDAGE 2" X 2"
100 BANDAGE TOPICAL DAILY
Status: DISCONTINUED | OUTPATIENT
Start: 2023-10-26 | End: 2023-11-02 | Stop reason: HOSPADM

## 2023-10-26 RX ORDER — PHENOBARBITAL SODIUM 65 MG/ML
32.5 INJECTION INTRAMUSCULAR 4 TIMES DAILY
Status: COMPLETED | OUTPATIENT
Start: 2023-10-28 | End: 2023-10-28

## 2023-10-26 RX ORDER — MAGNESIUM SULFATE IN WATER 40 MG/ML
2000 INJECTION, SOLUTION INTRAVENOUS ONCE
Status: COMPLETED | OUTPATIENT
Start: 2023-10-26 | End: 2023-10-26

## 2023-10-26 RX ORDER — ACETAMINOPHEN 650 MG/1
650 SUPPOSITORY RECTAL EVERY 6 HOURS PRN
Status: DISCONTINUED | OUTPATIENT
Start: 2023-10-26 | End: 2023-10-27

## 2023-10-26 RX ORDER — PREDNISONE 20 MG/1
40 TABLET ORAL DAILY
Status: COMPLETED | OUTPATIENT
Start: 2023-10-26 | End: 2023-10-30

## 2023-10-26 RX ORDER — ACETAMINOPHEN 650 MG/1
650 SUPPOSITORY RECTAL EVERY 6 HOURS PRN
Status: DISCONTINUED | OUTPATIENT
Start: 2023-10-26 | End: 2023-10-29

## 2023-10-26 RX ORDER — POTASSIUM CHLORIDE 7.45 MG/ML
10 INJECTION INTRAVENOUS ONCE
Status: COMPLETED | OUTPATIENT
Start: 2023-10-26 | End: 2023-10-26

## 2023-10-26 RX ORDER — PHENOBARBITAL SODIUM 65 MG/ML
32.5 INJECTION INTRAMUSCULAR 2 TIMES DAILY
Status: COMPLETED | OUTPATIENT
Start: 2023-10-29 | End: 2023-10-29

## 2023-10-26 RX ORDER — 0.9 % SODIUM CHLORIDE 0.9 %
1000 INTRAVENOUS SOLUTION INTRAVENOUS ONCE
Status: COMPLETED | OUTPATIENT
Start: 2023-10-26 | End: 2023-10-26

## 2023-10-26 RX ADMIN — Medication 100 MG: at 14:07

## 2023-10-26 RX ADMIN — POTASSIUM CHLORIDE 10 MEQ: 7.46 INJECTION, SOLUTION INTRAVENOUS at 15:26

## 2023-10-26 RX ADMIN — LORAZEPAM 2 MG: 2 INJECTION INTRAMUSCULAR; INTRAVENOUS at 19:53

## 2023-10-26 RX ADMIN — LEVOFLOXACIN 500 MG: 500 TABLET, FILM COATED ORAL at 22:30

## 2023-10-26 RX ADMIN — BUDESONIDE INHALATION 500 MCG: 0.5 SUSPENSION RESPIRATORY (INHALATION) at 23:09

## 2023-10-26 RX ADMIN — IPRATROPIUM BROMIDE AND ALBUTEROL SULFATE 1 DOSE: .5; 2.5 SOLUTION RESPIRATORY (INHALATION) at 23:07

## 2023-10-26 RX ADMIN — SODIUM CHLORIDE 40 MG: 9 INJECTION INTRAMUSCULAR; INTRAVENOUS; SUBCUTANEOUS at 15:21

## 2023-10-26 RX ADMIN — IPRATROPIUM BROMIDE AND ALBUTEROL SULFATE 1 DOSE: .5; 3 SOLUTION RESPIRATORY (INHALATION) at 14:16

## 2023-10-26 RX ADMIN — MORPHINE SULFATE 4 MG: 4 INJECTION, SOLUTION INTRAMUSCULAR; INTRAVENOUS at 17:29

## 2023-10-26 RX ADMIN — LORAZEPAM 2 MG: 2 INJECTION INTRAMUSCULAR; INTRAVENOUS at 17:29

## 2023-10-26 RX ADMIN — IPRATROPIUM BROMIDE AND ALBUTEROL SULFATE 1 DOSE: .5; 3 SOLUTION RESPIRATORY (INHALATION) at 14:14

## 2023-10-26 RX ADMIN — PHENOBARBITAL SODIUM 130 MG: 65 INJECTION INTRAMUSCULAR at 22:29

## 2023-10-26 RX ADMIN — FOLIC ACID 1 MG: 1 TABLET ORAL at 14:07

## 2023-10-26 RX ADMIN — GUAIFENESIN 600 MG: 600 TABLET, EXTENDED RELEASE ORAL at 22:30

## 2023-10-26 RX ADMIN — FENTANYL CITRATE 50 MCG: 0.05 INJECTION, SOLUTION INTRAMUSCULAR; INTRAVENOUS at 14:08

## 2023-10-26 RX ADMIN — LORAZEPAM 2 MG: 2 INJECTION INTRAMUSCULAR; INTRAVENOUS at 22:29

## 2023-10-26 RX ADMIN — IOPAMIDOL 75 ML: 755 INJECTION, SOLUTION INTRAVENOUS at 17:17

## 2023-10-26 RX ADMIN — MAGNESIUM SULFATE HEPTAHYDRATE 2000 MG: 40 INJECTION, SOLUTION INTRAVENOUS at 22:24

## 2023-10-26 RX ADMIN — LORAZEPAM 2 MG: 2 INJECTION INTRAMUSCULAR; INTRAVENOUS at 18:32

## 2023-10-26 RX ADMIN — Medication 10 ML: at 22:47

## 2023-10-26 RX ADMIN — ISOSORBIDE DINITRATE 10 MG: 20 TABLET ORAL at 22:30

## 2023-10-26 RX ADMIN — HYDROMORPHONE HYDROCHLORIDE 0.5 MG: 1 INJECTION, SOLUTION INTRAMUSCULAR; INTRAVENOUS; SUBCUTANEOUS at 18:32

## 2023-10-26 RX ADMIN — ARFORMOTEROL TARTRATE 15 MCG: 15 SOLUTION RESPIRATORY (INHALATION) at 23:09

## 2023-10-26 RX ADMIN — SODIUM CHLORIDE 1000 ML: 9 INJECTION, SOLUTION INTRAVENOUS at 14:09

## 2023-10-26 RX ADMIN — CLONIDINE HYDROCHLORIDE 0.2 MG: 0.2 TABLET ORAL at 22:30

## 2023-10-26 RX ADMIN — IPRATROPIUM BROMIDE AND ALBUTEROL SULFATE 1 DOSE: .5; 3 SOLUTION RESPIRATORY (INHALATION) at 14:15

## 2023-10-26 RX ADMIN — PREDNISONE 40 MG: 20 TABLET ORAL at 22:29

## 2023-10-26 RX ADMIN — MULTIPLE VITAMINS W/ MINERALS TAB 1 TABLET: TAB at 22:30

## 2023-10-26 RX ADMIN — ONDANSETRON 4 MG: 2 INJECTION INTRAMUSCULAR; INTRAVENOUS at 14:08

## 2023-10-26 RX ADMIN — LORAZEPAM 2 MG: 2 INJECTION INTRAMUSCULAR; INTRAVENOUS at 15:28

## 2023-10-26 RX ADMIN — MAGNESIUM SULFATE HEPTAHYDRATE 2000 MG: 40 INJECTION, SOLUTION INTRAVENOUS at 15:26

## 2023-10-26 RX ADMIN — METOPROLOL TARTRATE 75 MG: 25 TABLET, FILM COATED ORAL at 22:30

## 2023-10-26 RX ADMIN — SODIUM CHLORIDE, PRESERVATIVE FREE 10 ML: 5 INJECTION INTRAVENOUS at 22:31

## 2023-10-26 RX ADMIN — HYDROMORPHONE HYDROCHLORIDE 0.5 MG: 1 INJECTION, SOLUTION INTRAMUSCULAR; INTRAVENOUS; SUBCUTANEOUS at 19:53

## 2023-10-26 RX ADMIN — LORAZEPAM 2 MG: 2 INJECTION INTRAMUSCULAR; INTRAVENOUS at 14:07

## 2023-10-26 RX ADMIN — ACETAMINOPHEN 650 MG: 325 TABLET ORAL at 22:40

## 2023-10-26 RX ADMIN — MORPHINE SULFATE 4 MG: 4 INJECTION, SOLUTION INTRAMUSCULAR; INTRAVENOUS at 15:28

## 2023-10-26 ASSESSMENT — PAIN DESCRIPTION - LOCATION
LOCATION: BUTTOCKS
LOCATION: HEAD;BUTTOCKS
LOCATION: BUTTOCKS
LOCATION: BUTTOCKS;ABDOMEN

## 2023-10-26 ASSESSMENT — LIFESTYLE VARIABLES
HOW MANY STANDARD DRINKS CONTAINING ALCOHOL DO YOU HAVE ON A TYPICAL DAY: 1 OR 2
HOW OFTEN DO YOU HAVE A DRINK CONTAINING ALCOHOL: 2-3 TIMES A WEEK
HOW OFTEN DO YOU HAVE A DRINK CONTAINING ALCOHOL: MONTHLY OR LESS
HOW MANY STANDARD DRINKS CONTAINING ALCOHOL DO YOU HAVE ON A TYPICAL DAY: 3 OR 4

## 2023-10-26 ASSESSMENT — PAIN SCALES - GENERAL
PAINLEVEL_OUTOF10: 10

## 2023-10-26 NOTE — ED NOTES
Report to Bayard mFoundry, RN. All questions answered at this time.       Kevon Bocanegra RN  10/26/23 2263

## 2023-10-26 NOTE — ED PROVIDER NOTES
prior EKG [KM]   1549 Patient is Hemoccult positive. Brown stool with no gross blood noted. No hemorrhoids palpated [KM]   1550 Patient reports he is feeling a bit better [KM]      ED Course User Index  [KM] Xavier Knapp MD      He is a 69-year-old male with a history of HTN, HLD, asthma, COPD, prior MI, alcohol abuse presenting for fall/syncopal episode 3 days ago with associated C-spine, T-spine, L-spine pain as well as nausea and vomiting x3 days. Patient also presenting for shortness of breath with associated chest pressure and cough x1 week worsening today. Differentials include but not limited to ACS, pneumonia, PTX, intracranial hemorrhage, C-spine/L-spine/T-spine fracture, alcohol withdrawal, viral illness. At the time of my exam the patient is resting in bed in no acute distress but appears uncomfortable due to pain and has tremor at this time. Vitals show blood pressure 147/56, heart rate 120. Vitals otherwise unremarkable and he is 99% on supplemental oxygen at this time. He is afebrile. Exam shows large area of ecchymosis in the right hip/buttock region. He has tenderness to palpation of C-spine, T-spine, L-spine with no step-offs. Abdomen is soft and nontender. Heart regular rate and rhythm. Lungs are wheezy throughout with somewhat decreased breath sounds. He has no peripheral edema noted. Head is atraumatic. He is alert and oriented x3 and answering questions appropriately. CBC shows decreasing WBC to 3.9, decrease of hemoglobin to 7.8 (from 10.6 prior 4 weeks ago). Patient is Hemoccult positive but no gross blood noted on exam.  CMP shows mild hypokalemia to 3.4, elevated BUN to 34, elevated creatinine to 1.5 from baseline of 1.0, hypomagnesemia to 0.9. Potassium and magnesium replacement given. Glucose was 148. Ammonia was normal.  EKG showed mild sinus tachycardia but otherwise unremarkable. Troponin was 37 and 32. He is COVID-negative.   Chest x-ray shows no acute

## 2023-10-26 NOTE — ED NOTES
CIWA 12. Pt given 2mg of ativan as a one time dose. Spoke with Dr Kenneth Leahy. To hold off until 1500 to repeat CIWA and medicate per PRN orders.       Syed Dangelo RN  10/26/23 8959

## 2023-10-27 ENCOUNTER — APPOINTMENT (OUTPATIENT)
Dept: ULTRASOUND IMAGING | Age: 58
DRG: 394 | End: 2023-10-27
Payer: MEDICARE

## 2023-10-27 PROBLEM — K92.2 UPPER GI HEMORRHAGE: Status: ACTIVE | Noted: 2023-10-27

## 2023-10-27 PROBLEM — F10.939 ALCOHOL WITHDRAWAL SYNDROME WITH COMPLICATION (HCC): Status: ACTIVE | Noted: 2023-09-15

## 2023-10-27 PROBLEM — I50.33 ACUTE ON CHRONIC HEART FAILURE WITH PRESERVED EJECTION FRACTION (HCC): Status: ACTIVE | Noted: 2023-07-07

## 2023-10-27 PROBLEM — D62 ACUTE BLOOD LOSS ANEMIA: Status: ACTIVE | Noted: 2023-10-27

## 2023-10-27 LAB
ALBUMIN SERPL-MCNC: 3.9 G/DL (ref 3.5–5.2)
ALP SERPL-CCNC: 130 U/L (ref 40–129)
ALT SERPL-CCNC: 30 U/L (ref 0–40)
AMPHET UR QL SCN: NEGATIVE
ANION GAP SERPL CALCULATED.3IONS-SCNC: 14 MMOL/L (ref 7–16)
AST SERPL-CCNC: 44 U/L (ref 0–39)
B PARAP IS1001 DNA NPH QL NAA+NON-PROBE: NOT DETECTED
B PERT DNA SPEC QL NAA+PROBE: NOT DETECTED
BARBITURATES UR QL SCN: POSITIVE
BASOPHILS # BLD: 0 K/UL (ref 0–0.2)
BASOPHILS NFR BLD: 0 % (ref 0–2)
BENZODIAZ UR QL: NEGATIVE
BILIRUB SERPL-MCNC: 0.8 MG/DL (ref 0–1.2)
BNP SERPL-MCNC: 1072 PG/ML (ref 0–450)
BUN SERPL-MCNC: 33 MG/DL (ref 6–20)
BUPRENORPHINE UR QL: NEGATIVE
C PNEUM DNA NPH QL NAA+NON-PROBE: NOT DETECTED
CALCIUM SERPL-MCNC: 8.6 MG/DL (ref 8.6–10.2)
CANNABINOIDS UR QL SCN: POSITIVE
CHLORIDE SERPL-SCNC: 96 MMOL/L (ref 98–107)
CO2 SERPL-SCNC: 25 MMOL/L (ref 22–29)
COCAINE UR QL SCN: NEGATIVE
CREAT SERPL-MCNC: 1.7 MG/DL (ref 0.7–1.2)
CREAT UR-MCNC: 106.8 MG/DL (ref 40–278)
CREAT UR-MCNC: 107.2 MG/DL (ref 40–278)
EOSINOPHIL # BLD: 0 K/UL (ref 0.05–0.5)
EOSINOPHILS RELATIVE PERCENT: 0 % (ref 0–6)
ERYTHROCYTE [DISTWIDTH] IN BLOOD BY AUTOMATED COUNT: 16.2 % (ref 11.5–15)
FENTANYL UR QL: NEGATIVE
FLUAV RNA NPH QL NAA+NON-PROBE: NOT DETECTED
FLUBV RNA NPH QL NAA+NON-PROBE: NOT DETECTED
GFR SERPL CREATININE-BSD FRML MDRD: 45 ML/MIN/1.73M2
GLUCOSE BLD-MCNC: 342 MG/DL (ref 74–99)
GLUCOSE SERPL-MCNC: 236 MG/DL (ref 74–99)
HADV DNA NPH QL NAA+NON-PROBE: NOT DETECTED
HBA1C MFR BLD: 6 % (ref 4–5.6)
HCOV 229E RNA NPH QL NAA+NON-PROBE: NOT DETECTED
HCOV HKU1 RNA NPH QL NAA+NON-PROBE: NOT DETECTED
HCOV NL63 RNA NPH QL NAA+NON-PROBE: NOT DETECTED
HCOV OC43 RNA NPH QL NAA+NON-PROBE: NOT DETECTED
HCT VFR BLD AUTO: 20.9 % (ref 37–54)
HCT VFR BLD AUTO: 23.1 % (ref 37–54)
HCT VFR BLD AUTO: 23.2 % (ref 37–54)
HCT VFR BLD AUTO: 23.5 % (ref 37–54)
HGB BLD-MCNC: 6.9 G/DL (ref 12.5–16.5)
HGB BLD-MCNC: 7.2 G/DL (ref 12.5–16.5)
HGB BLD-MCNC: 7.8 G/DL (ref 12.5–16.5)
HGB BLD-MCNC: 7.9 G/DL (ref 12.5–16.5)
HMPV RNA NPH QL NAA+NON-PROBE: NOT DETECTED
HPIV1 RNA NPH QL NAA+NON-PROBE: NOT DETECTED
HPIV2 RNA NPH QL NAA+NON-PROBE: NOT DETECTED
HPIV3 RNA NPH QL NAA+NON-PROBE: NOT DETECTED
HPIV4 RNA NPH QL NAA+NON-PROBE: NOT DETECTED
INR PPP: 1.2
IRON SATN MFR SERPL: 61 % (ref 20–55)
IRON SERPL-MCNC: 185 UG/DL (ref 59–158)
LACTATE BLDV-SCNC: 1.9 MMOL/L (ref 0.5–2.2)
LYMPHOCYTES NFR BLD: 0.19 K/UL (ref 1.5–4)
LYMPHOCYTES RELATIVE PERCENT: 4 % (ref 20–42)
M PNEUMO DNA NPH QL NAA+NON-PROBE: NOT DETECTED
MAGNESIUM SERPL-MCNC: 1.8 MG/DL (ref 1.6–2.6)
MCH RBC QN AUTO: 30.1 PG (ref 26–35)
MCHC RBC AUTO-ENTMCNC: 33 G/DL (ref 32–34.5)
MCV RBC AUTO: 91.3 FL (ref 80–99.9)
METAMYELOCYTES ABSOLUTE COUNT: 0.04 K/UL (ref 0–0.12)
METAMYELOCYTES: 1 % (ref 0–1)
METHADONE UR QL: NEGATIVE
MONOCYTES NFR BLD: 0.04 K/UL (ref 0.1–0.95)
MONOCYTES NFR BLD: 1 % (ref 2–12)
MYELOCYTES ABSOLUTE COUNT: 0.04 K/UL
MYELOCYTES: 1 %
NEUTROPHILS NFR BLD: 93 % (ref 43–80)
NEUTS SEG NFR BLD: 4.09 K/UL (ref 1.8–7.3)
OPIATES UR QL SCN: POSITIVE
OSMOLALITY UR: 420 MOSM/KG (ref 300–900)
OXYCODONE UR QL SCN: NEGATIVE
PARTIAL THROMBOPLASTIN TIME: 28 SEC (ref 24.5–35.1)
PCP UR QL SCN: NEGATIVE
PLATELET # BLD AUTO: 134 K/UL (ref 130–450)
PMV BLD AUTO: 10.7 FL (ref 7–12)
POTASSIUM SERPL-SCNC: 3.6 MMOL/L (ref 3.5–5)
POTASSIUM, UR: 20.8 MMOL/L
PROT SERPL-MCNC: 6.8 G/DL (ref 6.4–8.3)
PROTHROMBIN TIME: 13.3 SEC (ref 9.3–12.4)
RBC # BLD AUTO: 2.29 M/UL (ref 3.8–5.8)
RBC # BLD: ABNORMAL 10*6/UL
RSV RNA NPH QL NAA+NON-PROBE: NOT DETECTED
RV+EV RNA NPH QL NAA+NON-PROBE: NOT DETECTED
SARS-COV-2 RNA NPH QL NAA+NON-PROBE: NOT DETECTED
SODIUM SERPL-SCNC: 135 MMOL/L (ref 132–146)
SODIUM UR-SCNC: <20 MMOL/L
SPECIMEN DESCRIPTION: NORMAL
TEST INFORMATION: ABNORMAL
TIBC SERPL-MCNC: 301 UG/DL (ref 250–450)
TOTAL PROTEIN, URINE: 25 MG/DL (ref 0–12)
TOTAL PROTEIN, URINE: 26 MG/DL (ref 0–12)
URINE TOTAL PROTEIN CREATININE RATIO: 0.24 (ref 0–0.2)
WBC OTHER # BLD: 4.4 K/UL (ref 4.5–11.5)

## 2023-10-27 PROCEDURE — 99233 SBSQ HOSP IP/OBS HIGH 50: CPT | Performed by: INTERNAL MEDICINE

## 2023-10-27 PROCEDURE — 83735 ASSAY OF MAGNESIUM: CPT

## 2023-10-27 PROCEDURE — 6360000002 HC RX W HCPCS: Performed by: NURSE PRACTITIONER

## 2023-10-27 PROCEDURE — 6360000002 HC RX W HCPCS: Performed by: FAMILY MEDICINE

## 2023-10-27 PROCEDURE — 86900 BLOOD TYPING SEROLOGIC ABO: CPT

## 2023-10-27 PROCEDURE — 6370000000 HC RX 637 (ALT 250 FOR IP): Performed by: FAMILY MEDICINE

## 2023-10-27 PROCEDURE — 85025 COMPLETE CBC W/AUTO DIFF WBC: CPT

## 2023-10-27 PROCEDURE — 94640 AIRWAY INHALATION TREATMENT: CPT

## 2023-10-27 PROCEDURE — 2580000003 HC RX 258: Performed by: FAMILY MEDICINE

## 2023-10-27 PROCEDURE — 6360000002 HC RX W HCPCS

## 2023-10-27 PROCEDURE — 80053 COMPREHEN METABOLIC PANEL: CPT

## 2023-10-27 PROCEDURE — 83550 IRON BINDING TEST: CPT

## 2023-10-27 PROCEDURE — 2580000003 HC RX 258

## 2023-10-27 PROCEDURE — 86901 BLOOD TYPING SEROLOGIC RH(D): CPT

## 2023-10-27 PROCEDURE — A4216 STERILE WATER/SALINE, 10 ML: HCPCS | Performed by: INTERNAL MEDICINE

## 2023-10-27 PROCEDURE — 85018 HEMOGLOBIN: CPT

## 2023-10-27 PROCEDURE — 36430 TRANSFUSION BLD/BLD COMPNT: CPT

## 2023-10-27 PROCEDURE — 85014 HEMATOCRIT: CPT

## 2023-10-27 PROCEDURE — 0202U NFCT DS 22 TRGT SARS-COV-2: CPT

## 2023-10-27 PROCEDURE — 85610 PROTHROMBIN TIME: CPT

## 2023-10-27 PROCEDURE — P9016 RBC LEUKOCYTES REDUCED: HCPCS

## 2023-10-27 PROCEDURE — C9113 INJ PANTOPRAZOLE SODIUM, VIA: HCPCS | Performed by: FAMILY MEDICINE

## 2023-10-27 PROCEDURE — C9113 INJ PANTOPRAZOLE SODIUM, VIA: HCPCS | Performed by: INTERNAL MEDICINE

## 2023-10-27 PROCEDURE — 6370000000 HC RX 637 (ALT 250 FOR IP): Performed by: INTERNAL MEDICINE

## 2023-10-27 PROCEDURE — 6360000002 HC RX W HCPCS: Performed by: INTERNAL MEDICINE

## 2023-10-27 PROCEDURE — 2580000003 HC RX 258: Performed by: INTERNAL MEDICINE

## 2023-10-27 PROCEDURE — 82962 GLUCOSE BLOOD TEST: CPT

## 2023-10-27 PROCEDURE — 76705 ECHO EXAM OF ABDOMEN: CPT

## 2023-10-27 PROCEDURE — 85730 THROMBOPLASTIN TIME PARTIAL: CPT

## 2023-10-27 PROCEDURE — 83605 ASSAY OF LACTIC ACID: CPT

## 2023-10-27 PROCEDURE — 36415 COLL VENOUS BLD VENIPUNCTURE: CPT

## 2023-10-27 PROCEDURE — 83880 ASSAY OF NATRIURETIC PEPTIDE: CPT

## 2023-10-27 PROCEDURE — 99222 1ST HOSP IP/OBS MODERATE 55: CPT | Performed by: INTERNAL MEDICINE

## 2023-10-27 PROCEDURE — 6370000000 HC RX 637 (ALT 250 FOR IP)

## 2023-10-27 PROCEDURE — 86923 COMPATIBILITY TEST ELECTRIC: CPT

## 2023-10-27 PROCEDURE — 86850 RBC ANTIBODY SCREEN: CPT

## 2023-10-27 PROCEDURE — APPSS60 APP SPLIT SHARED TIME 46-60 MINUTES: Performed by: PHYSICIAN ASSISTANT

## 2023-10-27 PROCEDURE — 2580000003 HC RX 258: Performed by: NURSE PRACTITIONER

## 2023-10-27 PROCEDURE — 2060000000 HC ICU INTERMEDIATE R&B

## 2023-10-27 PROCEDURE — 83540 ASSAY OF IRON: CPT

## 2023-10-27 RX ORDER — ACETAMINOPHEN 500 MG
500 TABLET ORAL EVERY 6 HOURS SCHEDULED
Status: DISCONTINUED | OUTPATIENT
Start: 2023-10-27 | End: 2023-10-28

## 2023-10-27 RX ORDER — OXYCODONE HYDROCHLORIDE 5 MG/1
10 TABLET ORAL EVERY 4 HOURS PRN
Status: DISCONTINUED | OUTPATIENT
Start: 2023-10-27 | End: 2023-11-02 | Stop reason: HOSPADM

## 2023-10-27 RX ORDER — GLUCAGON 1 MG/ML
1 KIT INJECTION PRN
Status: DISCONTINUED | OUTPATIENT
Start: 2023-10-27 | End: 2023-11-02 | Stop reason: HOSPADM

## 2023-10-27 RX ORDER — INSULIN LISPRO 100 [IU]/ML
0-4 INJECTION, SOLUTION INTRAVENOUS; SUBCUTANEOUS
Status: DISCONTINUED | OUTPATIENT
Start: 2023-10-27 | End: 2023-11-02 | Stop reason: HOSPADM

## 2023-10-27 RX ORDER — DEXTROSE MONOHYDRATE 100 MG/ML
INJECTION, SOLUTION INTRAVENOUS CONTINUOUS PRN
Status: DISCONTINUED | OUTPATIENT
Start: 2023-10-27 | End: 2023-11-02 | Stop reason: HOSPADM

## 2023-10-27 RX ORDER — INSULIN LISPRO 100 [IU]/ML
0-4 INJECTION, SOLUTION INTRAVENOUS; SUBCUTANEOUS NIGHTLY
Status: DISCONTINUED | OUTPATIENT
Start: 2023-10-27 | End: 2023-11-02 | Stop reason: HOSPADM

## 2023-10-27 RX ORDER — OXYCODONE HYDROCHLORIDE 5 MG/1
5 TABLET ORAL EVERY 4 HOURS PRN
Status: DISCONTINUED | OUTPATIENT
Start: 2023-10-27 | End: 2023-11-02 | Stop reason: HOSPADM

## 2023-10-27 RX ORDER — INSULIN GLARGINE 100 [IU]/ML
5 INJECTION, SOLUTION SUBCUTANEOUS NIGHTLY
Status: DISCONTINUED | OUTPATIENT
Start: 2023-10-27 | End: 2023-11-02 | Stop reason: HOSPADM

## 2023-10-27 RX ORDER — SODIUM CHLORIDE 9 MG/ML
INJECTION, SOLUTION INTRAVENOUS PRN
Status: DISCONTINUED | OUTPATIENT
Start: 2023-10-27 | End: 2023-11-02 | Stop reason: HOSPADM

## 2023-10-27 RX ADMIN — BUDESONIDE INHALATION 500 MCG: 0.5 SUSPENSION RESPIRATORY (INHALATION) at 20:41

## 2023-10-27 RX ADMIN — LORAZEPAM 2 MG: 2 INJECTION INTRAMUSCULAR; INTRAVENOUS at 01:54

## 2023-10-27 RX ADMIN — ACETAMINOPHEN 650 MG: 325 TABLET ORAL at 04:36

## 2023-10-27 RX ADMIN — ESCITALOPRAM 10 MG: 10 TABLET, FILM COATED ORAL at 09:56

## 2023-10-27 RX ADMIN — CLONIDINE HYDROCHLORIDE 0.2 MG: 0.2 TABLET ORAL at 15:46

## 2023-10-27 RX ADMIN — LORAZEPAM 2 MG: 1 TABLET ORAL at 09:56

## 2023-10-27 RX ADMIN — LORAZEPAM 1 MG: 2 INJECTION INTRAMUSCULAR; INTRAVENOUS at 00:48

## 2023-10-27 RX ADMIN — SODIUM CHLORIDE, POTASSIUM CHLORIDE, SODIUM LACTATE AND CALCIUM CHLORIDE: 600; 310; 30; 20 INJECTION, SOLUTION INTRAVENOUS at 13:32

## 2023-10-27 RX ADMIN — LORAZEPAM 1 MG: 1 TABLET ORAL at 11:28

## 2023-10-27 RX ADMIN — PHENOBARBITAL SODIUM 65 MG: 65 INJECTION INTRAMUSCULAR; INTRAVENOUS at 04:37

## 2023-10-27 RX ADMIN — LEVOFLOXACIN 500 MG: 500 TABLET, FILM COATED ORAL at 09:57

## 2023-10-27 RX ADMIN — ARFORMOTEROL TARTRATE 15 MCG: 15 SOLUTION RESPIRATORY (INHALATION) at 20:41

## 2023-10-27 RX ADMIN — SODIUM CHLORIDE, PRESERVATIVE FREE 40 MG: 5 INJECTION INTRAVENOUS at 09:55

## 2023-10-27 RX ADMIN — PANTOPRAZOLE SODIUM 40 MG: 40 INJECTION, POWDER, FOR SOLUTION INTRAVENOUS at 21:13

## 2023-10-27 RX ADMIN — Medication 10 ML: at 21:14

## 2023-10-27 RX ADMIN — IPRATROPIUM BROMIDE AND ALBUTEROL SULFATE 1 DOSE: .5; 2.5 SOLUTION RESPIRATORY (INHALATION) at 15:44

## 2023-10-27 RX ADMIN — CLONIDINE HYDROCHLORIDE 0.2 MG: 0.2 TABLET ORAL at 21:06

## 2023-10-27 RX ADMIN — PHENOBARBITAL SODIUM 65 MG: 65 INJECTION INTRAMUSCULAR; INTRAVENOUS at 09:55

## 2023-10-27 RX ADMIN — GUAIFENESIN 600 MG: 600 TABLET, EXTENDED RELEASE ORAL at 09:56

## 2023-10-27 RX ADMIN — SODIUM CHLORIDE, POTASSIUM CHLORIDE, SODIUM LACTATE AND CALCIUM CHLORIDE: 600; 310; 30; 20 INJECTION, SOLUTION INTRAVENOUS at 00:51

## 2023-10-27 RX ADMIN — CLONIDINE HYDROCHLORIDE 0.2 MG: 0.2 TABLET ORAL at 09:56

## 2023-10-27 RX ADMIN — INSULIN LISPRO 4 UNITS: 100 INJECTION, SOLUTION INTRAVENOUS; SUBCUTANEOUS at 21:13

## 2023-10-27 RX ADMIN — GUAIFENESIN 600 MG: 600 TABLET, EXTENDED RELEASE ORAL at 21:10

## 2023-10-27 RX ADMIN — SODIUM CHLORIDE, PRESERVATIVE FREE 10 ML: 5 INJECTION INTRAVENOUS at 21:14

## 2023-10-27 RX ADMIN — Medication 100 MG: at 09:56

## 2023-10-27 RX ADMIN — PROCHLORPERAZINE EDISYLATE 10 MG: 5 INJECTION INTRAMUSCULAR; INTRAVENOUS at 19:38

## 2023-10-27 RX ADMIN — MULTIPLE VITAMINS W/ MINERALS TAB 1 TABLET: TAB at 09:56

## 2023-10-27 RX ADMIN — ACETAMINOPHEN 500 MG: 500 TABLET ORAL at 17:14

## 2023-10-27 RX ADMIN — PHENOBARBITAL SODIUM 65 MG: 65 INJECTION INTRAMUSCULAR; INTRAVENOUS at 21:05

## 2023-10-27 RX ADMIN — LORAZEPAM 2 MG: 1 TABLET ORAL at 17:14

## 2023-10-27 RX ADMIN — IPRATROPIUM BROMIDE AND ALBUTEROL SULFATE 1 DOSE: .5; 2.5 SOLUTION RESPIRATORY (INHALATION) at 20:41

## 2023-10-27 RX ADMIN — OCTREOTIDE ACETATE 50 MCG/HR: 500 INJECTION, SOLUTION INTRAVENOUS; SUBCUTANEOUS at 15:59

## 2023-10-27 RX ADMIN — ISOSORBIDE DINITRATE 10 MG: 20 TABLET ORAL at 09:56

## 2023-10-27 RX ADMIN — IPRATROPIUM BROMIDE AND ALBUTEROL SULFATE 1 DOSE: .5; 2.5 SOLUTION RESPIRATORY (INHALATION) at 05:06

## 2023-10-27 RX ADMIN — Medication 10 ML: at 09:58

## 2023-10-27 RX ADMIN — PREDNISONE 40 MG: 20 TABLET ORAL at 09:56

## 2023-10-27 RX ADMIN — METOPROLOL TARTRATE 75 MG: 25 TABLET, FILM COATED ORAL at 21:06

## 2023-10-27 RX ADMIN — LORAZEPAM 2 MG: 2 INJECTION INTRAMUSCULAR; INTRAVENOUS at 04:37

## 2023-10-27 RX ADMIN — LORAZEPAM 2 MG: 1 TABLET ORAL at 18:51

## 2023-10-27 RX ADMIN — PHENOBARBITAL SODIUM 65 MG: 65 INJECTION INTRAMUSCULAR; INTRAVENOUS at 15:46

## 2023-10-27 RX ADMIN — METOPROLOL TARTRATE 75 MG: 25 TABLET, FILM COATED ORAL at 09:56

## 2023-10-27 RX ADMIN — SODIUM CHLORIDE, PRESERVATIVE FREE 10 ML: 5 INJECTION INTRAVENOUS at 09:58

## 2023-10-27 RX ADMIN — IPRATROPIUM BROMIDE AND ALBUTEROL SULFATE 1 DOSE: .5; 2.5 SOLUTION RESPIRATORY (INHALATION) at 09:50

## 2023-10-27 RX ADMIN — LORAZEPAM 2 MG: 2 INJECTION INTRAMUSCULAR; INTRAVENOUS at 21:05

## 2023-10-27 RX ADMIN — OXYCODONE HYDROCHLORIDE 10 MG: 5 TABLET ORAL at 15:46

## 2023-10-27 RX ADMIN — ISOSORBIDE DINITRATE 10 MG: 20 TABLET ORAL at 21:06

## 2023-10-27 RX ADMIN — LORAZEPAM 2 MG: 1 TABLET ORAL at 15:46

## 2023-10-27 RX ADMIN — ACETAMINOPHEN 650 MG: 325 TABLET ORAL at 11:18

## 2023-10-27 RX ADMIN — BUDESONIDE INHALATION 500 MCG: 0.5 SUSPENSION RESPIRATORY (INHALATION) at 05:06

## 2023-10-27 RX ADMIN — OXYCODONE HYDROCHLORIDE 10 MG: 5 TABLET ORAL at 21:06

## 2023-10-27 RX ADMIN — INSULIN GLARGINE 5 UNITS: 100 INJECTION, SOLUTION SUBCUTANEOUS at 21:12

## 2023-10-27 RX ADMIN — ARFORMOTEROL TARTRATE 15 MCG: 15 SOLUTION RESPIRATORY (INHALATION) at 05:06

## 2023-10-27 ASSESSMENT — PAIN DESCRIPTION - DESCRIPTORS
DESCRIPTORS: THROBBING
DESCRIPTORS: ACHING;SORE
DESCRIPTORS: THROBBING;STABBING
DESCRIPTORS: ACHING

## 2023-10-27 ASSESSMENT — PAIN DESCRIPTION - LOCATION
LOCATION: BUTTOCKS
LOCATION: HIP
LOCATION: HIP;LEG
LOCATION: HEAD
LOCATION: BACK

## 2023-10-27 ASSESSMENT — PAIN DESCRIPTION - ORIENTATION
ORIENTATION: RIGHT

## 2023-10-27 ASSESSMENT — PAIN SCALES - GENERAL
PAINLEVEL_OUTOF10: 8
PAINLEVEL_OUTOF10: 5
PAINLEVEL_OUTOF10: 3
PAINLEVEL_OUTOF10: 5
PAINLEVEL_OUTOF10: 9

## 2023-10-27 NOTE — PLAN OF CARE
Problem: Discharge Planning  Goal: Discharge to home or other facility with appropriate resources  Outcome: Progressing     Problem: Pain  Goal: Verbalizes/displays adequate comfort level or baseline comfort level  Outcome: Progressing     Problem: Safety - Adult  Goal: Free from fall injury  Outcome: Progressing     Problem: Metabolic/Fluid and Electrolytes - Adult  Goal: Electrolytes maintained within normal limits  Outcome: Progressing     Problem: Respiratory - Adult  Goal: Achieves optimal ventilation and oxygenation  Outcome: Progressing

## 2023-10-27 NOTE — CARE COORDINATION
Case Management Assessment  Initial Evaluation    Date/Time of Evaluation: 10/27/2023 5:01 PM  Assessment Completed by: Stephanie Pradhan RN    If patient is discharged prior to next notation, then this note serves as note for discharge by case management. Patient Name: Kelsi Maria                   YOB: 1965  Diagnosis: COPD with acute exacerbation [facility]) [J44.1]                   Date / Time: 10/26/2023 12:59 PM    Patient Admission Status: Inpatient   Readmission Risk (Low < 19, Mod (19-27), High > 27): Readmission Risk Score: 28.6    Current PCP: Lenka Andrade MD  PCP verified by CM? Yes (Lenka Andrade)    Chart Reviewed: Yes      History Provided by: Patient  Patient Orientation: Alert and Oriented    Patient Cognition: Alert    Hospitalization in the last 30 days (Readmission):  No    If yes, Readmission Assessment in CM Navigator will be completed. Advance Directives:      Code Status: Limited   Patient's Primary Decision Maker is: Legal Next of Kin    Primary Decision MakerDfrancisco Enrique - Child - [phone]    Secondary Decision Maker: Lázaro Rutledge  Other - [phone]    Discharge Planning:    Patient lives with: Alone Type of Home: House  Primary Care Giver: Self  Patient Support Systems include: Children, Friends/Neighbors   Current Financial resources:    Current community resources:    Current services prior to admission: None            Current DME:              Type of Home Care services:  None    ADLS  Prior functional level: Shopping  Current functional level: Assistance with the following:, Shopping        Family can provide assistance at DC: Other (comment) (unclear at this time)  Would you like Case Management to discuss the discharge plan with any other family members/significant others, and if so, who?  Yes (pts son Concepcion Sanchez or friend Talita Shoemaker)  Plans to Return to Present Housing: Yes  Other Identified Issues/Barriers to RETURNING to current housing:

## 2023-10-27 NOTE — H&P
CONTRAST    Result Date: 10/26/2023  EXAMINATION: CT OF THE LUMBAR SPINE WITHOUT CONTRAST  10/26/2023 TECHNIQUE: CT of the lumbar spine was performed without the administration of intravenous contrast. Multiplanar reformatted images are provided for review. Adjustment of mA and/or kV according to patient size was utilized. Automated exposure control, iterative reconstruction, and/or weight based adjustment of the mA/kV was utilized to reduce the radiation dose to as low as reasonably achievable. COMPARISON: 09/17/2022 lumbar spine CT. HISTORY: ORDERING SYSTEM PROVIDED HISTORY: fall 3 days ago TECHNOLOGIST PROVIDED HISTORY: Reason for exam:->fall 3 days ago FINDINGS: BONES/ALIGNMENT: There are 5 lumbar vertebral bodies. The vertebral bodies are normal height and alignment. There is generalized osteopenia. No definite fractures. DEGENERATIVE CHANGES: There are marginal osteophytes at the anterior vertebral endplates. There is multilevel disc disease, moderate L2-3, mild at L3-4, mild at L4-5 with vacuum disc phenomenon and marked at L5-S1. The appearance however is similar. There is narrowing of the L5-S1 neural foramina as result of disc disease. There is moderate narrowing of the spinal canal at L4-5 secondary to disc disease and facet arthropathy. SOFT TISSUES/RETROPERITONEUM: No paraspinal mass is seen. No definite CT evidence of acute lumbar spine pathology. Spondylosis, disc disease and facet arthropathy, particularly at L4-5 and L5-S1. CT THORACIC SPINE WO CONTRAST    Result Date: 10/26/2023  EXAMINATION: CT OF THE THORACIC SPINE WITHOUT CONTRAST  10/26/2023 2:38 pm: TECHNIQUE: CT of the thoracic spine was performed without the administration of intravenous contrast. Multiplanar reformatted images are provided for review. Automated exposure control, iterative reconstruction, and/or weight based adjustment of the mA/kV was utilized to reduce the radiation dose to as low as reasonably achievable.

## 2023-10-27 NOTE — CONSULTS
Name:  Basilia Estevez  :  1965  MRN:  [id number]  Room:  [facility]5439-  DOS:  10/27/2023    Weston Paige  [facility]  Dr. Dolores Gomez M.D. Dr. Kecia Delgado M.D. DEBBIE Kerr.AMPARO. Dr. Marita Tovar M.D. Dr. Richar Herman D.O.     -NP Consult Note-    PCP:  Dominique Valentin MD  Admitting Physician:  Davey Gonzales DO  Consultants: GI  Chief Complaint:    Chief Complaint   Patient presents with    Shortness of Breath     X a few days, hx COPD, does not wear O2 at baseline, 1 duoneb and 125 solumedrol PTA     Fall     also reports a fall a few days ago, reports hitting head, denies thinners, unsure if LOC, also reports right hip pain       Reason for Consult: GI bleed, history of alcohol abuse. History of Present Illness  Basilia Estevez is a 62 y.o. male patient on consult for GI bleed, history of alcohol abuse. Patient reports a fall at home. He states that he attempted to get up from his chair and fell. He states there was a possible loss of consciousness. He has been more short of breath recently. He complains of generalized pain throughout his back, neck, and posterior head. This pain is chronic, although worse since his fall. He denies abdominal pain. He reports nausea and vomiting for the past 4 to 5 days. He reports small volumes of white liquid. He denies any hematemesis or emesis of coffee-ground material.  He reports that stools have been dark/black recently. He denies any hematochezia. Most recent bowel movement yesterday. At baseline, patient reports that he generally moves his bowels every 1 to 2 days. He complains of subjective fever with some chills. Reports chest pain, describing pressure. Reports worsening shortness of breath. PMH: COPD, hypertension, dyslipidemia, heart disease. PSH: Leg fracture surgery, knee surgery, cataract surgery. Cardiac catheterization without stent placement.   Reports prior EGD and colonoscopy, not

## 2023-10-27 NOTE — ACP (ADVANCE CARE PLANNING)
Advance Care Planning   Healthcare Decision Maker:    Primary Decision Maker: Desirae Saini [facility] - [phone]    Secondary Decision Maker: Fiordaliza Braga [facility] - [phone]

## 2023-10-27 NOTE — CONSULTS
INPATIENT CARDIOLOGY CONSULT     Reason for Consult: Recurrent syncope    Cardiologist: Dr. Antione Davies    Requesting Physician: Dr. Pavel Stack    Date of Consultation: 10/27/2023    HISTORY OF PRESENT ILLNESS:   Patient is a 62year old WM known to Dr. Tracy Ram.    He has a past medical history of HTN, HLD, prediabetes mellitus, chronic HFpEF, nonobstructive CAD, chronically elevated troponin, orthostatic hypotension, COPD with continued tobacco abuse, longstanding alcohol abuse, marijuana use, obesity, probable KULDIP, chronic back pain, recurrent mechanical falls, chronic anemia, history of hyponatremia, history of elevated LFTs, GERD, anxiety and depression. [address]:  April 2023 [facility] with complaints of N/V, generalized weakness. Poor oral intake. Subjective fever, wheezing, dyspnea. Still with alcohol and tobacco abuse. Noted to have IAS with hyponatremia. Treated for alcohol withdrawal, COPD exacerbation. Evaluated by Nephrology --ISA felt to be in setting of volume depletion with N/V/diarrhea and NSAID use. Creatinine returned to baseline with IV fluid resuscitation. July 2023 [facility] with complaints of mechanical fall. Admitted to excessive NSAID use. Noted to have recurrent ISA. Still with tobacco and alcohol abuse. He was noted to go through alcohol withdrawal, intubated for airway protection in setting of AMS. Creatinine returned to baseline. Readmission July 2023 [facility] with complaints of shortness of breath. Chills. Found to have community-acquired pneumonia and COPD exacerbation. Readmission September 2023 [facility] with shortness of breath and cough. Still with alcohol, tobacco and marijuana use. Treated for COPD exacerbation, COVID-19+ infection and alcohol withdrawal.      He presented to [facility] October 26, 2023 with multiple complaints, including shortness of breath, productive cough, N/V.   Of note, due to patient's UIMEM-24

## 2023-10-27 NOTE — PLAN OF CARE
Problem: Discharge Planning  Goal: Discharge to home or other facility with appropriate resources  Outcome: Progressing  Flowsheets (Taken 10/27/2023 0956)  Discharge to home or other facility with appropriate resources:   Identify barriers to discharge with patient and caregiver   Arrange for needed discharge resources and transportation as appropriate     Problem: Pain  Goal: Verbalizes/displays adequate comfort level or baseline comfort level  Outcome: Progressing  Flowsheets (Taken 10/27/2023 0956)  Verbalizes/displays adequate comfort level or baseline comfort level: Encourage patient to monitor pain and request assistance     Problem: Safety - Adult  Goal: Free from fall injury  Outcome: Progressing  Flowsheets (Taken 10/27/2023 0956)  Free From Fall Injury: Instruct family/caregiver on patient safety     Problem: Metabolic/Fluid and Electrolytes - Adult  Goal: Electrolytes maintained within normal limits  Outcome: Progressing  Flowsheets (Taken 10/27/2023 0956)  Electrolytes maintained within normal limits: Monitor labs and assess patient for signs and symptoms of electrolyte imbalances     Problem: Respiratory - Adult  Goal: Achieves optimal ventilation and oxygenation  Outcome: Progressing  Flowsheets (Taken 10/27/2023 0956)  Achieves optimal ventilation and oxygenation: Assess for changes in respiratory status     Problem: ABCDS Injury Assessment  Goal: Absence of physical injury  Outcome: Progressing

## 2023-10-28 ENCOUNTER — ANESTHESIA EVENT (OUTPATIENT)
Dept: ENDOSCOPY | Age: 58
End: 2023-10-28
Payer: MEDICARE

## 2023-10-28 ENCOUNTER — ANESTHESIA (OUTPATIENT)
Dept: ENDOSCOPY | Age: 58
End: 2023-10-28
Payer: MEDICARE

## 2023-10-28 LAB
ABO/RH: NORMAL
ALBUMIN SERPL-MCNC: 3.7 G/DL (ref 3.5–5.2)
ALP SERPL-CCNC: 118 U/L (ref 40–129)
ALT SERPL-CCNC: 25 U/L (ref 0–40)
ANION GAP SERPL CALCULATED.3IONS-SCNC: 15 MMOL/L (ref 7–16)
ANTIBODY SCREEN: NEGATIVE
ARM BAND NUMBER: NORMAL
AST SERPL-CCNC: 31 U/L (ref 0–39)
BASOPHILS # BLD: 0 K/UL (ref 0–0.2)
BASOPHILS NFR BLD: 0 % (ref 0–2)
BILIRUB DIRECT SERPL-MCNC: 0.3 MG/DL (ref 0–0.3)
BILIRUB INDIRECT SERPL-MCNC: 0.5 MG/DL (ref 0–1)
BILIRUB SERPL-MCNC: 0.8 MG/DL (ref 0–1.2)
BLOOD BANK BLOOD PRODUCT EXPIRATION DATE: NORMAL
BLOOD BANK DISPENSE STATUS: NORMAL
BLOOD BANK ISBT PRODUCT BLOOD TYPE: 7300
BLOOD BANK PRODUCT CODE: NORMAL
BLOOD BANK SAMPLE EXPIRATION: NORMAL
BLOOD BANK UNIT TYPE AND RH: NORMAL
BPU ID: NORMAL
BUN SERPL-MCNC: 30 MG/DL (ref 6–20)
CALCIUM SERPL-MCNC: 8.1 MG/DL (ref 8.6–10.2)
CHLORIDE SERPL-SCNC: 94 MMOL/L (ref 98–107)
CO2 SERPL-SCNC: 24 MMOL/L (ref 22–29)
COMPONENT: NORMAL
CREAT SERPL-MCNC: 1.8 MG/DL (ref 0.7–1.2)
CROSSMATCH RESULT: NORMAL
EOSINOPHIL # BLD: 0 K/UL (ref 0.05–0.5)
EOSINOPHILS RELATIVE PERCENT: 0 % (ref 0–6)
ERYTHROCYTE [DISTWIDTH] IN BLOOD BY AUTOMATED COUNT: 15.7 % (ref 11.5–15)
GFR SERPL CREATININE-BSD FRML MDRD: 43 ML/MIN/1.73M2
GLUCOSE BLD-MCNC: 240 MG/DL (ref 74–99)
GLUCOSE BLD-MCNC: 270 MG/DL (ref 74–99)
GLUCOSE BLD-MCNC: 277 MG/DL (ref 74–99)
GLUCOSE SERPL-MCNC: 229 MG/DL (ref 74–99)
HCT VFR BLD AUTO: 23.4 % (ref 37–54)
HGB BLD-MCNC: 7.8 G/DL (ref 12.5–16.5)
INR PPP: 1.2
LYMPHOCYTES NFR BLD: 0.3 K/UL (ref 1.5–4)
LYMPHOCYTES RELATIVE PERCENT: 4 % (ref 20–42)
MCH RBC QN AUTO: 30.6 PG (ref 26–35)
MCHC RBC AUTO-ENTMCNC: 33.3 G/DL (ref 32–34.5)
MCV RBC AUTO: 91.8 FL (ref 80–99.9)
MONOCYTES NFR BLD: 0.15 K/UL (ref 0.1–0.95)
MONOCYTES NFR BLD: 2 % (ref 2–12)
NEUTROPHILS NFR BLD: 95 % (ref 43–80)
NEUTS SEG NFR BLD: 8.14 K/UL (ref 1.8–7.3)
PLATELET # BLD AUTO: 150 K/UL (ref 130–450)
PMV BLD AUTO: 11.3 FL (ref 7–12)
POTASSIUM SERPL-SCNC: 3.5 MMOL/L (ref 3.5–5)
PROT SERPL-MCNC: 6.4 G/DL (ref 6.4–8.3)
PROTHROMBIN TIME: 14.2 SEC (ref 9.3–12.4)
RBC # BLD AUTO: 2.55 M/UL (ref 3.8–5.8)
RBC # BLD: NORMAL 10*6/UL
SODIUM SERPL-SCNC: 133 MMOL/L (ref 132–146)
TRANSFUSION STATUS: NORMAL
UNIT DIVISION: 0
UNIT ISSUE DATE/TIME: NORMAL
WBC OTHER # BLD: 8.6 K/UL (ref 4.5–11.5)

## 2023-10-28 PROCEDURE — 6360000002 HC RX W HCPCS

## 2023-10-28 PROCEDURE — 6370000000 HC RX 637 (ALT 250 FOR IP): Performed by: INTERNAL MEDICINE

## 2023-10-28 PROCEDURE — 3700000001 HC ADD 15 MINUTES (ANESTHESIA): Performed by: INTERNAL MEDICINE

## 2023-10-28 PROCEDURE — 7100000010 HC PHASE II RECOVERY - FIRST 15 MIN: Performed by: INTERNAL MEDICINE

## 2023-10-28 PROCEDURE — 2580000003 HC RX 258: Performed by: FAMILY MEDICINE

## 2023-10-28 PROCEDURE — 2060000000 HC ICU INTERMEDIATE R&B

## 2023-10-28 PROCEDURE — 3700000000 HC ANESTHESIA ATTENDED CARE: Performed by: INTERNAL MEDICINE

## 2023-10-28 PROCEDURE — 2709999900 HC NON-CHARGEABLE SUPPLY: Performed by: INTERNAL MEDICINE

## 2023-10-28 PROCEDURE — 94640 AIRWAY INHALATION TREATMENT: CPT

## 2023-10-28 PROCEDURE — 80053 COMPREHEN METABOLIC PANEL: CPT

## 2023-10-28 PROCEDURE — 88305 TISSUE EXAM BY PATHOLOGIST: CPT

## 2023-10-28 PROCEDURE — 2580000003 HC RX 258: Performed by: INTERNAL MEDICINE

## 2023-10-28 PROCEDURE — 6360000002 HC RX W HCPCS: Performed by: ANESTHESIOLOGY

## 2023-10-28 PROCEDURE — 97535 SELF CARE MNGMENT TRAINING: CPT | Performed by: OCCUPATIONAL THERAPIST

## 2023-10-28 PROCEDURE — 97165 OT EVAL LOW COMPLEX 30 MIN: CPT | Performed by: OCCUPATIONAL THERAPIST

## 2023-10-28 PROCEDURE — 97530 THERAPEUTIC ACTIVITIES: CPT | Performed by: OCCUPATIONAL THERAPIST

## 2023-10-28 PROCEDURE — 36415 COLL VENOUS BLD VENIPUNCTURE: CPT

## 2023-10-28 PROCEDURE — 6360000002 HC RX W HCPCS: Performed by: FAMILY MEDICINE

## 2023-10-28 PROCEDURE — 7100000011 HC PHASE II RECOVERY - ADDTL 15 MIN: Performed by: INTERNAL MEDICINE

## 2023-10-28 PROCEDURE — 3609012400 HC EGD TRANSORAL BIOPSY SINGLE/MULTIPLE: Performed by: INTERNAL MEDICINE

## 2023-10-28 PROCEDURE — 6360000002 HC RX W HCPCS: Performed by: INTERNAL MEDICINE

## 2023-10-28 PROCEDURE — 2580000003 HC RX 258: Performed by: NURSE PRACTITIONER

## 2023-10-28 PROCEDURE — 6370000000 HC RX 637 (ALT 250 FOR IP): Performed by: FAMILY MEDICINE

## 2023-10-28 PROCEDURE — 97161 PT EVAL LOW COMPLEX 20 MIN: CPT | Performed by: PHYSICAL THERAPIST

## 2023-10-28 PROCEDURE — 85025 COMPLETE CBC W/AUTO DIFF WBC: CPT

## 2023-10-28 PROCEDURE — 82962 GLUCOSE BLOOD TEST: CPT

## 2023-10-28 PROCEDURE — 0DB68ZX EXCISION OF STOMACH, VIA NATURAL OR ARTIFICIAL OPENING ENDOSCOPIC, DIAGNOSTIC: ICD-10-PCS | Performed by: INTERNAL MEDICINE

## 2023-10-28 PROCEDURE — 85610 PROTHROMBIN TIME: CPT

## 2023-10-28 PROCEDURE — 6360000002 HC RX W HCPCS: Performed by: NURSE PRACTITIONER

## 2023-10-28 PROCEDURE — 2500000003 HC RX 250 WO HCPCS

## 2023-10-28 PROCEDURE — 99232 SBSQ HOSP IP/OBS MODERATE 35: CPT | Performed by: INTERNAL MEDICINE

## 2023-10-28 PROCEDURE — 2580000003 HC RX 258

## 2023-10-28 PROCEDURE — 82248 BILIRUBIN DIRECT: CPT

## 2023-10-28 PROCEDURE — 97530 THERAPEUTIC ACTIVITIES: CPT | Performed by: PHYSICAL THERAPIST

## 2023-10-28 RX ORDER — MEPERIDINE HYDROCHLORIDE 25 MG/ML
12.5 INJECTION INTRAMUSCULAR; INTRAVENOUS; SUBCUTANEOUS ONCE
Status: DISCONTINUED | OUTPATIENT
Start: 2023-10-28 | End: 2023-10-28 | Stop reason: HOSPADM

## 2023-10-28 RX ORDER — PROCHLORPERAZINE EDISYLATE 5 MG/ML
5 INJECTION INTRAMUSCULAR; INTRAVENOUS
Status: COMPLETED | OUTPATIENT
Start: 2023-10-28 | End: 2023-10-28

## 2023-10-28 RX ORDER — PROPOFOL 10 MG/ML
INJECTION, EMULSION INTRAVENOUS PRN
Status: DISCONTINUED | OUTPATIENT
Start: 2023-10-28 | End: 2023-10-28 | Stop reason: SDUPTHER

## 2023-10-28 RX ORDER — ACETAMINOPHEN 500 MG
1000 TABLET ORAL EVERY 6 HOURS SCHEDULED
Status: DISCONTINUED | OUTPATIENT
Start: 2023-10-28 | End: 2023-10-30

## 2023-10-28 RX ORDER — LIDOCAINE HYDROCHLORIDE 20 MG/ML
INJECTION, SOLUTION EPIDURAL; INFILTRATION; INTRACAUDAL; PERINEURAL PRN
Status: DISCONTINUED | OUTPATIENT
Start: 2023-10-28 | End: 2023-10-28 | Stop reason: SDUPTHER

## 2023-10-28 RX ORDER — SODIUM CHLORIDE 9 MG/ML
INJECTION, SOLUTION INTRAVENOUS PRN
Status: DISCONTINUED | OUTPATIENT
Start: 2023-10-28 | End: 2023-10-28 | Stop reason: HOSPADM

## 2023-10-28 RX ORDER — HYDROMORPHONE HYDROCHLORIDE 1 MG/ML
0.5 INJECTION, SOLUTION INTRAMUSCULAR; INTRAVENOUS; SUBCUTANEOUS EVERY 5 MIN PRN
Status: DISCONTINUED | OUTPATIENT
Start: 2023-10-28 | End: 2023-10-28 | Stop reason: HOSPADM

## 2023-10-28 RX ORDER — HYDRALAZINE HYDROCHLORIDE 20 MG/ML
5 INJECTION INTRAMUSCULAR; INTRAVENOUS
Status: COMPLETED | OUTPATIENT
Start: 2023-10-28 | End: 2023-10-28

## 2023-10-28 RX ORDER — FENTANYL CITRATE 0.05 MG/ML
25 INJECTION, SOLUTION INTRAMUSCULAR; INTRAVENOUS EVERY 5 MIN PRN
Status: DISCONTINUED | OUTPATIENT
Start: 2023-10-28 | End: 2023-10-28 | Stop reason: HOSPADM

## 2023-10-28 RX ORDER — SODIUM CHLORIDE 0.9 % (FLUSH) 0.9 %
5-40 SYRINGE (ML) INJECTION PRN
Status: DISCONTINUED | OUTPATIENT
Start: 2023-10-28 | End: 2023-10-28 | Stop reason: HOSPADM

## 2023-10-28 RX ORDER — SODIUM CHLORIDE 9 MG/ML
INJECTION, SOLUTION INTRAVENOUS CONTINUOUS PRN
Status: DISCONTINUED | OUTPATIENT
Start: 2023-10-28 | End: 2023-10-28 | Stop reason: SDUPTHER

## 2023-10-28 RX ORDER — DIPHENHYDRAMINE HYDROCHLORIDE 50 MG/ML
12.5 INJECTION INTRAMUSCULAR; INTRAVENOUS
Status: DISCONTINUED | OUTPATIENT
Start: 2023-10-28 | End: 2023-10-28 | Stop reason: HOSPADM

## 2023-10-28 RX ORDER — SODIUM CHLORIDE 0.9 % (FLUSH) 0.9 %
5-40 SYRINGE (ML) INJECTION EVERY 12 HOURS SCHEDULED
Status: DISCONTINUED | OUTPATIENT
Start: 2023-10-28 | End: 2023-10-28 | Stop reason: HOSPADM

## 2023-10-28 RX ORDER — PANTOPRAZOLE SODIUM 40 MG/1
40 TABLET, DELAYED RELEASE ORAL
Status: DISCONTINUED | OUTPATIENT
Start: 2023-10-29 | End: 2023-11-02 | Stop reason: HOSPADM

## 2023-10-28 RX ORDER — LABETALOL HYDROCHLORIDE 5 MG/ML
5 INJECTION, SOLUTION INTRAVENOUS
Status: COMPLETED | OUTPATIENT
Start: 2023-10-28 | End: 2023-10-28

## 2023-10-28 RX ADMIN — PHENOBARBITAL SODIUM 65 MG: 65 INJECTION INTRAMUSCULAR; INTRAVENOUS at 00:25

## 2023-10-28 RX ADMIN — LORAZEPAM 2 MG: 1 TABLET ORAL at 15:20

## 2023-10-28 RX ADMIN — CLONIDINE HYDROCHLORIDE 0.2 MG: 0.2 TABLET ORAL at 20:16

## 2023-10-28 RX ADMIN — OXYCODONE HYDROCHLORIDE 10 MG: 5 TABLET ORAL at 06:50

## 2023-10-28 RX ADMIN — Medication 10 ML: at 10:58

## 2023-10-28 RX ADMIN — PHENOBARBITAL SODIUM 32.5 MG: 65 INJECTION INTRAMUSCULAR; INTRAVENOUS at 20:16

## 2023-10-28 RX ADMIN — CLONIDINE HYDROCHLORIDE 0.2 MG: 0.2 TABLET ORAL at 10:34

## 2023-10-28 RX ADMIN — LABETALOL HYDROCHLORIDE 5 MG: 5 INJECTION INTRAVENOUS at 09:15

## 2023-10-28 RX ADMIN — OXYCODONE HYDROCHLORIDE 10 MG: 5 TABLET ORAL at 20:15

## 2023-10-28 RX ADMIN — OXYCODONE HYDROCHLORIDE 10 MG: 5 TABLET ORAL at 15:24

## 2023-10-28 RX ADMIN — IPRATROPIUM BROMIDE AND ALBUTEROL SULFATE 1 DOSE: .5; 2.5 SOLUTION RESPIRATORY (INHALATION) at 13:45

## 2023-10-28 RX ADMIN — ISOSORBIDE DINITRATE 10 MG: 20 TABLET ORAL at 20:15

## 2023-10-28 RX ADMIN — PHENOBARBITAL SODIUM 32.5 MG: 65 INJECTION INTRAMUSCULAR; INTRAVENOUS at 17:57

## 2023-10-28 RX ADMIN — ACETAMINOPHEN 1000 MG: 500 TABLET ORAL at 17:58

## 2023-10-28 RX ADMIN — ARFORMOTEROL TARTRATE 15 MCG: 15 SOLUTION RESPIRATORY (INHALATION) at 17:01

## 2023-10-28 RX ADMIN — PROCHLORPERAZINE EDISYLATE 10 MG: 5 INJECTION INTRAMUSCULAR; INTRAVENOUS at 21:55

## 2023-10-28 RX ADMIN — ESCITALOPRAM 10 MG: 10 TABLET, FILM COATED ORAL at 10:34

## 2023-10-28 RX ADMIN — GUAIFENESIN 600 MG: 600 TABLET, EXTENDED RELEASE ORAL at 10:34

## 2023-10-28 RX ADMIN — PHENOBARBITAL SODIUM 32.5 MG: 65 INJECTION INTRAMUSCULAR; INTRAVENOUS at 12:55

## 2023-10-28 RX ADMIN — SODIUM CHLORIDE, PRESERVATIVE FREE 10 ML: 5 INJECTION INTRAVENOUS at 20:17

## 2023-10-28 RX ADMIN — METOPROLOL TARTRATE 75 MG: 25 TABLET, FILM COATED ORAL at 10:33

## 2023-10-28 RX ADMIN — IPRATROPIUM BROMIDE AND ALBUTEROL SULFATE 1 DOSE: .5; 2.5 SOLUTION RESPIRATORY (INHALATION) at 22:15

## 2023-10-28 RX ADMIN — PREDNISONE 40 MG: 20 TABLET ORAL at 10:33

## 2023-10-28 RX ADMIN — BUDESONIDE INHALATION 500 MCG: 0.5 SUSPENSION RESPIRATORY (INHALATION) at 05:14

## 2023-10-28 RX ADMIN — LORAZEPAM 2 MG: 1 TABLET ORAL at 12:55

## 2023-10-28 RX ADMIN — ACETAMINOPHEN 500 MG: 500 TABLET ORAL at 00:24

## 2023-10-28 RX ADMIN — IPRATROPIUM BROMIDE AND ALBUTEROL SULFATE 1 DOSE: .5; 2.5 SOLUTION RESPIRATORY (INHALATION) at 17:01

## 2023-10-28 RX ADMIN — ARFORMOTEROL TARTRATE 15 MCG: 15 SOLUTION RESPIRATORY (INHALATION) at 05:14

## 2023-10-28 RX ADMIN — LORAZEPAM 2 MG: 2 INJECTION INTRAMUSCULAR; INTRAVENOUS at 22:01

## 2023-10-28 RX ADMIN — LORAZEPAM 2 MG: 1 TABLET ORAL at 10:33

## 2023-10-28 RX ADMIN — LORAZEPAM 2 MG: 1 TABLET ORAL at 17:58

## 2023-10-28 RX ADMIN — MULTIPLE VITAMINS W/ MINERALS TAB 1 TABLET: TAB at 10:33

## 2023-10-28 RX ADMIN — ACETAMINOPHEN 500 MG: 500 TABLET ORAL at 12:50

## 2023-10-28 RX ADMIN — LIDOCAINE HYDROCHLORIDE 40 MG: 20 INJECTION, SOLUTION EPIDURAL; INFILTRATION; INTRACAUDAL; PERINEURAL at 08:14

## 2023-10-28 RX ADMIN — ACETAMINOPHEN 500 MG: 500 TABLET ORAL at 06:32

## 2023-10-28 RX ADMIN — SODIUM CHLORIDE, POTASSIUM CHLORIDE, SODIUM LACTATE AND CALCIUM CHLORIDE: 600; 310; 30; 20 INJECTION, SOLUTION INTRAVENOUS at 01:54

## 2023-10-28 RX ADMIN — Medication 100 MG: at 10:33

## 2023-10-28 RX ADMIN — PROCHLORPERAZINE EDISYLATE 5 MG: 5 INJECTION INTRAMUSCULAR; INTRAVENOUS at 08:57

## 2023-10-28 RX ADMIN — LORAZEPAM 2 MG: 2 INJECTION INTRAMUSCULAR; INTRAVENOUS at 06:33

## 2023-10-28 RX ADMIN — INSULIN LISPRO 2 UNITS: 100 INJECTION, SOLUTION INTRAVENOUS; SUBCUTANEOUS at 17:58

## 2023-10-28 RX ADMIN — OXYCODONE HYDROCHLORIDE 10 MG: 5 TABLET ORAL at 10:33

## 2023-10-28 RX ADMIN — LORAZEPAM 3 MG: 2 INJECTION INTRAMUSCULAR; INTRAVENOUS at 00:24

## 2023-10-28 RX ADMIN — IPRATROPIUM BROMIDE AND ALBUTEROL SULFATE 1 DOSE: .5; 2.5 SOLUTION RESPIRATORY (INHALATION) at 05:14

## 2023-10-28 RX ADMIN — LEVOFLOXACIN 500 MG: 500 TABLET, FILM COATED ORAL at 10:33

## 2023-10-28 RX ADMIN — PROPOFOL 150 MG: 10 INJECTION, EMULSION INTRAVENOUS at 08:14

## 2023-10-28 RX ADMIN — LORAZEPAM 3 MG: 2 INJECTION INTRAMUSCULAR; INTRAVENOUS at 03:55

## 2023-10-28 RX ADMIN — CLONIDINE HYDROCHLORIDE 0.2 MG: 0.2 TABLET ORAL at 13:47

## 2023-10-28 RX ADMIN — INSULIN LISPRO 1 UNITS: 100 INJECTION, SOLUTION INTRAVENOUS; SUBCUTANEOUS at 12:50

## 2023-10-28 RX ADMIN — INSULIN GLARGINE 5 UNITS: 100 INJECTION, SOLUTION SUBCUTANEOUS at 20:15

## 2023-10-28 RX ADMIN — Medication 10 ML: at 20:17

## 2023-10-28 RX ADMIN — SODIUM CHLORIDE: 900 INJECTION, SOLUTION INTRAVENOUS at 08:05

## 2023-10-28 RX ADMIN — METOPROLOL TARTRATE 75 MG: 25 TABLET, FILM COATED ORAL at 20:15

## 2023-10-28 RX ADMIN — PHENOBARBITAL SODIUM 32.5 MG: 65 INJECTION INTRAMUSCULAR; INTRAVENOUS at 10:34

## 2023-10-28 RX ADMIN — SODIUM CHLORIDE, POTASSIUM CHLORIDE, SODIUM LACTATE AND CALCIUM CHLORIDE: 600; 310; 30; 20 INJECTION, SOLUTION INTRAVENOUS at 10:32

## 2023-10-28 RX ADMIN — GUAIFENESIN 600 MG: 600 TABLET, EXTENDED RELEASE ORAL at 20:16

## 2023-10-28 RX ADMIN — OCTREOTIDE ACETATE 50 MCG/HR: 500 INJECTION, SOLUTION INTRAVENOUS; SUBCUTANEOUS at 07:02

## 2023-10-28 RX ADMIN — LABETALOL HYDROCHLORIDE 5 MG: 5 INJECTION INTRAVENOUS at 08:41

## 2023-10-28 RX ADMIN — ISOSORBIDE DINITRATE 10 MG: 20 TABLET ORAL at 10:34

## 2023-10-28 RX ADMIN — BUDESONIDE INHALATION 500 MCG: 0.5 SUSPENSION RESPIRATORY (INHALATION) at 17:01

## 2023-10-28 RX ADMIN — LORAZEPAM 2 MG: 2 INJECTION INTRAMUSCULAR; INTRAVENOUS at 09:03

## 2023-10-28 ASSESSMENT — PAIN SCALES - GENERAL
PAINLEVEL_OUTOF10: 8
PAINLEVEL_OUTOF10: 5
PAINLEVEL_OUTOF10: 7
PAINLEVEL_OUTOF10: 5

## 2023-10-28 ASSESSMENT — LIFESTYLE VARIABLES: SMOKING_STATUS: 1

## 2023-10-28 ASSESSMENT — PAIN DESCRIPTION - LOCATION
LOCATION: HEAD
LOCATION: BACK
LOCATION: HEAD
LOCATION: BACK;HEAD

## 2023-10-28 ASSESSMENT — ENCOUNTER SYMPTOMS
SHORTNESS OF BREATH: 1
DYSPNEA ACTIVITY LEVEL: AFTER AMBULATING 1 FLIGHT OF STAIRS

## 2023-10-28 ASSESSMENT — COPD QUESTIONNAIRES: CAT_SEVERITY: MODERATE

## 2023-10-28 ASSESSMENT — PAIN DESCRIPTION - DESCRIPTORS
DESCRIPTORS: ACHING
DESCRIPTORS: ACHING

## 2023-10-28 NOTE — ANESTHESIA POSTPROCEDURE EVALUATION
Department of Anesthesiology  Postprocedure Note    Patient: Sandra Osborn  MRN: [id number]  YOB: 1965  Date of evaluation: 10/28/2023      Procedure Summary     Date: 10/28/23 Room / Location:  ENDO 01 / [address]    Anesthesia Start: 6671 Anesthesia Stop: 4536    Procedure: EGD BIOPSY Diagnosis:       Anemia, unspecified type      (Anemia, unspecified type [D64.9])    Surgeons: Gillian Horta DO Responsible Provider: Yuliya Griffith DO    Anesthesia Type: MAC ASA Status: 4          Anesthesia Type: No value filed. Emmy Phase I:      Emmy Phase II: Emmy Score: 8      Anesthesia Post Evaluation    Patient location during evaluation: bedside  Patient participation: complete - patient participated  Level of consciousness: awake  Pain score: 1  Airway patency: patent  Nausea & Vomiting: no vomiting and no nausea  Complications: no  Cardiovascular status: hemodynamically stable  Respiratory status: acceptable  Hydration status: stable  Comments: Seen and examined. Progressing as expected. No questions or concerns.   Pain management: adequate

## 2023-10-28 NOTE — PLAN OF CARE
Problem: Discharge Planning  Goal: Discharge to home or other facility with appropriate resources  10/28/2023 0255 by Jerald Tavares RN  Outcome: Progressing     Problem: Pain  Goal: Verbalizes/displays adequate comfort level or baseline comfort level  10/28/2023 0255 by Jerald Tavares RN  Outcome: Progressing     Problem: Safety - Adult  Goal: Free from fall injury  10/28/2023 0255 by Jerald Tavares RN  Outcome: Progressing     Problem: Metabolic/Fluid and Electrolytes - Adult  Goal: Electrolytes maintained within normal limits  10/28/2023 0255 by Jerald Tavares RN  Outcome: Progressing     Problem: Respiratory - Adult  Goal: Achieves optimal ventilation and oxygenation  10/28/2023 0255 by Jerald Tavares RN  Outcome: Progressing     Problem: ABCDS Injury Assessment  Goal: Absence of physical injury  10/28/2023 0255 by Jerald Tavares RN  Outcome: Progressing

## 2023-10-28 NOTE — PLAN OF CARE
Problem: Discharge Planning  Goal: Discharge to home or other facility with appropriate resources  10/28/2023 1647 by Meño Sanon RN  Outcome: Progressing  10/28/2023 0255 by Hosea Ventura RN  Outcome: Progressing     Problem: Pain  Goal: Verbalizes/displays adequate comfort level or baseline comfort level  10/28/2023 1647 by Meño Sanon RN  Outcome: Progressing  10/28/2023 0255 by Hosea Ventura RN  Outcome: Progressing     Problem: Safety - Adult  Goal: Free from fall injury  10/28/2023 1647 by Meño Sanon RN  Outcome: Progressing  10/28/2023 0255 by Hosea Ventura RN  Outcome: Progressing     Problem: Metabolic/Fluid and Electrolytes - Adult  Goal: Electrolytes maintained within normal limits  10/28/2023 1647 by Meño Sanon RN  Outcome: Progressing  10/28/2023 0255 by Hosea Ventura RN  Outcome: Progressing     Problem: Respiratory - Adult  Goal: Achieves optimal ventilation and oxygenation  10/28/2023 1647 by Meño Sanon RN  Outcome: Progressing  10/28/2023 0255 by Hosea Ventura RN  Outcome: Progressing     Problem: ABCDS Injury Assessment  Goal: Absence of physical injury  10/28/2023 1647 by Meño Sanon RN  Outcome: Progressing  10/28/2023 0255 by Hosea Ventura RN  Outcome: Progressing     Problem: Chronic Conditions and Co-morbidities  Goal: Patient's chronic conditions and co-morbidity symptoms are monitored and maintained or improved  Outcome: Progressing

## 2023-10-29 ENCOUNTER — APPOINTMENT (OUTPATIENT)
Dept: GENERAL RADIOLOGY | Age: 58
DRG: 394 | End: 2023-10-29
Payer: MEDICARE

## 2023-10-29 LAB
ANION GAP SERPL CALCULATED.3IONS-SCNC: 12 MMOL/L (ref 7–16)
BUN SERPL-MCNC: 27 MG/DL (ref 6–20)
CALCIUM SERPL-MCNC: 8.1 MG/DL (ref 8.6–10.2)
CHLORIDE SERPL-SCNC: 94 MMOL/L (ref 98–107)
CO2 SERPL-SCNC: 25 MMOL/L (ref 22–29)
CREAT SERPL-MCNC: 1.7 MG/DL (ref 0.7–1.2)
ERYTHROCYTE [DISTWIDTH] IN BLOOD BY AUTOMATED COUNT: 15.4 % (ref 11.5–15)
GFR SERPL CREATININE-BSD FRML MDRD: 46 ML/MIN/1.73M2
GLUCOSE BLD-MCNC: 183 MG/DL (ref 74–99)
GLUCOSE BLD-MCNC: 203 MG/DL (ref 74–99)
GLUCOSE BLD-MCNC: 226 MG/DL (ref 74–99)
GLUCOSE BLD-MCNC: 265 MG/DL (ref 74–99)
GLUCOSE SERPL-MCNC: 190 MG/DL (ref 74–99)
HCT VFR BLD AUTO: 24.2 % (ref 37–54)
HGB BLD-MCNC: 8.1 G/DL (ref 12.5–16.5)
MAGNESIUM SERPL-MCNC: 1.3 MG/DL (ref 1.6–2.6)
MCH RBC QN AUTO: 31 PG (ref 26–35)
MCHC RBC AUTO-ENTMCNC: 33.5 G/DL (ref 32–34.5)
MCV RBC AUTO: 92.7 FL (ref 80–99.9)
PHOSPHATE SERPL-MCNC: 2.4 MG/DL (ref 2.5–4.5)
PLATELET # BLD AUTO: 143 K/UL (ref 130–450)
PMV BLD AUTO: 11.8 FL (ref 7–12)
POTASSIUM SERPL-SCNC: 3.4 MMOL/L (ref 3.5–5)
RBC # BLD AUTO: 2.61 M/UL (ref 3.8–5.8)
SODIUM SERPL-SCNC: 131 MMOL/L (ref 132–146)
WBC OTHER # BLD: 9.8 K/UL (ref 4.5–11.5)

## 2023-10-29 PROCEDURE — 99232 SBSQ HOSP IP/OBS MODERATE 35: CPT | Performed by: INTERNAL MEDICINE

## 2023-10-29 PROCEDURE — 94640 AIRWAY INHALATION TREATMENT: CPT

## 2023-10-29 PROCEDURE — 6360000002 HC RX W HCPCS: Performed by: INTERNAL MEDICINE

## 2023-10-29 PROCEDURE — 71045 X-RAY EXAM CHEST 1 VIEW: CPT

## 2023-10-29 PROCEDURE — 83735 ASSAY OF MAGNESIUM: CPT

## 2023-10-29 PROCEDURE — 85027 COMPLETE CBC AUTOMATED: CPT

## 2023-10-29 PROCEDURE — 84100 ASSAY OF PHOSPHORUS: CPT

## 2023-10-29 PROCEDURE — 36415 COLL VENOUS BLD VENIPUNCTURE: CPT

## 2023-10-29 PROCEDURE — 80048 BASIC METABOLIC PNL TOTAL CA: CPT

## 2023-10-29 PROCEDURE — 6370000000 HC RX 637 (ALT 250 FOR IP): Performed by: INTERNAL MEDICINE

## 2023-10-29 PROCEDURE — 2580000003 HC RX 258: Performed by: INTERNAL MEDICINE

## 2023-10-29 PROCEDURE — 2060000000 HC ICU INTERMEDIATE R&B

## 2023-10-29 PROCEDURE — 82962 GLUCOSE BLOOD TEST: CPT

## 2023-10-29 RX ORDER — MAGNESIUM OXIDE 400 MG/1
400 TABLET ORAL
Status: DISCONTINUED | OUTPATIENT
Start: 2023-10-29 | End: 2023-11-02 | Stop reason: HOSPADM

## 2023-10-29 RX ORDER — POTASSIUM CHLORIDE 20 MEQ/1
40 TABLET, EXTENDED RELEASE ORAL ONCE
Status: COMPLETED | OUTPATIENT
Start: 2023-10-29 | End: 2023-10-29

## 2023-10-29 RX ORDER — CALCIUM GLUCONATE 20 MG/ML
2000 INJECTION, SOLUTION INTRAVENOUS ONCE
Status: COMPLETED | OUTPATIENT
Start: 2023-10-29 | End: 2023-10-29

## 2023-10-29 RX ORDER — GABAPENTIN 300 MG/1
300 CAPSULE ORAL 3 TIMES DAILY
Status: DISCONTINUED | OUTPATIENT
Start: 2023-10-29 | End: 2023-11-02 | Stop reason: HOSPADM

## 2023-10-29 RX ORDER — SODIUM CHLORIDE 9 MG/ML
INJECTION, SOLUTION INTRAVENOUS CONTINUOUS
Status: DISCONTINUED | OUTPATIENT
Start: 2023-10-29 | End: 2023-10-29

## 2023-10-29 RX ORDER — MAGNESIUM SULFATE IN WATER 40 MG/ML
4000 INJECTION, SOLUTION INTRAVENOUS ONCE
Status: COMPLETED | OUTPATIENT
Start: 2023-10-29 | End: 2023-10-29

## 2023-10-29 RX ADMIN — LEVOFLOXACIN 500 MG: 500 TABLET, FILM COATED ORAL at 09:01

## 2023-10-29 RX ADMIN — ACETAMINOPHEN 1000 MG: 500 TABLET ORAL at 16:48

## 2023-10-29 RX ADMIN — Medication 10 ML: at 20:51

## 2023-10-29 RX ADMIN — METOPROLOL TARTRATE 75 MG: 25 TABLET, FILM COATED ORAL at 08:53

## 2023-10-29 RX ADMIN — OXYCODONE HYDROCHLORIDE 10 MG: 5 TABLET ORAL at 20:13

## 2023-10-29 RX ADMIN — ACETAMINOPHEN 1000 MG: 500 TABLET ORAL at 12:31

## 2023-10-29 RX ADMIN — OXYCODONE HYDROCHLORIDE 10 MG: 5 TABLET ORAL at 04:20

## 2023-10-29 RX ADMIN — LORAZEPAM 1 MG: 1 TABLET ORAL at 12:32

## 2023-10-29 RX ADMIN — SODIUM CHLORIDE, PRESERVATIVE FREE 10 ML: 5 INJECTION INTRAVENOUS at 08:57

## 2023-10-29 RX ADMIN — INSULIN GLARGINE 5 UNITS: 100 INJECTION, SOLUTION SUBCUTANEOUS at 20:50

## 2023-10-29 RX ADMIN — ESCITALOPRAM 10 MG: 10 TABLET, FILM COATED ORAL at 08:54

## 2023-10-29 RX ADMIN — PHENOBARBITAL SODIUM 32.5 MG: 65 INJECTION INTRAMUSCULAR; INTRAVENOUS at 08:56

## 2023-10-29 RX ADMIN — PROCHLORPERAZINE EDISYLATE 10 MG: 5 INJECTION INTRAMUSCULAR; INTRAVENOUS at 04:21

## 2023-10-29 RX ADMIN — POTASSIUM CHLORIDE 40 MEQ: 1500 TABLET, EXTENDED RELEASE ORAL at 16:48

## 2023-10-29 RX ADMIN — CLONIDINE HYDROCHLORIDE 0.2 MG: 0.2 TABLET ORAL at 20:44

## 2023-10-29 RX ADMIN — PREDNISONE 40 MG: 20 TABLET ORAL at 08:54

## 2023-10-29 RX ADMIN — BUDESONIDE INHALATION 500 MCG: 0.5 SUSPENSION RESPIRATORY (INHALATION) at 06:10

## 2023-10-29 RX ADMIN — ISOSORBIDE DINITRATE 10 MG: 20 TABLET ORAL at 20:44

## 2023-10-29 RX ADMIN — GUAIFENESIN 600 MG: 600 TABLET, EXTENDED RELEASE ORAL at 08:52

## 2023-10-29 RX ADMIN — LORAZEPAM 1 MG: 1 TABLET ORAL at 01:51

## 2023-10-29 RX ADMIN — IPRATROPIUM BROMIDE AND ALBUTEROL SULFATE 1 DOSE: .5; 2.5 SOLUTION RESPIRATORY (INHALATION) at 06:10

## 2023-10-29 RX ADMIN — GABAPENTIN 300 MG: 300 CAPSULE ORAL at 16:48

## 2023-10-29 RX ADMIN — MAGNESIUM SULFATE HEPTAHYDRATE 4000 MG: 40 INJECTION, SOLUTION INTRAVENOUS at 08:59

## 2023-10-29 RX ADMIN — ACETAMINOPHEN 1000 MG: 500 TABLET ORAL at 00:32

## 2023-10-29 RX ADMIN — CLONIDINE HYDROCHLORIDE 0.2 MG: 0.2 TABLET ORAL at 13:15

## 2023-10-29 RX ADMIN — CALCIUM GLUCONATE 2000 MG: 20 INJECTION, SOLUTION INTRAVENOUS at 13:16

## 2023-10-29 RX ADMIN — MULTIPLE VITAMINS W/ MINERALS TAB 1 TABLET: TAB at 08:54

## 2023-10-29 RX ADMIN — OXYCODONE HYDROCHLORIDE 10 MG: 5 TABLET ORAL at 13:15

## 2023-10-29 RX ADMIN — GUAIFENESIN 600 MG: 600 TABLET, EXTENDED RELEASE ORAL at 20:43

## 2023-10-29 RX ADMIN — GABAPENTIN 300 MG: 300 CAPSULE ORAL at 20:44

## 2023-10-29 RX ADMIN — Medication 100 MG: at 08:52

## 2023-10-29 RX ADMIN — BUDESONIDE INHALATION 500 MCG: 0.5 SUSPENSION RESPIRATORY (INHALATION) at 18:08

## 2023-10-29 RX ADMIN — PHENOBARBITAL SODIUM 32.5 MG: 65 INJECTION INTRAMUSCULAR; INTRAVENOUS at 20:44

## 2023-10-29 RX ADMIN — PANTOPRAZOLE SODIUM 40 MG: 40 TABLET, DELAYED RELEASE ORAL at 06:25

## 2023-10-29 RX ADMIN — OXYCODONE HYDROCHLORIDE 10 MG: 5 TABLET ORAL at 00:33

## 2023-10-29 RX ADMIN — IPRATROPIUM BROMIDE AND ALBUTEROL SULFATE 1 DOSE: .5; 2.5 SOLUTION RESPIRATORY (INHALATION) at 18:08

## 2023-10-29 RX ADMIN — SODIUM CHLORIDE: 9 INJECTION, SOLUTION INTRAVENOUS at 08:52

## 2023-10-29 RX ADMIN — LORAZEPAM 1 MG: 1 TABLET ORAL at 04:20

## 2023-10-29 RX ADMIN — IPRATROPIUM BROMIDE AND ALBUTEROL SULFATE 1 DOSE: .5; 2.5 SOLUTION RESPIRATORY (INHALATION) at 09:52

## 2023-10-29 RX ADMIN — LORAZEPAM 1 MG: 1 TABLET ORAL at 20:43

## 2023-10-29 RX ADMIN — LORAZEPAM 1 MG: 1 TABLET ORAL at 16:49

## 2023-10-29 RX ADMIN — OXYCODONE HYDROCHLORIDE 10 MG: 5 TABLET ORAL at 08:52

## 2023-10-29 RX ADMIN — ARFORMOTEROL TARTRATE 15 MCG: 15 SOLUTION RESPIRATORY (INHALATION) at 18:08

## 2023-10-29 RX ADMIN — INSULIN LISPRO 1 UNITS: 100 INJECTION, SOLUTION INTRAVENOUS; SUBCUTANEOUS at 16:49

## 2023-10-29 RX ADMIN — ARFORMOTEROL TARTRATE 15 MCG: 15 SOLUTION RESPIRATORY (INHALATION) at 06:10

## 2023-10-29 RX ADMIN — LORAZEPAM 2 MG: 1 TABLET ORAL at 00:32

## 2023-10-29 RX ADMIN — METOPROLOL TARTRATE 75 MG: 25 TABLET, FILM COATED ORAL at 20:44

## 2023-10-29 RX ADMIN — LORAZEPAM 2 MG: 1 TABLET ORAL at 07:52

## 2023-10-29 RX ADMIN — SODIUM CHLORIDE, PRESERVATIVE FREE 10 ML: 5 INJECTION INTRAVENOUS at 20:51

## 2023-10-29 RX ADMIN — INSULIN LISPRO 1 UNITS: 100 INJECTION, SOLUTION INTRAVENOUS; SUBCUTANEOUS at 09:02

## 2023-10-29 RX ADMIN — ACETAMINOPHEN 1000 MG: 500 TABLET ORAL at 06:25

## 2023-10-29 RX ADMIN — IPRATROPIUM BROMIDE AND ALBUTEROL SULFATE 1 DOSE: .5; 2.5 SOLUTION RESPIRATORY (INHALATION) at 14:08

## 2023-10-29 RX ADMIN — MAGNESIUM OXIDE 400 MG: 400 TABLET ORAL at 12:32

## 2023-10-29 RX ADMIN — CLONIDINE HYDROCHLORIDE 0.2 MG: 0.2 TABLET ORAL at 08:54

## 2023-10-29 RX ADMIN — ISOSORBIDE DINITRATE 10 MG: 20 TABLET ORAL at 08:52

## 2023-10-29 RX ADMIN — Medication 10 ML: at 09:02

## 2023-10-29 ASSESSMENT — PAIN SCALES - GENERAL
PAINLEVEL_OUTOF10: 8
PAINLEVEL_OUTOF10: 8
PAINLEVEL_OUTOF10: 7
PAINLEVEL_OUTOF10: 9
PAINLEVEL_OUTOF10: 8

## 2023-10-29 ASSESSMENT — PAIN DESCRIPTION - LOCATION
LOCATION: BACK;HIP
LOCATION: BACK
LOCATION: BACK;HEAD
LOCATION: BACK
LOCATION: BACK

## 2023-10-29 ASSESSMENT — PAIN DESCRIPTION - ORIENTATION: ORIENTATION: MID;RIGHT;LEFT;LOWER

## 2023-10-29 ASSESSMENT — PAIN DESCRIPTION - DESCRIPTORS: DESCRIPTORS: SPASM;ACHING

## 2023-10-29 NOTE — PLAN OF CARE
Problem: Discharge Planning  Goal: Discharge to home or other facility with appropriate resources  10/29/2023 1126 by Jesika Beal RN  Outcome: Progressing  10/29/2023 0503 by Zaria Workman RN  Outcome: Progressing     Problem: Pain  Goal: Verbalizes/displays adequate comfort level or baseline comfort level  10/29/2023 1126 by Jesika Beal RN  Outcome: Progressing  10/29/2023 0503 by Zaria Workman RN  Outcome: Progressing     Problem: Safety - Adult  Goal: Free from fall injury  10/29/2023 1126 by Jesika Beal RN  Outcome: Progressing  10/29/2023 0503 by Zaria Workman RN  Outcome: Progressing     Problem: Metabolic/Fluid and Electrolytes - Adult  Goal: Electrolytes maintained within normal limits  10/29/2023 1126 by Jesika Beal RN  Outcome: Progressing  10/29/2023 0503 by Zaria Workman RN  Outcome: Progressing     Problem: Respiratory - Adult  Goal: Achieves optimal ventilation and oxygenation  10/29/2023 1126 by Jesika Beal RN  Outcome: Progressing  10/29/2023 0503 by Zaria Workman RN  Outcome: Progressing     Problem: ABCDS Injury Assessment  Goal: Absence of physical injury  10/29/2023 1126 by Jesika Beal RN  Outcome: Progressing  10/29/2023 0503 by Zaria Workman RN  Outcome: Progressing     Problem: Chronic Conditions and Co-morbidities  Goal: Patient's chronic conditions and co-morbidity symptoms are monitored and maintained or improved  10/29/2023 1126 by Jesika Beal RN  Outcome: Progressing  10/29/2023 0503 by Zaria Workman RN  Outcome: Progressing

## 2023-10-29 NOTE — PLAN OF CARE
Problem: Discharge Planning  Goal: Discharge to home or other facility with appropriate resources  10/29/2023 0503 by Jerald Tavares RN  Outcome: Progressing     Problem: Pain  Goal: Verbalizes/displays adequate comfort level or baseline comfort level  10/29/2023 0503 by Jerald Tavares RN  Outcome: Progressing     Problem: Safety - Adult  Goal: Free from fall injury  10/29/2023 0503 by Jerald Tavares RN  Outcome: Progressing     Problem: Metabolic/Fluid and Electrolytes - Adult  Goal: Electrolytes maintained within normal limits  10/29/2023 0503 by Jerald Tavares RN  Outcome: Progressing     Problem: Respiratory - Adult  Goal: Achieves optimal ventilation and oxygenation  10/29/2023 0503 by Jerald Tavares RN  Outcome: Progressing     Problem: ABCDS Injury Assessment  Goal: Absence of physical injury  10/29/2023 0503 by Jerald Tavares RN  Outcome: Progressing     Problem: Chronic Conditions and Co-morbidities  Goal: Patient's chronic conditions and co-morbidity symptoms are monitored and maintained or improved  10/29/2023 0503 by Jerald Tavares RN  Outcome: Progressing

## 2023-10-30 LAB
ANION GAP SERPL CALCULATED.3IONS-SCNC: 13 MMOL/L (ref 7–16)
BUN SERPL-MCNC: 27 MG/DL (ref 6–20)
CALCIUM SERPL-MCNC: 8.1 MG/DL (ref 8.6–10.2)
CHLORIDE SERPL-SCNC: 95 MMOL/L (ref 98–107)
CO2 SERPL-SCNC: 22 MMOL/L (ref 22–29)
CREAT SERPL-MCNC: 1.5 MG/DL (ref 0.7–1.2)
GFR SERPL CREATININE-BSD FRML MDRD: 53 ML/MIN/1.73M2
GLUCOSE BLD-MCNC: 183 MG/DL (ref 74–99)
GLUCOSE BLD-MCNC: 188 MG/DL (ref 74–99)
GLUCOSE BLD-MCNC: 213 MG/DL (ref 74–99)
GLUCOSE BLD-MCNC: 266 MG/DL (ref 74–99)
GLUCOSE SERPL-MCNC: 157 MG/DL (ref 74–99)
MAGNESIUM SERPL-MCNC: 1.6 MG/DL (ref 1.6–2.6)
PHOSPHATE SERPL-MCNC: 1.8 MG/DL (ref 2.5–4.5)
POTASSIUM SERPL-SCNC: 3.6 MMOL/L (ref 3.5–5)
SODIUM SERPL-SCNC: 130 MMOL/L (ref 132–146)

## 2023-10-30 PROCEDURE — 76937 US GUIDE VASCULAR ACCESS: CPT

## 2023-10-30 PROCEDURE — 84100 ASSAY OF PHOSPHORUS: CPT

## 2023-10-30 PROCEDURE — 6370000000 HC RX 637 (ALT 250 FOR IP): Performed by: INTERNAL MEDICINE

## 2023-10-30 PROCEDURE — 94640 AIRWAY INHALATION TREATMENT: CPT

## 2023-10-30 PROCEDURE — 2060000000 HC ICU INTERMEDIATE R&B

## 2023-10-30 PROCEDURE — 97530 THERAPEUTIC ACTIVITIES: CPT

## 2023-10-30 PROCEDURE — 36415 COLL VENOUS BLD VENIPUNCTURE: CPT

## 2023-10-30 PROCEDURE — 82962 GLUCOSE BLOOD TEST: CPT

## 2023-10-30 PROCEDURE — 97110 THERAPEUTIC EXERCISES: CPT

## 2023-10-30 PROCEDURE — 2580000003 HC RX 258: Performed by: INTERNAL MEDICINE

## 2023-10-30 PROCEDURE — 6360000002 HC RX W HCPCS: Performed by: INTERNAL MEDICINE

## 2023-10-30 PROCEDURE — 99233 SBSQ HOSP IP/OBS HIGH 50: CPT | Performed by: INTERNAL MEDICINE

## 2023-10-30 PROCEDURE — 80048 BASIC METABOLIC PNL TOTAL CA: CPT

## 2023-10-30 PROCEDURE — 83735 ASSAY OF MAGNESIUM: CPT

## 2023-10-30 RX ORDER — ACETAMINOPHEN 500 MG
1000 TABLET ORAL EVERY 8 HOURS SCHEDULED
Status: DISCONTINUED | OUTPATIENT
Start: 2023-10-30 | End: 2023-11-02 | Stop reason: HOSPADM

## 2023-10-30 RX ORDER — MAGNESIUM SULFATE IN WATER 40 MG/ML
2000 INJECTION, SOLUTION INTRAVENOUS ONCE
Status: COMPLETED | OUTPATIENT
Start: 2023-10-30 | End: 2023-10-30

## 2023-10-30 RX ORDER — PHENOBARBITAL 16.2 MG/1
16.2 TABLET ORAL NIGHTLY
Status: COMPLETED | OUTPATIENT
Start: 2023-10-30 | End: 2023-10-30

## 2023-10-30 RX ORDER — PHENOBARBITAL 16.2 MG/1
16.2 TABLET ORAL EVERY 6 HOURS PRN
Status: ACTIVE | OUTPATIENT
Start: 2023-10-30 | End: 2023-10-30

## 2023-10-30 RX ADMIN — METOPROLOL TARTRATE 75 MG: 25 TABLET, FILM COATED ORAL at 22:19

## 2023-10-30 RX ADMIN — PHENOBARBITAL SODIUM 16.2 MG: 65 INJECTION INTRAMUSCULAR; INTRAVENOUS at 09:28

## 2023-10-30 RX ADMIN — GABAPENTIN 300 MG: 300 CAPSULE ORAL at 22:19

## 2023-10-30 RX ADMIN — Medication 100 MG: at 09:27

## 2023-10-30 RX ADMIN — LEVOFLOXACIN 500 MG: 500 TABLET, FILM COATED ORAL at 09:27

## 2023-10-30 RX ADMIN — OXYCODONE HYDROCHLORIDE 10 MG: 5 TABLET ORAL at 17:19

## 2023-10-30 RX ADMIN — OXYCODONE HYDROCHLORIDE 10 MG: 5 TABLET ORAL at 05:20

## 2023-10-30 RX ADMIN — BUDESONIDE INHALATION 500 MCG: 0.5 SUSPENSION RESPIRATORY (INHALATION) at 06:14

## 2023-10-30 RX ADMIN — ARFORMOTEROL TARTRATE 15 MCG: 15 SOLUTION RESPIRATORY (INHALATION) at 06:14

## 2023-10-30 RX ADMIN — ACETAMINOPHEN 1000 MG: 500 TABLET ORAL at 13:20

## 2023-10-30 RX ADMIN — IPRATROPIUM BROMIDE AND ALBUTEROL SULFATE 1 DOSE: .5; 2.5 SOLUTION RESPIRATORY (INHALATION) at 18:21

## 2023-10-30 RX ADMIN — PHENOBARBITAL 16.2 MG: 16.2 TABLET ORAL at 22:19

## 2023-10-30 RX ADMIN — ACETAMINOPHEN 1000 MG: 500 TABLET ORAL at 01:04

## 2023-10-30 RX ADMIN — IPRATROPIUM BROMIDE AND ALBUTEROL SULFATE 1 DOSE: .5; 2.5 SOLUTION RESPIRATORY (INHALATION) at 10:21

## 2023-10-30 RX ADMIN — PREDNISONE 40 MG: 20 TABLET ORAL at 09:27

## 2023-10-30 RX ADMIN — IPRATROPIUM BROMIDE AND ALBUTEROL SULFATE 1 DOSE: .5; 2.5 SOLUTION RESPIRATORY (INHALATION) at 14:23

## 2023-10-30 RX ADMIN — OXYCODONE HYDROCHLORIDE 10 MG: 5 TABLET ORAL at 01:07

## 2023-10-30 RX ADMIN — MAGNESIUM OXIDE 400 MG: 400 TABLET ORAL at 13:20

## 2023-10-30 RX ADMIN — SODIUM CHLORIDE, PRESERVATIVE FREE 10 ML: 5 INJECTION INTRAVENOUS at 09:29

## 2023-10-30 RX ADMIN — INSULIN GLARGINE 5 UNITS: 100 INJECTION, SOLUTION SUBCUTANEOUS at 22:16

## 2023-10-30 RX ADMIN — ARFORMOTEROL TARTRATE 15 MCG: 15 SOLUTION RESPIRATORY (INHALATION) at 18:21

## 2023-10-30 RX ADMIN — OXYCODONE HYDROCHLORIDE 10 MG: 5 TABLET ORAL at 22:18

## 2023-10-30 RX ADMIN — METOPROLOL TARTRATE 75 MG: 25 TABLET, FILM COATED ORAL at 09:27

## 2023-10-30 RX ADMIN — GABAPENTIN 300 MG: 300 CAPSULE ORAL at 09:27

## 2023-10-30 RX ADMIN — BUDESONIDE INHALATION 500 MCG: 0.5 SUSPENSION RESPIRATORY (INHALATION) at 18:21

## 2023-10-30 RX ADMIN — MULTIPLE VITAMINS W/ MINERALS TAB 1 TABLET: TAB at 09:27

## 2023-10-30 RX ADMIN — CLONIDINE HYDROCHLORIDE 0.2 MG: 0.2 TABLET ORAL at 13:21

## 2023-10-30 RX ADMIN — ISOSORBIDE DINITRATE 10 MG: 20 TABLET ORAL at 22:19

## 2023-10-30 RX ADMIN — GUAIFENESIN 600 MG: 600 TABLET, EXTENDED RELEASE ORAL at 09:27

## 2023-10-30 RX ADMIN — GUAIFENESIN 600 MG: 600 TABLET, EXTENDED RELEASE ORAL at 22:18

## 2023-10-30 RX ADMIN — ISOSORBIDE DINITRATE 10 MG: 20 TABLET ORAL at 09:28

## 2023-10-30 RX ADMIN — GABAPENTIN 300 MG: 300 CAPSULE ORAL at 13:21

## 2023-10-30 RX ADMIN — CLONIDINE HYDROCHLORIDE 0.2 MG: 0.2 TABLET ORAL at 09:27

## 2023-10-30 RX ADMIN — PANTOPRAZOLE SODIUM 40 MG: 40 TABLET, DELAYED RELEASE ORAL at 05:20

## 2023-10-30 RX ADMIN — MAGNESIUM SULFATE HEPTAHYDRATE 2000 MG: 2 INJECTION, SOLUTION INTRAVENOUS at 15:50

## 2023-10-30 RX ADMIN — CLONIDINE HYDROCHLORIDE 0.2 MG: 0.2 TABLET ORAL at 22:18

## 2023-10-30 RX ADMIN — IPRATROPIUM BROMIDE AND ALBUTEROL SULFATE 1 DOSE: .5; 2.5 SOLUTION RESPIRATORY (INHALATION) at 06:14

## 2023-10-30 RX ADMIN — OXYCODONE HYDROCHLORIDE 10 MG: 5 TABLET ORAL at 13:21

## 2023-10-30 RX ADMIN — OXYCODONE HYDROCHLORIDE 10 MG: 5 TABLET ORAL at 09:27

## 2023-10-30 RX ADMIN — ESCITALOPRAM 10 MG: 10 TABLET, FILM COATED ORAL at 09:27

## 2023-10-30 RX ADMIN — SODIUM CHLORIDE, PRESERVATIVE FREE 10 ML: 5 INJECTION INTRAVENOUS at 22:19

## 2023-10-30 RX ADMIN — ACETAMINOPHEN 1000 MG: 500 TABLET ORAL at 22:18

## 2023-10-30 RX ADMIN — INSULIN LISPRO 2 UNITS: 100 INJECTION, SOLUTION INTRAVENOUS; SUBCUTANEOUS at 17:16

## 2023-10-30 ASSESSMENT — PAIN DESCRIPTION - LOCATION
LOCATION: BACK;HIP
LOCATION: BACK
LOCATION: HIP;BACK

## 2023-10-30 ASSESSMENT — PAIN SCALES - GENERAL
PAINLEVEL_OUTOF10: 8
PAINLEVEL_OUTOF10: 9
PAINLEVEL_OUTOF10: 5
PAINLEVEL_OUTOF10: 8
PAINLEVEL_OUTOF10: 8
PAINLEVEL_OUTOF10: 7

## 2023-10-30 ASSESSMENT — PAIN DESCRIPTION - DESCRIPTORS
DESCRIPTORS: SHARP;ACHING
DESCRIPTORS: THROBBING
DESCRIPTORS: ACHING;THROBBING
DESCRIPTORS: ACHING
DESCRIPTORS: THROBBING

## 2023-10-30 ASSESSMENT — PAIN DESCRIPTION - ORIENTATION
ORIENTATION: RIGHT
ORIENTATION: RIGHT
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
## 2023-10-31 LAB
ALBUMIN SERPL-MCNC: 3.5 G/DL (ref 3.5–5.2)
ALP SERPL-CCNC: 121 U/L (ref 40–129)
ALT SERPL-CCNC: 52 U/L (ref 0–40)
ANION GAP SERPL CALCULATED.3IONS-SCNC: 9 MMOL/L (ref 7–16)
AST SERPL-CCNC: 67 U/L (ref 0–39)
BASOPHILS # BLD: 0 K/UL (ref 0–0.2)
BASOPHILS NFR BLD: 0 % (ref 0–2)
BILIRUB SERPL-MCNC: 0.4 MG/DL (ref 0–1.2)
BUN SERPL-MCNC: 26 MG/DL (ref 6–20)
CALCIUM SERPL-MCNC: 8.2 MG/DL (ref 8.6–10.2)
CHLORIDE SERPL-SCNC: 92 MMOL/L (ref 98–107)
CO2 SERPL-SCNC: 26 MMOL/L (ref 22–29)
CREAT SERPL-MCNC: 1.4 MG/DL (ref 0.7–1.2)
EOSINOPHIL # BLD: 0.1 K/UL (ref 0.05–0.5)
EOSINOPHILS RELATIVE PERCENT: 1 % (ref 0–6)
ERYTHROCYTE [DISTWIDTH] IN BLOOD BY AUTOMATED COUNT: 16.7 % (ref 11.5–15)
GFR SERPL CREATININE-BSD FRML MDRD: 58 ML/MIN/1.73M2
GLUCOSE BLD-MCNC: 123 MG/DL (ref 74–99)
GLUCOSE BLD-MCNC: 173 MG/DL (ref 74–99)
GLUCOSE BLD-MCNC: 181 MG/DL (ref 74–99)
GLUCOSE BLD-MCNC: 182 MG/DL (ref 74–99)
GLUCOSE SERPL-MCNC: 193 MG/DL (ref 74–99)
HCT VFR BLD AUTO: 24.3 % (ref 37–54)
HGB BLD-MCNC: 7.9 G/DL (ref 12.5–16.5)
LYMPHOCYTES NFR BLD: 0.59 K/UL (ref 1.5–4)
LYMPHOCYTES RELATIVE PERCENT: 5 % (ref 20–42)
MAGNESIUM SERPL-MCNC: 1.5 MG/DL (ref 1.6–2.6)
MCH RBC QN AUTO: 31.1 PG (ref 26–35)
MCHC RBC AUTO-ENTMCNC: 32.5 G/DL (ref 32–34.5)
MCV RBC AUTO: 95.7 FL (ref 80–99.9)
METAMYELOCYTES ABSOLUTE COUNT: 0.2 K/UL (ref 0–0.12)
METAMYELOCYTES: 2 % (ref 0–1)
MONOCYTES NFR BLD: 0.1 K/UL (ref 0.1–0.95)
MONOCYTES NFR BLD: 1 % (ref 2–12)
MYELOCYTES ABSOLUTE COUNT: 0.29 K/UL
MYELOCYTES: 3 %
NEUTROPHILS NFR BLD: 89 % (ref 43–80)
NEUTS SEG NFR BLD: 10.02 K/UL (ref 1.8–7.3)
PHOSPHATE SERPL-MCNC: 1.3 MG/DL (ref 2.5–4.5)
PLATELET # BLD AUTO: 135 K/UL (ref 130–450)
PMV BLD AUTO: 10.4 FL (ref 7–12)
POTASSIUM SERPL-SCNC: 3.3 MMOL/L (ref 3.5–5)
PROT SERPL-MCNC: 6 G/DL (ref 6.4–8.3)
RBC # BLD AUTO: 2.54 M/UL (ref 3.8–5.8)
RBC # BLD: NORMAL 10*6/UL
SODIUM SERPL-SCNC: 127 MMOL/L (ref 132–146)
WBC OTHER # BLD: 11.3 K/UL (ref 4.5–11.5)

## 2023-10-31 PROCEDURE — 99232 SBSQ HOSP IP/OBS MODERATE 35: CPT | Performed by: INTERNAL MEDICINE

## 2023-10-31 PROCEDURE — 2060000000 HC ICU INTERMEDIATE R&B

## 2023-10-31 PROCEDURE — 6360000002 HC RX W HCPCS: Performed by: INTERNAL MEDICINE

## 2023-10-31 PROCEDURE — 94640 AIRWAY INHALATION TREATMENT: CPT

## 2023-10-31 PROCEDURE — 80053 COMPREHEN METABOLIC PANEL: CPT

## 2023-10-31 PROCEDURE — 6370000000 HC RX 637 (ALT 250 FOR IP): Performed by: INTERNAL MEDICINE

## 2023-10-31 PROCEDURE — 83735 ASSAY OF MAGNESIUM: CPT

## 2023-10-31 PROCEDURE — 2580000003 HC RX 258: Performed by: INTERNAL MEDICINE

## 2023-10-31 PROCEDURE — 84100 ASSAY OF PHOSPHORUS: CPT

## 2023-10-31 PROCEDURE — 6370000000 HC RX 637 (ALT 250 FOR IP): Performed by: FAMILY MEDICINE

## 2023-10-31 PROCEDURE — 97535 SELF CARE MNGMENT TRAINING: CPT

## 2023-10-31 PROCEDURE — 36415 COLL VENOUS BLD VENIPUNCTURE: CPT

## 2023-10-31 PROCEDURE — 82962 GLUCOSE BLOOD TEST: CPT

## 2023-10-31 PROCEDURE — 97530 THERAPEUTIC ACTIVITIES: CPT

## 2023-10-31 PROCEDURE — 2500000003 HC RX 250 WO HCPCS: Performed by: INTERNAL MEDICINE

## 2023-10-31 PROCEDURE — 85025 COMPLETE CBC W/AUTO DIFF WBC: CPT

## 2023-10-31 RX ORDER — MAGNESIUM SULFATE IN WATER 40 MG/ML
4000 INJECTION, SOLUTION INTRAVENOUS ONCE
Status: COMPLETED | OUTPATIENT
Start: 2023-10-31 | End: 2023-10-31

## 2023-10-31 RX ORDER — CALCIUM CARBONATE 500 MG/1
500 TABLET, CHEWABLE ORAL 3 TIMES DAILY PRN
Status: DISCONTINUED | OUTPATIENT
Start: 2023-10-31 | End: 2023-11-02 | Stop reason: HOSPADM

## 2023-10-31 RX ORDER — POTASSIUM CHLORIDE 20 MEQ/1
40 TABLET, EXTENDED RELEASE ORAL EVERY 6 HOURS
Status: COMPLETED | OUTPATIENT
Start: 2023-10-31 | End: 2023-10-31

## 2023-10-31 RX ADMIN — POTASSIUM CHLORIDE 40 MEQ: 1500 TABLET, EXTENDED RELEASE ORAL at 21:08

## 2023-10-31 RX ADMIN — IPRATROPIUM BROMIDE AND ALBUTEROL SULFATE 1 DOSE: .5; 2.5 SOLUTION RESPIRATORY (INHALATION) at 09:23

## 2023-10-31 RX ADMIN — CALCIUM CARBONATE 500 MG: 500 TABLET, CHEWABLE ORAL at 21:06

## 2023-10-31 RX ADMIN — CLONIDINE HYDROCHLORIDE 0.2 MG: 0.2 TABLET ORAL at 13:37

## 2023-10-31 RX ADMIN — OXYCODONE HYDROCHLORIDE 10 MG: 5 TABLET ORAL at 17:13

## 2023-10-31 RX ADMIN — OXYCODONE HYDROCHLORIDE 10 MG: 5 TABLET ORAL at 12:26

## 2023-10-31 RX ADMIN — INSULIN GLARGINE 5 UNITS: 100 INJECTION, SOLUTION SUBCUTANEOUS at 21:12

## 2023-10-31 RX ADMIN — IPRATROPIUM BROMIDE AND ALBUTEROL SULFATE 1 DOSE: .5; 2.5 SOLUTION RESPIRATORY (INHALATION) at 15:11

## 2023-10-31 RX ADMIN — ESCITALOPRAM 10 MG: 10 TABLET, FILM COATED ORAL at 09:10

## 2023-10-31 RX ADMIN — MULTIPLE VITAMINS W/ MINERALS TAB 1 TABLET: TAB at 09:10

## 2023-10-31 RX ADMIN — SODIUM PHOSPHATE, MONOBASIC, MONOHYDRATE AND SODIUM PHOSPHATE, DIBASIC, ANHYDROUS 30 MMOL: 142; 276 INJECTION, SOLUTION INTRAVENOUS at 17:12

## 2023-10-31 RX ADMIN — IPRATROPIUM BROMIDE AND ALBUTEROL SULFATE 1 DOSE: .5; 2.5 SOLUTION RESPIRATORY (INHALATION) at 05:17

## 2023-10-31 RX ADMIN — GABAPENTIN 300 MG: 300 CAPSULE ORAL at 09:09

## 2023-10-31 RX ADMIN — Medication 100 MG: at 09:09

## 2023-10-31 RX ADMIN — MAGNESIUM SULFATE HEPTAHYDRATE 4000 MG: 4 INJECTION, SOLUTION INTRAVENOUS at 13:34

## 2023-10-31 RX ADMIN — POTASSIUM CHLORIDE 40 MEQ: 1500 TABLET, EXTENDED RELEASE ORAL at 13:29

## 2023-10-31 RX ADMIN — SODIUM CHLORIDE, PRESERVATIVE FREE 10 ML: 5 INJECTION INTRAVENOUS at 09:13

## 2023-10-31 RX ADMIN — OXYCODONE HYDROCHLORIDE 10 MG: 5 TABLET ORAL at 02:11

## 2023-10-31 RX ADMIN — GABAPENTIN 300 MG: 300 CAPSULE ORAL at 21:06

## 2023-10-31 RX ADMIN — GABAPENTIN 300 MG: 300 CAPSULE ORAL at 13:37

## 2023-10-31 RX ADMIN — Medication 10 ML: at 09:13

## 2023-10-31 RX ADMIN — ARFORMOTEROL TARTRATE 15 MCG: 15 SOLUTION RESPIRATORY (INHALATION) at 19:55

## 2023-10-31 RX ADMIN — MAGNESIUM OXIDE 400 MG: 400 TABLET ORAL at 12:26

## 2023-10-31 RX ADMIN — ISOSORBIDE DINITRATE 10 MG: 20 TABLET ORAL at 09:10

## 2023-10-31 RX ADMIN — ACETAMINOPHEN 1000 MG: 500 TABLET ORAL at 13:37

## 2023-10-31 RX ADMIN — IPRATROPIUM BROMIDE AND ALBUTEROL SULFATE 1 DOSE: .5; 2.5 SOLUTION RESPIRATORY (INHALATION) at 19:54

## 2023-10-31 RX ADMIN — ISOSORBIDE DINITRATE 10 MG: 20 TABLET ORAL at 21:07

## 2023-10-31 RX ADMIN — CLONIDINE HYDROCHLORIDE 0.2 MG: 0.2 TABLET ORAL at 09:09

## 2023-10-31 RX ADMIN — METOPROLOL TARTRATE 75 MG: 25 TABLET, FILM COATED ORAL at 21:08

## 2023-10-31 RX ADMIN — ACETAMINOPHEN 1000 MG: 500 TABLET ORAL at 21:06

## 2023-10-31 RX ADMIN — GUAIFENESIN 600 MG: 600 TABLET, EXTENDED RELEASE ORAL at 21:07

## 2023-10-31 RX ADMIN — OXYCODONE HYDROCHLORIDE 10 MG: 5 TABLET ORAL at 21:07

## 2023-10-31 RX ADMIN — PROCHLORPERAZINE EDISYLATE 10 MG: 5 INJECTION INTRAMUSCULAR; INTRAVENOUS at 13:29

## 2023-10-31 RX ADMIN — METOPROLOL TARTRATE 75 MG: 25 TABLET, FILM COATED ORAL at 09:10

## 2023-10-31 RX ADMIN — BUDESONIDE INHALATION 500 MCG: 0.5 SUSPENSION RESPIRATORY (INHALATION) at 05:17

## 2023-10-31 RX ADMIN — PANTOPRAZOLE SODIUM 40 MG: 40 TABLET, DELAYED RELEASE ORAL at 06:45

## 2023-10-31 RX ADMIN — ACETAMINOPHEN 1000 MG: 500 TABLET ORAL at 06:45

## 2023-10-31 RX ADMIN — GUAIFENESIN 600 MG: 600 TABLET, EXTENDED RELEASE ORAL at 09:10

## 2023-10-31 RX ADMIN — BUDESONIDE INHALATION 500 MCG: 0.5 SUSPENSION RESPIRATORY (INHALATION) at 19:55

## 2023-10-31 RX ADMIN — ARFORMOTEROL TARTRATE 15 MCG: 15 SOLUTION RESPIRATORY (INHALATION) at 05:17

## 2023-10-31 RX ADMIN — OXYCODONE HYDROCHLORIDE 10 MG: 5 TABLET ORAL at 06:44

## 2023-10-31 ASSESSMENT — PAIN DESCRIPTION - LOCATION
LOCATION: HIP
LOCATION: HIP
LOCATION: HIP;BACK
LOCATION: BACK;HIP
LOCATION: BACK;HIP
LOCATION: BACK
LOCATION: BACK

## 2023-10-31 ASSESSMENT — PAIN SCALES - GENERAL
PAINLEVEL_OUTOF10: 9
PAINLEVEL_OUTOF10: 8

## 2023-10-31 ASSESSMENT — PAIN DESCRIPTION - DESCRIPTORS
DESCRIPTORS: THROBBING
DESCRIPTORS: ACHING;THROBBING
DESCRIPTORS: STABBING

## 2023-10-31 ASSESSMENT — PAIN DESCRIPTION - ORIENTATION
ORIENTATION: RIGHT
ORIENTATION: RIGHT
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
## 2023-10-31 NOTE — PLAN OF CARE
Problem: Discharge Planning  Goal: Discharge to home or other facility with appropriate resources  Outcome: Progressing  Flowsheets (Taken 10/31/2023 0751)  Discharge to home or other facility with appropriate resources:   Identify barriers to discharge with patient and caregiver   Arrange for needed discharge resources and transportation as appropriate     Problem: Pain  Goal: Verbalizes/displays adequate comfort level or baseline comfort level  Outcome: Progressing  Flowsheets (Taken 10/31/2023 0751)  Verbalizes/displays adequate comfort level or baseline comfort level: Encourage patient to monitor pain and request assistance     Problem: Safety - Adult  Goal: Free from fall injury  Outcome: Progressing  Flowsheets (Taken 10/31/2023 0751)  Free From Fall Injury: Instruct family/caregiver on patient safety     Problem: Metabolic/Fluid and Electrolytes - Adult  Goal: Electrolytes maintained within normal limits  Outcome: Progressing  Flowsheets (Taken 10/31/2023 0751)  Electrolytes maintained within normal limits: Monitor labs and assess patient for signs and symptoms of electrolyte imbalances     Problem: Respiratory - Adult  Goal: Achieves optimal ventilation and oxygenation  Outcome: Progressing  Flowsheets (Taken 10/31/2023 0751)  Achieves optimal ventilation and oxygenation: Assess for changes in respiratory status     Problem: ABCDS Injury Assessment  Goal: Absence of physical injury  Outcome: Progressing  Flowsheets (Taken 10/31/2023 0751)  Absence of Physical Injury: Implement safety measures based on patient assessment     Problem: Chronic Conditions and Co-morbidities  Goal: Patient's chronic conditions and co-morbidity symptoms are monitored and maintained or improved  Outcome: Progressing  Flowsheets (Taken 10/31/2023 0751)  Care Plan - Patient's Chronic Conditions and Co-Morbidity Symptoms are Monitored and Maintained or Improved: Monitor and assess patient's chronic conditions and comorbid symptoms

## 2023-11-01 LAB
ANION GAP SERPL CALCULATED.3IONS-SCNC: 12 MMOL/L (ref 7–16)
BASOPHILS # BLD: 0 K/UL (ref 0–0.2)
BASOPHILS NFR BLD: 0 % (ref 0–2)
BUN SERPL-MCNC: 22 MG/DL (ref 6–20)
CALCIUM SERPL-MCNC: 8.5 MG/DL (ref 8.6–10.2)
CHLORIDE SERPL-SCNC: 95 MMOL/L (ref 98–107)
CO2 SERPL-SCNC: 23 MMOL/L (ref 22–29)
CREAT SERPL-MCNC: 1.3 MG/DL (ref 0.7–1.2)
EOSINOPHIL # BLD: 0.14 K/UL (ref 0.05–0.5)
EOSINOPHILS RELATIVE PERCENT: 2 % (ref 0–6)
ERYTHROCYTE [DISTWIDTH] IN BLOOD BY AUTOMATED COUNT: 17.1 % (ref 11.5–15)
GFR SERPL CREATININE-BSD FRML MDRD: >60 ML/MIN/1.73M2
GLUCOSE BLD-MCNC: 114 MG/DL (ref 74–99)
GLUCOSE BLD-MCNC: 161 MG/DL (ref 74–99)
GLUCOSE BLD-MCNC: 162 MG/DL (ref 74–99)
GLUCOSE BLD-MCNC: 99 MG/DL (ref 74–99)
GLUCOSE SERPL-MCNC: 118 MG/DL (ref 74–99)
HCT VFR BLD AUTO: 26.2 % (ref 37–54)
HGB BLD-MCNC: 8.5 G/DL (ref 12.5–16.5)
LYMPHOCYTES NFR BLD: 0.71 K/UL (ref 1.5–4)
LYMPHOCYTES RELATIVE PERCENT: 9 % (ref 20–42)
MAGNESIUM SERPL-MCNC: 1.7 MG/DL (ref 1.6–2.6)
MCH RBC QN AUTO: 30.9 PG (ref 26–35)
MCHC RBC AUTO-ENTMCNC: 32.4 G/DL (ref 32–34.5)
MCV RBC AUTO: 95.3 FL (ref 80–99.9)
MONOCYTES NFR BLD: 0.29 K/UL (ref 0.1–0.95)
MONOCYTES NFR BLD: 4 % (ref 2–12)
NEUTROPHILS NFR BLD: 79 % (ref 43–80)
NEUTS SEG NFR BLD: 6.49 K/UL (ref 1.8–7.3)
PHOSPHATE SERPL-MCNC: 2.4 MG/DL (ref 2.5–4.5)
PLATELET # BLD AUTO: 173 K/UL (ref 130–450)
PMV BLD AUTO: 10.8 FL (ref 7–12)
POTASSIUM SERPL-SCNC: 3.5 MMOL/L (ref 3.5–5)
RBC # BLD AUTO: 2.75 M/UL (ref 3.8–5.8)
RBC # BLD: ABNORMAL 10*6/UL
SODIUM SERPL-SCNC: 130 MMOL/L (ref 132–146)
SURGICAL PATHOLOGY REPORT: NORMAL
WBC OTHER # BLD: 8.2 K/UL (ref 4.5–11.5)

## 2023-11-01 PROCEDURE — 80048 BASIC METABOLIC PNL TOTAL CA: CPT

## 2023-11-01 PROCEDURE — 6370000000 HC RX 637 (ALT 250 FOR IP): Performed by: INTERNAL MEDICINE

## 2023-11-01 PROCEDURE — 2580000003 HC RX 258: Performed by: INTERNAL MEDICINE

## 2023-11-01 PROCEDURE — 94640 AIRWAY INHALATION TREATMENT: CPT

## 2023-11-01 PROCEDURE — 85025 COMPLETE CBC W/AUTO DIFF WBC: CPT

## 2023-11-01 PROCEDURE — 6360000002 HC RX W HCPCS: Performed by: INTERNAL MEDICINE

## 2023-11-01 PROCEDURE — 97530 THERAPEUTIC ACTIVITIES: CPT

## 2023-11-01 PROCEDURE — 36415 COLL VENOUS BLD VENIPUNCTURE: CPT

## 2023-11-01 PROCEDURE — C1751 CATH, INF, PER/CENT/MIDLINE: HCPCS

## 2023-11-01 PROCEDURE — 84100 ASSAY OF PHOSPHORUS: CPT

## 2023-11-01 PROCEDURE — 99232 SBSQ HOSP IP/OBS MODERATE 35: CPT | Performed by: INTERNAL MEDICINE

## 2023-11-01 PROCEDURE — 76937 US GUIDE VASCULAR ACCESS: CPT

## 2023-11-01 PROCEDURE — 2060000000 HC ICU INTERMEDIATE R&B

## 2023-11-01 PROCEDURE — 82962 GLUCOSE BLOOD TEST: CPT

## 2023-11-01 PROCEDURE — 2500000003 HC RX 250 WO HCPCS: Performed by: INTERNAL MEDICINE

## 2023-11-01 PROCEDURE — 97116 GAIT TRAINING THERAPY: CPT

## 2023-11-01 PROCEDURE — 83735 ASSAY OF MAGNESIUM: CPT

## 2023-11-01 RX ORDER — MAGNESIUM SULFATE IN WATER 40 MG/ML
2000 INJECTION, SOLUTION INTRAVENOUS ONCE
Status: COMPLETED | OUTPATIENT
Start: 2023-11-01 | End: 2023-11-01

## 2023-11-01 RX ADMIN — IPRATROPIUM BROMIDE AND ALBUTEROL SULFATE 1 DOSE: .5; 2.5 SOLUTION RESPIRATORY (INHALATION) at 14:33

## 2023-11-01 RX ADMIN — OXYCODONE HYDROCHLORIDE 10 MG: 5 TABLET ORAL at 19:35

## 2023-11-01 RX ADMIN — ARFORMOTEROL TARTRATE 15 MCG: 15 SOLUTION RESPIRATORY (INHALATION) at 18:32

## 2023-11-01 RX ADMIN — SODIUM PHOSPHATE, MONOBASIC, MONOHYDRATE AND SODIUM PHOSPHATE, DIBASIC, ANHYDROUS 30 MMOL: 276; 142 INJECTION, SOLUTION INTRAVENOUS at 17:15

## 2023-11-01 RX ADMIN — IPRATROPIUM BROMIDE AND ALBUTEROL SULFATE 1 DOSE: .5; 2.5 SOLUTION RESPIRATORY (INHALATION) at 09:41

## 2023-11-01 RX ADMIN — BUDESONIDE INHALATION 500 MCG: 0.5 SUSPENSION RESPIRATORY (INHALATION) at 06:25

## 2023-11-01 RX ADMIN — Medication 100 MG: at 09:48

## 2023-11-01 RX ADMIN — BUDESONIDE INHALATION 500 MCG: 0.5 SUSPENSION RESPIRATORY (INHALATION) at 18:32

## 2023-11-01 RX ADMIN — MULTIPLE VITAMINS W/ MINERALS TAB 1 TABLET: TAB at 09:48

## 2023-11-01 RX ADMIN — ISOSORBIDE DINITRATE 10 MG: 20 TABLET ORAL at 19:46

## 2023-11-01 RX ADMIN — IPRATROPIUM BROMIDE AND ALBUTEROL SULFATE 1 DOSE: .5; 2.5 SOLUTION RESPIRATORY (INHALATION) at 18:32

## 2023-11-01 RX ADMIN — ACETAMINOPHEN 1000 MG: 500 TABLET ORAL at 15:13

## 2023-11-01 RX ADMIN — Medication 10 ML: at 19:47

## 2023-11-01 RX ADMIN — ACETAMINOPHEN 1000 MG: 500 TABLET ORAL at 05:28

## 2023-11-01 RX ADMIN — INSULIN GLARGINE 5 UNITS: 100 INJECTION, SOLUTION SUBCUTANEOUS at 19:48

## 2023-11-01 RX ADMIN — MAGNESIUM OXIDE 400 MG: 400 TABLET ORAL at 15:13

## 2023-11-01 RX ADMIN — GABAPENTIN 300 MG: 300 CAPSULE ORAL at 09:48

## 2023-11-01 RX ADMIN — SODIUM CHLORIDE, PRESERVATIVE FREE 10 ML: 5 INJECTION INTRAVENOUS at 19:47

## 2023-11-01 RX ADMIN — CLONIDINE HYDROCHLORIDE 0.2 MG: 0.2 TABLET ORAL at 19:36

## 2023-11-01 RX ADMIN — GABAPENTIN 300 MG: 300 CAPSULE ORAL at 15:12

## 2023-11-01 RX ADMIN — IPRATROPIUM BROMIDE AND ALBUTEROL SULFATE 1 DOSE: .5; 2.5 SOLUTION RESPIRATORY (INHALATION) at 06:25

## 2023-11-01 RX ADMIN — GUAIFENESIN 600 MG: 600 TABLET, EXTENDED RELEASE ORAL at 09:48

## 2023-11-01 RX ADMIN — GUAIFENESIN 600 MG: 600 TABLET, EXTENDED RELEASE ORAL at 19:35

## 2023-11-01 RX ADMIN — ESCITALOPRAM 10 MG: 10 TABLET, FILM COATED ORAL at 09:49

## 2023-11-01 RX ADMIN — GABAPENTIN 300 MG: 300 CAPSULE ORAL at 19:36

## 2023-11-01 RX ADMIN — OXYCODONE HYDROCHLORIDE 10 MG: 5 TABLET ORAL at 09:48

## 2023-11-01 RX ADMIN — METOPROLOL TARTRATE 75 MG: 25 TABLET, FILM COATED ORAL at 19:35

## 2023-11-01 RX ADMIN — OXYCODONE HYDROCHLORIDE 10 MG: 5 TABLET ORAL at 05:29

## 2023-11-01 RX ADMIN — OXYCODONE HYDROCHLORIDE 10 MG: 5 TABLET ORAL at 01:38

## 2023-11-01 RX ADMIN — PANTOPRAZOLE SODIUM 40 MG: 40 TABLET, DELAYED RELEASE ORAL at 05:29

## 2023-11-01 RX ADMIN — OXYCODONE HYDROCHLORIDE 10 MG: 5 TABLET ORAL at 15:13

## 2023-11-01 RX ADMIN — ISOSORBIDE DINITRATE 10 MG: 20 TABLET ORAL at 09:48

## 2023-11-01 RX ADMIN — MAGNESIUM SULFATE HEPTAHYDRATE 2000 MG: 40 INJECTION, SOLUTION INTRAVENOUS at 15:21

## 2023-11-01 RX ADMIN — OXYCODONE HYDROCHLORIDE 10 MG: 5 TABLET ORAL at 23:42

## 2023-11-01 RX ADMIN — ARFORMOTEROL TARTRATE 15 MCG: 15 SOLUTION RESPIRATORY (INHALATION) at 06:25

## 2023-11-01 RX ADMIN — ACETAMINOPHEN 1000 MG: 500 TABLET ORAL at 22:31

## 2023-11-01 RX ADMIN — METOPROLOL TARTRATE 75 MG: 25 TABLET, FILM COATED ORAL at 09:48

## 2023-11-01 ASSESSMENT — PAIN SCALES - GENERAL
PAINLEVEL_OUTOF10: 9
PAINLEVEL_OUTOF10: 2
PAINLEVEL_OUTOF10: 8

## 2023-11-01 ASSESSMENT — PAIN DESCRIPTION - DESCRIPTORS
DESCRIPTORS: ACHING;THROBBING
DESCRIPTORS: ACHING
DESCRIPTORS: STABBING
DESCRIPTORS: ACHING;DISCOMFORT

## 2023-11-01 ASSESSMENT — PAIN DESCRIPTION - LOCATION
LOCATION: HIP
LOCATION: BACK;HIP
LOCATION: BACK;HIP
LOCATION: HIP;BACK
LOCATION: BACK;HIP

## 2023-11-01 ASSESSMENT — PAIN DESCRIPTION - ORIENTATION
ORIENTATION: RIGHT;MID
ORIENTATION: RIGHT
ORIENTATION: RIGHT

## 2023-11-01 ASSESSMENT — PAIN - FUNCTIONAL ASSESSMENT: PAIN_FUNCTIONAL_ASSESSMENT: PREVENTS OR INTERFERES SOME ACTIVE ACTIVITIES AND ADLS

## 2023-11-01 ASSESSMENT — PAIN DESCRIPTION - PAIN TYPE: TYPE: ACUTE PAIN;CHRONIC PAIN

## 2023-11-01 NOTE — PLAN OF CARE
Problem: Discharge Planning  Goal: Discharge to home or other facility with appropriate resources  Outcome: Progressing  Flowsheets (Taken 11/1/2023 0749)  Discharge to home or other facility with appropriate resources:   Identify barriers to discharge with patient and caregiver   Arrange for needed discharge resources and transportation as appropriate     Problem: Pain  Goal: Verbalizes/displays adequate comfort level or baseline comfort level  Outcome: Progressing  Flowsheets (Taken 11/1/2023 0749)  Verbalizes/displays adequate comfort level or baseline comfort level: Encourage patient to monitor pain and request assistance     Problem: Safety - Adult  Goal: Free from fall injury  Outcome: Progressing  Flowsheets (Taken 11/1/2023 0749)  Free From Fall Injury: Instruct family/caregiver on patient safety     Problem: Metabolic/Fluid and Electrolytes - Adult  Goal: Electrolytes maintained within normal limits  Outcome: Progressing  Flowsheets (Taken 11/1/2023 0749)  Electrolytes maintained within normal limits: Monitor labs and assess patient for signs and symptoms of electrolyte imbalances     Problem: Respiratory - Adult  Goal: Achieves optimal ventilation and oxygenation  Outcome: Progressing  Flowsheets (Taken 11/1/2023 0749)  Achieves optimal ventilation and oxygenation: Assess for changes in respiratory status     Problem: ABCDS Injury Assessment  Goal: Absence of physical injury  Outcome: Progressing  Flowsheets (Taken 11/1/2023 0749)  Absence of Physical Injury: Implement safety measures based on patient assessment     Problem: Chronic Conditions and Co-morbidities  Goal: Patient's chronic conditions and co-morbidity symptoms are monitored and maintained or improved  Outcome: Progressing  Flowsheets (Taken 11/1/2023 0749)  Care Plan - Patient's Chronic Conditions and Co-Morbidity Symptoms are Monitored and Maintained or Improved: Monitor and assess patient's chronic conditions and comorbid symptoms for
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
## 2023-11-01 NOTE — PROCEDURES
Extended dwell cath Placement 11/1/2023    Product number: [id number]   Lot Number: [id number]   Consult: limited access   Ultrasound: yes   Left Basilic vein:                Upper Arm Circumference: (CM) nm    Size:(FR)/GUAGE 20    Exposed Length: (CM) 0    Internal Length: (CM) 10   Cut: (CM) 0   Vein Measurement: 0.5              Lidocaine Given: no    Tolerated well  Brisk blood return  Flushed well   RN notified      Rae Stack RN  11/1/2023  3:18 PM

## 2023-11-02 VITALS
HEIGHT: 72 IN | BODY MASS INDEX: 34.67 KG/M2 | HEART RATE: 71 BPM | WEIGHT: 256 LBS | RESPIRATION RATE: 18 BRPM | SYSTOLIC BLOOD PRESSURE: 134 MMHG | DIASTOLIC BLOOD PRESSURE: 71 MMHG | TEMPERATURE: 98.4 F | OXYGEN SATURATION: 100 %

## 2023-11-02 LAB
ANION GAP SERPL CALCULATED.3IONS-SCNC: 11 MMOL/L (ref 7–16)
BUN SERPL-MCNC: 16 MG/DL (ref 6–20)
CALCIUM SERPL-MCNC: 8.7 MG/DL (ref 8.6–10.2)
CHLORIDE SERPL-SCNC: 96 MMOL/L (ref 98–107)
CO2 SERPL-SCNC: 24 MMOL/L (ref 22–29)
CREAT SERPL-MCNC: 1.1 MG/DL (ref 0.7–1.2)
ERYTHROCYTE [DISTWIDTH] IN BLOOD BY AUTOMATED COUNT: 17.3 % (ref 11.5–15)
GFR SERPL CREATININE-BSD FRML MDRD: >60 ML/MIN/1.73M2
GLUCOSE BLD-MCNC: 119 MG/DL (ref 74–99)
GLUCOSE BLD-MCNC: 123 MG/DL (ref 74–99)
GLUCOSE SERPL-MCNC: 114 MG/DL (ref 74–99)
HCT VFR BLD AUTO: 24.1 % (ref 37–54)
HGB BLD-MCNC: 8 G/DL (ref 12.5–16.5)
MAGNESIUM SERPL-MCNC: 1.6 MG/DL (ref 1.6–2.6)
MCH RBC QN AUTO: 30.7 PG (ref 26–35)
MCHC RBC AUTO-ENTMCNC: 33.2 G/DL (ref 32–34.5)
MCV RBC AUTO: 92.3 FL (ref 80–99.9)
PHOSPHATE SERPL-MCNC: 3.2 MG/DL (ref 2.5–4.5)
PLATELET # BLD AUTO: 180 K/UL (ref 130–450)
PMV BLD AUTO: 10.5 FL (ref 7–12)
POTASSIUM SERPL-SCNC: 3.7 MMOL/L (ref 3.5–5)
RBC # BLD AUTO: 2.61 M/UL (ref 3.8–5.8)
SODIUM SERPL-SCNC: 131 MMOL/L (ref 132–146)
WBC OTHER # BLD: 6.6 K/UL (ref 4.5–11.5)

## 2023-11-02 PROCEDURE — 83735 ASSAY OF MAGNESIUM: CPT

## 2023-11-02 PROCEDURE — 85027 COMPLETE CBC AUTOMATED: CPT

## 2023-11-02 PROCEDURE — 6370000000 HC RX 637 (ALT 250 FOR IP): Performed by: INTERNAL MEDICINE

## 2023-11-02 PROCEDURE — 84100 ASSAY OF PHOSPHORUS: CPT

## 2023-11-02 PROCEDURE — 94640 AIRWAY INHALATION TREATMENT: CPT

## 2023-11-02 PROCEDURE — 2580000003 HC RX 258: Performed by: INTERNAL MEDICINE

## 2023-11-02 PROCEDURE — 97110 THERAPEUTIC EXERCISES: CPT

## 2023-11-02 PROCEDURE — 80048 BASIC METABOLIC PNL TOTAL CA: CPT

## 2023-11-02 PROCEDURE — 97116 GAIT TRAINING THERAPY: CPT

## 2023-11-02 PROCEDURE — 82962 GLUCOSE BLOOD TEST: CPT

## 2023-11-02 PROCEDURE — 6360000002 HC RX W HCPCS: Performed by: INTERNAL MEDICINE

## 2023-11-02 PROCEDURE — 99239 HOSP IP/OBS DSCHRG MGMT >30: CPT | Performed by: INTERNAL MEDICINE

## 2023-11-02 PROCEDURE — 97530 THERAPEUTIC ACTIVITIES: CPT

## 2023-11-02 RX ORDER — MAGNESIUM SULFATE IN WATER 40 MG/ML
2000 INJECTION, SOLUTION INTRAVENOUS ONCE
Status: COMPLETED | OUTPATIENT
Start: 2023-11-02 | End: 2023-11-02

## 2023-11-02 RX ORDER — OXYCODONE HYDROCHLORIDE 5 MG/1
5 TABLET ORAL EVERY 4 HOURS PRN
Qty: 15 TABLET | Refills: 0 | Status: SHIPPED | OUTPATIENT
Start: 2023-11-02 | End: 2023-11-05

## 2023-11-02 RX ORDER — PANTOPRAZOLE SODIUM 40 MG/1
40 TABLET, DELAYED RELEASE ORAL
Qty: 30 TABLET | Refills: 0 | Status: SHIPPED | OUTPATIENT
Start: 2023-11-03

## 2023-11-02 RX ORDER — LIDOCAINE 4 G/G
1 PATCH TOPICAL DAILY
Qty: 30 EACH | Refills: 0 | Status: SHIPPED | OUTPATIENT
Start: 2023-11-03 | End: 2023-12-03

## 2023-11-02 RX ORDER — 0.9 % SODIUM CHLORIDE 0.9 %
500 INTRAVENOUS SOLUTION INTRAVENOUS ONCE
Status: COMPLETED | OUTPATIENT
Start: 2023-11-02 | End: 2023-11-02

## 2023-11-02 RX ORDER — GABAPENTIN 300 MG/1
300 CAPSULE ORAL 3 TIMES DAILY
Qty: 90 CAPSULE | Refills: 0 | Status: SHIPPED | OUTPATIENT
Start: 2023-11-02 | End: 2023-12-02

## 2023-11-02 RX ADMIN — ESCITALOPRAM 10 MG: 10 TABLET, FILM COATED ORAL at 09:21

## 2023-11-02 RX ADMIN — GABAPENTIN 300 MG: 300 CAPSULE ORAL at 13:26

## 2023-11-02 RX ADMIN — MULTIPLE VITAMINS W/ MINERALS TAB 1 TABLET: TAB at 09:21

## 2023-11-02 RX ADMIN — ISOSORBIDE DINITRATE 10 MG: 20 TABLET ORAL at 09:21

## 2023-11-02 RX ADMIN — OXYCODONE HYDROCHLORIDE 10 MG: 5 TABLET ORAL at 09:01

## 2023-11-02 RX ADMIN — IPRATROPIUM BROMIDE AND ALBUTEROL SULFATE 1 DOSE: .5; 2.5 SOLUTION RESPIRATORY (INHALATION) at 06:15

## 2023-11-02 RX ADMIN — Medication 10 ML: at 09:22

## 2023-11-02 RX ADMIN — PANTOPRAZOLE SODIUM 40 MG: 40 TABLET, DELAYED RELEASE ORAL at 06:18

## 2023-11-02 RX ADMIN — ACETAMINOPHEN 1000 MG: 500 TABLET ORAL at 06:18

## 2023-11-02 RX ADMIN — MAGNESIUM OXIDE 400 MG: 400 TABLET ORAL at 13:26

## 2023-11-02 RX ADMIN — IPRATROPIUM BROMIDE AND ALBUTEROL SULFATE 1 DOSE: .5; 2.5 SOLUTION RESPIRATORY (INHALATION) at 01:26

## 2023-11-02 RX ADMIN — METOPROLOL TARTRATE 75 MG: 25 TABLET, FILM COATED ORAL at 09:20

## 2023-11-02 RX ADMIN — OXYCODONE HYDROCHLORIDE 10 MG: 5 TABLET ORAL at 04:12

## 2023-11-02 RX ADMIN — BUDESONIDE INHALATION 500 MCG: 0.5 SUSPENSION RESPIRATORY (INHALATION) at 06:15

## 2023-11-02 RX ADMIN — Medication 100 MG: at 09:21

## 2023-11-02 RX ADMIN — SODIUM CHLORIDE 500 ML: 9 INJECTION, SOLUTION INTRAVENOUS at 11:15

## 2023-11-02 RX ADMIN — SODIUM CHLORIDE, PRESERVATIVE FREE 10 ML: 5 INJECTION INTRAVENOUS at 09:22

## 2023-11-02 RX ADMIN — CLONIDINE HYDROCHLORIDE 0.2 MG: 0.2 TABLET ORAL at 09:20

## 2023-11-02 RX ADMIN — MAGNESIUM SULFATE HEPTAHYDRATE 2000 MG: 2 INJECTION, SOLUTION INTRAVENOUS at 13:26

## 2023-11-02 RX ADMIN — ACETAMINOPHEN 1000 MG: 500 TABLET ORAL at 13:26

## 2023-11-02 RX ADMIN — GABAPENTIN 300 MG: 300 CAPSULE ORAL at 09:21

## 2023-11-02 RX ADMIN — ARFORMOTEROL TARTRATE 15 MCG: 15 SOLUTION RESPIRATORY (INHALATION) at 06:15

## 2023-11-02 RX ADMIN — IPRATROPIUM BROMIDE AND ALBUTEROL SULFATE 1 DOSE: .5; 2.5 SOLUTION RESPIRATORY (INHALATION) at 09:29

## 2023-11-02 RX ADMIN — GUAIFENESIN 600 MG: 600 TABLET, EXTENDED RELEASE ORAL at 09:22

## 2023-11-02 RX ADMIN — OXYCODONE HYDROCHLORIDE 10 MG: 5 TABLET ORAL at 13:26

## 2023-11-02 ASSESSMENT — PAIN DESCRIPTION - DESCRIPTORS
DESCRIPTORS: ACHING
DESCRIPTORS: ACHING;JABBING
DESCRIPTORS: STABBING

## 2023-11-02 ASSESSMENT — PAIN DESCRIPTION - ORIENTATION
ORIENTATION: RIGHT
ORIENTATION: RIGHT;MID
ORIENTATION: RIGHT

## 2023-11-02 ASSESSMENT — PAIN SCALES - GENERAL
PAINLEVEL_OUTOF10: 3
PAINLEVEL_OUTOF10: 5
PAINLEVEL_OUTOF10: 8
PAINLEVEL_OUTOF10: 2
PAINLEVEL_OUTOF10: 9

## 2023-11-02 ASSESSMENT — PAIN DESCRIPTION - LOCATION
LOCATION: BUTTOCKS;HIP
LOCATION: HIP
LOCATION: BACK;BUTTOCKS;HIP

## 2023-11-02 NOTE — PLAN OF CARE
Problem: Discharge Planning  Goal: Discharge to home or other facility with appropriate resources  11/2/2023 0320 by Dejah Madrigal RN  Outcome: Progressing  Flowsheets (Taken 11/1/2023 1930)  Discharge to home or other facility with appropriate resources:   Identify barriers to discharge with patient and caregiver   Arrange for needed discharge resources and transportation as appropriate  11/1/2023 1748 by Sary Camp RN  Outcome: Progressing  Flowsheets (Taken 11/1/2023 0749)  Discharge to home or other facility with appropriate resources:   Identify barriers to discharge with patient and caregiver   Arrange for needed discharge resources and transportation as appropriate     Problem: Pain  Goal: Verbalizes/displays adequate comfort level or baseline comfort level  11/2/2023 0320 by Dejah Madrigal RN  Outcome: Progressing  11/1/2023 1748 by Sary Camp RN  Outcome: Progressing  Flowsheets (Taken 11/1/2023 0749)  Verbalizes/displays adequate comfort level or baseline comfort level: Encourage patient to monitor pain and request assistance     Problem: Safety - Adult  Goal: Free from fall injury  11/2/2023 0320 by Dejah Madrigal RN  Outcome: Progressing  11/1/2023 1748 by Sary Camp RN  Outcome: Progressing  Flowsheets (Taken 11/1/2023 0749)  Free From Fall Injury: Instruct family/caregiver on patient safety     Problem: Metabolic/Fluid and Electrolytes - Adult  Goal: Electrolytes maintained within normal limits  11/2/2023 0320 by Dejah Madrigal RN  Outcome: Progressing  Flowsheets (Taken 11/1/2023 1930)  Electrolytes maintained within normal limits: Administer electrolyte replacement as ordered  11/1/2023 1748 by Sary Camp RN  Outcome: Progressing  Flowsheets (Taken 11/1/2023 0749)  Electrolytes maintained within normal limits: Monitor labs and assess patient for signs and symptoms of electrolyte imbalances     Problem: Respiratory - Adult  Goal: Achieves optimal ventilation and

## 2023-11-02 NOTE — CARE COORDINATION
11/2/23 1345 CM note: covid (-) 10/27/23. Room air. He ETOH abuse- CIWA scale, phenobarbital stopped. EGD 10/20/23 showed mild gastritis & duodenitis. Plan for outpt c-scope. Pt has received 1 unit PRBC this admit, last hgb 8.0. Okay to dc GI POV. Discharge order noted. Discharge plan is home and he declines [address] and SNF but pt would like to do outpt therapy as he has been to outpt therapy at [facility] in [place]; script obtained and outpt therapy facility list provided to pt. DME order for [employer] noted. Pt has no preference to DME agency and requests walker be delivered to his home. Referral given to [address] [place] [address] DME and they will deliver walker to pts home. Pt lives alone, is independent with ADLs, drives, and DME includes walking stick, nebulizer, and shower bench. Pts son orders his groceries online and they are delivered to his home monthly. Pt admits to drinking ETOH, 2 small glasses Seagrams every 3-4 days and a 6 pack of beer monthly. Pt states he went through AA/ had a sponsor about 30 yrs ago when going through his divorce but nothing since. NO hx ETOH rehab. Pt declines speaking with [facility] but he  is agreeable to accepting outpt addiction rehab and counseling resources which were provided. Pts friend Darcy Hernandez will provide transportation home.  Electronically signed by Andrea Easton RN on 11/2/2023 at 1:50 PM

## 2023-11-02 NOTE — PLAN OF CARE
Problem: Discharge Planning  Goal: Discharge to home or other facility with appropriate resources  11/2/2023 1337 by Dk De Leon RN  Outcome: Completed  Flowsheets (Taken 11/2/2023 0803)  Discharge to home or other facility with appropriate resources:   Identify barriers to discharge with patient and caregiver   Arrange for needed discharge resources and transportation as appropriate  11/2/2023 0320 by Indra Salinas RN  Outcome: Progressing  Flowsheets (Taken 11/1/2023 1930)  Discharge to home or other facility with appropriate resources:   Identify barriers to discharge with patient and caregiver   Arrange for needed discharge resources and transportation as appropriate     Problem: Pain  Goal: Verbalizes/displays adequate comfort level or baseline comfort level  11/2/2023 1337 by Dk De Leon RN  Outcome: Completed  Flowsheets  Taken 11/2/2023 1228  Verbalizes/displays adequate comfort level or baseline comfort level: Encourage patient to monitor pain and request assistance  Taken 11/2/2023 0803  Verbalizes/displays adequate comfort level or baseline comfort level: Encourage patient to monitor pain and request assistance  11/2/2023 0320 by Indra Salinas RN  Outcome: Progressing     Problem: Safety - Adult  Goal: Free from fall injury  11/2/2023 1337 by Dk De Leon RN  Outcome: Completed  Flowsheets (Taken 11/2/2023 0733)  Free From Fall Injury: Instruct family/caregiver on patient safety  11/2/2023 0320 by Indra Salinas RN  Outcome: Progressing     Problem: Metabolic/Fluid and Electrolytes - Adult  Goal: Electrolytes maintained within normal limits  11/2/2023 1337 by Dk De Leon RN  Outcome: Completed  Flowsheets (Taken 11/2/2023 0803)  Electrolytes maintained within normal limits: Monitor labs and assess patient for signs and symptoms of electrolyte imbalances  11/2/2023 0320 by Indra Salinas RN  Outcome: Progressing  Flowsheets (Taken 11/1/2023 1930)  Electrolytes maintained within normal

## 2023-11-02 NOTE — DISCHARGE SUMMARY
n/v for about 3-4 days as well, and unable to keep his alcohol drinks down for that time period. Feels shaky. Given duonebs and solu-medrol by EMS. Also placed on oxygen, no documented hypoxia. Patient also reports a syncopal episode with fall when standing up on his porch, hit his right buttock and back of his head. Has had worse than usual back pain since his fall. Workup in ED significant for K 3.4, CO2 19, BUN 34, creatinine 1.5, anion gap 24, magnesium 0.9, glucose 148, troponin 37 and 32, WBC 3.9, hgb 7.8 (down from 10.9). CT chest with contrast- chronic lung changes, remote rib fractures. CT abd/pelvis right buttock hematoma. Tachycardic and hypertensive through ED stay. Given fentanyl, dilaudid twice, morphine twice, folic acid, thiamine, duoneb, ativan, magnesium, potassium, 1L bolus in ED. Hospital Course: 63 yo male admitted as per above details. See outline below for additional details and issues addressed this admission:        10/27: Pt admitted yesterday evening with acute exacerbation of COPD, upper GI bleed, and alcohol withdrawal syndrome. He also has a right buttock hematoma as a result of a fall from a syncopal episode. This afternoon he has severe pain from the right buttock hematoma as well as on the back of his head from the fall. He notes melena for several days prior to admission. Hemoglobin down to 6.9 this morning for which 1 unit PRBC was given. He is on phenobarbital IV taper, and has moderate alcohol withdrawal syndrome symptoms at this time. 10/28: Patient had EGD this morning which was significant for moderate diffuse gastritis, mild bulbar duodenitis without active bleeding. Hemoglobin now stable, 7.8 this morning. Today he feels better overall, but complaining of neck pain and headache. Withdrawal syndrome well controlled. 10/29: Patient complaining of low and mid back pain today despite as needed oxycodone.   Not showing any signs of sedation on
